# Patient Record
Sex: MALE | Race: WHITE | Employment: OTHER | URBAN - NONMETROPOLITAN AREA
[De-identification: names, ages, dates, MRNs, and addresses within clinical notes are randomized per-mention and may not be internally consistent; named-entity substitution may affect disease eponyms.]

---

## 2018-04-16 ENCOUNTER — OFFICE VISIT (OUTPATIENT)
Dept: FAMILY MEDICINE CLINIC | Age: 58
End: 2018-04-16
Payer: MEDICARE

## 2018-04-16 VITALS
HEIGHT: 73 IN | DIASTOLIC BLOOD PRESSURE: 82 MMHG | HEART RATE: 107 BPM | RESPIRATION RATE: 16 BRPM | BODY MASS INDEX: 36.18 KG/M2 | SYSTOLIC BLOOD PRESSURE: 134 MMHG | WEIGHT: 273 LBS | OXYGEN SATURATION: 92 %

## 2018-04-16 DIAGNOSIS — E11.9 TYPE 2 DIABETES MELLITUS WITHOUT COMPLICATION, WITHOUT LONG-TERM CURRENT USE OF INSULIN (HCC): ICD-10-CM

## 2018-04-16 DIAGNOSIS — F17.210 NICOTINE DEPENDENCE, CIGARETTES, UNCOMPLICATED: ICD-10-CM

## 2018-04-16 DIAGNOSIS — F41.1 GENERALIZED ANXIETY DISORDER: ICD-10-CM

## 2018-04-16 DIAGNOSIS — Z13.31 POSITIVE DEPRESSION SCREENING: ICD-10-CM

## 2018-04-16 DIAGNOSIS — R22.1 MASS IN NECK: ICD-10-CM

## 2018-04-16 DIAGNOSIS — M54.2 NECK PAIN: ICD-10-CM

## 2018-04-16 DIAGNOSIS — F33.1 MODERATE EPISODE OF RECURRENT MAJOR DEPRESSIVE DISORDER (HCC): Primary | ICD-10-CM

## 2018-04-16 PROCEDURE — G0296 VISIT TO DETERM LDCT ELIG: HCPCS | Performed by: FAMILY MEDICINE

## 2018-04-16 PROCEDURE — 3046F HEMOGLOBIN A1C LEVEL >9.0%: CPT | Performed by: FAMILY MEDICINE

## 2018-04-16 PROCEDURE — G8431 POS CLIN DEPRES SCRN F/U DOC: HCPCS | Performed by: FAMILY MEDICINE

## 2018-04-16 PROCEDURE — 99406 BEHAV CHNG SMOKING 3-10 MIN: CPT | Performed by: FAMILY MEDICINE

## 2018-04-16 PROCEDURE — 3017F COLORECTAL CA SCREEN DOC REV: CPT | Performed by: FAMILY MEDICINE

## 2018-04-16 PROCEDURE — 4004F PT TOBACCO SCREEN RCVD TLK: CPT | Performed by: FAMILY MEDICINE

## 2018-04-16 PROCEDURE — 99205 OFFICE O/P NEW HI 60 MIN: CPT | Performed by: FAMILY MEDICINE

## 2018-04-16 PROCEDURE — 96372 THER/PROPH/DIAG INJ SC/IM: CPT | Performed by: FAMILY MEDICINE

## 2018-04-16 PROCEDURE — G8417 CALC BMI ABV UP PARAM F/U: HCPCS | Performed by: FAMILY MEDICINE

## 2018-04-16 PROCEDURE — G8427 DOCREV CUR MEDS BY ELIG CLIN: HCPCS | Performed by: FAMILY MEDICINE

## 2018-04-16 PROCEDURE — 2022F DILAT RTA XM EVC RTNOPTHY: CPT | Performed by: FAMILY MEDICINE

## 2018-04-16 RX ORDER — QUETIAPINE FUMARATE 100 MG/1
150 TABLET, FILM COATED ORAL DAILY
Qty: 45 TABLET | Refills: 3
Start: 2018-04-16 | End: 2018-04-16 | Stop reason: CLARIF

## 2018-04-16 RX ORDER — QUETIAPINE FUMARATE 100 MG/1
150 TABLET, FILM COATED ORAL DAILY
Qty: 45 TABLET | Refills: 3 | Status: SHIPPED | OUTPATIENT
Start: 2018-04-16 | End: 2018-06-04

## 2018-04-16 RX ORDER — SERTRALINE HYDROCHLORIDE 100 MG/1
100 TABLET, FILM COATED ORAL DAILY
COMMUNITY
End: 2018-04-16 | Stop reason: CLARIF

## 2018-04-16 RX ORDER — VARENICLINE TARTRATE 25 MG
KIT ORAL
Qty: 1 EACH | Refills: 0 | Status: SHIPPED | OUTPATIENT
Start: 2018-04-16 | End: 2018-04-27 | Stop reason: ALTCHOICE

## 2018-04-16 RX ORDER — KETOROLAC TROMETHAMINE 30 MG/ML
60 INJECTION, SOLUTION INTRAMUSCULAR; INTRAVENOUS ONCE
Status: COMPLETED | OUTPATIENT
Start: 2018-04-16 | End: 2018-04-16

## 2018-04-16 RX ORDER — SERTRALINE HYDROCHLORIDE 100 MG/1
100 TABLET, FILM COATED ORAL DAILY
Qty: 30 TABLET | Refills: 3 | Status: SHIPPED | OUTPATIENT
Start: 2018-04-16 | End: 2018-06-04 | Stop reason: SDUPTHER

## 2018-04-16 RX ORDER — VARENICLINE TARTRATE 25 MG
KIT ORAL
Qty: 1 EACH | Refills: 0 | Status: SHIPPED | OUTPATIENT
Start: 2018-04-16 | End: 2018-04-16 | Stop reason: CLARIF

## 2018-04-16 RX ADMIN — KETOROLAC TROMETHAMINE 60 MG: 30 INJECTION, SOLUTION INTRAMUSCULAR; INTRAVENOUS at 14:00

## 2018-04-16 ASSESSMENT — PATIENT HEALTH QUESTIONNAIRE - PHQ9
SUM OF ALL RESPONSES TO PHQ9 QUESTIONS 1 & 2: 3
3. TROUBLE FALLING OR STAYING ASLEEP: 3
1. LITTLE INTEREST OR PLEASURE IN DOING THINGS: 0
5. POOR APPETITE OR OVEREATING: 1
4. FEELING TIRED OR HAVING LITTLE ENERGY: 1
9. THOUGHTS THAT YOU WOULD BE BETTER OFF DEAD, OR OF HURTING YOURSELF: 1
2. FEELING DOWN, DEPRESSED OR HOPELESS: 3
6. FEELING BAD ABOUT YOURSELF - OR THAT YOU ARE A FAILURE OR HAVE LET YOURSELF OR YOUR FAMILY DOWN: 1
SUM OF ALL RESPONSES TO PHQ QUESTIONS 1-9: 13
8. MOVING OR SPEAKING SO SLOWLY THAT OTHER PEOPLE COULD HAVE NOTICED. OR THE OPPOSITE, BEING SO FIGETY OR RESTLESS THAT YOU HAVE BEEN MOVING AROUND A LOT MORE THAN USUAL: 3
7. TROUBLE CONCENTRATING ON THINGS, SUCH AS READING THE NEWSPAPER OR WATCHING TELEVISION: 0
10. IF YOU CHECKED OFF ANY PROBLEMS, HOW DIFFICULT HAVE THESE PROBLEMS MADE IT FOR YOU TO DO YOUR WORK, TAKE CARE OF THINGS AT HOME, OR GET ALONG WITH OTHER PEOPLE: 3

## 2018-04-16 ASSESSMENT — ENCOUNTER SYMPTOMS
ABDOMINAL PAIN: 0
SHORTNESS OF BREATH: 1
CONSTIPATION: 0
NAUSEA: 0
DIARRHEA: 0
RHINORRHEA: 0
COUGH: 1
COLOR CHANGE: 1
WHEEZING: 0
EYE DISCHARGE: 0
SORE THROAT: 0

## 2018-04-23 ENCOUNTER — HOSPITAL ENCOUNTER (OUTPATIENT)
Age: 58
Discharge: HOME | End: 2018-04-23
Payer: MEDICARE

## 2018-04-23 ENCOUNTER — HOSPITAL ENCOUNTER (OUTPATIENT)
Dept: CT IMAGING | Age: 58
Discharge: HOME | End: 2018-04-23
Payer: MEDICARE

## 2018-04-23 DIAGNOSIS — E11.9 TYPE 2 DIABETES MELLITUS WITHOUT COMPLICATION, WITHOUT LONG-TERM CURRENT USE OF INSULIN (HCC): ICD-10-CM

## 2018-04-23 DIAGNOSIS — F17.210 NICOTINE DEPENDENCE, CIGARETTES, UNCOMPLICATED: ICD-10-CM

## 2018-04-23 LAB
ALBUMIN SERPL-MCNC: 3.8 G/DL (ref 3.5–5.1)
ALP BLD-CCNC: 99 U/L (ref 38–126)
ALT SERPL-CCNC: 13 U/L (ref 11–66)
ANION GAP SERPL CALCULATED.3IONS-SCNC: 12 MEQ/L (ref 8–16)
ANISOCYTOSIS: ABNORMAL
AST SERPL-CCNC: 15 U/L (ref 5–40)
AVERAGE GLUCOSE: 96 MG/DL (ref 70–126)
BASOPHILS # BLD: 0.3 %
BASOPHILS ABSOLUTE: 0 THOU/MM3 (ref 0–0.1)
BILIRUB SERPL-MCNC: 0.4 MG/DL (ref 0.3–1.2)
BUN BLDV-MCNC: 27 MG/DL (ref 7–22)
CALCIUM SERPL-MCNC: 8.9 MG/DL (ref 8.5–10.5)
CHLORIDE BLD-SCNC: 104 MEQ/L (ref 98–111)
CHOLESTEROL, TOTAL: 189 MG/DL (ref 100–199)
CO2: 26 MEQ/L (ref 23–33)
CREAT SERPL-MCNC: 1.1 MG/DL (ref 0.4–1.2)
EOSINOPHIL # BLD: 2.2 %
EOSINOPHILS ABSOLUTE: 0.3 THOU/MM3 (ref 0–0.4)
GFR SERPL CREATININE-BSD FRML MDRD: 68 ML/MIN/1.73M2
GLUCOSE BLD-MCNC: 99 MG/DL (ref 70–108)
HBA1C MFR BLD: 5.2 % (ref 4.4–6.4)
HCT VFR BLD CALC: 48.4 % (ref 42–52)
HDLC SERPL-MCNC: 67 MG/DL
HEMOGLOBIN: 16.1 GM/DL (ref 14–18)
LDL CHOLESTEROL CALCULATED: 111 MG/DL
LYMPHOCYTES # BLD: 14.1 %
LYMPHOCYTES ABSOLUTE: 1.7 THOU/MM3 (ref 1–4.8)
MCH RBC QN AUTO: 30.5 PG (ref 27–31)
MCHC RBC AUTO-ENTMCNC: 33.2 GM/DL (ref 33–37)
MCV RBC AUTO: 92 FL (ref 80–94)
MONOCYTES # BLD: 6.5 %
MONOCYTES ABSOLUTE: 0.8 THOU/MM3 (ref 0.4–1.3)
NUCLEATED RED BLOOD CELLS: 0 /100 WBC
PDW BLD-RTO: 15.3 % (ref 11.5–14.5)
PLATELET # BLD: 258 THOU/MM3 (ref 130–400)
PMV BLD AUTO: 8.7 FL (ref 7.4–10.4)
POTASSIUM SERPL-SCNC: 4.7 MEQ/L (ref 3.5–5.2)
RBC # BLD: 5.26 MILL/MM3 (ref 4.7–6.1)
SEG NEUTROPHILS: 76.9 %
SEGMENTED NEUTROPHILS ABSOLUTE COUNT: 9.1 THOU/MM3 (ref 1.8–7.7)
SODIUM BLD-SCNC: 142 MEQ/L (ref 135–145)
TOTAL PROTEIN: 6.6 G/DL (ref 6.1–8)
TRIGL SERPL-MCNC: 57 MG/DL (ref 0–199)
WBC # BLD: 11.8 THOU/MM3 (ref 4.8–10.8)

## 2018-04-23 PROCEDURE — 83036 HEMOGLOBIN GLYCOSYLATED A1C: CPT

## 2018-04-23 PROCEDURE — 80061 LIPID PANEL: CPT

## 2018-04-23 PROCEDURE — 36415 COLL VENOUS BLD VENIPUNCTURE: CPT

## 2018-04-23 PROCEDURE — 85025 COMPLETE CBC W/AUTO DIFF WBC: CPT

## 2018-04-23 PROCEDURE — G0297 LDCT FOR LUNG CA SCREEN: HCPCS

## 2018-04-23 PROCEDURE — 80053 COMPREHEN METABOLIC PANEL: CPT

## 2018-04-27 ENCOUNTER — HOSPITAL ENCOUNTER (OUTPATIENT)
Age: 58
Discharge: HOME | End: 2018-04-27
Payer: MEDICARE

## 2018-04-27 ENCOUNTER — OFFICE VISIT (OUTPATIENT)
Dept: ENT CLINIC | Age: 58
End: 2018-04-27
Payer: MEDICARE

## 2018-04-27 ENCOUNTER — TELEPHONE (OUTPATIENT)
Dept: ENT CLINIC | Age: 58
End: 2018-04-27

## 2018-04-27 ENCOUNTER — HOSPITAL ENCOUNTER (OUTPATIENT)
Dept: CT IMAGING | Age: 58
Discharge: HOME | End: 2018-04-27
Payer: MEDICARE

## 2018-04-27 VITALS
TEMPERATURE: 98.4 F | SYSTOLIC BLOOD PRESSURE: 122 MMHG | HEIGHT: 72 IN | BODY MASS INDEX: 37.65 KG/M2 | DIASTOLIC BLOOD PRESSURE: 70 MMHG | RESPIRATION RATE: 12 BRPM | WEIGHT: 278 LBS | HEART RATE: 88 BPM

## 2018-04-27 DIAGNOSIS — R59.0 LEFT CERVICAL LYMPHADENOPATHY: ICD-10-CM

## 2018-04-27 DIAGNOSIS — R22.1 MASS OF LEFT SIDE OF NECK: ICD-10-CM

## 2018-04-27 DIAGNOSIS — M54.2 CERVICALGIA: ICD-10-CM

## 2018-04-27 DIAGNOSIS — L98.8 DRAINING CUTANEOUS SINUS TRACT: ICD-10-CM

## 2018-04-27 DIAGNOSIS — D37.02 NEOPLASM OF UNCERTAIN BEHAVIOR OF BASE OF TONGUE: Primary | ICD-10-CM

## 2018-04-27 DIAGNOSIS — D37.02 NEOPLASM OF UNCERTAIN BEHAVIOR OF BASE OF TONGUE: ICD-10-CM

## 2018-04-27 PROCEDURE — 3017F COLORECTAL CA SCREEN DOC REV: CPT | Performed by: OTOLARYNGOLOGY

## 2018-04-27 PROCEDURE — 4004F PT TOBACCO SCREEN RCVD TLK: CPT | Performed by: OTOLARYNGOLOGY

## 2018-04-27 PROCEDURE — G8427 DOCREV CUR MEDS BY ELIG CLIN: HCPCS | Performed by: OTOLARYNGOLOGY

## 2018-04-27 PROCEDURE — 70491 CT SOFT TISSUE NECK W/DYE: CPT

## 2018-04-27 PROCEDURE — G8417 CALC BMI ABV UP PARAM F/U: HCPCS | Performed by: OTOLARYNGOLOGY

## 2018-04-27 PROCEDURE — 6360000004 HC RX CONTRAST MEDICATION: Performed by: OTOLARYNGOLOGY

## 2018-04-27 PROCEDURE — 99203 OFFICE O/P NEW LOW 30 MIN: CPT | Performed by: OTOLARYNGOLOGY

## 2018-04-27 RX ORDER — IBUPROFEN 600 MG/1
600 TABLET ORAL EVERY 6 HOURS PRN
Qty: 50 TABLET | Refills: 1 | Status: SHIPPED | OUTPATIENT
Start: 2018-04-27 | End: 2018-05-10 | Stop reason: ALTCHOICE

## 2018-04-27 RX ADMIN — IOPAMIDOL 80 ML: 755 INJECTION, SOLUTION INTRAVENOUS at 18:10

## 2018-04-27 ASSESSMENT — ENCOUNTER SYMPTOMS
TROUBLE SWALLOWING: 0
WHEEZING: 0
DIARRHEA: 0
APNEA: 0
FACIAL SWELLING: 0
SHORTNESS OF BREATH: 0
ABDOMINAL PAIN: 0
COUGH: 0
VOICE CHANGE: 0
NAUSEA: 0
RHINORRHEA: 0
STRIDOR: 0
CHOKING: 0
CHEST TIGHTNESS: 0
SORE THROAT: 0
VOMITING: 0
SINUS PRESSURE: 0
COLOR CHANGE: 0

## 2018-04-30 ENCOUNTER — TELEPHONE (OUTPATIENT)
Dept: ENT CLINIC | Age: 58
End: 2018-04-30

## 2018-05-03 ENCOUNTER — TELEPHONE (OUTPATIENT)
Dept: ENT CLINIC | Age: 58
End: 2018-05-03

## 2018-05-03 DIAGNOSIS — M54.2 CERVICALGIA: Primary | ICD-10-CM

## 2018-05-03 DIAGNOSIS — D37.02 NEOPLASM OF UNCERTAIN BEHAVIOR OF BASE OF TONGUE: ICD-10-CM

## 2018-05-03 DIAGNOSIS — R59.0 CERVICAL LYMPHADENOPATHY: ICD-10-CM

## 2018-05-03 RX ORDER — HYDROCODONE BITARTRATE AND ACETAMINOPHEN 5; 325 MG/1; MG/1
1 TABLET ORAL EVERY 4 HOURS PRN
Qty: 42 TABLET | Refills: 0 | Status: SHIPPED | OUTPATIENT
Start: 2018-05-03 | End: 2018-05-10

## 2018-05-07 ENCOUNTER — HOSPITAL ENCOUNTER (OUTPATIENT)
Dept: ULTRASOUND IMAGING | Age: 58
Discharge: HOME | End: 2018-05-07
Payer: MEDICARE

## 2018-05-07 DIAGNOSIS — R59.0 LEFT CERVICAL LYMPHADENOPATHY: ICD-10-CM

## 2018-05-07 DIAGNOSIS — D37.02 NEOPLASM OF UNCERTAIN BEHAVIOR OF BASE OF TONGUE: ICD-10-CM

## 2018-05-07 PROCEDURE — 88333 PATH CONSLTJ SURG CYTO XM 1: CPT

## 2018-05-07 PROCEDURE — 88341 IMHCHEM/IMCYTCHM EA ADD ANTB: CPT

## 2018-05-07 PROCEDURE — 88305 TISSUE EXAM BY PATHOLOGIST: CPT

## 2018-05-07 PROCEDURE — 88334 PATH CONSLTJ SURG CYTO XM EA: CPT

## 2018-05-07 PROCEDURE — 76942 ECHO GUIDE FOR BIOPSY: CPT

## 2018-05-07 PROCEDURE — 88342 IMHCHEM/IMCYTCHM 1ST ANTB: CPT

## 2018-05-09 ENCOUNTER — TELEPHONE (OUTPATIENT)
Dept: ENT CLINIC | Age: 58
End: 2018-05-09

## 2018-05-10 ENCOUNTER — OFFICE VISIT (OUTPATIENT)
Dept: ENT CLINIC | Age: 58
End: 2018-05-10
Payer: MEDICARE

## 2018-05-10 VITALS
DIASTOLIC BLOOD PRESSURE: 70 MMHG | BODY MASS INDEX: 37.38 KG/M2 | TEMPERATURE: 98.1 F | HEIGHT: 72 IN | RESPIRATION RATE: 12 BRPM | HEART RATE: 80 BPM | SYSTOLIC BLOOD PRESSURE: 124 MMHG | WEIGHT: 276 LBS

## 2018-05-10 DIAGNOSIS — C77.9 METASTATIC SQUAMOUS CELL CARCINOMA TO LYMPH NODE (HCC): ICD-10-CM

## 2018-05-10 DIAGNOSIS — M54.2 CERVICALGIA: ICD-10-CM

## 2018-05-10 DIAGNOSIS — C09.9 SQUAMOUS CELL CARCINOMA OF LEFT TONSIL (HCC): Primary | ICD-10-CM

## 2018-05-10 DIAGNOSIS — L98.8 DRAINING CUTANEOUS SINUS TRACT: ICD-10-CM

## 2018-05-10 PROCEDURE — 4004F PT TOBACCO SCREEN RCVD TLK: CPT | Performed by: OTOLARYNGOLOGY

## 2018-05-10 PROCEDURE — G8427 DOCREV CUR MEDS BY ELIG CLIN: HCPCS | Performed by: OTOLARYNGOLOGY

## 2018-05-10 PROCEDURE — 3017F COLORECTAL CA SCREEN DOC REV: CPT | Performed by: OTOLARYNGOLOGY

## 2018-05-10 PROCEDURE — G8417 CALC BMI ABV UP PARAM F/U: HCPCS | Performed by: OTOLARYNGOLOGY

## 2018-05-10 PROCEDURE — 99214 OFFICE O/P EST MOD 30 MIN: CPT | Performed by: OTOLARYNGOLOGY

## 2018-05-10 RX ORDER — HYDROCODONE BITARTRATE AND ACETAMINOPHEN 7.5; 325 MG/1; MG/1
1 TABLET ORAL EVERY 4 HOURS PRN
Qty: 42 TABLET | Refills: 0 | Status: SHIPPED | OUTPATIENT
Start: 2018-05-10 | End: 2018-05-17

## 2018-05-10 ASSESSMENT — ENCOUNTER SYMPTOMS
SORE THROAT: 0
APNEA: 0
STRIDOR: 0
TROUBLE SWALLOWING: 0
RHINORRHEA: 0
DIARRHEA: 0
COLOR CHANGE: 0
NAUSEA: 0
CHEST TIGHTNESS: 0
COUGH: 0
VOICE CHANGE: 0
WHEEZING: 0
SINUS PRESSURE: 0
FACIAL SWELLING: 0
VOMITING: 0
CHOKING: 0
SHORTNESS OF BREATH: 0
ABDOMINAL PAIN: 0

## 2018-05-14 ENCOUNTER — HOSPITAL ENCOUNTER (OUTPATIENT)
Dept: PET IMAGING | Age: 58
Discharge: HOME | End: 2018-05-14
Payer: MEDICARE

## 2018-05-14 DIAGNOSIS — C77.9 METASTATIC SQUAMOUS CELL CARCINOMA TO LYMPH NODE (HCC): ICD-10-CM

## 2018-05-14 DIAGNOSIS — C09.9 SQUAMOUS CELL CARCINOMA OF LEFT TONSIL (HCC): ICD-10-CM

## 2018-05-14 PROCEDURE — 78815 PET IMAGE W/CT SKULL-THIGH: CPT

## 2018-05-14 PROCEDURE — 3430000000 HC RX DIAGNOSTIC RADIOPHARMACEUTICAL: Performed by: OTOLARYNGOLOGY

## 2018-05-14 PROCEDURE — A9552 F18 FDG: HCPCS | Performed by: OTOLARYNGOLOGY

## 2018-05-14 RX ORDER — FLUDEOXYGLUCOSE F 18 200 MCI/ML
14.7 INJECTION, SOLUTION INTRAVENOUS
Status: COMPLETED | OUTPATIENT
Start: 2018-05-14 | End: 2018-05-14

## 2018-05-14 RX ADMIN — FLUDEOXYGLUCOSE F 18 14.7 MILLICURIE: 200 INJECTION, SOLUTION INTRAVENOUS at 07:40

## 2018-05-15 ENCOUNTER — CLINICAL DOCUMENTATION (OUTPATIENT)
Dept: CASE MANAGEMENT | Age: 58
End: 2018-05-15

## 2018-05-15 ENCOUNTER — HOSPITAL ENCOUNTER (OUTPATIENT)
Dept: INFUSION THERAPY | Age: 58
Discharge: HOME | End: 2018-05-15
Payer: MEDICARE

## 2018-05-15 ENCOUNTER — HOSPITAL ENCOUNTER (OUTPATIENT)
Dept: RADIATION ONCOLOGY | Age: 58
Discharge: HOME | End: 2018-05-15
Payer: MEDICARE

## 2018-05-15 ENCOUNTER — OFFICE VISIT (OUTPATIENT)
Dept: ONCOLOGY | Age: 58
End: 2018-05-15
Payer: MEDICARE

## 2018-05-15 VITALS
RESPIRATION RATE: 18 BRPM | OXYGEN SATURATION: 93 % | HEIGHT: 72 IN | BODY MASS INDEX: 36.57 KG/M2 | HEART RATE: 72 BPM | WEIGHT: 270 LBS | TEMPERATURE: 98.6 F | SYSTOLIC BLOOD PRESSURE: 137 MMHG | DIASTOLIC BLOOD PRESSURE: 73 MMHG

## 2018-05-15 DIAGNOSIS — C09.9 MALIGNANT NEOPLASM OF TONSIL (HCC): ICD-10-CM

## 2018-05-15 DIAGNOSIS — C09.9 MALIGNANT NEOPLASM OF TONSIL (HCC): Primary | ICD-10-CM

## 2018-05-15 DIAGNOSIS — C10.8 MALIGNANT NEOPLASM OF OVERLAPPING SITES OF OROPHARYNX (HCC): Primary | ICD-10-CM

## 2018-05-15 DIAGNOSIS — C76.0 HEAD AND NECK CANCER (HCC): Primary | ICD-10-CM

## 2018-05-15 DIAGNOSIS — C10.8 MALIGNANT NEOPLASM OF OVERLAPPING SITES OF OROPHARYNX (HCC): ICD-10-CM

## 2018-05-15 PROBLEM — C02.9 TONGUE CARCINOMA (HCC): Status: ACTIVE | Noted: 2018-05-15

## 2018-05-15 LAB
ALBUMIN SERPL-MCNC: 3.4 G/DL (ref 3.5–5.1)
ALP BLD-CCNC: 98 U/L (ref 38–126)
ALT SERPL-CCNC: 15 U/L (ref 11–66)
AST SERPL-CCNC: 16 U/L (ref 5–40)
BASINOPHIL, AUTOMATED: 1 % (ref 0–3)
BILIRUB SERPL-MCNC: 0.4 MG/DL (ref 0.3–1.2)
BILIRUBIN DIRECT: < 0.2 MG/DL (ref 0–0.3)
BUN, WHOLE BLOOD: 24 MG/DL (ref 8–26)
CHLORIDE, WHOLE BLOOD: 102 MEQ/L (ref 98–109)
CREATININE, WHOLE BLOOD: 1.1 MG/DL (ref 0.5–1.2)
EOSINOPHILS RELATIVE PERCENT: 3 % (ref 0–4)
GFR, ESTIMATED: 73 ML/MIN/1.73M2
GLUCOSE, WHOLE BLOOD: 108 MG/DL (ref 70–108)
HCT VFR BLD CALC: 46.2 % (ref 42–52)
HEMOGLOBIN: 15.4 GM/DL (ref 14–18)
IONIZED CALCIUM, WHOLE BLOOD: 1.15 MMOL/L (ref 1.12–1.32)
LYMPHOCYTES # BLD: 17 % (ref 15–47)
MCH RBC QN AUTO: 31.3 PG (ref 27–31)
MCHC RBC AUTO-ENTMCNC: 33.3 GM/DL (ref 33–37)
MCV RBC AUTO: 94 FL (ref 80–94)
MONOCYTES: 8 % (ref 0–12)
PDW BLD-RTO: 12.2 % (ref 11.5–14.5)
PLATELET # BLD: 243 THOU/MM3 (ref 130–400)
PMV BLD AUTO: 7.2 FL (ref 7.4–10.4)
POTASSIUM, WHOLE BLOOD: 3.8 MEQ/L (ref 3.5–4.9)
RBC # BLD: 4.92 MILL/MM3 (ref 4.7–6.1)
SEG NEUTROPHILS: 72 % (ref 43–75)
SODIUM, WHOLE BLOOD: 138 MEQ/L (ref 138–146)
TOTAL CO2, WHOLE BLOOD: 26 MEQ/L (ref 23–33)
TOTAL PROTEIN: 6.6 G/DL (ref 6.1–8)
WBC # BLD: 10.3 THOU/MM3 (ref 4.8–10.8)

## 2018-05-15 PROCEDURE — 80047 BASIC METABLC PNL IONIZED CA: CPT

## 2018-05-15 PROCEDURE — 85025 COMPLETE CBC W/AUTO DIFF WBC: CPT

## 2018-05-15 PROCEDURE — 99211 OFF/OP EST MAY X REQ PHY/QHP: CPT

## 2018-05-15 PROCEDURE — 99204 OFFICE O/P NEW MOD 45 MIN: CPT | Performed by: PHYSICIAN ASSISTANT

## 2018-05-15 PROCEDURE — 36415 COLL VENOUS BLD VENIPUNCTURE: CPT

## 2018-05-15 PROCEDURE — 99202 OFFICE O/P NEW SF 15 MIN: CPT | Performed by: RADIOLOGY

## 2018-05-15 PROCEDURE — 80076 HEPATIC FUNCTION PANEL: CPT

## 2018-05-15 RX ORDER — HEPARIN SODIUM (PORCINE) LOCK FLUSH IV SOLN 100 UNIT/ML 100 UNIT/ML
500 SOLUTION INTRAVENOUS PRN
Status: CANCELLED | OUTPATIENT
Start: 2018-05-15

## 2018-05-15 RX ORDER — SODIUM CHLORIDE 0.9 % (FLUSH) 0.9 %
20 SYRINGE (ML) INJECTION PRN
Status: CANCELLED | OUTPATIENT
Start: 2018-05-15

## 2018-05-15 RX ORDER — SODIUM CHLORIDE 0.9 % (FLUSH) 0.9 %
10 SYRINGE (ML) INJECTION PRN
Status: CANCELLED | OUTPATIENT
Start: 2018-05-15

## 2018-05-15 ASSESSMENT — ENCOUNTER SYMPTOMS
CHEST TIGHTNESS: 0
WHEEZING: 1
NAUSEA: 0
VOMITING: 0
SHORTNESS OF BREATH: 1
BLOOD IN STOOL: 0
TROUBLE SWALLOWING: 1
COUGH: 1
DIARRHEA: 0
SORE THROAT: 1
CONSTIPATION: 0
ABDOMINAL PAIN: 0

## 2018-05-17 ENCOUNTER — HOSPITAL ENCOUNTER (OUTPATIENT)
Dept: CT IMAGING | Age: 58
Discharge: HOME | End: 2018-05-17
Payer: MEDICARE

## 2018-05-17 ENCOUNTER — TELEPHONE (OUTPATIENT)
Dept: ONCOLOGY | Age: 58
End: 2018-05-17

## 2018-05-17 DIAGNOSIS — C10.8 MALIGNANT NEOPLASM OF OVERLAPPING SITES OF OROPHARYNX (HCC): ICD-10-CM

## 2018-05-17 PROCEDURE — 77332 RADIATION TREATMENT AID(S): CPT | Performed by: RADIOLOGY

## 2018-05-17 PROCEDURE — 77334 RADIATION TREATMENT AID(S): CPT | Performed by: RADIOLOGY

## 2018-05-17 PROCEDURE — 3209999900 CT GUIDE RADIATION THERAPY NO CHARGE

## 2018-05-18 ENCOUNTER — HOSPITAL ENCOUNTER (OUTPATIENT)
Dept: INFUSION THERAPY | Age: 58
Discharge: HOME | End: 2018-05-18
Payer: MEDICARE

## 2018-05-18 DIAGNOSIS — C09.9 MALIGNANT NEOPLASM OF TONSIL (HCC): ICD-10-CM

## 2018-05-18 DIAGNOSIS — C09.9 SQUAMOUS CELL CARCINOMA OF LEFT TONSIL (HCC): ICD-10-CM

## 2018-05-18 DIAGNOSIS — C02.9 TONGUE CARCINOMA (HCC): ICD-10-CM

## 2018-05-18 PROCEDURE — 99212 OFFICE O/P EST SF 10 MIN: CPT

## 2018-05-18 NOTE — PROGRESS NOTES
Ofe in to see patient. Patient states he wants a port, she states will get it ordered and consult completed Monday. Patient later stated he would rather want IR to insert. [facility] AT [facility] notified. , he will talk with dr Sarabjit Campbell on Monday about this. Patient instructed on chemotherapy specifically the drugs cisplatin and  chemotherapy regimen. The [employer] folder along with the following - chemotherapy drug information sheets,chemotherapy side effects handouts,Understanding your blood counts, [place] diet,Importance to hydration,when to call physician sheet,reduce risk infection sheet,bleeding precaution,neutropenia precaution,Chemotherapy and You and Eating Hints books was included and reviewed. All questions answered satisfactory and support given. Distress Screening  Survey completed. Patient navigator explained to patient and informed that she may be calling him/her if needs assistance. Patient given a tour of facility. Approximately 60minutes spent with patient LACP called for  and scheduled for appointment  Tuesday.  Assisted off unit in wheelchair

## 2018-05-18 NOTE — PLAN OF CARE
Problem: Intellectual/Education/Knowledge Deficit  Intervention: Verbal/written education provided  Chemotherapy Teaching     What is Chemotherapy   Drug action [x]   Method of Administration [x]   Handouts given []     Side Effects  Nausea/vomiting [x]   Diarrhea [x]   Fatigue [x]   Signs / Symptoms of infection [x]   Neutropenia [x]   Thrombocytopenia [x]   Alopecia [x]   neuropathy [x]   [place] diet &  the importance of fluids [x]       Micellaneous  Importance of nutrition [x]   Importance of oral hygiene [x]   When to call the MD [x]   Monitoring labs [x]   Use of supportive services []     Explanation of Drug Regimen / Frequency  cisplatin     Comments  Verbalized understanding to drug,action,side effects and when to call MD       Goal: Teaching initiated upon admission  Outcome: Met This Shift  Patient verbalizes understanding to verbal information given on cisplatin,action and possible side effects. Aware to call MD if develop complications. Problem: Discharge Planning  Intervention: Discharge to appropriate level of care  Discuss discharge instructions, follow ups and when to call doctor. Goal: Knowledge of discharge instructions  Knowledge of discharge instructions    Outcome: Met This Shift  Verbalized understanding of discharge instructions, follow ups and when to call doctor    Comments: Care plan reviewed with patient . Patient verbalized understanding of the plan of care and contribute to goal setting.

## 2018-05-21 ENCOUNTER — TELEPHONE (OUTPATIENT)
Dept: FAMILY MEDICINE CLINIC | Age: 58
End: 2018-05-21

## 2018-05-21 DIAGNOSIS — F41.1 GENERALIZED ANXIETY DISORDER: Primary | ICD-10-CM

## 2018-05-21 DIAGNOSIS — C09.9 MALIGNANT NEOPLASM OF TONSIL (HCC): Primary | ICD-10-CM

## 2018-05-21 RX ORDER — CLONAZEPAM 1 MG/1
1 TABLET ORAL 3 TIMES DAILY PRN
Qty: 90 TABLET | Refills: 1 | Status: SHIPPED | OUTPATIENT
Start: 2018-05-21 | End: 2018-05-23 | Stop reason: SDUPTHER

## 2018-05-22 ENCOUNTER — CLINICAL DOCUMENTATION (OUTPATIENT)
Dept: CASE MANAGEMENT | Age: 58
End: 2018-05-22

## 2018-05-22 ENCOUNTER — HOSPITAL ENCOUNTER (OUTPATIENT)
Dept: INFUSION THERAPY | Age: 58
Discharge: HOME | End: 2018-05-22
Payer: MEDICARE

## 2018-05-22 VITALS
SYSTOLIC BLOOD PRESSURE: 151 MMHG | OXYGEN SATURATION: 95 % | RESPIRATION RATE: 18 BRPM | TEMPERATURE: 97.8 F | HEIGHT: 72 IN | BODY MASS INDEX: 38.87 KG/M2 | WEIGHT: 287 LBS | DIASTOLIC BLOOD PRESSURE: 79 MMHG | HEART RATE: 74 BPM

## 2018-05-22 DIAGNOSIS — C09.9 MALIGNANT NEOPLASM OF TONSIL (HCC): ICD-10-CM

## 2018-05-22 DIAGNOSIS — F41.1 GENERALIZED ANXIETY DISORDER: ICD-10-CM

## 2018-05-22 DIAGNOSIS — R53.0 NEOPLASTIC MALIGNANT RELATED FATIGUE: ICD-10-CM

## 2018-05-22 DIAGNOSIS — Z51.11 ENCOUNTER FOR ANTINEOPLASTIC CHEMOTHERAPY: ICD-10-CM

## 2018-05-22 DIAGNOSIS — R53.1 GENERALIZED WEAKNESS: Primary | ICD-10-CM

## 2018-05-22 LAB
ALBUMIN SERPL-MCNC: 3.5 G/DL (ref 3.5–5.1)
ALP BLD-CCNC: 111 U/L (ref 38–126)
ALT SERPL-CCNC: 21 U/L (ref 11–66)
AST SERPL-CCNC: 25 U/L (ref 5–40)
BASINOPHIL, AUTOMATED: 1 % (ref 0–3)
BILIRUB SERPL-MCNC: 0.3 MG/DL (ref 0.3–1.2)
BILIRUBIN DIRECT: < 0.2 MG/DL (ref 0–0.3)
BUN, WHOLE BLOOD: 29 MG/DL (ref 8–26)
CHLORIDE, WHOLE BLOOD: 101 MEQ/L (ref 98–109)
CREATININE, WHOLE BLOOD: 1.1 MG/DL (ref 0.5–1.2)
EOSINOPHILS RELATIVE PERCENT: 3 % (ref 0–4)
GFR, ESTIMATED: 73 ML/MIN/1.73M2
GLUCOSE, WHOLE BLOOD: 67 MG/DL (ref 70–108)
HCT VFR BLD CALC: 45.6 % (ref 42–52)
HEMOGLOBIN: 15.1 GM/DL (ref 14–18)
IONIZED CALCIUM, WHOLE BLOOD: 1.16 MMOL/L (ref 1.12–1.32)
LYMPHOCYTES # BLD: 18 % (ref 15–47)
MCH RBC QN AUTO: 31.1 PG (ref 27–31)
MCHC RBC AUTO-ENTMCNC: 33.1 GM/DL (ref 33–37)
MCV RBC AUTO: 94 FL (ref 80–94)
MONOCYTES: 9 % (ref 0–12)
PDW BLD-RTO: 12.1 % (ref 11.5–14.5)
PLATELET # BLD: 249 THOU/MM3 (ref 130–400)
PMV BLD AUTO: 7.7 FL (ref 7.4–10.4)
POTASSIUM, WHOLE BLOOD: 4.7 MEQ/L (ref 3.5–4.9)
RBC # BLD: 4.84 MILL/MM3 (ref 4.7–6.1)
SEG NEUTROPHILS: 70 % (ref 43–75)
SODIUM, WHOLE BLOOD: 138 MEQ/L (ref 138–146)
TOTAL CO2, WHOLE BLOOD: 28 MEQ/L (ref 23–33)
TOTAL PROTEIN: 6.7 G/DL (ref 6.1–8)
WBC # BLD: 10.9 THOU/MM3 (ref 4.8–10.8)

## 2018-05-22 PROCEDURE — 96415 CHEMO IV INFUSION ADDL HR: CPT

## 2018-05-22 PROCEDURE — 96375 TX/PRO/DX INJ NEW DRUG ADDON: CPT

## 2018-05-22 PROCEDURE — 6360000002 HC RX W HCPCS: Performed by: INTERNAL MEDICINE

## 2018-05-22 PROCEDURE — 85025 COMPLETE CBC W/AUTO DIFF WBC: CPT

## 2018-05-22 PROCEDURE — 2580000003 HC RX 258: Performed by: INTERNAL MEDICINE

## 2018-05-22 PROCEDURE — 80076 HEPATIC FUNCTION PANEL: CPT

## 2018-05-22 PROCEDURE — 96367 TX/PROPH/DG ADDL SEQ IV INF: CPT

## 2018-05-22 PROCEDURE — 99212 OFFICE O/P EST SF 10 MIN: CPT

## 2018-05-22 PROCEDURE — 80047 BASIC METABLC PNL IONIZED CA: CPT

## 2018-05-22 PROCEDURE — 77300 RADIATION THERAPY DOSE PLAN: CPT | Performed by: RADIOLOGY

## 2018-05-22 PROCEDURE — 77338 DESIGN MLC DEVICE FOR IMRT: CPT | Performed by: RADIOLOGY

## 2018-05-22 PROCEDURE — 96413 CHEMO IV INFUSION 1 HR: CPT

## 2018-05-22 PROCEDURE — 36415 COLL VENOUS BLD VENIPUNCTURE: CPT

## 2018-05-22 PROCEDURE — 96366 THER/PROPH/DIAG IV INF ADDON: CPT

## 2018-05-22 PROCEDURE — 77301 RADIOTHERAPY DOSE PLAN IMRT: CPT | Performed by: RADIOLOGY

## 2018-05-22 RX ORDER — LIDOCAINE AND PRILOCAINE 25; 25 MG/G; MG/G
1 CREAM TOPICAL PRN
COMMUNITY
End: 2018-05-22 | Stop reason: SDUPTHER

## 2018-05-22 RX ORDER — SODIUM CHLORIDE 9 MG/ML
INJECTION, SOLUTION INTRAVENOUS ONCE
Status: CANCELLED | OUTPATIENT
Start: 2018-05-29 | End: 2018-05-29

## 2018-05-22 RX ORDER — ONDANSETRON 4 MG/1
4 TABLET, FILM COATED ORAL EVERY 8 HOURS PRN
Qty: 30 TABLET | Refills: 3 | Status: SHIPPED | OUTPATIENT
Start: 2018-05-22 | End: 2018-07-17 | Stop reason: ALTCHOICE

## 2018-05-22 RX ORDER — SODIUM CHLORIDE 9 MG/ML
INJECTION, SOLUTION INTRAVENOUS CONTINUOUS
Status: CANCELLED | OUTPATIENT
Start: 2018-05-22

## 2018-05-22 RX ORDER — LIDOCAINE AND PRILOCAINE 25; 25 MG/G; MG/G
1 CREAM TOPICAL PRN
Qty: 1 TUBE | Refills: 1 | Status: SHIPPED | OUTPATIENT
Start: 2018-05-22 | End: 2018-10-12

## 2018-05-22 RX ORDER — METHYLPREDNISOLONE SODIUM SUCCINATE 125 MG/2ML
125 INJECTION, POWDER, LYOPHILIZED, FOR SOLUTION INTRAMUSCULAR; INTRAVENOUS ONCE
Status: CANCELLED | OUTPATIENT
Start: 2018-05-29 | End: 2018-05-29

## 2018-05-22 RX ORDER — PALONOSETRON 0.05 MG/ML
0.25 INJECTION, SOLUTION INTRAVENOUS ONCE
Status: COMPLETED | OUTPATIENT
Start: 2018-05-22 | End: 2018-05-22

## 2018-05-22 RX ORDER — OXYCODONE HYDROCHLORIDE 5 MG/1
10 CAPSULE ORAL EVERY 4 HOURS PRN
COMMUNITY
End: 2018-08-22 | Stop reason: DRUGHIGH

## 2018-05-22 RX ORDER — HEPARIN SODIUM (PORCINE) LOCK FLUSH IV SOLN 100 UNIT/ML 100 UNIT/ML
500 SOLUTION INTRAVENOUS PRN
Status: CANCELLED | OUTPATIENT
Start: 2018-05-29

## 2018-05-22 RX ORDER — ONDANSETRON 4 MG/1
4 TABLET, FILM COATED ORAL EVERY 8 HOURS PRN
COMMUNITY
End: 2018-05-22 | Stop reason: SDUPTHER

## 2018-05-22 RX ORDER — PALONOSETRON 0.05 MG/ML
0.25 INJECTION, SOLUTION INTRAVENOUS ONCE
Status: CANCELLED | OUTPATIENT
Start: 2018-05-29

## 2018-05-22 RX ORDER — SODIUM CHLORIDE 0.9 % (FLUSH) 0.9 %
10 SYRINGE (ML) INJECTION PRN
Status: CANCELLED | OUTPATIENT
Start: 2018-05-22

## 2018-05-22 RX ORDER — SODIUM CHLORIDE 0.9 % (FLUSH) 0.9 %
10 SYRINGE (ML) INJECTION PRN
Status: CANCELLED | OUTPATIENT
Start: 2018-05-29

## 2018-05-22 RX ORDER — EPINEPHRINE 1 MG/ML
0.3 INJECTION, SOLUTION, CONCENTRATE INTRAVENOUS PRN
Status: CANCELLED | OUTPATIENT
Start: 2018-05-29

## 2018-05-22 RX ORDER — METHYLPREDNISOLONE SODIUM SUCCINATE 125 MG/2ML
125 INJECTION, POWDER, LYOPHILIZED, FOR SOLUTION INTRAMUSCULAR; INTRAVENOUS ONCE
Status: CANCELLED | OUTPATIENT
Start: 2018-05-22 | End: 2018-05-22

## 2018-05-22 RX ORDER — 0.9 % SODIUM CHLORIDE 0.9 %
10 VIAL (ML) INJECTION ONCE
Status: CANCELLED | OUTPATIENT
Start: 2018-05-22 | End: 2018-05-22

## 2018-05-22 RX ORDER — HEPARIN SODIUM (PORCINE) LOCK FLUSH IV SOLN 100 UNIT/ML 100 UNIT/ML
500 SOLUTION INTRAVENOUS PRN
Status: CANCELLED | OUTPATIENT
Start: 2018-05-22

## 2018-05-22 RX ORDER — CLONAZEPAM 1 MG/1
1 TABLET ORAL 3 TIMES DAILY PRN
Qty: 90 TABLET | Refills: 1 | Status: CANCELLED | OUTPATIENT
Start: 2018-05-22 | End: 2018-06-22

## 2018-05-22 RX ORDER — DIPHENHYDRAMINE HYDROCHLORIDE 50 MG/ML
50 INJECTION INTRAMUSCULAR; INTRAVENOUS ONCE
Status: CANCELLED | OUTPATIENT
Start: 2018-05-22 | End: 2018-05-22

## 2018-05-22 RX ORDER — SODIUM CHLORIDE 9 MG/ML
INJECTION, SOLUTION INTRAVENOUS CONTINUOUS
Status: CANCELLED | OUTPATIENT
Start: 2018-05-29

## 2018-05-22 RX ORDER — SODIUM CHLORIDE 9 MG/ML
INJECTION, SOLUTION INTRAVENOUS ONCE
Status: COMPLETED | OUTPATIENT
Start: 2018-05-22 | End: 2018-05-22

## 2018-05-22 RX ORDER — 0.9 % SODIUM CHLORIDE 0.9 %
10 VIAL (ML) INJECTION ONCE
Status: CANCELLED | OUTPATIENT
Start: 2018-05-29 | End: 2018-05-29

## 2018-05-22 RX ORDER — DIPHENHYDRAMINE HYDROCHLORIDE 50 MG/ML
50 INJECTION INTRAMUSCULAR; INTRAVENOUS ONCE
Status: CANCELLED | OUTPATIENT
Start: 2018-05-29 | End: 2018-05-29

## 2018-05-22 RX ORDER — EPINEPHRINE 1 MG/ML
0.3 INJECTION, SOLUTION, CONCENTRATE INTRAVENOUS PRN
Status: CANCELLED | OUTPATIENT
Start: 2018-05-22

## 2018-05-22 RX ORDER — SODIUM CHLORIDE 0.9 % (FLUSH) 0.9 %
5 SYRINGE (ML) INJECTION PRN
Status: CANCELLED | OUTPATIENT
Start: 2018-05-29

## 2018-05-22 RX ORDER — SODIUM CHLORIDE 0.9 % (FLUSH) 0.9 %
5 SYRINGE (ML) INJECTION PRN
Status: CANCELLED | OUTPATIENT
Start: 2018-05-22

## 2018-05-22 RX ADMIN — POTASSIUM CHLORIDE: 2 INJECTION, SOLUTION, CONCENTRATE INTRAVENOUS at 09:14

## 2018-05-22 RX ADMIN — SODIUM CHLORIDE: 9 INJECTION, SOLUTION INTRAVENOUS at 09:11

## 2018-05-22 RX ADMIN — CISPLATIN: 100 INJECTION, SOLUTION INTRAVENOUS at 11:57

## 2018-05-22 RX ADMIN — PALONOSETRON HYDROCHLORIDE 0.25 MG: 0.25 INJECTION INTRAVENOUS at 11:15

## 2018-05-22 RX ADMIN — POTASSIUM CHLORIDE: 2 INJECTION, SOLUTION, CONCENTRATE INTRAVENOUS at 14:02

## 2018-05-22 RX ADMIN — SODIUM CHLORIDE 150 MG: 9 INJECTION, SOLUTION INTRAVENOUS at 10:42

## 2018-05-22 RX ADMIN — DEXAMETHASONE SODIUM PHOSPHATE 12 MG: 4 INJECTION, SOLUTION INTRAMUSCULAR; INTRAVENOUS at 11:19

## 2018-05-22 NOTE — PROGRESS NOTES
Chemotherapy Administration    Pre-assessment Data: Antineoplastic Agents  Other:   See toxicity flow sheet for assessment [x]     Physician Notification of Concerns Related to Chemotherapy Administration:   Physician Notified Briseida Campo / Time of Notification      Interventions:   Lab work assessed  [x]   Height / Weight verified for dose [x]   Current MAR reviewed [x]   Emergency drugs available as appropriate [x]   Anaphylaxis assessment completed [x]   Pre-medications administered as ordered [x]   Blood return noted upon initiation of chemotherapy [x]   Blood return noted each 1-2ml of a vesicant medication if given IV push []   Blood return noted each 2-3ml of a non-vesicant medication if given IV push []   Monitor for signs / symptoms of hypersensitivity reaction [x]   Chemotherapy orders (drug/dose/rate) verified by 2 Chemo certified RNs [x]   Monitor IV site and blood return throughout the infusion of the medication [x]   Document IV site checks on the IV assessment form [x]   Document chemotherapy teaching on the Patient Education tab [x]   Document patient verbalizes understanding of medications being administered [x]   If IV infiltration, see ONS Guidelines []   Other:      []

## 2018-05-22 NOTE — PROGRESS NOTES
Patient assessed for the following post chemotherapy:    Dizziness   No  Lightheadedness  No      Acute nausea/vomiting No  Headache   No  Chest pain/pressure  No  Rash/itching   No  Shortness of breath  No    Patient kept for 20 minutes observation post infusion chemotherapy. Patient tolerated chemotherapy treatment cisplatin without any complications. Last vital signs:   BP (!) 151/79   Pulse 74   Temp 97.8 °F (36.6 °C) (Oral)   Resp 18   Ht 6' 0.01\" (1.829 m)   Wt 287 lb (130.2 kg)   SpO2 95%   BMI 38.91 kg/m²         Patient instructed if experience any of the above symptoms following today's infusion,he/she is to notify MD immediately or go to the emergency department. Discharge instructions given to patient. Verbalizes understanding.  Off unit in wheelchair, wanted to wait outside for [facility] express ride

## 2018-05-23 ENCOUNTER — TELEPHONE (OUTPATIENT)
Dept: FAMILY MEDICINE CLINIC | Age: 58
End: 2018-05-23

## 2018-05-23 DIAGNOSIS — F41.1 GENERALIZED ANXIETY DISORDER: ICD-10-CM

## 2018-05-23 PROCEDURE — 77338 DESIGN MLC DEVICE FOR IMRT: CPT | Performed by: RADIOLOGY

## 2018-05-23 PROCEDURE — 77386 HC NTSTY MODUL RAD TX DLVR CPLX: CPT | Performed by: RADIOLOGY

## 2018-05-23 PROCEDURE — 77470 SPECIAL RADIATION TREATMENT: CPT | Performed by: RADIOLOGY

## 2018-05-23 RX ORDER — CLONAZEPAM 1 MG/1
1 TABLET ORAL 3 TIMES DAILY PRN
Qty: 90 TABLET | Refills: 1 | Status: SHIPPED | OUTPATIENT
Start: 2018-05-23 | End: 2018-07-18 | Stop reason: SDUPTHER

## 2018-05-24 ENCOUNTER — NURSE ONLY (OUTPATIENT)
Dept: INFUSION THERAPY | Age: 58
End: 2018-05-24

## 2018-05-24 PROCEDURE — 77386 HC NTSTY MODUL RAD TX DLVR CPLX: CPT | Performed by: RADIOLOGY

## 2018-05-25 DIAGNOSIS — C09.9 MALIGNANT NEOPLASM OF TONSIL (HCC): Primary | ICD-10-CM

## 2018-05-25 PROCEDURE — 77386 HC NTSTY MODUL RAD TX DLVR CPLX: CPT | Performed by: RADIOLOGY

## 2018-05-29 ENCOUNTER — HOSPITAL ENCOUNTER (OUTPATIENT)
Dept: INFUSION THERAPY | Age: 58
Discharge: HOME | End: 2018-05-29
Payer: MEDICARE

## 2018-05-29 ENCOUNTER — CLINICAL DOCUMENTATION (OUTPATIENT)
Dept: CASE MANAGEMENT | Age: 58
End: 2018-05-29

## 2018-05-29 VITALS
OXYGEN SATURATION: 93 % | TEMPERATURE: 97.9 F | RESPIRATION RATE: 18 BRPM | BODY MASS INDEX: 37.96 KG/M2 | HEART RATE: 86 BPM | SYSTOLIC BLOOD PRESSURE: 139 MMHG | WEIGHT: 280 LBS | DIASTOLIC BLOOD PRESSURE: 65 MMHG

## 2018-05-29 DIAGNOSIS — Z51.11 ENCOUNTER FOR ANTINEOPLASTIC CHEMOTHERAPY: ICD-10-CM

## 2018-05-29 DIAGNOSIS — C09.9 MALIGNANT NEOPLASM OF TONSIL (HCC): ICD-10-CM

## 2018-05-29 LAB
ALBUMIN SERPL-MCNC: 3.6 G/DL (ref 3.5–5.1)
ALP BLD-CCNC: 80 U/L (ref 38–126)
ALT SERPL-CCNC: 20 U/L (ref 11–66)
ANISOCYTOSIS: ABNORMAL
AST SERPL-CCNC: 24 U/L (ref 5–40)
BASOPHILS # BLD: 0.7 %
BASOPHILS ABSOLUTE: 0.1 THOU/MM3 (ref 0–0.1)
BILIRUB SERPL-MCNC: 0.7 MG/DL (ref 0.3–1.2)
BILIRUBIN DIRECT: < 0.2 MG/DL (ref 0–0.3)
BUN, WHOLE BLOOD: 24 MG/DL (ref 8–26)
CHLORIDE, WHOLE BLOOD: 103 MEQ/L (ref 98–109)
CREATININE, WHOLE BLOOD: 1.1 MG/DL (ref 0.5–1.2)
EOSINOPHIL # BLD: 1.7 %
EOSINOPHILS ABSOLUTE: 0.2 THOU/MM3 (ref 0–0.4)
GFR, ESTIMATED: 73 ML/MIN/1.73M2
GLUCOSE, WHOLE BLOOD: 123 MG/DL (ref 70–108)
HCT VFR BLD CALC: 42.6 % (ref 42–52)
HEMOGLOBIN: 14.4 GM/DL (ref 14–18)
IONIZED CALCIUM, WHOLE BLOOD: 1.18 MMOL/L (ref 1.12–1.32)
LYMPHOCYTES # BLD: 8.3 %
LYMPHOCYTES ABSOLUTE: 1.2 THOU/MM3 (ref 1–4.8)
MCH RBC QN AUTO: 31.8 PG (ref 27–31)
MCHC RBC AUTO-ENTMCNC: 33.9 GM/DL (ref 33–37)
MCV RBC AUTO: 94 FL (ref 80–94)
MONOCYTES # BLD: 6.6 %
MONOCYTES ABSOLUTE: 1 THOU/MM3 (ref 0.4–1.3)
NUCLEATED RED BLOOD CELLS: 0 /100 WBC
PDW BLD-RTO: 11.4 % (ref 11.5–14.5)
PLATELET # BLD: 239 THOU/MM3 (ref 130–400)
PMV BLD AUTO: 7.4 FL (ref 7.4–10.4)
POTASSIUM, WHOLE BLOOD: 4.5 MEQ/L (ref 3.5–4.9)
RBC # BLD: 4.53 MILL/MM3 (ref 4.7–6.1)
SEG NEUTROPHILS: 82.7 %
SEGMENTED NEUTROPHILS ABSOLUTE COUNT: 12 THOU/MM3 (ref 1.8–7.7)
SODIUM, WHOLE BLOOD: 141 MEQ/L (ref 138–146)
TOTAL CO2, WHOLE BLOOD: 28 MEQ/L (ref 23–33)
TOTAL PROTEIN: 6.4 G/DL (ref 6.1–8)
WBC # BLD: 14.5 THOU/MM3 (ref 4.8–10.8)

## 2018-05-29 PROCEDURE — 96366 THER/PROPH/DIAG IV INF ADDON: CPT

## 2018-05-29 PROCEDURE — 80047 BASIC METABLC PNL IONIZED CA: CPT

## 2018-05-29 PROCEDURE — 36415 COLL VENOUS BLD VENIPUNCTURE: CPT

## 2018-05-29 PROCEDURE — 80076 HEPATIC FUNCTION PANEL: CPT

## 2018-05-29 PROCEDURE — 96413 CHEMO IV INFUSION 1 HR: CPT

## 2018-05-29 PROCEDURE — 85025 COMPLETE CBC W/AUTO DIFF WBC: CPT

## 2018-05-29 PROCEDURE — 77386 HC NTSTY MODUL RAD TX DLVR CPLX: CPT

## 2018-05-29 PROCEDURE — 96415 CHEMO IV INFUSION ADDL HR: CPT

## 2018-05-29 PROCEDURE — 77336 RADIATION PHYSICS CONSULT: CPT

## 2018-05-29 PROCEDURE — 6360000002 HC RX W HCPCS: Performed by: INTERNAL MEDICINE

## 2018-05-29 PROCEDURE — 96367 TX/PROPH/DG ADDL SEQ IV INF: CPT

## 2018-05-29 PROCEDURE — 2580000003 HC RX 258: Performed by: INTERNAL MEDICINE

## 2018-05-29 PROCEDURE — 96375 TX/PRO/DX INJ NEW DRUG ADDON: CPT

## 2018-05-29 RX ORDER — SODIUM CHLORIDE 9 MG/ML
INJECTION, SOLUTION INTRAVENOUS ONCE
Status: COMPLETED | OUTPATIENT
Start: 2018-05-29 | End: 2018-05-29

## 2018-05-29 RX ORDER — PALONOSETRON 0.05 MG/ML
0.25 INJECTION, SOLUTION INTRAVENOUS ONCE
Status: COMPLETED | OUTPATIENT
Start: 2018-05-29 | End: 2018-05-29

## 2018-05-29 RX ADMIN — DEXAMETHASONE SODIUM PHOSPHATE 12 MG: 4 INJECTION, SOLUTION INTRAMUSCULAR; INTRAVENOUS at 12:37

## 2018-05-29 RX ADMIN — POTASSIUM CHLORIDE: 2 INJECTION, SOLUTION, CONCENTRATE INTRAVENOUS at 13:00

## 2018-05-29 RX ADMIN — SODIUM CHLORIDE: 9 INJECTION, SOLUTION INTRAVENOUS at 12:02

## 2018-05-29 RX ADMIN — SODIUM CHLORIDE 150 MG: 9 INJECTION, SOLUTION INTRAVENOUS at 12:03

## 2018-05-29 RX ADMIN — CISPLATIN: 100 INJECTION, SOLUTION INTRAVENOUS at 14:15

## 2018-05-29 RX ADMIN — PALONOSETRON HYDROCHLORIDE 0.25 MG: 0.25 INJECTION INTRAVENOUS at 12:02

## 2018-05-29 RX ADMIN — POTASSIUM CHLORIDE: 2 INJECTION, SOLUTION, CONCENTRATE INTRAVENOUS at 16:11

## 2018-05-29 ASSESSMENT — PAIN SCALES - GENERAL
PAINLEVEL_OUTOF10: 8
PAINLEVEL_OUTOF10: 6

## 2018-05-29 ASSESSMENT — PAIN DESCRIPTION - PAIN TYPE
TYPE: CHRONIC PAIN
TYPE: CHRONIC PAIN

## 2018-05-29 ASSESSMENT — PAIN DESCRIPTION - INTENSITY: RATING_2: 8

## 2018-05-29 ASSESSMENT — PAIN DESCRIPTION - ONSET
ONSET: ON-GOING
ONSET: ON-GOING
ONSET_2: ON-GOING

## 2018-05-29 ASSESSMENT — PAIN DESCRIPTION - FREQUENCY
FREQUENCY: CONTINUOUS
FREQUENCY: CONTINUOUS

## 2018-05-29 ASSESSMENT — PAIN DESCRIPTION - PROGRESSION
CLINICAL_PROGRESSION: NOT CHANGED
CLINICAL_PROGRESSION: NOT CHANGED

## 2018-05-29 ASSESSMENT — PAIN DESCRIPTION - DESCRIPTORS
DESCRIPTORS: CONSTANT;SHARP;SHOOTING
DESCRIPTORS: CONSTANT;SHOOTING
DESCRIPTORS_2: ACHING;DULL

## 2018-05-29 ASSESSMENT — PAIN DESCRIPTION - LOCATION
LOCATION: THROAT
LOCATION: THROAT

## 2018-05-29 ASSESSMENT — PAIN DESCRIPTION - ORIENTATION
ORIENTATION: MID
ORIENTATION: MID
ORIENTATION_2: LEFT;RIGHT

## 2018-05-29 NOTE — PLAN OF CARE
Problem: Pain:  Intervention: Opioid analgesia side-effects  Patient well versed on current pain medication and regime    Goal: Control of chronic pain  Control of chronic pain   Outcome: Ongoing  Patient currently doesn't have adequate pain control. Pain medication being prescribed by Dr Saleem Hernandez in Radiation therapy and message left with NP about pain not in control and will discuss with doctor and patient tomorrow     Problem: Musculor/Skeletal Functional Status  Goal: Absence of falls  Outcome: Met This Shift  No falls this admission    Problem: Intellectual/Education/Knowledge Deficit  Intervention: Verbal/written education provided  Discharge instruction sheets    Goal: Teaching initiated upon admission  Outcome: Met This Shift  Patient aware of fall precautions for here and at home -call light in reach while here  Goal: Written Disposition Instruction form completed  Outcome: Met This Shift  Discharge instructions given and reviewed with patient. All questions answered. Patient verbalized understanding    Problem: Discharge Planning  Intervention: Interaction with patient/family and care team  Patient currently denies any other needs other than pain  Intervention: Discharge to appropriate level of care  Discharge home    Goal: Knowledge of discharge instructions  Knowledge of discharge instructions    Outcome: Met This Shift  Patient  able to teach back follow up appointments and when to call the doctor. Patient offers no questions at this time    Comments: Care plan reviewed with patient and he verbalized understanding of the plan of care and contributed to goal setting.

## 2018-05-29 NOTE — PROGRESS NOTES
Patient assessed for the following post chemotherapy:    Dizziness   No  Lightheadedness  No      Acute nausea/vomiting No  Headache   No  Chest pain/pressure  No  Rash/itching   No  Shortness of breath  No    Patient kept for 20 minutes observation post infusion chemotherapy. Patient tolerated chemotherapy treatment with cisplatin without any complications. Last vital signs:   /65   Pulse 86   Temp 97.9 °F (36.6 °C) (Oral)   Resp 18   Wt 280 lb (127 kg)   SpO2 93%   BMI 37.96 kg/m²     Patient instructed if experience any of the above symptoms following today's infusion,he is to notify MD immediately or go to the emergency department. Discharge instructions given to patient. Verbalizes understanding. off unit via wheelchair accompanied by nurse. Patient had all belonging with him at discharge.

## 2018-05-30 ENCOUNTER — CLINICAL DOCUMENTATION (OUTPATIENT)
Dept: NUTRITION | Age: 58
End: 2018-05-30

## 2018-05-30 ENCOUNTER — HOSPITAL ENCOUNTER (OUTPATIENT)
Dept: INTERVENTIONAL RADIOLOGY/VASCULAR | Age: 58
Discharge: HOME | End: 2018-05-30
Payer: MEDICARE

## 2018-05-30 VITALS
OXYGEN SATURATION: 92 % | DIASTOLIC BLOOD PRESSURE: 69 MMHG | RESPIRATION RATE: 18 BRPM | HEART RATE: 91 BPM | TEMPERATURE: 97.5 F | BODY MASS INDEX: 37.96 KG/M2 | SYSTOLIC BLOOD PRESSURE: 138 MMHG | WEIGHT: 280 LBS

## 2018-05-30 DIAGNOSIS — C09.9 MALIGNANT NEOPLASM OF TONSIL (HCC): ICD-10-CM

## 2018-05-30 PROCEDURE — 6360000002 HC RX W HCPCS: Performed by: RADIOLOGY

## 2018-05-30 PROCEDURE — C1788 PORT, INDWELLING, IMP: HCPCS

## 2018-05-30 PROCEDURE — C1769 GUIDE WIRE: HCPCS

## 2018-05-30 PROCEDURE — 36561 INSERT TUNNELED CV CATH: CPT | Performed by: RADIOLOGY

## 2018-05-30 PROCEDURE — 77386 HC NTSTY MODUL RAD TX DLVR CPLX: CPT | Performed by: RADIOLOGY

## 2018-05-30 PROCEDURE — 2500000003 HC RX 250 WO HCPCS: Performed by: RADIOLOGY

## 2018-05-30 PROCEDURE — A6454 SELF-ADHER BAND W>=3" <5"/YD: HCPCS

## 2018-05-30 PROCEDURE — 76937 US GUIDE VASCULAR ACCESS: CPT | Performed by: RADIOLOGY

## 2018-05-30 PROCEDURE — 77001 FLUOROGUIDE FOR VEIN DEVICE: CPT | Performed by: RADIOLOGY

## 2018-05-30 PROCEDURE — C1894 INTRO/SHEATH, NON-LASER: HCPCS

## 2018-05-30 PROCEDURE — 2580000003 HC RX 258: Performed by: RADIOLOGY

## 2018-05-30 PROCEDURE — 2500000003 HC RX 250 WO HCPCS

## 2018-05-30 PROCEDURE — 6360000002 HC RX W HCPCS

## 2018-05-30 RX ORDER — HEPARIN SODIUM (PORCINE) LOCK FLUSH IV SOLN 100 UNIT/ML 100 UNIT/ML
500 SOLUTION INTRAVENOUS ONCE
Status: COMPLETED | OUTPATIENT
Start: 2018-05-30 | End: 2018-05-30

## 2018-05-30 RX ORDER — SODIUM CHLORIDE 450 MG/100ML
INJECTION, SOLUTION INTRAVENOUS CONTINUOUS
Status: DISCONTINUED | OUTPATIENT
Start: 2018-05-30 | End: 2018-05-31 | Stop reason: HOSPADM

## 2018-05-30 RX ORDER — CEFAZOLIN SODIUM 1 G/50ML
1 INJECTION, SOLUTION INTRAVENOUS
Status: COMPLETED | OUTPATIENT
Start: 2018-05-30 | End: 2018-05-30

## 2018-05-30 RX ORDER — FENTANYL CITRATE 50 UG/ML
50 INJECTION, SOLUTION INTRAMUSCULAR; INTRAVENOUS ONCE
Status: COMPLETED | OUTPATIENT
Start: 2018-05-30 | End: 2018-05-30

## 2018-05-30 RX ORDER — MIDAZOLAM HYDROCHLORIDE 1 MG/ML
1 INJECTION INTRAMUSCULAR; INTRAVENOUS ONCE
Status: COMPLETED | OUTPATIENT
Start: 2018-05-30 | End: 2018-05-30

## 2018-05-30 RX ADMIN — FENTANYL CITRATE 50 MCG: 50 INJECTION, SOLUTION INTRAMUSCULAR; INTRAVENOUS at 12:25

## 2018-05-30 RX ADMIN — HEPARIN SODIUM (PORCINE) LOCK FLUSH IV SOLN 100 UNIT/ML 500 UNITS: 100 SOLUTION at 12:38

## 2018-05-30 RX ADMIN — CEFAZOLIN SODIUM 1 G: 1 INJECTION, SOLUTION INTRAVENOUS at 08:34

## 2018-05-30 RX ADMIN — MIDAZOLAM HYDROCHLORIDE 0.5 MG: 1 INJECTION INTRAMUSCULAR; INTRAVENOUS at 12:24

## 2018-05-30 RX ADMIN — SODIUM CHLORIDE: 4.5 INJECTION, SOLUTION INTRAVENOUS at 08:34

## 2018-05-30 RX ADMIN — SODIUM CHLORIDE 50000 UNITS: 900 IRRIGANT IRRIGATION at 12:39

## 2018-05-30 ASSESSMENT — PAIN DESCRIPTION - ORIENTATION
ORIENTATION: LEFT
ORIENTATION_2: LEFT;RIGHT
ORIENTATION: LEFT

## 2018-05-30 ASSESSMENT — PAIN DESCRIPTION - FREQUENCY: FREQUENCY: INTERMITTENT

## 2018-05-30 ASSESSMENT — PAIN SCALES - GENERAL
PAINLEVEL_OUTOF10: 7
PAINLEVEL_OUTOF10: 6
PAINLEVEL_OUTOF10: 6
PAINLEVEL_OUTOF10: 8
PAINLEVEL_OUTOF10: 7
PAINLEVEL_OUTOF10: 7

## 2018-05-30 ASSESSMENT — PAIN DESCRIPTION - PAIN TYPE
TYPE: SURGICAL PAIN
TYPE: CHRONIC PAIN
TYPE_2: CHRONIC PAIN
TYPE: CHRONIC PAIN

## 2018-05-30 ASSESSMENT — PAIN DESCRIPTION - LOCATION
LOCATION: NECK
LOCATION_2: HEAD
LOCATION: NECK

## 2018-05-30 ASSESSMENT — PAIN DESCRIPTION - ONSET: ONSET: ON-GOING

## 2018-05-30 ASSESSMENT — PAIN DESCRIPTION - DESCRIPTORS
DESCRIPTORS: SHARP
DESCRIPTORS: ACHING
DESCRIPTORS_2: ACHING

## 2018-05-30 ASSESSMENT — PAIN DESCRIPTION - INTENSITY: RATING_2: 7

## 2018-05-31 PROCEDURE — 77386 HC NTSTY MODUL RAD TX DLVR CPLX: CPT | Performed by: RADIOLOGY

## 2018-06-01 ENCOUNTER — HOSPITAL ENCOUNTER (OUTPATIENT)
Dept: RADIATION ONCOLOGY | Age: 58
Discharge: HOME | End: 2018-06-01
Payer: MEDICARE

## 2018-06-01 PROCEDURE — 77386 HC NTSTY MODUL RAD TX DLVR CPLX: CPT | Performed by: RADIOLOGY

## 2018-06-02 ENCOUNTER — HOSPITAL ENCOUNTER (EMERGENCY)
Age: 58
Discharge: LEFT BEFORE DISCHARGE | End: 2018-06-02
Attending: FAMILY MEDICINE
Payer: MEDICARE

## 2018-06-02 ENCOUNTER — APPOINTMENT (OUTPATIENT)
Dept: GENERAL RADIOLOGY | Age: 58
End: 2018-06-02
Payer: MEDICARE

## 2018-06-02 ENCOUNTER — NURSE TRIAGE (OUTPATIENT)
Dept: ADMINISTRATIVE | Age: 58
End: 2018-06-02

## 2018-06-02 VITALS
OXYGEN SATURATION: 97 % | HEART RATE: 57 BPM | DIASTOLIC BLOOD PRESSURE: 76 MMHG | TEMPERATURE: 98.3 F | RESPIRATION RATE: 20 BRPM | SYSTOLIC BLOOD PRESSURE: 116 MMHG

## 2018-06-02 DIAGNOSIS — I50.9 ACUTE ON CHRONIC CONGESTIVE HEART FAILURE, UNSPECIFIED CONGESTIVE HEART FAILURE TYPE: Primary | ICD-10-CM

## 2018-06-02 DIAGNOSIS — R23.8 SKIN IRRITATION: ICD-10-CM

## 2018-06-02 DIAGNOSIS — R06.89 DYSPNEA AND RESPIRATORY ABNORMALITIES: ICD-10-CM

## 2018-06-02 DIAGNOSIS — R06.00 DYSPNEA AND RESPIRATORY ABNORMALITIES: ICD-10-CM

## 2018-06-02 LAB
ALBUMIN SERPL-MCNC: 3.1 G/DL (ref 3.5–5.1)
ALP BLD-CCNC: 107 U/L (ref 38–126)
ALT SERPL-CCNC: 68 U/L (ref 11–66)
ANION GAP SERPL CALCULATED.3IONS-SCNC: 9 MEQ/L (ref 8–16)
AST SERPL-CCNC: 54 U/L (ref 5–40)
BASOPHILS # BLD: 0.8 %
BASOPHILS ABSOLUTE: 0.1 THOU/MM3 (ref 0–0.1)
BILIRUB SERPL-MCNC: 1.1 MG/DL (ref 0.3–1.2)
BUN BLDV-MCNC: 24 MG/DL (ref 7–22)
CALCIUM SERPL-MCNC: 8.3 MG/DL (ref 8.5–10.5)
CHLORIDE BLD-SCNC: 104 MEQ/L (ref 98–111)
CO2: 26 MEQ/L (ref 23–33)
CREAT SERPL-MCNC: 0.8 MG/DL (ref 0.4–1.2)
EKG ATRIAL RATE: 79 BPM
EKG P AXIS: 75 DEGREES
EKG P-R INTERVAL: 136 MS
EKG Q-T INTERVAL: 372 MS
EKG QRS DURATION: 88 MS
EKG QTC CALCULATION (BAZETT): 426 MS
EKG R AXIS: 19 DEGREES
EKG T AXIS: 50 DEGREES
EKG VENTRICULAR RATE: 79 BPM
EOSINOPHIL # BLD: 1.5 %
EOSINOPHILS ABSOLUTE: 0.2 THOU/MM3 (ref 0–0.4)
GFR SERPL CREATININE-BSD FRML MDRD: > 90 ML/MIN/1.73M2
GLUCOSE BLD-MCNC: 125 MG/DL (ref 70–108)
HCT VFR BLD CALC: 40.7 % (ref 42–52)
HEMOGLOBIN: 13.7 GM/DL (ref 14–18)
LACTIC ACID, SEPSIS: 0.9 MMOL/L (ref 0.5–1.9)
LYMPHOCYTES # BLD: 4.5 %
LYMPHOCYTES ABSOLUTE: 0.5 THOU/MM3 (ref 1–4.8)
MCH RBC QN AUTO: 31.2 PG (ref 27–31)
MCHC RBC AUTO-ENTMCNC: 33.7 GM/DL (ref 33–37)
MCV RBC AUTO: 92.5 FL (ref 80–94)
MONOCYTES # BLD: 10.2 %
MONOCYTES ABSOLUTE: 1.2 THOU/MM3 (ref 0.4–1.3)
NUCLEATED RED BLOOD CELLS: 0 /100 WBC
OSMOLALITY CALCULATION: 283.1 MOSMOL/KG (ref 275–300)
PDW BLD-RTO: 14.3 % (ref 11.5–14.5)
PLATELET # BLD: 212 THOU/MM3 (ref 130–400)
PMV BLD AUTO: 8.2 FL (ref 7.4–10.4)
POTASSIUM SERPL-SCNC: 4.6 MEQ/L (ref 3.5–5.2)
PRO-BNP: 1187 PG/ML (ref 0–900)
PROCALCITONIN: 0.06 NG/ML (ref 0.01–0.09)
RBC # BLD: 4.4 MILL/MM3 (ref 4.7–6.1)
SEG NEUTROPHILS: 83 %
SEGMENTED NEUTROPHILS ABSOLUTE COUNT: 9.8 THOU/MM3 (ref 1.8–7.7)
SODIUM BLD-SCNC: 139 MEQ/L (ref 135–145)
TOTAL PROTEIN: 5.9 G/DL (ref 6.1–8)
WBC # BLD: 11.8 THOU/MM3 (ref 4.8–10.8)

## 2018-06-02 PROCEDURE — 84145 PROCALCITONIN (PCT): CPT

## 2018-06-02 PROCEDURE — 71046 X-RAY EXAM CHEST 2 VIEWS: CPT

## 2018-06-02 PROCEDURE — 93005 ELECTROCARDIOGRAM TRACING: CPT | Performed by: FAMILY MEDICINE

## 2018-06-02 PROCEDURE — 83605 ASSAY OF LACTIC ACID: CPT

## 2018-06-02 PROCEDURE — 36415 COLL VENOUS BLD VENIPUNCTURE: CPT

## 2018-06-02 PROCEDURE — 99284 EMERGENCY DEPT VISIT MOD MDM: CPT

## 2018-06-02 PROCEDURE — 83880 ASSAY OF NATRIURETIC PEPTIDE: CPT

## 2018-06-02 PROCEDURE — 85025 COMPLETE CBC W/AUTO DIFF WBC: CPT

## 2018-06-02 PROCEDURE — 87040 BLOOD CULTURE FOR BACTERIA: CPT

## 2018-06-02 PROCEDURE — 80053 COMPREHEN METABOLIC PANEL: CPT

## 2018-06-02 RX ORDER — BACITRACIN, NEOMYCIN, POLYMYXIN B 400; 3.5; 5 [USP'U]/G; MG/G; [USP'U]/G
OINTMENT TOPICAL ONCE
Status: DISCONTINUED | OUTPATIENT
Start: 2018-06-02 | End: 2018-06-02 | Stop reason: HOSPADM

## 2018-06-02 ASSESSMENT — PAIN DESCRIPTION - PAIN TYPE: TYPE: ACUTE PAIN

## 2018-06-02 ASSESSMENT — ENCOUNTER SYMPTOMS
EYE REDNESS: 0
COUGH: 0
NAUSEA: 0
DIARRHEA: 0
SHORTNESS OF BREATH: 0
WHEEZING: 0
RHINORRHEA: 0
BACK PAIN: 0
EYE DISCHARGE: 0
ABDOMINAL PAIN: 0
VOMITING: 0
SORE THROAT: 0

## 2018-06-02 ASSESSMENT — PAIN DESCRIPTION - ORIENTATION: ORIENTATION: RIGHT

## 2018-06-02 ASSESSMENT — PAIN DESCRIPTION - LOCATION: LOCATION: OTHER (COMMENT)

## 2018-06-02 ASSESSMENT — PAIN SCALES - GENERAL: PAINLEVEL_OUTOF10: 6

## 2018-06-04 ENCOUNTER — OFFICE VISIT (OUTPATIENT)
Dept: FAMILY MEDICINE CLINIC | Age: 58
End: 2018-06-04
Payer: MEDICARE

## 2018-06-04 VITALS
BODY MASS INDEX: 39.28 KG/M2 | HEART RATE: 94 BPM | DIASTOLIC BLOOD PRESSURE: 74 MMHG | SYSTOLIC BLOOD PRESSURE: 118 MMHG | WEIGHT: 290 LBS | OXYGEN SATURATION: 98 % | HEIGHT: 72 IN | RESPIRATION RATE: 20 BRPM

## 2018-06-04 DIAGNOSIS — F17.210 CIGARETTE NICOTINE DEPENDENCE WITHOUT COMPLICATION: ICD-10-CM

## 2018-06-04 DIAGNOSIS — C09.9 SQUAMOUS CELL CARCINOMA OF LEFT TONSIL (HCC): ICD-10-CM

## 2018-06-04 DIAGNOSIS — F41.1 GENERALIZED ANXIETY DISORDER: ICD-10-CM

## 2018-06-04 DIAGNOSIS — F33.1 MODERATE EPISODE OF RECURRENT MAJOR DEPRESSIVE DISORDER (HCC): ICD-10-CM

## 2018-06-04 DIAGNOSIS — C09.9 MALIGNANT NEOPLASM OF TONSIL (HCC): Primary | ICD-10-CM

## 2018-06-04 DIAGNOSIS — C02.9 TONGUE CARCINOMA (HCC): ICD-10-CM

## 2018-06-04 PROCEDURE — 77386 HC NTSTY MODUL RAD TX DLVR CPLX: CPT

## 2018-06-04 PROCEDURE — 4004F PT TOBACCO SCREEN RCVD TLK: CPT | Performed by: FAMILY MEDICINE

## 2018-06-04 PROCEDURE — G8417 CALC BMI ABV UP PARAM F/U: HCPCS | Performed by: FAMILY MEDICINE

## 2018-06-04 PROCEDURE — 99214 OFFICE O/P EST MOD 30 MIN: CPT | Performed by: FAMILY MEDICINE

## 2018-06-04 PROCEDURE — 3017F COLORECTAL CA SCREEN DOC REV: CPT | Performed by: FAMILY MEDICINE

## 2018-06-04 PROCEDURE — 77336 RADIATION PHYSICS CONSULT: CPT

## 2018-06-04 PROCEDURE — G8427 DOCREV CUR MEDS BY ELIG CLIN: HCPCS | Performed by: FAMILY MEDICINE

## 2018-06-04 RX ORDER — ALBUTEROL SULFATE 90 UG/1
2 AEROSOL, METERED RESPIRATORY (INHALATION) EVERY 6 HOURS PRN
Qty: 1 INHALER | Refills: 2 | Status: SHIPPED | OUTPATIENT
Start: 2018-06-04 | End: 2019-04-13 | Stop reason: SDUPTHER

## 2018-06-04 RX ORDER — VARENICLINE TARTRATE 25 MG
KIT ORAL
Qty: 1 EACH | Refills: 0 | Status: SHIPPED | OUTPATIENT
Start: 2018-06-04 | End: 2018-08-14 | Stop reason: ALTCHOICE

## 2018-06-04 RX ORDER — MORPHINE SULFATE 30 MG/1
60 TABLET ORAL EVERY 12 HOURS
COMMUNITY
End: 2018-08-22 | Stop reason: SDUPTHER

## 2018-06-04 RX ORDER — SERTRALINE HYDROCHLORIDE 100 MG/1
150 TABLET, FILM COATED ORAL DAILY
Qty: 45 TABLET | Refills: 5 | Status: SHIPPED | OUTPATIENT
Start: 2018-06-04 | End: 2018-07-17 | Stop reason: ALTCHOICE

## 2018-06-04 ASSESSMENT — ENCOUNTER SYMPTOMS
CONSTIPATION: 0
EYE DISCHARGE: 0
COLOR CHANGE: 1
ABDOMINAL PAIN: 0
DIARRHEA: 0
SORE THROAT: 0
SHORTNESS OF BREATH: 0
WHEEZING: 1
RHINORRHEA: 0
NAUSEA: 0
COUGH: 0

## 2018-06-05 ENCOUNTER — NURSE ONLY (OUTPATIENT)
Dept: INFUSION THERAPY | Age: 58
End: 2018-06-05

## 2018-06-05 ENCOUNTER — OFFICE VISIT (OUTPATIENT)
Dept: ONCOLOGY | Age: 58
End: 2018-06-05
Payer: MEDICARE

## 2018-06-05 ENCOUNTER — HOSPITAL ENCOUNTER (OUTPATIENT)
Dept: INFUSION THERAPY | Age: 58
Discharge: HOME | End: 2018-06-05
Payer: MEDICARE

## 2018-06-05 VITALS
HEART RATE: 77 BPM | RESPIRATION RATE: 20 BRPM | SYSTOLIC BLOOD PRESSURE: 127 MMHG | TEMPERATURE: 98.6 F | OXYGEN SATURATION: 92 % | DIASTOLIC BLOOD PRESSURE: 67 MMHG | BODY MASS INDEX: 39.32 KG/M2 | HEIGHT: 72 IN

## 2018-06-05 VITALS
TEMPERATURE: 98.6 F | SYSTOLIC BLOOD PRESSURE: 115 MMHG | DIASTOLIC BLOOD PRESSURE: 54 MMHG | HEART RATE: 68 BPM | OXYGEN SATURATION: 98 % | RESPIRATION RATE: 18 BRPM

## 2018-06-05 DIAGNOSIS — C76.0 HEAD AND NECK CANCER (HCC): ICD-10-CM

## 2018-06-05 DIAGNOSIS — R22.1 MASS IN NECK: ICD-10-CM

## 2018-06-05 DIAGNOSIS — Z51.11 ENCOUNTER FOR ANTINEOPLASTIC CHEMOTHERAPY: ICD-10-CM

## 2018-06-05 DIAGNOSIS — C09.9 MALIGNANT NEOPLASM OF TONSIL (HCC): ICD-10-CM

## 2018-06-05 DIAGNOSIS — C09.9 MALIGNANT NEOPLASM OF TONSIL (HCC): Primary | ICD-10-CM

## 2018-06-05 DIAGNOSIS — E11.9 TYPE 2 DIABETES MELLITUS WITHOUT COMPLICATION, WITHOUT LONG-TERM CURRENT USE OF INSULIN (HCC): ICD-10-CM

## 2018-06-05 DIAGNOSIS — C02.9 TONGUE CARCINOMA (HCC): ICD-10-CM

## 2018-06-05 DIAGNOSIS — C09.9 SQUAMOUS CELL CARCINOMA OF LEFT TONSIL (HCC): ICD-10-CM

## 2018-06-05 LAB
ALBUMIN SERPL-MCNC: 3 G/DL (ref 3.5–5.1)
ALP BLD-CCNC: 213 U/L (ref 38–126)
ALT SERPL-CCNC: 62 U/L (ref 11–66)
AST SERPL-CCNC: 40 U/L (ref 5–40)
BASINOPHIL, AUTOMATED: 1 % (ref 0–3)
BILIRUB SERPL-MCNC: 0.9 MG/DL (ref 0.3–1.2)
BILIRUBIN DIRECT: 0.5 MG/DL (ref 0–0.3)
BUN, WHOLE BLOOD: 21 MG/DL (ref 8–26)
CHLORIDE, WHOLE BLOOD: 96 MEQ/L (ref 98–109)
CREATININE, WHOLE BLOOD: 0.9 MG/DL (ref 0.5–1.2)
EOSINOPHILS RELATIVE PERCENT: 2 % (ref 0–4)
GFR, ESTIMATED: > 90 ML/MIN/1.73M2
GLUCOSE, WHOLE BLOOD: 116 MG/DL (ref 70–108)
HCT VFR BLD CALC: 35.4 % (ref 42–52)
HEMOGLOBIN: 12.4 GM/DL (ref 14–18)
IONIZED CALCIUM, WHOLE BLOOD: 1.12 MMOL/L (ref 1.12–1.32)
LYMPHOCYTES # BLD: 7 % (ref 15–47)
MCH RBC QN AUTO: 32 PG (ref 27–31)
MCHC RBC AUTO-ENTMCNC: 35 GM/DL (ref 33–37)
MCV RBC AUTO: 91 FL (ref 80–94)
MONOCYTES: 13 % (ref 0–12)
PDW BLD-RTO: 11.5 % (ref 11.5–14.5)
PLATELET # BLD: 164 THOU/MM3 (ref 130–400)
PMV BLD AUTO: 7.7 FL (ref 7.4–10.4)
POTASSIUM, WHOLE BLOOD: 4 MEQ/L (ref 3.5–4.9)
RBC # BLD: 3.88 MILL/MM3 (ref 4.7–6.1)
SEG NEUTROPHILS: 78 % (ref 43–75)
SODIUM, WHOLE BLOOD: 133 MEQ/L (ref 138–146)
TOTAL CO2, WHOLE BLOOD: 26 MEQ/L (ref 23–33)
TOTAL PROTEIN: 5.7 G/DL (ref 6.1–8)
WBC # BLD: 11.8 THOU/MM3 (ref 4.8–10.8)

## 2018-06-05 PROCEDURE — 85025 COMPLETE CBC W/AUTO DIFF WBC: CPT

## 2018-06-05 PROCEDURE — 96413 CHEMO IV INFUSION 1 HR: CPT

## 2018-06-05 PROCEDURE — 96415 CHEMO IV INFUSION ADDL HR: CPT

## 2018-06-05 PROCEDURE — 6370000000 HC RX 637 (ALT 250 FOR IP): Performed by: PHYSICIAN ASSISTANT

## 2018-06-05 PROCEDURE — 96367 TX/PROPH/DG ADDL SEQ IV INF: CPT

## 2018-06-05 PROCEDURE — 6360000002 HC RX W HCPCS: Performed by: INTERNAL MEDICINE

## 2018-06-05 PROCEDURE — 96366 THER/PROPH/DIAG IV INF ADDON: CPT

## 2018-06-05 PROCEDURE — G8427 DOCREV CUR MEDS BY ELIG CLIN: HCPCS | Performed by: INTERNAL MEDICINE

## 2018-06-05 PROCEDURE — 3017F COLORECTAL CA SCREEN DOC REV: CPT | Performed by: INTERNAL MEDICINE

## 2018-06-05 PROCEDURE — 96375 TX/PRO/DX INJ NEW DRUG ADDON: CPT

## 2018-06-05 PROCEDURE — G8417 CALC BMI ABV UP PARAM F/U: HCPCS | Performed by: INTERNAL MEDICINE

## 2018-06-05 PROCEDURE — G0463 HOSPITAL OUTPT CLINIC VISIT: HCPCS

## 2018-06-05 PROCEDURE — 2580000003 HC RX 258: Performed by: INTERNAL MEDICINE

## 2018-06-05 PROCEDURE — 80076 HEPATIC FUNCTION PANEL: CPT

## 2018-06-05 PROCEDURE — 77386 HC NTSTY MODUL RAD TX DLVR CPLX: CPT | Performed by: RADIOLOGY

## 2018-06-05 PROCEDURE — 4004F PT TOBACCO SCREEN RCVD TLK: CPT | Performed by: INTERNAL MEDICINE

## 2018-06-05 PROCEDURE — 36415 COLL VENOUS BLD VENIPUNCTURE: CPT

## 2018-06-05 PROCEDURE — 99215 OFFICE O/P EST HI 40 MIN: CPT | Performed by: INTERNAL MEDICINE

## 2018-06-05 PROCEDURE — 80047 BASIC METABLC PNL IONIZED CA: CPT

## 2018-06-05 RX ORDER — METHYLPREDNISOLONE SODIUM SUCCINATE 125 MG/2ML
125 INJECTION, POWDER, LYOPHILIZED, FOR SOLUTION INTRAMUSCULAR; INTRAVENOUS ONCE
Status: CANCELLED | OUTPATIENT
Start: 2018-06-05 | End: 2018-06-05

## 2018-06-05 RX ORDER — HEPARIN SODIUM (PORCINE) LOCK FLUSH IV SOLN 100 UNIT/ML 100 UNIT/ML
500 SOLUTION INTRAVENOUS PRN
Status: CANCELLED | OUTPATIENT
Start: 2018-06-05

## 2018-06-05 RX ORDER — PALONOSETRON 0.05 MG/ML
0.25 INJECTION, SOLUTION INTRAVENOUS ONCE
Status: CANCELLED | OUTPATIENT
Start: 2018-06-05

## 2018-06-05 RX ORDER — DIPHENHYDRAMINE HYDROCHLORIDE 50 MG/ML
50 INJECTION INTRAMUSCULAR; INTRAVENOUS ONCE
Status: CANCELLED | OUTPATIENT
Start: 2018-06-05 | End: 2018-06-05

## 2018-06-05 RX ORDER — SODIUM CHLORIDE 0.9 % (FLUSH) 0.9 %
10 SYRINGE (ML) INJECTION PRN
Status: CANCELLED | OUTPATIENT
Start: 2018-06-05

## 2018-06-05 RX ORDER — SODIUM CHLORIDE 9 MG/ML
INJECTION, SOLUTION INTRAVENOUS ONCE
Status: COMPLETED | OUTPATIENT
Start: 2018-06-05 | End: 2018-06-05

## 2018-06-05 RX ORDER — SODIUM CHLORIDE 0.9 % (FLUSH) 0.9 %
10 SYRINGE (ML) INJECTION PRN
Status: DISCONTINUED | OUTPATIENT
Start: 2018-06-05 | End: 2018-06-06 | Stop reason: HOSPADM

## 2018-06-05 RX ORDER — SODIUM CHLORIDE 0.9 % (FLUSH) 0.9 %
5 SYRINGE (ML) INJECTION PRN
Status: CANCELLED | OUTPATIENT
Start: 2018-06-05

## 2018-06-05 RX ORDER — ALBUTEROL SULFATE 90 UG/1
2 AEROSOL, METERED RESPIRATORY (INHALATION) ONCE
Status: CANCELLED
Start: 2018-06-05 | End: 2018-06-05

## 2018-06-05 RX ORDER — PALONOSETRON 0.05 MG/ML
0.25 INJECTION, SOLUTION INTRAVENOUS ONCE
Status: COMPLETED | OUTPATIENT
Start: 2018-06-05 | End: 2018-06-05

## 2018-06-05 RX ORDER — ALBUTEROL SULFATE 90 UG/1
2 AEROSOL, METERED RESPIRATORY (INHALATION) ONCE
Status: COMPLETED | OUTPATIENT
Start: 2018-06-05 | End: 2018-06-05

## 2018-06-05 RX ORDER — SODIUM CHLORIDE 9 MG/ML
INJECTION, SOLUTION INTRAVENOUS ONCE
Status: CANCELLED | OUTPATIENT
Start: 2018-06-05 | End: 2018-06-05

## 2018-06-05 RX ORDER — HEPARIN SODIUM (PORCINE) LOCK FLUSH IV SOLN 100 UNIT/ML 100 UNIT/ML
500 SOLUTION INTRAVENOUS PRN
Status: DISCONTINUED | OUTPATIENT
Start: 2018-06-05 | End: 2018-06-06 | Stop reason: HOSPADM

## 2018-06-05 RX ORDER — 0.9 % SODIUM CHLORIDE 0.9 %
10 VIAL (ML) INJECTION ONCE
Status: CANCELLED | OUTPATIENT
Start: 2018-06-05 | End: 2018-06-05

## 2018-06-05 RX ORDER — SODIUM CHLORIDE 0.9 % (FLUSH) 0.9 %
20 SYRINGE (ML) INJECTION PRN
Status: CANCELLED | OUTPATIENT
Start: 2018-06-05

## 2018-06-05 RX ORDER — SODIUM CHLORIDE 9 MG/ML
INJECTION, SOLUTION INTRAVENOUS CONTINUOUS
Status: CANCELLED | OUTPATIENT
Start: 2018-06-05

## 2018-06-05 RX ADMIN — SODIUM CHLORIDE: 9 INJECTION, SOLUTION INTRAVENOUS at 10:26

## 2018-06-05 RX ADMIN — Medication 10 ML: at 16:32

## 2018-06-05 RX ADMIN — SODIUM CHLORIDE 150 MG: 9 INJECTION, SOLUTION INTRAVENOUS at 12:24

## 2018-06-05 RX ADMIN — Medication 2 PUFF: at 14:16

## 2018-06-05 RX ADMIN — CISPLATIN: 100 INJECTION, SOLUTION INTRAVENOUS at 13:02

## 2018-06-05 RX ADMIN — Medication 10 ML: at 09:40

## 2018-06-05 RX ADMIN — SODIUM CHLORIDE, PRESERVATIVE FREE 500 UNITS: 5 INJECTION INTRAVENOUS at 16:32

## 2018-06-05 RX ADMIN — POTASSIUM CHLORIDE: 2 INJECTION, SOLUTION, CONCENTRATE INTRAVENOUS at 10:38

## 2018-06-05 RX ADMIN — POTASSIUM CHLORIDE: 2 INJECTION, SOLUTION, CONCENTRATE INTRAVENOUS at 15:25

## 2018-06-05 RX ADMIN — Medication 10 ML: at 09:41

## 2018-06-05 RX ADMIN — DEXAMETHASONE SODIUM PHOSPHATE 12 MG: 4 INJECTION, SOLUTION INTRAMUSCULAR; INTRAVENOUS at 11:49

## 2018-06-05 RX ADMIN — PALONOSETRON HYDROCHLORIDE 0.25 MG: 0.25 INJECTION INTRAVENOUS at 12:22

## 2018-06-05 NOTE — PLAN OF CARE
Problem: Infection - Central Venous Catheter-Associated Bloodstream Infection:  Intervention: Central line needs assessment  Discussed port maintenance, infection prevention, signs and when to call the doctor    Goal: Will show no infection signs and symptoms  Will show no infection signs and symptoms  Outcome: Met This Shift  Mediport site with no redness or warmth. Skin over port intact with no signs of breakdown noted. Steri strips intact. Bruising noted under mediport site and right breast. Blisters noted lateral side of tape edges Patient verbalizes signs/symptoms of port infection and when to notify the physician. Problem: Musculor/Skeletal Functional Status  Intervention: Fall precautions  Discussed fall precautions, call light within reach. Goal: Absence of falls  Outcome: Met This Shift  Patient verbalizes understanding to verbal information given on fall precautions. Patient free from falls this visit. Problem: Intellectual/Education/Knowledge Deficit  Intervention: Verbal/written education provided  Chemotherapy Teaching     What is Chemotherapy   Drug action [x]   Method of Administration [x]   Handouts given []     Side Effects  Nausea/vomiting [x]   Diarrhea [x]   Fatigue [x]   Signs / Symptoms of infection [x]   Neutropenia [x]   Thrombocytopenia [x]   Alopecia [x]   neuropathy [x]   [place] diet &  the importance of fluids [x]       Micellaneous  Importance of nutrition [x]   Importance of oral hygiene [x]   When to call the MD [x]   Monitoring labs [x]   Use of supportive services []     Explanation of Drug Regimen / Frequency  cisplatin     Comments  Verbalized understanding to drug,action,side effects and when to call MD       Goal: Teaching initiated upon admission  Outcome: Met This Shift  Patient verbalizes understanding to verbal information given on cisplatin,action and possible side effects. Aware to call MD if develop complications.      Problem: Discharge Planning  Intervention: Interaction with patient/family and care team  Discuss discharge instructions, follow ups, and when to call the doctor. Goal: Knowledge of discharge instructions  Knowledge of discharge instructions    Outcome: Met This Shift  Verbalized understanding of discharge instructions, follow-up appointments, and when to call the physician. Comments: Care plan reviewed with patient. Patient verbalize understanding of the plan of care and contribute to goal setting.

## 2018-06-05 NOTE — PROGRESS NOTES
Patient assessed for the following post chemotherapy:    Dizziness   No  Lightheadedness  No      Acute nausea/vomiting No  Headache   No  Chest pain/pressure  No  Rash/itching   No  Shortness of breath  No    Patient kept for 20 minutes observation post infusion chemotherapy. Patient tolerated chemotherapy treatment cisplatin without any complications. Last vital signs:   BP (!) 115/54   Pulse 68   Temp 98.6 °F (37 °C) (Oral)   Resp 18   SpO2 98%       Patient instructed if experience any of the above symptoms following today's infusion,he/she is to notify MD immediately or go to the emergency department. Discharge instructions given to patient. Verbalizes understanding. Off unit in wheelchair. LACP called from transportation home.

## 2018-06-05 NOTE — PROGRESS NOTES
Patient has 2 large tape burns to right upper chest, about 2 inches from Mediport site. Steri strips in place to port site. Very large bruise to chest under port site. Dominick Teixeira in to see, instructed patient to apply antibiotic ointment to tape burns. Patient verbalized understanding.

## 2018-06-05 NOTE — PROGRESS NOTES
Patient breathing easier after albuterol. Still having cough with thick sputum, but wheezing improved.

## 2018-06-06 PROCEDURE — 77386 HC NTSTY MODUL RAD TX DLVR CPLX: CPT | Performed by: RADIOLOGY

## 2018-06-07 PROCEDURE — 77386 HC NTSTY MODUL RAD TX DLVR CPLX: CPT

## 2018-06-08 LAB
BLOOD CULTURE, ROUTINE: NORMAL
BLOOD CULTURE, ROUTINE: NORMAL

## 2018-06-08 PROCEDURE — 77386 HC NTSTY MODUL RAD TX DLVR CPLX: CPT | Performed by: RADIOLOGY

## 2018-06-10 RX ORDER — SODIUM CHLORIDE 0.9 % (FLUSH) 0.9 %
5 SYRINGE (ML) INJECTION PRN
Status: CANCELLED | OUTPATIENT
Start: 2018-06-12

## 2018-06-10 RX ORDER — 0.9 % SODIUM CHLORIDE 0.9 %
10 VIAL (ML) INJECTION ONCE
Status: CANCELLED | OUTPATIENT
Start: 2018-06-12 | End: 2018-06-12

## 2018-06-10 RX ORDER — EPINEPHRINE 1 MG/ML
0.3 INJECTION, SOLUTION, CONCENTRATE INTRAVENOUS PRN
Status: CANCELLED | OUTPATIENT
Start: 2018-06-12

## 2018-06-10 RX ORDER — DIPHENHYDRAMINE HYDROCHLORIDE 50 MG/ML
50 INJECTION INTRAMUSCULAR; INTRAVENOUS ONCE
Status: CANCELLED | OUTPATIENT
Start: 2018-06-12 | End: 2018-06-12

## 2018-06-10 RX ORDER — METHYLPREDNISOLONE SODIUM SUCCINATE 125 MG/2ML
125 INJECTION, POWDER, LYOPHILIZED, FOR SOLUTION INTRAMUSCULAR; INTRAVENOUS ONCE
Status: CANCELLED | OUTPATIENT
Start: 2018-06-12 | End: 2018-06-12

## 2018-06-10 RX ORDER — SODIUM CHLORIDE 9 MG/ML
INJECTION, SOLUTION INTRAVENOUS CONTINUOUS
Status: CANCELLED | OUTPATIENT
Start: 2018-06-12

## 2018-06-11 PROCEDURE — 77336 RADIATION PHYSICS CONSULT: CPT | Performed by: RADIOLOGY

## 2018-06-11 PROCEDURE — 77386 HC NTSTY MODUL RAD TX DLVR CPLX: CPT | Performed by: RADIOLOGY

## 2018-06-12 ENCOUNTER — HOSPITAL ENCOUNTER (OUTPATIENT)
Dept: INFUSION THERAPY | Age: 58
Discharge: HOME | End: 2018-06-12
Payer: MEDICARE

## 2018-06-12 VITALS
HEART RATE: 66 BPM | DIASTOLIC BLOOD PRESSURE: 74 MMHG | RESPIRATION RATE: 18 BRPM | OXYGEN SATURATION: 98 % | TEMPERATURE: 98.5 F | HEIGHT: 72 IN | SYSTOLIC BLOOD PRESSURE: 144 MMHG

## 2018-06-12 DIAGNOSIS — Z51.11 ENCOUNTER FOR ANTINEOPLASTIC CHEMOTHERAPY: ICD-10-CM

## 2018-06-12 DIAGNOSIS — C09.9 MALIGNANT NEOPLASM OF TONSIL (HCC): ICD-10-CM

## 2018-06-12 LAB
BASINOPHIL, AUTOMATED: 0 % (ref 0–3)
BUN, WHOLE BLOOD: 13 MG/DL (ref 8–26)
CHLORIDE, WHOLE BLOOD: 98 MEQ/L (ref 98–109)
CREATININE, WHOLE BLOOD: 0.8 MG/DL (ref 0.5–1.2)
EOSINOPHILS RELATIVE PERCENT: 4 % (ref 0–4)
GFR, ESTIMATED: > 90 ML/MIN/1.73M2
GLUCOSE, WHOLE BLOOD: 95 MG/DL (ref 70–108)
HCT VFR BLD CALC: 36.8 % (ref 42–52)
HEMOGLOBIN: 12.8 GM/DL (ref 14–18)
IONIZED CALCIUM, WHOLE BLOOD: 1.17 MMOL/L (ref 1.12–1.32)
LYMPHOCYTES # BLD: 14 % (ref 15–47)
MCH RBC QN AUTO: 31.8 PG (ref 27–31)
MCHC RBC AUTO-ENTMCNC: 34.8 GM/DL (ref 33–37)
MCV RBC AUTO: 92 FL (ref 80–94)
MONOCYTES: 11 % (ref 0–12)
PDW BLD-RTO: 11.6 % (ref 11.5–14.5)
PLATELET # BLD: 187 THOU/MM3 (ref 130–400)
PMV BLD AUTO: 7 FL (ref 7.4–10.4)
POTASSIUM, WHOLE BLOOD: 4.3 MEQ/L (ref 3.5–4.9)
RBC # BLD: 4.02 MILL/MM3 (ref 4.7–6.1)
SEG NEUTROPHILS: 71 % (ref 43–75)
SODIUM, WHOLE BLOOD: 137 MEQ/L (ref 138–146)
TOTAL CO2, WHOLE BLOOD: 29 MEQ/L (ref 23–33)
WBC # BLD: 6.8 THOU/MM3 (ref 4.8–10.8)

## 2018-06-12 PROCEDURE — 96413 CHEMO IV INFUSION 1 HR: CPT

## 2018-06-12 PROCEDURE — 36591 DRAW BLOOD OFF VENOUS DEVICE: CPT

## 2018-06-12 PROCEDURE — 2580000003 HC RX 258: Performed by: INTERNAL MEDICINE

## 2018-06-12 PROCEDURE — 96367 TX/PROPH/DG ADDL SEQ IV INF: CPT

## 2018-06-12 PROCEDURE — 77386 HC NTSTY MODUL RAD TX DLVR CPLX: CPT | Performed by: RADIOLOGY

## 2018-06-12 PROCEDURE — 96415 CHEMO IV INFUSION ADDL HR: CPT

## 2018-06-12 PROCEDURE — 96366 THER/PROPH/DIAG IV INF ADDON: CPT

## 2018-06-12 PROCEDURE — 6360000002 HC RX W HCPCS: Performed by: INTERNAL MEDICINE

## 2018-06-12 PROCEDURE — 85025 COMPLETE CBC W/AUTO DIFF WBC: CPT

## 2018-06-12 PROCEDURE — 80047 BASIC METABLC PNL IONIZED CA: CPT

## 2018-06-12 PROCEDURE — 96375 TX/PRO/DX INJ NEW DRUG ADDON: CPT

## 2018-06-12 RX ORDER — PALONOSETRON 0.05 MG/ML
0.25 INJECTION, SOLUTION INTRAVENOUS ONCE
Status: COMPLETED | OUTPATIENT
Start: 2018-06-12 | End: 2018-06-12

## 2018-06-12 RX ORDER — SODIUM CHLORIDE 9 MG/ML
INJECTION, SOLUTION INTRAVENOUS ONCE
Status: COMPLETED | OUTPATIENT
Start: 2018-06-12 | End: 2018-06-12

## 2018-06-12 RX ORDER — SODIUM CHLORIDE 0.9 % (FLUSH) 0.9 %
10 SYRINGE (ML) INJECTION PRN
Status: DISCONTINUED | OUTPATIENT
Start: 2018-06-12 | End: 2018-06-13 | Stop reason: HOSPADM

## 2018-06-12 RX ORDER — HEPARIN SODIUM (PORCINE) LOCK FLUSH IV SOLN 100 UNIT/ML 100 UNIT/ML
500 SOLUTION INTRAVENOUS PRN
Status: DISCONTINUED | OUTPATIENT
Start: 2018-06-12 | End: 2018-06-13 | Stop reason: HOSPADM

## 2018-06-12 RX ADMIN — Medication 10 ML: at 16:39

## 2018-06-12 RX ADMIN — SODIUM CHLORIDE, PRESERVATIVE FREE 500 UNITS: 5 INJECTION INTRAVENOUS at 16:39

## 2018-06-12 RX ADMIN — DEXAMETHASONE SODIUM PHOSPHATE 12 MG: 4 INJECTION, SOLUTION INTRAMUSCULAR; INTRAVENOUS at 11:44

## 2018-06-12 RX ADMIN — POTASSIUM CHLORIDE: 2 INJECTION, SOLUTION, CONCENTRATE INTRAVENOUS at 15:27

## 2018-06-12 RX ADMIN — SODIUM CHLORIDE: 9 INJECTION, SOLUTION INTRAVENOUS at 10:30

## 2018-06-12 RX ADMIN — Medication 10 ML: at 10:05

## 2018-06-12 RX ADMIN — SODIUM CHLORIDE 150 MG: 9 INJECTION, SOLUTION INTRAVENOUS at 12:19

## 2018-06-12 RX ADMIN — CISPLATIN: 100 INJECTION, SOLUTION INTRAVENOUS at 13:24

## 2018-06-12 RX ADMIN — Medication 10 ML: at 10:06

## 2018-06-12 RX ADMIN — PALONOSETRON 0.25 MG: 0.05 INJECTION, SOLUTION INTRAVENOUS at 10:31

## 2018-06-12 RX ADMIN — POTASSIUM CHLORIDE: 2 INJECTION, SOLUTION, CONCENTRATE INTRAVENOUS at 10:33

## 2018-06-12 RX ADMIN — Medication 10 ML: at 10:30

## 2018-06-12 ASSESSMENT — PAIN SCALES - GENERAL: PAINLEVEL_OUTOF10: 7

## 2018-06-12 ASSESSMENT — PAIN DESCRIPTION - DESCRIPTORS: DESCRIPTORS: CONSTANT;SHARP

## 2018-06-12 ASSESSMENT — PAIN DESCRIPTION - ORIENTATION: ORIENTATION: LEFT

## 2018-06-12 ASSESSMENT — PAIN DESCRIPTION - PAIN TYPE: TYPE: CHRONIC PAIN;ACUTE PAIN

## 2018-06-12 ASSESSMENT — PAIN DESCRIPTION - LOCATION: LOCATION: THROAT;HEAD

## 2018-06-12 NOTE — PROGRESS NOTES
Patient coughing and choking more frequently while eating. Also complains of more pain, call and spoke with Radiation tech and she said would address concerns when down for radiation. Assisted off unit in wheelchair to radiation appointment.

## 2018-06-12 NOTE — PROGRESS NOTES
Patient assessed for the following post chemotherapy:    Dizziness   No  Lightheadedness  No      Acute nausea/vomiting No  Headache   No  Chest pain/pressure  No  Rash/itching   No  Shortness of breath  No     Patient tolerated chemotherapy treatment cisplatin without any complications. Last vital signs:   BP (!) 144/74   Pulse 66   Temp 98.5 °F (36.9 °C) (Oral)   Resp 18   Ht 6' 0.01\" (1.829 m)   SpO2 98%         Patient instructed if experience any of the above symptoms following today's infusion,he/she is to notify MD immediately or go to the emergency department. Discharge instructions given to patient. Verbalizes understanding. Off unit in wheelchair per self.  LACP called for

## 2018-06-12 NOTE — PLAN OF CARE
Problem: Intellectual/Education/Knowledge Deficit  Intervention: Verbal/written education provided  Chemotherapy Teaching     What is Chemotherapy   Drug action [x]   Method of Administration [x]   Handouts given []     Side Effects  Nausea/vomiting [x]   Diarrhea [x]   Fatigue [x]   Signs / Symptoms of infection [x]   Neutropenia [x]   Thrombocytopenia [x]   Alopecia [x]   neuropathy [x]   [place] diet &  the importance of fluids [x]       Micellaneous  Importance of nutrition [x]   Importance of oral hygiene [x]   When to call the MD [x]   Monitoring labs [x]   Use of supportive services []     Explanation of Drug Regimen / Frequency  cisplatin     Comments  Verbalized understanding to drug,action,side effects and when to call MD       Goal: Teaching initiated upon admission  Outcome: Met This Shift  Patient verbalizes understanding to verbal information given on cisplatin,action and possible side effects. Aware to call MD if develop complications. Problem: Discharge Planning  Intervention: Discharge to appropriate level of care  Discuss discharge instructions, follow ups and when to call doctor. Goal: Knowledge of discharge instructions  Knowledge of discharge instructions     Outcome: Met This Shift  Verbalized understanding of discharge instructions, follow ups and when to call doctor    Problem: Infection - Central Venous Catheter-Associated Bloodstream Infection:  Intervention: Infection risk assessment  Discuss port maintenance, infection prevention, signs and when to call Dr Cooper Robles    Goal: Will show no infection signs and symptoms  Will show no infection signs and symptoms   Outcome: Met This Shift  Mediport site with no redness or warmth. Skin over port intact with no signs of breakdown noted. Patient verbalizes signs/symptoms of port infection and when to notify the physician. Problem: Falls - Risk of:   Intervention: Assess risk factors for falls  Assess for fall risk, instruct to ask for assistance with ambulation    Goal: Will remain free from falls  Will remain free from falls   Outcome: Met This Shift  Free from falls while in infusion center. Verbalized understanding of fall prevention and to ask for assistance with ambulation    Comments: Care plan reviewed with patient. Patient verbalized understanding of the plan of care and contribute to goal setting.

## 2018-06-12 NOTE — PROGRESS NOTES
Chemotherapy Administration    Pre-assessment Data: Antineoplastic Agents  Other:   See toxicity flow sheet for assessment [x]     Physician Notification of Concerns Related to Chemotherapy Administration:   Physician Notified Debra Frees / Time of Notification      Interventions:   Lab work assessed  [x]   Height / Weight verified for dose [x]   Current MAR reviewed [x]   Emergency drugs available as appropriate [x]   Anaphylaxis assessment completed [x]   Pre-medications administered as ordered [x]   Blood return noted upon initiation of chemotherapy [x]   Blood return noted each 1-2ml of a vesicant medication if given IV push []   Blood return noted each 2-3ml of a non-vesicant medication if given IV push []   Monitor for signs / symptoms of hypersensitivity reaction [x]   Chemotherapy orders (drug/dose/rate) verified by 2 Chemo certified RNs [x]   Monitor IV site and blood return throughout the infusion of the medication [x]   Document IV site checks on the IV assessment form [x]   Document chemotherapy teaching on the Patient Education tab [x]   Document patient verbalizes understanding of medications being administered [x]   If IV infiltration, see ONS Guidelines []   Other:      []

## 2018-06-13 ENCOUNTER — CLINICAL DOCUMENTATION (OUTPATIENT)
Dept: NUTRITION | Age: 58
End: 2018-06-13

## 2018-06-13 PROCEDURE — 77386 HC NTSTY MODUL RAD TX DLVR CPLX: CPT | Performed by: RADIOLOGY

## 2018-06-14 PROCEDURE — 77386 HC NTSTY MODUL RAD TX DLVR CPLX: CPT | Performed by: RADIOLOGY

## 2018-06-15 PROCEDURE — 77386 HC NTSTY MODUL RAD TX DLVR CPLX: CPT | Performed by: RADIOLOGY

## 2018-06-17 RX ORDER — 0.9 % SODIUM CHLORIDE 0.9 %
10 VIAL (ML) INJECTION ONCE
Status: CANCELLED | OUTPATIENT
Start: 2018-06-19 | End: 2018-06-19

## 2018-06-17 RX ORDER — METHYLPREDNISOLONE SODIUM SUCCINATE 125 MG/2ML
125 INJECTION, POWDER, LYOPHILIZED, FOR SOLUTION INTRAMUSCULAR; INTRAVENOUS ONCE
Status: CANCELLED | OUTPATIENT
Start: 2018-06-19 | End: 2018-06-19

## 2018-06-17 RX ORDER — SODIUM CHLORIDE 0.9 % (FLUSH) 0.9 %
5 SYRINGE (ML) INJECTION PRN
Status: CANCELLED | OUTPATIENT
Start: 2018-06-19

## 2018-06-17 RX ORDER — DIPHENHYDRAMINE HYDROCHLORIDE 50 MG/ML
50 INJECTION INTRAMUSCULAR; INTRAVENOUS ONCE
Status: CANCELLED | OUTPATIENT
Start: 2018-06-19 | End: 2018-06-19

## 2018-06-17 RX ORDER — EPINEPHRINE 1 MG/ML
0.3 INJECTION, SOLUTION, CONCENTRATE INTRAVENOUS PRN
Status: CANCELLED | OUTPATIENT
Start: 2018-06-19

## 2018-06-17 RX ORDER — SODIUM CHLORIDE 9 MG/ML
INJECTION, SOLUTION INTRAVENOUS CONTINUOUS
Status: CANCELLED | OUTPATIENT
Start: 2018-06-19

## 2018-06-18 PROCEDURE — 77336 RADIATION PHYSICS CONSULT: CPT

## 2018-06-18 PROCEDURE — 77386 HC NTSTY MODUL RAD TX DLVR CPLX: CPT

## 2018-06-19 ENCOUNTER — HOSPITAL ENCOUNTER (OUTPATIENT)
Dept: INFUSION THERAPY | Age: 58
Discharge: HOME | End: 2018-06-19
Payer: MEDICARE

## 2018-06-19 ENCOUNTER — CLINICAL DOCUMENTATION (OUTPATIENT)
Dept: CASE MANAGEMENT | Age: 58
End: 2018-06-19

## 2018-06-19 VITALS
TEMPERATURE: 98.1 F | DIASTOLIC BLOOD PRESSURE: 69 MMHG | RESPIRATION RATE: 18 BRPM | OXYGEN SATURATION: 96 % | HEIGHT: 72 IN | SYSTOLIC BLOOD PRESSURE: 135 MMHG | HEART RATE: 68 BPM

## 2018-06-19 DIAGNOSIS — C02.9 TONGUE CARCINOMA (HCC): ICD-10-CM

## 2018-06-19 DIAGNOSIS — C09.9 MALIGNANT NEOPLASM OF TONSIL (HCC): ICD-10-CM

## 2018-06-19 DIAGNOSIS — Z51.11 ENCOUNTER FOR ANTINEOPLASTIC CHEMOTHERAPY: ICD-10-CM

## 2018-06-19 DIAGNOSIS — R22.1 MASS IN NECK: ICD-10-CM

## 2018-06-19 DIAGNOSIS — C09.9 SQUAMOUS CELL CARCINOMA OF LEFT TONSIL (HCC): ICD-10-CM

## 2018-06-19 DIAGNOSIS — E11.9 TYPE 2 DIABETES MELLITUS WITHOUT COMPLICATION, WITHOUT LONG-TERM CURRENT USE OF INSULIN (HCC): ICD-10-CM

## 2018-06-19 LAB
BASINOPHIL, AUTOMATED: 0 % (ref 0–3)
BUN, WHOLE BLOOD: 11 MG/DL (ref 8–26)
CHLORIDE, WHOLE BLOOD: 96 MEQ/L (ref 98–109)
CREATININE, WHOLE BLOOD: 0.8 MG/DL (ref 0.5–1.2)
EOSINOPHILS RELATIVE PERCENT: 2 % (ref 0–4)
GFR, ESTIMATED: > 90 ML/MIN/1.73M2
GLUCOSE, WHOLE BLOOD: 132 MG/DL (ref 70–108)
HCT VFR BLD CALC: 35.8 % (ref 42–52)
HEMOGLOBIN: 12.6 GM/DL (ref 14–18)
IONIZED CALCIUM, WHOLE BLOOD: 1.08 MMOL/L (ref 1.12–1.32)
LYMPHOCYTES # BLD: 9 % (ref 15–47)
MCH RBC QN AUTO: 32.2 PG (ref 27–31)
MCHC RBC AUTO-ENTMCNC: 35.2 GM/DL (ref 33–37)
MCV RBC AUTO: 91 FL (ref 80–94)
MONOCYTES: 10 % (ref 0–12)
PDW BLD-RTO: 11.6 % (ref 11.5–14.5)
PLATELET # BLD: 135 THOU/MM3 (ref 130–400)
PMV BLD AUTO: 7 FL (ref 7.4–10.4)
POTASSIUM, WHOLE BLOOD: 3.8 MEQ/L (ref 3.5–4.9)
RBC # BLD: 3.91 MILL/MM3 (ref 4.7–6.1)
SEG NEUTROPHILS: 79 % (ref 43–75)
SODIUM, WHOLE BLOOD: 139 MEQ/L (ref 138–146)
TOTAL CO2, WHOLE BLOOD: 30 MEQ/L (ref 23–33)
WBC # BLD: 7.6 THOU/MM3 (ref 4.8–10.8)

## 2018-06-19 PROCEDURE — 96375 TX/PRO/DX INJ NEW DRUG ADDON: CPT

## 2018-06-19 PROCEDURE — 6360000002 HC RX W HCPCS: Performed by: INTERNAL MEDICINE

## 2018-06-19 PROCEDURE — 77386 HC NTSTY MODUL RAD TX DLVR CPLX: CPT | Performed by: RADIOLOGY

## 2018-06-19 PROCEDURE — 80047 BASIC METABLC PNL IONIZED CA: CPT

## 2018-06-19 PROCEDURE — 96413 CHEMO IV INFUSION 1 HR: CPT

## 2018-06-19 PROCEDURE — 36591 DRAW BLOOD OFF VENOUS DEVICE: CPT

## 2018-06-19 PROCEDURE — 96415 CHEMO IV INFUSION ADDL HR: CPT

## 2018-06-19 PROCEDURE — 96366 THER/PROPH/DIAG IV INF ADDON: CPT

## 2018-06-19 PROCEDURE — 36593 DECLOT VASCULAR DEVICE: CPT

## 2018-06-19 PROCEDURE — 96367 TX/PROPH/DG ADDL SEQ IV INF: CPT

## 2018-06-19 PROCEDURE — 2580000003 HC RX 258: Performed by: INTERNAL MEDICINE

## 2018-06-19 PROCEDURE — 85025 COMPLETE CBC W/AUTO DIFF WBC: CPT

## 2018-06-19 RX ORDER — PALONOSETRON 0.05 MG/ML
0.25 INJECTION, SOLUTION INTRAVENOUS ONCE
Status: COMPLETED | OUTPATIENT
Start: 2018-06-19 | End: 2018-06-19

## 2018-06-19 RX ORDER — SODIUM CHLORIDE 0.9 % (FLUSH) 0.9 %
20 SYRINGE (ML) INJECTION PRN
Status: DISCONTINUED | OUTPATIENT
Start: 2018-06-19 | End: 2018-06-20 | Stop reason: HOSPADM

## 2018-06-19 RX ORDER — SODIUM CHLORIDE 9 MG/ML
INJECTION, SOLUTION INTRAVENOUS ONCE
Status: COMPLETED | OUTPATIENT
Start: 2018-06-19 | End: 2018-06-19

## 2018-06-19 RX ORDER — HEPARIN SODIUM (PORCINE) LOCK FLUSH IV SOLN 100 UNIT/ML 100 UNIT/ML
500 SOLUTION INTRAVENOUS PRN
Status: CANCELLED | OUTPATIENT
Start: 2018-06-19

## 2018-06-19 RX ORDER — SODIUM CHLORIDE 0.9 % (FLUSH) 0.9 %
10 SYRINGE (ML) INJECTION PRN
Status: DISCONTINUED | OUTPATIENT
Start: 2018-06-19 | End: 2018-06-20 | Stop reason: HOSPADM

## 2018-06-19 RX ORDER — SODIUM CHLORIDE 0.9 % (FLUSH) 0.9 %
20 SYRINGE (ML) INJECTION PRN
Status: CANCELLED | OUTPATIENT
Start: 2018-06-19

## 2018-06-19 RX ORDER — HEPARIN SODIUM (PORCINE) LOCK FLUSH IV SOLN 100 UNIT/ML 100 UNIT/ML
500 SOLUTION INTRAVENOUS PRN
Status: DISCONTINUED | OUTPATIENT
Start: 2018-06-19 | End: 2018-06-20 | Stop reason: HOSPADM

## 2018-06-19 RX ORDER — SODIUM CHLORIDE 0.9 % (FLUSH) 0.9 %
10 SYRINGE (ML) INJECTION PRN
Status: CANCELLED | OUTPATIENT
Start: 2018-06-19

## 2018-06-19 RX ADMIN — POTASSIUM CHLORIDE: 2 INJECTION, SOLUTION, CONCENTRATE INTRAVENOUS at 11:44

## 2018-06-19 RX ADMIN — WATER 2.2 ML: 1 INJECTION INTRAMUSCULAR; INTRAVENOUS; SUBCUTANEOUS at 10:40

## 2018-06-19 RX ADMIN — ALTEPLASE 2 MG: 2.2 INJECTION, POWDER, LYOPHILIZED, FOR SOLUTION INTRAVENOUS at 10:41

## 2018-06-19 RX ADMIN — SODIUM CHLORIDE: 9 INJECTION, SOLUTION INTRAVENOUS at 11:42

## 2018-06-19 RX ADMIN — POTASSIUM CHLORIDE: 2 INJECTION, SOLUTION, CONCENTRATE INTRAVENOUS at 16:36

## 2018-06-19 RX ADMIN — SODIUM CHLORIDE, PRESERVATIVE FREE 500 UNITS: 5 INJECTION INTRAVENOUS at 17:31

## 2018-06-19 RX ADMIN — Medication 20 ML: at 11:20

## 2018-06-19 RX ADMIN — DEXAMETHASONE SODIUM PHOSPHATE 12 MG: 4 INJECTION, SOLUTION INTRAMUSCULAR; INTRAVENOUS at 12:59

## 2018-06-19 RX ADMIN — Medication 10 ML: at 17:31

## 2018-06-19 RX ADMIN — PALONOSETRON 0.25 MG: 0.05 INJECTION, SOLUTION INTRAVENOUS at 12:58

## 2018-06-19 RX ADMIN — Medication 20 ML: at 10:35

## 2018-06-19 RX ADMIN — SODIUM CHLORIDE 150 MG: 9 INJECTION, SOLUTION INTRAVENOUS at 13:29

## 2018-06-19 RX ADMIN — Medication 20 ML: at 10:36

## 2018-06-19 RX ADMIN — CISPLATIN: 100 INJECTION, SOLUTION INTRAVENOUS at 14:16

## 2018-06-19 ASSESSMENT — PAIN DESCRIPTION - FREQUENCY: FREQUENCY: INTERMITTENT

## 2018-06-19 ASSESSMENT — PAIN SCALES - GENERAL
PAINLEVEL_OUTOF10: 5
PAINLEVEL_OUTOF10: 6

## 2018-06-19 ASSESSMENT — PAIN DESCRIPTION - PAIN TYPE: TYPE: CHRONIC PAIN

## 2018-06-19 ASSESSMENT — PAIN DESCRIPTION - ONSET: ONSET: ON-GOING

## 2018-06-19 ASSESSMENT — PAIN DESCRIPTION - DESCRIPTORS: DESCRIPTORS: CONSTANT;SHARP

## 2018-06-19 ASSESSMENT — PAIN DESCRIPTION - PROGRESSION: CLINICAL_PROGRESSION: NOT CHANGED

## 2018-06-19 ASSESSMENT — PAIN DESCRIPTION - ORIENTATION: ORIENTATION: LEFT

## 2018-06-19 ASSESSMENT — PAIN DESCRIPTION - LOCATION: LOCATION: THROAT;NECK

## 2018-06-19 NOTE — PROGRESS NOTES
Patient assessed for the following post chemotherapy:    Dizziness   No  Lightheadedness  No      Acute nausea/vomiting No  Headache   No  Chest pain/pressure  No  Rash/itching   No  Shortness of breath  No    Patient kept for 20 minutes observation post infusion chemotherapy. Patient tolerated chemotherapy treatment cisplatin without any complications. Last vital signs:   /69   Pulse 68   Temp 98.1 °F (36.7 °C) (Oral)   Resp 18   Ht 6' 0.01\" (1.829 m)   SpO2 96%         Patient instructed if experience any of the above symptoms following today's infusion,he/she is to notify MD immediately or go to the emergency department. Discharge instructions given to patient. Verbalizes understanding. Wheeled self off unit in wheelchair. LACP called for transport

## 2018-06-19 NOTE — PLAN OF CARE
Problem: Intellectual/Education/Knowledge Deficit  Intervention: Verbal/written education provided  Chemotherapy Teaching     What is Chemotherapy   Drug action [x]   Method of Administration [x]   Handouts given []     Side Effects  Nausea/vomiting [x]   Diarrhea [x]   Fatigue [x]   Signs / Symptoms of infection [x]   Neutropenia [x]   Thrombocytopenia [x]   Alopecia [x]   neuropathy [x]   [place] diet &  the importance of fluids [x]       Micellaneous  Importance of nutrition [x]   Importance of oral hygiene [x]   When to call the MD [x]   Monitoring labs [x]   Use of supportive services []     Explanation of Drug Regimen / Frequency  cisplatin     Comments  Verbalized understanding to drug,action,side effects and when to call MD       Goal: Teaching initiated upon admission  Outcome: Met This Shift  Patient verbalizes understanding to verbal information given on cisplatin,action and possible side effects. Aware to call MD if develop complications. Problem: Discharge Planning  Intervention: Discharge to appropriate level of care  Discuss discharge instructions, follow ups and when to call doctor. Goal: Knowledge of discharge instructions  Knowledge of discharge instructions    Outcome: Met This Shift  Verbalized understanding of discharge instructions, follow ups and when to call doctor    Problem: Falls - Risk of: Intervention: Assess risk factors for falls  Assess for fall risk, instruct to ask for assistance with ambulation    Goal: Will remain free from falls  Will remain free from falls  Outcome: Met This Shift  Free from falls while in infusion center.  Verbalized understanding of fall prevention and to ask for assistance with ambulation    Problem: Infection - Central Venous Catheter-Associated Bloodstream Infection:  Intervention: Infection risk assessment  Discuss port maintenance, infection prevention, signs and when to call Dr    Goal: Will show no infection signs and symptoms  Will show no infection signs and symptoms  Outcome: Met This Shift  Mediport site with no redness or warmth. Skin over port intact with no signs of breakdown noted. Patient verbalizes signs/symptoms of port infection and when to notify the physician. Comments: Care plan reviewed with patient. Patient   verbalized understanding of the plan of care and contribute to goal setting.

## 2018-06-19 NOTE — PROGRESS NOTES
Chemotherapy Administration    Pre-assessment Data: Antineoplastic Agents  Other:   See toxicity flow sheet for assessment [x]     Physician Notification of Concerns Related to Chemotherapy Administration:   Physician Notified Hope Sicard / Time of Notification      Interventions:   Lab work assessed  [x]   Height / Weight verified for dose [x]   Current MAR reviewed [x]   Emergency drugs available as appropriate [x]   Anaphylaxis assessment completed [x]   Pre-medications administered as ordered [x]   Blood return noted upon initiation of chemotherapy [x]   Blood return noted each 1-2ml of a vesicant medication if given IV push []   Blood return noted each 2-3ml of a non-vesicant medication if given IV push []   Monitor for signs / symptoms of hypersensitivity reaction [x]   Chemotherapy orders (drug/dose/rate) verified by 2 Chemo certified RNs [x]   Monitor IV site and blood return throughout the infusion of the medication [x]   Document IV site checks on the IV assessment form [x]   Document chemotherapy teaching on the Patient Education tab [x]   Document patient verbalizes understanding of medications being administered [x]   If IV infiltration, see ONS Guidelines []   Other:      []

## 2018-06-20 PROCEDURE — 77386 HC NTSTY MODUL RAD TX DLVR CPLX: CPT | Performed by: RADIOLOGY

## 2018-06-20 RX ORDER — PALONOSETRON 0.05 MG/ML
0.25 INJECTION, SOLUTION INTRAVENOUS ONCE
Status: CANCELLED | OUTPATIENT
Start: 2018-06-26

## 2018-06-20 RX ORDER — SODIUM CHLORIDE 0.9 % (FLUSH) 0.9 %
10 SYRINGE (ML) INJECTION PRN
Status: CANCELLED | OUTPATIENT
Start: 2018-06-26

## 2018-06-20 RX ORDER — 0.9 % SODIUM CHLORIDE 0.9 %
10 VIAL (ML) INJECTION ONCE
Status: CANCELLED | OUTPATIENT
Start: 2018-06-26 | End: 2018-06-26

## 2018-06-20 RX ORDER — HEPARIN SODIUM (PORCINE) LOCK FLUSH IV SOLN 100 UNIT/ML 100 UNIT/ML
500 SOLUTION INTRAVENOUS PRN
Status: CANCELLED | OUTPATIENT
Start: 2018-06-26

## 2018-06-20 RX ORDER — DIPHENHYDRAMINE HYDROCHLORIDE 50 MG/ML
50 INJECTION INTRAMUSCULAR; INTRAVENOUS ONCE
Status: CANCELLED | OUTPATIENT
Start: 2018-06-26 | End: 2018-06-26

## 2018-06-20 RX ORDER — SODIUM CHLORIDE 9 MG/ML
INJECTION, SOLUTION INTRAVENOUS CONTINUOUS
Status: CANCELLED | OUTPATIENT
Start: 2018-06-26

## 2018-06-20 RX ORDER — SODIUM CHLORIDE 0.9 % (FLUSH) 0.9 %
5 SYRINGE (ML) INJECTION PRN
Status: CANCELLED | OUTPATIENT
Start: 2018-06-26

## 2018-06-20 RX ORDER — METHYLPREDNISOLONE SODIUM SUCCINATE 125 MG/2ML
125 INJECTION, POWDER, LYOPHILIZED, FOR SOLUTION INTRAMUSCULAR; INTRAVENOUS ONCE
Status: CANCELLED | OUTPATIENT
Start: 2018-06-26 | End: 2018-06-26

## 2018-06-20 RX ORDER — SODIUM CHLORIDE 9 MG/ML
INJECTION, SOLUTION INTRAVENOUS ONCE
Status: CANCELLED | OUTPATIENT
Start: 2018-06-26 | End: 2018-06-26

## 2018-06-21 PROCEDURE — 77386 HC NTSTY MODUL RAD TX DLVR CPLX: CPT

## 2018-06-22 PROCEDURE — 77386 HC NTSTY MODUL RAD TX DLVR CPLX: CPT | Performed by: RADIOLOGY

## 2018-06-25 PROCEDURE — 77336 RADIATION PHYSICS CONSULT: CPT | Performed by: RADIOLOGY

## 2018-06-25 PROCEDURE — 77386 HC NTSTY MODUL RAD TX DLVR CPLX: CPT | Performed by: RADIOLOGY

## 2018-06-26 ENCOUNTER — HOSPITAL ENCOUNTER (OUTPATIENT)
Dept: INFUSION THERAPY | Age: 58
Discharge: HOME | End: 2018-06-26
Payer: MEDICARE

## 2018-06-26 ENCOUNTER — TELEPHONE (OUTPATIENT)
Dept: FAMILY MEDICINE CLINIC | Age: 58
End: 2018-06-26

## 2018-06-26 VITALS
DIASTOLIC BLOOD PRESSURE: 67 MMHG | HEART RATE: 70 BPM | HEIGHT: 72 IN | RESPIRATION RATE: 18 BRPM | SYSTOLIC BLOOD PRESSURE: 143 MMHG | TEMPERATURE: 98.1 F | OXYGEN SATURATION: 93 %

## 2018-06-26 DIAGNOSIS — C09.9 MALIGNANT NEOPLASM OF TONSIL (HCC): ICD-10-CM

## 2018-06-26 DIAGNOSIS — R22.1 MASS IN NECK: ICD-10-CM

## 2018-06-26 DIAGNOSIS — C02.9 TONGUE CARCINOMA (HCC): ICD-10-CM

## 2018-06-26 DIAGNOSIS — Z51.11 ENCOUNTER FOR ANTINEOPLASTIC CHEMOTHERAPY: ICD-10-CM

## 2018-06-26 DIAGNOSIS — C09.9 SQUAMOUS CELL CARCINOMA OF LEFT TONSIL (HCC): ICD-10-CM

## 2018-06-26 DIAGNOSIS — E11.9 TYPE 2 DIABETES MELLITUS WITHOUT COMPLICATION, WITHOUT LONG-TERM CURRENT USE OF INSULIN (HCC): ICD-10-CM

## 2018-06-26 LAB
ALBUMIN SERPL-MCNC: 3 G/DL (ref 3.5–5.1)
ALP BLD-CCNC: 79 U/L (ref 38–126)
ALT SERPL-CCNC: 10 U/L (ref 11–66)
AST SERPL-CCNC: 15 U/L (ref 5–40)
BASOPHILS # BLD: 0.2 %
BASOPHILS ABSOLUTE: 0 THOU/MM3 (ref 0–0.1)
BILIRUB SERPL-MCNC: 0.4 MG/DL (ref 0.3–1.2)
BILIRUBIN DIRECT: < 0.2 MG/DL (ref 0–0.3)
BUN, WHOLE BLOOD: 18 MG/DL (ref 8–26)
CHLORIDE, WHOLE BLOOD: 96 MEQ/L (ref 98–109)
CREATININE, WHOLE BLOOD: 0.9 MG/DL (ref 0.5–1.2)
EOSINOPHIL # BLD: 1.5 %
EOSINOPHILS ABSOLUTE: 0.1 THOU/MM3 (ref 0–0.4)
GFR, ESTIMATED: > 90 ML/MIN/1.73M2
GLUCOSE, WHOLE BLOOD: 111 MG/DL (ref 70–108)
HCT VFR BLD CALC: 32.9 % (ref 42–52)
HEMOGLOBIN: 11.7 GM/DL (ref 14–18)
IMMATURE GRANS (ABS): 0.02 THOU/MM3 (ref 0–0.07)
IMMATURE GRANULOCYTES: 0.3 %
IONIZED CALCIUM, WHOLE BLOOD: 1.11 MMOL/L (ref 1.12–1.32)
LYMPHOCYTES # BLD: 7.5 %
LYMPHOCYTES ABSOLUTE: 0.5 THOU/MM3 (ref 1–4.8)
MCH RBC QN AUTO: 32.5 PG (ref 27–31)
MCHC RBC AUTO-ENTMCNC: 35.7 GM/DL (ref 33–37)
MCV RBC AUTO: 91 FL (ref 80–94)
MONOCYTES # BLD: 7.9 %
MONOCYTES ABSOLUTE: 0.5 THOU/MM3 (ref 0.4–1.3)
NUCLEATED RED BLOOD CELLS: 0 /100 WBC
PDW BLD-RTO: 11.6 % (ref 11.5–14.5)
PLATELET # BLD: 110 THOU/MM3 (ref 130–400)
PMV BLD AUTO: 7.6 FL (ref 7.4–10.4)
POTASSIUM, WHOLE BLOOD: 3.8 MEQ/L (ref 3.5–4.9)
RBC # BLD: 3.61 MILL/MM3 (ref 4.7–6.1)
SEG NEUTROPHILS: 82.6 %
SEGMENTED NEUTROPHILS ABSOLUTE COUNT: 5 THOU/MM3 (ref 1.8–7.7)
SODIUM, WHOLE BLOOD: 138 MEQ/L (ref 138–146)
TOTAL CO2, WHOLE BLOOD: 30 MEQ/L (ref 23–33)
TOTAL PROTEIN: 5.6 G/DL (ref 6.1–8)
WBC # BLD: 6.1 THOU/MM3 (ref 4.8–10.8)

## 2018-06-26 PROCEDURE — 77386 HC NTSTY MODUL RAD TX DLVR CPLX: CPT | Performed by: RADIOLOGY

## 2018-06-26 PROCEDURE — 77338 DESIGN MLC DEVICE FOR IMRT: CPT | Performed by: RADIOLOGY

## 2018-06-26 PROCEDURE — 96375 TX/PRO/DX INJ NEW DRUG ADDON: CPT

## 2018-06-26 PROCEDURE — 80047 BASIC METABLC PNL IONIZED CA: CPT

## 2018-06-26 PROCEDURE — 96367 TX/PROPH/DG ADDL SEQ IV INF: CPT

## 2018-06-26 PROCEDURE — 36591 DRAW BLOOD OFF VENOUS DEVICE: CPT

## 2018-06-26 PROCEDURE — 96366 THER/PROPH/DIAG IV INF ADDON: CPT

## 2018-06-26 PROCEDURE — 80076 HEPATIC FUNCTION PANEL: CPT

## 2018-06-26 PROCEDURE — 6360000002 HC RX W HCPCS: Performed by: INTERNAL MEDICINE

## 2018-06-26 PROCEDURE — 85025 COMPLETE CBC W/AUTO DIFF WBC: CPT

## 2018-06-26 PROCEDURE — 96415 CHEMO IV INFUSION ADDL HR: CPT

## 2018-06-26 PROCEDURE — 96413 CHEMO IV INFUSION 1 HR: CPT

## 2018-06-26 PROCEDURE — 2580000003 HC RX 258: Performed by: INTERNAL MEDICINE

## 2018-06-26 PROCEDURE — 77300 RADIATION THERAPY DOSE PLAN: CPT | Performed by: RADIOLOGY

## 2018-06-26 RX ORDER — HEPARIN SODIUM (PORCINE) LOCK FLUSH IV SOLN 100 UNIT/ML 100 UNIT/ML
500 SOLUTION INTRAVENOUS PRN
Status: DISCONTINUED | OUTPATIENT
Start: 2018-06-26 | End: 2018-06-27 | Stop reason: HOSPADM

## 2018-06-26 RX ORDER — SODIUM CHLORIDE 0.9 % (FLUSH) 0.9 %
10 SYRINGE (ML) INJECTION PRN
Status: CANCELLED | OUTPATIENT
Start: 2018-06-26

## 2018-06-26 RX ORDER — SODIUM CHLORIDE 9 MG/ML
INJECTION, SOLUTION INTRAVENOUS ONCE
Status: COMPLETED | OUTPATIENT
Start: 2018-06-26 | End: 2018-06-26

## 2018-06-26 RX ORDER — SODIUM CHLORIDE 0.9 % (FLUSH) 0.9 %
10 SYRINGE (ML) INJECTION PRN
Status: DISCONTINUED | OUTPATIENT
Start: 2018-06-26 | End: 2018-06-27 | Stop reason: HOSPADM

## 2018-06-26 RX ORDER — DOCUSATE SODIUM 100 MG/1
100 CAPSULE, LIQUID FILLED ORAL 3 TIMES DAILY
Qty: 90 CAPSULE | Refills: 3 | Status: SHIPPED | OUTPATIENT
Start: 2018-06-26 | End: 2018-07-17 | Stop reason: ALTCHOICE

## 2018-06-26 RX ORDER — SODIUM CHLORIDE 0.9 % (FLUSH) 0.9 %
20 SYRINGE (ML) INJECTION PRN
Status: CANCELLED | OUTPATIENT
Start: 2018-06-26

## 2018-06-26 RX ORDER — PALONOSETRON 0.05 MG/ML
0.25 INJECTION, SOLUTION INTRAVENOUS ONCE
Status: COMPLETED | OUTPATIENT
Start: 2018-06-26 | End: 2018-06-26

## 2018-06-26 RX ORDER — SODIUM CHLORIDE 0.9 % (FLUSH) 0.9 %
20 SYRINGE (ML) INJECTION PRN
Status: DISCONTINUED | OUTPATIENT
Start: 2018-06-26 | End: 2018-06-27 | Stop reason: HOSPADM

## 2018-06-26 RX ORDER — HEPARIN SODIUM (PORCINE) LOCK FLUSH IV SOLN 100 UNIT/ML 100 UNIT/ML
500 SOLUTION INTRAVENOUS PRN
Status: CANCELLED | OUTPATIENT
Start: 2018-06-26

## 2018-06-26 RX ADMIN — SODIUM CHLORIDE, PRESERVATIVE FREE 500 UNITS: 5 INJECTION INTRAVENOUS at 17:02

## 2018-06-26 RX ADMIN — SODIUM CHLORIDE: 9 INJECTION, SOLUTION INTRAVENOUS at 11:29

## 2018-06-26 RX ADMIN — PALONOSETRON HYDROCHLORIDE 0.25 MG: 0.25 INJECTION INTRAVENOUS at 13:42

## 2018-06-26 RX ADMIN — DEXAMETHASONE SODIUM PHOSPHATE 12 MG: 4 INJECTION, SOLUTION INTRAMUSCULAR; INTRAVENOUS at 12:38

## 2018-06-26 RX ADMIN — SODIUM CHLORIDE 150 MG: 9 INJECTION, SOLUTION INTRAVENOUS at 13:10

## 2018-06-26 RX ADMIN — Medication 20 ML: at 10:31

## 2018-06-26 RX ADMIN — Medication 10 ML: at 10:30

## 2018-06-26 RX ADMIN — Medication 10 ML: at 11:29

## 2018-06-26 RX ADMIN — Medication 10 ML: at 17:02

## 2018-06-26 RX ADMIN — CISPLATIN: 100 INJECTION, SOLUTION INTRAVENOUS at 13:52

## 2018-06-26 RX ADMIN — POTASSIUM CHLORIDE: 2 INJECTION, SOLUTION, CONCENTRATE INTRAVENOUS at 15:59

## 2018-06-26 RX ADMIN — POTASSIUM CHLORIDE: 2 INJECTION, SOLUTION, CONCENTRATE INTRAVENOUS at 11:34

## 2018-06-26 ASSESSMENT — PAIN DESCRIPTION - DESCRIPTORS: DESCRIPTORS: BURNING;DISCOMFORT;SHARP

## 2018-06-26 ASSESSMENT — PAIN DESCRIPTION - LOCATION: LOCATION: NECK;THROAT

## 2018-06-26 ASSESSMENT — PAIN DESCRIPTION - PROGRESSION: CLINICAL_PROGRESSION: NOT CHANGED

## 2018-06-26 ASSESSMENT — PAIN SCALES - GENERAL: PAINLEVEL_OUTOF10: 6

## 2018-06-26 ASSESSMENT — PAIN DESCRIPTION - ONSET: ONSET: ON-GOING

## 2018-06-26 ASSESSMENT — PAIN DESCRIPTION - FREQUENCY: FREQUENCY: CONTINUOUS

## 2018-06-26 ASSESSMENT — PAIN DESCRIPTION - ORIENTATION: ORIENTATION: LEFT

## 2018-06-26 ASSESSMENT — PAIN DESCRIPTION - PAIN TYPE: TYPE: CHRONIC PAIN

## 2018-06-26 NOTE — PLAN OF CARE
Problem: Pain:  Intervention: Opioid analgesia side-effects  Patient encouraged to take prescribed pain medications and call Physician if pain is not controlled. Goal: Pain level will decrease  Pain level will decrease   Outcome: Met This Shift  Complaints of chronic  pain in the neck/throat area a '6\" out of 10 using ZAIDA scale. Problem: Intellectual/Education/Knowledge Deficit  Intervention: Verbal/written education provided  Chemotherapy Teaching     What is Chemotherapy   Drug action [x]   Method of Administration [x]   Handouts given []     Side Effects  Nausea/vomiting [x]   Diarrhea [x]   Fatigue [x]   Signs / Symptoms of infection [x]   Neutropenia [x]   Thrombocytopenia [x]   Alopecia [x]   neuropathy [x]   [place] diet &  the importance of fluids [x]       Micellaneous  Importance of nutrition [x]   Importance of oral hygiene [x]   When to call the MD [x]   Monitoring labs [x]   Use of supportive services []     Explanation of Drug Regimen / Frequency  Cisplatin     Comments  Verbalized understanding to drug,action,side effects and when to call MD       Goal: Teaching initiated upon admission  Outcome: Met This Shift  Patient verbalizes understanding to verbal information given on Cisplatin, action and possible side effects. Aware to call MD if develop complications. Problem: Discharge Planning  Intervention: Discharge to appropriate level of care  Discuss understanding of discharge instructions,follow-up appointments, and when to call the physician. Goal: Knowledge of discharge instructions  Knowledge of discharge instructions     Outcome: Met This Shift  Verbalized understanding of discharge instructions, follow-up appointments, and when to call the physician. Problem: Infection - Central Venous Catheter-Associated Bloodstream Infection:  Intervention: Infection risk assessment  Mediport site with no redness or warmth. Skin over port site intact with no signs of breakdown noted.  Patient verbalizes signs/symptoms of port infection and when to notify the physician. Goal: Will show no infection signs and symptoms  Will show no infection signs and symptoms   Outcome: Met This Shift  Instructed to monitor for signs/symptoms of infection at medi-port site and call MD if problems develop. Comments: Care plan reviewed with patient. Patient verbalize understanding of the plan of care and contribute to goal setting.

## 2018-06-26 NOTE — ONCOLOGY
Chemotherapy Administration    Pre-assessment Data: Antineoplastic Agents  Other:   See toxicity flow sheet for assessment [x]     Physician Notification of Concerns Related to Chemotherapy Administration:   Physician Notified Ken Girard / Time of Notification      Interventions:   Lab work assessed  [x]   Height / Weight verified for dose [x]   Current MAR reviewed [x]   Emergency drugs available as appropriate [x]   Anaphylaxis assessment completed [x]   Pre-medications administered as ordered [x]   Blood return noted upon initiation of chemotherapy [x]   Blood return noted each 1-2ml of a vesicant medication if given IV push []   Blood return noted each 2-3ml of a non-vesicant medication if given IV push []   Monitor for signs / symptoms of hypersensitivity reaction [x]   Chemotherapy orders (drug/dose/rate) verified by 2 Chemo certified RNs [x]   Monitor IV site and blood return throughout the infusion of the medication [x]   Document IV site checks on the IV assessment form [x]   Document chemotherapy teaching on the Patient Education tab [x]   Document patient verbalizes understanding of medications being administered [x]   If IV infiltration, see ONS Guidelines []   Other:     Cisplatin []

## 2018-06-27 ENCOUNTER — CLINICAL DOCUMENTATION (OUTPATIENT)
Dept: NUTRITION | Age: 58
End: 2018-06-27

## 2018-06-27 PROCEDURE — 77386 HC NTSTY MODUL RAD TX DLVR CPLX: CPT | Performed by: RADIOLOGY

## 2018-06-28 ENCOUNTER — CLINICAL DOCUMENTATION (OUTPATIENT)
Dept: CASE MANAGEMENT | Age: 58
End: 2018-06-28

## 2018-06-28 PROCEDURE — 77386 HC NTSTY MODUL RAD TX DLVR CPLX: CPT | Performed by: RADIOLOGY

## 2018-06-29 PROCEDURE — 77386 HC NTSTY MODUL RAD TX DLVR CPLX: CPT | Performed by: RADIOLOGY

## 2018-07-02 ENCOUNTER — HOSPITAL ENCOUNTER (OUTPATIENT)
Dept: RADIATION ONCOLOGY | Age: 58
Discharge: HOME | End: 2018-07-02
Payer: MEDICARE

## 2018-07-02 DIAGNOSIS — C09.9 SQUAMOUS CELL CARCINOMA OF LEFT TONSIL (HCC): Primary | ICD-10-CM

## 2018-07-02 DIAGNOSIS — E11.9 TYPE 2 DIABETES MELLITUS WITHOUT COMPLICATION, WITHOUT LONG-TERM CURRENT USE OF INSULIN (HCC): ICD-10-CM

## 2018-07-02 PROCEDURE — 77336 RADIATION PHYSICS CONSULT: CPT | Performed by: RADIOLOGY

## 2018-07-02 PROCEDURE — 77386 HC NTSTY MODUL RAD TX DLVR CPLX: CPT | Performed by: RADIOLOGY

## 2018-07-03 PROCEDURE — 77386 HC NTSTY MODUL RAD TX DLVR CPLX: CPT | Performed by: RADIOLOGY

## 2018-07-06 PROCEDURE — 77386 HC NTSTY MODUL RAD TX DLVR CPLX: CPT | Performed by: RADIOLOGY

## 2018-07-09 ENCOUNTER — HOSPITAL ENCOUNTER (OUTPATIENT)
Age: 58
Discharge: HOME | End: 2018-07-09
Payer: MEDICARE

## 2018-07-09 DIAGNOSIS — G89.3 PAIN DUE TO NEOPLASM: ICD-10-CM

## 2018-07-09 DIAGNOSIS — C10.8 MALIGNANT NEOPLASM OF JUNCTIONAL REGION OF OROPHARYNX (HCC): Primary | ICD-10-CM

## 2018-07-09 LAB
BASOPHILS # BLD: 0.2 %
BASOPHILS ABSOLUTE: 0 THOU/MM3 (ref 0–0.1)
EOSINOPHIL # BLD: 2.9 %
EOSINOPHILS ABSOLUTE: 0.1 THOU/MM3 (ref 0–0.4)
ERYTHROCYTE [DISTWIDTH] IN BLOOD BY AUTOMATED COUNT: 16.2 % (ref 11.5–14.5)
ERYTHROCYTE [DISTWIDTH] IN BLOOD BY AUTOMATED COUNT: 53.8 FL (ref 35–45)
HCT VFR BLD CALC: 34.8 % (ref 42–52)
HEMOGLOBIN: 12.1 GM/DL (ref 14–18)
IMMATURE GRANS (ABS): 0.03 THOU/MM3 (ref 0–0.07)
IMMATURE GRANULOCYTES: 0.7 %
LYMPHOCYTES # BLD: 10.2 %
LYMPHOCYTES ABSOLUTE: 0.4 THOU/MM3 (ref 1–4.8)
MCH RBC QN AUTO: 32.6 PG (ref 26–33)
MCHC RBC AUTO-ENTMCNC: 34.8 GM/DL (ref 32.2–35.5)
MCV RBC AUTO: 93.8 FL (ref 80–94)
MONOCYTES # BLD: 12.7 %
MONOCYTES ABSOLUTE: 0.6 THOU/MM3 (ref 0.4–1.3)
NUCLEATED RED BLOOD CELLS: 0 /100 WBC
PLATELET # BLD: 163 THOU/MM3 (ref 130–400)
PLATELET ESTIMATE: ADEQUATE
PMV BLD AUTO: 10.7 FL (ref 9.4–12.4)
RBC # BLD: 3.71 MILL/MM3 (ref 4.7–6.1)
SCAN OF BLOOD SMEAR: NORMAL
SEG NEUTROPHILS: 73.3 %
SEGMENTED NEUTROPHILS ABSOLUTE COUNT: 3.2 THOU/MM3 (ref 1.8–7.7)
WBC # BLD: 4.4 THOU/MM3 (ref 4.8–10.8)

## 2018-07-09 PROCEDURE — 85025 COMPLETE CBC W/AUTO DIFF WBC: CPT

## 2018-07-09 PROCEDURE — 77386 HC NTSTY MODUL RAD TX DLVR CPLX: CPT | Performed by: RADIOLOGY

## 2018-07-09 PROCEDURE — 36415 COLL VENOUS BLD VENIPUNCTURE: CPT

## 2018-07-10 ENCOUNTER — HOSPITAL ENCOUNTER (OUTPATIENT)
Dept: WOUND CARE | Age: 58
Discharge: HOME | End: 2018-07-10
Payer: MEDICARE

## 2018-07-10 VITALS
OXYGEN SATURATION: 94 % | HEART RATE: 96 BPM | HEIGHT: 72 IN | TEMPERATURE: 98.2 F | RESPIRATION RATE: 18 BRPM | WEIGHT: 266.7 LBS | BODY MASS INDEX: 36.12 KG/M2 | SYSTOLIC BLOOD PRESSURE: 145 MMHG | DIASTOLIC BLOOD PRESSURE: 63 MMHG

## 2018-07-10 PROCEDURE — 77386 HC NTSTY MODUL RAD TX DLVR CPLX: CPT | Performed by: RADIOLOGY

## 2018-07-10 PROCEDURE — 29580 STRAPPING UNNA BOOT: CPT

## 2018-07-10 PROCEDURE — G0463 HOSPITAL OUTPT CLINIC VISIT: HCPCS

## 2018-07-10 NOTE — PROGRESS NOTES
[facility]         H&P and Procedure Note      Sindhu Patel  MEDICAL RECORD NUMBER:  [id number]  AGE: 59 y.o. GENDER: male  : 1960  EPISODE DATE:  7/10/2018    Subjective:     Chief Complaint   Patient presents with    Wound Check     right lower leg wound         HISTORY of PRESENT ILLNESS HPI     Sindhu Patel is a 59 y.o. male New patient, who presents today for wound/ulcer evaluation. History of Wound Context: venous stasis. Wound/Ulcer Pain Timing/Severity: intermittent  Quality of pain: dull  Severity:  3 / 10   Modifying Factors: Pain worsens with palpation  Associated Signs/Symptoms: edema and drainage    Interval History:   Patient presents today for follow up on wound/ulcer's progression. The patient is currently not on antibiotics. Patient relates long history of ulceration to the right lower extremity. Relates that he has a history of venous stasis. Admits to history of previous fracture to the knee. Also admits to previous ulcer which probed to bone of the lateral malleolus and right 2nd toe. 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 Right superficial leg ulcer, onset with chemo. Denies purulent drainage, mal odor, erythema. Associated pitting edema and serous drainage. Denies F,C,N,V, SOB, CP. Admits to 40 year pack history, currently smoking 4-5 cigarettes a day.         PAST MEDICAL HISTORY        Diagnosis Date    Anxiety     Bleeds easily ([address]     Bruises easily     COPD (chronic obstructive pulmonary disease) ([address]     DVT (deep venous thrombosis) ([address]     Enlarged lymph node     History of emotional problems     Hypertension     HCC    Obesity     Type II or unspecified type diabetes mellitus without mention of complication, not stated as uncontrolled        PAST SURGICAL HISTORY    Past Surgical History:   Procedure Laterality Date    HIP SURGERY Right 2014    Excisin Right Hip Mass-Dr. Krista Gray     KNEE SURGERY file prior to encounter. REVIEW OF SYSTEMS    Pertinent items are noted in HPI. Objective:      BP (!) 145/63   Pulse 96   Temp 98.2 °F (36.8 °C) (Oral)   Resp 18   Ht 6' (1.829 m)   Wt 266 lb 11.2 oz (121 kg)   SpO2 94%   BMI 36.17 kg/m²     Wt Readings from Last 3 Encounters:   07/10/18 266 lb 11.2 oz (121 kg)   06/04/18 290 lb (131.5 kg)   05/30/18 280 lb (127 kg)       PHYSICAL EXAM    General Appearance: alert and oriented to person, place and time, well-developed and well-nourished, in no acute distress  Palpable pedal pulses. Brisk cap refill. 3+ pitting edema bilaterally. Hyper pigmentation right leg  Superficial ulceration right leg without purulent drainage, mal odor, erythema. Light touch sensation grossly intact. Wound 07/10/18 Leg Right; Lower 1 (Active)   Wound Type Wound 7/10/2018  2:58 PM   Dressing Changed Changed/New 7/10/2018  2:58 PM   Wound Cleansed Rinsed/Irrigated with saline 7/10/2018  2:58 PM   Wound Length (cm) 6.9 cm 7/10/2018  2:58 PM   Wound Width (cm) 5.5 cm 7/10/2018  2:58 PM   Wound Depth (cm)  0.05 7/10/2018  2:58 PM   Calculated Wound Size (cm^2) (l*w) 37.95 cm^2 7/10/2018  2:58 PM   Wound Assessment Epithelialization;Pink 7/10/2018  2:58 PM   Drainage Amount Moderate 7/10/2018  2:58 PM   Drainage Description Serous 7/10/2018  2:58 PM   Odor None 7/10/2018  2:58 PM   Margins Attached edges 7/10/2018  2:58 PM   [place]-wound Assessment Dry;Red;Edema 7/10/2018  2:58 PM   [place]%Wound Bed 40 7/10/2018  2:58 PM   Other%Wound Bed 60 7/10/2018  2:58 PM   Number of days: 0       LABS      CBC:   Lab Results   Component Value Date    WBC 4.4 07/09/2018    HGB 12.1 07/09/2018    HCT 34.8 07/09/2018    MCV 93.8 07/09/2018     07/09/2018     BMP:   Lab Results   Component Value Date     06/26/2018     06/02/2018    K 3.8 06/26/2018    K 4.6 06/02/2018     06/02/2018    CO2 26 06/02/2018    BUN 24 06/02/2018    CREATININE 0.9 06/26/2018    CREATININE 0.8 06/02/2018     PT/INR: No results found for: PROTIME, INR  Prealbumin: No results found for: PREALBUMIN  Albumin:  Lab Results   Component Value Date    LABALBU 3.0 06/26/2018     Sed Rate:No results found for: Rosa Baker  Micro:   Lab Results   Component Value Date    BC No growth-preliminary  No growth   06/02/2018        Assessment:     Ulcer Identification:  Ulcer Type: venous  Contributing Factors: venous stasis, smoking and immunosuppression    Wound: N/A    Depth of Diabetic/Pressure/Non Pressure Ulcers or Wound:  Non-Pressure ulcer, limited to breakdown of skin    Patient Active Problem List   Diagnosis Code    Mass in neck R22.1    Hip pain M25.559    Fracture of metacarpal bone S62.309A    Type 2 diabetes mellitus without complication, without long-term current use of insulin (HCC) E11.9    Generalized anxiety disorder F41.1    Moderate episode of recurrent major depressive disorder (HCC) F33.1    Malignant neoplasm of tonsil (HCC) C09.9    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 Performed by: Jefry Bradley DPM      Plan:     Patient examined and evaluated    Venous stasis ulcerations without signs of infection. Local wound care to consist of aquacel and unnaboot  Encourage smoking cessation  Follow up 1 week eval for compression stockings    Treatment:   Orders Placed This Encounter   Procedures    Application unnaboot     Standing Status:   Standing     Number of Occurrences:   1         Antibiotics: No    Follow up: 1 weeks    Please see attached Discharge Instructions    Written patient dismissal instructions given to patient and signed by patient or POA. Discharge Instructions       Visit Discharge/Physician Orders:     Wound Location: Right lower leg wound, bilateral lower leg edema    Dressing orders: Unna boots applied to bilateral lower legs today. Keep dry and intact. Bilateral lower legs: Remove old unna boots to bilateral lower legs. Wash legs with warm soapy water. Pat dry. Cleanse wound with normal saline or wound cleanser and gauze. Pat dry with clean gauze. Apply unna boots then coban to bilateral lower legs from base of toes to 1-2 inches below bend of knee. Change in wound clinic once weekly. Keep all dressings clean & dry. Do not shower, take baths or get wound wet, unless otherwise instructed by your Wound Care doctor. Follow up visit:   1 Week - 7/17/18 at 2:30 pm    Keep next scheduled appointment. Please give 24 hour notice if unable to keep appointment. [phone]    If you experience any of the following, please call the Wound Care Service during business hours: Monday through Friday 8:00 am - 4:30 pm  ([phone]). *Increase in pain   *Temperature over 101   *Increase in drainage from your wound or a foul odor   *Uncontrolled swelling   *Need for compression bandage changes due to slippage, breakthrough drainage    If you need medical attention outside of business hours, please contact your Primary Care Doctor or go to the nearest emergency room.                    Electronically signed by Santa Edmondson DPM on 7/10/2018 at 3:38 PM

## 2018-07-10 NOTE — PLAN OF CARE
Problem: Wound:  Goal: Will show signs of wound healing; wound closure and no evidence of infection  Will show signs of wound healing; wound closure and no evidence of infection  Outcome: Ongoing  Pt presents to clinic new with bilateral lower leg edema and wound to right lower leg. No s/s of infection noted. Pt afebrile. Pt currently receiving radiation for left sided tonsil and lymph cancer as reported by pt. Mediport present to right upper chest. Aquacel ag applied to right lower leg followed by unna boot then coban to bilateral lower legs. Pt to leave intact x 1 week and follow up in wound clinic. Comments: Care plan reviewed with patient . Patient verbalizes understanding of the plan of care and contribute to goal setting.

## 2018-07-11 ENCOUNTER — TELEPHONE (OUTPATIENT)
Dept: WOUND CARE | Age: 58
End: 2018-07-11

## 2018-07-11 ENCOUNTER — CLINICAL DOCUMENTATION (OUTPATIENT)
Dept: NUTRITION | Age: 58
End: 2018-07-11

## 2018-07-11 ENCOUNTER — HOSPITAL ENCOUNTER (OUTPATIENT)
Dept: WOUND CARE | Age: 58
Discharge: HOME | End: 2018-07-11
Payer: MEDICARE

## 2018-07-11 VITALS
TEMPERATURE: 98 F | OXYGEN SATURATION: 94 % | SYSTOLIC BLOOD PRESSURE: 119 MMHG | HEART RATE: 87 BPM | DIASTOLIC BLOOD PRESSURE: 59 MMHG | RESPIRATION RATE: 18 BRPM

## 2018-07-11 DIAGNOSIS — C09.9 MALIGNANT NEOPLASM OF TONSIL (HCC): ICD-10-CM

## 2018-07-11 DIAGNOSIS — C09.9 SQUAMOUS CELL CARCINOMA OF LEFT TONSIL (HCC): ICD-10-CM

## 2018-07-11 DIAGNOSIS — R60.0 BILATERAL LEG EDEMA: ICD-10-CM

## 2018-07-11 DIAGNOSIS — S81.801A WOUND OF RIGHT LEG, INITIAL ENCOUNTER: Primary | ICD-10-CM

## 2018-07-11 DIAGNOSIS — E11.9 TYPE 2 DIABETES MELLITUS WITHOUT COMPLICATION, WITHOUT LONG-TERM CURRENT USE OF INSULIN (HCC): ICD-10-CM

## 2018-07-11 PROCEDURE — 99214 OFFICE O/P EST MOD 30 MIN: CPT

## 2018-07-11 PROCEDURE — 77386 HC NTSTY MODUL RAD TX DLVR CPLX: CPT | Performed by: RADIOLOGY

## 2018-07-11 NOTE — PLAN OF CARE
Problem: Wound:  Goal: Will show signs of wound healing; wound closure and no evidence of infection  Will show signs of wound healing; wound closure and no evidence of infection  Outcome: Ongoing  Pt presents to clinic for ongoing care of bilateral lower leg edema and right leg wounds. No s/s of infection noted. Pt afebrile. Pt states that his right leg wrap felt very tight and he could not keep wrap on any longer. Took wrap off at home. More swelling noted to right lower leg than left. Pt states that he sleeps with his feet down in dependant position. Pt requests wedge, wheelchair and walker for home due to increased weakness. Called Dr. Gabriel Cotter office and set pt up with appt to assist in getting assistive devices. Instructed to pt elevate legs if wraps feel like they are tight to help control edema. Aquacel ag applied to right lower leg. Wrapped lower legs with kerlix then surpress wraps. Home health to change three times weekly. Referral sent to [facility] per pt request. Referral sent for social work to help assist pt with medical bills/medical card. Emailed and faxed referral and spoke to Marina Sherman. Pt states he has 2 more radiation treatments. Pt to follow up next week with Dr. Jen Cali. Called pt transport and wheeled pt to [facility]. Comments: Care plan reviewed with patient. Patient verbalizes understanding of the plan of care and contribute to goal setting.

## 2018-07-11 NOTE — PROGRESS NOTES
Patient called Wound Clinic stating, Unna boot to right lower feels too tight. RN advised patient to removed unna boot to the right leg. Patient removed unna boot and states, his leg feels better. Patient states he is able to move his leg without pain or further concern. Patient notes sensation. Patient states left leg unna boot feels fine. RN asked if patient is able to come to Wound Care to see RN, Patient states he is being picked up by [facility] today at 1PM for his radiation treatment. RN contacted [facility], she scheduled patient to be dropped off at [address] [facility] after his radiation treatment. Patient notified and agrees to come to Wound Care for evaluation of right lower leg.

## 2018-07-12 PROCEDURE — 77386 HC NTSTY MODUL RAD TX DLVR CPLX: CPT | Performed by: RADIOLOGY

## 2018-07-16 ENCOUNTER — TELEPHONE (OUTPATIENT)
Dept: FAMILY MEDICINE CLINIC | Age: 58
End: 2018-07-16

## 2018-07-16 DIAGNOSIS — E11.65 TYPE 2 DIABETES MELLITUS WITH HYPERGLYCEMIA, UNSPECIFIED LONG TERM INSULIN USE STATUS: Primary | ICD-10-CM

## 2018-07-16 PROCEDURE — 77386 HC NTSTY MODUL RAD TX DLVR CPLX: CPT

## 2018-07-16 PROCEDURE — 77336 RADIATION PHYSICS CONSULT: CPT

## 2018-07-16 RX ORDER — BLOOD-GLUCOSE METER
1 KIT MISCELLANEOUS DAILY
Qty: 1 KIT | Refills: 0 | Status: SHIPPED | OUTPATIENT
Start: 2018-07-16 | End: 2019-04-13 | Stop reason: SDUPTHER

## 2018-07-16 NOTE — TELEPHONE ENCOUNTER
Dr Ashly Esquivel, who does the patient's radiation and also Dr Ana Cardenas told him that he needs to get a meter. But they wouldn't order him one. Order or appt?     Call patient

## 2018-07-17 ENCOUNTER — HOSPITAL ENCOUNTER (OUTPATIENT)
Dept: WOUND CARE | Age: 58
Discharge: HOME | End: 2018-07-17
Payer: MEDICARE

## 2018-07-17 ENCOUNTER — OFFICE VISIT (OUTPATIENT)
Dept: PHYSICAL MEDICINE AND REHAB | Age: 58
End: 2018-07-17
Payer: MEDICARE

## 2018-07-17 ENCOUNTER — TELEPHONE (OUTPATIENT)
Dept: FAMILY MEDICINE CLINIC | Age: 58
End: 2018-07-17

## 2018-07-17 VITALS
TEMPERATURE: 98 F | DIASTOLIC BLOOD PRESSURE: 65 MMHG | HEART RATE: 95 BPM | WEIGHT: 266 LBS | OXYGEN SATURATION: 96 % | HEIGHT: 72 IN | RESPIRATION RATE: 18 BRPM | SYSTOLIC BLOOD PRESSURE: 115 MMHG | BODY MASS INDEX: 36.03 KG/M2

## 2018-07-17 VITALS
WEIGHT: 266.1 LBS | BODY MASS INDEX: 36.04 KG/M2 | DIASTOLIC BLOOD PRESSURE: 75 MMHG | SYSTOLIC BLOOD PRESSURE: 128 MMHG | HEIGHT: 72 IN | HEART RATE: 92 BPM

## 2018-07-17 DIAGNOSIS — C02.9 TONGUE CARCINOMA (HCC): ICD-10-CM

## 2018-07-17 DIAGNOSIS — I83.018 VENOUS STASIS ULCER OF OTHER PART OF RIGHT LOWER LEG LIMITED TO BREAKDOWN OF SKIN WITH VARICOSE VEINS (HCC): Primary | ICD-10-CM

## 2018-07-17 DIAGNOSIS — M25.559 ARTHRALGIA OF HIP, UNSPECIFIED LATERALITY: ICD-10-CM

## 2018-07-17 DIAGNOSIS — C09.9 SQUAMOUS CELL CARCINOMA OF LEFT TONSIL (HCC): Primary | ICD-10-CM

## 2018-07-17 DIAGNOSIS — L97.811 VENOUS STASIS ULCER OF OTHER PART OF RIGHT LOWER LEG LIMITED TO BREAKDOWN OF SKIN WITH VARICOSE VEINS (HCC): Primary | ICD-10-CM

## 2018-07-17 PROCEDURE — 99204 OFFICE O/P NEW MOD 45 MIN: CPT | Performed by: PAIN MEDICINE

## 2018-07-17 PROCEDURE — 99213 OFFICE O/P EST LOW 20 MIN: CPT

## 2018-07-17 PROCEDURE — G8417 CALC BMI ABV UP PARAM F/U: HCPCS | Performed by: PAIN MEDICINE

## 2018-07-17 PROCEDURE — 3017F COLORECTAL CA SCREEN DOC REV: CPT | Performed by: PAIN MEDICINE

## 2018-07-17 PROCEDURE — 4004F PT TOBACCO SCREEN RCVD TLK: CPT | Performed by: PAIN MEDICINE

## 2018-07-17 PROCEDURE — G8427 DOCREV CUR MEDS BY ELIG CLIN: HCPCS | Performed by: PAIN MEDICINE

## 2018-07-17 ASSESSMENT — ENCOUNTER SYMPTOMS
VOMITING: 0
SHORTNESS OF BREATH: 1
PHOTOPHOBIA: 0
BACK PAIN: 0
CONSTIPATION: 1
RHINORRHEA: 0
DIARRHEA: 0
WHEEZING: 0
NAUSEA: 0
COUGH: 0
ABDOMINAL PAIN: 0
EYE PAIN: 0
VOICE CHANGE: 1
CHEST TIGHTNESS: 0
SINUS PRESSURE: 0
TROUBLE SWALLOWING: 1
COLOR CHANGE: 0
SORE THROAT: 1

## 2018-07-17 ASSESSMENT — PAIN DESCRIPTION - PAIN TYPE: TYPE: CHRONIC PAIN

## 2018-07-17 ASSESSMENT — PAIN DESCRIPTION - PROGRESSION: CLINICAL_PROGRESSION: NOT CHANGED

## 2018-07-17 ASSESSMENT — PAIN SCALES - GENERAL: PAINLEVEL_OUTOF10: 5

## 2018-07-17 ASSESSMENT — PAIN DESCRIPTION - FREQUENCY: FREQUENCY: CONTINUOUS

## 2018-07-17 ASSESSMENT — PAIN DESCRIPTION - ONSET: ONSET: ON-GOING

## 2018-07-17 NOTE — PROGRESS NOTES
[facility]  [facility]  [address]  Dept: [phone]  Dept Fax: [phone]  Loc: [phone]    Visit Date: 7/17/2018    Mustapha Suárez is a 59 y.o. male who is referred for pain management evaluation and treatment per Dr. Shaheen Duncan. CAGE and CAGE-AID Questions   1. In the last three months, have you felt you should cut down or stop drinking or using drugs? Yes []        No [x]     2. In the last three months, has anyone annoyed you or gotten on your nerves by telling you to cut down or stop drinking or using drugs? Yes []        No [x]     3. In the last three months, have you felt guilty or bad about how much you drink or use drugs? Yes []        No [x]     4. In the last three months, have you been waking up wanting to have an alcoholic drink or use drugs? Yes []        No [x]        Opioid Risk Tool:  Clinician Form       1. Family History of Substance Abuse: Female Male    Alcohol   []1   []3    Illegal drugs   []2   []3    Prescription drugs     []4   []4   2. Personal History of Substance Abuse:          Alcohol   []3   []3    Illegal drugs   []4   []4    Prescription drugs     []5   []5   3. Age (fredo box if between 12 and 39):     []1   []1   4. History of Preadolescent Sexual Abuse:     []3   []0   5. Psychological Disease:      Attention deficit disorder, obsessive-compulsive disorder, bipolar, schizophrenia   []2   []2      Depression     []1   []1    Scoring Totals 0 0     Total Score  Low Risk  Moderate Risk  High Risk   Risk Category   0 - 3   4 - 7   8 or Above      Patient states symptoms interfere with:  A.  General Activity:  no   B. Mood: no    C. Walking Ability:   no   D. Normal Work (Includes both work outside the home and housework):   no    E.  Relations with Other People:  no   F. Sleep:   no   G.  Enjoyment of Life:  no       HPI:     Chief Complaint: Throat pain    HPI     Patient is a 60 y/o male with known history of squamous cell carcinoma of the left tonsil. Patient states he developed a painful mass in his left neck and seek evaluation per Dr Nicolás Quinn. Patient was further evaluated and was found to have a tongue mass with cervical lymphadenopathy on the left. Patient had CT scan preformed which confirmed mass. Patient had biopsy preformed on May 7, 2018 revealed squamous cell carcinoma. Patient states has had chemotherapy and radiation treatments. Patient states has tolerated treatments. Patient states has been prescribed extended release morphine and oxycodone for pain control. States pain is controlled with theses medications and has been taking less and still feels pain adequately controlled. States my pain is minimal and I do not want to be on pain medications again. PET CT 05/14/2018:  FINDINGS:        Neck: The anatomic characteristics of a mass at the left tonsil and tongue base with inferior extension are better characterized on recent CT of the neck. This mass is associated with a maximum SUV of 16.6 (image 49). There are multiple FDG avid level 2,    3 and 4 left-sided cervical lymph nodes. A representative level 3/4 lymph node measures 2.1 cm in short axis diameter and has a maximum SUV of 18.5 (image 73). A nonenlarged right-sided level 3/4 lymph node has a maximum SUV of 4.9 (image 79).     Chest: Cardiac size is normal. Atherosclerotic calcifications are present in the thoracic aorta without evidence of aneurysm. There is no pleural or pericardial effusion. Pleural thickening is noted on the right side. Calcified granulomas are present in    the lungs. There is scarring at the bilateral lung bases. No FDG avid pulmonary nodules are identified. There is no FDG avid mediastinal, hilar or axillary lymphadenopathy.  Degenerative and scoliotic changes are present in the thoracic spine without    evidence of hypermetabolic osseous metastatic disease.       Abdomen/pelvis: Physiologic activity is present in the liver, spleen, urinary collecting system and gastrointestinal tract. There are calcified granulomas in the liver and spleen. Bilateral perinephric stranding may be chronic. Atherosclerotic    calcifications are seen in the abdominal aorta without evidence of aneurysm. There is an IVC filter in an infrarenal position. Diffuse thickening of the urinary bladder wall may be secondary to underdistention. Calcifications are seen in a prominent    prostate gland. There is no FDG avid mesenteric, retroperitoneal, pelvic or inguinal lymphadenopathy. Degenerative changes are seen in the lumbar spine and pelvis without evidence of hypermetabolic osseous metastatic disease. A well-defined sclerotic    lesion in the left ilium is likely a benign bone island (image 246).         Impression   1. FDG avid mass at the left tongue base and tonsil with inferior extension corresponding to known malignancy. 2. FDG avid cervical lymphadenopathy, primarily on the left side, corresponding to known metastatic disease. 3. No evidence of FDG avid distant or osseous metastatic disease.             The patient is allergic to latex; coumadin [warfarin sodium]; and tape [adhesive tape]. Past Medical History  Dave Jackman  has a past medical history of Anxiety; Bleeds easily ([address] Bruises easily; Cancer [facility]); COPD (chronic obstructive pulmonary disease) ([address] DVT (deep venous thrombosis) ([address] Enlarged lymph node; History of emotional problems; Hypertension; Obesity; Squamous cell carcinoma of tonsils (HCC); and Type II or unspecified type diabetes mellitus without mention of complication, not stated as uncontrolled. Past Surgical History  The patient  has a past surgical history that includes knee surgery (Right, 1980's); Toe Surgery (Right, 2013); other surgical history (7-7-09); hip surgery (Right, 06/30/2014); and Tunneled venous port placement.     Family History  This patient's family history includes cell carcinoma of left tonsil ([facility]    2. Tongue carcinoma ([facility]            Plan:      · Patient read and signed orientation and opioid agreement. · OARRS reviewed. · Discussed long term side effects of medications. · Discussed tolerance, dependency and addiction. · UDS preformed today. · Patient told can not receive any pain medications from any other source. · Discussed with patient they may not be pain free. · No evidence of abuse, diversion or aberrant behavior. · Medications and/or procedures improve function and quality of life. · Reviewed notes from Dr Chandler Simmons  · Reviewed Scans  · Discussed pain medications at length, sustain and immediate release opioids. · Prescription Needs: Patient states pain is minimal and does not want to continue to take narcotics. · I have instructed  the patient to call office if his pain level changes. Verbalizes understanding    Meds. Prescribed:   No orders of the defined types were placed in this encounter. Return for Patient to call office if pain level changes. .     Time spent with patient was 60 minutes more than 50% was spent  Counseling/coordinated the patient's care.     Electronically signed by Leny Archer MD on 7/24/2018 at 5:07 PM

## 2018-07-17 NOTE — TELEPHONE ENCOUNTER
FYI: Milady Kee a  from [address] [place] [address] called wanting the script for the patient's wheeled walker with a seat to be faxed to Annalise Baez or Milady Kee at [phone].

## 2018-07-17 NOTE — LETTER
[facility]  [address] [id number]  Phone: [phone]  Fax: [phone]    Wei Velasquez MD        July 24, 2018       Patient: Jim Bishop   MR Number: [id number]   YOB: 1960   Date of Visit: 7/17/2018       Dear Dr. Lynn Donovan: Thank you for the request for consultation for Eric Avila to me for the evaluation of cancer pain. Below are the relevant portions of my assessment and plan of care. If you have questions, please do not hesitate to call me. I look forward to following Sameer Valdez along with you.     Sincerely,        Khanh Jordan MD    CC providers:  Jameel Culver MD  [address] [place] [id number] [facility]  Arianna Conroy MD  [address] [place]to Hackett

## 2018-07-17 NOTE — PROGRESS NOTES
SYSTEMS    Pertinent items are noted in HPI. Objective:      /65   Pulse 95   Temp 98 °F (36.7 °C) (Oral)   Resp 18   Ht 6' (1.829 m)   Wt 266 lb (120.7 kg)   SpO2 96%   BMI 36.08 kg/m²     Wt Readings from Last 3 Encounters:   07/17/18 266 lb (120.7 kg)   07/10/18 266 lb 11.2 oz (121 kg)   06/04/18 290 lb (131.5 kg)       PHYSICAL EXAM:    General Appearance: alert and oriented to person, place and time, well-developed and well-nourished, in no acute distress    Palpable pedal pulses. Brisk cap refill. 3+ pitting edema bilaterally. Hyper pigmentation right leg  Superficial venous ulceration right leg without purulent drainage, mal odor, erythema. Light touch sensation grossly intact. Wound 07/10/18 Leg Right; Lower 1 (Active)   Wound Type Wound 7/17/2018  2:25 PM   Dressing Status Intact 7/17/2018  2:25 PM   Dressing Changed Changed/New 7/17/2018  2:25 PM   Wound Cleansed Rinsed/Irrigated with saline 7/17/2018  2:25 PM   Wound Length (cm) 2 cm 7/17/2018  2:25 PM   Wound Width (cm) 2 cm 7/17/2018  2:25 PM   Wound Depth (cm)  0.1 7/17/2018  2:25 PM   Calculated Wound Size (cm^2) (l*w) 4 cm^2 7/17/2018  2:25 PM   Change in Wound Size % (l*w) 89.46 7/17/2018  2:25 PM   Wound Assessment Yellow;Pink 7/17/2018  2:25 PM   Drainage Amount Small 7/17/2018  2:25 PM   Drainage Description Serosanguinous 7/17/2018  2:25 PM   Odor None 7/17/2018  2:25 PM   Margins Attached edges 7/17/2018  2:25 PM   [place]-wound Assessment Dry;Pink 7/17/2018  2:25 PM   [place]%Wound Bed 50 7/17/2018  2:25 PM   Yellow%Wound Bed 50 7/17/2018  2:25 PM   Other%Wound Bed 60 7/10/2018  2:58 PM   Number of days: 6       LABS      CBC:   Lab Results   Component Value Date    WBC 4.4 07/09/2018    HGB 12.1 07/09/2018    HCT 34.8 07/09/2018    MCV 93.8 07/09/2018     07/09/2018     BMP:   Lab Results   Component Value Date     06/26/2018     06/02/2018    K 3.8 06/26/2018    K 4.6 06/02/2018     06/02/2018

## 2018-07-18 DIAGNOSIS — F41.1 GENERALIZED ANXIETY DISORDER: ICD-10-CM

## 2018-07-18 RX ORDER — CLONAZEPAM 1 MG/1
1 TABLET ORAL 3 TIMES DAILY PRN
Qty: 90 TABLET | Refills: 1 | Status: SHIPPED | OUTPATIENT
Start: 2018-07-18 | End: 2018-07-19 | Stop reason: SDUPTHER

## 2018-07-18 NOTE — TELEPHONE ENCOUNTER
Pt called stating that he will need a refill on his clonazepam on 7-21-18 but our office is closed. Pt wants to know if a script can be sent to pharmacy and he will  on 21 or 22. Pt will check with the pharmacy.

## 2018-07-19 DIAGNOSIS — F41.1 GENERALIZED ANXIETY DISORDER: ICD-10-CM

## 2018-07-19 RX ORDER — CLONAZEPAM 1 MG/1
1 TABLET ORAL 3 TIMES DAILY PRN
Qty: 90 TABLET | Refills: 1 | Status: SHIPPED | OUTPATIENT
Start: 2018-07-19 | End: 2018-09-05 | Stop reason: SDUPTHER

## 2018-07-20 ENCOUNTER — TELEPHONE (OUTPATIENT)
Dept: PHYSICAL MEDICINE AND REHAB | Age: 58
End: 2018-07-20

## 2018-07-20 NOTE — TELEPHONE ENCOUNTER
Pt. Requesting pain medication. New pt 7/17/2018 for cancer of the tongue and tonsil. UDS positive for Morphine THC-Delta-9 Oxycodone and Noroxycodone. Please advise.

## 2018-07-23 DIAGNOSIS — C09.9 MALIGNANT NEOPLASM OF TONSIL (HCC): Primary | ICD-10-CM

## 2018-07-24 ENCOUNTER — HOSPITAL ENCOUNTER (OUTPATIENT)
Dept: INFUSION THERAPY | Age: 58
Discharge: HOME | End: 2018-07-24
Payer: MEDICARE

## 2018-07-24 NOTE — TELEPHONE ENCOUNTER
Pt. Would like to make and appt. for increased pain. I will have to discuss with Charly/Dr. Lesly Chavez where to schedule due to the full schedule.

## 2018-07-25 ENCOUNTER — OFFICE VISIT (OUTPATIENT)
Dept: PHYSICAL MEDICINE AND REHAB | Age: 58
End: 2018-07-25
Payer: MEDICARE

## 2018-07-25 VITALS
WEIGHT: 266 LBS | BODY MASS INDEX: 36.03 KG/M2 | SYSTOLIC BLOOD PRESSURE: 133 MMHG | HEART RATE: 104 BPM | HEIGHT: 72 IN | DIASTOLIC BLOOD PRESSURE: 60 MMHG

## 2018-07-25 DIAGNOSIS — G89.4 CHRONIC PAIN SYNDROME: ICD-10-CM

## 2018-07-25 DIAGNOSIS — C09.9 SQUAMOUS CELL CARCINOMA OF LEFT TONSIL (HCC): Primary | ICD-10-CM

## 2018-07-25 DIAGNOSIS — C02.9 TONGUE CARCINOMA (HCC): ICD-10-CM

## 2018-07-25 PROCEDURE — G8417 CALC BMI ABV UP PARAM F/U: HCPCS | Performed by: NURSE PRACTITIONER

## 2018-07-25 PROCEDURE — 4004F PT TOBACCO SCREEN RCVD TLK: CPT | Performed by: NURSE PRACTITIONER

## 2018-07-25 PROCEDURE — 99213 OFFICE O/P EST LOW 20 MIN: CPT | Performed by: NURSE PRACTITIONER

## 2018-07-25 PROCEDURE — G8427 DOCREV CUR MEDS BY ELIG CLIN: HCPCS | Performed by: NURSE PRACTITIONER

## 2018-07-25 PROCEDURE — 3017F COLORECTAL CA SCREEN DOC REV: CPT | Performed by: NURSE PRACTITIONER

## 2018-07-25 RX ORDER — OXYCODONE HYDROCHLORIDE 10 MG/1
10 TABLET ORAL EVERY 8 HOURS PRN
Qty: 45 TABLET | Refills: 0 | Status: SHIPPED | OUTPATIENT
Start: 2018-07-25 | End: 2018-08-07 | Stop reason: SDUPTHER

## 2018-07-25 ASSESSMENT — ENCOUNTER SYMPTOMS
BACK PAIN: 0
CHEST TIGHTNESS: 0
WHEEZING: 1
TROUBLE SWALLOWING: 1
VOICE CHANGE: 1
NAUSEA: 0
SORE THROAT: 1
PHOTOPHOBIA: 0
DIARRHEA: 0
VOMITING: 0
COLOR CHANGE: 0
ABDOMINAL PAIN: 1
CONSTIPATION: 0
COUGH: 1
SINUS PRESSURE: 0
RHINORRHEA: 0
EYE PAIN: 0
SHORTNESS OF BREATH: 1
CHOKING: 1

## 2018-07-25 NOTE — PROGRESS NOTES
exhibits no distension. There is no tenderness. There is no rebound and no guarding. Musculoskeletal:        Right lower leg: He exhibits tenderness and swelling. Right foot: There is tenderness and swelling. Feet:    Scratch and open wound to right shin area, has dressing ACE wrap intact. Neurological: He is alert and oriented to person, place, and time. He has normal strength. He is not disoriented. He displays no atrophy. No cranial nerve deficit or sensory deficit. He exhibits normal muscle tone. He displays a negative Romberg sign. Coordination and gait normal. He displays no Babinski's sign on the right side. Skin: Skin is warm. No rash noted. He is not diaphoretic. No erythema. No pallor. Psychiatric: He has a normal mood and affect. His speech is normal and behavior is normal. Judgment and thought content normal. His mood appears not anxious. His affect is not angry, not blunt, not labile and not inappropriate. He is not agitated, not aggressive, not hyperactive, not slowed, not withdrawn, not actively hallucinating and not combative. Thought content is not paranoid and not delusional. Cognition and memory are normal. Cognition and memory are not impaired. He does not express impulsivity or inappropriate judgment. He does not exhibit a depressed mood. He expresses no homicidal and no suicidal ideation. He expresses no suicidal plans and no homicidal plans. He exhibits normal recent memory and normal remote memory. Tearful crying today. He is attentive. Nursing note and vitals reviewed. NIC test: negative  Yeomans test: negative  [place] test: negative     Assessment:     1. 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 Tongue carcinoma ([facility]    3. Chronic pain syndrome            Plan:      · OARRS reviewed. · Discussed long term side effects of medications. · Discussed tolerance, dependency and addiction.   · Previous UDS reviewed  · UDS preformed today for

## 2018-07-30 ENCOUNTER — TELEPHONE (OUTPATIENT)
Dept: FAMILY MEDICINE CLINIC | Age: 58
End: 2018-07-30

## 2018-07-30 ENCOUNTER — CLINICAL DOCUMENTATION (OUTPATIENT)
Dept: RADIATION ONCOLOGY | Age: 58
End: 2018-07-30

## 2018-07-30 DIAGNOSIS — G89.3 CANCER ASSOCIATED PAIN: Primary | ICD-10-CM

## 2018-07-30 RX ORDER — MORPHINE SULFATE 60 MG/1
60 TABLET, FILM COATED, EXTENDED RELEASE ORAL EVERY 12 HOURS
Qty: 28 TABLET | Refills: 0 | Status: CANCELLED | OUTPATIENT
Start: 2018-07-30 | End: 2018-08-13

## 2018-07-30 NOTE — TELEPHONE ENCOUNTER
Pt. Requesting MS Contin 60mg q12h refill. You note states will discuss continuing Morphine 30mg q12h with Dr. Lesly Chavez. I set up for the 60mg, please advise if otherwise. OARRS reviewed. UDS: positive for Morphine and oxycodone as well as 7-Aminoclonazepam and THC-Delta-9 - cancer patient. Last seen: 7/25/2018.  Follow-up: 8/22/2018

## 2018-07-31 ENCOUNTER — OFFICE VISIT (OUTPATIENT)
Dept: ONCOLOGY | Age: 58
End: 2018-07-31
Payer: MEDICARE

## 2018-07-31 ENCOUNTER — HOSPITAL ENCOUNTER (OUTPATIENT)
Dept: INFUSION THERAPY | Age: 58
Discharge: HOME | End: 2018-07-31
Payer: MEDICARE

## 2018-07-31 VITALS
DIASTOLIC BLOOD PRESSURE: 84 MMHG | HEART RATE: 81 BPM | OXYGEN SATURATION: 93 % | SYSTOLIC BLOOD PRESSURE: 146 MMHG | HEIGHT: 72 IN | RESPIRATION RATE: 20 BRPM | TEMPERATURE: 97.9 F

## 2018-07-31 DIAGNOSIS — C02.9 TONGUE CARCINOMA (HCC): ICD-10-CM

## 2018-07-31 DIAGNOSIS — Z51.11 ENCOUNTER FOR ANTINEOPLASTIC CHEMOTHERAPY: ICD-10-CM

## 2018-07-31 DIAGNOSIS — R22.1 MASS IN NECK: ICD-10-CM

## 2018-07-31 DIAGNOSIS — C09.9 MALIGNANT NEOPLASM OF TONSIL (HCC): Primary | ICD-10-CM

## 2018-07-31 DIAGNOSIS — C09.9 SQUAMOUS CELL CARCINOMA OF LEFT TONSIL (HCC): ICD-10-CM

## 2018-07-31 DIAGNOSIS — C09.9 MALIGNANT NEOPLASM OF TONSIL (HCC): ICD-10-CM

## 2018-07-31 DIAGNOSIS — E11.9 TYPE 2 DIABETES MELLITUS WITHOUT COMPLICATION, WITHOUT LONG-TERM CURRENT USE OF INSULIN (HCC): ICD-10-CM

## 2018-07-31 LAB
BASOPHILIA: ABNORMAL
BASOPHILS # BLD: 0.8 %
BASOPHILS ABSOLUTE: 0.1 THOU/MM3 (ref 0–0.1)
BUN, WHOLE BLOOD: 23 MG/DL (ref 8–26)
CHLORIDE, WHOLE BLOOD: 98 MEQ/L (ref 98–109)
CREATININE, WHOLE BLOOD: 1 MG/DL (ref 0.5–1.2)
EOSINOPHIL # BLD: 1.6 %
EOSINOPHILS ABSOLUTE: 0.2 THOU/MM3 (ref 0–0.4)
GFR, ESTIMATED: 81 ML/MIN/1.73M2
GLUCOSE, WHOLE BLOOD: 118 MG/DL (ref 70–108)
HCT VFR BLD CALC: 35.9 % (ref 42–52)
HEMOGLOBIN: 12.2 GM/DL (ref 14–18)
IMMATURE GRANS (ABS): 0.22 THOU/MM3 (ref 0–0.07)
IMMATURE GRANULOCYTES: 2.2 %
IONIZED CALCIUM, WHOLE BLOOD: 1.02 MMOL/L (ref 1.12–1.32)
LYMPHOCYTES # BLD: 5.4 %
LYMPHOCYTES ABSOLUTE: 0.6 THOU/MM3 (ref 1–4.8)
MCH RBC QN AUTO: 31.2 PG (ref 27–31)
MCHC RBC AUTO-ENTMCNC: 33.9 GM/DL (ref 33–37)
MCV RBC AUTO: 92 FL (ref 80–94)
MONOCYTES # BLD: 13.4 %
MONOCYTES ABSOLUTE: 1.4 THOU/MM3 (ref 0.4–1.3)
NUCLEATED RED BLOOD CELLS: 0 /100 WBC
PDW BLD-RTO: 14.5 % (ref 11.5–14.5)
PLATELET # BLD: 250 THOU/MM3 (ref 130–400)
PLATELET ESTIMATE: ADEQUATE
PMV BLD AUTO: 7.6 FL (ref 7.4–10.4)
POTASSIUM, WHOLE BLOOD: 3.7 MEQ/L (ref 3.5–4.9)
RBC # BLD: 3.9 MILL/MM3 (ref 4.7–6.1)
SCAN OF BLOOD SMEAR: NORMAL
SEG NEUTROPHILS: 76.6 %
SEGMENTED NEUTROPHILS ABSOLUTE COUNT: 7.8 THOU/MM3 (ref 1.8–7.7)
SODIUM, WHOLE BLOOD: 139 MEQ/L (ref 138–146)
TOTAL CO2, WHOLE BLOOD: 28 MEQ/L (ref 23–33)
WBC # BLD: 10.2 THOU/MM3 (ref 4.8–10.8)

## 2018-07-31 PROCEDURE — 80047 BASIC METABLC PNL IONIZED CA: CPT

## 2018-07-31 PROCEDURE — 36591 DRAW BLOOD OFF VENOUS DEVICE: CPT

## 2018-07-31 PROCEDURE — 99213 OFFICE O/P EST LOW 20 MIN: CPT | Performed by: INTERNAL MEDICINE

## 2018-07-31 PROCEDURE — G8427 DOCREV CUR MEDS BY ELIG CLIN: HCPCS | Performed by: INTERNAL MEDICINE

## 2018-07-31 PROCEDURE — 36593 DECLOT VASCULAR DEVICE: CPT

## 2018-07-31 PROCEDURE — 3017F COLORECTAL CA SCREEN DOC REV: CPT | Performed by: INTERNAL MEDICINE

## 2018-07-31 PROCEDURE — G8417 CALC BMI ABV UP PARAM F/U: HCPCS | Performed by: INTERNAL MEDICINE

## 2018-07-31 PROCEDURE — 6360000002 HC RX W HCPCS: Performed by: INTERNAL MEDICINE

## 2018-07-31 PROCEDURE — 85025 COMPLETE CBC W/AUTO DIFF WBC: CPT

## 2018-07-31 PROCEDURE — 4004F PT TOBACCO SCREEN RCVD TLK: CPT | Performed by: INTERNAL MEDICINE

## 2018-07-31 PROCEDURE — 2709999900 HC NON-CHARGEABLE SUPPLY

## 2018-07-31 PROCEDURE — 99211 OFF/OP EST MAY X REQ PHY/QHP: CPT

## 2018-07-31 PROCEDURE — 2580000003 HC RX 258: Performed by: INTERNAL MEDICINE

## 2018-07-31 RX ORDER — SODIUM CHLORIDE 0.9 % (FLUSH) 0.9 %
10 SYRINGE (ML) INJECTION PRN
Status: DISCONTINUED | OUTPATIENT
Start: 2018-07-31 | End: 2018-08-01 | Stop reason: HOSPADM

## 2018-07-31 RX ORDER — SODIUM CHLORIDE 0.9 % (FLUSH) 0.9 %
20 SYRINGE (ML) INJECTION PRN
Status: CANCELLED | OUTPATIENT
Start: 2018-07-31

## 2018-07-31 RX ORDER — HEPARIN SODIUM (PORCINE) LOCK FLUSH IV SOLN 100 UNIT/ML 100 UNIT/ML
500 SOLUTION INTRAVENOUS PRN
Status: CANCELLED | OUTPATIENT
Start: 2018-07-31

## 2018-07-31 RX ORDER — SODIUM CHLORIDE 0.9 % (FLUSH) 0.9 %
10 SYRINGE (ML) INJECTION PRN
Status: CANCELLED | OUTPATIENT
Start: 2018-07-31

## 2018-07-31 RX ORDER — MORPHINE SULFATE 30 MG/1
30 TABLET, FILM COATED, EXTENDED RELEASE ORAL EVERY 12 HOURS
Qty: 30 TABLET | Refills: 0 | Status: SHIPPED | OUTPATIENT
Start: 2018-07-31 | End: 2018-08-09 | Stop reason: SDUPTHER

## 2018-07-31 RX ORDER — SODIUM CHLORIDE 0.9 % (FLUSH) 0.9 %
20 SYRINGE (ML) INJECTION PRN
Status: DISCONTINUED | OUTPATIENT
Start: 2018-07-31 | End: 2018-08-01 | Stop reason: HOSPADM

## 2018-07-31 RX ORDER — HEPARIN SODIUM (PORCINE) LOCK FLUSH IV SOLN 100 UNIT/ML 100 UNIT/ML
500 SOLUTION INTRAVENOUS PRN
Status: DISCONTINUED | OUTPATIENT
Start: 2018-07-31 | End: 2018-08-01 | Stop reason: HOSPADM

## 2018-07-31 RX ADMIN — Medication 10 ML: at 11:26

## 2018-07-31 RX ADMIN — Medication 10 ML: at 11:25

## 2018-07-31 RX ADMIN — WATER 2.2 ML: 1 INJECTION INTRAMUSCULAR; INTRAVENOUS; SUBCUTANEOUS at 11:39

## 2018-07-31 RX ADMIN — Medication 20 ML: at 12:04

## 2018-07-31 RX ADMIN — ALTEPLASE 2 MG: 2.2 INJECTION, POWDER, LYOPHILIZED, FOR SOLUTION INTRAVENOUS at 11:34

## 2018-07-31 RX ADMIN — HEPARIN 500 UNITS: 100 SYRINGE at 12:04

## 2018-07-31 NOTE — PROGRESS NOTES
Tolerated lab draw from [facility] well, discharged in satisfactory condition per self.

## 2018-07-31 NOTE — PLAN OF CARE
Problem: Musculor/Skeletal Functional Status  Intervention: Fall precautions  Fall precautions reviewed, patient verbalizes understanding. Call light within reach. Goal: Absence of falls  Outcome: Met This Shift      Problem: Intellectual/Education/Knowledge Deficit  Intervention: Verbal/written education provided  Labs to be drawn reviewed, patient verbalizes understanding. Goal: Teaching initiated upon admission  Outcome: Met This Shift      Problem: Discharge Planning  Intervention: Interaction with patient/family and care team  Patient verbalizes understanding of discharge instructions, follow up appointment, and when to call physician if needed    Goal: Knowledge of discharge instructions  Knowledge of discharge instructions    Outcome: Met This Shift      Problem: Infection - Central Venous Catheter-Associated Bloodstream Infection:  Intervention: Infection risk assessment  Mediport site with no redness or warmth. Skin over port intact with no signs of breakdown noted. Patient verbalizes signs/symptoms of port infection and when to notify the physician. Goal: Will show no infection signs and symptoms  Will show no infection signs and symptoms  Outcome: Met This Shift      Comments: Care plan reviewed with patient. Patient verbalizes understanding of the plan of care and contributes to goal setting.

## 2018-08-06 ENCOUNTER — TELEPHONE (OUTPATIENT)
Dept: PHYSICAL MEDICINE AND REHAB | Age: 58
End: 2018-08-06

## 2018-08-07 DIAGNOSIS — C09.9 SQUAMOUS CELL CARCINOMA OF LEFT TONSIL (HCC): ICD-10-CM

## 2018-08-07 DIAGNOSIS — G89.4 CHRONIC PAIN SYNDROME: ICD-10-CM

## 2018-08-07 DIAGNOSIS — C02.9 TONGUE CARCINOMA (HCC): ICD-10-CM

## 2018-08-07 RX ORDER — OXYCODONE HYDROCHLORIDE 10 MG/1
10 TABLET ORAL EVERY 8 HOURS PRN
Qty: 90 TABLET | Refills: 0 | Status: SHIPPED | OUTPATIENT
Start: 2018-08-08 | End: 2018-09-04 | Stop reason: SDUPTHER

## 2018-08-07 NOTE — PROGRESS NOTES
[facility] [address] [facility], [address]  Ph. 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 Treatment Course Number: 1    Treatment Site (s) Modality Dose (cGy From To Fractions/  Elapsed Days   Base of Tongue Mass + Cervical Lymph Nodes 6MV photons 5000 cGy 2018   Boost to Primary and Gross Node Disease 6MV photons 2000 cGy 2018 2018 10/ 18     Cumulative Dose to Base of Tongue Primary and Grossly Involved Lymph Nodes: 7000 cGy  Treatment Modifiers: Intensity modulated radiation therapy; custom MLC blocking; custom Aquaplast mask immobilization; concurrent chemotherapy.       HISTORY OF PRESENT ILLNESS:   Arianna Marin is a 59-year-old [employer]  and long-term cigarette smoker (continues to smoke one half pack per day) who noticed some left neck fullness and lumps dating back for well over a year. In the past few months he developed pain in the left neck and head area which has grown quite severe. He has pain with swallowing as well. About a year ago he developed a small area and the left neck where fluid would drain at night. He states that the fluid was tested, and \"did not show anything\". Recently, he moved, and changed his primary care physician to Dr. Keith Pizano. He brought up his difficulties with her, and after examining his neck, she informed him that he required further evaluation. He was seen in otolaryngology and had suspicious findings including left neck lymphadenopathy and a mass involving the base of tongue. CT scanning of the neck showed a partially cavitated mass in the left oropharynx and suspicious appearing left cervical adenopathy.

## 2018-08-09 DIAGNOSIS — G89.3 CANCER ASSOCIATED PAIN: ICD-10-CM

## 2018-08-09 RX ORDER — MORPHINE SULFATE 30 MG/1
30 TABLET, FILM COATED, EXTENDED RELEASE ORAL EVERY 12 HOURS
Qty: 30 TABLET | Refills: 0 | Status: SHIPPED | OUTPATIENT
Start: 2018-08-14 | End: 2018-08-22 | Stop reason: SDUPTHER

## 2018-08-14 ENCOUNTER — HOSPITAL ENCOUNTER (OUTPATIENT)
Dept: WOUND CARE | Age: 58
Discharge: HOME | End: 2018-08-14
Payer: MEDICARE

## 2018-08-14 VITALS
WEIGHT: 231.5 LBS | HEART RATE: 94 BPM | HEIGHT: 72 IN | RESPIRATION RATE: 21 BRPM | DIASTOLIC BLOOD PRESSURE: 67 MMHG | SYSTOLIC BLOOD PRESSURE: 120 MMHG | TEMPERATURE: 97.7 F | BODY MASS INDEX: 31.36 KG/M2 | OXYGEN SATURATION: 98 %

## 2018-08-14 DIAGNOSIS — S91.301A OPEN WOUND OF RIGHT FOOT, INITIAL ENCOUNTER: Primary | ICD-10-CM

## 2018-08-14 PROCEDURE — 11721 DEBRIDE NAIL 6 OR MORE: CPT

## 2018-08-14 PROCEDURE — 2709999900 HC NON-CHARGEABLE SUPPLY

## 2018-08-14 ASSESSMENT — PAIN DESCRIPTION - LOCATION: LOCATION: THROAT;LEG

## 2018-08-14 ASSESSMENT — PAIN DESCRIPTION - PROGRESSION: CLINICAL_PROGRESSION: NOT CHANGED

## 2018-08-14 ASSESSMENT — PAIN SCALES - GENERAL: PAINLEVEL_OUTOF10: 5

## 2018-08-14 ASSESSMENT — PAIN DESCRIPTION - DESCRIPTORS: DESCRIPTORS: BURNING;SORE

## 2018-08-14 ASSESSMENT — PAIN DESCRIPTION - PAIN TYPE: TYPE: CHRONIC PAIN

## 2018-08-14 ASSESSMENT — PAIN DESCRIPTION - ONSET: ONSET: ON-GOING

## 2018-08-14 ASSESSMENT — PAIN DESCRIPTION - FREQUENCY: FREQUENCY: CONTINUOUS

## 2018-08-14 ASSESSMENT — PAIN DESCRIPTION - ORIENTATION: ORIENTATION: RIGHT;LEFT

## 2018-08-14 NOTE — PROGRESS NOTES
[facility]          Progress Note and Procedure Note      Gillian Mcclure  MEDICAL RECORD NUMBER:  [id number]  AGE: 59 y.o. GENDER: male  : 1960  EPISODE DATE:  2018    Subjective:     Chief Complaint   Patient presents with    Wound Check     right leg          HISTORY of PRESENT ILLNESS HPI     Gillian Mcclure is a 59 y.o. male Established patient, who presents today for wound/ulcer evaluation. History of Wound Context: venous stasis  Wound/Ulcer Pain Timing/Severity: intermittent  Quality of pain: dull  Severity:  3 / 10   Modifying Factors: Pain worsens with palpation  Associated Signs/Symptoms: edema and drainage    Interval History:   Patient presents today for follow up on wound/ulcer's progression. The patient is currently not on antibiotics. Current dressing care includes meptiel Ag and compression wraps. Relates that he has a history of venous stasis. Admits to history of previous fracture to the knee. Also admits to previous ulcer which probed to bone of the lateral malleolus and right 2nd toe. 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 Right superficial leg ulcer, onset with chemo, currently healed. New superficial ulceration present to the dorsum of the right foot, patient thinks it is due to the wraps being too tight. Patient also has an area that is sore on the outside of his right foot by the base of his 5th toe. Denies purulent drainage, mal odor, erythema. Associated pitting edema and serous drainage. Denies F,C,N,V, SOB, CP. Admits to 40 year pack history, currently smoking 5-7 cigarettes a day, trying to quit.          PAST MEDICAL HISTORY        Diagnosis Date    Anxiety     Bleeds easily ([address]     Bruises easily     Cancer ([address]     COPD (chronic obstructive pulmonary disease) ([address]     DVT (deep venous thrombosis) ([address]     Enlarged lymph node     History of emotional problems     Hypertension     HCC    Obesity     mouth every 4 hours as needed for Pain. Tirso John lidocaine-prilocaine (EMLA) 2.5-2.5 % cream Apply 1 Tube topically as needed for Pain Apply topically as needed. 1 Tube 1     No current facility-administered medications on file prior to encounter. REVIEW OF SYSTEMS    Pertinent items are noted in HPI. Objective:      /67   Pulse 94   Temp 97.7 °F (36.5 °C) (Oral)   Resp 21   Ht 6' (1.829 m)   Wt 231 lb 8 oz (105 kg)   SpO2 98%   BMI 31.40 kg/m²     Wt Readings from Last 3 Encounters:   08/14/18 231 lb 8 oz (105 kg)   07/25/18 266 lb (120.7 kg)   07/17/18 266 lb (120.7 kg)       PHYSICAL EXAM    General Appearance: alert and oriented to person, place and time, well-developed and well-nourished, in no acute distress    Palpable pedal pulses. Brisk cap refill. Hyper pigmentation right leg  Superficial ulceration present to the dorsum of the right foot, no mal odor, no erythema. Light touch sensation grossly intact. Equinus to bilateral lower extremity. Prominent styloid process on the right 5th digit with moderate erythema. Wound 07/10/18 Leg Right; Lower 1 (Active)   Wound Type Wound 7/17/2018  2:25 PM   Dressing Status Intact 7/17/2018  2:25 PM   Dressing Changed Changed/New 7/17/2018  2:25 PM   Wound Cleansed Rinsed/Irrigated with saline 7/17/2018  2:25 PM   Wound Length (cm) 0 cm 8/14/2018  2:55 PM   Wound Width (cm) 0 cm 8/14/2018  2:55 PM   Wound Depth (cm)  0 8/14/2018  2:55 PM   Calculated Wound Size (cm^2) (l*w) 0 cm^2 8/14/2018  2:55 PM   Change in Wound Size % (l*w) 100 8/14/2018  2:55 PM   Wound Assessment Yellow;Pink 7/17/2018  2:25 PM   Drainage Amount Small 7/17/2018  2:25 PM   Drainage Description Serosanguinous 7/17/2018  2:25 PM   Odor None 7/17/2018  2:25 PM   Margins Attached edges 7/17/2018  2:25 PM   [place]-wound Assessment Dry;Pink 7/17/2018  2:25 PM   [place]%Wound Bed 50 7/17/2018  2:25 PM   Yellow%Wound Bed 50 7/17/2018  2:25 PM   Other%Wound Bed 60 7/10/2018  2:58 PM for 20 seconds (sing \"Happy Birthday\" twice).    3) Cleanse wounds with normal saline or wound cleanser and gauze. Pat dry with clean gauze.    4) Right dorsal and lateral foot wounds- Apply bordered foam dressing . Change every 3 days.      Keep all dressings clean & dry.     Do not shower, take baths or get wound wet, unless otherwise instructed by your Wound Care doctor.      Follow up visit:   2 Weeks  August 28th @ 1:30 pm      Keep next scheduled appointment. Please give 24 hour notice if unable to keep appointment. [phone]     If you experience any of the following, please call the Wound Care Service during business hours: Monday through Friday 8:00 am - 4:30 pm  ([phone]). *Increase in pain              *Temperature over 101              *Increase in drainage from your wound or a foul odor              *Uncontrolled swelling              *Need for compression bandage changes due to slippage, breakthrough drainage     If you need medical attention outside of business hours, please contact your Primary Care Doctor or go to the nearest emergency room.          Electronically signed by Sade Magallanes DPM on 8/14/2018 at 3:29 PM

## 2018-08-14 NOTE — PROGRESS NOTES
I was present with the resident during the visit . I discussed the case with the resident and agree with the findings and the plan as documented in the residents note.     Alexi Whaley DPM, FACFAS  Foot & Ankle Surgical Residency  [facility]

## 2018-08-15 ENCOUNTER — TELEPHONE (OUTPATIENT)
Dept: FAMILY MEDICINE CLINIC | Age: 58
End: 2018-08-15

## 2018-08-15 NOTE — TELEPHONE ENCOUNTER
Patient states he has been constipated for a week or less. He had diarrhea about a week ago, that was last bm. He is wondering if he could have something for the constipation. He is having gas. He has not tried any otc meds. He also states he is not eating a lot, about 1 time daily. Having a continued sore throat.     Pharmacy is [facility] [place]  dolv 6/14/18  lopez 9/5

## 2018-08-22 ENCOUNTER — TELEPHONE (OUTPATIENT)
Dept: PHYSICAL MEDICINE AND REHAB | Age: 58
End: 2018-08-22

## 2018-08-22 ENCOUNTER — OFFICE VISIT (OUTPATIENT)
Dept: PHYSICAL MEDICINE AND REHAB | Age: 58
End: 2018-08-22
Payer: MEDICARE

## 2018-08-22 VITALS
HEART RATE: 91 BPM | SYSTOLIC BLOOD PRESSURE: 126 MMHG | BODY MASS INDEX: 31.29 KG/M2 | WEIGHT: 231 LBS | HEIGHT: 72 IN | DIASTOLIC BLOOD PRESSURE: 82 MMHG

## 2018-08-22 DIAGNOSIS — C09.9 SQUAMOUS CELL CARCINOMA OF LEFT TONSIL (HCC): Primary | ICD-10-CM

## 2018-08-22 DIAGNOSIS — T40.2X5A THERAPEUTIC OPIOID-INDUCED CONSTIPATION (OIC): ICD-10-CM

## 2018-08-22 DIAGNOSIS — K59.03 THERAPEUTIC OPIOID-INDUCED CONSTIPATION (OIC): ICD-10-CM

## 2018-08-22 DIAGNOSIS — G89.4 CHRONIC PAIN SYNDROME: ICD-10-CM

## 2018-08-22 DIAGNOSIS — G89.3 CANCER ASSOCIATED PAIN: ICD-10-CM

## 2018-08-22 DIAGNOSIS — C02.9 TONGUE CARCINOMA (HCC): ICD-10-CM

## 2018-08-22 PROCEDURE — 4004F PT TOBACCO SCREEN RCVD TLK: CPT | Performed by: NURSE PRACTITIONER

## 2018-08-22 PROCEDURE — G8427 DOCREV CUR MEDS BY ELIG CLIN: HCPCS | Performed by: NURSE PRACTITIONER

## 2018-08-22 PROCEDURE — G8417 CALC BMI ABV UP PARAM F/U: HCPCS | Performed by: NURSE PRACTITIONER

## 2018-08-22 PROCEDURE — 3017F COLORECTAL CA SCREEN DOC REV: CPT | Performed by: NURSE PRACTITIONER

## 2018-08-22 PROCEDURE — 99213 OFFICE O/P EST LOW 20 MIN: CPT | Performed by: NURSE PRACTITIONER

## 2018-08-22 RX ORDER — MORPHINE SULFATE 30 MG/1
30 TABLET, FILM COATED, EXTENDED RELEASE ORAL EVERY 12 HOURS
Qty: 60 TABLET | Refills: 0 | Status: SHIPPED | OUTPATIENT
Start: 2018-08-28 | End: 2018-09-12

## 2018-08-22 ASSESSMENT — ENCOUNTER SYMPTOMS
COLOR CHANGE: 0
CONSTIPATION: 0
VOICE CHANGE: 1
NAUSEA: 0
WHEEZING: 1
SINUS PRESSURE: 0
BACK PAIN: 0
DIARRHEA: 0
VOMITING: 0
RHINORRHEA: 0
TROUBLE SWALLOWING: 1
CHOKING: 1
COUGH: 1
SHORTNESS OF BREATH: 1
EYE PAIN: 0
PHOTOPHOBIA: 0
SORE THROAT: 1
CHEST TIGHTNESS: 0
ABDOMINAL PAIN: 1

## 2018-08-22 NOTE — PROGRESS NOTES
[facility] [address] [facility]  Dept: [phone]  Dept Fax: [phone]  Loc: [phone]    Visit Date: 8/22/2018    Functionality Assessment/Goals Worksheet     On a scale of 0 (Does not Interfere) to 10 (Completely Interferes)     1. Which number describes how during the past week pain has interfered with       the following:  A. General Activity:  8  B. Mood: 9  C. Walking Ability:  10  D. Normal Work (Includes both work outside the home and housework):  10  E. Relations with Other People:   7  F. Sleep:   9  G. Enjoyment of Life:   10    2. Patient Prefers to Take their Pain Medications:     [x]  On a regular basis   []  Only when necessary    []  Does not take pain medications    3. What are the Patient's Goals/Expectations for Visiting Pain Management? [x]  Learn about my pain    [x]  Receive Medication   []  Physical Therapy     []  Treat Depression   []  Receive Injections    []  Treat Sleep   []  Deal with Anxiety and Stress   []  Treat Opoid Dependence/Addiction   []  Other:    HPI:   Geovani Miller is a 59 y.o. male is here today for    Chief Complaint: throat pain  HPI   F/U left tonsillar and tongue squamous celll carcinoma. F/U continues to have throat and tongue tonsillar pain on the left. He states the pain increases with turning his head and swallowing. He continues to eat shakes, pureed foods, soft foods due to difficulty swallowing and pain. He continues to have chemo and radiation  treatments. He follows with Dr Ruby Gong. He continues to take MS 30 mg every 12 hrs and OXY 10 mg IR prn q 8 hrs. He states it helps the pain. He has been constipation lately and his PCP put him on Colace and Powder. Medications reviewed. Patient c/o constipation side effects with medications. Patient states he is  taking medications as prescribed.  He denies receiving pain medications from other sources. He complains of any ER visits since last visit. Pain scale with out pain medications is 9/10. Pain scale with pain medications is  4/10. Last dose of Morphine and Oxy  was today  Drug screen reviewed from 7/25/2018 and was + THC  Pill count was completed today and was appropriate    The patient is allergic to latex; coumadin [warfarin sodium]; and tape [adhesive tape]. Past Medical History  Dick Sanchez  has a past medical history of Anxiety; Bleeds easily ([facility] Bruises easily; Cancer [facility]); COPD (chronic obstructive pulmonary disease) ([facility] D[facility]deep venous thrombosis) ([facility] Enlarged lymph node; History of emotional problems; Hypertension; Obesity; Squamous cell carcinoma of tonsils (HCC); and Type II or unspecified type diabetes mellitus without mention of complication, not stated as uncontrolled. Past Surgical History  The patient  has a past surgical history that includes knee surgery (Right, 1980's); Toe Surgery (Right, 2013); other surgical history (7-7-09); hip surgery (Right, 06/30/2014); and Tunneled venous port placement. Family History  This patient's family history includes Cancer in his mother. Social History  Dick Sanchez  reports that he has been smoking Cigarettes. He has a 4.00 pack-year smoking history. He has never used smokeless tobacco. He reports that he does not drink alcohol or use drugs. Medications    Current Outpatient Prescriptions:     [START ON 8/28/2018] morphine (MS CONTIN) 30 MG extended release tablet, Take 1 tablet by mouth every 12 hours for 15 days. ., Disp: 60 tablet, Rfl: 0    naloxegol (MOVANTIK) 12.5 MG TABS tablet, Take 1 tablet by mouth every morning, Disp: 30 tablet, Rfl: 2    oxyCODONE HCl (OXY-IR) 10 MG immediate release tablet, Take 1 tablet by mouth every 8 hours as needed for Pain for up to 30 days. ., Disp: 90 tablet, Rfl: 0    clonazePAM (KLONOPIN) 1 MG tablet, Take 1 tablet by mouth 3 times daily as needed for Anxiety Psychiatric/Behavioral: Negative for agitation, behavioral problems, confusion, decreased concentration, dysphoric mood, hallucinations, self-injury, sleep disturbance and suicidal ideas. The patient is not nervous/anxious and is not hyperactive. Objective:     Vitals:    08/22/18 1238   BP: 126/82   Pulse: 91   Weight: 231 lb (104.8 kg)   Height: 6' (1.829 m)       Physical Exam   Constitutional: He is oriented to person, place, and time. He appears well-developed and well-nourished. No distress. HENT:   Head: Normocephalic and atraumatic. Right Ear: External ear normal.   Left Ear: External ear normal.   Nose: Nose normal.   Mouth/Throat: Oral lesions present. Tonsillar abscesses present. No oropharyngeal exudate. Tongue and tonsil cancer with active radiation and chemo treatments. He has throat swelling and oral lesions, tongue coated with brown exudate. Eyes: Pupils are equal, round, and reactive to light. Conjunctivae and EOM are normal. Right eye exhibits no discharge. Left eye exhibits no discharge. No scleral icterus. Neck: Normal range of motion and full passive range of motion without pain. Neck supple. No muscular tenderness present. No neck rigidity. No edema, no erythema and normal range of motion present. No thyromegaly present. Cardiovascular: Normal rate, regular rhythm, normal heart sounds and intact distal pulses. Exam reveals no gallop and no friction rub. No murmur heard. Pulmonary/Chest: Effort normal and breath sounds normal. No respiratory distress. He has no wheezes. He has no rales. He exhibits no tenderness. Right chest mediport   Abdominal: Soft. Bowel sounds are normal. He exhibits no distension. There is no tenderness. There is no rebound and no guarding. Musculoskeletal:        Right lower leg: He exhibits tenderness and swelling. Right foot: There is tenderness and swelling.         Feet:    Scratch and open wound to right shin area, has dressing ACE wrap intact. Neurological: He is alert and oriented to person, place, and time. He has normal strength. He is not disoriented. He displays no atrophy. No cranial nerve deficit or sensory deficit. He exhibits normal muscle tone. He displays a negative Romberg sign. Coordination and gait normal. He displays no Babinski's sign on the right side. Skin: Skin is warm. No rash noted. He is not diaphoretic. No erythema. No pallor. Psychiatric: He has a normal mood and affect. His speech is normal and behavior is normal. Judgment and thought content normal. His mood appears not anxious. His affect is not angry, not blunt, not labile and not inappropriate. He is not agitated, not aggressive, not hyperactive, not slowed, not withdrawn, not actively hallucinating and not combative. Thought content is not paranoid and not delusional. Cognition and memory are normal. Cognition and memory are not impaired. He does not express impulsivity or inappropriate judgment. He does not exhibit a depressed mood. He expresses no homicidal and no suicidal ideation. He expresses no suicidal plans and no homicidal plans. He exhibits normal recent memory and normal remote memory. Tearful crying today. He is attentive. Nursing note and vitals reviewed. NIC test: negative  Yeomans test:negative  Gaenslen test: negative     Assessment:     1. 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 Tongue carcinoma ([facility]    3. Cancer associated pain    4. Chronic pain syndrome    5. Therapeutic opioid-induced constipation (OIC)            Plan:      · OARRS reviewed. · Discussed long term side effects of medications. · Discussed tolerance, dependency and addiction. · Previous UDS reviewed  · UDS preformed today for compliance. · Patient told can not receive any pain medications from any other source. · Discussed with patient that they may not be pain free. · No evidence of abuse, diversion or aberrant behavior.   · Medications and/or procedures improve function and quality of life. · Movantik 12.5 mg daily for OIC, medication reviewed  · Continue MS 30 mg every 12 hrs and Oxy IR 10 mg prn q 8 hrs. · Continues to follow Dr Michelle Zee. Meds. Prescribed:   Orders Placed This Encounter   Medications    morphine (MS CONTIN) 30 MG extended release tablet     Sig: Take 1 tablet by mouth every 12 hours for 15 days. .     Dispense:  60 tablet     Refill:  0    naloxegol (MOVANTIK) 12.5 MG TABS tablet     Sig: Take 1 tablet by mouth every morning     Dispense:  30 tablet     Refill:  2       Return in about 6 weeks (around 10/3/2018) for Follow up pain medications.          Electronically signed by Naye PresYAQUELIN lancaster CNP on 8/22/2018 at 1:02 PM

## 2018-08-23 ENCOUNTER — TELEPHONE (OUTPATIENT)
Dept: PHYSICAL MEDICINE AND REHAB | Age: 58
End: 2018-08-23

## 2018-08-23 RX ORDER — LACTULOSE 10 G/15ML
20 SOLUTION ORAL 2 TIMES DAILY PRN
Qty: 1800 ML | Refills: 0 | Status: ON HOLD | OUTPATIENT
Start: 2018-08-23 | End: 2019-08-13 | Stop reason: SDUPTHER

## 2018-08-27 ENCOUNTER — HOSPITAL ENCOUNTER (OUTPATIENT)
Dept: RADIATION ONCOLOGY | Age: 58
Discharge: HOME | End: 2018-08-27
Payer: MEDICARE

## 2018-08-27 DIAGNOSIS — C02.9 TONGUE CARCINOMA (HCC): ICD-10-CM

## 2018-08-27 DIAGNOSIS — C09.9 SQUAMOUS CELL CARCINOMA OF LEFT TONSIL (HCC): Primary | ICD-10-CM

## 2018-08-27 PROCEDURE — 99211 OFF/OP EST MAY X REQ PHY/QHP: CPT

## 2018-08-28 ENCOUNTER — HOSPITAL ENCOUNTER (OUTPATIENT)
Dept: INFUSION THERAPY | Age: 58
Discharge: HOME | End: 2018-08-28
Payer: MEDICARE

## 2018-08-28 ENCOUNTER — OFFICE VISIT (OUTPATIENT)
Dept: ONCOLOGY | Age: 58
End: 2018-08-28
Payer: MEDICARE

## 2018-08-28 ENCOUNTER — HOSPITAL ENCOUNTER (OUTPATIENT)
Dept: WOUND CARE | Age: 58
Discharge: HOME | End: 2018-08-28
Payer: MEDICARE

## 2018-08-28 VITALS
TEMPERATURE: 97.2 F | RESPIRATION RATE: 18 BRPM | OXYGEN SATURATION: 95 % | DIASTOLIC BLOOD PRESSURE: 60 MMHG | SYSTOLIC BLOOD PRESSURE: 115 MMHG | HEART RATE: 62 BPM | BODY MASS INDEX: 31.51 KG/M2 | HEIGHT: 72 IN | WEIGHT: 232.6 LBS

## 2018-08-28 VITALS
DIASTOLIC BLOOD PRESSURE: 55 MMHG | HEART RATE: 72 BPM | TEMPERATURE: 97.9 F | SYSTOLIC BLOOD PRESSURE: 111 MMHG | RESPIRATION RATE: 16 BRPM | OXYGEN SATURATION: 96 %

## 2018-08-28 DIAGNOSIS — R22.1 MASS IN NECK: ICD-10-CM

## 2018-08-28 DIAGNOSIS — C02.9 TONGUE CARCINOMA (HCC): ICD-10-CM

## 2018-08-28 DIAGNOSIS — E11.9 TYPE 2 DIABETES MELLITUS WITHOUT COMPLICATION, WITHOUT LONG-TERM CURRENT USE OF INSULIN (HCC): ICD-10-CM

## 2018-08-28 DIAGNOSIS — E03.9 HYPOTHYROIDISM, UNSPECIFIED TYPE: Primary | ICD-10-CM

## 2018-08-28 DIAGNOSIS — C09.9 SQUAMOUS CELL CARCINOMA OF LEFT TONSIL (HCC): ICD-10-CM

## 2018-08-28 DIAGNOSIS — E03.9 HYPOTHYROIDISM, UNSPECIFIED TYPE: ICD-10-CM

## 2018-08-28 DIAGNOSIS — L89.891 DECUBITUS ULCER OF RIGHT FOOT, STAGE 1: Primary | ICD-10-CM

## 2018-08-28 DIAGNOSIS — C09.9 MALIGNANT NEOPLASM OF TONSIL (HCC): ICD-10-CM

## 2018-08-28 DIAGNOSIS — C09.9 MALIGNANT NEOPLASM OF TONSIL (HCC): Primary | ICD-10-CM

## 2018-08-28 LAB
ALBUMIN SERPL-MCNC: 3.2 G/DL (ref 3.5–5.1)
ALP BLD-CCNC: 71 U/L (ref 38–126)
ALT SERPL-CCNC: < 5 U/L (ref 11–66)
AST SERPL-CCNC: 13 U/L (ref 5–40)
BASINOPHIL, AUTOMATED: 0 % (ref 0–3)
BILIRUB SERPL-MCNC: 0.5 MG/DL (ref 0.3–1.2)
BILIRUBIN DIRECT: < 0.2 MG/DL (ref 0–0.3)
BUN, WHOLE BLOOD: 15 MG/DL (ref 8–26)
CHLORIDE, WHOLE BLOOD: 101 MEQ/L (ref 98–109)
CREATININE, WHOLE BLOOD: 1.1 MG/DL (ref 0.5–1.2)
EOSINOPHILS RELATIVE PERCENT: 5 % (ref 0–4)
GFR, ESTIMATED: 72 ML/MIN/1.73M2
GLUCOSE, WHOLE BLOOD: 103 MG/DL (ref 70–108)
HCT VFR BLD CALC: 35.4 % (ref 42–52)
HEMOGLOBIN: 11.9 GM/DL (ref 14–18)
IONIZED CALCIUM, WHOLE BLOOD: 1.2 MMOL/L (ref 1.12–1.32)
LYMPHOCYTES # BLD: 13 % (ref 15–47)
MCH RBC QN AUTO: 31.8 PG (ref 27–31)
MCHC RBC AUTO-ENTMCNC: 33.7 GM/DL (ref 33–37)
MCV RBC AUTO: 94 FL (ref 80–94)
MONOCYTES: 8 % (ref 0–12)
PDW BLD-RTO: 12.6 % (ref 11.5–14.5)
PLATELET # BLD: 194 THOU/MM3 (ref 130–400)
PMV BLD AUTO: 7.6 FL (ref 7.4–10.4)
POTASSIUM, WHOLE BLOOD: 4 MEQ/L (ref 3.5–4.9)
RBC # BLD: 3.75 MILL/MM3 (ref 4.7–6.1)
SEG NEUTROPHILS: 74 % (ref 43–75)
SODIUM, WHOLE BLOOD: 140 MEQ/L (ref 138–146)
T4 FREE: 1.26 NG/DL (ref 0.93–1.76)
TOTAL CO2, WHOLE BLOOD: 26 MEQ/L (ref 23–33)
TOTAL PROTEIN: 6.2 G/DL (ref 6.1–8)
TSH SERPL DL<=0.05 MIU/L-ACNC: 1.11 UIU/ML (ref 0.4–4.2)
WBC # BLD: 7.2 THOU/MM3 (ref 4.8–10.8)

## 2018-08-28 PROCEDURE — 2709999900 HC NON-CHARGEABLE SUPPLY

## 2018-08-28 PROCEDURE — 80047 BASIC METABLC PNL IONIZED CA: CPT

## 2018-08-28 PROCEDURE — 84443 ASSAY THYROID STIM HORMONE: CPT

## 2018-08-28 PROCEDURE — 99213 OFFICE O/P EST LOW 20 MIN: CPT | Performed by: INTERNAL MEDICINE

## 2018-08-28 PROCEDURE — 4004F PT TOBACCO SCREEN RCVD TLK: CPT | Performed by: INTERNAL MEDICINE

## 2018-08-28 PROCEDURE — 36591 DRAW BLOOD OFF VENOUS DEVICE: CPT

## 2018-08-28 PROCEDURE — L3260 AMBULATORY SURGICAL BOOT EAC: HCPCS

## 2018-08-28 PROCEDURE — 2580000003 HC RX 258: Performed by: INTERNAL MEDICINE

## 2018-08-28 PROCEDURE — 80076 HEPATIC FUNCTION PANEL: CPT

## 2018-08-28 PROCEDURE — 85025 COMPLETE CBC W/AUTO DIFF WBC: CPT

## 2018-08-28 PROCEDURE — G8417 CALC BMI ABV UP PARAM F/U: HCPCS | Performed by: INTERNAL MEDICINE

## 2018-08-28 PROCEDURE — 3017F COLORECTAL CA SCREEN DOC REV: CPT | Performed by: INTERNAL MEDICINE

## 2018-08-28 PROCEDURE — 99211 OFF/OP EST MAY X REQ PHY/QHP: CPT

## 2018-08-28 PROCEDURE — G8427 DOCREV CUR MEDS BY ELIG CLIN: HCPCS | Performed by: INTERNAL MEDICINE

## 2018-08-28 PROCEDURE — 99213 OFFICE O/P EST LOW 20 MIN: CPT

## 2018-08-28 PROCEDURE — 84439 ASSAY OF FREE THYROXINE: CPT

## 2018-08-28 PROCEDURE — 6360000002 HC RX W HCPCS: Performed by: INTERNAL MEDICINE

## 2018-08-28 RX ORDER — SODIUM CHLORIDE 0.9 % (FLUSH) 0.9 %
20 SYRINGE (ML) INJECTION PRN
Status: CANCELLED | OUTPATIENT
Start: 2018-08-28

## 2018-08-28 RX ORDER — HEPARIN SODIUM (PORCINE) LOCK FLUSH IV SOLN 100 UNIT/ML 100 UNIT/ML
500 SOLUTION INTRAVENOUS PRN
Status: CANCELLED | OUTPATIENT
Start: 2018-08-28

## 2018-08-28 RX ORDER — SODIUM CHLORIDE 0.9 % (FLUSH) 0.9 %
10 SYRINGE (ML) INJECTION PRN
Status: DISCONTINUED | OUTPATIENT
Start: 2018-08-28 | End: 2018-08-29 | Stop reason: HOSPADM

## 2018-08-28 RX ORDER — SODIUM CHLORIDE 0.9 % (FLUSH) 0.9 %
10 SYRINGE (ML) INJECTION PRN
Status: CANCELLED | OUTPATIENT
Start: 2018-08-28

## 2018-08-28 RX ORDER — HEPARIN SODIUM (PORCINE) LOCK FLUSH IV SOLN 100 UNIT/ML 100 UNIT/ML
500 SOLUTION INTRAVENOUS PRN
Status: DISCONTINUED | OUTPATIENT
Start: 2018-08-28 | End: 2018-08-29 | Stop reason: HOSPADM

## 2018-08-28 RX ORDER — SODIUM CHLORIDE 0.9 % (FLUSH) 0.9 %
20 SYRINGE (ML) INJECTION PRN
Status: DISCONTINUED | OUTPATIENT
Start: 2018-08-28 | End: 2018-08-29 | Stop reason: HOSPADM

## 2018-08-28 RX ADMIN — Medication 20 ML: at 12:15

## 2018-08-28 RX ADMIN — Medication 500 UNITS: at 12:16

## 2018-08-28 ASSESSMENT — PAIN DESCRIPTION - PAIN TYPE: TYPE: CHRONIC PAIN

## 2018-08-28 ASSESSMENT — PAIN SCALES - GENERAL: PAINLEVEL_OUTOF10: 5

## 2018-08-28 NOTE — PROGRESS NOTES
Stella De La Rosa  1960    Visit Discharge/Physician Orders:  - Stop smoking to promote wound healing.   - Have xray of right foot done prior to next appt. - Wear Dh Shoe when walking to right foot.     Home Care: [facility]     Wound Location: right dorsal and lateral foot wounds      Dressing orders:      1) Gather wound care supplies and arrange on clean table.      2) Wash your hands with soap and water or use alcohol based hand  for 20 seconds (sing \"Happy Birthday\" twice).    3) Cleanse wounds with normal saline or wound cleanser and gauze. Pat dry with clean gauze.    4) Right dorsal and lateral foot wounds- Apply bordered foam dressing . Change every 3 days. Apply tubigrip. Apply in AM take off in PM.      Keep all dressings clean & dry.     Do not shower, take baths or get wound wet, unless otherwise instructed by your Wound Care doctor.      Follow up visit: Flaquito Marroquin - September 11th, 2018 at 2:00 pm     Keep next scheduled appointment. Please give 24 hour notice if unable to keep appointment. [phone]     If you experience any of the following, please call the Wound Care Service during business hours: Monday through Friday 8:00 am - 4:30 pm  ([phone]).             *Increase in pain              *Temperature over 101              *Increase in drainage from your wound or a foul odor              *Uncontrolled swelling              *Need for compression bandage changes due to slippage, breakthrough drainage     If you need medical attention outside of business hours, please contact your Primary Care Doctor or go to the nearest emergency room.

## 2018-08-28 NOTE — PROGRESS NOTES
 History of emotional problems     Hypertension     HCC    Obesity     Squamous cell carcinoma of tonsils ([facility] [address] 2018    Type II or unspecified type diabetes mellitus without mention of complication, not stated as uncontrolled        PAST SURGICAL HISTORY    Past Surgical History:   Procedure Laterality Date    HIP SURGERY Right 06/30/2014    Excisin Right Hip Mass-Dr. Brody Blankenship     KNEE SURGERY Right 1980's    OTHER SURGICAL HISTORY  7-7-09    lisa filter placed     TOE SURGERY Right 2013    wound to bone (second toe)     TUNNELED VENOUS PORT PLACEMENT         FAMILY HISTORY    Family History   Problem Relation Age of Onset    Cancer Mother         lung       SOCIAL HISTORY    Social History   Substance Use Topics    Smoking status: Current Every Day Smoker     Packs/day: 0.10     Years: 40.00     Types: Cigarettes    Smokeless tobacco: Never Used      Comment: 5 cig a day     Alcohol use No       ALLERGIES    Allergies   Allergen Reactions    Latex Hives    Coumadin [Warfarin Sodium] Other (See Comments)     Tears skin     Tape [Adhesive Tape] Other (See Comments)     Tears skin        MEDICATIONS    Current Outpatient Prescriptions on File Prior to Encounter   Medication Sig Dispense Refill    lactulose (CHRONULAC) 10 GM/15ML solution Take 30 mLs by mouth 2 times daily as needed (constipation) 1800 mL 0    morphine (MS CONTIN) 30 MG extended release tablet Take 1 tablet by mouth every 12 hours for 15 days. . 60 tablet 0    naloxegol (MOVANTIK) 12.5 MG TABS tablet Take 1 tablet by mouth every morning 30 tablet 2    oxyCODONE HCl (OXY-IR) 10 MG immediate release tablet Take 1 tablet by mouth every 8 hours as needed for Pain for up to 30 days. . 90 tablet 0    clonazePAM (KLONOPIN) 1 MG tablet Take 1 tablet by mouth 3 times daily as needed for Anxiety for up to 90 doses. . 90 tablet 1    glucose monitoring kit (FREESTYLE) monitoring kit 1 kit by Does not apply route daily 1 kit 0    Component Value Date    LABALBU 3.0 06/26/2018     Sed Rate:No results found for: Judy Kilpatrick  CRP: No results found for: CRP  Micro:   Lab Results   Component Value Date    BC No growth-preliminary  No growth   06/02/2018      Hemoglobin A1C:   Lab Results   Component Value Date    LABA1C 5.2 04/23/2018       Assessment:     Ulcer Identification:  Ulcer Type: pressure  Contributing Factors: edema, chronic pressure and smoking    Wound: N/A    Depth of Diabetic/Pressure/Non Pressure Ulcers or Wound:  Pressure ulcer, Stage 1    Patient Active Problem List   Diagnosis Code    Mass in neck R22.1    Hip pain M25.559    Fracture of metacarpal bone S62.309A    Type 2 diabetes mellitus without complication, without long-term current use of insulin (HCC) E11.9    Generalized anxiety disorder F41.1    Moderate episode of recurrent major depressive disorder (HCC) F33.1    Malignant neoplasm of tonsil (HCC) C09.9    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 Plan:     Patient examined and evaluated  Ulcerations dressed with boarder foam dressngs   Tubigrips to bilateral lower extremity   Patient to use a DH walker shoe to offload the prominence on the right 5th digit   Xray ordered of the right foot for patient to obtain prior to next appointment  Discussed possibility of getting patient in custom brace or shoe     Treatment: No orders of the defined types were placed in this encounter. Antibiotics: No    Follow up: 2 weeks    Please see attached Discharge Instructions    Written patient dismissal instructions given to patient and signed by patient or POA.          Discharge Instructions       Visit Discharge/Physician Orders:  - Stop smoking to promote wound healing.      Home Care: [facility]     Wound Location: right dorsal and lateral foot wounds      Dressing orders:      1) Gather wound care supplies and arrange on clean table.      2) Wash your hands with soap and water or use alcohol based hand  for 20 seconds (sing \"Happy Birthday\" twice).    3) Cleanse wounds with normal saline or wound cleanser and gauze. Pat dry with clean gauze.    4) Right dorsal and lateral foot wounds- Apply bordered foam dressing . Change every 3 days. Apply tubigrip. Apply in AM take off in PM.      Keep all dressings clean & dry.     Do not shower, take baths or get wound wet, unless otherwise instructed by your Wound Care doctor.      Follow up visit: Renée Santizo      Keep next scheduled appointment. Please give 24 hour notice if unable to keep appointment. [phone]     If you experience any of the following, please call the Wound Care Service during business hours: Monday through Friday 8:00 am - 4:30 pm  ([phone]).             *Increase in pain              *Temperature over 101              *Increase in drainage from your wound or a foul odor              *Uncontrolled swelling              *Need for compression bandage changes due to slippage, breakthrough drainage     If you need medical attention outside of business hours, please contact your Primary Care Doctor or go to the nearest emergency room.          Electronically signed by Nathan Delgadillo DPM on 8/28/2018 at 2:32 PM

## 2018-08-28 NOTE — PROGRESS NOTES
Labs drawn from [facility] per protocol. Tolerated well. Discharged in satisfactory condition.  Off unit with LPN for appointment with dr Esquivel See

## 2018-09-04 DIAGNOSIS — G89.4 CHRONIC PAIN SYNDROME: ICD-10-CM

## 2018-09-04 DIAGNOSIS — C02.9 TONGUE CARCINOMA (HCC): ICD-10-CM

## 2018-09-04 DIAGNOSIS — C09.9 SQUAMOUS CELL CARCINOMA OF LEFT TONSIL (HCC): ICD-10-CM

## 2018-09-04 RX ORDER — OXYCODONE HYDROCHLORIDE 10 MG/1
10 TABLET ORAL EVERY 8 HOURS PRN
Qty: 90 TABLET | Refills: 0 | Status: SHIPPED | OUTPATIENT
Start: 2018-09-07 | End: 2018-10-03 | Stop reason: SDUPTHER

## 2018-09-05 ENCOUNTER — OFFICE VISIT (OUTPATIENT)
Dept: FAMILY MEDICINE CLINIC | Age: 58
End: 2018-09-05
Payer: MEDICARE

## 2018-09-05 VITALS
WEIGHT: 226 LBS | OXYGEN SATURATION: 97 % | BODY MASS INDEX: 30.61 KG/M2 | SYSTOLIC BLOOD PRESSURE: 110 MMHG | DIASTOLIC BLOOD PRESSURE: 64 MMHG | RESPIRATION RATE: 20 BRPM | HEART RATE: 80 BPM | HEIGHT: 72 IN

## 2018-09-05 DIAGNOSIS — E11.9 TYPE 2 DIABETES MELLITUS WITHOUT COMPLICATION, WITHOUT LONG-TERM CURRENT USE OF INSULIN (HCC): ICD-10-CM

## 2018-09-05 DIAGNOSIS — F33.1 MODERATE EPISODE OF RECURRENT MAJOR DEPRESSIVE DISORDER (HCC): Primary | ICD-10-CM

## 2018-09-05 DIAGNOSIS — C02.9 TONGUE CARCINOMA (HCC): ICD-10-CM

## 2018-09-05 DIAGNOSIS — F41.1 GENERALIZED ANXIETY DISORDER: ICD-10-CM

## 2018-09-05 PROCEDURE — 2022F DILAT RTA XM EVC RTNOPTHY: CPT | Performed by: FAMILY MEDICINE

## 2018-09-05 PROCEDURE — G8427 DOCREV CUR MEDS BY ELIG CLIN: HCPCS | Performed by: FAMILY MEDICINE

## 2018-09-05 PROCEDURE — 3044F HG A1C LEVEL LT 7.0%: CPT | Performed by: FAMILY MEDICINE

## 2018-09-05 PROCEDURE — 3017F COLORECTAL CA SCREEN DOC REV: CPT | Performed by: FAMILY MEDICINE

## 2018-09-05 PROCEDURE — G8417 CALC BMI ABV UP PARAM F/U: HCPCS | Performed by: FAMILY MEDICINE

## 2018-09-05 PROCEDURE — 4004F PT TOBACCO SCREEN RCVD TLK: CPT | Performed by: FAMILY MEDICINE

## 2018-09-05 PROCEDURE — 99214 OFFICE O/P EST MOD 30 MIN: CPT | Performed by: FAMILY MEDICINE

## 2018-09-05 RX ORDER — CLONAZEPAM 1 MG/1
1 TABLET ORAL 3 TIMES DAILY PRN
Qty: 90 TABLET | Refills: 2 | Status: SHIPPED | OUTPATIENT
Start: 2018-09-05 | End: 2018-12-10 | Stop reason: SDUPTHER

## 2018-09-05 RX ORDER — SERTRALINE HYDROCHLORIDE 100 MG/1
100 TABLET, FILM COATED ORAL DAILY
Qty: 30 TABLET | Refills: 2 | Status: SHIPPED | OUTPATIENT
Start: 2018-09-05 | End: 2018-10-10 | Stop reason: DRUGHIGH

## 2018-09-05 ASSESSMENT — ENCOUNTER SYMPTOMS
SORE THROAT: 0
CONSTIPATION: 0
NAUSEA: 0
SHORTNESS OF BREATH: 0
RHINORRHEA: 0
ABDOMINAL PAIN: 0
TROUBLE SWALLOWING: 1
COUGH: 0
WHEEZING: 0
EYE DISCHARGE: 0
DIARRHEA: 0

## 2018-09-05 NOTE — PROGRESS NOTES
[facility]  [address] [place] [address]  Dept: [phone]  Dept Fax: [phone]  Loc: [phone]    Lizandro Carbajal is a 59 y.o. male who presents today for his medical conditions/complaints as noted below. Chief Complaint   Patient presents with    Check-Up     3 month check on meds feels that they are working well for him            HPI:     Patient presents for three-month recheck of mood. Mentions that the clonazepam really helps with his anxiety but he does still feel depressed. Mentions that he has low energy and mood is low. He feels sad and unmotivated. Appetite is decreased and is not sleeping the best.  He denies thoughts of hurting himself. Has been on antidepressants in the past but it's been quite some time. Thinks that the medication might help him. He is currently undergoing treatment for his tonsillar cancer and this has been tough on him. Pain is controlled with pain management. Also patient is a diabetic but has been completely diet controlled on any medications. Denies any chest pain or shortness of breath. Otherwise doing well.         Past Medical History:   Diagnosis Date    Anxiety     Bleeds easily ([address]     Bruises easily     Cancer ([address]     COPD (chronic obstructive pulmonary disease) ([address]     DVT (deep venous thrombosis) ([address] 2009    Enlarged lymph node     History of emotional problems     Hypertension     HCC    Obesity     Squamous cell carcinoma of tonsils ([address] 2018    Type II or unspecified type diabetes mellitus without mention of complication, not stated as uncontrolled       Past Surgical History:   Procedure Laterality Date    HIP SURGERY Right 06/30/2014    Excisin Right Hip Mass-Dr. Brody Blankenship     KNEE SURGERY Right 1980's    OTHER SURGICAL HISTORY  7-7-09    lisa filter placed     TOE SURGERY Right 2013    wound to bone (second toe)     TUNNELED has a normal mood and affect. His behavior is normal. Judgment and thought content normal.   Nursing note and vitals reviewed. /64   Pulse 80   Resp 20   Ht 6' (1.829 m)   Wt 226 lb (102.5 kg)   SpO2 97%   BMI 30.65 kg/m²     Assessment:       Diagnosis Orders   1. Moderate episode of recurrent major depressive disorder (HCC)  sertraline (ZOLOFT) 100 MG tablet   2. Tongue carcinoma ([address]     3. Type 2 diabetes mellitus without complication, without long-term current use of insulin ([facility])   DIABETES EYE EXAM   4. Generalized anxiety disorder  clonazePAM (KLONOPIN) 1 MG tablet    sertraline (ZOLOFT) 100 MG tablet       Plan:   MDD- will start zoloft  LAURA- cont klonopin, refill given  DM- has appt for eye doc. Will do foot exam and microalbumin at next visit  Pt has had colonsocopy, results requested. Discussed use, benefit, and side effects of prescribed medications. Barriers to medication compliance addressed. All patient questions answered. Pt voiced understanding. Return in about 3 months (around 12/5/2018). Orders Placed This Encounter   Procedures     DIABETES EYE EXAM     Standing Status:   Future     Standing Expiration Date:   10/5/2018     Orders Placed This Encounter   Medications    clonazePAM (KLONOPIN) 1 MG tablet     Sig: Take 1 tablet by mouth 3 times daily as needed for Anxiety for up to 90 doses. .     Dispense:  90 tablet     Refill:  2    sertraline (ZOLOFT) 100 MG tablet     Sig: Take 1 tablet by mouth daily     Dispense:  30 tablet     Refill:  2        Reccommended tobacco cessation options including pharmacologic methods, counseled great than 3 minutes during this visit:  Yes  []  No  []     Patient given educational materials - see patient instructions. Discussed use, benefit, and side effects of prescribed medications. All patient questions answered. Pt voiced understanding. Reviewed health maintenance.   Instructed to continue current medications, diet and exercise. Patient agreed with treatment plan. Follow up as directed.      Electronically signed by Taisha Nobles MD on 9/5/2018 at 1:17 PM

## 2018-09-06 ENCOUNTER — TELEPHONE (OUTPATIENT)
Dept: WOUND CARE | Age: 58
End: 2018-09-06

## 2018-09-08 NOTE — PROGRESS NOTES
[facility] PROFESSIONAL SERVICES  [facility]  [address] [facility] [address], [address] [address],[address]  [address] [address]  Dept: [phone]  Dept Fax: [phone]: [phone]    Visit Date: 8/28/2018     Subjective:     Chief Complaint:  Estefani Murillo is a 59 y.o. with squamous cell carcinoma of the left tonsil. He was referred by Dr. Dusty Decker for further evaluation of his malignancy. The patient developed a painful mass in his left neck. He was seen by Dr. Dusty Decker and was found to have a base of tongue mass with palpable cervical lymphadenopathy on the left side. CT scan of the head and neck confirmed a thick wall mass which showed cavitation and likely originated from the left palatine tonsil. A core biopsy obtained on May 7, 2018 revealed squamous cell carcinoma. A PET scan was obtained that found the known mass at the base of the left tongue and tonsil as well as cervical lymphadenopathy. There was no evidence of distant metastatic disease. HPI: The patient is here today for follow up evaluation. He is here for follow-up regarding his history of squamous cell carcinoma the tonsil. The patient has completed a course of concurrent radiation therapy and chemotherapy treatment. He is starting to show improvement from the multiple side effects that he experienced during therapy. He had relatively severe sore throat, generalized weakness, and fatigue. Easily symptoms are improving. In addition his general pain is been improving. His ECOG performance status is level 0-1. He reports that his bowel and bladder habits have been normal.  The patient had fever or other signs of infection. PMH, SH, and FH:  I reviewed the patients medication list and allergy list as noted on the electronic medical record. The PMH, SH and FH were also reviewed as noted on the EMR. Review of Systems   Constitutional: Fatigue. HENT: Sore throat. Eyes: Negative. Respiratory: Negative.    Cardiovascular: Negative. Gastrointestinal: Negative. Genitourinary: Negative. Musculoskeletal: Negative. Skin: Negative. Neurological: General weakness. Hematological: Negative. Psychiatric/Behavioral: Negative. Objective:   Physical Exam   Vitals:    08/28/18 1226   BP: 115/60   Pulse: 62   Resp: 18   Temp: 97.2 °F (36.2 °C)   SpO2: 95%   Vitals reviewed. Constitutional: Well-nourished. No acute distress. HENT: Normocephalic and atraumatic. Mild erythema in the throat, no thrush. Eyes: Pupils are equal and reactive. No scleral icterus. Neck: Overall appearance is symmetrical. No identifiable masses. Chest: Inspection and palpation of chest is normal.  Pulmonary: Effort normal. No respiratory distress. Abdominal: Soft. No hepatomegaly or splenomegaly. Musculoskeletal: Gait is normal. Muscle strength and tone good. Neurological: Alert and oriented to person, place, and time. Judgment and thought content normal.  Skin: Skin is warm and dry. No rash. Psychiatric: Mood and affect appropriate for the clinical situation. Behavior is normal.        Data Analysis:    Hematology 8/28/2018 7/31/2018 7/9/2018   WBC 7.2 10.2 4.4 (L)   RBC 3.75 (L) 3.90 (L) 3.71 (L)   HGB 11.9 (L) 12.2 (L) 12.1 (L)   HCT 35.4 (L) 35.9 (L) 34.8 (L)   MCV 94 92 93.8   RDW 12.6 14.5     250 163     Assessment:   1. Squamous cell carcinoma head and neck  2. Chronic anemia. 3.  Chronic pain. Plan:   1. Monitor for recurrence of malignancy. 2.  Continue MS Contin and oxycodone for pain control. 3.  Monitor hemoglobin/hematocrit and for any signs of blood loss. Richie Pennington M.D.   [facility] [address]  [address], [address] [facility], [address] [place] [address], [address] [facility] [place]

## 2018-09-11 ENCOUNTER — HOSPITAL ENCOUNTER (OUTPATIENT)
Dept: WOUND CARE | Age: 58
Discharge: HOME | End: 2018-09-11
Payer: MEDICARE

## 2018-09-11 ENCOUNTER — HOSPITAL ENCOUNTER (OUTPATIENT)
Age: 58
Discharge: HOME | End: 2018-09-11
Payer: MEDICARE

## 2018-09-11 ENCOUNTER — HOSPITAL ENCOUNTER (OUTPATIENT)
Dept: GENERAL RADIOLOGY | Age: 58
Discharge: HOME | End: 2018-09-11
Payer: MEDICARE

## 2018-09-11 VITALS
OXYGEN SATURATION: 98 % | DIASTOLIC BLOOD PRESSURE: 75 MMHG | TEMPERATURE: 98 F | HEART RATE: 105 BPM | RESPIRATION RATE: 20 BRPM | SYSTOLIC BLOOD PRESSURE: 123 MMHG

## 2018-09-11 DIAGNOSIS — L89.891 DECUBITUS ULCER OF RIGHT FOOT, STAGE 1: ICD-10-CM

## 2018-09-11 DIAGNOSIS — M86.9 OSTEOMYELITIS OF RIGHT FOOT, UNSPECIFIED TYPE (HCC): Primary | ICD-10-CM

## 2018-09-11 PROCEDURE — 87186 SC STD MICRODIL/AGAR DIL: CPT

## 2018-09-11 PROCEDURE — 97597 DBRDMT OPN WND 1ST 20 CM/<: CPT

## 2018-09-11 PROCEDURE — 87205 SMEAR GRAM STAIN: CPT

## 2018-09-11 PROCEDURE — 87147 CULTURE TYPE IMMUNOLOGIC: CPT

## 2018-09-11 PROCEDURE — 73630 X-RAY EXAM OF FOOT: CPT

## 2018-09-11 PROCEDURE — 6370000000 HC RX 637 (ALT 250 FOR IP): Performed by: PODIATRIST

## 2018-09-11 PROCEDURE — 87075 CULTR BACTERIA EXCEPT BLOOD: CPT

## 2018-09-11 PROCEDURE — 2709999900 HC NON-CHARGEABLE SUPPLY

## 2018-09-11 PROCEDURE — 87077 CULTURE AEROBIC IDENTIFY: CPT

## 2018-09-11 PROCEDURE — 87070 CULTURE OTHR SPECIMN AEROBIC: CPT

## 2018-09-11 RX ORDER — CLINDAMYCIN HYDROCHLORIDE 150 MG/1
150 CAPSULE ORAL 4 TIMES DAILY
Qty: 40 CAPSULE | Refills: 0 | Status: SHIPPED | OUTPATIENT
Start: 2018-09-11 | End: 2018-09-21

## 2018-09-11 RX ADMIN — LIDOCAINE HYDROCHLORIDE: 20 JELLY TOPICAL at 14:13

## 2018-09-11 ASSESSMENT — PAIN DESCRIPTION - LOCATION: LOCATION: LEG

## 2018-09-11 ASSESSMENT — PAIN DESCRIPTION - PAIN TYPE: TYPE: CHRONIC PAIN

## 2018-09-11 ASSESSMENT — PAIN DESCRIPTION - ORIENTATION: ORIENTATION: RIGHT

## 2018-09-11 ASSESSMENT — PAIN SCALES - GENERAL: PAINLEVEL_OUTOF10: 5

## 2018-09-11 NOTE — PROGRESS NOTES
[facility]          Progress Note and Procedure Note      Ginger George  MEDICAL RECORD NUMBER:  [id number]  AGE: 59 y.o. GENDER: male  : 1960  EPISODE DATE:  2018    Subjective:     Chief Complaint   Patient presents with    Wound Check     right foot         HISTORY of PRESENT ILLNESS HPI     Ginger George is a 59 y.o. male Established patient, who presents today for wound/ulcer evaluation. History of Wound Context: venous stasis  Wound/Ulcer Pain Timing/Severity: intermittent  Quality of pain: dull  Severity:  3 / 10   Modifying Factors: Pain worsens with palpation  Associated Signs/Symptoms: edema and drainage     Interval History:   Patient presents today for follow up on wound/ulcer's progression. The patient is currently not on antibiotics. Patient has a history of venous stasis. Admits to history of previous fracture to the knee. Also admits to previous ulcer which probed to bone of the lateral malleolus and right 2nd toe. 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 Right superficial leg ulcer, onset with chemo, currently healed. Superficial ulceration present to the dorsum of the right foot improving, patient thinks it is due to the wraps being too tight. Patient also has an area that is sore on the outside of his right foot by the base of his 5th toe that has gotten worse over the past week. Denies purulent drainage, mal odor, erythema. Associated pitting edema and serous drainage. Denies F,C,N,V, SOB, CP. Admits to 40 year pack history, currently smoking 5-7 cigarettes a day, trying to quit. Current dressing care includes boarder foam to the dorsum of the foot and the lateral aspect of the foot.            PAST MEDICAL HISTORY        Diagnosis Date    Anxiety     Bleeds easily ([address]     Bruises easily     Cancer ([address]     COPD (chronic obstructive pulmonary disease) ([address]     DVT (deep venous thrombosis) ([address]     Enlarged lymph node (FREESTYLE) monitoring kit 1 kit by Does not apply route daily 1 kit 0    albuterol sulfate  (90 Base) MCG/ACT inhaler Inhale 2 puffs into the lungs every 6 hours as needed for Wheezing 1 Inhaler 2    lidocaine-prilocaine (EMLA) 2.5-2.5 % cream Apply 1 Tube topically as needed for Pain Apply topically as needed. 1 Tube 1     No current facility-administered medications on file prior to encounter. REVIEW OF SYSTEMS    Pertinent items are noted in HPI. Objective:      /75   Pulse 105   Temp 98 °F (36.7 °C) (Oral)   Resp 20   SpO2 98%     Wt Readings from Last 3 Encounters:   09/05/18 226 lb (102.5 kg)   08/28/18 232 lb 9.6 oz (105.5 kg)   08/22/18 231 lb (104.8 kg)       PHYSICAL EXAM    General Appearance: alert and oriented to person, place and time, well-developed and well-nourished, in no acute distress    Skin cool to touch, however palpable pedal pulses. Brisk cap refill. Hyper pigmentation right leg  Superficial ulceration present to the dorsum of the right foot, no mal odor, no erythema. Superficial ulceration present to the lateral aspect of the right 5th digit at the level of the styloid process. Light touch sensation grossly intact. Equinus to bilateral lower extremity. Prominent styloid process on the right 5th digit.                     Wound 08/14/18 Foot Dorsal;Right #2 (Active)   Wound Image   9/11/2018  1:58 PM   Wound Type Wound 9/11/2018  1:58 PM   Dressing Status Intact 9/11/2018  1:58 PM   Dressing Changed Changed/New 9/11/2018  1:58 PM   Wound Cleansed Rinsed/Irrigated with saline 9/11/2018  1:58 PM   Wound Length (cm) 0.8 cm 9/11/2018  1:58 PM   Wound Width (cm) 0.4 cm 9/11/2018  1:58 PM   Wound Depth (cm)  0.05 9/11/2018  1:58 PM   Calculated Wound Size (cm^2) (l*w) 0.32 cm^2 9/11/2018  1:58 PM   Change in Wound Size % (l*w) 75.38 9/11/2018  1:58 PM   Wound Assessment Red;Yellow 8/28/2018  2:05 PM   Drainage Amount Small 9/11/2018  1:58 PM   Drainage Description Serosanguinous 9/11/2018  1:58 PM   Odor None 9/11/2018  1:58 PM   Margins Attached edges 9/11/2018  1:58 PM   [place]-wound Assessment Dry;Pink 9/11/2018  1:58 PM   Red%Wound Bed 100 9/11/2018  1:58 PM   Yellow%Wound Bed 20 8/28/2018  2:05 PM   Op First Treatment Date 08/14/18 8/14/2018  2:55 PM   Number of days: 27       Wound 08/28/18 Foot Right;Lateral 1 (Active)   Wound Image   9/11/2018  1:58 PM   Wound Type Wound 9/11/2018  1:58 PM   Dressing Changed Changed/New 9/11/2018  1:58 PM   Wound Cleansed Rinsed/Irrigated with saline 9/11/2018  1:58 PM   Wound Length (cm) 0.7 cm 9/11/2018  1:58 PM   Wound Width (cm) 1.2 cm 9/11/2018  1:58 PM   Wound Depth (cm)  0.3 9/11/2018  1:58 PM   Calculated Wound Size (cm^2) (l*w) 0.84 cm^2 9/11/2018  1:58 PM   Change in Wound Size % (l*w) -20 9/11/2018  1:58 PM   Wound Assessment Black; Yellow 9/11/2018  1:58 PM   Drainage Amount Moderate 9/11/2018  1:58 PM   Drainage Description Yellow;Brown 9/11/2018  1:58 PM   Odor Mild 9/11/2018  1:58 PM   Margins Attached edges 9/11/2018  1:58 PM   [place]-wound Assessment Dry;Red 9/11/2018  1:58 PM   Okay%Wound Bed 10 8/28/2018  2:05 PM   Yellow%Wound Bed 75 9/11/2018  1:58 PM   Black%Wound Bed 25 9/11/2018  1:58 PM   Number of days: 14       LABS      CBC:   Lab Results   Component Value Date    WBC 7.2 08/28/2018    HGB 11.9 08/28/2018    HCT 35.4 08/28/2018    MCV 94 08/28/2018     08/28/2018     BMP:   Lab Results   Component Value Date     08/28/2018     06/02/2018    K 4.0 08/28/2018    K 4.6 06/02/2018     06/02/2018    CO2 26 06/02/2018    BUN 24 06/02/2018    CREATININE 1.1 08/28/2018    CREATININE 0.8 06/02/2018     PT/INR: No results found for: PROTIME, INR  Prealbumin: No results found for: PREALBUMIN  Albumin:  Lab Results   Component Value Date    LABALBU 3.2 08/28/2018     Sed Rate:No results found for: SEDRATE  CRP: No results found for: CRP  Micro:   Lab Results   Component Value Date    BC No growth-preliminary  No growth   06/02/2018      Hemoglobin A1C:   Lab Results   Component Value Date    LABA1C 5.2 04/23/2018       Assessment:     Ulcer Identification:  Ulcer Type: pressure  Contributing Factors: edema, chronic pressure and smoking    Wound: N/A    Depth of Diabetic/Pressure/Non Pressure Ulcers or Wound:  Pressure ulcer, Stage 1    Patient Active Problem List   Diagnosis Code    Mass in neck R22.1    Hip pain M25.559    Fracture of metacarpal bone S62.309A    Type 2 diabetes mellitus without complication, without long-term current use of insulin (HCC) E11.9    Generalized anxiety disorder F41.1    Moderate episode of recurrent major depressive disorder (HCC) F33.1    Malignant neoplasm of tonsil (HCC) C09.9    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 Performed by: Emerald Mcnulty DPM    Consent obtained: Yes     Time out taken:  Yes    Pain control: lidocaine gel 2%      Debridement:Non-excisional Debridement    Using curette the wound(s)/ulcer(s) was/were sharply debrided down through and including the removal of epidermis, dermis and subcutaneous tissue. Devitalized Tissue Debrided:  fibrin and necrotic/eschar    Pre Debridement Measurements:  Are located in the Wound/Ulcer Documentation Flow Sheet    Wound/Ulcer #: 1    Post Debridement Measurements:    Wound/Ulcer Descriptions are listed under Physical Exam above.     Wound/Ulcer Descriptions are Pre Debridement except measurements    Percent of Wound/Ulcer Debrided: 100%    Total Surface Area Debrided:  0.84 sq cm     Bleeding:  Minimal    Hemostasis Achieved:  not needed    Procedural Pain:

## 2018-09-12 ENCOUNTER — TELEPHONE (OUTPATIENT)
Dept: PHYSICAL MEDICINE AND REHAB | Age: 58
End: 2018-09-12

## 2018-09-12 DIAGNOSIS — G62.9 NEUROPATHY: Primary | ICD-10-CM

## 2018-09-12 RX ORDER — GABAPENTIN 300 MG/1
300 CAPSULE ORAL NIGHTLY
Qty: 30 CAPSULE | Refills: 0 | Status: SHIPPED | OUTPATIENT
Start: 2018-09-12 | End: 2018-10-12

## 2018-09-14 ENCOUNTER — TELEPHONE (OUTPATIENT)
Dept: WOUND CARE | Age: 58
End: 2018-09-14

## 2018-09-14 LAB
AEROBIC CULTURE: ABNORMAL
ANAEROBIC CULTURE: ABNORMAL
GRAM STAIN RESULT: ABNORMAL
ORGANISM: ABNORMAL

## 2018-09-14 RX ORDER — SULFAMETHOXAZOLE AND TRIMETHOPRIM 800; 160 MG/1; MG/1
1 TABLET ORAL 2 TIMES DAILY
COMMUNITY
Start: 2018-09-14 | End: 2018-09-28

## 2018-09-17 ENCOUNTER — HOSPITAL ENCOUNTER (OUTPATIENT)
Dept: WOUND CARE | Age: 58
Discharge: HOME | End: 2018-09-17
Payer: MEDICARE

## 2018-09-17 VITALS
SYSTOLIC BLOOD PRESSURE: 138 MMHG | HEART RATE: 78 BPM | DIASTOLIC BLOOD PRESSURE: 65 MMHG | HEIGHT: 72 IN | OXYGEN SATURATION: 93 % | RESPIRATION RATE: 18 BRPM | BODY MASS INDEX: 29.35 KG/M2 | TEMPERATURE: 98 F | WEIGHT: 216.7 LBS

## 2018-09-17 DIAGNOSIS — L89.890 UNSTAGEABLE PRESSURE ULCER OF RIGHT FOOT (HCC): ICD-10-CM

## 2018-09-17 PROCEDURE — 99213 OFFICE O/P EST LOW 20 MIN: CPT

## 2018-09-17 PROCEDURE — 2709999900 HC NON-CHARGEABLE SUPPLY

## 2018-09-17 PROCEDURE — 99214 OFFICE O/P EST MOD 30 MIN: CPT | Performed by: NURSE PRACTITIONER

## 2018-09-17 ASSESSMENT — PAIN DESCRIPTION - PROGRESSION: CLINICAL_PROGRESSION: NOT CHANGED

## 2018-09-17 ASSESSMENT — PAIN DESCRIPTION - FREQUENCY: FREQUENCY: CONTINUOUS

## 2018-09-17 ASSESSMENT — PAIN DESCRIPTION - ONSET: ONSET: ON-GOING

## 2018-09-17 ASSESSMENT — PAIN DESCRIPTION - LOCATION: LOCATION: THROAT;FOOT

## 2018-09-17 ASSESSMENT — PAIN DESCRIPTION - ORIENTATION: ORIENTATION: RIGHT

## 2018-09-17 ASSESSMENT — PAIN DESCRIPTION - PAIN TYPE: TYPE: CHRONIC PAIN

## 2018-09-17 ASSESSMENT — PAIN DESCRIPTION - DESCRIPTORS: DESCRIPTORS: CONSTANT

## 2018-09-17 ASSESSMENT — PAIN SCALES - GENERAL: PAINLEVEL_OUTOF10: 5

## 2018-09-17 NOTE — PROGRESS NOTES
0.8 cm 9/17/2018  1:21 PM   Wound Width (cm) 1.2 cm 9/17/2018  1:21 PM   Wound Depth (cm)  0.4 9/17/2018  1:21 PM   Calculated Wound Size (cm^2) (l*w) 0.96 cm^2 9/17/2018  1:21 PM   Change in Wound Size % (l*w) -37.14 9/17/2018  1:21 PM   Wound Assessment Yellow;Red 9/17/2018  1:21 PM   Drainage Amount Small 9/17/2018  1:21 PM   Drainage Description Serosanguinous 9/17/2018  1:21 PM   Odor None 9/17/2018  1:21 PM   Margins Attached edges 9/17/2018  1:21 PM   [place]-wound Assessment Dry 9/17/2018  1:21 PM   [place]%Wound Bed 10 8/28/2018  2:05 PM   Red%Wound Bed 70 9/17/2018  1:21 PM   Yellow%Wound Bed 30 9/17/2018  1:21 PM   Black%Wound Bed 25 9/11/2018  1:58 PM   Number of days: 20     CXR: will need evaluated prior to HBO  EKG: will need evaluated prior to HBO    LABS:  CBC:   Lab Results   Component Value Date    WBC 7.2 08/28/2018    RBC 3.75 08/28/2018    HGB 11.9 08/28/2018    HCT 35.4 08/28/2018    MCV 94 08/28/2018    MCH 31.8 08/28/2018    MCHC 33.7 08/28/2018    RDW 12.6 08/28/2018     08/28/2018    MPV 7.6 08/28/2018     CMP:    Lab Results   Component Value Date     08/28/2018     06/02/2018    K 4.0 08/28/2018    K 4.6 06/02/2018     06/02/2018    CO2 26 06/02/2018    BUN 24 06/02/2018    CREATININE 1.1 08/28/2018    CREATININE 0.8 06/02/2018    LABGLOM >90 06/02/2018    GLUCOSE 125 06/02/2018    PROT 6.2 08/28/2018    LABALBU 3.2 08/28/2018    CALCIUM 8.3 06/02/2018    BILITOT 0.5 08/28/2018    ALKPHOS 71 08/28/2018    AST 13 08/28/2018    ALT <5 08/28/2018     Albumin:    Lab Results   Component Value Date    LABALBU 3.2 08/28/2018     PT/INR:  No results found for: PROTIME, INR  HgBA1c:    Lab Results   Component Value Date    LABA1C 5.2 04/23/2018       Assessment:     Unstageable pressure ulcer of right foot    Contributing Factors: edema, venous stasis, diabetes, chronic pressure and smoking    Patient Active Problem List   Diagnosis Code    Mass in neck R22.1    Hip pain Orders:  -Hyperbaric oxygen evaluation today.  -Stop smoking to promote wound healing.   -Waiting on medical records from [facility].  -Will talk to Dr. Harvey Multani about rechecking blood flow studies in leg.  -Waiting on MRI results.     Wound Location: Right foot- top and side     Dressing orders:      1) Gather wound care supplies and arrange on clean table.      2) Wash your hands with soap and water or use alcohol based hand  for 20 seconds (sing \"Happy Birthday\" twice).    3) Cleanse wounds with normal saline or wound cleanser and gauze. Pat dry with clean gauze.    4) Right foot wounds- Apply aquacel ag to wound bed. Cover with bordered foam dressings. Change every 3 days. Apply tubigrip. Apply in AM take off in PM.      Keep all dressings clean & dry.     Do not shower, take baths or get wound wet, unless otherwise instructed by your Wound Care doctor.      Follow up visit: [place]  Dr. Harvey Multani- Tuesday September 25th at 2:15 pm    Keep next scheduled appointment. Please give 24 hour notice if unable to keep appointment. [phone]     If you experience any of the following, please call the Wound Care Service during business hours: Monday through Friday 8:00 am - 4:30 pm  ([phone]).             *Increase in pain              *Temperature over 101              *Increase in drainage from your wound or a foul odor              *Uncontrolled swelling              *Need for compression bandage changes due to slippage, breakthrough drainage     If you need medical attention outside of business hours, please contact your Primary Care Doctor or go to the nearest emergency room.      Electronically signed by YAQUELIN Pettit CNP on 9/17/2018 at 1:56 PM.

## 2018-09-18 ENCOUNTER — TELEPHONE (OUTPATIENT)
Dept: ONCOLOGY | Age: 58
End: 2018-09-18

## 2018-09-18 DIAGNOSIS — C09.9 SQUAMOUS CELL CARCINOMA OF LEFT TONSIL (HCC): Primary | ICD-10-CM

## 2018-09-18 NOTE — TELEPHONE ENCOUNTER
Patient called to inquire about getting an Xray or CT done. Stated you mentioned at last appointment. Please advise, thank you.

## 2018-09-21 ENCOUNTER — HOSPITAL ENCOUNTER (OUTPATIENT)
Dept: MRI IMAGING | Age: 58
Discharge: HOME | End: 2018-09-21
Payer: MEDICARE

## 2018-09-21 DIAGNOSIS — M86.9 OSTEOMYELITIS OF RIGHT FOOT, UNSPECIFIED TYPE (HCC): ICD-10-CM

## 2018-09-21 PROCEDURE — 73718 MRI LOWER EXTREMITY W/O DYE: CPT

## 2018-09-25 ENCOUNTER — HOSPITAL ENCOUNTER (OUTPATIENT)
Dept: WOUND CARE | Age: 58
Discharge: HOME | End: 2018-09-25
Payer: MEDICARE

## 2018-09-25 VITALS
TEMPERATURE: 97.7 F | RESPIRATION RATE: 18 BRPM | OXYGEN SATURATION: 93 % | WEIGHT: 213.8 LBS | BODY MASS INDEX: 29 KG/M2 | SYSTOLIC BLOOD PRESSURE: 118 MMHG | HEART RATE: 95 BPM | DIASTOLIC BLOOD PRESSURE: 67 MMHG

## 2018-09-25 PROCEDURE — 2709999900 HC NON-CHARGEABLE SUPPLY

## 2018-09-25 PROCEDURE — 99213 OFFICE O/P EST LOW 20 MIN: CPT

## 2018-09-25 ASSESSMENT — PAIN DESCRIPTION - FREQUENCY: FREQUENCY: CONTINUOUS

## 2018-09-25 ASSESSMENT — PAIN SCALES - GENERAL: PAINLEVEL_OUTOF10: 5

## 2018-09-25 ASSESSMENT — PAIN DESCRIPTION - ORIENTATION: ORIENTATION: RIGHT

## 2018-09-25 ASSESSMENT — PAIN DESCRIPTION - PAIN TYPE: TYPE: CHRONIC PAIN

## 2018-09-25 NOTE — PROGRESS NOTES
[address] [facility]          Progress Note and Procedure Note      Junior Hess  MEDICAL RECORD NUMBER:  [id number]  AGE: 59 y.o. GENDER: male  : 1960  EPISODE DATE:  2018    Subjective:     Chief Complaint   Patient presents with    Wound Check     right foot         HISTORY of PRESENT ILLNESS HPI     Alpesh Wells is a 59 y. o. male Established patient, who presents today for wound/ulcer evaluation. History of Wound Context: venous stasis  Wound/Ulcer Pain Timing/Severity: intermittent  Quality of pain: dull  Severity:  3 / 10   Modifying Factors: Pain worsens with palpation  Associated Signs/Symptoms: edema and drainage    Interval History:   Patient presents today for follow up on wound/ulcer's progression. The patient is currently on antibiotics. Current dressing care includes fibracol, aquacel ag and boarder foam dressings to lateral aspect of the 5th digit. Border foam dressing applied to dorsal ankle wound. Patient states that he has been feeling run down feeling feverish and chills last week and had an episode of vomiting a few days ago. Patient states that he has been taking bactrim since Friday (9.21.18) after the culture results were finalized. Patient states he is feeling better today. Patient has continued to smoke, but trying to quit, down to 5-7 cigarettes/day.          PAST MEDICAL HISTORY        Diagnosis Date    Anxiety     Bleeds easily ([address]     Bruises easily     Cancer ([address]     COPD (chronic obstructive pulmonary disease) ([address]     DVT (deep venous thrombosis) ([address]     Enlarged lymph node     History of emotional problems     Hypertension     HCC    Obesity     Squamous cell carcinoma of tonsils ([address] 2018    Type II or unspecified type diabetes mellitus without mention of complication, not stated as uncontrolled        PAST SURGICAL HISTORY    Past Surgical History:   Procedure Laterality Date    HIP SURGERY Right 2014    Prachi Right Hip Mass-Dr. Shivani Evans     KNEE SURGERY Right 1980's    OTHER SURGICAL HISTORY  7-7-09    lisa filter placed     TOE SURGERY Right 2013    wound to bone (second toe)     TUNNELED VENOUS PORT PLACEMENT         FAMILY HISTORY    Family History   Problem Relation Age of Onset    Cancer Mother         lung       SOCIAL HISTORY    Social History   Substance Use Topics    Smoking status: Current Every Day Smoker     Packs/day: 0.25     Years: 40.00     Types: Cigarettes    Smokeless tobacco: Never Used      Comment: 5 cig a day     Alcohol use No       ALLERGIES    Allergies   Allergen Reactions    Latex Hives    Coumadin [Warfarin Sodium] Other (See Comments)     Tears skin     Tape [Adhesive Tape] Other (See Comments)     Tears skin        MEDICATIONS    Current Outpatient Prescriptions on File Prior to Encounter   Medication Sig Dispense Refill    sulfamethoxazole-trimethoprim (BACTRIM DS;SEPTRA DS) 800-160 MG per tablet Take 1 tablet by mouth 2 times daily      gabapentin (NEURONTIN) 300 MG capsule Take 1 capsule by mouth nightly for 30 days. . 30 capsule 0    clonazePAM (KLONOPIN) 1 MG tablet Take 1 tablet by mouth 3 times daily as needed for Anxiety for up to 90 doses. . 90 tablet 2    sertraline (ZOLOFT) 100 MG tablet Take 1 tablet by mouth daily 30 tablet 2    oxyCODONE HCl (OXY-IR) 10 MG immediate release tablet Take 1 tablet by mouth every 8 hours as needed for Pain for up to 30 days. . 90 tablet 0    lactulose (CHRONULAC) 10 GM/15ML solution Take 30 mLs by mouth 2 times daily as needed (constipation) 1800 mL 0    glucose monitoring kit (FREESTYLE) monitoring kit 1 kit by Does not apply route daily 1 kit 0    albuterol sulfate  (90 Base) MCG/ACT inhaler Inhale 2 puffs into the lungs every 6 hours as needed for Wheezing 1 Inhaler 2    lidocaine-prilocaine (EMLA) 2.5-2.5 % cream Apply 1 Tube topically as needed for Pain Apply topically as needed.  1 Tube 1     No current facility-administered medications on file prior to encounter. REVIEW OF SYSTEMS    Pertinent items are noted in HPI. Objective:      /67   Pulse 95   Temp 97.7 °F (36.5 °C) (Oral)   Resp 18   Wt 213 lb 12.8 oz (97 kg)   SpO2 93%   BMI 29.00 kg/m²     Wt Readings from Last 3 Encounters:   09/25/18 213 lb 12.8 oz (97 kg)   09/17/18 216 lb 11.2 oz (98.3 kg)   09/05/18 226 lb (102.5 kg)       PHYSICAL EXAM  General Appearance: alert and oriented to person, place and time, well-developed and well-nourished, in no acute distress     Skin cool to touch, however palpable pedal pulses. Brisk cap refill. Hyper pigmentation right leg. Full thickness ulceration present to the lateral aspect of the right foot. Wound base is fibro-granular with moderate purulence noted. Periwound erythema present with no proximal streaking, no edema present, no malodor present. Wound probes deep (0.5cm) but does not probe to bone. Equinus to bilateral lower extremity. Prominent styloid process on the right 5th digit. Wound 08/14/18 Foot Dorsal;Right #2 (Active)   Wound Image   9/17/2018  1:21 PM   Wound Type Wound 9/17/2018  1:21 PM   Dressing Status Intact; New drainage; Old drainage 9/17/2018  1:21 PM   Dressing Changed Changed/New 9/17/2018  1:21 PM   Wound Cleansed Rinsed/Irrigated with saline 9/17/2018  1:21 PM   Wound Length (cm) 0 cm 9/25/2018  3:26 PM   Wound Width (cm) 0 cm 9/25/2018  3:26 PM   Wound Depth (cm)  0 9/25/2018  3:26 PM   Calculated Wound Size (cm^2) (l*w) 0 cm^2 9/25/2018  3:26 PM   Change in Wound Size % (l*w) 100 9/25/2018  3:26 PM   Wound Assessment Yellow;Red 9/17/2018  1:21 PM   Drainage Amount Scant 9/17/2018  1:21 PM   Drainage Description Serosanguinous 9/17/2018  1:21 PM   Odor None 9/17/2018  1:21 PM   Margins Attached edges 9/17/2018  1:21 PM   [place]-wound Assessment Dry 9/17/2018  1:21 PM   Red%Wound Bed 50 9/17/2018  1:21 PM   Yellow%Wound Bed 50 9/17/2018  1:21 PM   Op First Treatment Date 08/14/18 8/14/2018  2:55 PM   Number of days: 42       Wound 08/28/18 Foot Right;Lateral 1 (Active)   Wound Image   9/25/2018  3:26 PM   Wound Type Wound 9/25/2018  3:26 PM   Dressing Status Intact; New drainage; Old drainage 9/17/2018  1:21 PM   Dressing Changed Changed/New 9/25/2018  3:26 PM   Wound Cleansed Rinsed/Irrigated with saline 9/25/2018  3:26 PM   Wound Length (cm) 0.8 cm 9/25/2018  3:26 PM   Wound Width (cm) 1.1 cm 9/25/2018  3:26 PM   Wound Depth (cm)  0.4 9/25/2018  3:26 PM   Calculated Wound Size (cm^2) (l*w) 0.88 cm^2 9/25/2018  3:26 PM   Change in Wound Size % (l*w) -25.71 9/25/2018  3:26 PM   Undermining Starts ___ O'Clock 12 9/25/2018  3:26 PM   Undermining Ends___ O'Clock 4 9/25/2018  3:26 PM   Undermining Maxium Distance (cm) 0.3 9/25/2018  3:26 PM   Wound Assessment Yellow;Red 9/17/2018  1:21 PM   Drainage Amount Moderate 9/25/2018  3:26 PM   Drainage Description Purulent;Serosanguinous 9/25/2018  3:26 PM   Odor Mild 9/25/2018  3:26 PM   Margins Attached edges; Unattached edges 9/25/2018  3:26 PM   [place]-wound Assessment Dry;Red 9/25/2018  3:26 PM   [place]%Wound Bed 10 8/28/2018  2:05 PM   Red%Wound Bed 10 9/25/2018  3:26 PM   Yellow%Wound Bed 90 9/25/2018  3:26 PM   Black%Wound Bed 25 9/11/2018  1:58 PM   Number of days: 28       LABS      CBC:   Lab Results   Component Value Date    WBC 7.2 08/28/2018    HGB 11.9 08/28/2018    HCT 35.4 08/28/2018    MCV 94 08/28/2018     08/28/2018     BMP:   Lab Results   Component Value Date     08/28/2018     06/02/2018    K 4.0 08/28/2018    K 4.6 06/02/2018     06/02/2018    CO2 26 06/02/2018    BUN 24 06/02/2018    CREATININE 1.1 08/28/2018    CREATININE 0.8 06/02/2018     PT/INR: No results found for: PROTIME, INR  Prealbumin: No results found for: PREALBUMIN  Albumin:  Lab Results   Component Value Date    [facility] 3.[facility] 08/28/2018     Sed Rate:No results found for: SEDRATE  CRP: No results found for: CRP  Micro: Lab Results   Component Value Date    BC No growth-preliminary  No growth   06/02/2018      Hemoglobin A1C:   Lab Results   Component Value Date    LABA1C 5.2 04/23/2018       Assessment:     Ulcer Identification:  Ulcer Type: pressure  Contributing Factors: chronic pressure and smoking    Wound: N/A    Depth of Diabetic/Pressure/Non Pressure Ulcers or Wound:  \"N/A    Patient Active Problem List   Diagnosis Code    Mass in neck R22.1    Hip pain M25.559    Fracture of metacarpal bone S62.309A    Type 2 diabetes mellitus without complication, without long-term current use of insulin ([facility]) E11.9    Generalized anxiety disorder F41.1    Moderate episode of recurrent major depressive disorder ([facility]) F33.1    Malignant neoplasm of tonsil ([facility]) C09.9    Squamous cell carcinoma of left tonsil ([facility]) C09.9    Tongue carcinoma ([facility]) C02.9    Encounter for antineoplastic chemotherapy Z51.11    Cigarette nicotine dependence without complication T58.900    Unstageable pressure ulcer of right foot ([facility]) L89.890       Procedure Note  Indications:  Based on my examination of this patient's wound(s)/ulcer(s) today, debridement is not required to promote healing and evaluate the extent healing.       Plan:     Patient examined and evaluated  MRI results reviewed with patient, bone marrow edema noted to the lateral aspect of the styloid process, suspicious for osteomyelitis, no cortical destruction visualized  Wound cultures demonstrate MSSA, sensitive to Bactrim, patient switched on 9.21.18  Patient interested in pursuing surgical treatment for the wound with a 5th metatarsal base resection  Patient needs to have clearance from oncologist or family doctor prior to scheduling surgery   Advised patient on signs of infection including increased pain, increased redness, increased swelling, increased drainage, foul odor and to seek immediate medical care   Patient to follow up in 1 week          Treatment:   Orders Placed This hours, please contact your Primary Care Doctor or go to the nearest emergency room.          Electronically signed by Dilcia Barakat DPM on 9/25/2018 at 4:14 PM

## 2018-09-25 NOTE — PROGRESS NOTES
I was present with the resident during the visit . I discussed the case with the resident and agree with the findings and the plan as documented in the residents note.     Cristhian Whaley DPM, FACFAS  Foot & Ankle Surgical Residency  [facility]

## 2018-09-25 NOTE — PLAN OF CARE
Problem: Wound:  Intervention: Assess wound size, appearance and drainage  Care plan reviewed with patient. Patient verbalize understanding of the plan of care and contribute to goal setting. Goal: Will show signs of wound healing; wound closure and no evidence of infection  Will show signs of wound healing; wound closure and no evidence of infection   Outcome: Ongoing  Pt. Seen today for right lateral leg wounds. Aquacel Ag, bordered foam and tubigrip applied today. Pt to change every 3 days. Continue Bactrim as previously ordered. Follow up in 2 weeks.     Comments:

## 2018-09-26 ENCOUNTER — TELEPHONE (OUTPATIENT)
Dept: ONCOLOGY | Age: 58
End: 2018-09-26

## 2018-09-26 DIAGNOSIS — G89.3 CANCER ASSOCIATED PAIN: Primary | ICD-10-CM

## 2018-09-26 RX ORDER — MORPHINE SULFATE 30 MG/1
30 TABLET, FILM COATED, EXTENDED RELEASE ORAL EVERY 12 HOURS
Qty: 60 TABLET | Refills: 0 | Status: SHIPPED | OUTPATIENT
Start: 2018-09-26 | End: 2018-10-24 | Stop reason: SDUPTHER

## 2018-09-26 NOTE — TELEPHONE ENCOUNTER
OARRS reviewed. UDS: positive for the morphine and oxycodone we have him on as well as 7-Aminoclonazepam and [address] [facility] - cancer pt. Last seen: 8/22/2018.  Follow-up: 10/8/2018

## 2018-09-27 NOTE — TELEPHONE ENCOUNTER
Irma Cough called back and they are taking some the bone the right side of the foot because of an infection.

## 2018-10-03 DIAGNOSIS — C02.9 TONGUE CARCINOMA (HCC): ICD-10-CM

## 2018-10-03 DIAGNOSIS — C09.9 SQUAMOUS CELL CARCINOMA OF LEFT TONSIL (HCC): ICD-10-CM

## 2018-10-03 DIAGNOSIS — G89.4 CHRONIC PAIN SYNDROME: ICD-10-CM

## 2018-10-03 RX ORDER — OXYCODONE HYDROCHLORIDE 10 MG/1
10 TABLET ORAL EVERY 8 HOURS PRN
Qty: 90 TABLET | Refills: 0 | Status: SHIPPED | OUTPATIENT
Start: 2018-10-07 | End: 2018-11-05 | Stop reason: SDUPTHER

## 2018-10-08 ENCOUNTER — OFFICE VISIT (OUTPATIENT)
Dept: PHYSICAL MEDICINE AND REHAB | Age: 58
End: 2018-10-08
Payer: MEDICARE

## 2018-10-08 VITALS
WEIGHT: 217 LBS | SYSTOLIC BLOOD PRESSURE: 115 MMHG | DIASTOLIC BLOOD PRESSURE: 71 MMHG | BODY MASS INDEX: 29.39 KG/M2 | HEART RATE: 97 BPM | HEIGHT: 72 IN

## 2018-10-08 DIAGNOSIS — C09.9 SQUAMOUS CELL CARCINOMA OF LEFT TONSIL (HCC): ICD-10-CM

## 2018-10-08 DIAGNOSIS — G89.4 CHRONIC PAIN SYNDROME: ICD-10-CM

## 2018-10-08 DIAGNOSIS — C09.9 MALIGNANT NEOPLASM OF TONSIL (HCC): Primary | ICD-10-CM

## 2018-10-08 DIAGNOSIS — L89.890 UNSTAGEABLE PRESSURE ULCER OF RIGHT FOOT (HCC): ICD-10-CM

## 2018-10-08 DIAGNOSIS — C02.9 TONGUE CARCINOMA (HCC): ICD-10-CM

## 2018-10-08 PROCEDURE — G8427 DOCREV CUR MEDS BY ELIG CLIN: HCPCS | Performed by: NURSE PRACTITIONER

## 2018-10-08 PROCEDURE — 99213 OFFICE O/P EST LOW 20 MIN: CPT | Performed by: NURSE PRACTITIONER

## 2018-10-08 PROCEDURE — 4004F PT TOBACCO SCREEN RCVD TLK: CPT | Performed by: NURSE PRACTITIONER

## 2018-10-08 PROCEDURE — 3017F COLORECTAL CA SCREEN DOC REV: CPT | Performed by: NURSE PRACTITIONER

## 2018-10-08 PROCEDURE — G8484 FLU IMMUNIZE NO ADMIN: HCPCS | Performed by: NURSE PRACTITIONER

## 2018-10-08 PROCEDURE — G8417 CALC BMI ABV UP PARAM F/U: HCPCS | Performed by: NURSE PRACTITIONER

## 2018-10-08 ASSESSMENT — ENCOUNTER SYMPTOMS
COUGH: 1
ABDOMINAL PAIN: 1
VOMITING: 0
CHOKING: 1
CONSTIPATION: 0
SORE THROAT: 1
EYE PAIN: 0
DIARRHEA: 0
RHINORRHEA: 0
NAUSEA: 0
CHEST TIGHTNESS: 0
PHOTOPHOBIA: 0
COLOR CHANGE: 0
BACK PAIN: 0
WHEEZING: 1
VOICE CHANGE: 1
SHORTNESS OF BREATH: 1
SINUS PRESSURE: 0
TROUBLE SWALLOWING: 1

## 2018-10-09 ENCOUNTER — HOSPITAL ENCOUNTER (OUTPATIENT)
Dept: WOUND CARE | Age: 58
Discharge: HOME | End: 2018-10-09
Payer: MEDICARE

## 2018-10-09 ENCOUNTER — HOSPITAL ENCOUNTER (OUTPATIENT)
Dept: INFUSION THERAPY | Age: 58
Discharge: HOME | End: 2018-10-09
Payer: MEDICARE

## 2018-10-09 VITALS
OXYGEN SATURATION: 94 % | TEMPERATURE: 98.1 F | SYSTOLIC BLOOD PRESSURE: 118 MMHG | HEIGHT: 72 IN | DIASTOLIC BLOOD PRESSURE: 62 MMHG | BODY MASS INDEX: 29.07 KG/M2 | RESPIRATION RATE: 18 BRPM | WEIGHT: 214.6 LBS

## 2018-10-09 VITALS
DIASTOLIC BLOOD PRESSURE: 66 MMHG | HEART RATE: 73 BPM | TEMPERATURE: 97.7 F | OXYGEN SATURATION: 95 % | SYSTOLIC BLOOD PRESSURE: 108 MMHG | RESPIRATION RATE: 18 BRPM

## 2018-10-09 DIAGNOSIS — C02.9 TONGUE CARCINOMA (HCC): ICD-10-CM

## 2018-10-09 DIAGNOSIS — R22.1 MASS IN NECK: ICD-10-CM

## 2018-10-09 DIAGNOSIS — C09.9 SQUAMOUS CELL CARCINOMA OF LEFT TONSIL (HCC): ICD-10-CM

## 2018-10-09 DIAGNOSIS — L89.890 UNSTAGEABLE PRESSURE ULCER OF RIGHT FOOT (HCC): ICD-10-CM

## 2018-10-09 DIAGNOSIS — E11.9 TYPE 2 DIABETES MELLITUS WITHOUT COMPLICATION, WITHOUT LONG-TERM CURRENT USE OF INSULIN (HCC): ICD-10-CM

## 2018-10-09 DIAGNOSIS — C09.9 MALIGNANT NEOPLASM OF TONSIL (HCC): ICD-10-CM

## 2018-10-09 PROCEDURE — 2709999900 HC NON-CHARGEABLE SUPPLY

## 2018-10-09 PROCEDURE — 36593 DECLOT VASCULAR DEVICE: CPT

## 2018-10-09 PROCEDURE — 99213 OFFICE O/P EST LOW 20 MIN: CPT

## 2018-10-09 PROCEDURE — 6360000002 HC RX W HCPCS: Performed by: INTERNAL MEDICINE

## 2018-10-09 PROCEDURE — 2580000003 HC RX 258: Performed by: INTERNAL MEDICINE

## 2018-10-09 RX ORDER — SULFAMETHOXAZOLE AND TRIMETHOPRIM 800; 160 MG/1; MG/1
1 TABLET ORAL 2 TIMES DAILY
Qty: 20 TABLET | Refills: 0 | Status: SHIPPED | OUTPATIENT
Start: 2018-10-09 | End: 2018-10-19

## 2018-10-09 RX ORDER — SODIUM CHLORIDE 0.9 % (FLUSH) 0.9 %
20 SYRINGE (ML) INJECTION PRN
Status: DISCONTINUED | OUTPATIENT
Start: 2018-10-09 | End: 2018-10-10 | Stop reason: HOSPADM

## 2018-10-09 RX ORDER — SODIUM CHLORIDE 0.9 % (FLUSH) 0.9 %
10 SYRINGE (ML) INJECTION PRN
Status: CANCELLED | OUTPATIENT
Start: 2018-10-09

## 2018-10-09 RX ORDER — SODIUM CHLORIDE 0.9 % (FLUSH) 0.9 %
10 SYRINGE (ML) INJECTION PRN
Status: DISCONTINUED | OUTPATIENT
Start: 2018-10-09 | End: 2018-10-10 | Stop reason: HOSPADM

## 2018-10-09 RX ORDER — HEPARIN SODIUM (PORCINE) LOCK FLUSH IV SOLN 100 UNIT/ML 100 UNIT/ML
500 SOLUTION INTRAVENOUS PRN
Status: DISCONTINUED | OUTPATIENT
Start: 2018-10-09 | End: 2018-10-10 | Stop reason: HOSPADM

## 2018-10-09 RX ORDER — SODIUM CHLORIDE 0.9 % (FLUSH) 0.9 %
20 SYRINGE (ML) INJECTION PRN
Status: CANCELLED | OUTPATIENT
Start: 2018-10-09

## 2018-10-09 RX ORDER — HEPARIN SODIUM (PORCINE) LOCK FLUSH IV SOLN 100 UNIT/ML 100 UNIT/ML
500 SOLUTION INTRAVENOUS PRN
Status: CANCELLED | OUTPATIENT
Start: 2018-10-09

## 2018-10-09 RX ADMIN — Medication 500 UNITS: at 14:37

## 2018-10-09 RX ADMIN — Medication 10 ML: at 14:37

## 2018-10-09 RX ADMIN — Medication 10 ML: at 13:59

## 2018-10-09 RX ADMIN — WATER 2.2 ML: 1 INJECTION INTRAMUSCULAR; INTRAVENOUS; SUBCUTANEOUS at 14:02

## 2018-10-09 RX ADMIN — ALTEPLASE 2 MG: 2.2 INJECTION, POWDER, LYOPHILIZED, FOR SOLUTION INTRAVENOUS at 14:02

## 2018-10-09 RX ADMIN — Medication 10 ML: at 13:58

## 2018-10-09 ASSESSMENT — PAIN SCALES - GENERAL
PAINLEVEL_OUTOF10: 5
PAINLEVEL_OUTOF10: 4

## 2018-10-09 ASSESSMENT — PAIN DESCRIPTION - ORIENTATION
ORIENTATION: LEFT
ORIENTATION: RIGHT

## 2018-10-09 ASSESSMENT — PAIN DESCRIPTION - DESCRIPTORS
DESCRIPTORS: ACHING;CONSTANT
DESCRIPTORS: ACHING;BURNING;CONSTANT

## 2018-10-09 ASSESSMENT — PAIN DESCRIPTION - LOCATION
LOCATION: FOOT
LOCATION: NECK;JAW

## 2018-10-09 ASSESSMENT — PAIN DESCRIPTION - PAIN TYPE
TYPE: CHRONIC PAIN
TYPE: ACUTE PAIN

## 2018-10-09 ASSESSMENT — PAIN DESCRIPTION - FREQUENCY
FREQUENCY: CONTINUOUS
FREQUENCY: CONTINUOUS

## 2018-10-09 NOTE — PROGRESS NOTES
Patient assessed for the following post cath diana:    Dizziness   No  Lightheadedness  No     Acute nausea/vomiting No  Headache   No  Chest pain/pressure  No  Rash/itching   No  Shortness of breath  No    Patient tolerated mediport flush without any complications. Cath diana instilled; good blood return noted. Last vital signs:   /62   Temp 98.1 °F (36.7 °C) (Oral)   Resp 18   Ht 6' (1.829 m)   Wt 214 lb 9.6 oz (97.3 kg)   SpO2 94%   BMI 29.10 kg/m²     Patient instructed if experience any of the above symptoms following today's mediport flush,he/she is to notify MD immediately or go to the emergency department. Discharge instructions given to patient. Verbalizes understanding. Ambulated off unit per wheelchair and cane with belongings.

## 2018-10-09 NOTE — PLAN OF CARE
Problem: Pain:  Intervention: Opioid analgesia side-effects  Patient encouraged to take pain med as prescribed and to call the physician if pain is uncontrolled. Goal: Control of chronic pain  Control of chronic pain   Outcome: Met This Shift  Patient rates left tonsil pain @ \"4\" upon admission and discharge. Problem: Discharge Planning  Intervention: Discharge to appropriate level of care  Provide discharge instructions. Goal: Knowledge of discharge instructions  Knowledge of discharge instructions     Outcome: Met This Shift  Verbalized understanding of discharge instructions, follow-up appointments, and when to call the physician. Problem: Infection - Central Venous Catheter-Associated Bloodstream Infection:  Intervention: Infection risk assessment  Discussed mediport maintenance, infection prevention, and when to call the physician. Cath diana instilled; good blood return noted. Goal: Will show no infection signs and symptoms  Will show no infection signs and symptoms   Outcome: Met This Shift  Mediport site with no redness or warmth. Skin over port intact with no signs of breakdown noted. Patient verbalizes signs/symptoms of port infection and when to notify the physician. Comments: Care plan reviewed with patient. Patient verbalize understanding of the plan of care and contribute to goal setting.

## 2018-10-10 ENCOUNTER — TELEPHONE (OUTPATIENT)
Dept: FAMILY MEDICINE CLINIC | Age: 58
End: 2018-10-10

## 2018-10-10 ENCOUNTER — TELEPHONE (OUTPATIENT)
Dept: PHYSICAL MEDICINE AND REHAB | Age: 58
End: 2018-10-10

## 2018-10-10 ENCOUNTER — OFFICE VISIT (OUTPATIENT)
Dept: FAMILY MEDICINE CLINIC | Age: 58
End: 2018-10-10
Payer: MEDICARE

## 2018-10-10 VITALS
BODY MASS INDEX: 28.77 KG/M2 | HEART RATE: 113 BPM | SYSTOLIC BLOOD PRESSURE: 118 MMHG | HEIGHT: 72 IN | DIASTOLIC BLOOD PRESSURE: 66 MMHG | TEMPERATURE: 97.4 F | OXYGEN SATURATION: 93 % | RESPIRATION RATE: 20 BRPM | WEIGHT: 212.4 LBS

## 2018-10-10 DIAGNOSIS — Z11.59 NEED FOR HEPATITIS C SCREENING TEST: ICD-10-CM

## 2018-10-10 DIAGNOSIS — F33.1 MODERATE EPISODE OF RECURRENT MAJOR DEPRESSIVE DISORDER (HCC): ICD-10-CM

## 2018-10-10 DIAGNOSIS — L89.890 UNSTAGEABLE PRESSURE ULCER OF RIGHT FOOT (HCC): ICD-10-CM

## 2018-10-10 DIAGNOSIS — Z01.818 PRE-OP EVALUATION: ICD-10-CM

## 2018-10-10 DIAGNOSIS — R22.42 LEG MASS, LEFT: ICD-10-CM

## 2018-10-10 DIAGNOSIS — C09.9 SQUAMOUS CELL CARCINOMA OF LEFT TONSIL (HCC): ICD-10-CM

## 2018-10-10 DIAGNOSIS — E11.9 TYPE 2 DIABETES MELLITUS WITHOUT COMPLICATION, WITHOUT LONG-TERM CURRENT USE OF INSULIN (HCC): Primary | ICD-10-CM

## 2018-10-10 DIAGNOSIS — F41.1 GENERALIZED ANXIETY DISORDER: ICD-10-CM

## 2018-10-10 DIAGNOSIS — Z11.4 SCREENING FOR HIV (HUMAN IMMUNODEFICIENCY VIRUS): ICD-10-CM

## 2018-10-10 PROCEDURE — 2022F DILAT RTA XM EVC RTNOPTHY: CPT | Performed by: FAMILY MEDICINE

## 2018-10-10 PROCEDURE — G8484 FLU IMMUNIZE NO ADMIN: HCPCS | Performed by: FAMILY MEDICINE

## 2018-10-10 PROCEDURE — 93000 ELECTROCARDIOGRAM COMPLETE: CPT | Performed by: FAMILY MEDICINE

## 2018-10-10 PROCEDURE — G8417 CALC BMI ABV UP PARAM F/U: HCPCS | Performed by: FAMILY MEDICINE

## 2018-10-10 PROCEDURE — 99215 OFFICE O/P EST HI 40 MIN: CPT | Performed by: FAMILY MEDICINE

## 2018-10-10 PROCEDURE — 3044F HG A1C LEVEL LT 7.0%: CPT | Performed by: FAMILY MEDICINE

## 2018-10-10 PROCEDURE — 4004F PT TOBACCO SCREEN RCVD TLK: CPT | Performed by: FAMILY MEDICINE

## 2018-10-10 PROCEDURE — G8427 DOCREV CUR MEDS BY ELIG CLIN: HCPCS | Performed by: FAMILY MEDICINE

## 2018-10-10 PROCEDURE — 3017F COLORECTAL CA SCREEN DOC REV: CPT | Performed by: FAMILY MEDICINE

## 2018-10-10 RX ORDER — SERTRALINE HYDROCHLORIDE 100 MG/1
150 TABLET, FILM COATED ORAL DAILY
Qty: 45 TABLET | Refills: 5 | Status: SHIPPED | OUTPATIENT
Start: 2018-10-10 | End: 2018-10-10 | Stop reason: SDUPTHER

## 2018-10-10 RX ORDER — SERTRALINE HYDROCHLORIDE 100 MG/1
150 TABLET, FILM COATED ORAL DAILY
Qty: 45 TABLET | Refills: 5 | Status: SHIPPED | OUTPATIENT
Start: 2018-10-10 | End: 2019-01-29 | Stop reason: SDUPTHER

## 2018-10-10 ASSESSMENT — ENCOUNTER SYMPTOMS
BLOOD IN STOOL: 0
VOMITING: 0
ABDOMINAL PAIN: 0
DIARRHEA: 0
SORE THROAT: 0
RHINORRHEA: 0
NAUSEA: 0
BACK PAIN: 1
EYE DISCHARGE: 0
COUGH: 0
CONSTIPATION: 1
COLOR CHANGE: 1
SHORTNESS OF BREATH: 0
TROUBLE SWALLOWING: 1
WHEEZING: 0

## 2018-10-10 NOTE — PROGRESS NOTES
[facility]  [address] [place] [address]  Dept: [phone]  Dept Fax: [phone]  Loc: [phone]    Yoly Lloyd is a 59 y.o. male who presents today for his medical conditions/complaints as noted below. Chief Complaint   Patient presents with    Other     sore    Mass     left leg for about a week painful and sore to touch           HPI:     Patient presents for follow-up of multiple medical conditions. First, patient was seen at the wound clinic last week for a wound on his right foot. After discussing case with podiatrist, it is believed that the wound tracks all the way to the bone and osteomyelitis is likely present. Podiatry recommends surgery for this. As far as surgical clearance goes, patient has no cardiac history and denies any chest pain or shortness of breath today. States that he can vacuum a room without being short of breath. Labs were reviewed from earlier last month which show appropriate blood counts and platelets which would suffice for surgery. Next, patient states that he is definitely feeling more depressed lately. He is currently finished with his chemotherapy and radiation therapy for tonsillar cancer but is waiting until December for repeat scan. States that the weight is difficult on him and he feels depressed and anxious. States that his mood is low and his energy is low. Appetite is also low. He tries to supplement diet with ensure and protein shakes. States that it's difficult for him to swallow so he has to mash most things up before he can eat them. He denies any thoughts of hurting himself. Next, patient complains of a mass on his left upper leg. States has been present for slightly over a week and it is very tender. He states the pain is 6 out of 10. He states it's red and inflamed and hurts to the touch. He denies any trauma or injury.   Next, patient has a skin wound Standing Status:   Future     Standing Expiration Date:   10/10/2019    Hemoglobin A1C     Standing Status:   Future     Standing Expiration Date:   10/10/2019    Microalbumin, Ur     Standing Status:   Future     Standing Expiration Date:   10/10/2019    HIV-1 and HIV-2 Antibodies     Standing Status:   Future     Standing Expiration Date:   10/10/2019    EKG 12 Lead     Order Specific Question:   Reason for Exam?     Answer:   Pre-op    HM DIABETES FOOT EXAM    HM DIABETES EYE EXAM     Standing Status:   Future     Standing Expiration Date:   4/10/2019     Orders Placed This Encounter   Medications    DISCONTD: sertraline (ZOLOFT) 100 MG tablet     Sig: Take 1.5 tablets by mouth daily     Dispense:  45 tablet     Refill:  5    sertraline (ZOLOFT) 100 MG tablet     Sig: Take 1.5 tablets by mouth daily     Dispense:  45 tablet     Refill:  5        Reccommended tobacco cessation options including pharmacologicmethods, counseled great than 3 minutes during this visit:  Yes  []  No  []     Patient given educational materials - see patient instructions. Discussed use, benefit, and sideeffects of prescribed medications. All patient questions answered. Pt voiced understanding. Reviewed health maintenance. Instructed to continue current medications, diet and exercise. Patient agreed with treatment plan. Follow up as directed.      Electronically signed by Sury Perrin MD on 10/10/2018 at 3:53 PM

## 2018-10-10 NOTE — TELEPHONE ENCOUNTER
Patient is aware of appointment information    Spoke with [id number] [facility] and they will pick him up on Monday at 1pm and transport him to [facility]

## 2018-10-12 NOTE — PROGRESS NOTES
Patient instructed not to eat or drink anything after midnight the day before surgery. Please bring list of medications with the dosages & when you take them. If you do not  have a list bring the medications bottles with you. If having a MAC or general anesthetic you MUST have a . Bring photo ID & insurance information. Leave jewelry (watch ,rings, peircings) & other valuables, including extra cash, at home. Wear comfortable clean clothing. When showering or bathing the night before or morning of surgery please use  antibacterial soap.

## 2018-10-16 ENCOUNTER — HOSPITAL ENCOUNTER (OUTPATIENT)
Age: 58
Setting detail: OUTPATIENT SURGERY
Discharge: HOME | End: 2018-10-16
Attending: PODIATRIST | Admitting: PODIATRIST
Payer: MEDICARE

## 2018-10-16 ENCOUNTER — TELEPHONE (OUTPATIENT)
Dept: PHYSICAL MEDICINE AND REHAB | Age: 58
End: 2018-10-16

## 2018-10-16 ENCOUNTER — ANESTHESIA EVENT (OUTPATIENT)
Dept: OPERATING ROOM | Age: 58
End: 2018-10-16
Payer: MEDICARE

## 2018-10-16 ENCOUNTER — ANESTHESIA (OUTPATIENT)
Dept: OPERATING ROOM | Age: 58
End: 2018-10-16
Payer: MEDICARE

## 2018-10-16 VITALS
OXYGEN SATURATION: 97 % | SYSTOLIC BLOOD PRESSURE: 128 MMHG | BODY MASS INDEX: 28.58 KG/M2 | RESPIRATION RATE: 14 BRPM | TEMPERATURE: 97.6 F | HEART RATE: 92 BPM | HEIGHT: 72 IN | DIASTOLIC BLOOD PRESSURE: 66 MMHG | WEIGHT: 211 LBS

## 2018-10-16 VITALS
DIASTOLIC BLOOD PRESSURE: 69 MMHG | OXYGEN SATURATION: 98 % | SYSTOLIC BLOOD PRESSURE: 113 MMHG | RESPIRATION RATE: 13 BRPM

## 2018-10-16 LAB — GLUCOSE BLD-MCNC: 88 MG/DL (ref 70–108)

## 2018-10-16 PROCEDURE — 7100000011 HC PHASE II RECOVERY - ADDTL 15 MIN: Performed by: PODIATRIST

## 2018-10-16 PROCEDURE — 2580000003 HC RX 258: Performed by: STUDENT IN AN ORGANIZED HEALTH CARE EDUCATION/TRAINING PROGRAM

## 2018-10-16 PROCEDURE — 7100000010 HC PHASE II RECOVERY - FIRST 15 MIN: Performed by: PODIATRIST

## 2018-10-16 PROCEDURE — 6360000002 HC RX W HCPCS: Performed by: SPECIALIST

## 2018-10-16 PROCEDURE — 6360000002 HC RX W HCPCS: Performed by: STUDENT IN AN ORGANIZED HEALTH CARE EDUCATION/TRAINING PROGRAM

## 2018-10-16 PROCEDURE — 3600000003 HC SURGERY LEVEL 3 BASE: Performed by: PODIATRIST

## 2018-10-16 PROCEDURE — 2580000003 HC RX 258: Performed by: SPECIALIST

## 2018-10-16 PROCEDURE — 88305 TISSUE EXAM BY PATHOLOGIST: CPT

## 2018-10-16 PROCEDURE — 2709999900 HC NON-CHARGEABLE SUPPLY: Performed by: PODIATRIST

## 2018-10-16 PROCEDURE — 2500000003 HC RX 250 WO HCPCS: Performed by: PODIATRIST

## 2018-10-16 PROCEDURE — 3600000013 HC SURGERY LEVEL 3 ADDTL 15MIN: Performed by: PODIATRIST

## 2018-10-16 PROCEDURE — 3700000000 HC ANESTHESIA ATTENDED CARE: Performed by: PODIATRIST

## 2018-10-16 PROCEDURE — 6370000000 HC RX 637 (ALT 250 FOR IP): Performed by: STUDENT IN AN ORGANIZED HEALTH CARE EDUCATION/TRAINING PROGRAM

## 2018-10-16 PROCEDURE — 2500000003 HC RX 250 WO HCPCS: Performed by: SPECIALIST

## 2018-10-16 PROCEDURE — 3700000001 HC ADD 15 MINUTES (ANESTHESIA): Performed by: PODIATRIST

## 2018-10-16 PROCEDURE — 6360000002 HC RX W HCPCS: Performed by: PODIATRIST

## 2018-10-16 PROCEDURE — 82948 REAGENT STRIP/BLOOD GLUCOSE: CPT

## 2018-10-16 RX ORDER — BUPIVACAINE HYDROCHLORIDE 5 MG/ML
INJECTION, SOLUTION EPIDURAL; INTRACAUDAL PRN
Status: DISCONTINUED | OUTPATIENT
Start: 2018-10-16 | End: 2018-10-16 | Stop reason: HOSPADM

## 2018-10-16 RX ORDER — FENTANYL CITRATE 50 UG/ML
INJECTION, SOLUTION INTRAMUSCULAR; INTRAVENOUS PRN
Status: DISCONTINUED | OUTPATIENT
Start: 2018-10-16 | End: 2018-10-16 | Stop reason: SDUPTHER

## 2018-10-16 RX ORDER — SODIUM CHLORIDE 0.9 % (FLUSH) 0.9 %
10 SYRINGE (ML) INJECTION EVERY 12 HOURS SCHEDULED
Status: DISCONTINUED | OUTPATIENT
Start: 2018-10-16 | End: 2018-10-16 | Stop reason: HOSPADM

## 2018-10-16 RX ORDER — ACETAMINOPHEN 325 MG/1
650 TABLET ORAL EVERY 4 HOURS PRN
Status: DISCONTINUED | OUTPATIENT
Start: 2018-10-16 | End: 2018-10-16 | Stop reason: HOSPADM

## 2018-10-16 RX ORDER — OXYCODONE HYDROCHLORIDE AND ACETAMINOPHEN 5; 325 MG/1; MG/1
2 TABLET ORAL EVERY 4 HOURS PRN
Status: DISCONTINUED | OUTPATIENT
Start: 2018-10-16 | End: 2018-10-16 | Stop reason: HOSPADM

## 2018-10-16 RX ORDER — SODIUM CHLORIDE 0.9 % (FLUSH) 0.9 %
10 SYRINGE (ML) INJECTION PRN
Status: DISCONTINUED | OUTPATIENT
Start: 2018-10-16 | End: 2018-10-16 | Stop reason: HOSPADM

## 2018-10-16 RX ORDER — LIDOCAINE HYDROCHLORIDE 20 MG/ML
INJECTION, SOLUTION INFILTRATION; PERINEURAL PRN
Status: DISCONTINUED | OUTPATIENT
Start: 2018-10-16 | End: 2018-10-16 | Stop reason: SDUPTHER

## 2018-10-16 RX ORDER — SODIUM CHLORIDE 9 MG/ML
INJECTION, SOLUTION INTRAVENOUS CONTINUOUS PRN
Status: DISCONTINUED | OUTPATIENT
Start: 2018-10-16 | End: 2018-10-16 | Stop reason: SDUPTHER

## 2018-10-16 RX ORDER — ONDANSETRON 2 MG/ML
4 INJECTION INTRAMUSCULAR; INTRAVENOUS EVERY 6 HOURS PRN
Status: DISCONTINUED | OUTPATIENT
Start: 2018-10-16 | End: 2018-10-16 | Stop reason: HOSPADM

## 2018-10-16 RX ORDER — PROPOFOL 10 MG/ML
INJECTION, EMULSION INTRAVENOUS PRN
Status: DISCONTINUED | OUTPATIENT
Start: 2018-10-16 | End: 2018-10-16 | Stop reason: SDUPTHER

## 2018-10-16 RX ORDER — GLYCOPYRROLATE 1 MG/5 ML
SYRINGE (ML) INTRAVENOUS PRN
Status: DISCONTINUED | OUTPATIENT
Start: 2018-10-16 | End: 2018-10-16 | Stop reason: SDUPTHER

## 2018-10-16 RX ORDER — OXYCODONE HYDROCHLORIDE AND ACETAMINOPHEN 5; 325 MG/1; MG/1
TABLET ORAL
Status: DISCONTINUED
Start: 2018-10-16 | End: 2018-10-16 | Stop reason: HOSPADM

## 2018-10-16 RX ADMIN — LIDOCAINE HYDROCHLORIDE 60 MG: 20 INJECTION, SOLUTION INFILTRATION; PERINEURAL at 07:33

## 2018-10-16 RX ADMIN — FENTANYL CITRATE 50 MCG: 50 INJECTION INTRAMUSCULAR; INTRAVENOUS at 08:34

## 2018-10-16 RX ADMIN — FENTANYL CITRATE 25 MCG: 50 INJECTION INTRAMUSCULAR; INTRAVENOUS at 07:56

## 2018-10-16 RX ADMIN — Medication 0.1 MG: at 07:33

## 2018-10-16 RX ADMIN — OXYCODONE HYDROCHLORIDE AND ACETAMINOPHEN 2 TABLET: 5; 325 TABLET ORAL at 08:49

## 2018-10-16 RX ADMIN — FENTANYL CITRATE 50 MCG: 50 INJECTION INTRAMUSCULAR; INTRAVENOUS at 07:33

## 2018-10-16 RX ADMIN — SODIUM CHLORIDE: 9 INJECTION, SOLUTION INTRAVENOUS at 07:31

## 2018-10-16 RX ADMIN — PROPOFOL 200 MG: 10 INJECTION, EMULSION INTRAVENOUS at 07:33

## 2018-10-16 RX ADMIN — DEXTROSE MONOHYDRATE 2 G: 50 INJECTION, SOLUTION INTRAVENOUS at 07:44

## 2018-10-16 RX ADMIN — FENTANYL CITRATE 25 MCG: 50 INJECTION INTRAMUSCULAR; INTRAVENOUS at 07:50

## 2018-10-16 RX ADMIN — PROPOFOL 100 MG: 10 INJECTION, EMULSION INTRAVENOUS at 07:45

## 2018-10-16 RX ADMIN — FENTANYL CITRATE 50 MCG: 50 INJECTION INTRAMUSCULAR; INTRAVENOUS at 08:36

## 2018-10-16 RX ADMIN — PROPOFOL 100 MG: 10 INJECTION, EMULSION INTRAVENOUS at 08:00

## 2018-10-16 RX ADMIN — PROPOFOL 30 MG: 10 INJECTION, EMULSION INTRAVENOUS at 08:20

## 2018-10-16 ASSESSMENT — PULMONARY FUNCTION TESTS
PIF_VALUE: 0

## 2018-10-16 ASSESSMENT — PAIN SCALES - GENERAL
PAINLEVEL_OUTOF10: 10
PAINLEVEL_OUTOF10: 10

## 2018-10-16 ASSESSMENT — LIFESTYLE VARIABLES: SMOKING_STATUS: 1

## 2018-10-16 ASSESSMENT — PAIN - FUNCTIONAL ASSESSMENT: PAIN_FUNCTIONAL_ASSESSMENT: 0-10

## 2018-10-16 ASSESSMENT — PAIN DESCRIPTION - DESCRIPTORS: DESCRIPTORS: CONSTANT;ACHING;SHARP

## 2018-10-16 NOTE — ANESTHESIA PRE PROCEDURE
ceFAZolin (ANCEF) 2 g in dextrose 5 % 50 mL IVPB  2 g Intravenous On Call to [address] [facility], [facility]           Allergies:     Allergies   Allergen Reactions    Latex Hives    Coumadin [Warfarin Sodium] Other (See Comments)     Tears skin     Tape Sona Solum Tape] Other (See Comments)     Tears skin        Problem List:    Patient Active Problem List   Diagnosis Code    Mass in neck R22.1    Hip pain M25.559    Fracture of metacarpal bone S62.309A    Type 2 diabetes mellitus without complication, without long-term current use of insulin ([facility]) E11.9    Generalized anxiety disorder F41.1    Moderate episode of recurrent major depressive disorder (HCC) F33.1    Malignant neoplasm of tonsil ([facility]) C09.9    Squamous cell carcinoma of left tonsil ([facility]) C09.9    Tongue carcinoma (HCC) C02.9    Encounter for antineoplastic chemotherapy Z51.11    Cigarette nicotine dependence without complication R34.833    Unstageable pressure ulcer of right foot ([facility]) L89.890       Past Medical History:        Diagnosis Date    Anxiety     Bleeds easily ([address]     Bruises easily     COPD (chronic obstructive pulmonary disease) (HCC)     DVT (deep venous thrombosis) ([address] 2009    Enlarged lymph node     History of emotional problems     Hx of blood clots 2009    Hypertension     HCC    Obesity     Squamous cell carcinoma of tonsils ([address] 2018    left   - radiation    Type II or unspecified type diabetes mellitus without mention of complication, not stated as uncontrolled     lost weight, no meds at present 2018       Past Surgical History:        Procedure Laterality Date    HIP SURGERY Right 06/30/2014    Excisin Right Hip Mass-Dr. Kowalski Pu     KNEE SURGERY Right 1980's    OTHER SURGICAL HISTORY  7-7-09    lisa filter placed     TOE SURGERY Right 2013    wound to bone (second toe)     TUNNELED VENOUS PORT PLACEMENT         Social History:    Social History   Substance Use Topics    Smoking status: Current

## 2018-10-16 NOTE — H&P
[facility]  History and Physical Update    Pt Name: Roxy Escobar  MRN: [id number]  YOB: 1960  Date of evaluation: 10/16/2018    I have examined the patient and reviewed the H&P/Consult and there are no changes to the patient or plans.       [facility] CLINT,RAY  Electronically signed 10/16/2018 at 7:26 AM

## 2018-10-22 ENCOUNTER — APPOINTMENT (OUTPATIENT)
Dept: CT IMAGING | Age: 58
DRG: 175 | End: 2018-10-22
Payer: MEDICARE

## 2018-10-22 ENCOUNTER — HOSPITAL ENCOUNTER (INPATIENT)
Age: 58
LOS: 1 days | Discharge: HOME | DRG: 175 | End: 2018-10-23
Attending: HOSPITALIST | Admitting: HOSPITALIST
Payer: MEDICARE

## 2018-10-22 ENCOUNTER — APPOINTMENT (OUTPATIENT)
Dept: GENERAL RADIOLOGY | Age: 58
DRG: 175 | End: 2018-10-22
Payer: MEDICARE

## 2018-10-22 DIAGNOSIS — I26.99 OTHER ACUTE PULMONARY EMBOLISM WITHOUT ACUTE COR PULMONALE (HCC): Primary | ICD-10-CM

## 2018-10-22 LAB
ALBUMIN SERPL-MCNC: 3.6 G/DL (ref 3.5–5.1)
ALP BLD-CCNC: 79 U/L (ref 38–126)
ALT SERPL-CCNC: 8 U/L (ref 11–66)
AMPHETAMINE+METHAMPHETAMINE URINE SCREEN: NEGATIVE
ANION GAP SERPL CALCULATED.3IONS-SCNC: 21 MEQ/L (ref 8–16)
APTT: 31.3 SECONDS (ref 22–38)
AST SERPL-CCNC: 20 U/L (ref 5–40)
BACTERIA: ABNORMAL /HPF
BARBITURATE QUANTITATIVE URINE: NEGATIVE
BENZODIAZEPINE QUANTITATIVE URINE: POSITIVE
BILIRUB SERPL-MCNC: 0.3 MG/DL (ref 0.3–1.2)
BILIRUBIN URINE: ABNORMAL
BLOOD, URINE: NEGATIVE
BUN BLDV-MCNC: 9 MG/DL (ref 7–22)
CALCIUM SERPL-MCNC: 9.7 MG/DL (ref 8.5–10.5)
CANNABINOID QUANTITATIVE URINE: POSITIVE
CASTS 2: ABNORMAL /LPF
CASTS UA: ABNORMAL /LPF
CHARACTER, URINE: ABNORMAL
CHLORIDE BLD-SCNC: 95 MEQ/L (ref 98–111)
CO2: 25 MEQ/L (ref 23–33)
COCAINE METABOLITE QUANTITATIVE URINE: NEGATIVE
COLOR: YELLOW
CREAT SERPL-MCNC: 1 MG/DL (ref 0.4–1.2)
CRYSTALS, UA: ABNORMAL
EKG ATRIAL RATE: 85 BPM
EKG P AXIS: 70 DEGREES
EKG P-R INTERVAL: 144 MS
EKG Q-T INTERVAL: 398 MS
EKG QRS DURATION: 92 MS
EKG QTC CALCULATION (BAZETT): 473 MS
EKG R AXIS: -6 DEGREES
EKG T AXIS: 50 DEGREES
EKG VENTRICULAR RATE: 85 BPM
EPITHELIAL CELLS, UA: ABNORMAL /HPF
ERYTHROCYTE [DISTWIDTH] IN BLOOD BY AUTOMATED COUNT: 13.4 % (ref 11.5–14.5)
ERYTHROCYTE [DISTWIDTH] IN BLOOD BY AUTOMATED COUNT: 13.5 % (ref 11.5–14.5)
ERYTHROCYTE [DISTWIDTH] IN BLOOD BY AUTOMATED COUNT: 44.4 FL (ref 35–45)
ERYTHROCYTE [DISTWIDTH] IN BLOOD BY AUTOMATED COUNT: 45.2 FL (ref 35–45)
GFR SERPL CREATININE-BSD FRML MDRD: 76 ML/MIN/1.73M2
GLUCOSE BLD-MCNC: 87 MG/DL (ref 70–108)
GLUCOSE URINE: NEGATIVE MG/DL
HCT VFR BLD CALC: 39.3 % (ref 42–52)
HCT VFR BLD CALC: 43.2 % (ref 42–52)
HEMOGLOBIN: 12.9 GM/DL (ref 14–18)
HEMOGLOBIN: 14.3 GM/DL (ref 14–18)
ICTOTEST: NEGATIVE
KETONES, URINE: 40
LACTIC ACID: 1.4 MMOL/L (ref 0.5–2.2)
LEUKOCYTE ESTERASE, URINE: NEGATIVE
MCH RBC QN AUTO: 29.9 PG (ref 26–33)
MCH RBC QN AUTO: 30.3 PG (ref 26–33)
MCHC RBC AUTO-ENTMCNC: 32.8 GM/DL (ref 32.2–35.5)
MCHC RBC AUTO-ENTMCNC: 33.1 GM/DL (ref 32.2–35.5)
MCV RBC AUTO: 91 FL (ref 80–94)
MCV RBC AUTO: 91.5 FL (ref 80–94)
MISCELLANEOUS 2: ABNORMAL
NITRITE, URINE: NEGATIVE
OPIATES, URINE: POSITIVE
OSMOLALITY CALCULATION: 279.3 MOSMOL/KG (ref 275–300)
OXYCODONE: POSITIVE
PH UA: 6
PHENCYCLIDINE QUANTITATIVE URINE: NEGATIVE
PLATELET # BLD: 216 THOU/MM3 (ref 130–400)
PLATELET # BLD: 234 THOU/MM3 (ref 130–400)
PMV BLD AUTO: 10 FL (ref 9.4–12.4)
PMV BLD AUTO: 9.4 FL (ref 9.4–12.4)
POTASSIUM REFLEX MAGNESIUM: 4.9 MEQ/L (ref 3.5–5.2)
POTASSIUM SERPL-SCNC: 4.9 MEQ/L (ref 3.5–5.2)
PRO-BNP: 666.5 PG/ML (ref 0–900)
PROCALCITONIN: 0.05 NG/ML (ref 0.01–0.09)
PROTEIN UA: NEGATIVE
RBC # BLD: 4.32 MILL/MM3 (ref 4.7–6.1)
RBC # BLD: 4.72 MILL/MM3 (ref 4.7–6.1)
RBC URINE: ABNORMAL /HPF
RENAL EPITHELIAL, UA: ABNORMAL
SODIUM BLD-SCNC: 141 MEQ/L (ref 135–145)
SPECIFIC GRAVITY, URINE: 1.01 (ref 1–1.03)
TOTAL PROTEIN: 6.8 G/DL (ref 6.1–8)
TROPONIN T: < 0.01 NG/ML
UROBILINOGEN, URINE: 1 EU/DL
WBC # BLD: 11.3 THOU/MM3 (ref 4.8–10.8)
WBC # BLD: 8.5 THOU/MM3 (ref 4.8–10.8)
WBC UA: ABNORMAL /HPF
YEAST: ABNORMAL

## 2018-10-22 PROCEDURE — 85730 THROMBOPLASTIN TIME PARTIAL: CPT

## 2018-10-22 PROCEDURE — 96375 TX/PRO/DX INJ NEW DRUG ADDON: CPT

## 2018-10-22 PROCEDURE — 80048 BASIC METABOLIC PNL TOTAL CA: CPT

## 2018-10-22 PROCEDURE — 6360000004 HC RX CONTRAST MEDICATION: Performed by: STUDENT IN AN ORGANIZED HEALTH CARE EDUCATION/TRAINING PROGRAM

## 2018-10-22 PROCEDURE — 96365 THER/PROPH/DIAG IV INF INIT: CPT

## 2018-10-22 PROCEDURE — 36415 COLL VENOUS BLD VENIPUNCTURE: CPT

## 2018-10-22 PROCEDURE — 85027 COMPLETE CBC AUTOMATED: CPT

## 2018-10-22 PROCEDURE — 99223 1ST HOSP IP/OBS HIGH 75: CPT | Performed by: HOSPITALIST

## 2018-10-22 PROCEDURE — 6370000000 HC RX 637 (ALT 250 FOR IP): Performed by: HOSPITALIST

## 2018-10-22 PROCEDURE — 6370000000 HC RX 637 (ALT 250 FOR IP): Performed by: STUDENT IN AN ORGANIZED HEALTH CARE EDUCATION/TRAINING PROGRAM

## 2018-10-22 PROCEDURE — 80307 DRUG TEST PRSMV CHEM ANLYZR: CPT

## 2018-10-22 PROCEDURE — 83880 ASSAY OF NATRIURETIC PEPTIDE: CPT

## 2018-10-22 PROCEDURE — 83605 ASSAY OF LACTIC ACID: CPT

## 2018-10-22 PROCEDURE — 6360000002 HC RX W HCPCS: Performed by: STUDENT IN AN ORGANIZED HEALTH CARE EDUCATION/TRAINING PROGRAM

## 2018-10-22 PROCEDURE — 71275 CT ANGIOGRAPHY CHEST: CPT

## 2018-10-22 PROCEDURE — 71045 X-RAY EXAM CHEST 1 VIEW: CPT

## 2018-10-22 PROCEDURE — 81001 URINALYSIS AUTO W/SCOPE: CPT

## 2018-10-22 PROCEDURE — 2709999900 HC NON-CHARGEABLE SUPPLY

## 2018-10-22 PROCEDURE — 99285 EMERGENCY DEPT VISIT HI MDM: CPT

## 2018-10-22 PROCEDURE — 96366 THER/PROPH/DIAG IV INF ADDON: CPT

## 2018-10-22 PROCEDURE — 93005 ELECTROCARDIOGRAM TRACING: CPT | Performed by: STUDENT IN AN ORGANIZED HEALTH CARE EDUCATION/TRAINING PROGRAM

## 2018-10-22 PROCEDURE — 93010 ELECTROCARDIOGRAM REPORT: CPT | Performed by: INTERNAL MEDICINE

## 2018-10-22 PROCEDURE — 84145 PROCALCITONIN (PCT): CPT

## 2018-10-22 PROCEDURE — 80053 COMPREHEN METABOLIC PANEL: CPT

## 2018-10-22 PROCEDURE — 1200000003 HC TELEMETRY R&B

## 2018-10-22 PROCEDURE — 84484 ASSAY OF TROPONIN QUANT: CPT

## 2018-10-22 PROCEDURE — 94640 AIRWAY INHALATION TREATMENT: CPT

## 2018-10-22 RX ORDER — LACTULOSE 10 G/15ML
20 SOLUTION ORAL 2 TIMES DAILY PRN
Status: DISCONTINUED | OUTPATIENT
Start: 2018-10-22 | End: 2018-10-23 | Stop reason: HOSPADM

## 2018-10-22 RX ORDER — HEPARIN SODIUM 10000 [USP'U]/100ML
18 INJECTION, SOLUTION INTRAVENOUS CONTINUOUS
Status: DISCONTINUED | OUTPATIENT
Start: 2018-10-22 | End: 2018-10-23

## 2018-10-22 RX ORDER — OXYCODONE HCL 10 MG/1
10 TABLET, FILM COATED, EXTENDED RELEASE ORAL ONCE
Status: COMPLETED | OUTPATIENT
Start: 2018-10-22 | End: 2018-10-22

## 2018-10-22 RX ORDER — CLONAZEPAM 1 MG/1
1 TABLET ORAL 3 TIMES DAILY PRN
Status: DISCONTINUED | OUTPATIENT
Start: 2018-10-22 | End: 2018-10-23 | Stop reason: HOSPADM

## 2018-10-22 RX ORDER — MORPHINE SULFATE 30 MG/1
30 TABLET, FILM COATED, EXTENDED RELEASE ORAL EVERY 12 HOURS
Status: DISCONTINUED | OUTPATIENT
Start: 2018-10-22 | End: 2018-10-23 | Stop reason: HOSPADM

## 2018-10-22 RX ORDER — IPRATROPIUM BROMIDE AND ALBUTEROL SULFATE 2.5; .5 MG/3ML; MG/3ML
1 SOLUTION RESPIRATORY (INHALATION) ONCE
Status: COMPLETED | OUTPATIENT
Start: 2018-10-22 | End: 2018-10-22

## 2018-10-22 RX ORDER — POTASSIUM CHLORIDE 20 MEQ/1
40 TABLET, EXTENDED RELEASE ORAL PRN
Status: DISCONTINUED | OUTPATIENT
Start: 2018-10-22 | End: 2018-10-23 | Stop reason: HOSPADM

## 2018-10-22 RX ORDER — HEPARIN SODIUM 1000 [USP'U]/ML
80 INJECTION, SOLUTION INTRAVENOUS; SUBCUTANEOUS ONCE
Status: COMPLETED | OUTPATIENT
Start: 2018-10-22 | End: 2018-10-22

## 2018-10-22 RX ORDER — CLONAZEPAM 0.5 MG/1
1 TABLET ORAL ONCE
Status: COMPLETED | OUTPATIENT
Start: 2018-10-22 | End: 2018-10-22

## 2018-10-22 RX ORDER — AZITHROMYCIN 250 MG/1
500 TABLET, FILM COATED ORAL ONCE
Status: COMPLETED | OUTPATIENT
Start: 2018-10-22 | End: 2018-10-22

## 2018-10-22 RX ORDER — SODIUM CHLORIDE 0.9 % (FLUSH) 0.9 %
10 SYRINGE (ML) INJECTION EVERY 12 HOURS SCHEDULED
Status: DISCONTINUED | OUTPATIENT
Start: 2018-10-22 | End: 2018-10-23 | Stop reason: HOSPADM

## 2018-10-22 RX ORDER — ALBUTEROL SULFATE 2.5 MG/3ML
2.5 SOLUTION RESPIRATORY (INHALATION)
Status: DISCONTINUED | OUTPATIENT
Start: 2018-10-23 | End: 2018-10-23 | Stop reason: HOSPADM

## 2018-10-22 RX ORDER — POTASSIUM CHLORIDE 20MEQ/15ML
40 LIQUID (ML) ORAL PRN
Status: DISCONTINUED | OUTPATIENT
Start: 2018-10-22 | End: 2018-10-23 | Stop reason: HOSPADM

## 2018-10-22 RX ORDER — SODIUM CHLORIDE 0.9 % (FLUSH) 0.9 %
10 SYRINGE (ML) INJECTION PRN
Status: DISCONTINUED | OUTPATIENT
Start: 2018-10-22 | End: 2018-10-23 | Stop reason: HOSPADM

## 2018-10-22 RX ORDER — HEPARIN SODIUM 1000 [USP'U]/ML
40 INJECTION, SOLUTION INTRAVENOUS; SUBCUTANEOUS PRN
Status: DISCONTINUED | OUTPATIENT
Start: 2018-10-22 | End: 2018-10-23

## 2018-10-22 RX ORDER — MORPHINE SULFATE 2 MG/ML
2 INJECTION, SOLUTION INTRAMUSCULAR; INTRAVENOUS ONCE
Status: COMPLETED | OUTPATIENT
Start: 2018-10-22 | End: 2018-10-22

## 2018-10-22 RX ORDER — OXYCODONE HYDROCHLORIDE 5 MG/1
10 TABLET ORAL EVERY 8 HOURS PRN
Status: DISCONTINUED | OUTPATIENT
Start: 2018-10-22 | End: 2018-10-23 | Stop reason: HOSPADM

## 2018-10-22 RX ORDER — ACETAMINOPHEN 325 MG/1
650 TABLET ORAL EVERY 4 HOURS PRN
Status: DISCONTINUED | OUTPATIENT
Start: 2018-10-22 | End: 2018-10-23 | Stop reason: HOSPADM

## 2018-10-22 RX ORDER — POTASSIUM CHLORIDE 7.45 MG/ML
10 INJECTION INTRAVENOUS PRN
Status: DISCONTINUED | OUTPATIENT
Start: 2018-10-22 | End: 2018-10-23 | Stop reason: HOSPADM

## 2018-10-22 RX ORDER — HEPARIN SODIUM 1000 [USP'U]/ML
80 INJECTION, SOLUTION INTRAVENOUS; SUBCUTANEOUS PRN
Status: DISCONTINUED | OUTPATIENT
Start: 2018-10-22 | End: 2018-10-23

## 2018-10-22 RX ORDER — DOCUSATE SODIUM 100 MG/1
100 CAPSULE, LIQUID FILLED ORAL 2 TIMES DAILY
Status: DISCONTINUED | OUTPATIENT
Start: 2018-10-22 | End: 2018-10-23 | Stop reason: HOSPADM

## 2018-10-22 RX ORDER — ONDANSETRON 2 MG/ML
4 INJECTION INTRAMUSCULAR; INTRAVENOUS EVERY 6 HOURS PRN
Status: DISCONTINUED | OUTPATIENT
Start: 2018-10-22 | End: 2018-10-22 | Stop reason: SDUPTHER

## 2018-10-22 RX ORDER — ONDANSETRON 2 MG/ML
4 INJECTION INTRAMUSCULAR; INTRAVENOUS EVERY 6 HOURS PRN
Status: DISCONTINUED | OUTPATIENT
Start: 2018-10-22 | End: 2018-10-23 | Stop reason: HOSPADM

## 2018-10-22 RX ADMIN — MORPHINE SULFATE 2 MG: 2 INJECTION, SOLUTION INTRAMUSCULAR; INTRAVENOUS at 16:05

## 2018-10-22 RX ADMIN — HEPARIN SODIUM 18 UNITS/KG/HR: 10000 INJECTION, SOLUTION INTRAVENOUS at 16:50

## 2018-10-22 RX ADMIN — IOPAMIDOL 85 ML: 755 INJECTION, SOLUTION INTRAVENOUS at 15:48

## 2018-10-22 RX ADMIN — MORPHINE SULFATE 30 MG: 30 TABLET, FILM COATED, EXTENDED RELEASE ORAL at 23:52

## 2018-10-22 RX ADMIN — HEPARIN SODIUM 7660 UNITS: 1000 INJECTION INTRAVENOUS; SUBCUTANEOUS at 16:51

## 2018-10-22 RX ADMIN — OXYCODONE HYDROCHLORIDE 10 MG: 10 TABLET, FILM COATED, EXTENDED RELEASE ORAL at 18:02

## 2018-10-22 RX ADMIN — CLONAZEPAM 1 MG: 0.5 TABLET ORAL at 14:51

## 2018-10-22 RX ADMIN — DOCUSATE SODIUM 100 MG: 100 CAPSULE, LIQUID FILLED ORAL at 23:52

## 2018-10-22 RX ADMIN — AZITHROMYCIN 500 MG: 250 TABLET, FILM COATED ORAL at 16:05

## 2018-10-22 RX ADMIN — IPRATROPIUM BROMIDE AND ALBUTEROL SULFATE 1 AMPULE: .5; 3 SOLUTION RESPIRATORY (INHALATION) at 13:41

## 2018-10-22 ASSESSMENT — ENCOUNTER SYMPTOMS
COUGH: 1
VOMITING: 0
EYE PAIN: 0
WHEEZING: 0
CONSTIPATION: 1
DIARRHEA: 0
EYE REDNESS: 0
NAUSEA: 0
SORE THROAT: 0
SORE THROAT: 1
BACK PAIN: 1
RHINORRHEA: 0
BACK PAIN: 0
SHORTNESS OF BREATH: 1
BLURRED VISION: 0
EYE DISCHARGE: 0
BLOOD IN STOOL: 0
SINUS PAIN: 0
ABDOMINAL PAIN: 0

## 2018-10-22 ASSESSMENT — PAIN DESCRIPTION - LOCATION
LOCATION: GENERALIZED
LOCATION: GENERALIZED

## 2018-10-22 ASSESSMENT — PAIN SCALES - GENERAL
PAINLEVEL_OUTOF10: 8
PAINLEVEL_OUTOF10: 7
PAINLEVEL_OUTOF10: 8

## 2018-10-22 ASSESSMENT — PAIN DESCRIPTION - PAIN TYPE
TYPE: CHRONIC PAIN
TYPE: CHRONIC PAIN

## 2018-10-22 NOTE — ED PROVIDER NOTES
[facility]  eMERGENCY dEPARTMENT eNCOUnter          CHIEF COMPLAINT       Chief Complaint   Patient presents with    Shortness of Breath       Nurses Notes reviewed and I agree except as notedin the HPI. HISTORY OF PRESENT ILLNESS    Kamaljit Kebede is a 59 y.o. male who has history of COPD presents to the Emergency Department via EMS for the evaluation of shortness of breath that began this morning when patient woke up. Patient was 80% on room air en route. He states that he has just felt \"weird\" all day today. 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 His last session of chemotherapy and radiation was a few weeks ago. Patient states that he has been feeling light headed and dizzy as well. Patient states that he has not been able to eat very well. He states that he also has a productive cough but that this is normal. He denies fever, hematemesis, or chest pain. Patient denies history of DVT's or heart failure. Patient is also c/o left calf pain and erythema. Patient denies any further complaints at this time. REVIEW OF SYSTEMS     Review of Systems   Constitutional: Negative for appetite change, chills, fatigue and fever. HENT: Negative for congestion, ear pain, rhinorrhea and sore throat. Eyes: Negative for discharge, redness and visual disturbance. Respiratory: Positive for cough (Chronic, productive) and shortness of breath. Negative for wheezing. Cardiovascular: Negative for chest pain, palpitations and leg swelling. Gastrointestinal: Negative for abdominal pain, diarrhea, nausea and vomiting. Genitourinary: Negative for decreased urine volume, difficulty urinating, dysuria and hematuria. Musculoskeletal: Positive for myalgias (Left calf). Negative for arthralgias, back pain, joint swelling and neck pain. Skin: Negative for pallor and rash. Allergic/Immunologic: Negative for environmental allergies.    Neurological: Negative for dizziness, syncope, posterior oropharyngeal edema or posterior oropharyngeal erythema. Eyes: Conjunctivae and EOM are normal.   Neck: Normal range of motion. Neck supple. No JVD present. Cardiovascular: Normal rate, regular rhythm, normal heart sounds, intact distal pulses and normal pulses. Exam reveals no gallop and no friction rub. No murmur heard. Pulmonary/Chest: Effort normal. No respiratory distress. He has decreased breath sounds. He has no wheezes. He has no rhonchi. He has no rales. Abdominal: Soft. Bowel sounds are normal. He exhibits no distension. There is no tenderness. There is no rebound, no guarding and no CVA tenderness. Musculoskeletal: Normal range of motion. He exhibits no edema. Left lower leg: He exhibits tenderness and swelling. Patient's left calf is neurovascularly intact. The anterior left calf is indurated, warm to the touch, swollen, and tender to palpation. He has many varicose veins in his legs as well. Neurological: He is alert and oriented to person, place, and time. He exhibits normal muscle tone. Coordination normal.   Skin: Skin is warm and dry. No rash noted. He is not diaphoretic. Nursing note and vitals reviewed.       DIFFERENTIAL DIAGNOSIS:   Including but not limited to: PE, metastatic cancer, COPD, pneumonia   DIAGNOSTIC RESULTS     EKG: All EKG's are interpreted by the Emergency Department Physician who eithersigns or Co-signs this chart in the absence of a cardiologist.       EKG SOB (Edited Result - FINAL)   Component (Lab Inquiry)   Collection Time Result Time Ventricular Rate Atrial Rate P-R Interval QRS Duration Q-T Interval   10/22/18 11:58:19 10/22/18 11:58:19 85 85 144 92 398       Collection Time Result Time QTc Calculation (Bazett) P Axis R Axis T Axis   10/22/18 11:58:19 10/22/18 11:58:19 473 70 -6 50       Final result                Narrative:    Sinus rhythm with Premature atrial complexes  Nonspecific ST and T wave abnormality  Prolonged QT interval or tu fusion, consider myocardial disease, electrolyte imbalance, or drug effects  Abnormal ECG  When compared with ECG of 02-JUN-2018 12:19,  Premature atrial complexes are now Present  Nonspecific T wave abnormality now evident in Inferior leads  Nonspecific T wave abnormality now evident in Anterolateral leads  Confirmed by ANDRA WILKERSON ([id number]) on 10/22/2018 10:10:50 PM                  RADIOLOGY: non-plain filmimages(s) such as CT, Ultrasound and MRI are read by the radiologist.  CTA Chest W WO Contrast   Final Result      Small left lower lobe pulmonary arterial embolism. There is 2 cm area of irregular scarring posterior medially right lung base. This is likely related to scarring or changes related to treatment. However when compared to prior examination this appears more masslike. Further evaluation can be based on    clinical history. PET/CT can be considered. These results were communicated directly with Cristobal Kumari, charge nurse on 10/22/2018 4:02 PM,  [ ]      **This report has been created using voice recognition software. It may contain minor errors which are inherent in voice recognition technology. **      Final report electronically signed by Dr. Adenike Mendoza on 10/22/2018 4:07 PM      XR CHEST PORTABLE   Final Result   1. Normal heart size. Mediport right side, catheter tip in SVC. 2. Mild chronic pleural thickening lateral aspect right mid and lower lung fields. 3. Mild left basilar atelectasis/pneumonia. Questionable mild infiltrate right lung base. 4. No definite effusion is seen. There appears to be mild chronic blunting both lateral costophrenic angles. **This report has been created using voice recognition software. It may contain minor errors which are inherent in voice recognition technology. **      Final report electronically signed by Dr. Olayinka Krause on 10/22/2018 1:27 PM          LABS:   Labs Reviewed   CBC - Abnormal; Notable for the following:        Result Value

## 2018-10-22 NOTE — ED NOTES
Pt medicated per orders and positioned for comfort. Respirations easy and even. Skin warm and dry.        Tirso Loredo RN  10/22/18 5929

## 2018-10-22 NOTE — H&P
Patient was looking to get a refill on l-carnitine 500mg sig: give 0.5 tablet via j-tube two times a day    Thank You,  Chaz Jacob, Pharmacy [facility] [place]     insight  Capillary Refill: Brisk,< 3 seconds   Peripheral Pulses: +2 palpable, equal bilaterally       Labs:     Recent Labs      10/22/18   1324  10/22/18   1641   WBC  11.3*  8.5   HGB  14.3  12.9*   HCT  43.2  39.3*   PLT  234  216     Recent Labs      10/22/18   1324   NA  141   K  4.9  4.9   CL  95*   CO2  25   BUN  9   CREATININE  1.0   CALCIUM  9.7     Recent Labs      10/22/18   1324   AST  20   ALT  8*   BILITOT  0.3   ALKPHOS  79     No results for input(s): INR in the last 72 hours. No results for input(s): Aloma Caleb in the last 72 hours. Urinalysis:      Lab Results   Component Value Date    NITRU NEGATIVE 10/22/2018    WBCUA 2-4 10/22/2018    BACTERIA NONE 10/22/2018    RBCUA 3-5 10/22/2018    BLOODU NEGATIVE 10/22/2018    GLUCOSEU NEGATIVE 10/22/2018       Intake & Output:  No intake/output data recorded. No intake/output data recorded. Radiology:   CTA Chest W WO Contrast   Final Result      Small left lower lobe pulmonary arterial embolism. There is 2 cm area of irregular scarring posterior medially right lung base. This is likely related to scarring or changes related to treatment. However when compared to prior examination this appears more masslike. Further evaluation can be based on    clinical history. PET/CT can be considered. These results were communicated directly with Chyna Sen, charge nurse on 10/22/2018 4:02 PM,  [ ]      **This report has been created using voice recognition software. It may contain minor errors which are inherent in voice recognition technology. **      Final report electronically signed by Dr. Zander Reese on 10/22/2018 4:07 PM      XR CHEST PORTABLE   Final Result   1. Normal heart size. Mediport right side, catheter tip in SVC. 2. Mild chronic pleural thickening lateral aspect right mid and lower lung fields. 3. Mild left basilar atelectasis/pneumonia. Questionable mild infiltrate right lung base. 4. No definite effusion is seen. completed his antibiotics after 2 weeks. Patient most likely had removal of area of osteomyelitis, given that he has only been on antibiotics for the past 2 weeks. 4. Type 2 diabetes-patient with history of diabetes. Patient will be continued on his home diabetic medications. Accu-Cheks as scheduled. Patient will be placed on a moderate insulin sliding scale. 5. Disposition-out of it to chair and ambulating. Thank you Aniceto Bustos MD for the opportunity to be involved in this patient's care.     Electronically signed by Sheri Reinoso MD on 10/22/2018 at 6:49 PM

## 2018-10-22 NOTE — ED NOTES
Pt provided urine sample, states he wants to go home. Advised pt we need to get test results back before decisions would be make for admission or not.        Korina Adames, RN  10/22/18 4848

## 2018-10-22 NOTE — ED NOTES
Pt requesting oxycodone for pain. Francisco Patricia, [id number] notified.       Glen Baez RN  10/22/18 7817

## 2018-10-23 ENCOUNTER — TELEPHONE (OUTPATIENT)
Dept: FAMILY MEDICINE CLINIC | Age: 58
End: 2018-10-23

## 2018-10-23 ENCOUNTER — APPOINTMENT (OUTPATIENT)
Dept: INTERVENTIONAL RADIOLOGY/VASCULAR | Age: 58
DRG: 175 | End: 2018-10-23
Payer: MEDICARE

## 2018-10-23 VITALS
HEART RATE: 83 BPM | BODY MASS INDEX: 27.4 KG/M2 | HEIGHT: 72 IN | SYSTOLIC BLOOD PRESSURE: 109 MMHG | RESPIRATION RATE: 17 BRPM | OXYGEN SATURATION: 93 % | TEMPERATURE: 98 F | WEIGHT: 202.3 LBS | DIASTOLIC BLOOD PRESSURE: 59 MMHG

## 2018-10-23 PROBLEM — E43 SEVERE MALNUTRITION (HCC): Chronic | Status: ACTIVE | Noted: 2018-10-23

## 2018-10-23 LAB
ANION GAP SERPL CALCULATED.3IONS-SCNC: 13 MEQ/L (ref 8–16)
APTT: 154 SECONDS (ref 22–38)
APTT: 54.5 SECONDS (ref 22–38)
BASOPHILS # BLD: 0.4 %
BASOPHILS ABSOLUTE: 0 THOU/MM3 (ref 0–0.1)
BUN BLDV-MCNC: 9 MG/DL (ref 7–22)
CALCIUM SERPL-MCNC: 8.9 MG/DL (ref 8.5–10.5)
CHLORIDE BLD-SCNC: 97 MEQ/L (ref 98–111)
CO2: 26 MEQ/L (ref 23–33)
CREAT SERPL-MCNC: 1 MG/DL (ref 0.4–1.2)
EOSINOPHIL # BLD: 0.8 %
EOSINOPHILS ABSOLUTE: 0.1 THOU/MM3 (ref 0–0.4)
ERYTHROCYTE [DISTWIDTH] IN BLOOD BY AUTOMATED COUNT: 13.8 % (ref 11.5–14.5)
ERYTHROCYTE [DISTWIDTH] IN BLOOD BY AUTOMATED COUNT: 44.7 FL (ref 35–45)
GFR SERPL CREATININE-BSD FRML MDRD: 76 ML/MIN/1.73M2
GLUCOSE BLD-MCNC: 101 MG/DL (ref 70–108)
HCT VFR BLD CALC: 36.9 % (ref 42–52)
HEMOGLOBIN: 12.6 GM/DL (ref 14–18)
IMMATURE GRANS (ABS): 0.04 THOU/MM3 (ref 0–0.07)
IMMATURE GRANULOCYTES: 0.6 %
LYMPHOCYTES # BLD: 12.1 %
LYMPHOCYTES ABSOLUTE: 0.9 THOU/MM3 (ref 1–4.8)
MCH RBC QN AUTO: 30.7 PG (ref 26–33)
MCHC RBC AUTO-ENTMCNC: 34.1 GM/DL (ref 32.2–35.5)
MCV RBC AUTO: 89.8 FL (ref 80–94)
MONOCYTES # BLD: 10.3 %
MONOCYTES ABSOLUTE: 0.7 THOU/MM3 (ref 0.4–1.3)
NUCLEATED RED BLOOD CELLS: 0 /100 WBC
OSMOLALITY CALCULATION: 270.8 MOSMOL/KG (ref 275–300)
PLATELET # BLD: 203 THOU/MM3 (ref 130–400)
PMV BLD AUTO: 9.5 FL (ref 9.4–12.4)
POTASSIUM REFLEX MAGNESIUM: 3.8 MEQ/L (ref 3.5–5.2)
RBC # BLD: 4.11 MILL/MM3 (ref 4.7–6.1)
SEG NEUTROPHILS: 75.8 %
SEGMENTED NEUTROPHILS ABSOLUTE COUNT: 5.5 THOU/MM3 (ref 1.8–7.7)
SODIUM BLD-SCNC: 136 MEQ/L (ref 135–145)
WBC # BLD: 7.2 THOU/MM3 (ref 4.8–10.8)

## 2018-10-23 PROCEDURE — 99239 HOSP IP/OBS DSCHRG MGMT >30: CPT | Performed by: INTERNAL MEDICINE

## 2018-10-23 PROCEDURE — 6360000002 HC RX W HCPCS: Performed by: HOSPITALIST

## 2018-10-23 PROCEDURE — 6370000000 HC RX 637 (ALT 250 FOR IP): Performed by: HOSPITALIST

## 2018-10-23 PROCEDURE — 85025 COMPLETE CBC W/AUTO DIFF WBC: CPT

## 2018-10-23 PROCEDURE — 94640 AIRWAY INHALATION TREATMENT: CPT

## 2018-10-23 PROCEDURE — 80048 BASIC METABOLIC PNL TOTAL CA: CPT

## 2018-10-23 PROCEDURE — 2580000003 HC RX 258: Performed by: HOSPITALIST

## 2018-10-23 PROCEDURE — 93970 EXTREMITY STUDY: CPT

## 2018-10-23 PROCEDURE — 6370000000 HC RX 637 (ALT 250 FOR IP): Performed by: INTERNAL MEDICINE

## 2018-10-23 PROCEDURE — 85730 THROMBOPLASTIN TIME PARTIAL: CPT

## 2018-10-23 PROCEDURE — 36415 COLL VENOUS BLD VENIPUNCTURE: CPT

## 2018-10-23 RX ADMIN — MORPHINE SULFATE 30 MG: 30 TABLET, FILM COATED, EXTENDED RELEASE ORAL at 10:27

## 2018-10-23 RX ADMIN — ALBUTEROL SULFATE 2.5 MG: 2.5 SOLUTION RESPIRATORY (INHALATION) at 09:06

## 2018-10-23 RX ADMIN — ALBUTEROL SULFATE 2.5 MG: 2.5 SOLUTION RESPIRATORY (INHALATION) at 12:42

## 2018-10-23 RX ADMIN — APIXABAN 10 MG: 5 TABLET, FILM COATED ORAL at 10:28

## 2018-10-23 RX ADMIN — OXYCODONE HYDROCHLORIDE 10 MG: 5 TABLET ORAL at 13:00

## 2018-10-23 RX ADMIN — DOCUSATE SODIUM 100 MG: 100 CAPSULE, LIQUID FILLED ORAL at 10:27

## 2018-10-23 RX ADMIN — OXYCODONE HYDROCHLORIDE 10 MG: 5 TABLET ORAL at 04:55

## 2018-10-23 RX ADMIN — SERTRALINE 150 MG: 100 TABLET, FILM COATED ORAL at 10:28

## 2018-10-23 RX ADMIN — Medication 10 ML: at 10:29

## 2018-10-23 RX ADMIN — CLONAZEPAM 1 MG: 1 TABLET ORAL at 13:03

## 2018-10-23 ASSESSMENT — PAIN SCALES - GENERAL
PAINLEVEL_OUTOF10: 6
PAINLEVEL_OUTOF10: 7
PAINLEVEL_OUTOF10: 6
PAINLEVEL_OUTOF10: 7

## 2018-10-23 ASSESSMENT — PAIN DESCRIPTION - PAIN TYPE
TYPE: CHRONIC PAIN
TYPE: CHRONIC PAIN

## 2018-10-23 ASSESSMENT — PAIN DESCRIPTION - LOCATION: LOCATION: GENERALIZED

## 2018-10-23 ASSESSMENT — PAIN DESCRIPTION - ONSET: ONSET: ON-GOING

## 2018-10-23 ASSESSMENT — PAIN DESCRIPTION - FREQUENCY: FREQUENCY: CONTINUOUS

## 2018-10-23 ASSESSMENT — PAIN DESCRIPTION - DESCRIPTORS: DESCRIPTORS: ACHING

## 2018-10-23 NOTE — PROGRESS NOTES
CLINICAL PHARMACY: DISCHARGE MED RECONCILIATION/REVIEW    [facility] ([facility]) Select Patient?: No  Total # of Interventions Recommended: 0  Total # Interventions Accepted: 0  Intervention Severity:   - Level 1 Intervention Present?: No   - Level 2 #: 0   - Level 3 #: 0   Time Spent (min): 15    Joshua Long PharmD, BCPS  10/23/2018  2:11 PM

## 2018-10-23 NOTE — PROGRESS NOTES
Nutrition Assessment    Type and Reason for Visit: Initial, Positive Nutrition Screen (difficulty swalloiwng, history of tonsil cancer)    Nutrition Recommendations:   Trial dysphagia II to assist with chewing/swallowing. Continue ONS. Consider a multivitamin. Malnutrition Assessment:  · Malnutrition Status: Meets the criteria for severe malnutrition  · Context: Chronic illness  · Findings of the 6 clinical characteristics of malnutrition (Minimum of 2 out of 6 clinical characteristics is required to make the diagnosis of moderate or severe Protein Calorie Malnutrition based on AND/ASPEN Guidelines):  1. Energy Intake-Less than or equal to 75%, greater than or equal to 1 month    2. Weight Loss-10% loss or greater (25.9% ), in 6 months  3. Fat Loss-Severe subcutaneous fat loss, Orbital, Fat overlying the ribs  4. Muscle Loss-Severe muscle mass loss, Clavicles (pectoralis and deltoids), Thigh (quadriceps)    Nutrition Diagnosis:   · Problem: Severe malnutrition, in context of chronic illness  · Etiology: related to Insufficient energy/nutrient consumption, Difficulty swallowing     Signs and symptoms:  as evidenced by Diet history of poor intake, Weight loss greater than or equal to 10% in 6 months, Severe muscle loss, Severe loss of subcutaneous fat    Nutrition Assessment:  · Subjective Assessment: Pt seen, reports poor oral intake since at least 6/2018. Reports he used to weigh over 500#. Note per EMR, 70.7# unplanned weight loss in the last 6 months. States he has dentures but didn't bring, trouble swallowing so has been avoiding meats, etc.  He thought it would be a great idea to trial dysphagia (soft with ground meats) diet. He has been using Boost at home 3-4 per day, agreed to Ensure Enlive TID. Will monitor blood sugars, history of diabetes but since he has lost so much weight hasn't been a problem anymore he reports. States chemsticks at home 99, 106.   Medications: lactulose, zoloft, MOM,

## 2018-10-23 NOTE — DISCHARGE SUMMARY
Hospital Medicine Discharge Summary      Patient Identification:   Lucille Nathan   : 1960  MRN: [id number]   Account: [de-identified]      Patient's PCP: Raul Siu MD    Admit Date: 10/22/2018     Discharge Date:   10/23/2018     Admitting Physician: Efrain Moy MD     Discharge Physician: Isidoro Smith MD     Discharge Diagnoses:  Acute Left lower lobe acute PE without cor pulmonale  Right lower lobe scarring versus mass  Current smoker  Severe protein calorie malnutrition  Squamous cell cancer of the tonsil on the left status post radiation  Right fifth metatarsal osteomyelitis status post resection,  COPD without exacerbation  Anxiety disorder  History of hypertension and diabetes currently off medications because of weight loss        The patient was seen and examined on day of discharge and this discharge summary is in conjunction with any daily progress note from day of discharge. Hospital Course:   Lucille Nathan is a 59 y.o. male admitted to [address] [place] [address] on 10/22/2018 for SOB and sweating      Patient presented to the emergency room with chief complaint of sudden onset shortness of breath that started while he was resting in the morning with sweating and hyperventilating. Patient thought he was having a panic attack but was not getting better and came to the emergency room. Denied any pleuritic chest pain or cough or wheezing. Patient had right foot ulcer with cellulitis and also fifth metatarsal osteomyelitis and recently had excision of the ulcer and the metatarsal base resection on 10/16 and has a CAM boot. Prior to that he was treated with antibiotics as per him.    Admits to smoking 10 cigarettes per day and also done with radiation of the left tonsilar cancer.       CT scan shows small left lower lobe pulmonary artery embolism and also a 2 cm area of irregular scarring posterior on the medial right lung base most likely related to scarring from treatment but kit by Does not apply route daily             lactulose (CHRONULAC) 10 GM/15ML solution  Take 30 mLs by mouth 2 times daily as needed (constipation)             morphine (MS CONTIN) 30 MG extended release tablet  Take 1 tablet by mouth every 12 hours for 30 days. Moisés Lobato oxyCODONE HCl (OXY-IR) 10 MG immediate release tablet  Take 1 tablet by mouth every 8 hours as needed for Pain for up to 30 days. Moisés Lobato sertraline (ZOLOFT) 100 MG tablet  Take 1.5 tablets by mouth daily                 Time Spent on discharge is more than 35 min in the examination, evaluation, counseling and review of medications and discharge plan. Signed: Thank you Edna Gutiérrez MD for the opportunity to be involved in this patient's care.     Electronically signed by Eladia Wright MD on 10/23/2018 at 1:17 PM

## 2018-10-23 NOTE — PLAN OF CARE
Problem: Falls - Risk of:  Goal: Will remain free from falls  Will remain free from falls   Outcome: Ongoing  Pt free from falls this shift. Bed alarm on, 2/4 side rails up, call light in reach, fall band on, nonskid socks on, clear pathway, bed in locked and lowest position. Will continue to monitor. Goal: Absence of physical injury  Absence of physical injury   Outcome: Ongoing  Pt free from physical injury this shift. Bed alarm on, 2/4 side rails up, call light in reach, fall band on, nonskid socks on, clear pathway, bed in locked and lowest position. Will continue to monitor. Problem: Pain:  Goal: Pain level will decrease  Pain level will decrease   Outcome: Ongoing  Pt experiences chronic pain due to tonsil cancer. Will continue to monitor and manage appropriately. Problem: Discharge Planning:  Goal: Participates in care planning  Participates in care planning  Outcome: Ongoing  Pt participates in care planning to the best of ability. Will continue to include in care planning. Goal: Discharged to appropriate level of care  Discharged to appropriate level of care  Outcome: Ongoing  Pt plans to be discharged to home. Will continue to monitor for further discharge needs. Problem: Anxiety/Stress:  Goal: Level of anxiety will decrease  Level of anxiety will decrease  Outcome: Ongoing  No anxiety noted this shift. Will continue to provide calming environment. Problem: Bowel Function - Altered:  Goal: Bowel elimination is within specified parameters  Bowel elimination is within specified parameters  Outcome: Ongoing  Pt on pain medications and has scheduled stool softeners. No BM noted this shift. Will continue to monitor. Problem: Gas Exchange - Impaired:  Goal: Levels of oxygenation will improve  Levels of oxygenation will improve  Outcome: Ongoing  Pt O2 95% on room air. Will continue to monitor.     Problem: Skin Integrity - Impaired:  Goal: Will show no infection signs and symptoms  Will show no

## 2018-10-23 NOTE — PROGRESS NOTES
Collaborative Care Pharmacist Note:    The cost of both Eliquis and Xarelto is $40 per month with the patient's insurance.     Kilo WashingtonD, BCPS  10/23/2018  10:47 AM

## 2018-10-23 NOTE — TELEPHONE ENCOUNTER
10/23/18 . Transition of Care visit scheduled.  10/30/18  Patient is being discharged to home from [facility]

## 2018-10-23 NOTE — PLAN OF CARE
Problem: Nutrition  Goal: Optimal nutrition therapy  Outcome: Ongoing  Nutrition Problem: Severe malnutrition, in context of chronic illness  Intervention: Food and/or Nutrient Delivery: Modify current diet, Start ONS (Trial dysphagia II due to assist with chewing/swallowing)  Nutritional Goals: Pt will safely consume 75% or more of meals during LOS.

## 2018-10-23 NOTE — PLAN OF CARE
Problem: Impaired respiratory status  Goal: Clear lung sounds  Outcome: Ongoing  Pt started on aerosol txs to improve breath sounds/aeration. Txs explained to pt and pt given medication education sheet.

## 2018-10-23 NOTE — FLOWSHEET NOTE
10/23/18 1341   Provider Notification   Reason for Communication Review case   Provider Name Dr. Trell Reis   Provider Notification Physician   Method of Communication Page   Response Other (Comment)  (unable to reach)   Notification Time 2213     Attempted to contact Dr. Trell Reis regarding consult.

## 2018-10-24 DIAGNOSIS — G89.3 CANCER ASSOCIATED PAIN: ICD-10-CM

## 2018-10-24 RX ORDER — MORPHINE SULFATE 30 MG/1
30 TABLET, FILM COATED, EXTENDED RELEASE ORAL EVERY 12 HOURS
Qty: 60 TABLET | Refills: 0 | Status: SHIPPED | OUTPATIENT
Start: 2018-10-27 | End: 2018-11-26 | Stop reason: SDUPTHER

## 2018-10-30 ENCOUNTER — OFFICE VISIT (OUTPATIENT)
Dept: FAMILY MEDICINE CLINIC | Age: 58
End: 2018-10-30
Payer: MEDICARE

## 2018-10-30 ENCOUNTER — HOSPITAL ENCOUNTER (OUTPATIENT)
Dept: WOUND CARE | Age: 58
Discharge: HOME | End: 2018-10-30
Payer: MEDICARE

## 2018-10-30 VITALS
RESPIRATION RATE: 18 BRPM | WEIGHT: 203.5 LBS | TEMPERATURE: 97.7 F | HEART RATE: 84 BPM | OXYGEN SATURATION: 93 % | DIASTOLIC BLOOD PRESSURE: 57 MMHG | BODY MASS INDEX: 27.6 KG/M2 | SYSTOLIC BLOOD PRESSURE: 104 MMHG

## 2018-10-30 VITALS
OXYGEN SATURATION: 95 % | DIASTOLIC BLOOD PRESSURE: 62 MMHG | SYSTOLIC BLOOD PRESSURE: 116 MMHG | RESPIRATION RATE: 12 BRPM | WEIGHT: 205 LBS | BODY MASS INDEX: 27.8 KG/M2 | HEART RATE: 100 BPM

## 2018-10-30 DIAGNOSIS — F41.1 GENERALIZED ANXIETY DISORDER: ICD-10-CM

## 2018-10-30 DIAGNOSIS — E43 SEVERE PROTEIN-CALORIE MALNUTRITION (HCC): ICD-10-CM

## 2018-10-30 DIAGNOSIS — I26.99 OTHER ACUTE PULMONARY EMBOLISM WITHOUT ACUTE COR PULMONALE (HCC): ICD-10-CM

## 2018-10-30 DIAGNOSIS — Z23 NEED FOR VACCINATION: ICD-10-CM

## 2018-10-30 DIAGNOSIS — I26.99 PULMONARY EMBOLISM AND INFARCTION (HCC): Primary | ICD-10-CM

## 2018-10-30 DIAGNOSIS — T81.89XD NON-HEALING SURGICAL WOUND, SUBSEQUENT ENCOUNTER: Primary | ICD-10-CM

## 2018-10-30 PROCEDURE — 97597 DBRDMT OPN WND 1ST 20 CM/<: CPT

## 2018-10-30 PROCEDURE — G0009 ADMIN PNEUMOCOCCAL VACCINE: HCPCS | Performed by: FAMILY MEDICINE

## 2018-10-30 PROCEDURE — 90732 PPSV23 VACC 2 YRS+ SUBQ/IM: CPT | Performed by: FAMILY MEDICINE

## 2018-10-30 PROCEDURE — 1111F DSCHRG MED/CURRENT MED MERGE: CPT | Performed by: FAMILY MEDICINE

## 2018-10-30 PROCEDURE — 2709999900 HC NON-CHARGEABLE SUPPLY

## 2018-10-30 PROCEDURE — 99495 TRANSJ CARE MGMT MOD F2F 14D: CPT | Performed by: FAMILY MEDICINE

## 2018-10-30 RX ORDER — HYDROXYZINE 50 MG/1
50 TABLET, FILM COATED ORAL NIGHTLY PRN
Qty: 30 TABLET | Refills: 1 | Status: SHIPPED | OUTPATIENT
Start: 2018-10-30 | End: 2018-12-11 | Stop reason: SDUPTHER

## 2018-10-30 ASSESSMENT — ENCOUNTER SYMPTOMS
SHORTNESS OF BREATH: 0
RHINORRHEA: 0
COLOR CHANGE: 1
WHEEZING: 0
DIARRHEA: 0
COUGH: 0
ABDOMINAL PAIN: 0
SORE THROAT: 0
EYE DISCHARGE: 0
NAUSEA: 0
CONSTIPATION: 0

## 2018-10-30 ASSESSMENT — PAIN SCALES - GENERAL: PAINLEVEL_OUTOF10: 0

## 2018-10-30 NOTE — PROGRESS NOTES
After obtaining consent, and per orders of Dr. Lieutenant Chaudhry, injection of 0.5ml Pneumovax 23 given in Left deltoid by Hoagland Real. Patient instructed to report any adverse reaction to me immediately.     Immunizations     Name Date Dose Route    Pneumococcal Polysaccharide ([id number]) 10/30/2018 0.5 mL Intramuscular    Site: Deltoid- Left    Lot: [id number]    NDC: 7406-8861-83          Patient tolerated well  VIS given  Vaccine Checklist filled out
mouth 3 times daily as needed for Anxiety for up to 90 doses. [place] Route glucose monitoring kit (FREESTYLE) monitoring kit  1 kit by Does not apply route daily             hydrOXYzine (ATARAX) 50 MG tablet  Take 1 tablet by mouth nightly as needed for Anxiety (sleep)             lactulose (CHRONULAC) 10 GM/15ML solution  Take 30 mLs by mouth 2 times daily as needed (constipation)             morphine (MS CONTIN) 30 MG extended release tablet  Take 1 tablet by mouth every 12 hours for 30 days. [place]enton Route oxyCODONE HCl (OXY-IR) 10 MG immediate release tablet  Take 1 tablet by mouth every 8 hours as needed for Pain for up to 30 days. [place] Route sertraline (ZOLOFT) 100 MG tablet  Take 1.5 tablets by mouth daily                   Medications marked \"taking\" at this time  Outpatient Prescriptions Marked as Taking for the 10/30/18 encounter (Office Visit) with Maggie Magaña MD   Medication Sig Dispense Refill    apixaban (ELIQUIS) 5 MG TABS tablet Take 1 tablet by mouth 2 times daily 60 tablet 5    hydrOXYzine (ATARAX) 50 MG tablet Take 1 tablet by mouth nightly as needed for Anxiety (sleep) 30 tablet 1    morphine (MS CONTIN) 30 MG extended release tablet Take 1 tablet by mouth every 12 hours for 30 days. . 60 tablet 0    sertraline (ZOLOFT) 100 MG tablet Take 1.5 tablets by mouth daily 45 tablet 5    oxyCODONE HCl (OXY-IR) 10 MG immediate release tablet Take 1 tablet by mouth every 8 hours as needed for Pain for up to 30 days. . 90 tablet 0    clonazePAM (KLONOPIN) 1 MG tablet Take 1 tablet by mouth 3 times daily as needed for Anxiety for up to 90 doses. . 90 tablet 2    lactulose (CHRONULAC) 10 GM/15ML solution Take 30 mLs by mouth 2 times daily as needed (constipation) 1800 mL 0    glucose monitoring kit (FREESTYLE) monitoring kit 1 kit by Does not apply route daily 1 kit 0    albuterol sulfate  (90 Base) MCG/ACT inhaler Inhale 2 puffs into the lungs every 6 hours as needed for Wheezing 1 Inhaler 2

## 2018-10-30 NOTE — PROGRESS NOTES
given to patient and signed by patient or POA. Discharge Instructions       Visit Discharge/Physician Orders: Wear walking boot. May take off at night in bed, otherwise wear when walking. Stay off right foot as much as possible. Home Health: [address] - referral sent today    Wound Location: Right lateral foot    Dressing orders:     1) Gather wound care supplies and arrange on clean table. 2) Wash your hands with soap and water or use alcohol based hand  for 20 seconds (sing \"Happy Birthday\" twice). 3) Cleanse wounds with normal saline or wound cleanser and gauze. Pat dry with clean gauze. 4) Apply iodoflex to open wound area. Apply betadine to incision line. Cover with gauze. Wrap with kerlix. 5) Change dressing three times weekly. Keep all dressings clean & dry. Do not shower, take baths or get wound wet, unless otherwise instructed by your Wound Care doctor. Follow up visit:   2 Weeks - November 13th, 2018 at 2:30 pm    Keep next scheduled appointment. Please give 24 hour notice if unable to keep appointment. [phone]    If you experience any of the following, please call the Wound Care Service during business hours: Monday through Friday 8:00 am - 4:30 pm  ([phone]). *Increase in pain   *Temperature over 101   *Increase in drainage from your wound or a foul odor   *Uncontrolled swelling   *Need for compression bandage changes due to slippage, breakthrough drainage    If you need medical attention outside of business hours, please contact your Primary Care Doctor or go to the nearest emergency room.                    Electronically signed by Jan Walters DPM on 10/30/2018 at 12:59 PM

## 2018-11-05 DIAGNOSIS — G89.4 CHRONIC PAIN SYNDROME: ICD-10-CM

## 2018-11-05 DIAGNOSIS — C09.9 SQUAMOUS CELL CARCINOMA OF LEFT TONSIL (HCC): ICD-10-CM

## 2018-11-05 DIAGNOSIS — C02.9 TONGUE CARCINOMA (HCC): ICD-10-CM

## 2018-11-05 RX ORDER — OXYCODONE HYDROCHLORIDE 10 MG/1
10 TABLET ORAL EVERY 8 HOURS PRN
Qty: 90 TABLET | Refills: 0 | Status: SHIPPED | OUTPATIENT
Start: 2018-11-06 | End: 2018-12-03 | Stop reason: SDUPTHER

## 2018-11-07 ENCOUNTER — HOSPITAL ENCOUNTER (OUTPATIENT)
Dept: PET IMAGING | Age: 58
Discharge: HOME | End: 2018-11-07
Payer: MEDICARE

## 2018-11-07 DIAGNOSIS — C09.9 SQUAMOUS CELL CARCINOMA OF LEFT TONSIL (HCC): ICD-10-CM

## 2018-11-07 PROCEDURE — 78815 PET IMAGE W/CT SKULL-THIGH: CPT

## 2018-11-07 PROCEDURE — 3430000000 HC RX DIAGNOSTIC RADIOPHARMACEUTICAL: Performed by: INTERNAL MEDICINE

## 2018-11-07 PROCEDURE — A9552 F18 FDG: HCPCS | Performed by: INTERNAL MEDICINE

## 2018-11-07 RX ORDER — FLUDEOXYGLUCOSE F 18 200 MCI/ML
10 INJECTION, SOLUTION INTRAVENOUS
Status: COMPLETED | OUTPATIENT
Start: 2018-11-07 | End: 2018-11-07

## 2018-11-07 RX ADMIN — FLUDEOXYGLUCOSE F 18 15 MILLICURIE: 200 INJECTION, SOLUTION INTRAVENOUS at 10:24

## 2018-11-09 ENCOUNTER — OFFICE VISIT (OUTPATIENT)
Dept: ONCOLOGY | Age: 58
End: 2018-11-09
Payer: MEDICARE

## 2018-11-09 ENCOUNTER — HOSPITAL ENCOUNTER (OUTPATIENT)
Dept: INFUSION THERAPY | Age: 58
Discharge: HOME | End: 2018-11-09
Payer: MEDICARE

## 2018-11-09 VITALS
SYSTOLIC BLOOD PRESSURE: 113 MMHG | HEART RATE: 75 BPM | OXYGEN SATURATION: 97 % | HEIGHT: 72 IN | DIASTOLIC BLOOD PRESSURE: 69 MMHG | TEMPERATURE: 97.4 F | WEIGHT: 211.2 LBS | RESPIRATION RATE: 18 BRPM | BODY MASS INDEX: 28.61 KG/M2

## 2018-11-09 DIAGNOSIS — C09.9 MALIGNANT NEOPLASM OF TONSIL (HCC): Primary | ICD-10-CM

## 2018-11-09 DIAGNOSIS — C09.9 SQUAMOUS CELL CARCINOMA OF LEFT TONSIL (HCC): ICD-10-CM

## 2018-11-09 PROCEDURE — G8484 FLU IMMUNIZE NO ADMIN: HCPCS | Performed by: INTERNAL MEDICINE

## 2018-11-09 PROCEDURE — G8417 CALC BMI ABV UP PARAM F/U: HCPCS | Performed by: INTERNAL MEDICINE

## 2018-11-09 PROCEDURE — 4004F PT TOBACCO SCREEN RCVD TLK: CPT | Performed by: INTERNAL MEDICINE

## 2018-11-09 PROCEDURE — 1111F DSCHRG MED/CURRENT MED MERGE: CPT | Performed by: INTERNAL MEDICINE

## 2018-11-09 PROCEDURE — G8427 DOCREV CUR MEDS BY ELIG CLIN: HCPCS | Performed by: INTERNAL MEDICINE

## 2018-11-09 PROCEDURE — 99213 OFFICE O/P EST LOW 20 MIN: CPT | Performed by: INTERNAL MEDICINE

## 2018-11-09 PROCEDURE — 3017F COLORECTAL CA SCREEN DOC REV: CPT | Performed by: INTERNAL MEDICINE

## 2018-11-09 PROCEDURE — 99211 OFF/OP EST MAY X REQ PHY/QHP: CPT

## 2018-11-18 NOTE — PROGRESS NOTES
[facility] PROFESSIONAL SERVICES  [facility]  [address], [address]  [address] [address]  Dept: [phone]  Dept Fax: [phone]: [phone]    Visit Date: 11/9/2018     Subjective:     Chief Complaint:  Blaire Green is a 59 y.o. with squamous cell carcinoma of the left tonsil. He was referred by Dr. Elio Herbert for further evaluation of his malignancy. The patient developed a painful mass in his left neck. He was seen by Dr. Elio Herbert and was found to have a base of tongue mass with palpable cervical lymphadenopathy on the left side. CT scan of the head and neck confirmed a thick wall mass which showed cavitation and likely originated from the left palatine tonsil. A core biopsy obtained on May 7, 2018 revealed squamous cell carcinoma. A PET scan was obtained that found the known mass at the base of the left tongue and tonsil as well as cervical lymphadenopathy. There was no evidence of distant metastatic disease. HPI: The patient is here today for follow up evaluation. He is here today for follow up regarding his head and neck cancer. His general condition has improved since discontinuing therapy. A recent PET/CT scan found an good response to therapy. The patient's chronic pain has improved. He continues to use narcotic pain medication. The patient's follow-up the chronic pain clinic. He does not have any specific symptoms to suggest progression or recurrence of his malignancy. The patient does have chronic shortness of breath. His energy level has been improving. The patient's bowel and bladder habits have been stable. He has not had signs of infection. ECOG performance status is level I.    PMH, SH, and FH:  I reviewed the patients medication list and allergy list as noted on the electronic medical record. The PMH, SH and FH were also reviewed as noted on the EMR. Review of Systems   Constitutional: Negative. HENT: Negative. Eyes: Negative.

## 2018-11-20 ENCOUNTER — HOSPITAL ENCOUNTER (OUTPATIENT)
Dept: INFUSION THERAPY | Age: 58
Discharge: HOME | End: 2018-11-20
Payer: MEDICARE

## 2018-11-20 ENCOUNTER — HOSPITAL ENCOUNTER (OUTPATIENT)
Dept: WOUND CARE | Age: 58
Discharge: HOME | End: 2018-11-20
Payer: MEDICARE

## 2018-11-20 VITALS
DIASTOLIC BLOOD PRESSURE: 65 MMHG | HEART RATE: 69 BPM | TEMPERATURE: 97.9 F | RESPIRATION RATE: 18 BRPM | SYSTOLIC BLOOD PRESSURE: 137 MMHG | OXYGEN SATURATION: 93 %

## 2018-11-20 VITALS
HEART RATE: 80 BPM | BODY MASS INDEX: 28.5 KG/M2 | SYSTOLIC BLOOD PRESSURE: 127 MMHG | OXYGEN SATURATION: 94 % | RESPIRATION RATE: 18 BRPM | HEIGHT: 72 IN | WEIGHT: 210.4 LBS | DIASTOLIC BLOOD PRESSURE: 67 MMHG | TEMPERATURE: 97.9 F

## 2018-11-20 DIAGNOSIS — E11.9 TYPE 2 DIABETES MELLITUS WITHOUT COMPLICATION, WITHOUT LONG-TERM CURRENT USE OF INSULIN (HCC): ICD-10-CM

## 2018-11-20 DIAGNOSIS — C02.9 TONGUE CARCINOMA (HCC): ICD-10-CM

## 2018-11-20 DIAGNOSIS — R22.1 MASS IN NECK: ICD-10-CM

## 2018-11-20 DIAGNOSIS — C09.9 SQUAMOUS CELL CARCINOMA OF LEFT TONSIL (HCC): ICD-10-CM

## 2018-11-20 DIAGNOSIS — C09.9 MALIGNANT NEOPLASM OF TONSIL (HCC): ICD-10-CM

## 2018-11-20 DIAGNOSIS — L84 CALLUS OF FOOT: Primary | ICD-10-CM

## 2018-11-20 PROCEDURE — 2580000003 HC RX 258: Performed by: INTERNAL MEDICINE

## 2018-11-20 PROCEDURE — 2709999900 HC NON-CHARGEABLE SUPPLY

## 2018-11-20 PROCEDURE — 11055 PARING/CUTG B9 HYPRKER LES 1: CPT

## 2018-11-20 PROCEDURE — 99212 OFFICE O/P EST SF 10 MIN: CPT

## 2018-11-20 PROCEDURE — 11721 DEBRIDE NAIL 6 OR MORE: CPT

## 2018-11-20 PROCEDURE — 6360000002 HC RX W HCPCS: Performed by: INTERNAL MEDICINE

## 2018-11-20 RX ORDER — SODIUM CHLORIDE 0.9 % (FLUSH) 0.9 %
20 SYRINGE (ML) INJECTION PRN
Status: CANCELLED | OUTPATIENT
Start: 2018-11-20

## 2018-11-20 RX ORDER — HEPARIN SODIUM (PORCINE) LOCK FLUSH IV SOLN 100 UNIT/ML 100 UNIT/ML
500 SOLUTION INTRAVENOUS PRN
Status: DISCONTINUED | OUTPATIENT
Start: 2018-11-20 | End: 2018-11-21 | Stop reason: HOSPADM

## 2018-11-20 RX ORDER — DOCUSATE SODIUM 100 MG/1
100 CAPSULE, LIQUID FILLED ORAL 3 TIMES DAILY PRN
Status: ON HOLD | COMMUNITY
End: 2019-08-13 | Stop reason: SDUPTHER

## 2018-11-20 RX ORDER — SODIUM CHLORIDE 0.9 % (FLUSH) 0.9 %
10 SYRINGE (ML) INJECTION PRN
Status: DISCONTINUED | OUTPATIENT
Start: 2018-11-20 | End: 2018-11-21 | Stop reason: HOSPADM

## 2018-11-20 RX ORDER — HEPARIN SODIUM (PORCINE) LOCK FLUSH IV SOLN 100 UNIT/ML 100 UNIT/ML
500 SOLUTION INTRAVENOUS PRN
Status: CANCELLED | OUTPATIENT
Start: 2018-11-20

## 2018-11-20 RX ORDER — SODIUM CHLORIDE 0.9 % (FLUSH) 0.9 %
10 SYRINGE (ML) INJECTION PRN
Status: CANCELLED | OUTPATIENT
Start: 2018-11-20

## 2018-11-20 RX ADMIN — Medication 500 UNITS: at 14:16

## 2018-11-20 RX ADMIN — Medication 10 ML: at 14:15

## 2018-11-20 ASSESSMENT — PAIN DESCRIPTION - DESCRIPTORS: DESCRIPTORS: ACHING

## 2018-11-20 ASSESSMENT — PAIN DESCRIPTION - PAIN TYPE
TYPE: ACUTE PAIN
TYPE: CHRONIC PAIN

## 2018-11-20 ASSESSMENT — PAIN SCALES - GENERAL
PAINLEVEL_OUTOF10: 5
PAINLEVEL_OUTOF10: 5

## 2018-11-20 ASSESSMENT — PAIN DESCRIPTION - LOCATION
LOCATION: NECK;BACK
LOCATION: THROAT

## 2018-11-20 ASSESSMENT — PAIN DESCRIPTION - FREQUENCY: FREQUENCY: CONTINUOUS

## 2018-11-20 NOTE — PLAN OF CARE
Problem: Pain:  Intervention: Promote participation in pain management plan  Encourage pt to take PRN pain medications as prescribed. Encourage pt to report uncontrolled pain to MD.     Goal: Control of chronic pain  Control of chronic pain   Outcome: Met This Shift  Pt reporting chronic neck and back pain. Pt states pain is tolerable with home PRN pain medications. Problem: Discharge Planning  Intervention: Discharge to appropriate level of care  Discharge instructions given to pt and reviewed. Follow up appointments reviewed. Goal: Knowledge of discharge instructions  Knowledge of discharge instructions     Outcome: Met This Shift  Patient verbalizes understanding of discharge instructions, follow up appointment, and when to call physician if needed    Problem: Infection - Central Venous Catheter-Associated Bloodstream Infection:  Intervention: Infection risk assessment  Discuss port maintenance, infection prevention, signs of infection, and when to call the doctor. Pt instructed port needs flushed every 4-6 weeks. Next appointment scheduled. Goal: Will show no infection signs and symptoms  Will show no infection signs and symptoms   Outcome: Met This Shift  Mediport site with no redness or warmth. Skin over port site intact with no signs of breakdown noted. Patient verbalizes signs/symptoms of port infection and when to notify the physician. Problem: Musculor/Skeletal Functional Status  Intervention: Fall precautions  Fall risk factors assessed. Precautions reviewed. Assistance provided with activity. Goal: Absence of falls  Outcome: Met This Shift  Pt free of falls this visit. Comments: Care plan reviewed with patient. Patient verbalizes understanding of the plan of care and contributes to goal setting.

## 2018-11-20 NOTE — PROGRESS NOTES
I was present with the resident during the visit . I discussed the case with the resident and agree with the findings and the plan as documented in the residents note.     Marlene Whaley DPM, FACFAS  Foot & Ankle Surgical Residency  [facility]

## 2018-11-20 NOTE — PROGRESS NOTES
Pt tolerated port flush without any complications. Discharge instructions given to patient. Patient verbalizes understanding. Pt ambulated off unit per self with belongings.

## 2018-11-26 DIAGNOSIS — G89.3 CANCER ASSOCIATED PAIN: ICD-10-CM

## 2018-11-26 RX ORDER — MORPHINE SULFATE 30 MG/1
30 TABLET, FILM COATED, EXTENDED RELEASE ORAL EVERY 12 HOURS
Qty: 60 TABLET | Refills: 0 | Status: SHIPPED | OUTPATIENT
Start: 2018-11-26 | End: 2018-12-24 | Stop reason: SDUPTHER

## 2018-11-27 ENCOUNTER — OFFICE VISIT (OUTPATIENT)
Dept: PHYSICAL MEDICINE AND REHAB | Age: 58
End: 2018-11-27
Payer: MEDICARE

## 2018-11-27 VITALS
DIASTOLIC BLOOD PRESSURE: 61 MMHG | HEIGHT: 72 IN | WEIGHT: 211 LBS | SYSTOLIC BLOOD PRESSURE: 131 MMHG | BODY MASS INDEX: 28.58 KG/M2

## 2018-11-27 DIAGNOSIS — C02.9 TONGUE CARCINOMA (HCC): ICD-10-CM

## 2018-11-27 DIAGNOSIS — G89.3 CANCER ASSOCIATED PAIN: Primary | ICD-10-CM

## 2018-11-27 DIAGNOSIS — G62.9 NEUROPATHY: ICD-10-CM

## 2018-11-27 DIAGNOSIS — I26.99 PULMONARY EMBOLISM AND INFARCTION (HCC): ICD-10-CM

## 2018-11-27 DIAGNOSIS — C09.9 MALIGNANT NEOPLASM OF TONSIL (HCC): ICD-10-CM

## 2018-11-27 DIAGNOSIS — C09.9 SQUAMOUS CELL CARCINOMA OF LEFT TONSIL (HCC): ICD-10-CM

## 2018-11-27 DIAGNOSIS — G89.4 CHRONIC PAIN SYNDROME: ICD-10-CM

## 2018-11-27 DIAGNOSIS — L89.890 UNSTAGEABLE PRESSURE ULCER OF RIGHT FOOT (HCC): ICD-10-CM

## 2018-11-27 PROCEDURE — G8484 FLU IMMUNIZE NO ADMIN: HCPCS | Performed by: NURSE PRACTITIONER

## 2018-11-27 PROCEDURE — G8417 CALC BMI ABV UP PARAM F/U: HCPCS | Performed by: NURSE PRACTITIONER

## 2018-11-27 PROCEDURE — 99213 OFFICE O/P EST LOW 20 MIN: CPT | Performed by: NURSE PRACTITIONER

## 2018-11-27 PROCEDURE — 4004F PT TOBACCO SCREEN RCVD TLK: CPT | Performed by: NURSE PRACTITIONER

## 2018-11-27 PROCEDURE — G8427 DOCREV CUR MEDS BY ELIG CLIN: HCPCS | Performed by: NURSE PRACTITIONER

## 2018-11-27 PROCEDURE — 3017F COLORECTAL CA SCREEN DOC REV: CPT | Performed by: NURSE PRACTITIONER

## 2018-11-27 ASSESSMENT — ENCOUNTER SYMPTOMS
SHORTNESS OF BREATH: 1
TROUBLE SWALLOWING: 1
SINUS PRESSURE: 0
COUGH: 1
RHINORRHEA: 0
BACK PAIN: 0
VOMITING: 0
ABDOMINAL PAIN: 1
SORE THROAT: 1
VOICE CHANGE: 1
CONSTIPATION: 0
EYE PAIN: 0
CHEST TIGHTNESS: 0
DIARRHEA: 0
NAUSEA: 0
PHOTOPHOBIA: 0
WHEEZING: 1
CHOKING: 1
COLOR CHANGE: 0

## 2018-11-27 NOTE — PROGRESS NOTES
Patient states he is  taking medications as prescribed. Hedenies receiving pain medications from other sources. He complains of any ER visits since last visit. SOB PE  Pain scale with out pain medications is 9/10. Pain scale with pain medications is  4/10. Last dose of  MS Contin and Oxy IR.was today  Drug screen reviewed from 10/8/2018 and was appropriate + THC cancer pt  Pill count was completed today and was appropriate    The patientis allergic to latex; coumadin [warfarin sodium]; and tape [adhesive tape]. Past Medical History  Dung Woods  has a past medical history of Anxiety; Bleeds easily ([address] Bruises easily; COPD (chronic obstructive pulmonary disease) ([address] DVT (deep venous thrombosis) ([address] Enlarged lymph node; History of emotional problems; Hx of blood clots; Hypertension; Obesity; Squamous cell carcinoma of tonsils (HCC); and Type II or unspecified type diabetes mellitus without mention of complication, not stated as uncontrolled. Past Surgical History  The patient  has a past surgical history that includes knee surgery (Right, 1980's); Toe Surgery (Right, 2013); other surgical history (7-7-09); hip surgery (Right, 06/30/2014); Tunneled venous port placement; and pr office/outpt visit,procedure only (Right, 10/16/2018). Family History  This patient's family history includes Cancer in his mother. Social History  Dung Woods  reports that he has been smoking Cigarettes. He has a 20.00 pack-year smoking history. He has never used smokeless tobacco. He reports that he does not drink alcohol or use drugs. Medications    Current Outpatient Prescriptions:     morphine (MS CONTIN) 30 MG extended release tablet, Take 1 tablet by mouth every 12 hours for 30 days. ., Disp: 60 tablet, Rfl: 0    docusate sodium (COLACE) 100 MG capsule, Take 100 mg by mouth 3 times daily as needed for Constipation, Disp: , Rfl:     oxyCODONE HCl (OXY-IR) 10 MG immediate release tablet, Take 1 tablet by mouth every 8 hours as needed for Pain for up to 30 days. ., Disp: 90 tablet, Rfl: 0    apixaban (ELIQUIS) 5 MG TABS tablet, Take 1 tablet by mouth 2 times daily, Disp: 60 tablet, Rfl: 5    sertraline (ZOLOFT) 100 MG tablet, Take 1.5 tablets by mouth daily, Disp: 45 tablet, Rfl: 5    lactulose (CHRONULAC) 10 GM/15ML solution, Take 30 mLs by mouth 2 times daily as needed (constipation), Disp: 1800 mL, Rfl: 0    glucose monitoring kit (FREESTYLE) monitoring kit, 1 kit by Does not apply route daily, Disp: 1 kit, Rfl: 0    albuterol sulfate  (90 Base) MCG/ACT inhaler, Inhale 2 puffs into the lungs every 6 hours as needed for Wheezing, Disp: 1 Inhaler, Rfl: 2    clonazePAM (KLONOPIN) 1 MG tablet, Take 1 tablet by mouth 3 times daily as needed for Anxiety for up to 90 doses. ., Disp: 90 tablet, Rfl: 2    Subjective:      Review of Systems   Constitutional: Positive for activity change and appetite change. Negative for chills, diaphoresis, fatigue, fever and unexpected weight change. HENT: Positive for drooling, mouth sores, sore throat, trouble swallowing and voice change. Negative for congestion, ear pain, hearing loss, nosebleeds, rhinorrhea and sinus pressure. Has tongue and tonsil cancer with radiation and chem treatments   Eyes: Negative for photophobia, pain and visual disturbance. Respiratory: Positive for cough, choking, shortness of breath and wheezing. Negative for chest tightness. Cardiovascular: Negative for chest pain and palpitations. Gastrointestinal: Positive for abdominal pain. Negative for constipation, diarrhea, nausea and vomiting. Endocrine: Negative for cold intolerance, heat intolerance, polydipsia, polyphagia and polyuria. Genitourinary: Positive for scrotal swelling. Negative for decreased urine volume, difficulty urinating, frequency and hematuria.         Large scrotum   Musculoskeletal: Negative for arthralgias, back pain, gait problem, joint swelling, myalgias, neck pain and

## 2018-12-03 DIAGNOSIS — C02.9 TONGUE CARCINOMA (HCC): ICD-10-CM

## 2018-12-03 DIAGNOSIS — G89.4 CHRONIC PAIN SYNDROME: ICD-10-CM

## 2018-12-03 DIAGNOSIS — C09.9 SQUAMOUS CELL CARCINOMA OF LEFT TONSIL (HCC): ICD-10-CM

## 2018-12-03 RX ORDER — OXYCODONE HYDROCHLORIDE 10 MG/1
10 TABLET ORAL EVERY 8 HOURS PRN
Qty: 90 TABLET | Refills: 0 | Status: SHIPPED | OUTPATIENT
Start: 2018-12-06 | End: 2018-12-24 | Stop reason: SDUPTHER

## 2018-12-03 NOTE — TELEPHONE ENCOUNTER
OARRS reviewed. UDS: positive for oxycodone and morphine we have him on as well as 7-Aminoclonazepam and [facility] - cancer patient. Last seen: 7/17/2018.  Follow-up: 1/28/2018

## 2018-12-06 ENCOUNTER — TELEPHONE (OUTPATIENT)
Dept: FAMILY MEDICINE CLINIC | Age: 58
End: 2018-12-06

## 2018-12-06 RX ORDER — AZITHROMYCIN 250 MG/1
TABLET, FILM COATED ORAL
Qty: 6 TABLET | Refills: 0 | Status: SHIPPED | OUTPATIENT
Start: 2018-12-06 | End: 2018-12-16

## 2018-12-10 ENCOUNTER — TELEPHONE (OUTPATIENT)
Dept: WOUND CARE | Age: 58
End: 2018-12-10

## 2018-12-10 DIAGNOSIS — F41.1 GENERALIZED ANXIETY DISORDER: ICD-10-CM

## 2018-12-10 NOTE — TELEPHONE ENCOUNTER
Magdaleno Plan called requesting a refill on the following medications:  Requested Prescriptions     Pending Prescriptions Disp Refills    clonazePAM (KLONOPIN) 1 MG tablet 90 tablet 2     Sig: Take 1 tablet by mouth 3 times daily as needed for Anxiety for up to 90 doses. .     Pharmacy verified: [facility]      Date of last visit: 10/10/18  Date of next visit (if applicable): 7/73/1984

## 2018-12-10 NOTE — PROGRESS NOTES
Kate from [facility] health called to give note that they discharged pt from service d/t family taking over on dressing changes.

## 2018-12-11 ENCOUNTER — TELEPHONE (OUTPATIENT)
Dept: FAMILY MEDICINE CLINIC | Age: 58
End: 2018-12-11

## 2018-12-11 DIAGNOSIS — F41.1 GENERALIZED ANXIETY DISORDER: ICD-10-CM

## 2018-12-11 RX ORDER — CLONAZEPAM 1 MG/1
1 TABLET ORAL 3 TIMES DAILY PRN
Qty: 90 TABLET | Refills: 2 | Status: SHIPPED | OUTPATIENT
Start: 2018-12-11 | End: 2019-03-07 | Stop reason: SDUPTHER

## 2018-12-11 RX ORDER — HYDROXYZINE 50 MG/1
75 TABLET, FILM COATED ORAL NIGHTLY PRN
Qty: 45 TABLET | Refills: 2 | Status: SHIPPED | OUTPATIENT
Start: 2018-12-11 | End: 2019-03-25 | Stop reason: SDUPTHER

## 2018-12-18 ENCOUNTER — HOSPITAL ENCOUNTER (OUTPATIENT)
Dept: INFUSION THERAPY | Age: 58
End: 2018-12-18

## 2018-12-24 DIAGNOSIS — C09.9 SQUAMOUS CELL CARCINOMA OF LEFT TONSIL (HCC): ICD-10-CM

## 2018-12-24 DIAGNOSIS — G89.4 CHRONIC PAIN SYNDROME: ICD-10-CM

## 2018-12-24 DIAGNOSIS — G89.3 CANCER ASSOCIATED PAIN: ICD-10-CM

## 2018-12-24 DIAGNOSIS — C02.9 TONGUE CARCINOMA (HCC): ICD-10-CM

## 2018-12-24 RX ORDER — OXYCODONE HYDROCHLORIDE 10 MG/1
10 TABLET ORAL EVERY 8 HOURS PRN
Qty: 90 TABLET | Refills: 0 | Status: SHIPPED | OUTPATIENT
Start: 2019-01-06 | End: 2019-01-28 | Stop reason: SDUPTHER

## 2018-12-24 RX ORDER — NALOXONE HYDROCHLORIDE 4 MG/.1ML
1 SPRAY NASAL PRN
Qty: 1 EACH | Refills: 0 | Status: ON HOLD | OUTPATIENT
Start: 2018-12-24 | End: 2019-08-13 | Stop reason: SDUPTHER

## 2018-12-24 RX ORDER — MORPHINE SULFATE 30 MG/1
30 TABLET, FILM COATED, EXTENDED RELEASE ORAL EVERY 12 HOURS
Qty: 60 TABLET | Refills: 0 | Status: SHIPPED | OUTPATIENT
Start: 2018-12-26 | End: 2019-01-21 | Stop reason: SDUPTHER

## 2018-12-28 ENCOUNTER — HOSPITAL ENCOUNTER (OUTPATIENT)
Dept: INFUSION THERAPY | Age: 58
End: 2018-12-28
Payer: MEDICARE

## 2018-12-31 ENCOUNTER — TELEPHONE (OUTPATIENT)
Dept: FAMILY MEDICINE CLINIC | Age: 58
End: 2018-12-31

## 2019-01-02 ENCOUNTER — TELEPHONE (OUTPATIENT)
Dept: FAMILY MEDICINE CLINIC | Age: 59
End: 2019-01-02

## 2019-01-07 ENCOUNTER — TELEPHONE (OUTPATIENT)
Dept: PHYSICAL MEDICINE AND REHAB | Age: 59
End: 2019-01-07

## 2019-01-07 ENCOUNTER — TELEPHONE (OUTPATIENT)
Dept: FAMILY MEDICINE CLINIC | Age: 59
End: 2019-01-07

## 2019-01-08 ENCOUNTER — HOSPITAL ENCOUNTER (OUTPATIENT)
Dept: WOUND CARE | Age: 59
Discharge: HOME | End: 2019-01-08
Payer: MEDICARE

## 2019-01-08 VITALS
TEMPERATURE: 98.4 F | HEIGHT: 72 IN | SYSTOLIC BLOOD PRESSURE: 146 MMHG | BODY MASS INDEX: 27.09 KG/M2 | RESPIRATION RATE: 18 BRPM | OXYGEN SATURATION: 91 % | DIASTOLIC BLOOD PRESSURE: 77 MMHG | HEART RATE: 74 BPM | WEIGHT: 200 LBS

## 2019-01-08 DIAGNOSIS — L89.890 UNSTAGEABLE PRESSURE ULCER OF RIGHT FOOT (HCC): ICD-10-CM

## 2019-01-08 DIAGNOSIS — E11.9 TYPE 2 DIABETES MELLITUS WITHOUT COMPLICATION, WITHOUT LONG-TERM CURRENT USE OF INSULIN (HCC): Primary | ICD-10-CM

## 2019-01-08 PROCEDURE — 11721 DEBRIDE NAIL 6 OR MORE: CPT

## 2019-01-08 PROCEDURE — 11056 PARNG/CUTG B9 HYPRKR LES 2-4: CPT

## 2019-01-08 ASSESSMENT — PAIN DESCRIPTION - DESCRIPTORS: DESCRIPTORS: ACHING;CONSTANT;BURNING

## 2019-01-08 ASSESSMENT — PAIN DESCRIPTION - FREQUENCY: FREQUENCY: CONTINUOUS

## 2019-01-08 ASSESSMENT — PAIN DESCRIPTION - LOCATION: LOCATION: THROAT

## 2019-01-08 ASSESSMENT — PAIN DESCRIPTION - ONSET: ONSET: ON-GOING

## 2019-01-08 ASSESSMENT — PAIN SCALES - GENERAL: PAINLEVEL_OUTOF10: 6

## 2019-01-08 ASSESSMENT — PAIN DESCRIPTION - PAIN TYPE: TYPE: CHRONIC PAIN

## 2019-01-08 ASSESSMENT — PAIN DESCRIPTION - PROGRESSION: CLINICAL_PROGRESSION: NOT CHANGED

## 2019-01-21 DIAGNOSIS — G89.3 CANCER ASSOCIATED PAIN: ICD-10-CM

## 2019-01-21 RX ORDER — MORPHINE SULFATE 30 MG/1
30 TABLET, FILM COATED, EXTENDED RELEASE ORAL EVERY 12 HOURS
Qty: 60 TABLET | Refills: 0 | Status: SHIPPED | OUTPATIENT
Start: 2019-01-24 | End: 2019-02-19 | Stop reason: SDUPTHER

## 2019-01-28 ENCOUNTER — OFFICE VISIT (OUTPATIENT)
Dept: PHYSICAL MEDICINE AND REHAB | Age: 59
End: 2019-01-28
Payer: MEDICARE

## 2019-01-28 VITALS
DIASTOLIC BLOOD PRESSURE: 82 MMHG | SYSTOLIC BLOOD PRESSURE: 137 MMHG | BODY MASS INDEX: 28.17 KG/M2 | HEIGHT: 72 IN | HEART RATE: 83 BPM | WEIGHT: 208 LBS

## 2019-01-28 DIAGNOSIS — C02.9 TONGUE CARCINOMA (HCC): ICD-10-CM

## 2019-01-28 DIAGNOSIS — G89.3 CANCER ASSOCIATED PAIN: ICD-10-CM

## 2019-01-28 DIAGNOSIS — I26.99 PULMONARY EMBOLISM AND INFARCTION (HCC): ICD-10-CM

## 2019-01-28 DIAGNOSIS — C09.9 MALIGNANT NEOPLASM OF TONSIL (HCC): ICD-10-CM

## 2019-01-28 DIAGNOSIS — G62.9 NEUROPATHY: ICD-10-CM

## 2019-01-28 DIAGNOSIS — G89.4 CHRONIC PAIN SYNDROME: ICD-10-CM

## 2019-01-28 DIAGNOSIS — C09.9 SQUAMOUS CELL CARCINOMA OF LEFT TONSIL (HCC): Primary | ICD-10-CM

## 2019-01-28 PROCEDURE — G8427 DOCREV CUR MEDS BY ELIG CLIN: HCPCS | Performed by: NURSE PRACTITIONER

## 2019-01-28 PROCEDURE — 99213 OFFICE O/P EST LOW 20 MIN: CPT | Performed by: NURSE PRACTITIONER

## 2019-01-28 PROCEDURE — G8484 FLU IMMUNIZE NO ADMIN: HCPCS | Performed by: NURSE PRACTITIONER

## 2019-01-28 PROCEDURE — 4004F PT TOBACCO SCREEN RCVD TLK: CPT | Performed by: NURSE PRACTITIONER

## 2019-01-28 PROCEDURE — 3017F COLORECTAL CA SCREEN DOC REV: CPT | Performed by: NURSE PRACTITIONER

## 2019-01-28 PROCEDURE — G8417 CALC BMI ABV UP PARAM F/U: HCPCS | Performed by: NURSE PRACTITIONER

## 2019-01-28 RX ORDER — OXYCODONE HYDROCHLORIDE 10 MG/1
10 TABLET ORAL EVERY 8 HOURS PRN
Qty: 90 TABLET | Refills: 0 | Status: SHIPPED | OUTPATIENT
Start: 2019-02-05 | End: 2019-03-04 | Stop reason: SDUPTHER

## 2019-01-28 ASSESSMENT — ENCOUNTER SYMPTOMS
PHOTOPHOBIA: 0
VOICE CHANGE: 1
SINUS PRESSURE: 0
WHEEZING: 1
CONSTIPATION: 0
TROUBLE SWALLOWING: 1
BACK PAIN: 0
SHORTNESS OF BREATH: 1
COUGH: 1
VOMITING: 0
CHEST TIGHTNESS: 0
RHINORRHEA: 0
ABDOMINAL PAIN: 1
CHOKING: 1
EYE PAIN: 0
DIARRHEA: 0
COLOR CHANGE: 0
SORE THROAT: 1
NAUSEA: 0

## 2019-01-29 ENCOUNTER — OFFICE VISIT (OUTPATIENT)
Dept: FAMILY MEDICINE CLINIC | Age: 59
End: 2019-01-29
Payer: MEDICARE

## 2019-01-29 VITALS
OXYGEN SATURATION: 93 % | RESPIRATION RATE: 18 BRPM | DIASTOLIC BLOOD PRESSURE: 76 MMHG | HEIGHT: 72 IN | TEMPERATURE: 97.8 F | WEIGHT: 203 LBS | SYSTOLIC BLOOD PRESSURE: 132 MMHG | HEART RATE: 99 BPM | BODY MASS INDEX: 27.5 KG/M2

## 2019-01-29 DIAGNOSIS — I26.99 PULMONARY EMBOLISM AND INFARCTION (HCC): ICD-10-CM

## 2019-01-29 DIAGNOSIS — F41.1 GENERALIZED ANXIETY DISORDER: ICD-10-CM

## 2019-01-29 DIAGNOSIS — F33.1 MODERATE EPISODE OF RECURRENT MAJOR DEPRESSIVE DISORDER (HCC): ICD-10-CM

## 2019-01-29 DIAGNOSIS — E43 SEVERE PROTEIN-CALORIE MALNUTRITION (HCC): ICD-10-CM

## 2019-01-29 DIAGNOSIS — E11.9 TYPE 2 DIABETES MELLITUS WITHOUT COMPLICATION, WITHOUT LONG-TERM CURRENT USE OF INSULIN (HCC): Primary | ICD-10-CM

## 2019-01-29 LAB — HBA1C MFR BLD: 4.8 %

## 2019-01-29 PROCEDURE — G8484 FLU IMMUNIZE NO ADMIN: HCPCS | Performed by: FAMILY MEDICINE

## 2019-01-29 PROCEDURE — 2022F DILAT RTA XM EVC RTNOPTHY: CPT | Performed by: FAMILY MEDICINE

## 2019-01-29 PROCEDURE — 83036 HEMOGLOBIN GLYCOSYLATED A1C: CPT | Performed by: FAMILY MEDICINE

## 2019-01-29 PROCEDURE — 3046F HEMOGLOBIN A1C LEVEL >9.0%: CPT | Performed by: FAMILY MEDICINE

## 2019-01-29 PROCEDURE — 3017F COLORECTAL CA SCREEN DOC REV: CPT | Performed by: FAMILY MEDICINE

## 2019-01-29 PROCEDURE — 4004F PT TOBACCO SCREEN RCVD TLK: CPT | Performed by: FAMILY MEDICINE

## 2019-01-29 PROCEDURE — G8417 CALC BMI ABV UP PARAM F/U: HCPCS | Performed by: FAMILY MEDICINE

## 2019-01-29 PROCEDURE — 99214 OFFICE O/P EST MOD 30 MIN: CPT | Performed by: FAMILY MEDICINE

## 2019-01-29 PROCEDURE — G8427 DOCREV CUR MEDS BY ELIG CLIN: HCPCS | Performed by: FAMILY MEDICINE

## 2019-01-29 RX ORDER — SERTRALINE HYDROCHLORIDE 100 MG/1
200 TABLET, FILM COATED ORAL DAILY
Qty: 60 TABLET | Refills: 2 | Status: SHIPPED | OUTPATIENT
Start: 2019-01-29 | End: 2019-07-05 | Stop reason: SDUPTHER

## 2019-01-29 ASSESSMENT — ENCOUNTER SYMPTOMS
EYE DISCHARGE: 0
TROUBLE SWALLOWING: 1
NAUSEA: 0
RHINORRHEA: 0
SORE THROAT: 0
ABDOMINAL PAIN: 0
WHEEZING: 1
CONSTIPATION: 0
DIARRHEA: 0
COUGH: 0
BACK PAIN: 1
SHORTNESS OF BREATH: 0

## 2019-01-30 ENCOUNTER — TELEPHONE (OUTPATIENT)
Dept: FAMILY MEDICINE CLINIC | Age: 59
End: 2019-01-30

## 2019-02-19 DIAGNOSIS — G89.3 CANCER ASSOCIATED PAIN: ICD-10-CM

## 2019-02-19 RX ORDER — MORPHINE SULFATE 30 MG/1
30 TABLET, FILM COATED, EXTENDED RELEASE ORAL EVERY 12 HOURS
Qty: 60 TABLET | Refills: 0 | Status: SHIPPED | OUTPATIENT
Start: 2019-02-23 | End: 2019-03-21 | Stop reason: SDUPTHER

## 2019-02-21 ENCOUNTER — TELEPHONE (OUTPATIENT)
Dept: FAMILY MEDICINE CLINIC | Age: 59
End: 2019-02-21

## 2019-02-21 DIAGNOSIS — L01.00 IMPETIGO: Primary | ICD-10-CM

## 2019-03-04 DIAGNOSIS — C09.9 MALIGNANT NEOPLASM OF TONSIL (HCC): ICD-10-CM

## 2019-03-04 DIAGNOSIS — G89.4 CHRONIC PAIN SYNDROME: ICD-10-CM

## 2019-03-04 DIAGNOSIS — C09.9 SQUAMOUS CELL CARCINOMA OF LEFT TONSIL (HCC): ICD-10-CM

## 2019-03-04 DIAGNOSIS — C02.9 TONGUE CARCINOMA (HCC): ICD-10-CM

## 2019-03-04 DIAGNOSIS — G62.9 NEUROPATHY: ICD-10-CM

## 2019-03-04 DIAGNOSIS — G89.3 CANCER ASSOCIATED PAIN: ICD-10-CM

## 2019-03-04 DIAGNOSIS — I26.99 PULMONARY EMBOLISM AND INFARCTION (HCC): ICD-10-CM

## 2019-03-04 RX ORDER — OXYCODONE HYDROCHLORIDE 10 MG/1
10 TABLET ORAL EVERY 8 HOURS PRN
Qty: 90 TABLET | Refills: 0 | Status: SHIPPED | OUTPATIENT
Start: 2019-03-07 | End: 2019-04-01 | Stop reason: SDUPTHER

## 2019-03-08 ENCOUNTER — HOSPITAL ENCOUNTER (OUTPATIENT)
Dept: RADIATION ONCOLOGY | Age: 59
Discharge: HOME | End: 2019-03-08
Payer: MEDICARE

## 2019-03-08 DIAGNOSIS — C09.9 SQUAMOUS CELL CARCINOMA OF LEFT TONSIL (HCC): ICD-10-CM

## 2019-03-08 DIAGNOSIS — R13.10 DYSPHAGIA, UNSPECIFIED TYPE: ICD-10-CM

## 2019-03-08 DIAGNOSIS — R53.83 FATIGUE, UNSPECIFIED TYPE: ICD-10-CM

## 2019-03-08 DIAGNOSIS — M54.2 NECK PAIN: Primary | ICD-10-CM

## 2019-03-08 PROCEDURE — 99211 OFF/OP EST MAY X REQ PHY/QHP: CPT

## 2019-03-13 ENCOUNTER — TELEPHONE (OUTPATIENT)
Dept: FAMILY MEDICINE CLINIC | Age: 59
End: 2019-03-13

## 2019-03-19 ENCOUNTER — TELEPHONE (OUTPATIENT)
Dept: FAMILY MEDICINE CLINIC | Age: 59
End: 2019-03-19

## 2019-03-19 NOTE — PROGRESS NOTES
[address] [address]  Therapy Contact Note      Date: 3/19/2019  Patient Name: Jose Meyers        MRN: [id number]    Account Number:   : 1960  (59 y.o.)  Gender: male       [facility]. Set patient up with [facility]). [facility] will contact patient to set up services. [facility] confirmed transport for 19 1030 appointment time. Phoned patient and notified him of this arrangement.     Jared Barton, Rehab Tech

## 2019-03-20 ENCOUNTER — PATIENT MESSAGE (OUTPATIENT)
Dept: ENT CLINIC | Age: 59
End: 2019-03-20

## 2019-03-20 ENCOUNTER — TELEPHONE (OUTPATIENT)
Dept: FAMILY MEDICINE CLINIC | Age: 59
End: 2019-03-20

## 2019-03-20 ENCOUNTER — NURSE TRIAGE (OUTPATIENT)
Dept: ADMINISTRATIVE | Age: 59
End: 2019-03-20

## 2019-03-21 ENCOUNTER — APPOINTMENT (OUTPATIENT)
Dept: MRI IMAGING | Age: 59
End: 2019-03-21
Payer: MEDICARE

## 2019-03-21 ENCOUNTER — TELEPHONE (OUTPATIENT)
Dept: FAMILY MEDICINE CLINIC | Age: 59
End: 2019-03-21

## 2019-03-21 ENCOUNTER — HOSPITAL ENCOUNTER (OUTPATIENT)
Age: 59
Discharge: HOME | End: 2019-03-21
Payer: MEDICARE

## 2019-03-21 ENCOUNTER — HOSPITAL ENCOUNTER (OUTPATIENT)
Dept: GENERAL RADIOLOGY | Age: 59
Discharge: HOME | End: 2019-03-21
Payer: MEDICARE

## 2019-03-21 ENCOUNTER — HOSPITAL ENCOUNTER (OUTPATIENT)
Dept: MRI IMAGING | Age: 59
Discharge: HOME | End: 2019-03-21
Payer: MEDICARE

## 2019-03-21 DIAGNOSIS — M25.552 LEFT HIP PAIN: ICD-10-CM

## 2019-03-21 DIAGNOSIS — C09.9 MALIGNANT NEOPLASM OF TONSIL (HCC): ICD-10-CM

## 2019-03-21 DIAGNOSIS — G89.3 CANCER ASSOCIATED PAIN: ICD-10-CM

## 2019-03-21 DIAGNOSIS — M54.2 NECK PAIN: ICD-10-CM

## 2019-03-21 DIAGNOSIS — M25.552 LEFT HIP PAIN: Primary | ICD-10-CM

## 2019-03-21 DIAGNOSIS — E11.9 TYPE 2 DIABETES MELLITUS WITHOUT COMPLICATION, WITHOUT LONG-TERM CURRENT USE OF INSULIN (HCC): ICD-10-CM

## 2019-03-21 LAB
ALBUMIN SERPL-MCNC: 3.6 G/DL (ref 3.5–5.1)
ALP BLD-CCNC: 80 U/L (ref 38–126)
ALT SERPL-CCNC: 7 U/L (ref 11–66)
ANION GAP SERPL CALCULATED.3IONS-SCNC: 14 MEQ/L (ref 8–16)
AST SERPL-CCNC: 13 U/L (ref 5–40)
AVERAGE GLUCOSE: 90 MG/DL (ref 70–126)
BASOPHILS # BLD: 0.6 %
BASOPHILS ABSOLUTE: 0 THOU/MM3 (ref 0–0.1)
BILIRUB SERPL-MCNC: 0.4 MG/DL (ref 0.3–1.2)
BILIRUBIN DIRECT: < 0.2 MG/DL (ref 0–0.3)
BUN BLDV-MCNC: 16 MG/DL (ref 7–22)
CALCIUM SERPL-MCNC: 9.2 MG/DL (ref 8.5–10.5)
CHLORIDE BLD-SCNC: 97 MEQ/L (ref 98–111)
CHOLESTEROL, TOTAL: 194 MG/DL (ref 100–199)
CO2: 26 MEQ/L (ref 23–33)
CREAT SERPL-MCNC: 0.9 MG/DL (ref 0.4–1.2)
CREATININE, URINE: 74.8 MG/DL
EOSINOPHIL # BLD: 1.6 %
EOSINOPHILS ABSOLUTE: 0.1 THOU/MM3 (ref 0–0.4)
ERYTHROCYTE [DISTWIDTH] IN BLOOD BY AUTOMATED COUNT: 13 % (ref 11.5–14.5)
ERYTHROCYTE [DISTWIDTH] IN BLOOD BY AUTOMATED COUNT: 42.5 FL (ref 35–45)
GFR SERPL CREATININE-BSD FRML MDRD: 86 ML/MIN/1.73M2
GLUCOSE BLD-MCNC: 93 MG/DL (ref 70–108)
HBA1C MFR BLD: 5 % (ref 4.4–6.4)
HCT VFR BLD CALC: 42.6 % (ref 42–52)
HDLC SERPL-MCNC: 58 MG/DL
HEMOGLOBIN: 14.3 GM/DL (ref 14–18)
IMMATURE GRANS (ABS): 0.02 THOU/MM3 (ref 0–0.07)
IMMATURE GRANULOCYTES: 0.3 %
LDL CHOLESTEROL CALCULATED: 117 MG/DL
LYMPHOCYTES # BLD: 12.1 %
LYMPHOCYTES ABSOLUTE: 0.8 THOU/MM3 (ref 1–4.8)
MCH RBC QN AUTO: 30 PG (ref 26–33)
MCHC RBC AUTO-ENTMCNC: 33.6 GM/DL (ref 32.2–35.5)
MCV RBC AUTO: 89.5 FL (ref 80–94)
MICROALBUMIN UR-MCNC: < 1.2 MG/DL
MICROALBUMIN/CREAT UR-RTO: 16 MG/G (ref 0–30)
MONOCYTES # BLD: 8.7 %
MONOCYTES ABSOLUTE: 0.6 THOU/MM3 (ref 0.4–1.3)
NUCLEATED RED BLOOD CELLS: 0 /100 WBC
PLATELET # BLD: 209 THOU/MM3 (ref 130–400)
PMV BLD AUTO: 9.8 FL (ref 9.4–12.4)
POC CREATININE WHOLE BLOOD: 0.9 MG/DL (ref 0.5–1.2)
POTASSIUM SERPL-SCNC: 4.1 MEQ/L (ref 3.5–5.2)
RBC # BLD: 4.76 MILL/MM3 (ref 4.7–6.1)
SEG NEUTROPHILS: 76.7 %
SEGMENTED NEUTROPHILS ABSOLUTE COUNT: 4.9 THOU/MM3 (ref 1.8–7.7)
SODIUM BLD-SCNC: 137 MEQ/L (ref 135–145)
TOTAL PROTEIN: 6.5 G/DL (ref 6.1–8)
TRIGL SERPL-MCNC: 97 MG/DL (ref 0–199)
WBC # BLD: 6.4 THOU/MM3 (ref 4.8–10.8)

## 2019-03-21 PROCEDURE — 82248 BILIRUBIN DIRECT: CPT

## 2019-03-21 PROCEDURE — 82565 ASSAY OF CREATININE: CPT

## 2019-03-21 PROCEDURE — 80061 LIPID PANEL: CPT

## 2019-03-21 PROCEDURE — 6360000004 HC RX CONTRAST MEDICATION: Performed by: NURSE PRACTITIONER

## 2019-03-21 PROCEDURE — 73502 X-RAY EXAM HIP UNI 2-3 VIEWS: CPT

## 2019-03-21 PROCEDURE — 83036 HEMOGLOBIN GLYCOSYLATED A1C: CPT

## 2019-03-21 PROCEDURE — A9579 GAD-BASE MR CONTRAST NOS,1ML: HCPCS | Performed by: NURSE PRACTITIONER

## 2019-03-21 PROCEDURE — 85025 COMPLETE CBC W/AUTO DIFF WBC: CPT

## 2019-03-21 PROCEDURE — 82043 UR ALBUMIN QUANTITATIVE: CPT

## 2019-03-21 PROCEDURE — 72100 X-RAY EXAM L-S SPINE 2/3 VWS: CPT

## 2019-03-21 PROCEDURE — 80053 COMPREHEN METABOLIC PANEL: CPT

## 2019-03-21 PROCEDURE — 70543 MRI ORBT/FAC/NCK W/O &W/DYE: CPT

## 2019-03-21 PROCEDURE — 36415 COLL VENOUS BLD VENIPUNCTURE: CPT

## 2019-03-21 RX ORDER — MORPHINE SULFATE 30 MG/1
30 TABLET, FILM COATED, EXTENDED RELEASE ORAL EVERY 12 HOURS
Qty: 60 TABLET | Refills: 0 | Status: SHIPPED | OUTPATIENT
Start: 2019-03-25 | End: 2019-04-24 | Stop reason: SDUPTHER

## 2019-03-21 RX ADMIN — GADOTERIDOL 20 ML: 279.3 INJECTION, SOLUTION INTRAVENOUS at 12:29

## 2019-03-22 ENCOUNTER — HOSPITAL ENCOUNTER (OUTPATIENT)
Dept: INFUSION THERAPY | Age: 59
Discharge: HOME | End: 2019-03-22
Payer: MEDICARE

## 2019-03-22 ENCOUNTER — OFFICE VISIT (OUTPATIENT)
Dept: ONCOLOGY | Age: 59
End: 2019-03-22
Payer: MEDICARE

## 2019-03-22 VITALS
HEIGHT: 72 IN | WEIGHT: 188.2 LBS | BODY MASS INDEX: 25.49 KG/M2 | RESPIRATION RATE: 18 BRPM | DIASTOLIC BLOOD PRESSURE: 72 MMHG | HEART RATE: 95 BPM | SYSTOLIC BLOOD PRESSURE: 127 MMHG | TEMPERATURE: 98.2 F

## 2019-03-22 VITALS
DIASTOLIC BLOOD PRESSURE: 72 MMHG | TEMPERATURE: 98.2 F | RESPIRATION RATE: 18 BRPM | OXYGEN SATURATION: 96 % | WEIGHT: 188.2 LBS | HEIGHT: 72 IN | SYSTOLIC BLOOD PRESSURE: 127 MMHG | BODY MASS INDEX: 25.49 KG/M2 | HEART RATE: 95 BPM

## 2019-03-22 DIAGNOSIS — E11.9 TYPE 2 DIABETES MELLITUS WITHOUT COMPLICATION, WITHOUT LONG-TERM CURRENT USE OF INSULIN (HCC): ICD-10-CM

## 2019-03-22 DIAGNOSIS — R22.1 MASS IN NECK: ICD-10-CM

## 2019-03-22 DIAGNOSIS — C09.9 MALIGNANT NEOPLASM OF TONSIL (HCC): Primary | ICD-10-CM

## 2019-03-22 DIAGNOSIS — C09.9 SQUAMOUS CELL CARCINOMA OF LEFT TONSIL (HCC): Primary | ICD-10-CM

## 2019-03-22 DIAGNOSIS — C02.9 TONGUE CARCINOMA (HCC): ICD-10-CM

## 2019-03-22 DIAGNOSIS — C09.9 SQUAMOUS CELL CARCINOMA OF LEFT TONSIL (HCC): ICD-10-CM

## 2019-03-22 PROCEDURE — G8484 FLU IMMUNIZE NO ADMIN: HCPCS | Performed by: INTERNAL MEDICINE

## 2019-03-22 PROCEDURE — 6360000002 HC RX W HCPCS: Performed by: INTERNAL MEDICINE

## 2019-03-22 PROCEDURE — G8427 DOCREV CUR MEDS BY ELIG CLIN: HCPCS | Performed by: INTERNAL MEDICINE

## 2019-03-22 PROCEDURE — 4004F PT TOBACCO SCREEN RCVD TLK: CPT | Performed by: INTERNAL MEDICINE

## 2019-03-22 PROCEDURE — 3017F COLORECTAL CA SCREEN DOC REV: CPT | Performed by: INTERNAL MEDICINE

## 2019-03-22 PROCEDURE — 99213 OFFICE O/P EST LOW 20 MIN: CPT | Performed by: INTERNAL MEDICINE

## 2019-03-22 PROCEDURE — G8417 CALC BMI ABV UP PARAM F/U: HCPCS | Performed by: INTERNAL MEDICINE

## 2019-03-22 PROCEDURE — 2580000003 HC RX 258: Performed by: INTERNAL MEDICINE

## 2019-03-22 PROCEDURE — 99213 OFFICE O/P EST LOW 20 MIN: CPT

## 2019-03-22 PROCEDURE — 2709999900 HC NON-CHARGEABLE SUPPLY

## 2019-03-22 RX ORDER — SODIUM CHLORIDE 0.9 % (FLUSH) 0.9 %
20 SYRINGE (ML) INJECTION PRN
Status: CANCELLED | OUTPATIENT
Start: 2019-03-22

## 2019-03-22 RX ORDER — SODIUM CHLORIDE 0.9 % (FLUSH) 0.9 %
10 SYRINGE (ML) INJECTION PRN
Status: CANCELLED | OUTPATIENT
Start: 2019-03-22

## 2019-03-22 RX ORDER — SODIUM CHLORIDE 0.9 % (FLUSH) 0.9 %
10 SYRINGE (ML) INJECTION PRN
Status: DISCONTINUED | OUTPATIENT
Start: 2019-03-22 | End: 2019-03-23 | Stop reason: HOSPADM

## 2019-03-22 RX ORDER — HEPARIN SODIUM (PORCINE) LOCK FLUSH IV SOLN 100 UNIT/ML 100 UNIT/ML
500 SOLUTION INTRAVENOUS PRN
Status: CANCELLED | OUTPATIENT
Start: 2019-03-22

## 2019-03-22 RX ORDER — HEPARIN SODIUM (PORCINE) LOCK FLUSH IV SOLN 100 UNIT/ML 100 UNIT/ML
500 SOLUTION INTRAVENOUS PRN
Status: DISCONTINUED | OUTPATIENT
Start: 2019-03-22 | End: 2019-03-23 | Stop reason: HOSPADM

## 2019-03-22 RX ORDER — SODIUM CHLORIDE 0.9 % (FLUSH) 0.9 %
20 SYRINGE (ML) INJECTION PRN
Status: DISCONTINUED | OUTPATIENT
Start: 2019-03-22 | End: 2019-03-23 | Stop reason: HOSPADM

## 2019-03-22 RX ADMIN — Medication 10 ML: at 12:35

## 2019-03-22 RX ADMIN — Medication 10 ML: at 12:36

## 2019-03-22 RX ADMIN — Medication 500 UNITS: at 12:36

## 2019-03-22 ASSESSMENT — PAIN DESCRIPTION - FREQUENCY: FREQUENCY: CONTINUOUS

## 2019-03-22 ASSESSMENT — PAIN DESCRIPTION - LOCATION: LOCATION: THROAT

## 2019-03-22 ASSESSMENT — PAIN SCALES - GENERAL: PAINLEVEL_OUTOF10: 6

## 2019-03-22 ASSESSMENT — PAIN DESCRIPTION - PAIN TYPE: TYPE: CHRONIC PAIN

## 2019-03-22 ASSESSMENT — PAIN DESCRIPTION - PROGRESSION: CLINICAL_PROGRESSION: NOT CHANGED

## 2019-03-22 NOTE — PROGRESS NOTES
Patient assessed for the following post mediport flush:    Dizziness   No  Lightheadedness  No     Acute nausea/vomiting No  Headache   No  Chest pain/pressure  No  Rash/itching   No  Shortness of breath  No    Patient tolerated mediport flush without any complications. Last vital signs:   /72   Pulse 95   Temp 98.2 °F (36.8 °C) (Oral)   Resp 18   Ht 6' (1.829 m)   Wt 188 lb 3.2 oz (85.4 kg)   BMI 25.52 kg/m²     Patient instructed if experience any of the above symptoms following today's mediport flush, he/she is to notify MD immediately or go to the emergency department. Discharge instructions given to patient. Verbalizes understanding. Ambulated off unit per wheelchair with belongings.

## 2019-03-22 NOTE — PLAN OF CARE
Problem: Pain:  Goal: Pain level will decrease  Description  Pain level will decrease  Outcome: Met This Shift  Goal: Control of acute pain  Description  Control of acute pain  Outcome: Met This Shift  Goal: Control of chronic pain  Description  Control of chronic pain  Outcome: Met This Shift  Note:   Patient rating pain a 6 out of 10 using ZAIDA scale, Pain located in throat. Intervention: Opioid analgesia side-effects  Note:   Patient encouraged to take prescribed pain medications and call Physician if pain is not controlled. Problem: Musculor/Skeletal Functional Status  Goal: Absence of falls  Outcome: Met This Shift  Note:   Free from falls while in O.P. Oncology. Intervention: Fall precautions  Note:   Discussed the need to use the call light for assistance when getting up to ambulate. Call light within reach. Problem: Discharge Planning  Goal: Knowledge of discharge instructions  Description  Knowledge of discharge instructions     Outcome: Met This Shift  Note:   Verbalize understanding of discharge instructions, follow up appointments, and when to call Physician. Intervention: Discharge to appropriate level of care  Note:   Provide discharge instructions. Problem: Infection - Central Venous Catheter-Associated Bloodstream Infection:  Goal: Will show no infection signs and symptoms  Description  Will show no infection signs and symptoms   Outcome: Met This Shift  Note:   Mediport site with no redness or warmth. Skin over port intact with no signs of breakdown noted. Patient verbalizes signs/symptoms of port infection and when to notify the physician. Intervention: Infection risk assessment  Note:   Discussed mediport maintenance, infection prevention, and when to call the physician. Good blood return noted. Care plan reviewed with patient. Patient verbalize understanding of the plan of care and contribute to goal setting.

## 2019-03-23 ENCOUNTER — PATIENT MESSAGE (OUTPATIENT)
Dept: FAMILY MEDICINE CLINIC | Age: 59
End: 2019-03-23

## 2019-03-23 DIAGNOSIS — F41.1 GENERALIZED ANXIETY DISORDER: ICD-10-CM

## 2019-03-24 ENCOUNTER — PATIENT MESSAGE (OUTPATIENT)
Dept: FAMILY MEDICINE CLINIC | Age: 59
End: 2019-03-24

## 2019-03-25 ENCOUNTER — TELEPHONE (OUTPATIENT)
Dept: ONCOLOGY | Age: 59
End: 2019-03-25

## 2019-03-25 DIAGNOSIS — F41.1 GENERALIZED ANXIETY DISORDER: ICD-10-CM

## 2019-03-25 RX ORDER — LIDOCAINE AND PRILOCAINE 25; 25 MG/G; MG/G
CREAM TOPICAL
Qty: 1 TUBE | Refills: 3 | Status: SHIPPED | OUTPATIENT
Start: 2019-03-25 | End: 2019-06-13 | Stop reason: SDUPTHER

## 2019-03-25 RX ORDER — HYDROXYZINE 50 MG/1
TABLET, FILM COATED ORAL
Qty: 60 TABLET | Refills: 2 | Status: SHIPPED | OUTPATIENT
Start: 2019-03-25 | End: 2019-04-22 | Stop reason: SDUPTHER

## 2019-03-25 RX ORDER — HYDROXYZINE 50 MG/1
75 TABLET, FILM COATED ORAL NIGHTLY PRN
Qty: 45 TABLET | Refills: 2 | Status: SHIPPED | OUTPATIENT
Start: 2019-03-25 | End: 2019-03-25 | Stop reason: SDUPTHER

## 2019-03-26 DIAGNOSIS — Z85.89 H/O MALIGNANT NEOPLASM OF HEAD AND NECK: ICD-10-CM

## 2019-03-26 DIAGNOSIS — R13.10 DYSPHAGIA, UNSPECIFIED TYPE: ICD-10-CM

## 2019-03-26 DIAGNOSIS — M54.2 NECK PAIN: Primary | ICD-10-CM

## 2019-04-01 ENCOUNTER — SOCIAL WORK (OUTPATIENT)
Dept: RADIATION ONCOLOGY | Age: 59
End: 2019-04-01

## 2019-04-01 ENCOUNTER — OFFICE VISIT (OUTPATIENT)
Dept: PHYSICAL MEDICINE AND REHAB | Age: 59
End: 2019-04-01
Payer: MEDICARE

## 2019-04-01 VITALS
SYSTOLIC BLOOD PRESSURE: 133 MMHG | WEIGHT: 188 LBS | HEART RATE: 83 BPM | BODY MASS INDEX: 25.47 KG/M2 | DIASTOLIC BLOOD PRESSURE: 71 MMHG | HEIGHT: 72 IN

## 2019-04-01 DIAGNOSIS — C09.9 MALIGNANT NEOPLASM OF TONSIL (HCC): ICD-10-CM

## 2019-04-01 DIAGNOSIS — G89.4 CHRONIC PAIN SYNDROME: ICD-10-CM

## 2019-04-01 DIAGNOSIS — I26.99 PULMONARY EMBOLISM AND INFARCTION (HCC): ICD-10-CM

## 2019-04-01 DIAGNOSIS — C09.9 SQUAMOUS CELL CARCINOMA OF LEFT TONSIL (HCC): ICD-10-CM

## 2019-04-01 DIAGNOSIS — C02.9 TONGUE CARCINOMA (HCC): ICD-10-CM

## 2019-04-01 DIAGNOSIS — G62.9 NEUROPATHY: ICD-10-CM

## 2019-04-01 DIAGNOSIS — G89.3 CANCER ASSOCIATED PAIN: Primary | ICD-10-CM

## 2019-04-01 PROCEDURE — G8427 DOCREV CUR MEDS BY ELIG CLIN: HCPCS | Performed by: NURSE PRACTITIONER

## 2019-04-01 PROCEDURE — 99213 OFFICE O/P EST LOW 20 MIN: CPT | Performed by: NURSE PRACTITIONER

## 2019-04-01 PROCEDURE — G8417 CALC BMI ABV UP PARAM F/U: HCPCS | Performed by: NURSE PRACTITIONER

## 2019-04-01 PROCEDURE — 3017F COLORECTAL CA SCREEN DOC REV: CPT | Performed by: NURSE PRACTITIONER

## 2019-04-01 PROCEDURE — 4004F PT TOBACCO SCREEN RCVD TLK: CPT | Performed by: NURSE PRACTITIONER

## 2019-04-01 RX ORDER — OXYCODONE HYDROCHLORIDE 10 MG/1
10 TABLET ORAL EVERY 8 HOURS PRN
Qty: 90 TABLET | Refills: 0 | Status: SHIPPED | OUTPATIENT
Start: 2019-04-06 | End: 2019-05-06 | Stop reason: SDUPTHER

## 2019-04-01 ASSESSMENT — ENCOUNTER SYMPTOMS
SORE THROAT: 1
CHOKING: 1
BACK PAIN: 0
TROUBLE SWALLOWING: 1
PHOTOPHOBIA: 0
COUGH: 1
NAUSEA: 0
ABDOMINAL PAIN: 1
WHEEZING: 1
RHINORRHEA: 0
SHORTNESS OF BREATH: 1
SINUS PRESSURE: 0
COLOR CHANGE: 0
EYE PAIN: 0
CONSTIPATION: 0
VOICE CHANGE: 1
VOMITING: 0
CHEST TIGHTNESS: 0
DIARRHEA: 0

## 2019-04-01 NOTE — PROGRESS NOTES
[facility]  [address]  Dept: [phone]  Dept Fax: [phone]  Loc: [phone]    Visit Date: 4/1/2019    Functionality Assessment/Goals Worksheet     On a scale of 0 (Does not Interfere) to 10 (Completely Interferes)     1. Which number describes how during the past week pain has interfered with       the following:  A. General Activity:  8  B. Mood: 8  C. Walking Ability:  9  D. Normal Work (Includes both work outside the home and housework):  9  E. Relations with Other People:   6  F. Sleep:   9  G. Enjoyment of Life:   5    2. Patient Prefers to Take their Pain Medications:     []  On a regular basis   [x]  Only when necessary    []  Does not take pain medications    3. What are the Patient's Goals/Expectations for Visiting Pain Management? [x]  Learn about my pain    []  Receive Medication   []  Physical Therapy     []  Treat Depression   []  Receive Injections    []  Treat Sleep   []  Deal with Anxiety and Stress   []  Treat Opoid Dependence/Addiction   [x]  Other:Get through a day without pain. HPI:   Dakota Basurto is a 62 y.o. male is here today for    Chief Complaint: throat pain    HPI   Throat tongue cancer squamous cell carcinoma left tonsillar cancer. F/U continues to have throat pain neck pain and difficulty swallowing. He follows Dr Violeta Kingsley, Dr Rich Dietrich and Dr Slater Lesch. He is in speech therapy again. He is about to start Chemo and radiation gain. He continues to take MS Contin and Oxy IR. He asked if he should be going to speech. I told him yes because he doesn't want to loose the muscle activity or coordination to prevent aspiratiobn or starvation and end up with a feeding tube. Medications reviewed. Patient denies side effects with medications. Patient states he is taking medications as prescribed.  Hedenies receiving pain medications from other sources. He denies any ER visits since last visit. Pain scale with out pain medications or at its worst is 9/10. Pain scale with pain medications or at its best is 4/10. Last dose of  MS Contin and Oxy IR was today  Drug screen reviewed from 1/28/2019and was  + THC cancer patient  Pill count was completed today and was appropriate  Patient does have naloxone available at home. Patient has not required use of naloxone at home since last office visit. The patientis allergic to latex; coumadin [warfarin sodium]; and tape [adhesive tape]. Past Medical History  Lucila Dunham  has a past medical history of Anxiety, Bleeds easily ([address] Bruises easily, COPD (chronic obstructive pulmonary disease) ([address] DVT (deep venous thrombosis) ([address] Enlarged lymph node, History of emotional problems, Hx of blood clots, Hypertension, Obesity, Squamous cell carcinoma of tonsils ([address] and Type II or unspecified type diabetes mellitus without mention of complication, not stated as uncontrolled. Past Surgical History  The patient  has a past surgical history that includes knee surgery (Right, 1980's); Toe Surgery (Right, 2013); other surgical history (7-7-09); hip surgery (Right, 06/30/2014); Tunneled venous port placement; and pr office/outpt visit,procedure only (Right, 10/16/2018). Family History  This patient's family history includes Cancer in his mother. Social History  Lucila Dunham  reports that he has been smoking cigarettes. He has a 20.00 pack-year smoking history. He has never used smokeless tobacco. He reports that he does not drink alcohol or use drugs. Medications    Current Outpatient Medications:     [START ON 4/6/2019] oxyCODONE HCl (OXY-IR) 10 MG immediate release tablet, Take 1 tablet by mouth every 8 hours as needed for Pain for up to 30 days. , Disp: 90 tablet, Rfl: 0    hydrOXYzine (ATARAX) 50 MG tablet, Take 1-2 tablets nightly prn, Disp: 60 tablet, Rfl: 2    lidocaine-prilocaine (EMLA) 2.5-2.5 % cream, Apply topically as needed. , Disp: 1 Tube, Rfl: 3    morphine (MS CONTIN) 30 MG extended release tablet, Take 1 tablet by mouth every 12 hours for 30 days. , Disp: 60 tablet, Rfl: 0    clonazePAM (KLONOPIN) 1 MG tablet, Take 1 tablet by mouth 2 times daily as needed for Anxiety for up to 60 doses. , Disp: 60 tablet, Rfl: 2    sertraline (ZOLOFT) 100 MG tablet, Take 2 tablets by mouth daily, Disp: 60 tablet, Rfl: 2    naloxone (NARCAN) 4 MG/0.1ML LIQD nasal spray, 1 spray by Nasal route as needed for Opioid Reversal, Disp: 1 each, Rfl: 0    docusate sodium (COLACE) 100 MG capsule, Take 100 mg by mouth 3 times daily as needed for Constipation, Disp: , Rfl:     apixaban (ELIQUIS) 5 MG TABS tablet, Take 1 tablet by mouth 2 times daily, Disp: 60 tablet, Rfl: 5    lactulose (CHRONULAC) 10 GM/15ML solution, Take 30 mLs by mouth 2 times daily as needed (constipation), Disp: 1800 mL, Rfl: 0    glucose monitoring kit (FREESTYLE) monitoring kit, 1 kit by Does not apply route daily, Disp: 1 kit, Rfl: 0    albuterol sulfate  (90 Base) MCG/ACT inhaler, Inhale 2 puffs into the lungs every 6 hours as needed for Wheezing, Disp: 1 Inhaler, Rfl: 2    Subjective:      Review of Systems   Constitutional: Positive for activity change and appetite change. Negative for chills, diaphoresis, fatigue, fever and unexpected weight change. HENT: Positive for drooling, mouth sores, sore throat, trouble swallowing and voice change. Negative for congestion, ear pain, hearing loss, nosebleeds, rhinorrhea and sinus pressure. Has tongue and tonsil cancer with radiation and chem treatments   Eyes: Negative for photophobia, pain and visual disturbance. Respiratory: Positive for cough, choking, shortness of breath and wheezing. Negative for chest tightness. Cardiovascular: Negative for chest pain and palpitations. Gastrointestinal: Positive for abdominal pain. Negative for constipation, diarrhea, nausea and vomiting. Endocrine: Negative for cold intolerance, heat intolerance, polydipsia, polyphagia and polyuria. Genitourinary: Positive for scrotal swelling. Negative for decreased urine volume, difficulty urinating, frequency and hematuria. Large scrotum   Musculoskeletal: Negative for arthralgias, back pain, gait problem, joint swelling, myalgias, neck pain and neck stiffness. Skin: Negative for color change and rash. BLE discolored and dry skin   Allergic/Immunologic: Negative for food allergies and immunocompromised state. Neurological: Positive for dizziness. Negative for tremors, seizures, syncope, facial asymmetry, speech difficulty, weakness, light-headedness, numbness and headaches. Hematological: Does not bruise/bleed easily. Psychiatric/Behavioral: Negative for agitation, behavioral problems, confusion, decreased concentration, dysphoric mood, hallucinations, self-injury, sleep disturbance and suicidal ideas. The patient is not nervous/anxious and is not hyperactive. Objective:     Vitals:    04/01/19 1425   BP: 133/71   Site: Right Upper Arm   Position: Sitting   Cuff Size: Medium Adult   Pulse: 83   Weight: 188 lb (85.3 kg)   Height: 6' (1.829 m)       Physical Exam   Constitutional: He is oriented to person, place, and time. He appears well-developed and well-nourished. No distress. HENT:   Head: Normocephalic and atraumatic. Right Ear: External ear normal.   Left Ear: External ear normal.   Nose: Nose normal.   Mouth/Throat: Oral lesions present. Tonsillar abscesses present. No oropharyngeal exudate. Tonsils are 1+ on the right. Tonsils are 3+ on the left. Tongue and tonsil cancer with active radiation and chemo treatments. He has throat swelling and oral lesions, tongue coated with brown exudate. Raspy hoarse voice, thick phlem    Eyes: Pupils are equal, round, and reactive to light. Conjunctivae and EOM are normal. Right eye exhibits no discharge.  Left eye exhibits no discharge. No scleral icterus. Neck: Normal range of motion and full passive range of motion without pain. Neck supple. No muscular tenderness present. No neck rigidity. No edema, no erythema and normal range of motion present. No thyromegaly present. Cardiovascular: Normal rate, regular rhythm, normal heart sounds and intact distal pulses. Exam reveals no gallop and no friction rub. No murmur heard. Pulmonary/Chest: Effort normal and breath sounds normal. No respiratory distress. He has no wheezes. He has no rales. He exhibits no tenderness. Right chest mediport   Abdominal: Soft. Bowel sounds are normal. He exhibits no distension. There is no tenderness. There is no rebound and no guarding. Musculoskeletal:        Right shoulder: Normal.        Left shoulder: Normal.        Right hip: He exhibits tenderness. Left hip: He exhibits tenderness. Right knee: Tenderness found. Left knee: Tenderness found. Cervical back: He exhibits tenderness. Thoracic back: He exhibits tenderness. Lumbar back: He exhibits tenderness. Right lower leg: He exhibits tenderness and swelling. Right foot: There is tenderness, bony tenderness, swelling and deformity. Feet:    10/2018 with Dr Jeancarlos Varela and 2nd toe amputation    Neurological: He is alert and oriented to person, place, and time. He has normal strength. He is not disoriented. He displays no atrophy. No cranial nerve deficit or sensory deficit. He exhibits normal muscle tone. He displays a negative Romberg sign. Coordination and gait normal. He displays no Babinski's sign on the right side. Skin: Skin is warm. No rash noted. He is not diaphoretic. No erythema. No pallor. Psychiatric: His speech is normal and behavior is normal. Judgment and thought content normal. His mood appears not anxious. His affect is not angry, not blunt, not labile and not inappropriate.  He is not agitated, not aggressive, not Eliquis for PE DVT from post op toe amputation complications      Meds. Prescribed:   Orders Placed This Encounter   Medications    oxyCODONE HCl (OXY-IR) 10 MG immediate release tablet     Sig: Take 1 tablet by mouth every 8 hours as needed for Pain for up to 30 days. Dispense:  90 tablet     Refill:  0     Reduce doses taken as pain becomes manageable       Return in about 2 months (around 6/1/2019) for Follow up pain medications.          Electronically signed by YAQUELIN Rosas CNP on4/1/2019 at 2:56 PM

## 2019-04-01 NOTE — PROGRESS NOTES
Informed patient of his CT scan scheduled for 4/9/19 and details pertaining to scan. Patient indicates he is now responsible for setting up his own transportation through [facility] on [address]

## 2019-04-02 ENCOUNTER — TELEPHONE (OUTPATIENT)
Dept: ONCOLOGY | Age: 59
End: 2019-04-02

## 2019-04-02 ENCOUNTER — HOSPITAL ENCOUNTER (OUTPATIENT)
Dept: PHYSICAL THERAPY | Age: 59
Setting detail: THERAPIES SERIES
Discharge: HOME | End: 2019-04-02
Payer: MEDICARE

## 2019-04-02 PROCEDURE — 97162 PT EVAL MOD COMPLEX 30 MIN: CPT

## 2019-04-02 ASSESSMENT — PAIN SCALES - GENERAL: PAINLEVEL_OUTOF10: 8

## 2019-04-02 NOTE — PROGRESS NOTES
** PLEASE SIGN, DATE AND TIME CERTIFICATION BELOW AND RETURN TO [facility] OUTPATIENT REHABILITATION (FAX #: [phone]). ATTEST/CO-SIGN IF ACCESSING VIA [employer]. THANK YOU.**    I certify that I have examined the patient below and determined that Physical Medicine and Rehabilitation service is necessary and that I approve the established plan of care for up to 90 days or as specifically noted. Attestation, signature or co-signature of physician indicates approval of certification requirements.    ________________________ ____________ __________  Physician Signature   Date   Time    [facility]  OUTPATIENT REHABILITATION  PHYSICAL THERAPY   ONCOLOGY REHABILITATION EVALUATION  Time In: 1300  Time Out: 1208  Total timed code minutes: 0  Total treatment minutes: 45    Date: 2019  Patient Name: Brice Dumont        CSN: [id number]   : 1960  (62 y.o.)  Gender: male   Referring Practitioner: Dr. 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   Treatment Diagnosis: Right LE weakness, left hip pain, difficulty walking, generalized weakness, lymphedema of neck  Additional Pertinent Hx: Diagnosed with L tonsil CA with cervical adenopathy 18; history of chemo and radiation, anxiety, COPD, HTN, obesity, DM type II, right knee trouble, falling, depression       Insurance:  Medicare - no visit limit   Total # Visits Approved: Total # Visits to Date: 1   Certification Date: 68/11/15 Progress Note Counter:1/10 for PN   Date of Physician Follow-Up: Speech on 19 , [place] - Rad Onc 19 Chart Reviewed: Yes     Subjective: Subjective: States he has been falling frequently with the last fall on 3/18/19. States has a wheelchair, 4-wheeled walker and small base quad cane but uses the 4-ww in the home. Would like to become more steady on his feet. X-rays of spine and left hip show arthritic changes.     Pain: Pain Assessment  Patient Currently in Pain: Yes  Pain Assessment: 0-10  Pain Level: 8(back, neck and left hip)    Social/Functional History and Current Status:  Curt Mendez lives with family in a single story home with stairs to enter without handrails. 2 steps to enter. Lives in back of house with family present at front of house. Has help from niece, Chioma Rodriguez, who helps with cooking and cleaning. Task Previous Current   ADLs  Independent Assistance Required   Homemaking Independent Assistance Required   Ambulation Independent Assistance Required - walks with RW in clinic with right foot drop, wide base of support, lacking terminal knee ext on R and slow jose l - pt states has AFO but declines use   Transfers Independent Assistance Required    Driving Independent Dependent/Unable   Work On diability On disability   Recreation Independent Dependent/Unable    Community Integration Independent Assistance Required        OBJECTIVE:   Posture: Poor, Protruded Head, Protracted Scapula  Palpation: Muscle atrophy palpated at bilateral proximal quads  Observation: poor circulation with wounds healed on right LE with toe amputated, extremely dry, scaley skin with R worse than L. Obvious lymphedema at neck from radiation. Range of Motion/Strength (Range of Motion in degrees)    Right Left Comments   Shoulder Flexion PR[facility]    Shoulder ABDuction PROM [facility]    Shoulder Strength 4/5 4/5    Elbow Strength 4+/5 4+/5    Hip Flexion AROM [facility]    Hip Strength 4-/5 4-/5    Knee Extension/Flexion AROM [facility] WFL R lacking full ext   Knee Strength 4/5 4/5    Ankle AROM [facility] WFL R with foot drop   Ankle Strength 2-/5 4/5      Circumferential Measurements:   Head and Neck Measurements   NECK (cm)   Superior 48   Middle 46   Inferior 43   TOTAL NECK COMPOSITE 137     Brief Fatigue Inventory: 8.3 (0: none, 1-3: mild, 4-6: moderate, 7-10: severe)  Tinetti: 16/28     Activity Tolerance: Tolerated evaluation well.     Treatment Initiated: Unable to initiate treatment due to pt's concern for review of medical records    ASSESSMENT:  Problem List:Decreased core stabilization, Decreased overall strength, Decreased tolerance of activities, Difficulty walking, Difficulty climbing stairs, Pain, Lymphedema and Impaired posture    Assessment: Pt presents with generalized weakness, difficulty walking and history of falling along with lymphedema of neck. Pt would benefit from skilled PT services to address these issues and improve his safety with functional mobility. Prognosis: Fair  Patient Education: Plan of care, goals. See \"Treatment Initiated\" for further details. Education Outcome: Verbalized understanding    PLAN:  Frequency and Duration of Treatment: up to 2x per week for up to 12 weeks pending compliance with program and recommendations  Treatment Recommendations: Strengthening, Range of Motion, Conditioning, Ambulation, Stair Training, Tranfers, Balance/Proprioception, Lymphedema Management, Manual Techniques, Pain Management, Postural Re-Training, Body Mechanics/Ergonomics, Home Exercise Prescription and Safety Education  Specific Interventions for Next Treatment:  Strengthening, gait, balance, core strengthening/posture, conditioning    History: Personal factors or comorbidities that impact plan of care - High Complexity: 3 or more personal factors or comorbidities. See history section above for details. Examination: Body structures and functions, activity limitations, participation restrictions; using standardized tests and measures - Moderate Complexity: 3 or morebody structures and functional, activity limitations and/or participation restrictions. See restrictions and objective section above for details. Clinical Presentation: Moderate - Evolving with Changing Characteristics: Pt is now falling intermittently    Decision Making: Moderate Complexity due to above reasons.   Decision making was based on patient assessment and decision making process in determining plan of care and establishing reasonable expectations for measurable functional outcomes. Evaluation Complexity: Based on the findings of patient history, examination, clinical presentation, and decision making during this evaluation, the evaluation of Yamel Couch  is of medium complexity. Goals:  Patient goals : Stop falling, walk better    Short term goals  Time Frame for Short term goals: 6 weeks  Short term goal 1: Pt to demo bilateral shoulder strength improved from 4/5 to 4+/5 for return to meal prep. Short term goal 2: Pt to demo bilateral hip strength improved from 4-/5 to 4+/5 to assist with transfers and gait safety. Short term goal 3: Pt to demo gait with RW with adequate right foot clearance and base of support 75% of the time on level surface Mod I >200 feet for safety in the community. Long term goals  Time Frame for Long term goals : 12 weeks  Long term goal 1: Pt to demo sit to stand without use of hands from standard height chair with independence for safety in the community. Long term goal 2: Pt to demo Brief Fatigue Inventory score improved from 8.3 to 6 for improved participation in recreational activities. Long term goal 3: Pt to demo Tinetti score improved from 16/28 to 21/28 for reduced risk of falling. Long term goal 4: Pt to report understanding of home management for chronic lymphedema of neck, including ROM, self manual lymphatic drainage, and compression.     PT G-Codes  Functional Assessment Tool Used: Tinetti  Score: 16/28    Rosy Marinelli PT, DPT, [place] [phone] 4/2/2019

## 2019-04-02 NOTE — TELEPHONE ENCOUNTER
Patient came in to the office today inquiring if he was in remission. He states that on the last visit you mentioned that he was but there was something going on in his jaw. He is wondering if he is still in remission or has the cancer returned? Please advise, thank you in advance.

## 2019-04-08 ENCOUNTER — HOSPITAL ENCOUNTER (OUTPATIENT)
Dept: SPEECH THERAPY | Age: 59
Setting detail: THERAPIES SERIES
Discharge: HOME | End: 2019-04-08
Payer: MEDICARE

## 2019-04-09 ENCOUNTER — APPOINTMENT (OUTPATIENT)
Dept: GENERAL RADIOLOGY | Age: 59
DRG: 260 | End: 2019-04-09
Payer: MEDICARE

## 2019-04-09 ENCOUNTER — APPOINTMENT (OUTPATIENT)
Dept: CT IMAGING | Age: 59
DRG: 260 | End: 2019-04-09
Payer: MEDICARE

## 2019-04-09 ENCOUNTER — HOSPITAL ENCOUNTER (INPATIENT)
Age: 59
LOS: 3 days | Discharge: HOME | DRG: 260 | End: 2019-04-12
Attending: INTERNAL MEDICINE | Admitting: INTERNAL MEDICINE
Payer: MEDICARE

## 2019-04-09 ENCOUNTER — TELEPHONE (OUTPATIENT)
Dept: ONCOLOGY | Age: 59
End: 2019-04-09

## 2019-04-09 DIAGNOSIS — I26.99 OTHER ACUTE PULMONARY EMBOLISM WITHOUT ACUTE COR PULMONALE (HCC): ICD-10-CM

## 2019-04-09 PROBLEM — I21.4 NSTEMI (NON-ST ELEVATED MYOCARDIAL INFARCTION) (HCC): Status: ACTIVE | Noted: 2019-04-09

## 2019-04-09 LAB
ALBUMIN SERPL-MCNC: 3.4 G/DL (ref 3.5–5.1)
ALP BLD-CCNC: 77 U/L (ref 38–126)
ALT SERPL-CCNC: 7 U/L (ref 11–66)
ANION GAP SERPL CALCULATED.3IONS-SCNC: 15 MEQ/L (ref 8–16)
APTT: 31.1 SECONDS (ref 22–38)
APTT: 31.4 SECONDS (ref 22–38)
APTT: 43.1 SECONDS (ref 22–38)
AST SERPL-CCNC: 19 U/L (ref 5–40)
BASOPHILS # BLD: 0.6 %
BASOPHILS ABSOLUTE: 0.1 THOU/MM3 (ref 0–0.1)
BILIRUB SERPL-MCNC: 0.7 MG/DL (ref 0.3–1.2)
BUN BLDV-MCNC: 17 MG/DL (ref 7–22)
CALCIUM SERPL-MCNC: 9.2 MG/DL (ref 8.5–10.5)
CHLORIDE BLD-SCNC: 100 MEQ/L (ref 98–111)
CO2: 26 MEQ/L (ref 23–33)
CREAT SERPL-MCNC: 0.9 MG/DL (ref 0.4–1.2)
EKG ATRIAL RATE: 61 BPM
EKG ATRIAL RATE: 77 BPM
EKG P AXIS: 83 DEGREES
EKG P AXIS: 86 DEGREES
EKG P-R INTERVAL: 144 MS
EKG P-R INTERVAL: 150 MS
EKG Q-T INTERVAL: 428 MS
EKG Q-T INTERVAL: 458 MS
EKG QRS DURATION: 88 MS
EKG QRS DURATION: 90 MS
EKG QTC CALCULATION (BAZETT): 461 MS
EKG QTC CALCULATION (BAZETT): 484 MS
EKG R AXIS: -30 DEGREES
EKG T AXIS: -74 DEGREES
EKG T AXIS: -88 DEGREES
EKG VENTRICULAR RATE: 61 BPM
EKG VENTRICULAR RATE: 77 BPM
EOSINOPHIL # BLD: 0.7 %
EOSINOPHILS ABSOLUTE: 0.1 THOU/MM3 (ref 0–0.4)
ERYTHROCYTE [DISTWIDTH] IN BLOOD BY AUTOMATED COUNT: 12.3 % (ref 11.5–14.5)
ERYTHROCYTE [DISTWIDTH] IN BLOOD BY AUTOMATED COUNT: 12.5 % (ref 11.5–14.5)
ERYTHROCYTE [DISTWIDTH] IN BLOOD BY AUTOMATED COUNT: 39.3 FL (ref 35–45)
ERYTHROCYTE [DISTWIDTH] IN BLOOD BY AUTOMATED COUNT: 40 FL (ref 35–45)
FLU A ANTIGEN: NEGATIVE
FLU B ANTIGEN: NEGATIVE
GFR SERPL CREATININE-BSD FRML MDRD: 86 ML/MIN/1.73M2
GLUCOSE BLD-MCNC: 112 MG/DL (ref 70–108)
HCT VFR BLD CALC: 44.4 % (ref 42–52)
HCT VFR BLD CALC: 47.2 % (ref 42–52)
HEMOGLOBIN: 15 GM/DL (ref 14–18)
HEMOGLOBIN: 15.8 GM/DL (ref 14–18)
IMMATURE GRANS (ABS): 0.03 THOU/MM3 (ref 0–0.07)
IMMATURE GRANULOCYTES: 0.3 %
INR BLD: 1.09 (ref 0.85–1.13)
LIPASE: 9.4 U/L (ref 5.6–51.3)
LV EF: 53 %
LVEF MODALITY: NORMAL
LYMPHOCYTES # BLD: 7 %
LYMPHOCYTES ABSOLUTE: 0.6 THOU/MM3 (ref 1–4.8)
MCH RBC QN AUTO: 29.4 PG (ref 26–33)
MCH RBC QN AUTO: 29.5 PG (ref 26–33)
MCHC RBC AUTO-ENTMCNC: 33.5 GM/DL (ref 32.2–35.5)
MCHC RBC AUTO-ENTMCNC: 33.8 GM/DL (ref 32.2–35.5)
MCV RBC AUTO: 87.2 FL (ref 80–94)
MCV RBC AUTO: 87.9 FL (ref 80–94)
MONOCYTES # BLD: 7.1 %
MONOCYTES ABSOLUTE: 0.6 THOU/MM3 (ref 0.4–1.3)
NUCLEATED RED BLOOD CELLS: 0 /100 WBC
OSMOLALITY CALCULATION: 283.6 MOSMOL/KG (ref 275–300)
PLATELET # BLD: 205 THOU/MM3 (ref 130–400)
PLATELET # BLD: 216 THOU/MM3 (ref 130–400)
PMV BLD AUTO: 10.1 FL (ref 9.4–12.4)
PMV BLD AUTO: 9.6 FL (ref 9.4–12.4)
POTASSIUM SERPL-SCNC: 4.1 MEQ/L (ref 3.5–5.2)
RBC # BLD: 5.09 MILL/MM3 (ref 4.7–6.1)
RBC # BLD: 5.37 MILL/MM3 (ref 4.7–6.1)
SEG NEUTROPHILS: 84.3 %
SEGMENTED NEUTROPHILS ABSOLUTE COUNT: 7.3 THOU/MM3 (ref 1.8–7.7)
SODIUM BLD-SCNC: 141 MEQ/L (ref 135–145)
TOTAL PROTEIN: 6.4 G/DL (ref 6.1–8)
TROPONIN T: 0.44 NG/ML
TROPONIN T: 0.45 NG/ML
TROPONIN T: 0.45 NG/ML
TROPONIN T: 0.5 NG/ML
WBC # BLD: 8.7 THOU/MM3 (ref 4.8–10.8)
WBC # BLD: 9.7 THOU/MM3 (ref 4.8–10.8)

## 2019-04-09 PROCEDURE — 6360000002 HC RX W HCPCS: Performed by: NURSE PRACTITIONER

## 2019-04-09 PROCEDURE — 85730 THROMBOPLASTIN TIME PARTIAL: CPT

## 2019-04-09 PROCEDURE — 6370000000 HC RX 637 (ALT 250 FOR IP): Performed by: NURSE PRACTITIONER

## 2019-04-09 PROCEDURE — 99223 1ST HOSP IP/OBS HIGH 75: CPT | Performed by: NURSE PRACTITIONER

## 2019-04-09 PROCEDURE — B24BZZ4 ULTRASONOGRAPHY OF HEART WITH AORTA, TRANSESOPHAGEAL: ICD-10-PCS | Performed by: INTERNAL MEDICINE

## 2019-04-09 PROCEDURE — 2709999900 HC NON-CHARGEABLE SUPPLY

## 2019-04-09 PROCEDURE — 84484 ASSAY OF TROPONIN QUANT: CPT

## 2019-04-09 PROCEDURE — 99285 EMERGENCY DEPT VISIT HI MDM: CPT

## 2019-04-09 PROCEDURE — 93306 TTE W/DOPPLER COMPLETE: CPT

## 2019-04-09 PROCEDURE — 70450 CT HEAD/BRAIN W/O DYE: CPT

## 2019-04-09 PROCEDURE — 6360000004 HC RX CONTRAST MEDICATION: Performed by: STUDENT IN AN ORGANIZED HEALTH CARE EDUCATION/TRAINING PROGRAM

## 2019-04-09 PROCEDURE — 71045 X-RAY EXAM CHEST 1 VIEW: CPT

## 2019-04-09 PROCEDURE — 87804 INFLUENZA ASSAY W/OPTIC: CPT

## 2019-04-09 PROCEDURE — 85610 PROTHROMBIN TIME: CPT

## 2019-04-09 PROCEDURE — 85025 COMPLETE CBC W/AUTO DIFF WBC: CPT

## 2019-04-09 PROCEDURE — 6370000000 HC RX 637 (ALT 250 FOR IP): Performed by: STUDENT IN AN ORGANIZED HEALTH CARE EDUCATION/TRAINING PROGRAM

## 2019-04-09 PROCEDURE — 74177 CT ABD & PELVIS W/CONTRAST: CPT

## 2019-04-09 PROCEDURE — 2500000003 HC RX 250 WO HCPCS: Performed by: STUDENT IN AN ORGANIZED HEALTH CARE EDUCATION/TRAINING PROGRAM

## 2019-04-09 PROCEDURE — 85027 COMPLETE CBC AUTOMATED: CPT

## 2019-04-09 PROCEDURE — 93005 ELECTROCARDIOGRAM TRACING: CPT | Performed by: STUDENT IN AN ORGANIZED HEALTH CARE EDUCATION/TRAINING PROGRAM

## 2019-04-09 PROCEDURE — 6360000002 HC RX W HCPCS: Performed by: STUDENT IN AN ORGANIZED HEALTH CARE EDUCATION/TRAINING PROGRAM

## 2019-04-09 PROCEDURE — 80053 COMPREHEN METABOLIC PANEL: CPT

## 2019-04-09 PROCEDURE — 99223 1ST HOSP IP/OBS HIGH 75: CPT | Performed by: INTERNAL MEDICINE

## 2019-04-09 PROCEDURE — 96375 TX/PRO/DX INJ NEW DRUG ADDON: CPT

## 2019-04-09 PROCEDURE — 93010 ELECTROCARDIOGRAM REPORT: CPT | Performed by: NUCLEAR MEDICINE

## 2019-04-09 PROCEDURE — G0426 INPT/ED TELECONSULT50: HCPCS | Performed by: PSYCHIATRY & NEUROLOGY

## 2019-04-09 PROCEDURE — 2580000003 HC RX 258: Performed by: NURSE PRACTITIONER

## 2019-04-09 PROCEDURE — 2140000000 HC CCU INTERMEDIATE R&B

## 2019-04-09 PROCEDURE — 4A023N8 MEASUREMENT OF CARDIAC SAMPLING AND PRESSURE, BILATERAL, PERCUTANEOUS APPROACH: ICD-10-PCS | Performed by: INTERNAL MEDICINE

## 2019-04-09 PROCEDURE — B2011ZZ PLAIN RADIOGRAPHY OF MULTIPLE CORONARY ARTERIES USING LOW OSMOLAR CONTRAST: ICD-10-PCS | Performed by: INTERNAL MEDICINE

## 2019-04-09 PROCEDURE — 83690 ASSAY OF LIPASE: CPT

## 2019-04-09 PROCEDURE — 96365 THER/PROPH/DIAG IV INF INIT: CPT

## 2019-04-09 PROCEDURE — 36415 COLL VENOUS BLD VENIPUNCTURE: CPT

## 2019-04-09 PROCEDURE — 71275 CT ANGIOGRAPHY CHEST: CPT

## 2019-04-09 PROCEDURE — 99223 1ST HOSP IP/OBS HIGH 75: CPT | Performed by: PSYCHIATRY & NEUROLOGY

## 2019-04-09 RX ORDER — HEPARIN SODIUM 10000 [USP'U]/100ML
12 INJECTION, SOLUTION INTRAVENOUS CONTINUOUS
Status: DISCONTINUED | OUTPATIENT
Start: 2019-04-09 | End: 2019-04-09

## 2019-04-09 RX ORDER — SODIUM CHLORIDE 0.9 % (FLUSH) 0.9 %
10 SYRINGE (ML) INJECTION PRN
Status: DISCONTINUED | OUTPATIENT
Start: 2019-04-09 | End: 2019-04-11 | Stop reason: SDUPTHER

## 2019-04-09 RX ORDER — CLONAZEPAM 1 MG/1
1 TABLET ORAL 2 TIMES DAILY PRN
Status: DISCONTINUED | OUTPATIENT
Start: 2019-04-09 | End: 2019-04-12 | Stop reason: HOSPADM

## 2019-04-09 RX ORDER — HEPARIN SODIUM 1000 [USP'U]/ML
2000 INJECTION, SOLUTION INTRAVENOUS; SUBCUTANEOUS PRN
Status: DISCONTINUED | OUTPATIENT
Start: 2019-04-09 | End: 2019-04-10 | Stop reason: ALTCHOICE

## 2019-04-09 RX ORDER — OXYCODONE HYDROCHLORIDE 5 MG/1
10 TABLET ORAL EVERY 8 HOURS PRN
Status: DISCONTINUED | OUTPATIENT
Start: 2019-04-09 | End: 2019-04-12 | Stop reason: HOSPADM

## 2019-04-09 RX ORDER — MAGNESIUM HYDROXIDE/ALUMINUM HYDROXICE/SIMETHICONE 120; 1200; 1200 MG/30ML; MG/30ML; MG/30ML
30 SUSPENSION ORAL ONCE
Status: COMPLETED | OUTPATIENT
Start: 2019-04-09 | End: 2019-04-09

## 2019-04-09 RX ORDER — ATORVASTATIN CALCIUM 40 MG/1
40 TABLET, FILM COATED ORAL NIGHTLY
Status: DISCONTINUED | OUTPATIENT
Start: 2019-04-09 | End: 2019-04-12 | Stop reason: HOSPADM

## 2019-04-09 RX ORDER — NITROGLYCERIN 20 MG/100ML
5 INJECTION INTRAVENOUS CONTINUOUS
Status: DISCONTINUED | OUTPATIENT
Start: 2019-04-09 | End: 2019-04-10

## 2019-04-09 RX ORDER — ALBUTEROL SULFATE 90 UG/1
2 AEROSOL, METERED RESPIRATORY (INHALATION) EVERY 6 HOURS PRN
Status: DISCONTINUED | OUTPATIENT
Start: 2019-04-09 | End: 2019-04-12 | Stop reason: HOSPADM

## 2019-04-09 RX ORDER — ONDANSETRON 2 MG/ML
4 INJECTION INTRAMUSCULAR; INTRAVENOUS ONCE
Status: COMPLETED | OUTPATIENT
Start: 2019-04-09 | End: 2019-04-09

## 2019-04-09 RX ORDER — MORPHINE SULFATE 2 MG/ML
2 INJECTION, SOLUTION INTRAMUSCULAR; INTRAVENOUS
Status: DISCONTINUED | OUTPATIENT
Start: 2019-04-09 | End: 2019-04-12 | Stop reason: HOSPADM

## 2019-04-09 RX ORDER — SENNA PLUS 8.6 MG/1
1 TABLET ORAL DAILY PRN
Status: DISCONTINUED | OUTPATIENT
Start: 2019-04-09 | End: 2019-04-12 | Stop reason: HOSPADM

## 2019-04-09 RX ORDER — MORPHINE SULFATE 2 MG/ML
2 INJECTION, SOLUTION INTRAMUSCULAR; INTRAVENOUS ONCE
Status: COMPLETED | OUTPATIENT
Start: 2019-04-09 | End: 2019-04-09

## 2019-04-09 RX ORDER — SODIUM CHLORIDE 0.9 % (FLUSH) 0.9 %
10 SYRINGE (ML) INJECTION EVERY 12 HOURS SCHEDULED
Status: DISCONTINUED | OUTPATIENT
Start: 2019-04-09 | End: 2019-04-11 | Stop reason: SDUPTHER

## 2019-04-09 RX ORDER — SODIUM CHLORIDE 9 MG/ML
INJECTION, SOLUTION INTRAVENOUS CONTINUOUS
Status: DISCONTINUED | OUTPATIENT
Start: 2019-04-09 | End: 2019-04-11

## 2019-04-09 RX ORDER — ASPIRIN 81 MG/1
243 TABLET, CHEWABLE ORAL ONCE
Status: COMPLETED | OUTPATIENT
Start: 2019-04-09 | End: 2019-04-09

## 2019-04-09 RX ORDER — MORPHINE SULFATE 4 MG/ML
4 INJECTION, SOLUTION INTRAMUSCULAR; INTRAVENOUS
Status: DISCONTINUED | OUTPATIENT
Start: 2019-04-09 | End: 2019-04-12 | Stop reason: HOSPADM

## 2019-04-09 RX ORDER — ACETAMINOPHEN 325 MG/1
650 TABLET ORAL ONCE
Status: COMPLETED | OUTPATIENT
Start: 2019-04-09 | End: 2019-04-09

## 2019-04-09 RX ORDER — ONDANSETRON 2 MG/ML
4 INJECTION INTRAMUSCULAR; INTRAVENOUS EVERY 6 HOURS PRN
Status: DISCONTINUED | OUTPATIENT
Start: 2019-04-09 | End: 2019-04-12 | Stop reason: HOSPADM

## 2019-04-09 RX ORDER — NITROGLYCERIN 0.4 MG/1
0.4 TABLET SUBLINGUAL EVERY 5 MIN PRN
Status: DISCONTINUED | OUTPATIENT
Start: 2019-04-09 | End: 2019-04-09

## 2019-04-09 RX ORDER — SERTRALINE HYDROCHLORIDE 100 MG/1
200 TABLET, FILM COATED ORAL DAILY
Status: DISCONTINUED | OUTPATIENT
Start: 2019-04-10 | End: 2019-04-12 | Stop reason: HOSPADM

## 2019-04-09 RX ORDER — ASPIRIN 81 MG/1
81 TABLET, CHEWABLE ORAL DAILY
Status: DISCONTINUED | OUTPATIENT
Start: 2019-04-10 | End: 2019-04-12 | Stop reason: HOSPADM

## 2019-04-09 RX ORDER — LACTULOSE 10 G/15ML
20 SOLUTION ORAL 2 TIMES DAILY PRN
Status: DISCONTINUED | OUTPATIENT
Start: 2019-04-09 | End: 2019-04-12 | Stop reason: HOSPADM

## 2019-04-09 RX ORDER — NICOTINE 21 MG/24HR
1 PATCH, TRANSDERMAL 24 HOURS TRANSDERMAL DAILY
Status: DISCONTINUED | OUTPATIENT
Start: 2019-04-09 | End: 2019-04-12 | Stop reason: HOSPADM

## 2019-04-09 RX ORDER — HEPARIN SODIUM 1000 [USP'U]/ML
4000 INJECTION, SOLUTION INTRAVENOUS; SUBCUTANEOUS ONCE
Status: COMPLETED | OUTPATIENT
Start: 2019-04-09 | End: 2019-04-09

## 2019-04-09 RX ORDER — HEPARIN SODIUM 10000 [USP'U]/100ML
12 INJECTION, SOLUTION INTRAVENOUS CONTINUOUS
Status: DISCONTINUED | OUTPATIENT
Start: 2019-04-09 | End: 2019-04-10

## 2019-04-09 RX ORDER — HEPARIN SODIUM 1000 [USP'U]/ML
4000 INJECTION, SOLUTION INTRAVENOUS; SUBCUTANEOUS PRN
Status: DISCONTINUED | OUTPATIENT
Start: 2019-04-09 | End: 2019-04-10 | Stop reason: ALTCHOICE

## 2019-04-09 RX ORDER — DOCUSATE SODIUM 100 MG/1
100 CAPSULE, LIQUID FILLED ORAL 2 TIMES DAILY
Status: DISCONTINUED | OUTPATIENT
Start: 2019-04-09 | End: 2019-04-12 | Stop reason: HOSPADM

## 2019-04-09 RX ORDER — ACETAMINOPHEN 325 MG/1
650 TABLET ORAL EVERY 4 HOURS PRN
Status: DISCONTINUED | OUTPATIENT
Start: 2019-04-09 | End: 2019-04-12 | Stop reason: HOSPADM

## 2019-04-09 RX ORDER — MORPHINE SULFATE 30 MG/1
30 TABLET, FILM COATED, EXTENDED RELEASE ORAL EVERY 12 HOURS
Status: DISCONTINUED | OUTPATIENT
Start: 2019-04-09 | End: 2019-04-12 | Stop reason: HOSPADM

## 2019-04-09 RX ADMIN — ONDANSETRON 4 MG: 2 INJECTION INTRAMUSCULAR; INTRAVENOUS at 22:27

## 2019-04-09 RX ADMIN — ASPIRIN 81 MG 243 MG: 81 TABLET ORAL at 11:59

## 2019-04-09 RX ADMIN — DOCUSATE SODIUM 100 MG: 100 CAPSULE, LIQUID FILLED ORAL at 20:09

## 2019-04-09 RX ADMIN — ALUMINUM HYDROXIDE, MAGNESIUM HYDROXIDE, AND SIMETHICONE 30 ML: 200; 200; 20 SUSPENSION ORAL at 11:00

## 2019-04-09 RX ADMIN — MORPHINE SULFATE 4 MG: 4 INJECTION INTRAVENOUS at 19:58

## 2019-04-09 RX ADMIN — Medication 10 ML: at 20:09

## 2019-04-09 RX ADMIN — ONDANSETRON 4 MG: 2 INJECTION INTRAMUSCULAR; INTRAVENOUS at 11:00

## 2019-04-09 RX ADMIN — LACTULOSE 20 G: 20 SOLUTION ORAL at 23:46

## 2019-04-09 RX ADMIN — HEPARIN SODIUM 12 UNITS/KG/HR: 10000 INJECTION, SOLUTION INTRAVENOUS at 12:02

## 2019-04-09 RX ADMIN — HEPARIN SODIUM 12 UNITS/KG/HR: 10000 INJECTION, SOLUTION INTRAVENOUS at 12:48

## 2019-04-09 RX ADMIN — ATORVASTATIN CALCIUM 40 MG: 40 TABLET, FILM COATED ORAL at 20:09

## 2019-04-09 RX ADMIN — IOPAMIDOL 80 ML: 755 INJECTION, SOLUTION INTRAVENOUS at 11:28

## 2019-04-09 RX ADMIN — HEPARIN SODIUM 4000 UNITS: 1000 INJECTION INTRAVENOUS; SUBCUTANEOUS at 12:03

## 2019-04-09 RX ADMIN — ONDANSETRON 4 MG: 2 INJECTION INTRAMUSCULAR; INTRAVENOUS at 14:55

## 2019-04-09 RX ADMIN — SODIUM CHLORIDE: 9 INJECTION, SOLUTION INTRAVENOUS at 14:57

## 2019-04-09 RX ADMIN — OXYCODONE HYDROCHLORIDE 10 MG: 5 TABLET ORAL at 22:20

## 2019-04-09 RX ADMIN — MORPHINE SULFATE 4 MG: 4 INJECTION INTRAVENOUS at 17:58

## 2019-04-09 RX ADMIN — HEPARIN SODIUM 2000 UNITS: 1000 INJECTION INTRAVENOUS; SUBCUTANEOUS at 20:19

## 2019-04-09 RX ADMIN — NITROGLYCERIN 5 MCG/MIN: 20 INJECTION INTRAVENOUS at 11:58

## 2019-04-09 RX ADMIN — MORPHINE SULFATE 2 MG: 2 INJECTION, SOLUTION INTRAMUSCULAR; INTRAVENOUS at 14:36

## 2019-04-09 RX ADMIN — MORPHINE SULFATE 2 MG: 2 INJECTION, SOLUTION INTRAMUSCULAR; INTRAVENOUS at 22:20

## 2019-04-09 RX ADMIN — ACETAMINOPHEN 650 MG: 325 TABLET ORAL at 11:00

## 2019-04-09 ASSESSMENT — ENCOUNTER SYMPTOMS
BACK PAIN: 0
DIARRHEA: 0
EYE DISCHARGE: 0
EYE REDNESS: 0
WHEEZING: 0
SHORTNESS OF BREATH: 1
COUGH: 1
VOMITING: 1
SORE THROAT: 0
ABDOMINAL PAIN: 1
NAUSEA: 1
RHINORRHEA: 1
TROUBLE SWALLOWING: 1

## 2019-04-09 ASSESSMENT — PAIN DESCRIPTION - PAIN TYPE: TYPE: ACUTE PAIN

## 2019-04-09 ASSESSMENT — PAIN SCALES - GENERAL
PAINLEVEL_OUTOF10: 6
PAINLEVEL_OUTOF10: 8
PAINLEVEL_OUTOF10: 6
PAINLEVEL_OUTOF10: 8

## 2019-04-09 ASSESSMENT — PAIN DESCRIPTION - LOCATION: LOCATION: HEAD

## 2019-04-09 ASSESSMENT — PAIN DESCRIPTION - DESCRIPTORS: DESCRIPTORS: THROBBING

## 2019-04-09 NOTE — ED NOTES
Provider Eleno ERWIN ordered to restart heparin. Pt complaining of lower back pain. Provider notified of pt's pain.       Margo Piper RN  04/09/19 5452

## 2019-04-09 NOTE — CONSULTS
[facility]    Patient's Name/Date of Birth: Sowmya Barrientos / 1960 (8 y.o.)    Date: April 9, 2019     Referring Provider: ED    CHIEF COMPLAINT: Chest pain      HPI: This is a pleasant 62 y.o. male presents with chest pain, SS, tight, 8/10 currently. Had headache and blurry vision. Eye pain to 2/10. Was nauseated and had emesis x 3. DM II, hx of DVT on Eliquis. There is concern that he had a subacute CVA, but Neurology has cleared to use all antiplatelets/antiocoagulants. ECG with ST-depressions and T-wave inversions laterally. Trop 0.445--0.503. Cr 0.9. No sob, and GONZALES. At rest, he is complaining of 6-7/10. Echo: No results found for this or any previous visit.      All labs, EKG's, diagnostic testing and images as well as cardiac cath, stress testing were reviewed during this encounter    Past Medical History:   Diagnosis Date    Anxiety     Bleeds easily ([address]     Bruises easily     COPD (chronic obstructive pulmonary disease) ([address]     DVT (deep venous thrombosis) ([address] 2009    Enlarged lymph node     History of emotional problems     Hx of blood clots 2009    Hypertension     [address]    Obesity     Squamous cell carcinoma of tonsils ([address] 2018    left   - radiation    Type II or unspecified type diabetes mellitus without mention of complication, not stated as uncontrolled     lost weight, no meds at present 2018     Past Surgical History:   Procedure Laterality Date    HIP SURGERY Right 06/30/2014    Excisin Right Hip Mass-Dr. Emeterio Sierra     KNEE SURGERY Right 1980's    OTHER SURGICAL HISTORY  7-7-09    lisa filter placed     DC OFFICE/OUTPT VISIT,PROCEDURE ONLY Right 10/16/2018    RIGHT FOOT EXCISION ULCER, RIGHT 5th METATARSAL BASE RESECTION performed by Sd Gonzalez DPM at [address] [facility] TOE SURGERY Right 2013    wound to bone (second toe)     TUNNELED VENOUS PORT PLACEMENT       Current Facility-Administered Medications   Medication Dose Route Frequency Provider Last Rate Last Dose    nitroGLYCERIN 50 mg in dextrose 5% 250 mL infusion  5 mcg/min Intravenous Continuous Joshua Juarez PA-C 1.5 mL/hr at 04/09/19 1158 5 mcg/min at 04/09/19 1158    heparin (porcine) injection 4,000 Units  4,000 Units Intravenous PRN Joshuaskyler Juarez PA-C        heparin (porcine) injection 2,000 Units  2,000 Units Intravenous PRN Joshua Juarez PA-C        heparin 25,000 units in dextrose 5% 250 mL infusion  12 Units/kg/hr Intravenous Continuous Joshuaskyler Juarez PA-C 10.2 mL/hr at 04/09/19 1248 12 Units/kg/hr at 04/09/19 1248    morphine (PF) injection 2 mg  2 mg Intravenous Once Joshua Juarez PA-C         Current Outpatient Medications   Medication Sig Dispense Refill    oxyCODONE HCl (OXY-IR) 10 MG immediate release tablet Take 1 tablet by mouth every 8 hours as needed for Pain for up to 30 days. 90 tablet 0    hydrOXYzine (ATARAX) 50 MG tablet Take 1-2 tablets nightly prn 60 tablet 2    lidocaine-prilocaine (EMLA) 2.5-2.5 % cream Apply topically as needed. 1 Tube 3    morphine (MS CONTIN) 30 MG extended release tablet Take 1 tablet by mouth every 12 hours for 30 days. 60 tablet 0    clonazePAM (KLONOPIN) 1 MG tablet Take 1 tablet by mouth 2 times daily as needed for Anxiety for up to 60 doses.  60 tablet 2    sertraline (ZOLOFT) 100 MG tablet Take 2 tablets by mouth daily 60 tablet 2    naloxone (NARCAN) 4 MG/0.1ML LIQD nasal spray 1 spray by Nasal route as needed for Opioid Reversal 1 each 0    docusate sodium (COLACE) 100 MG capsule Take 100 mg by mouth 3 times daily as needed for Constipation      apixaban (ELIQUIS) 5 MG TABS tablet Take 1 tablet by mouth 2 times daily 60 tablet 5    lactulose (CHRONULAC) 10 GM/15ML solution Take 30 mLs by mouth 2 times daily as needed (constipation) 1800 mL 0    glucose monitoring kit (FREESTYLE) monitoring kit 1 kit by Does not apply route daily 1 kit 0    albuterol sulfate  (90 Base) MCG/ACT inhaler Inhale 2 puffs into the lungs every 6 hours as needed for Wheezing 1 Inhaler 2     Prior to Admission medications    Medication Sig Start Date End Date Taking? Authorizing Provider   oxyCODONE HCl (OXY-IR) 10 MG immediate release tablet Take 1 tablet by mouth every 8 hours as needed for Pain for up to 30 days. 4/6/19 5/6/19  YAQUELIN Thakur CNP   hydrOXYzine (ATARAX) 50 MG tablet Take 1-2 tablets nightly prn 3/25/19   Yesika Elder MD   lidocaine-prilocaine (EMLA) 2.5-2.5 % cream Apply topically as needed. 3/25/19   Davon Townsend MD   morphine (MS CONTIN) 30 MG extended release tablet Take 1 tablet by mouth every 12 hours for 30 days. 3/25/19 4/24/19  YAQUELIN Thakur CNP   clonazePAM (KLONOPIN) 1 MG tablet Take 1 tablet by mouth 2 times daily as needed for Anxiety for up to 60 doses.  3/11/19 4/11/19  Yesika Elder MD   sertraline (ZOLOFT) 100 MG tablet Take 2 tablets by mouth daily 1/29/19   Yesika Elder MD   naloxone [facility]) 4 MG/0.1ML LIQD nasal spray 1 spray by Nasal route as needed for Opioid Reversal 12/24/18   YAQUELIN Joe CNP   docusate sodium (COLACE) 100 MG capsule Take 100 mg by mouth 3 times daily as needed for Constipation    Historical Provider, MD   apixaban (ELIQUIS) 5 MG TABS tablet Take 1 tablet by mouth 2 times daily 10/30/18   Yesika Elder MD   lactulose (CHRONULAC) 10 GM/15ML solution Take 30 mLs by mouth 2 times daily as needed (constipation) 8/23/18   YAQUELIN Thakur CNP   glucose monitoring kit (FREESTYLE) monitoring kit 1 kit by Does not apply route daily 7/16/18   Yesika Elder MD   albuterol sulfate  (90 Base) MCG/ACT inhaler Inhale 2 puffs into the lungs every 6 hours as needed for Wheezing 6/4/18   Yesika Elder MD   Scheduled Meds:   morphine  2 mg Intravenous Once     Continuous Infusions:   nitroGLYCERIN 5 mcg/min (04/09/19 5091)    heparin (porcine) 12 Units/kg/hr (04/09/19 5194) PRN Meds:.heparin (porcine), heparin (porcine)    Allergies   Allergen Reactions    Latex Hives    Coumadin [Warfarin Sodium] Other (See Comments)     Tears skin     Tape Janece Luana Tape] Other (See Comments)     Tears skin      Family History   Problem Relation Age of Onset    Cancer Mother         lung     Social History     Socioeconomic History    Marital status:      Spouse name: Not on file    Number of children: Not on file    Years of education: Not on file    Highest education level: Not on file   Occupational History    Occupation: disability   Social Needs    Financial resource strain: Not on file    Food insecurity:     Worry: Not on file     Inability: Not on file    Transportation needs:     Medical: Not on file     Non-medical: Not on file   Tobacco Use    Smoking status: Current Every Day Smoker     Packs/day: 0.50     Years: 40.00     Pack years: 20.00     Types: Cigarettes    Smokeless tobacco: Never Used    Tobacco comment: 5 cig a day    Substance and Sexual Activity    Alcohol use: No    Drug use: No    Sexual activity: Not on file   Lifestyle    Physical activity:     Days per week: Not on file     Minutes per session: Not on file    Stress: Not on file   Relationships    Social connections:     Talks on phone: Not on file     Gets together: Not on file     Attends Yarsanism service: Not on file     Active member of club or organization: Not on file     Attends meetings of clubs or organizations: Not on file     Relationship status: Not on file    Intimate partner violence:     Fear of current or ex partner: Not on file     Emotionally abused: Not on file     Physically abused: Not on file     Forced sexual activity: Not on file   Other Topics Concern    Not on file   Social History Narrative    Not on file     ROS:   Constitutional: Denies any recent wt change.   Eyes:  Denies any blurring or double vision, no glaucoma  Ears/Nose/Mouth/Throat:  Denies any chronic sinus/rhinitis, bleeding gums  Cardiovascular:  As described above. Respiratory:  Denies any frequent cough, wheezing or coughing up blood  Genitourinary:  Denies difficulty with urination and kidney stones  Gastrointestinal:  Denies any chronic problems with abdominal pain, nausea, vomiting or diarrhea  Musculoskeletal:  Denies any joint pain, back pain, or difficulty walking  Integumentary:  Denies any rash  Neurological:  No numbness or tingling  Endocrine:  Denies any polydipsia. Hematologic/Lymphatic:  Denies any hemorrhage or lymphatic drainage problems. Labs:  CBC:   Recent Labs     04/09/19  1027 04/09/19  1151   WBC 8.7 9.7   HGB 15.0 15.8   HCT 44.4 47.2   MCV 87.2 87.9    216     BMP:   Recent Labs     04/09/19  1027      K 4.1      CO2 26   BUN 17   CREATININE 0.9     Accucheck Glucoses: No results for input(s): POCGLU in the last 72 hours. Cardiac Enzymes: No results for input(s): CKTOTAL, CKMB, CKMBINDEX, TROPONINI in the last 72 hours.   PT/INR:   Recent Labs     04/09/19  1027   INR 1.09     APTT:   Recent Labs     04/09/19  1027 04/09/19  1151   APTT 31.4 31.1     Liver Profile:  Lab Results   Component Value Date    AST 19 04/09/2019    ALT 7 04/09/2019    BILIDIR <0.2 03/21/2019    BILITOT 0.7 04/09/2019    ALKPHOS 77 04/09/2019     Lab Results   Component Value Date    CHOL 194 03/21/2019    HDL 58 03/21/2019    TRIG 97 03/21/2019     TSH:   Lab Results   Component Value Date    TSH 1.110 08/28/2018     UA:   Lab Results   Component Value Date    COLORU YELLOW 10/22/2018    PHUR 6.0 10/22/2018    WBCUA 2-4 10/22/2018    RBCUA 3-5 10/22/2018    YEAST NONE SEEN 10/22/2018    BACTERIA NONE 10/22/2018    LEUKOCYTESUR NEGATIVE 10/22/2018    UROBILINOGEN 1.0 10/22/2018    BILIRUBINUR MODERATE 10/22/2018    BLOODU NEGATIVE 10/22/2018    GLUCOSEU NEGATIVE 10/22/2018         Physical Exam:  Vitals:    04/09/19 1246   BP: (!) 155/87   Pulse: 76   Resp:    Temp:    SpO2: 96%    No intake or output data in the 24 hours ending 04/09/19 1409     General:  No acute distress  Neck: Supple, no JVD, no carotid bruits  Heart: Regular rhythm normal S1 and S2, no rubs, murmurs or gallops; right chest powerport  Lungs: clear to ascultation no rales, wheezes, or rhonchi  Abdomen: positive bowel sounds, soft, non-tender, non-distended, no bruits, no masses  Extremities:no clubbing, cyanosis or edema  Neurologic: alert and oriented x 3, cranial nerves 2-12 grossly intact, motor and sensory intact, moving all extremities  Skin: No rashes    Assessment:  NSTEMI  HTN  DM II  Concern for subacute CVA  DVT on Eliquis - unclear when he took the last dose    Plan:  1. IV Heparin  2. ASA/Plavix  3. Bedrest  4. IV NTG gtt  5. Hold Eliquis  6. Metoprolol/Statin  7. TTE  8. Further recommendations based on results and clinical course    Thank you for allowing us to participate in the care of this patient. Please do not hesitate to call us with questions.     Electronically signed by Richard Luu MD on 4/9/2019 at 2:09 PM    Interventional Cardiology - [facility]

## 2019-04-09 NOTE — ED NOTES
Pt resting in bed with side rails up 2x2 and call light in reach. Vital signs stable. IV site checked.         Margoth Mcintosh RN  04/09/19 6035

## 2019-04-09 NOTE — FLOWSHEET NOTE
04/09/19 1730   Encounter Summary   Services provided to: Patient   Referral/Consult From: Nurse   Support System Unknown   Continue Visiting Yes  (4/9-AD)   Complexity of Encounter Low   Length of Encounter 15 minutes   Spiritual Assessment Completed Yes   Grief and Life Adjustment   Type Adjustment to illness   Assessment Hopeful;Concerns with suffering   Intervention Discussed illness/injury and it's impact;Nurtured hope;Explored feelings, thoughts, concerns   Outcome Expressed gratitude;Receptive     Objective: AD Consult  Assessment: The patient was laying in his bed. His nurse had just left and she reported he requested pain meds. This staff began discussing with him about his AD. The patient explained he was interested however at this time, his pain was so out of control, he really isn't able to focus on much of anything. He asked if we could postpone the completion until tomorrow. This staff explained the information required to complete and he agreed to obtain it for tomorrow's meeting. Since the patient is taking narcotics, this document can not be completed at this time. Plan: Nurse was notified of the medication requirements. The consult will remain open at this time and will be passed along to the staff to complete tomorrow.

## 2019-04-09 NOTE — ED NOTES
Bed: 036A  Expected date:   Expected time:   Means of arrival: [facility]  Comments:     Willy Orozco RN  04/09/19 0945

## 2019-04-09 NOTE — H&P
History & Physical        Patient:  Tomas Don  YOB: 1960    MRN: [id number]     Acct: [de-identified]    PCP: Monico Maddox MD    Date of Admission: 4/9/2019    Date of Service: Pt seen/examined on 04/09/19  and Admitted to Inpatient with expected LOS greater than two midnights due to medical therapy. Chief Complaint:  Chest pain, head and eye pain      History Of Present Illness:    62 y.o. male who presented to [facility] with mid sternal chest pain that started \"sometime this morning\"; he relates to [facility] and eye pain x 2 days\"; he is currently rating his head and eye pain 2/10 and his mid sternal chest pain 2/10 (was 7/10 this AM) and states is a pressure like sensation; he relates to lightheadedness and dizziness along with nausea and emesis x 3 today; in ED his troponin was 0.503 and he was started on Heparin and NTG gtts; cardiology and neurology was consulted from [facility] for Heparin gtt to continue; pt is being admitted to the Hospitalist Service for further care and evaluation.         Past Medical History:          Diagnosis Date    Anxiety     Bleeds easily ([address]     Bruises easily     COPD (chronic obstructive pulmonary disease) (HCC)     DVT (deep venous thrombosis) ([address] 2009    Enlarged lymph node     History of emotional problems     Hx of blood clots 2009    Hypertension     HCC    Obesity     Squamous cell carcinoma of tonsils ([address] 2018    left   - radiation    Type II or unspecified type diabetes mellitus without mention of complication, not stated as uncontrolled     lost weight, no meds at present 2018       Past Surgical History:          Procedure Laterality Date    HIP SURGERY Right 06/30/2014    Excisin Right Hip Mass-Dr. Paulino East     KNEE SURGERY Right 1980's    OTHER SURGICAL HISTORY  7-7-09    lisa filter placed     [facility] OFFICE/OUTPT VISIT,PROCEDURE ONLY Right 10/16/2018    RIGHT FOOT EXCISION ULCER, RIGHT 5th METATARSAL BASE RESECTION performed by Liborio Gandhi DPM at [facility] TOE SURGERY Right 2013    wound to bone (second toe)     TUNNELED VENOUS PORT PLACEMENT         Medications Prior to Admission:      Prior to Admission medications    Medication Sig Start Date End Date Taking? Authorizing Provider   oxyCODONE HCl (OXY-IR) 10 MG immediate release tablet Take 1 tablet by mouth every 8 hours as needed for Pain for up to 30 days. 4/6/19 5/6/19  YAQUELIN Osorio CNP   hydrOXYzine (ATARAX) 50 MG tablet Take 1-2 tablets nightly prn 3/25/19   Jamey Lange MD   lidocaine-prilocaine (EMLA) 2.5-2.5 % cream Apply topically as needed. 3/25/19   Jessica Malave MD   morphine (MS CONTIN) 30 MG extended release tablet Take 1 tablet by mouth every 12 hours for 30 days. 3/25/19 4/24/19  YAQUELIN Osorio CNP   clonazePAM (KLONOPIN) 1 MG tablet Take 1 tablet by mouth 2 times daily as needed for Anxiety for up to 60 doses. 3/11/19 4/11/19  Jamey Lange MD   sertraline (ZOLOFT) 100 MG tablet Take 2 tablets by mouth daily 1/29/19   Jamey Lange MD   naloxone [facility]) 4 MG/0.1ML LIQD nasal spray 1 spray by Nasal route as needed for Opioid Reversal 12/24/18   DebraYAQUELIN Horan CNP   docusate sodium (COLACE) 100 MG capsule Take 100 mg by mouth 3 times daily as needed for Constipation    Historical Provider, MD   apixaban (ELIQUIS) 5 MG TABS tablet Take 1 tablet by mouth 2 times daily 10/30/18   Jamey Lange MD   lactulose (CHRONULAC) 10 GM/15ML solution Take 30 mLs by mouth 2 times daily as needed (constipation) 8/23/18   YAQUELIN Osorio CNP   glucose monitoring kit (FREESTYLE) monitoring kit 1 kit by Does not apply route daily 7/16/18   Jamey Lange MD   albuterol sulfate  (90 Base) MCG/ACT inhaler Inhale 2 puffs into the lungs every 6 hours as needed for Wheezing 6/4/18   Jamey Lange MD       Allergies:  Latex; Coumadin [warfarin sodium]; and Tape Christia [place] tape]    Social History:       The patient currently lives alone. TOBACCO:   reports that he has been smoking cigarettes. He has a 20.00 pack-year smoking history. He has never used smokeless tobacco.  ETOH:   reports that he does not drink alcohol. Family History:      Positive as follows:        Problem Relation Age of Onset   Rafy Velazquez Cancer Mother         lung       REVIEW OF SYSTEMS:     Constitutional: ROS: negative for - chills or fever  Head: (+) headache, no head injury, no migraine   Eyes ROS: denies blurred/double vision (+) photophobia  Ears ROS: no hearing difficulty, no tinnitus  Mouth and Throat ROS: no ulceration, dysphagia, dental caries  Psychological ROS: no depression, no anxiety, no panic attacks, denies suicide/homicide ideation  Endocrine ROS: denies polyuria, polydypsia, no heat or cold intolerance  Respiratory ROS: positive for - cough and shortness of breath  Cardiovascular ROS: positive for - chest pain and shortness of breath  Gastrointestinal ROS: positive for - nausea/vomiting  Genito-Urinary ROS: denies dysuria, frequency, urgency; denies hematuria  Musculoskeletal ROS: negative  Neurological ROS: no syncope, no seizures, no numbness or tingling of hands, no numbness or tingling of feet, no paresis  Dermatology: no skin rash, no eczema  Endocrine: no polyuria, polydypsia, no heat/cold intolerance  Hematology: denies bruising easily, denies bleeding problems, denies clotting disorders    PHYSICAL EXAM:    BP (!) 155/87   Pulse 76   Temp 98.2 °F (36.8 °C) (Oral)   Resp 16   Ht 6' (1.829 m)   Wt 188 lb (85.3 kg)   SpO2 96%   BMI 25.50 kg/m²     General appearance:  No apparent distress, appears older than stated age and cooperative. HEENT:  Normal cephalic, atraumatic without obvious deformity. Pupils equal, round, and reactive to light. Conjunctivae/corneas clear. Neck: Supple, with full range of motion. No jugular venous distention. Trachea midline. Respiratory:  Normal respiratory effort.  Diminished to auscultation, bilaterally without Rales/Wheezes/Rhonchi. Cardiovascular:  Regular rate and rhythm with normal S1/S2 without murmurs, rubs or gallops. Abdomen: Soft, non-tender, non-distended with normal bowel sounds. Musculoskeletal:  No clubbing, cyanosis or edema bilaterally. Full range of motion without deformity. Skin: Skin color, texture, turgor normal.    Neurologic:  Neurovascularly intact without any focal sensory/motor deficits. Cranial nerves: II-XII intact, grossly non-focal.  Psychiatric:  Alert and oriented, thought content appropriate  Capillary Refill: Brisk,< 3 seconds   Peripheral Pulses: +2 palpable, equal bilaterally       Labs:     Recent Labs     04/09/19  1027 04/09/19  1151   WBC 8.7 9.7   HGB 15.0 15.8   HCT 44.4 47.2    216     Recent Labs     04/09/19  1027      K 4.1      CO2 26   BUN 17   CREATININE 0.9   CALCIUM 9.2     Recent Labs     04/09/19  1027   AST 19   ALT 7*   BILITOT 0.7   ALKPHOS 77     Recent Labs     04/09/19  1027   INR 1.09     No results for input(s): Kalia Vang in the last 72 hours. Urinalysis:      Lab Results   Component Value Date    NITRU NEGATIVE 10/22/2018    WBCUA 2-4 10/22/2018    BACTERIA NONE 10/22/2018    RBCUA 3-5 10/22/2018    BLOODU NEGATIVE 10/22/2018    GLUCOSEU NEGATIVE 10/22/2018       Radiology:     CT HEAD WO CONTRAST   Final Result   1. There is a large area of hypoattenuation within the right occipital lobe and tracking anteriorly along the medial aspect of the right temporal lobe consistent with an infarct acute to subacute. There is associated local mass effect with sulcal    effacement however there is no midline shift. There is no associated hemorrhage. A stat MRI of the brain and a CTA examination of the head and neck recommended for additional evaluation of the infarct and also to exclude an underlying mass lesion given    the history of cancer. 2. Latasha TALAMANTES 4/9/2018 at 1141 hours.          **This report stomach. An esophagram or endoscopy recommended to further evaluate this. 4. There is mesenteric edema. There is also a small amount of free fluid within the dependent portion of the pelvis. There is no loculated or drainable fluid collection. In addition there is diffuse subcutaneous edema suggesting 3rd spacing. 5. There is thickening of the urinary bladder wall with the differential including a cystitis and a mass/malignancy. **This report has been created using voice recognition software. It may contain minor errors which are inherent in voice recognition technology. **      Final report electronically signed by Dr. Victoria Melgar on 4/9/2019 12:00 PM      XR CHEST PORTABLE   Final Result   1. The lung fields are emphysematous. There is stable pleural reaction along the lateral costophrenic angles bilaterally and the lateral margin of the right lower chest. There is no consolidation or infiltrates. There is no pulmonary vascular congestion. **This report has been created using voice recognition software. It may contain minor errors which are inherent in voice recognition technology. **      Final report electronically signed by Dr. Victoria Melgar on 4/9/2019 11:09 AM          EKG:  I have reviewed the EKG with the following interpretation: SR HR 77; artifact baseline; inverted T waves in lead II, V5-V6         DVT prophylaxis: [] Lovenox                                 [] SCDs                                 [x] Heparin gtt                                 [] Encourage ambulation           [] Already on Anticoagulation    Code Status: Prior      Disposition:    [x] Home       [] TCU       [] Rehab       [] Psych       [] SNF       [] [facility]       [] Other-    PROBLEM LIST:    C/Lokesh [address] Problems    Diagnosis Date Noted    [facility]non[facility]ST elevated [facility] [I21.4] 04/09/2019       ASSESSMENT/PLAN:    1. NSTEMI--cardiology consult;  Heparin and NTG gtts; add ASA; check lipids  2. Subacute CVA right occipital lobe--consult neurology~I spoke with Dr Fritz Sanchez in ED[facility] for Heparin gtt and plan MRI brain W/WO contrast in a few days to avoid dye load as will be getting a cath-states MRI brain is not going to change any tx and contrast will allow to eval for mets; ASA, check lipids; on Heparin gtt  3. Squamous cell carcinoma of the left tonsil--follows with Dr Lee Sprague; CT scans from today show likely mets; will have oncology see; pt saw Dr eLe Sprague on March 29, 2019  4. Tobacco abuse--cessation counseling; add Nicoderm patch; check bedside PFT's        Thank you Kya Avina MD for the opportunity to be involved in this patient's care.     Electronically signed by YAQUELIN Fan CNP on 4/9/2019 at 1:04 PM

## 2019-04-09 NOTE — ED PROVIDER NOTES
[facility]  eMERGENCY dEPARTMENT eNCOUnter          CHIEF COMPLAINT       Chief Complaint   Patient presents with    Anxiety    Emesis       Nurses Notes reviewed and I agree except as noted in the HPI. HISTORY OF PRESENT ILLNESS    Dick Monreal is a 62 y.o. male who presents to the Emergency Department for the evaluation of anxiety, \"my head feels weird\", and \"I don't feel well\". The patient states he has been experiencing swelling and soreness in his neck, shooting pain in his chest, shortness of breath, left sided numbness and weakness, left sided peripheral vision loss, coughing, sneezing, nausea and occasional emesis, lack of appetite, difficulty swallowing (chronic since chemo treatment), bad taste in the back of his mouth, rhinorrhea, fatigue, and abdominal pain. He states most of his symptoms began 2 days ago, but the anxiety has been present since his wife passed away 2-3 years ago. He states he also lost his son last year, and his dog as well, while moving. He denies experiencing suicidal ideation, but does state he does not want to live anymore. He states he is \"pretty down\". He admits to needing glasses, and a refill on his Klonopin. He states he is unsure if his symptoms are due to glucose levels or cardiac issues, admitting he may be having a stroke as well. He has an extensive history with cancer, but states he was told it is in remission at the moment. He denies drinking alcohol. He does note he has started using dryer sheets that he can smell and taste the smell of off of his clothes. The patient did not state any other complaints or symptoms during my initial encounter. REVIEW OF SYSTEMS     Review of Systems   Constitutional: Positive for appetite change (loss) and fatigue. Negative for chills and fever. HENT: Positive for rhinorrhea, sneezing and trouble swallowing (chronic since chemo treatment). Negative for congestion, ear pain and sore throat.          Bad taste in the back of his mouth   Eyes: Positive for visual disturbance (left peripheral vision is gone). Negative for discharge and redness. Respiratory: Positive for cough and shortness of breath. Negative for wheezing. Cardiovascular: Positive for chest pain (\"shooting pain\"). Negative for palpitations and leg swelling. Gastrointestinal: Positive for abdominal pain, nausea and vomiting (occasional). Negative for diarrhea. Genitourinary: Negative for decreased urine volume, difficulty urinating, dysuria and hematuria. Musculoskeletal: Positive for neck pain (soreness, with swelling). Negative for arthralgias, back pain and joint swelling. Skin: Negative for pallor and rash. Allergic/Immunologic: Negative for environmental allergies. Neurological: Positive for weakness (left sided) and numbness (left sided). Negative for dizziness, syncope, light-headedness and headaches (\"head feels weird\"). Hematological: Negative for adenopathy. Psychiatric/Behavioral: Negative for confusion and suicidal ideas (but does state he does not want to live anymore). The patient is nervous/anxious. PAST MEDICAL HISTORY    has a past medical history of Anxiety, Bleeds easily ([address] Bruises easily, Cerebrovascular accident (CVA) due to embolism of right posterior cerebral artery ([address] COPD (chronic obstructive pulmonary disease) ([address] DVT (deep venous thrombosis) ([address] Enlarged lymph node, History of emotional problems, Hx of blood clots, Hypertension, Obesity, Squamous cell carcinoma of tonsils ([address] and Type II or unspecified type diabetes mellitus without mention of complication, not stated as uncontrolled. SURGICAL HISTORY      has a past surgical history that includes knee surgery (Right, 1980's); Toe Surgery (Right, 2013); other surgical history (7-7-09); hip surgery (Right, 06/30/2014); Tunneled venous port placement; and pr office/outpt visit,procedure only (Right, 10/16/2018).     CURRENT MEDICATIONS Current Discharge Medication List      CONTINUE these medications which have NOT CHANGED    Details   oxyCODONE HCl (OXY-IR) 10 MG immediate release tablet Take 1 tablet by mouth every 8 hours as needed for Pain for up to 30 days. Qty: 90 tablet, Refills: 0    Comments: Reduce doses taken as pain becomes manageable  Associated Diagnoses: Squamous cell carcinoma of left tonsil ([facility] [address] Tongue carcinoma ([facility] [address] Chronic pain syndrome; Cancer associated pain; Malignant neoplasm of tonsil ([facility] [address] Pulmonary embolism and infarction ([facility] [address] Neuropathy      hydrOXYzine (ATARAX) 50 MG tablet Take 1-2 tablets nightly prn  Qty: 60 tablet, Refills: 2    Associated Diagnoses: Generalized anxiety disorder      lidocaine-prilocaine (EMLA) 2.5-2.5 % cream Apply topically as needed. Qty: 1 Tube, Refills: 3      morphine (MS CONTIN) 30 MG extended release tablet Take 1 tablet by mouth every 12 hours for 30 days. Qty: 60 tablet, Refills: 0    Comments: Reduce doses taken as pain becomes manageable  Associated Diagnoses: Cancer associated pain      clonazePAM (KLONOPIN) 1 MG tablet Take 1 tablet by mouth 2 times daily as needed for Anxiety for up to 60 doses. Qty: 60 tablet, Refills: 2    Associated Diagnoses: Generalized anxiety disorder      sertraline (ZOLOFT) 100 MG tablet Take 2 tablets by mouth daily  Qty: 60 tablet, Refills: 2    Associated Diagnoses: Moderate episode of recurrent major depressive disorder ([facility] [address]  Generalized anxiety disorder      naloxone (NARCAN) 4 MG/0.1ML LIQD nasal spray 1 spray by Nasal route as needed for Opioid Reversal  Qty: 1 each, Refills: 0      docusate sodium (COLACE) 100 MG capsule Take 100 mg by mouth 3 times daily as needed for Constipation      apixaban (ELIQUIS) 5 MG TABS tablet Take 1 tablet by mouth 2 times daily  Qty: 60 tablet, Refills: 5    Associated Diagnoses: Other acute pulmonary embolism without acute cor pulmonale (HCC)      lactulose (CHRONULAC) 10 GM/15ML solution Take 30 mLs by mouth 2 times daily as needed (constipation)  Qty: 1800 mL, Refills: 0      glucose monitoring kit (FREESTYLE) monitoring kit 1 kit by Does not apply route daily  Qty: 1 kit, Refills: 0    Comments: Also please include 100 test strips and 100 lancets to check FSBG daily. Associated Diagnoses: Type 2 diabetes mellitus with hyperglycemia, unspecified long term insulin use status      albuterol sulfate  (90 Base) MCG/ACT inhaler Inhale 2 puffs into the lungs every 6 hours as needed for Wheezing  Qty: 1 Inhaler, Refills: 2             ALLERGIES     is allergic to latex; coumadin [warfarin sodium]; and tape [adhesive tape]. FAMILY HISTORY     indicated that his mother is . He indicated that his father is . He indicated that his maternal grandmother is . He indicated that his maternal grandfather is . He indicated that his paternal grandmother is . He indicated that his paternal grandfather is . family history includes Cancer in his mother. SOCIAL HISTORY      reports that he has been smoking cigarettes. He has a 20.00 pack-year smoking history. He has never used smokeless tobacco. He reports that he does not drink alcohol or use drugs. PHYSICAL EXAM     INITIAL VITALS:  height is 6' (1.829 m) and weight is 178 lb 6.4 oz (80.9 kg). His oral temperature is 98 °F (36.7 °C). His blood pressure is 130/60 and his pulse is 65. His respiration is 16 and oxygen saturation is 95%. Physical Exam   Constitutional: He is oriented to person, place, and time. He appears well-developed and well-nourished. Handling his own secretions. HENT:   Head: Normocephalic and atraumatic. Right Ear: External ear normal.   Left Ear: External ear normal.   Eyes: Pupils are equal, round, and reactive to light. Conjunctivae and EOM are normal. Right eye exhibits no discharge. Left eye exhibits no discharge. No scleral icterus. Right eye exhibits normal extraocular motion. Left eye exhibits normal extraocular motion. Left lateral superior peripheral vision loss. Neck: Normal range of motion. Neck supple. No JVD present. Edema present. Pulmonary/Chest: Effort normal. No stridor. No respiratory distress. He exhibits tenderness (sternum). Abdominal: Soft. He exhibits no distension. There is tenderness in the epigastric area. Musculoskeletal: Normal range of motion. He exhibits no edema. Neurological: He is alert and oriented to person, place, and time. No cranial nerve deficit. He exhibits normal muscle tone. GCS eye subscore is 4. GCS verbal subscore is 5. GCS motor subscore is 6. No asterixis, no upper or lower extremity drop, no facial droop. Skin: Skin is warm and dry. He is not diaphoretic. No erythema. Psychiatric: His behavior is normal. His mood appears anxious. Nursing note and vitals reviewed.         DIFFERENTIAL DIAGNOSIS:   Depression, GERD, brain tumor, stroke, ACS, costochondritis, STEMI    DIAGNOSTIC RESULTS     EKG: All EKG's are interpreted by the Emergency Department Physician who either signs or Co-signs this chart in the absence of a cardiologist.       EKG 12 Lead (Preliminary result)    Component (Lab Inquiry)   Collection Time Result Time Ventricular Rate Atrial Rate P-R Interval QRS Duration Q-T Interval   04/09/19 10:37:32 04/09/19 10:44:55 61 61 144 88 458       Collection Time Result Time QTc Calculation (Bazett) P Axis T Axis   04/09/19 10:37:32 04/09/19 10:44:55 461 83 -74         Preliminary result                Narrative:     Poor data quality, interpretation may be adversely affected  Sinus rhythm with Premature atrial complexes  Possible Inferior infarct , age undetermined  ST & T wave abnormality, consider lateral ischemia  Abnormal ECG  When compared with ECG of 22-OCT-2018 11:58,  Borderline criteria for Inferior infarct are now Present  Inverted T waves have replaced nonspecific T wave abnormality in Inferior leads RADIOLOGY: non-plain film images(s) such as CT, Ultrasound and MRI are readby theradiologist.    VL DUP LOWER EXTREMITY VENOUS BILATERAL   Final Result   No evidence of acute bilateral lower extremity DVT. **This report has been created using voice recognition software. It may contain minor errors which are inherent in voice recognition technology. **       Final report electronically signed by Dr. Cyndi Casey on 4/10/2019 8:41 PM      CT HEAD WO CONTRAST   Final Result   1. There is a large area of hypoattenuation within the right occipital lobe and tracking anteriorly along the medial aspect of the right temporal lobe consistent with an infarct acute to subacute. There is associated local mass effect with sulcal    effacement however there is no midline shift. There is no associated hemorrhage. A stat MRI of the brain and a CTA examination of the head and neck recommended for additional evaluation of the infarct and also to exclude an underlying mass lesion given    the history of cancer. 2. Latasha TALAMANTES 4/9/2018 at 1141 hours. **This report has been created using voice recognition software. It may contain minor errors which are inherent in voice recognition technology. **      Final report electronically signed by Dr. Reshma Retana on 4/9/2019 11:44 AM      CTA Chest W WO Contrast   Final Result   1. There are bullous changes within the left mid chest. There is linear parenchymal scarring and bronchiectatic change adjacent to this region. There is also a nodular linear opacity at the right posterior costophrenic angle suggesting atelectasis and/or    infiltrate. Follow-up imaging is recommended to exclude a mass within this region. A follow-up CT chest with intravenous contrast in 3 months is recommended to confirm stability. 2. There is a 0.2 to 0.3 cm nodule anteriorly within the right mid chest that this is series 3 image 83).  There is also a 0.6 cm noncalcified nodule along the left limits   PROTIME-INR - Abnormal; Notable for the following components:    INR 1.14 (*)     All other components within normal limits   RAPID INFLUENZA A/B ANTIGENS   APTT   PROTIME-INR   LIPASE   ANION GAP   OSMOLALITY   CBC   APTT   LIPID PANEL   BASIC METABOLIC PANEL   ANION GAP   GLOMERULAR FILTRATION RATE, ESTIMATED   URINE DRUG SCREEN   URINE RT REFLEX TO CULTURE   APTT   PLATELET COUNT   APTT   CBC WITH AUTO DIFFERENTIAL   PROTIME-INR   BASIC METABOLIC PANEL   TYPE AND SCREEN       EMERGENCYDEPARTMENTCOURSE:   Vitals:    Vitals:    04/10/19 1225 04/10/19 1330 04/10/19 1430 04/10/19 2025   BP: 104/60 (!) 102/52 112/66 130/60   Pulse: 86 84 62 65   Resp: 18 18 16 16   Temp:    98 °F (36.7 °C)   TempSrc:    Oral   SpO2: 97% 96% 96% 95%   Weight:       Height:         Patient was seen history physical exam was performed. See disposition below    MDM:  I consulted Dr. Shimon Medrano, who initially recommended outpatient follow up with an MRI with and without contrast.    I consulted Dr. Aminata Penaloza as well, who recommended imaging with CT head without    I gave the bolus of heparin due to lack of suspicion of acute stroke with last known well 2 days prior and reassuring NIH, in treatment of the acute NSTEMI. After which, Dr. Chase Patient returned my call, and recommended no heparin administration but bolus had already been administered for NSTEMI    I called Dr. Shimon Medrano back to inform him of the new findings. He stated the stroke does not look acute to him, and stated heparin bolus is not recommended, but heparin drip may continue. No further bolus should be given. ASA and Plavix Ok. I called Dr. Apolinar Pedraza, who recommended cath later in the day. The patient was seen and evaluated in the ED today following complaints of anxiety, \"my head feels weird\", and \"I don't feel well\", including chest pain and neck pain. Within the department I observed the patient's vital signs to show an elevated BP.   On exam, I appreciated anxiousness, good EOM, eyes PERRL, neck swelling, left lateral superior peripheral vision loss, cranial nerves in tact, no asterixis, no upper or lower extremity drop, no facial droop, epigastric tenderness, and sternum tenderness. Radiologic studies within the department revealed via CTA Chest w wo contrast: There are bullous changes within the left mid chest; There is linear parenchymal scarring and bronchiectatic change adjacent to this region; There is also a nodular linear opacity at the right posterior costophrenic angle suggesting atelectasis and/or infiltrate; Follow-up imaging is recommended to exclude a mass within this region; A follow-up CT chest with intravenous contrast in 3 months is recommended to confirm stability; There is a 0.2 to 0.3 cm nodule anteriorly within the right mid chest that this is series 3 image 83); There is also a 0.6 cm noncalcified nodule along the left major fissure within the left lower chest (series 3 image 131); In addition there is a 0.5 cm noncalcified nodule posterior laterally at the left lung base (series 3 image 142); A neoplastic etiology/metastatic disease cannot be excluded based on the CT appearance; A follow-up CT examination of the chest with intravenous contrast in 3 months is recommended to confirm stability. The CT Head without contrast revealed: There is a large area of hypoattenuation within the right occipital lobe and tracking anteriorly along the medial aspect of the right temporal lobe consistent with an infarct acute to subacute; There is associated local mass effect with sulcal  effacement however there is no midline shift; There is no associated hemorrhage; A stat MRI of the brain and a CTA examination of the head and neck recommended for additional evaluation of the infarct and also to exclude an underlying mass lesion given   the history of cancer. The CT Abdomen Pelvis w IV Contrast revealed:  There is a 0.9 cm hypodense lesion within the pancreatic body with a nonspecific CT appearance (An MRI of the pancreas is recommended for additional evaluation of this); There is nodular hypertrophy of the left adrenal gland; The bowel gas pattern is nonobstructive; There is a large amount of stool throughout the colon; There is mucosal thickening of the cardia the stomach (An esophagram or endoscopy recommended to further evaluate this); There is mesenteric edema; There is also a small amount of free fluid within the dependent portion of the pelvis; There is no loculated or drainable fluid collection; In addition there is diffuse subcutaneous edema suggesting 3rd spacing; There is thickening of the urinary bladder wall with the differential including a cystitis and a mass/malignancy. The CXR revealed: The lung fields are emphysematous; There is stable pleural reaction along the lateral costophrenic angles bilaterally and the lateral margin of the right lower chest; There is no consolidation or infiltrates; There is no pulmonary vascular congestion. Laboratory results showed an increasing troponin from 0.445 (10:59) to 0.503 (12:46). I consulted Eliane Parks CNP who graciously agreed to admit the patient. Within the department, the patient was treated with Heparin 25,000 units in dextrose 5% 250 mL infusion, aspirin chewable tablet 243 mg, nitroglycerin 50mg in dextrose 5% 250mL infusion, heparin injection 4,000 units, MAALOX, Zofran, Tylenol. I explained my proposed course of treatment to the patient, who was amenable to my decision, and I answered all questions that were asked. The patient was then admitted under Eliane Parks CNP.       CRITICAL CARE:   None    CONSULTS:  Dr. Rivas Situ  Dr. Veronika Pineda, neurology  Dr. Jimenez Shaver, neurointerventionalist  Dr. Jesse Lee, cardiology    PROCEDURES:  None    FINAL IMPRESSION    NSTEMI  Subacute Stroke    DISPOSITION/PLAN   Admit    PATIENT REFERREDTO:  Janis Davis MD  [address] [place] [id number]  [phone]            DISCHARGE MEDICATIONS:  Current Discharge Medication List          (Please note that portions of this note were completed with a voice recognition program.  Efforts were made to edit the dictations but occasionally words are mis-transcribed.)    The patient was given an opportunity to see the Emergency Attending. The patient voiced understanding that I was a Mid-Level Provider and was in agreement with being seen independently by myself. Scribe:  Eligio Albarran 4/9/19 10:19 AM Scribing for and in the presence of Christina Restrepo.     Signed by: Elizabeth Dukes, 04/10/19 9:47 PM    Provider:  I personally performed the services described in the documentation, reviewed and edited the documentation which was dictated to the scribe in my presence, and it accurately records my words and actions.     Martina Kilgore PA-C 4/9/19 9:47 PM    MISSY Ireland PA-C  04/10/19 [id number]

## 2019-04-09 NOTE — PROGRESS NOTES
Pharmacy Medication History Note      List of current medications patient is taking is complete. Source of information: Patient    Changes made to medication list:  Medications removed (include reason, ex. therapy complete or physician discontinued):  None    Medications added/doses adjusted:  None    Other notes (ex. Recent course of antibiotics, Coumadin dosing):  Patient states that all his medications are accurate on [facility] ([facility]) My Chart  Denies use of other OTC or herbal medications.       Allergies reviewed      Electronically signed by Tess Barrientos on 4/9/2019 at 3:51 PM

## 2019-04-09 NOTE — ED TRIAGE NOTES
PT in bed crying. Pt stated that he has felt off for about a week. PT stated that he has had high anxiety. Pt stated that the last 2 days he has been very nauseous. PT stated that he vomited 2 times today. PT in no distress.

## 2019-04-09 NOTE — CONSULTS
NEUROLOGY CONSULT NOTE      Requesting Physician: Roxi Sy MD    Reason for Consult:  Evaluate for stroke    History of Present Illness:  Brice Dumont is a 62 y.o. male admitted to [address] [place] [address] on 4/9/2019.        62year old man with hx of tonsil cancer, c/o 1 week of feeling not well, he started c/o chest pain in the last week, but 2 days ago c/o left vision feeling blurry but he could still see and mild headache. He decided to come into the hospital.    Labs and EKG showed patient is in MI, CT head showed a subacute stroke in the right occipital lobe. Past Medical History:        Diagnosis Date    Anxiety     Bleeds easily ([address]     Bruises easily     COPD (chronic obstructive pulmonary disease) (HCC)     DVT (deep venous thrombosis) ([address] 2009    Enlarged lymph node     History of emotional problems     Hx of blood clots 2009    Hypertension     [address]    Obesity     Squamous cell carcinoma of tonsils ([facility] 2018    left   - radiation    Type II or unspecified type diabetes mellitus without mention of complication, not stated as uncontrolled     lost weight, no meds at present 2018           Procedure Laterality Date    HIP SURGERY Right 06/30/2014    Excisin Right Hip Mass-Dr. Nuzhat eLija     KNEE SURGERY Right 1980's    OTHER SURGICAL HISTORY  7-7-09    lisa filter placed     SC OFFICE/OUTPT VISIT,PROCEDURE ONLY Right 10/16/2018    RIGHT FOOT EXCISION ULCER, RIGHT 5th METATARSAL BASE RESECTION performed by Roseline Rodriguez DPM at [facility] TOE SURGERY Right 2013    wound to bone (second toe)     TUNNELED VENOUS PORT PLACEMENT         Allergies:     Allergies   Allergen Reactions    Latex Hives    Coumadin [Warfarin Sodium] Other (See Comments)     Tears skin     Tape Katie Inch Tape] Other (See Comments)     Tears skin         Current Medications:     nitroGLYCERIN 50 mg in dextrose 5% 250 mL infusion Continuous   heparin (porcine) injection 4,000 Units PRN   heparin (porcine) injection 2,000 Units PRN   heparin 25,000 units in dextrose 5% 250 mL infusion Continuous   albuterol sulfate  (90 Base) MCG/ACT inhaler 2 puff Q6H PRN   clonazePAM (KLONOPIN) tablet 1 mg BID PRN   [START ON 4/10/2019] sertraline (ZOLOFT) tablet 200 mg Daily   sodium chloride flush 0.9 % injection 10 mL 2 times per day   sodium chloride flush 0.9 % injection 10 mL PRN   ondansetron (ZOFRAN) injection 4 mg Q6H PRN   atorvastatin (LIPITOR) tablet 40 mg Nightly   [START ON 4/10/2019] aspirin chewable tablet 81 mg Daily   0.9 % sodium chloride infusion Continuous   docusate sodium (COLACE) capsule 100 mg BID   senna (SENOKOT) tablet 8.6 mg Daily PRN   nicotine (NICODERM CQ) 21 MG/24HR 1 patch Daily   acetaminophen (TYLENOL) tablet 650 mg Q4H PRN        Social History:  Social History     Tobacco Use   Smoking Status Current Every Day Smoker    Packs/day: 0.50    Years: 40.00    Pack years: 20.00    Types: Cigarettes   Smokeless Tobacco Never Used   Tobacco Comment    5 cig a day      Social History     Substance and Sexual Activity   Alcohol Use No     Social History     Substance and Sexual Activity   Drug Use No         Family History:       Problem Relation Age of Onset    Cancer Mother         lung       Review of Systems:  All systems reviewed are negative except what is mentioned in history of present illness. Physical Exam:  BP (!) 167/88   Pulse 68   Temp 98.3 °F (36.8 °C)   Resp 16   Ht 6' (1.829 m)   Wt 178 lb 6.4 oz (80.9 kg)   SpO2 96%   BMI 24.20 kg/m²  I Body mass index is 24.2 kg/m².  I Wt Readings from Last 1 Encounters:   04/09/19 178 lb 6.4 oz (80.9 kg)           General appearance - alert, well appearing, and in no distress, oriented to person, place, and time and weight  Mental status -Level of Alertness: awake  Orientation: person, place, time  Memory: normal  Fund of Knowledge: normal  Attention/Concentration: normal  Language: normal. Mood is normal  Neck - supple, no significant adenopathy, carotids upstroke normal bilaterally, no carotid bruits. There is no LAP in the neck. There is no thyroid enlargement. Neurological -   Cranial Vkyiir-SL-GBA:   Cranial nerve II: Normal. There is full visual fields  Cranial nerve III: Pupils: equal, round, reactive to light   Cranial nerves III, IV, VI: Extraocular Movements: intact   Cranial nerve V: Facial sensation: intact   Cranial nerve VII:Facial strength: intact , right decrease nasolabial fold  Cranial nerve VIII: Hearing: intact   Cranial nerve IX: Palate Elevation intact bilaterally  Cranial nerve XI: Shoulder shrug intact bilaterally  Cranial nerve XII: Tongue midline   neck supple without rigidity    Motor exam is 5/5 in the upper and lower extremities Normal muscle tone  There is no muscle atrophy There is no muscle fasciculation  Sensory is intact    Coordination: finger to nose intact  Gait and station not tested     Abnormal movement none. Skin - no rashes or lesions  Superficial temporal artery pulses are normal.   Musculoskeletal: Has no hand arthritis, no limitation of ROM in any of the four extremities, . no joint tenderness, deformity or swelling. There is no leg edema. The Heart was regular in rate and rhythm. No heart murmur  Chest- Clear  Abdomen: , Soft, Intact bowel sounds. Labs:    CBC: Recent Labs     04/09/19  1027 04/09/19  1151   WBC 8.7 9.7   HGB 15.0 15.8    216   MCV 87.2 87.9   MCH 29.5 29.4   MCHC 33.8 33.5     CMP:  Recent Labs     04/09/19  1027      K 4.1      CO2 26   BUN 17   CREATININE 0.9   LABGLOM 86*   GLUCOSE 112*   CALCIUM 9.2     Liver: Recent Labs     04/09/19  1027   AST 19   ALT 7*   ALKPHOS 77   PROT 6.4   LABALBU 3.4*   BILITOT 0.7     MRI BRAIN:  No results found for this or any previous visit. No results found for this or any previous visit. No results found for this or any previous visit.   No results found for this or any previous visit. No results found for this or any previous visit. No results found for this or any previous visit. No results found for this or any previous visit. Results for orders placed during the hospital encounter of 04/09/19   CT HEAD WO CONTRAST    Narrative PROCEDURE: CT HEAD WO CONTRAST    CLINICAL INFORMATION: Peripheral vision loss for 2 days; history of cancer. COMPARISON: No prior study. TECHNIQUE:   5 mm axial imaging through the head without IV contrast.     All CT scans at this facility use dose modulation, iterative reconstruction, and/or weight based dosing when appropriate to reduce the radiation dose to as low as reasonably achievable. FINDINGS:   POSTOPERATIVE CHANGES: None. BRAIN VOLUME:   1. Normal for the patient's age. VENTRICLES/CISTERNS:   1. Normal for the patient's age. INFARCT/WHITE MATTER DISEASE:   1. There is a large area of hypoattenuation within the right occipital lobe and tracking anteriorly along the medial aspect of the right temporal lobe consistent with an infarct acute to subacute. There is associated local mass effect with sulcal   effacement however there is no midline shift. There is no associated hemorrhage. A stat MRI of the brain and a CTA examination of the head and neck recommended for additional evaluation of the infarct and also to exclude an underlying mass lesion given   the history of cancer. 2. There is mild white matter disease within the periventricular deep white matter. INTRACRANIAL HEMORRHAGE: None. MASS/MASS EFFECT/MIDLINE SHIFT: None. INTRA-AXIAL/EXTRA-AXIAL FLUID COLLECTIONS: None. INTRAORBITAL CONTENTS: Unremarkable. OTHER: None. SKULL/SCALP: Unremarkable. PARANASAL SINUSES:   1. There is a mucous retention cyst or polyp within the right maxillary sinus. 2. Mild mucosal thickening within the upper inner cells. Impression 1.  There is a large area of hypoattenuation within the right occipital lobe and tracking anteriorly along the medial aspect of the right temporal lobe consistent with an infarct acute to subacute. There is associated local mass effect with sulcal   effacement however there is no midline shift. There is no associated hemorrhage. A stat MRI of the brain and a CTA examination of the head and neck recommended for additional evaluation of the infarct and also to exclude an underlying mass lesion given   the history of cancer. 2. Latasha TALAMANTES 4/9/2018 at 1141 hours. **This report has been created using voice recognition software. It may contain minor errors which are inherent in voice recognition technology. **    Final report electronically signed by Dr. Toya Luna on 4/9/2019 11:44 AM         We reviewed the patient records and available information in the EHR       Impression:    Active Problems:    NSTEMI (non-ST elevated myocardial infarction) [facility])  Resolved Problems:    * No resolved hospital problems. *    60 year old man with hx of tonsil cancer, now with NSTMI and subacute right occipital stroke. Recommendations:    - originally recommend MRI brain w/wo contrast to workup for stroke and mets, however CT head was done. - at this moment, there is no hurry to get MRI brain to r/o cancer, however, this should be done when patient is better  - ok to use heparin for MI, the risk of hemorrhagic transformation is there, however, in this case, the benefit outweight risk   - ok to have cardiac cath, ok to use heparin during the cath for his MI  - ok to start full dose anticoagulation if needed  - ok to start aspirin  - recommend to get him MRA head and neck while he gets his MRI brain w/wo contrast.  - keep BP below 160/90, there is no reason to keep his BP high, this will not only stress his heart in the setting of MI but may increase hemorrhage in the setting of heparin gtt.       I spend a total of 80 minutes with greater than 60% of time spent face to face, counseling, coordinating

## 2019-04-09 NOTE — PROGRESS NOTES
Patient admitted to 3B, he said he is current smoker. His dentures are at home upper and lowers. He eats soft foods at home he states, he has back pain, head ache, said he fell at [address] couple weeks ago, he said Dr. Phill Mcdonald? said he has arthritis worse. He said he follows with pain clinic here at [facility]. He is alergic to coumadin, those are the scars on his bilateral lower legs. They suspected possible mets for cancer on CT scan. Oncology on board as well as cardiology and neurology. Patient in a lot of pain. Morphine Q2 for pain. mediport accessed by [facility]. He states he may need help on d/c, he says he has a walker at home, heparin drip, nitro drip, and 0.9 running. He is nauseated zofran given. Very pleasant gentleman.

## 2019-04-10 ENCOUNTER — TELEPHONE (OUTPATIENT)
Dept: FAMILY MEDICINE CLINIC | Age: 59
End: 2019-04-10

## 2019-04-10 ENCOUNTER — APPOINTMENT (OUTPATIENT)
Dept: MRI IMAGING | Age: 59
DRG: 260 | End: 2019-04-10
Payer: MEDICARE

## 2019-04-10 ENCOUNTER — TELEPHONE (OUTPATIENT)
Dept: ENT CLINIC | Age: 59
End: 2019-04-10

## 2019-04-10 ENCOUNTER — APPOINTMENT (OUTPATIENT)
Dept: CARDIAC CATH/INVASIVE PROCEDURES | Age: 59
DRG: 260 | End: 2019-04-10
Payer: MEDICARE

## 2019-04-10 ENCOUNTER — APPOINTMENT (OUTPATIENT)
Dept: INTERVENTIONAL RADIOLOGY/VASCULAR | Age: 59
DRG: 260 | End: 2019-04-10
Payer: MEDICARE

## 2019-04-10 LAB
ABO: NORMAL
ANION GAP SERPL CALCULATED.3IONS-SCNC: 12 MEQ/L (ref 8–16)
ANTIBODY SCREEN: NORMAL
APTT: 99.9 SECONDS (ref 22–38)
BUN BLDV-MCNC: 15 MG/DL (ref 7–22)
CALCIUM SERPL-MCNC: 8.5 MG/DL (ref 8.5–10.5)
CHLORIDE BLD-SCNC: 101 MEQ/L (ref 98–111)
CHOLESTEROL, TOTAL: 161 MG/DL (ref 100–199)
CO2: 25 MEQ/L (ref 23–33)
CREAT SERPL-MCNC: 0.7 MG/DL (ref 0.4–1.2)
ERYTHROCYTE [DISTWIDTH] IN BLOOD BY AUTOMATED COUNT: 12.6 % (ref 11.5–14.5)
ERYTHROCYTE [DISTWIDTH] IN BLOOD BY AUTOMATED COUNT: 40.7 FL (ref 35–45)
GFR SERPL CREATININE-BSD FRML MDRD: > 90 ML/MIN/1.73M2
GLUCOSE BLD-MCNC: 93 MG/DL (ref 70–108)
HCT VFR BLD CALC: 37.7 % (ref 42–52)
HDLC SERPL-MCNC: 46 MG/DL
HEMOGLOBIN: 12.8 GM/DL (ref 14–18)
INR BLD: 1.14 (ref 0.85–1.13)
LDL CHOLESTEROL CALCULATED: 104 MG/DL
MCH RBC QN AUTO: 30.1 PG (ref 26–33)
MCHC RBC AUTO-ENTMCNC: 34 GM/DL (ref 32.2–35.5)
MCV RBC AUTO: 88.7 FL (ref 80–94)
PLATELET # BLD: 190 THOU/MM3 (ref 130–400)
PMV BLD AUTO: 9.9 FL (ref 9.4–12.4)
POTASSIUM SERPL-SCNC: 3.8 MEQ/L (ref 3.5–5.2)
RBC # BLD: 4.25 MILL/MM3 (ref 4.7–6.1)
RH FACTOR: NORMAL
SODIUM BLD-SCNC: 138 MEQ/L (ref 135–145)
TRIGL SERPL-MCNC: 56 MG/DL (ref 0–199)
WBC # BLD: 7 THOU/MM3 (ref 4.8–10.8)

## 2019-04-10 PROCEDURE — A9579 GAD-BASE MR CONTRAST NOS,1ML: HCPCS | Performed by: PSYCHIATRY & NEUROLOGY

## 2019-04-10 PROCEDURE — 6360000002 HC RX W HCPCS

## 2019-04-10 PROCEDURE — 2709999900 HC NON-CHARGEABLE SUPPLY

## 2019-04-10 PROCEDURE — 99232 SBSQ HOSP IP/OBS MODERATE 35: CPT | Performed by: INTERNAL MEDICINE

## 2019-04-10 PROCEDURE — 36415 COLL VENOUS BLD VENIPUNCTURE: CPT

## 2019-04-10 PROCEDURE — 93458 L HRT ARTERY/VENTRICLE ANGIO: CPT | Performed by: INTERNAL MEDICINE

## 2019-04-10 PROCEDURE — C1894 INTRO/SHEATH, NON-LASER: HCPCS

## 2019-04-10 PROCEDURE — 70544 MR ANGIOGRAPHY HEAD W/O DYE: CPT

## 2019-04-10 PROCEDURE — 85730 THROMBOPLASTIN TIME PARTIAL: CPT

## 2019-04-10 PROCEDURE — 99233 SBSQ HOSP IP/OBS HIGH 50: CPT | Performed by: PSYCHIATRY & NEUROLOGY

## 2019-04-10 PROCEDURE — C1769 GUIDE WIRE: HCPCS

## 2019-04-10 PROCEDURE — 6370000000 HC RX 637 (ALT 250 FOR IP): Performed by: NURSE PRACTITIONER

## 2019-04-10 PROCEDURE — 86850 RBC ANTIBODY SCREEN: CPT

## 2019-04-10 PROCEDURE — 80048 BASIC METABOLIC PNL TOTAL CA: CPT

## 2019-04-10 PROCEDURE — 2500000003 HC RX 250 WO HCPCS

## 2019-04-10 PROCEDURE — 80061 LIPID PANEL: CPT

## 2019-04-10 PROCEDURE — 86900 BLOOD TYPING SEROLOGIC ABO: CPT

## 2019-04-10 PROCEDURE — 6360000004 HC RX CONTRAST MEDICATION: Performed by: INTERNAL MEDICINE

## 2019-04-10 PROCEDURE — 85027 COMPLETE CBC AUTOMATED: CPT

## 2019-04-10 PROCEDURE — 70553 MRI BRAIN STEM W/O & W/DYE: CPT

## 2019-04-10 PROCEDURE — 86901 BLOOD TYPING SEROLOGIC RH(D): CPT

## 2019-04-10 PROCEDURE — 93970 EXTREMITY STUDY: CPT

## 2019-04-10 PROCEDURE — 85610 PROTHROMBIN TIME: CPT

## 2019-04-10 PROCEDURE — 70547 MR ANGIOGRAPHY NECK W/O DYE: CPT

## 2019-04-10 PROCEDURE — 6360000002 HC RX W HCPCS: Performed by: INTERNAL MEDICINE

## 2019-04-10 PROCEDURE — 6360000004 HC RX CONTRAST MEDICATION: Performed by: PSYCHIATRY & NEUROLOGY

## 2019-04-10 PROCEDURE — 2140000000 HC CCU INTERMEDIATE R&B

## 2019-04-10 PROCEDURE — 94010 BREATHING CAPACITY TEST: CPT

## 2019-04-10 PROCEDURE — 6360000002 HC RX W HCPCS: Performed by: NURSE PRACTITIONER

## 2019-04-10 PROCEDURE — 2580000003 HC RX 258: Performed by: NURSE PRACTITIONER

## 2019-04-10 RX ORDER — SODIUM CHLORIDE 0.9 % (FLUSH) 0.9 %
10 SYRINGE (ML) INJECTION PRN
Status: DISCONTINUED | OUTPATIENT
Start: 2019-04-10 | End: 2019-04-10 | Stop reason: SDUPTHER

## 2019-04-10 RX ORDER — SODIUM CHLORIDE 0.9 % (FLUSH) 0.9 %
10 SYRINGE (ML) INJECTION EVERY 12 HOURS SCHEDULED
Status: DISCONTINUED | OUTPATIENT
Start: 2019-04-10 | End: 2019-04-10 | Stop reason: SDUPTHER

## 2019-04-10 RX ORDER — SODIUM CHLORIDE 0.9 % (FLUSH) 0.9 %
10 SYRINGE (ML) INJECTION EVERY 12 HOURS SCHEDULED
Status: DISCONTINUED | OUTPATIENT
Start: 2019-04-10 | End: 2019-04-11 | Stop reason: HOSPADM

## 2019-04-10 RX ORDER — HEPARIN SODIUM 5000 [USP'U]/ML
5000 INJECTION, SOLUTION INTRAVENOUS; SUBCUTANEOUS ONCE
Status: COMPLETED | OUTPATIENT
Start: 2019-04-10 | End: 2019-04-10

## 2019-04-10 RX ORDER — ACETAMINOPHEN 325 MG/1
650 TABLET ORAL EVERY 4 HOURS PRN
Status: DISCONTINUED | OUTPATIENT
Start: 2019-04-10 | End: 2019-04-10 | Stop reason: SDUPTHER

## 2019-04-10 RX ORDER — SODIUM CHLORIDE 9 MG/ML
INJECTION, SOLUTION INTRAVENOUS CONTINUOUS
Status: DISCONTINUED | OUTPATIENT
Start: 2019-04-10 | End: 2019-04-11

## 2019-04-10 RX ORDER — SODIUM CHLORIDE 0.9 % (FLUSH) 0.9 %
10 SYRINGE (ML) INJECTION PRN
Status: DISCONTINUED | OUTPATIENT
Start: 2019-04-10 | End: 2019-04-11 | Stop reason: HOSPADM

## 2019-04-10 RX ORDER — CHLORHEXIDINE GLUCONATE 4 G/100ML
SOLUTION TOPICAL ONCE
Status: COMPLETED | OUTPATIENT
Start: 2019-04-10 | End: 2019-04-11

## 2019-04-10 RX ADMIN — IOPAMIDOL 40 ML: 755 INJECTION, SOLUTION INTRAVENOUS at 08:40

## 2019-04-10 RX ADMIN — GADOTERIDOL 15 ML: 279.3 INJECTION, SOLUTION INTRAVENOUS at 17:20

## 2019-04-10 RX ADMIN — DOCUSATE SODIUM 100 MG: 100 CAPSULE, LIQUID FILLED ORAL at 10:28

## 2019-04-10 RX ADMIN — MORPHINE SULFATE 30 MG: 30 TABLET, EXTENDED RELEASE ORAL at 10:28

## 2019-04-10 RX ADMIN — ATORVASTATIN CALCIUM 40 MG: 40 TABLET, FILM COATED ORAL at 20:25

## 2019-04-10 RX ADMIN — ASPIRIN 81 MG 81 MG: 81 TABLET ORAL at 08:05

## 2019-04-10 RX ADMIN — OXYCODONE HYDROCHLORIDE 10 MG: 5 TABLET ORAL at 18:11

## 2019-04-10 RX ADMIN — SERTRALINE 200 MG: 100 TABLET, FILM COATED ORAL at 10:27

## 2019-04-10 RX ADMIN — MORPHINE SULFATE 30 MG: 30 TABLET, EXTENDED RELEASE ORAL at 00:07

## 2019-04-10 RX ADMIN — CLONAZEPAM 1 MG: 1 TABLET ORAL at 11:19

## 2019-04-10 RX ADMIN — CLONAZEPAM 1 MG: 1 TABLET ORAL at 02:01

## 2019-04-10 RX ADMIN — MORPHINE SULFATE 2 MG: 2 INJECTION, SOLUTION INTRAMUSCULAR; INTRAVENOUS at 04:19

## 2019-04-10 RX ADMIN — MORPHINE SULFATE 2 MG: 2 INJECTION, SOLUTION INTRAMUSCULAR; INTRAVENOUS at 06:48

## 2019-04-10 RX ADMIN — ACETAMINOPHEN 650 MG: 325 TABLET ORAL at 20:26

## 2019-04-10 RX ADMIN — CLONAZEPAM 1 MG: 1 TABLET ORAL at 22:24

## 2019-04-10 RX ADMIN — ACETAMINOPHEN 650 MG: 325 TABLET ORAL at 01:37

## 2019-04-10 RX ADMIN — MORPHINE SULFATE 30 MG: 30 TABLET, EXTENDED RELEASE ORAL at 22:24

## 2019-04-10 RX ADMIN — OXYCODONE HYDROCHLORIDE 10 MG: 5 TABLET ORAL at 09:03

## 2019-04-10 RX ADMIN — HEPARIN SODIUM 5000 UNITS: 5000 INJECTION INTRAVENOUS; SUBCUTANEOUS at 19:25

## 2019-04-10 RX ADMIN — SODIUM CHLORIDE, PRESERVATIVE FREE 10 ML: 5 INJECTION INTRAVENOUS at 22:22

## 2019-04-10 RX ADMIN — DOCUSATE SODIUM 100 MG: 100 CAPSULE, LIQUID FILLED ORAL at 20:25

## 2019-04-10 ASSESSMENT — PAIN DESCRIPTION - DESCRIPTORS: DESCRIPTORS: THROBBING

## 2019-04-10 ASSESSMENT — PAIN SCALES - GENERAL
PAINLEVEL_OUTOF10: 7
PAINLEVEL_OUTOF10: 8
PAINLEVEL_OUTOF10: 6
PAINLEVEL_OUTOF10: 7
PAINLEVEL_OUTOF10: 8
PAINLEVEL_OUTOF10: 8
PAINLEVEL_OUTOF10: 7
PAINLEVEL_OUTOF10: 8
PAINLEVEL_OUTOF10: 7

## 2019-04-10 ASSESSMENT — PAIN DESCRIPTION - ORIENTATION: ORIENTATION: RIGHT

## 2019-04-10 ASSESSMENT — PAIN DESCRIPTION - FREQUENCY: FREQUENCY: CONTINUOUS

## 2019-04-10 ASSESSMENT — PAIN DESCRIPTION - PAIN TYPE: TYPE: ACUTE PAIN

## 2019-04-10 ASSESSMENT — PAIN DESCRIPTION - LOCATION: LOCATION: HEAD

## 2019-04-10 NOTE — PROGRESS NOTES
Hospitalist Progress Note    Patient:  Mitra Rob      Unit/Bed:3B-31/031-A    YOB: 1960    MRN: [id number]       Acct: [de-identified]     PCP: Lucas Palmer MD    Date of Admission: 4/9/2019      Assessment/Plan:  Active Hospital Problems    Diagnosis Date Noted    NSTEMI (non-ST elevated myocardial infarction) ([facility] [I21.4] 04/09/2019    Cerebrovascular accident (CVA) due to embolism of right posterior cerebral artery ([facility] [I63.431]        NSTEMI - with elevated troponins, and EKG showing poor quality but lateral TWI.   - Cardiology consulted  - S/p heparin gtt and nitro gtt - OK with heparin use (given recent CVA) per Neurology  - [facility] with no obstructive CAD (4/10)  - suspect stroke and heart strain are etiology for troponemia. Acute/Subacute stroke in right occipital lobe  - large. - no indication for permissive HTN any longer  - okay for anticoag per Neuro (there is risk of hemorrhagic transformation, however benefit outweigh risk)  - f/up MRI/MRA official reads  - on statin, asa - plan to start Eliquis after BAILEY/Lynq placement tomorrow (can have hypertension acutely during BAILEY and want to avoid being anticoagulated during this procedure since has large stroke, per Neuro)   - discussion with Neuro - ok to stop asa once [facility] is started   - suspicion is for underlying AF    - will rule out PFO with BAILEY   - check doppler LE for occult clot   - there has been no noted AF on tele   - nonetheless, will require AC for hx of PE and DVT in past  - monitor tele  - PT/OT/ST  - lipid and A1C    Hx of SCC left tonsil - CT with likely mets with mult small nodules in bilateral lung and pancreas/adrenal gland. Last PET done 11/2018 showed resolution of FDG avid mass in left tongue, and cervical adenopathy.      Intraabdominal/Lung lesions - multiple small nodules in lungs (bilaterally), in pancreas body, left adrenal gland  - As above, Oncology consulted  - will likely need OP repeat PET CT (last was 11/2018 as noted above)    Hx of DVT/PE - will continue on [facility]    Tobacco abuse - counseling, patient is quite resistant to quit. Suspected PAD - further workup as OP    Chronic Pain  - follows pain management. - for now, continue on home regimen     Expected discharge date:  1-2 days    Disposition: pending         [] Home       [] TCU       [] Rehab       [] Psych       [] SNF       [] [place]       [] Other-    --------------------------------------------    Chief Complaint: Blurred vision. Hospital Course: Patient is a 59yo M admitted for blurred vision, chest pain, found to have NSTEMI. Clean cath on 4/10, however CT head noted acute/subacute right occipital stroke. MRI and MRA pending for today. Subjective (past 24 hours): Patient is agitated and wanting to leave, pleading with staff to be let go today. Denies any issues, but upon further questioning does mention poor vision. Very vague about symptoms. Denies any weakness, numbness or tingling currently. As he is standing at bedside, becomes unsteady, then says he has no issues with balance.        Medications:  Reviewed    Infusion Medications    nitroGLYCERIN 1.5 mcg/min (04/09/19 1955)    heparin (porcine) 13 Units/kg/hr (04/10/19 0249)    sodium chloride 75 mL/hr at 04/09/19 1457     Scheduled Medications    sertraline  200 mg Oral Daily    sodium chloride flush  10 mL Intravenous 2 times per day    atorvastatin  40 mg Oral Nightly    aspirin  81 mg Oral Daily    docusate sodium  100 mg Oral BID    nicotine  1 patch Transdermal Daily    morphine  30 mg Oral Q12H     PRN Meds: heparin (porcine), heparin (porcine), albuterol sulfate HFA, clonazePAM, sodium chloride flush, ondansetron, senna, acetaminophen, morphine **OR** morphine, oxyCODONE HCl, lactulose, heparin flush (100 units/mL)      Intake/Output Summary (Last 24 hours) at 4/10/2019 0748  Last data filed at 4/9/2019 2051  Gross per 24 hour   Intake 531.19 ml   Output 0 ml   Net 531.19 ml     Weight change:       Exam:  /71   Pulse 66   Temp 98 °F (36.7 °C) (Oral)   Resp 14   Ht 6' (1.829 m)   Wt 178 lb 6.4 oz (80.9 kg)   SpO2 95%   BMI 24.20 kg/m²     General appearance: Agitated, restless. Eyes:  Pupils equal, round, and reactive to light. Conjunctivae/corneas clear. HENT: Head normal in appearance. External nares normal.  Oral mucosa moist without lesions. Hearing grossly intact. Neck: Supple, with full range of motion. No jugular venous distention. Trachea midline. Respiratory:  Normal respiratory effort. Clear to auscultation, bilaterally without rales or wheezes or Rhonchi. Cardiovascular: Normal rate, regular rhythm with normal S1/S2 without murmurs. No lower extremity edema. Abdomen: Soft, non-tender, non-distended with normal bowel sounds. Musculoskeletal: Normal range of motion in extremities. There is no joint swelling or tenderness. Skin: Poor turgor of LE bilaterally. R>L with hyperpigmentation. Neurologic:  Neurovascularly intact without any focal sensory/motor deficits in the upper and lower extremities. Cranial nerves:  Unable to assess visual fields due to patient lack of cooperation with exam. No other gross deficits. Psychiatric: Alert and oriented, thought content appropriate, poor insight. Capillary Refill: slow, cold feet R>L  Peripheral Pulses: weak in bilateral LE. Labs:   Recent Labs     04/09/19  1027 04/09/19  1151 04/10/19  0435   WBC 8.7 9.7 7.0   HGB 15.0 15.8 12.8*   HCT 44.4 47.2 37.7*    216 190     Recent Labs     04/09/19  1027 04/10/19  0435    138   K 4.1 3.8    101   CO2 26 25   BUN 17 15   CREATININE 0.9 0.7   CALCIUM 9.2 8.5     Recent Labs     04/09/19  1027   AST 19   ALT 7*   BILITOT 0.7   ALKPHOS 77     Recent Labs     04/09/19  1027 04/10/19  0435   INR 1.09 1.14*     No results for input(s): Willian Winters in the last 72 hours.     Microbiology: Urinalysis:      Lab Results   Component Value Date    NITRU NEGATIVE 10/22/2018    WBCUA 2-4 10/22/2018    BACTERIA NONE 10/22/2018    RBCUA 3-5 10/22/2018    BLOODU NEGATIVE 10/22/2018    GLUCOSEU NEGATIVE 10/22/2018       Radiology:  CT HEAD WO CONTRAST   Final Result   1. There is a large area of hypoattenuation within the right occipital lobe and tracking anteriorly along the medial aspect of the right temporal lobe consistent with an infarct acute to subacute. There is associated local mass effect with sulcal    effacement however there is no midline shift. There is no associated hemorrhage. A stat MRI of the brain and a CTA examination of the head and neck recommended for additional evaluation of the infarct and also to exclude an underlying mass lesion given    the history of cancer. 2. Latasha TALAMANTES 4/9/2018 at 1141 hours. **This report has been created using voice recognition software. It may contain minor errors which are inherent in voice recognition technology. **      Final report electronically signed by Dr. Nadira Myers on 4/9/2019 11:44 AM      CTA Chest W WO Contrast   Final Result   1. There are bullous changes within the left mid chest. There is linear parenchymal scarring and bronchiectatic change adjacent to this region. There is also a nodular linear opacity at the right posterior costophrenic angle suggesting atelectasis and/or    infiltrate. Follow-up imaging is recommended to exclude a mass within this region. A follow-up CT chest with intravenous contrast in 3 months is recommended to confirm stability. 2. There is a 0.2 to 0.3 cm nodule anteriorly within the right mid chest that this is series 3 image 83). There is also a 0.6 cm noncalcified nodule along the left major fissure within the left lower chest (series 3 image 131). In addition there is a 0.5    cm noncalcified nodule posterior laterally at the left lung base (series 3 image 142).  A neoplastic etiology/metastatic disease cannot be excluded based on the CT appearance. A follow-up CT examination of the chest with intravenous contrast in 3 months    is recommended to confirm stability. **This report has been created using voice recognition software. It may contain minor errors which are inherent in voice recognition technology. **      Final report electronically signed by Dr. Patel Cuevas on 4/9/2019 11:52 AM      CT ABDOMEN PELVIS W IV CONTRAST Additional Contrast? None   Final Result   1. There is a 0.9 cm hypodense lesion within the pancreatic body with a nonspecific CT appearance. An MRI of the pancreas is recommended for additional evaluation of this. 2. There is nodular hypertrophy of the left adrenal gland. 3.  The bowel gas pattern is nonobstructive. There is a large amount of stool throughout the colon. There is mucosal thickening of the cardia the stomach. An esophagram or endoscopy recommended to further evaluate this. 4. There is mesenteric edema. There is also a small amount of free fluid within the dependent portion of the pelvis. There is no loculated or drainable fluid collection. In addition there is diffuse subcutaneous edema suggesting 3rd spacing. 5. There is thickening of the urinary bladder wall with the differential including a cystitis and a mass/malignancy. **This report has been created using voice recognition software. It may contain minor errors which are inherent in voice recognition technology. **      Final report electronically signed by Dr. Patel Cuevas on 4/9/2019 12:00 PM      XR CHEST PORTABLE   Final Result   1. The lung fields are emphysematous. There is stable pleural reaction along the lateral costophrenic angles bilaterally and the lateral margin of the right lower chest. There is no consolidation or infiltrates. There is no pulmonary vascular congestion. **This report has been created using voice recognition software.   It may contain minor errors which are inherent in voice recognition technology. **      Final report electronically signed by Dr. Ginger Posey on 4/9/2019 11:09 AM          DVT prophylaxis: [] Lovenox                                  [] SCDs                                 [x] SQ Heparin then on Eliquis tomorrow                                 [] Encourage ambulation           [] Already on Anticoagulation     Code Status: Full Code    PT/OT Eval Status: yes    Diet:   Diet NPO Effective Now    Fluids: gentle     Tele:   [x] yes             [] no      Electronically signed by Bin Viera DO on 4/10/2019 at 7:48 AM

## 2019-04-10 NOTE — PROGRESS NOTES
[facility]           Progress Note    Patient Name:  Analilia Walsh  Medical Record Number:  [id number]  YOB: 1960    Date:  4/10/2019  Time:  10:01 AM  Completed By:  Domingo Fraga RN    Reason for Palliative Care Evaluation: code status    Current Issues:  []  Pain  []  Fatigue  []  Nausea  [x]  Anxiety  []  Depression  []  Shortness of Breath  []  Constipation  []  Appetite  []  Other:    Advance Directives:none on file but spiritual care has been in to speak about AD  [] [facility] DNR Form  [] Living Will  [] Medical POA    Current Code Status  [x] Full Resuscitation  [] DNR-Comfort Care-Arrest  [] DNR-Comfort Care  [] Limited   [] No CPR   [] No shock   [] No ET intubation/reintubation   [] No resuscitative medications   [] Other limitation:    Performance Status:  70    Family/Patient Discussion:  Patient resting in bed. Alert and oriented to all spheres. Palliative care introduced. Patient is anxious about wanting to go home. Did inform the patient that this RN spoke with Dr. Keven Roberson and he will be in late this afternoon and may wish to do further testing. Emotional support provided. Plan/Follow-Up:  Did not speak about code status as this RN discussed with Dr. Keven Roberson and he states that further workup will be required to evaluate if this is a metastatic process. Updated Bonnie TALAMANTES.     Domingo Fraga RN  4/10/2019,  10:01 AM

## 2019-04-10 NOTE — PRE SEDATION
(porcine), albuterol sulfate HFA, clonazePAM, sodium chloride flush, ondansetron, senna, acetaminophen, morphine **OR** morphine, oxyCODONE HCl, lactulose, heparin flush (100 units/mL)  Home Meds:   Prior to Admission medications    Medication Sig Start Date End Date Taking? Authorizing Provider   oxyCODONE HCl (OXY-IR) 10 MG immediate release tablet Take 1 tablet by mouth every 8 hours as needed for Pain for up to 30 days. 4/6/19 5/6/19 Yes YAQUELIN Donis CNP   hydrOXYzine (ATARAX) 50 MG tablet Take 1-2 tablets nightly prn 3/25/19  Yes Pérez Keenan MD   lidocaine-prilocaine (EMLA) 2.5-2.5 % cream Apply topically as needed. 3/25/19  Yes Cathleen Velasco MD   morphine (MS CONTIN) 30 MG extended release tablet Take 1 tablet by mouth every 12 hours for 30 days. 3/25/19 4/24/19 Yes YAQUELIN Faust CNP   clonazePAM (KLONOPIN) 1 MG tablet Take 1 tablet by mouth 2 times daily as needed for Anxiety for up to 60 doses.  3/11/19 4/11/19 Yes Pérez Keenan MD   sertraline (ZOLOFT) 100 MG tablet Take 2 tablets by mouth daily 1/29/19  Yes Pérez Keenan MD   naloxone [facility]) 4 MG/0.1ML LIQD nasal spray 1 spray by Nasal route as needed for Opioid Reversal 12/24/18  Yes YAQUELIN Joe CNP   docusate sodium (COLACE) 100 MG capsule Take 100 mg by mouth 3 times daily as needed for Constipation   Yes Historical Provider, MD   apixaban (ELIQUIS) 5 MG TABS tablet Take 1 tablet by mouth 2 times daily 10/30/18  Yes Pérez Keenan MD   lactulose (CHRONULAC) 10 GM/15ML solution Take 30 mLs by mouth 2 times daily as needed (constipation) 8/23/18  Yes YAQUELIN Gomez CNP   glucose monitoring kit (FREESTYLE) monitoring kit 1 kit by Does not apply route daily 7/16/18  Yes Pérez Keenan MD   albuterol sulfate  (90 Base) MCG/ACT inhaler Inhale 2 puffs into the lungs every 6 hours as needed for Wheezing 6/4/18  Yes Pérez Keenan MD     Coumadin Use Last 7 Days:  no  Antiplatelet drug therapy use last 7 days: yes - asa  Other anticoagulant use last 7 days: yes - heparin  Additional Medication Information:        Pre-Sedation Documentation and Exam:   I have personally completed a history, physical exam & review of systems for this patient (see notes).     Mallampati Airway Assessment:  Mallampati Class II - (soft palate, fauces & uvula are visible)    Prior History of Anesthesia Complications:   none    ASA Classification:  Class 3 - A patient with severe systemic disease that limits activity but is not incapacitating    Sedation/ Anesthesia Plan:   intravenous sedation    Medications Planned:   midazolam (Versed) intravenously and fentanyl intravenously    Patient is an appropriate candidate for plan of sedation: yes    Electronically signed by Олег Koo MD on 4/10/2019 at 7:54 AM

## 2019-04-10 NOTE — TELEPHONE ENCOUNTER
Pt states he is currently admitted in the hospital and suffered a stroke. Pt hopes to be discharged today and would like an order for a hospital bed for his back problems. Please advise.

## 2019-04-10 NOTE — FLOWSHEET NOTE
During my follow up contact with the patient, he was in bed and no family was present. My purpose of the visit was to see if the patient wanted to complete his Advance Directive. The pt stated that he has the documents but wasn't ready to complete them at this time. The pt said that he would like to review the documents first. Plan: After the pt reviews the documents he will contact spiritual care.

## 2019-04-10 NOTE — CARE COORDINATION
[address] [facility],[address]     2019    Patient: Yamel Couch Patient : 1960   MRN: [id number]  Reason for Admission: NSTEMI (non-ST elevated myocardial infarction) [facility]) [I21.4]  RARS: Readmission Risk Score: 17    Respiratory at bedside for PFT. CTC will attempt inpatient Care Transitions interview at a later time. 2019 10:00 - Met with: Smith Bello for inpatient Care Transition interview. Identified self/role. Explained CTC process, verbalized understanding and agreeable to CTC calls. 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)    Tongue carcinoma ([address]    Encounter for antineoplastic chemotherapy    Cigarette nicotine dependence without complication    Unstageable pressure ulcer of right foot ([address]    Pulmonary embolism and infarction ([address]    Severe malnutrition (HCC)    Severe protein-calorie malnutrition ([address]    NSTEMI (non-ST elevated myocardial infarction) ([address]    Cerebrovascular accident (CVA) due to embolism of right posterior cerebral artery ([address]    Tonsil cancer [facility])       Inpatient Assessment  Care Transitions Summary    Care Transitions Inpatient Review  Medication Review  Are you able to afford your medications?:  Yes  How often do you have difficulty taking your medications?:  I always take them as prescribed.   Housing Review  Who do you live with?:  Alone  Are you an active caregiver in your home?:  No  Social Support  Do you have a ?:  No  Do you have a [address] [place] [address]?:  No  Durable Medical Equipment  Patient DME:  Straight cane, Walker, Shower chair, Other  [facility]  Functional Review  Ability to seek help/take action for Emergent/Urgent situations i.e. fire, crime, inclement weather or health crisis. :  Independent  Ability handle personal hygiene needs (bathing/dressing/grooming): Independent  Ability to manage medications: Independent  Ability to prepare food:  Independent  Ability to maintain home (clean home, laundry): Independent  Ability to drive and/or has transportation:  Dependent  Ability to do shopping:  Needs Assistance  Ability to manage finances: Independent  Is patient able to live independently?:  Yes  Hearing and Vision  Care Transitions Interventions     Other Services:  Completed (Comment: ACC Referral)        Patient presented to the ED on 0409/2019 with report of mid-sternal chest pain, anxiety, and stress. Patient also reported a multitude of additional complaints. PMH includes COPD, DVT, HTN, DM, and squamous cell carcinoma of the tonsils. In ED, CT scan showed possible mets for cancer. Patient admitted for NSTEMI (non-ST elevated myocardial infarction) ([facility] [I21.4]. Consults with Neurology, Cardiology, Oncology, and Cardiac Rehab. Case management, social work, and palliative care following. Patient lives at home where he resides alone. Patient states he is independent with ADL's. Patient utilizes [employer] for transportation. Patient current with OP PT and SLP at [facility] prior to admission. Patient states he has a blood pressure cuff and glucometer for chronic disease management. Patient trying to obtain a hospital bed, is aware he needs to meet with PCP at discharge before DME order can be placed. Patient will have new referral to [facility] at discharge. Patient denies additional needs or concerns at this time. Business card with contact information for CTC provided. Patient understands CTC will follow upon discharge. New ACC referral for possible CC enrollment if criteria is met.  Referral for

## 2019-04-10 NOTE — PROGRESS NOTES
 sodium chloride flush  10 mL Intravenous 2 times per day    atorvastatin  40 mg Oral Nightly    aspirin  81 mg Oral Daily    docusate sodium  100 mg Oral BID    nicotine  1 patch Transdermal Daily    morphine  30 mg Oral Q12H      nitroGLYCERIN 1.5 mcg/min (19)    heparin (porcine) 13 Units/kg/hr (04/10/19 0249)    sodium chloride 75 mL/hr at 19 1457       magnesium hydroxide 30 mL Daily PRN   heparin (porcine) 4,000 Units PRN   heparin (porcine) 2,000 Units PRN   albuterol sulfate HFA 2 puff Q6H PRN   clonazePAM 1 mg BID PRN   sodium chloride flush 10 mL PRN   ondansetron 4 mg Q6H PRN   senna 1 tablet Daily PRN   acetaminophen 650 mg Q4H PRN   morphine 2 mg Q2H PRN   Or     morphine 4 mg Q2H PRN   oxyCODONE HCl 10 mg Q8H PRN   lactulose 20 g BID PRN   heparin flush (100 units/mL) 500 Units PRN       Diagnostics:  EK2019 12:15:12 [facility] ROUTINE RETRIEVAL  ** Poor data quality, interpretation may be adversely affected  Normal sinus rhythm  Left axis deviation  Pulmonary disease pattern  T wave abnormality, consider lateral ischemia  Prolonged QT interval or tu fusion, consider myocardial disease, electrolyte imbalance, or drug effects  Abnormal ECG  When compared with ECG of 2019 10:37,  Premature atrial complexes are no longer Present  Nonspecific T wave abnormality has replaced inverted T waves in Inferior leads      Echo:   Electronically signed by Eamon Villegas MD (Interpreting   physician) on 2019 at 04:24 PM   ----------------------------------------------------------------      Findings      Mitral Valve   The mitral valve structure was normal with normal leaflet separation.   DOPPLER: The transmitral velocity was within the normal range with no   evidence for mitral stenosis. There was no evidence of mitral   regurgitation.      Aortic Valve   The aortic valve was trileaflet with normal thickness and cuspal   separation.  DOPPLER: Transaortic velocity was within the normal range with   no evidence of aortic stenosis. There was no evidence of aortic   regurgitation.      Tricuspid Valve   The tricuspid valve structure was normal with normal leaflet separation.   DOPPLER: There was no evidence of tricuspid stenosis. There was trace   tricuspid regurgitation.      Pulmonic Valve   The pulmonic valve leaflets were not well seen DOPPLER: The transpulmonic   velocity was within the normal range with no evidence for regurgitation.      Left Atrium   Left atrial size was normal.      Left Ventricle   Normal left ventricle size and systolic function. Ejection fraction was   estimated at 50-55%. There were no regional left ventricular wall motion   abnormalities. Mild apical hypokinesis.  E/A flow reversal noted. Suggestive of diastolic dysfunction.      Right Atrium   Right atrial size was normal.      Right Ventricle   The right ventricular size was normal with normal systolic function and   wall thickness.      Pericardial Effusion   The pericardium was normal in appearance with no evidence of a pericardial   effusion.      Pleural Effusion   No evidence of pleural effusion.      Aorta / Great Vessels   IVC not well seen. Left Heart Cath:  [facility] 11/26/2002  CORONARY ANGIOGRAPHY         Left Coronary Artery:  no angiographically definable disease         Right Coronary Artery:  no angiographically definable disease           {dominant)      PHYSICIANS         Attending Jos BLEDSOE,,MD      Lab Data:    Cardiac Enzymes:  No results for input(s): CKTOTAL, CKMB, CKMBINDEX, TROPONINI in the last 72 hours.     CBC:   Lab Results   Component Value Date    WBC 7.0 04/10/2019    RBC 4.25 04/10/2019    HGB 12.8 04/10/2019    HCT 37.7 04/10/2019     04/10/2019       CMP:  Lab Results   Component Value Date     04/10/2019    K 3.8 04/10/2019    K 3.8 10/23/2018     04/10/2019    CO2 25 04/10/2019    BUN 15 04/10/2019 CREATININE 0.7 04/10/2019    LABGLOM >90 04/10/2019    GLUCOSE 93 04/10/2019    CALCIUM 8.5 04/10/2019       Hepatic Function Panel:  Lab Results   Component Value Date    ALKPHOS 77 04/09/2019    ALT 7 04/09/2019    AST 19 04/09/2019    PROT 6.4 04/09/2019    BILITOT 0.7 04/09/2019    BILIDIR <0.2 03/21/2019    LABALBU 3.4 04/09/2019       Magnesium:  No results found for: MG    PT/INR:    Lab Results   Component Value Date    INR 1.14 04/10/2019       HgBA1c:    Lab Results   Component Value Date    LABA1C 5.0 03/21/2019       FLP:  Lab Results   Component Value Date    TRIG 56 04/10/2019    HDL 46 04/10/2019    LDLCALC 104 04/10/2019       TSH:    Lab Results   Component Value Date    TSH 1.110 08/28/2018         Assessment:    CVA - There is a large area of hypoattenuation within the right occipital lobe and tracking anteriorly along the medial aspect of the right temporal lobe consistent with an infarct acute to subacute. Prior Hx CVA    Abnormal EKG w/ elevated trop - concerning for NSTEMI   S\p cardiac cath - normal coronaries    Not NSTEMI   - elevated trop secondary to CVA      Hx PE / DVT 10/2018  - prescribed eliquis per Dr. Vale Bowling   ? NC with [address]     DM II  HTN  HLP    Hx COPD    Hx chronic pain issues        Plan:  · Recommend BAILEY / LINQ  Insertion for acute CVA  · Plan for tomorrow  · To follow with cardio at [facility]         Electronically signed by YAQUELIN Ward CNP on 4/10/2019 at 3:34 PM

## 2019-04-10 NOTE — CARE COORDINATION
4/10/19, 11:10 AM      Pilar Wells       Admitted from: ED 2019/ 1200 [facility] day: 1   Location: --A Reason for admit: NSTEMI (non-ST elevated myocardial infarction) ([address] [I21.4] Status: IP  Admit order signed?: yes  PMH:  has a past medical history of Anxiety, Bleeds easily ([address] Bruises easily, Cerebrovascular accident (CVA) due to embolism of right posterior cerebral artery ([address] COPD (chronic obstructive pulmonary disease) ([address] DVT (deep venous thrombosis) ([address] Enlarged lymph node, History of emotional problems, Hx of blood clots, Hypertension, Obesity, Squamous cell carcinoma of tonsils ([address] and Type II or unspecified type diabetes mellitus without mention of complication, not stated as uncontrolled. Procedure: 4/10 - Cardiac Cath - clean  Pertinent abnormal Imagin/9 - CXR - Emphysema.  CT head -   There is a large area of hypoattenuation within the right occipital lobe and tracking anteriorly along the medial aspect of the right temporal lobe consistent with an infarct acute to subacute. There is associated local mass effect with sulcal    effacement however there is no midline shift. There is no associated hemorrhage. A stat MRI of the brain and a CTA examination of the head and neck recommended for additional evaluation of the infarct and also to exclude an underlying mass lesion given    the history of cancer.  - CT chest -   1. There are bullous changes within the left mid chest. There is linear parenchymal scarring and bronchiectatic change adjacent to this region. There is also a nodular linear opacity at the right posterior costophrenic angle suggesting atelectasis and/or    infiltrate. Follow-up imaging is recommended to exclude a mass within this region. A follow-up CT chest with intravenous contrast in 3 months is recommended to confirm stability. 2. There is a 0.2 to 0.3 cm nodule anteriorly within the right mid chest that this is series 3 image 83).  There is Cardiology. Taken for heart cath this am, no intervention needed. Cardiac Rehab consulted. Consulting Oncology. IVF, heparin and NTG gtts started. Pt has history of cancer of the tongue and lymph nodes. Diet: DIET GENERAL; No Caffeine   Smoking status:  reports that he has been smoking cigarettes. He has a 20.00 pack-year smoking history. He has never used smokeless tobacco.   PCP: Akash Haines MD  Readmission: No  Readmission Risk Score: 18%    Discharge Planning  Current Residence:  Private Residence  Living Arrangements:  Alone   Support Systems:  Family Members  Current Services PTA:     Potential Assistance Needed:  Home Care, Outpatient PT/OT  Potential Assistance Purchasing Medications:  No  Does patient want to participate in local refill/ meds to beds program?     Type of Home Care Services:     Patient expects to be discharged to:     Expected Discharge date:  04/12/19  Follow Up Appointment: Best Day/ Time:      Discharge Plan: Pt lives at home alone. He uses the [place] [facility] to get to his appointments as he no longer drives. His niece takes him to the grocery. Pt has a walker at home, shower chair, cane and his house is fitted with grab bars. He has used [facility] in the past, would be interested in this again.  made aware.       Evaluation: yes

## 2019-04-10 NOTE — PROCEDURES
[address] [id number]                            CARDIAC CATHETERIZATION    PATIENT NAME: Dwain Woodruff                      :        1960  MED REC NO:   [id number]                           ROOM:       0031  ACCOUNT NO:   [de-identified]                           ADMIT DATE: 2019  PROVIDER:     Tee Cottrell MD      DATE OF PROCEDURE:  04/10/2019    PROCEDURES PERFORMED:  1. Selective left and right coronary angiography. 2.  Measurement of LVEDP and transaortic valve gradients. FINDINGS:  1. LVEDP estimated at 8 mmHg. There was no gradient across the aortic  valve on pullback. 2.  Left main vessel is normal.  3.  The anterior descending artery and its branches showed no narrowing. 4.  The circumflex vessel and its branches showed no narrowing. 5.  Right coronary artery and its branches showed no focal narrowing. COMPLICATIONS:  None. EQUIPMENT USED:  The 5-Malagasy 7 cm sheath, 5-Malagasy Multipack diagnostic  catheters. PROCEDURE DETAILS:  Informed consent was obtained. Right radial artery  was prepped and draped in the usual fashion. Versed/fentanyl was used  for mild systemic sedation and lidocaine for local anesthesia. Right  radial arterial access was readily obtained and a 5-Malagasy 7 cm sheath  placed. The equipment outlined above was used to perform the diagnostic  study. The findings are outlined above. At the end of the procedure,  the radial sheath was removed and a Vasc Band placed. The findings were discussed with the patient. He was transferred back to his room for further monitoring and  management.         Aj Blanco MD    D: 04/10/2019 9:28:04       T: 04/10/2019 9:46:13     [id number]  Job#: [id number]     Doc#: [id number]    CC:

## 2019-04-10 NOTE — PROGRESS NOTES
Sent message to Dr. Santa Perrin to check on plan per neurology. He said he needs to come see the patient and may order MRI/MRA.

## 2019-04-10 NOTE — PROCEDURES
Bedside PFT done at this time. Pt in high fowlers for PFT and tolerated well. Results put in chart under miscellaneous tab and nurse notified.

## 2019-04-10 NOTE — PROGRESS NOTES
Inpatient Cardiac Rehabilitation Consult    Received consult for Phase II Cardiac Rehabilitation. Cardiac Rehab education completed with patient. Pt with CA and read possible mets. However, pt is interested in our program at [facility]. He is okay for me to alert them and they will follow as an OP. Brochure given.

## 2019-04-10 NOTE — TELEPHONE ENCOUNTER
Medicare requires a face to face encounter in order to place order for bed. Please have him schedule NELSON visit after discharge and we can order it then.   thanks

## 2019-04-10 NOTE — PROGRESS NOTES
Nutrition Assessment    Type and Reason for Visit: Initial, Positive Nutrition Screen(unplanned weight loss, poor po/appetite)    Nutrition Recommendations:   Consider swallow eval to insure swallowing safety as appropriate. Send soft texture foods with ground meats & sauce on meats per pt. request  Nutrition Assessment:  Pt. severely malnourished AEB severe fat & muscle loss. At risk for further nutritional compromise r/t NSTEMI, CVA, unplanned weight loss, hx tonsil cancer  Will send ensure enlive TID. Send soft with ground meats & sauce on meats per pt. request.  Meds: Chronulac, Zofran, Senokot, Maalox, Colace, Zofran. Malnutrition Assessment:  · Malnutrition Status: Meets the criteria for severe malnutrition  · Context: Chronic illness  · Findings of the 6 clinical characteristics of malnutrition (Minimum of 2 out of 6 clinical characteristics is required to make the diagnosis of moderate or severe Protein Calorie Malnutrition based on AND/ASPEN Guidelines):  1. Energy Intake-Less than or equal to 75% of estimated energy requirement, Unable to assess(atleast 1 day)    2. Weight Loss-10% loss or greater, (5 months)  3. Fat Loss-Severe subcutaneous fat loss, Orbital  4. Muscle Loss-Severe muscle mass loss, Clavicles (pectoralis and deltoids)    Nutrition Risk Level: High    Nutrient Needs:  · Estimated Daily Total Kcal: 7232-4483 kcals (30-35kcals/kgm admit wt. 80.9kgm 4/9)  · Estimated Daily Protein (g): ~121 grams (1.5 grams protein/kgm admit wt. 80.9kgm 4/9)    Nutrition Diagnosis:   · Problem: Severe malnutrition, In context of chronic illness  · Etiology: related to Catabolic illness     Signs and symptoms:  as evidenced by Intake 25-50%, Severe loss of subcutaneous fat, Severe muscle loss, Weight loss    Objective Information:  · Nutrition-Focused Physical Findings:  Thin appearing male, states fair appetite ate ~ 1/2 of meatloaf, mashed potato, broccoli & raspberry mousse meal. No BM noted, denies constipation. Mentions he had some swallowing difficulty since he had radiation in 6/2018 ( hx tonsil cancer) Pt. requests soft texture foods with ground up meats & sauce on them. Likes boost ONS, willing to trial ensure enlive here. · Wound Type: None  · Current Nutrition Therapies:  · Oral Diet Orders: General(No Caffeine, ( Soft with ground meats & sauce on meats per pt. request))   · Oral Diet intake: 26-50%  · Oral Nutrition Supplement (ONS) Orders: Standard High Calorie Oral Supplement  · ONS intake: (new)  · Anthropometric Measures:  · Ht: 6' (182.9 cm)   · Current Body Wt: 178 lb 5.6 oz (80.9 kg)  · Admission Body Wt: 178 lb 5.6 oz (80.9 kg)((4/9))  · Usual Body Wt: (per EMR: (1/8) 200# 90.7kgm, (11/20) 210# 6.4oz,202# 4.8oz (10/22/18) no edema)  · % Weight Change:  ,  11.9% unplanned weight loss in 5 months per EMR  · Ideal Body Wt: 178 lb (80.7 kg),BMI Classification: BMI 18.5 - 24.9 Normal Weight    Nutrition Interventions:   Modify current diet(send soft diet with ground meats  & sauce on meats per pt. request)  Continued Inpatient Monitoring, Education Initiated, Coordination of Care(Encouraged good oral intake, snacks between meals as tolerated. )    Nutrition Evaluation:   · Evaluation: Goals set   · Goals: Pt. will consume 75% or more at meals during LOS.      · Monitoring: Meal Intake, Supplement Intake, Diet Tolerance, Skin Integrity, Weight, Pertinent Labs, Chewing/Swallowing, Monitor Bowel Function, Patient/Family Education      Electronically signed by Kristen Talley RD, LD on 4/10/19 at 1:56 PM    Contact Number: [phone]

## 2019-04-10 NOTE — POST SEDATION
Sedation Post Procedure Note    Patient Name: Lizbeth Ritter   YOB: 1960  Room/Bed: [facility]/031-A  Medical Record Number: [id number]  Date: 4/10/2019   Time: 8:38 AM         Physicians/Assistants: Merlin Hargrove MD    Procedure Performed:  [facility]  Post-Sedation Vital Signs:  Vitals:    04/10/19 0647   BP: 124/71   Pulse:    Resp:    Temp:    SpO2:       Vital signs were reviewed and were stable after the procedure (see flow sheet for vitals)            Post-Sedation Exam: Cardiovascular: normal           Complications: none    FINDING : 1. Normal coronaries and LVEDP.     Electronically signed by Merlin Hargrove MD on 4/10/2019 at 8:38 AM

## 2019-04-10 NOTE — PLAN OF CARE
Patient plans home and either continue outpatient therapy or possible HH if appropriate. Will continue to follow.

## 2019-04-10 NOTE — PROGRESS NOTES
NEUROLOGY INPATIENT PROGRESS NOTE    Patient- Rufina David    MRN -  [id number]   Acct # - [de-identified]      - 1960    62 y.o. Subjective: The patient is seen as follow up for subacute right occipital stroke. Patient is alert at this time. He keeps saying he wants to go home. Cardiology clear him for MI    Objective:   /66   Pulse 62   Temp 97.6 °F (36.4 °C) (Oral)   Resp 16   Ht 6' (1.829 m)   Wt 178 lb 6.4 oz (80.9 kg)   SpO2 96%   BMI 24.20 kg/m²       Intake/Output Summary (Last 24 hours) at 4/10/2019 1811  Last data filed at 4/10/2019 1515  Gross per 24 hour   Intake 2038.19 ml   Output 0 ml   Net 2038.19 ml       Physical Exam:  General:  Awake, alert, not in distress. Language is intact. HEENT: pink conjunctiva, unicteric sclera, moist oral mucosa. EOMI,   Neck: There is no carotid bruits. The Neck is supple. Neuro: CN 2-12 grossly intact with more droopy right face. Cannot see well on  The left herve visual field. Power 5/5 Throughout symmetric, Reflexes are +2 symmetric. Long tracts are intact. Cerebellar exam is Intact. Sensory exam is intact to light touch. Gait is unsteady. No abnormal movement. Osteo: There is no LROM of any of the 4 extremity joints, no joint tenderness. ROS:    Cardiac: no chest pain. No palpitations.   Renal : no flank pain, no hematuria  Skin: no rash    Medications:     [Held by provider] apixaban  5 mg Oral BID    sodium chloride flush  10 mL Intravenous 2 times per day    ceFAZolin (ANCEF) IVPB  2 g Intravenous On Call to OR    chlorhexidine   Topical Once    heparin (porcine)  5,000 Units Subcutaneous Once    sertraline  200 mg Oral Daily    sodium chloride flush  10 mL Intravenous 2 times per day    atorvastatin  40 mg Oral Nightly    aspirin  81 mg Oral Daily    docusate sodium  100 mg Oral BID    nicotine  1 patch Transdermal Daily    morphine  30 mg Oral Q12H       Data:   CBC:   Recent Labs rehab  6.  Will get BAILEY tomorrow to r/o cardiac thrombus as his stroke is most likely cardioembolic    Ralph Lezama MD, 4/10/2019 6:11 PM       Ashwini Graff MD  Attending Neurologist/Neurointensivist

## 2019-04-10 NOTE — TELEPHONE ENCOUNTER
FYI Today patient called to verify his upcoming appointment. He wanted to let Dr Stefani Blankenship know that yesterday April 9,2019 he suffered a stroke. He is inpatient here at [facility] 3B 31. He stated that he hopes to go home today or tomorrow.

## 2019-04-11 ENCOUNTER — ANESTHESIA (OUTPATIENT)
Dept: ENDOSCOPY | Age: 59
DRG: 260 | End: 2019-04-11
Payer: MEDICARE

## 2019-04-11 ENCOUNTER — APPOINTMENT (OUTPATIENT)
Dept: CARDIAC CATH/INVASIVE PROCEDURES | Age: 59
DRG: 260 | End: 2019-04-11
Payer: MEDICARE

## 2019-04-11 ENCOUNTER — ANESTHESIA EVENT (OUTPATIENT)
Dept: ENDOSCOPY | Age: 59
DRG: 260 | End: 2019-04-11
Payer: MEDICARE

## 2019-04-11 VITALS
SYSTOLIC BLOOD PRESSURE: 148 MMHG | OXYGEN SATURATION: 98 % | RESPIRATION RATE: 16 BRPM | DIASTOLIC BLOOD PRESSURE: 81 MMHG

## 2019-04-11 LAB
ANION GAP SERPL CALCULATED.3IONS-SCNC: 9 MEQ/L (ref 8–16)
APTT: 29.6 SECONDS (ref 22–38)
BACTERIA: NORMAL
BASOPHILS # BLD: 0.9 %
BASOPHILS ABSOLUTE: 0 THOU/MM3 (ref 0–0.1)
BILIRUBIN URINE: NEGATIVE
BLOOD, URINE: NEGATIVE
BUN BLDV-MCNC: 20 MG/DL (ref 7–22)
CALCIUM SERPL-MCNC: 8.6 MG/DL (ref 8.5–10.5)
CASTS: NORMAL /LPF
CASTS: NORMAL /LPF
CHARACTER, URINE: CLEAR
CHLORIDE BLD-SCNC: 103 MEQ/L (ref 98–111)
CO2: 28 MEQ/L (ref 23–33)
COLOR: YELLOW
CREAT SERPL-MCNC: 0.7 MG/DL (ref 0.4–1.2)
CRYSTALS: NORMAL
EOSINOPHIL # BLD: 4.5 %
EOSINOPHILS ABSOLUTE: 0.2 THOU/MM3 (ref 0–0.4)
EPITHELIAL CELLS, UA: NORMAL /HPF
ERYTHROCYTE [DISTWIDTH] IN BLOOD BY AUTOMATED COUNT: 12.7 % (ref 11.5–14.5)
ERYTHROCYTE [DISTWIDTH] IN BLOOD BY AUTOMATED COUNT: 41.5 FL (ref 35–45)
GFR SERPL CREATININE-BSD FRML MDRD: > 90 ML/MIN/1.73M2
GLUCOSE BLD-MCNC: 121 MG/DL (ref 70–108)
GLUCOSE BLD-MCNC: 95 MG/DL (ref 70–108)
GLUCOSE, URINE: NEGATIVE MG/DL
HCT VFR BLD CALC: 35.4 % (ref 42–52)
HEMOGLOBIN: 11.9 GM/DL (ref 14–18)
IMMATURE GRANS (ABS): 0.01 THOU/MM3 (ref 0–0.07)
IMMATURE GRANULOCYTES: 0.2 %
INR BLD: 1.05 (ref 0.85–1.13)
KETONES, URINE: NEGATIVE
LEUKOCYTE ESTERASE, URINE: NEGATIVE
LV EF: 58 %
LVEF MODALITY: NORMAL
LYMPHOCYTES # BLD: 15.7 %
LYMPHOCYTES ABSOLUTE: 0.9 THOU/MM3 (ref 1–4.8)
MCH RBC QN AUTO: 30 PG (ref 26–33)
MCHC RBC AUTO-ENTMCNC: 33.6 GM/DL (ref 32.2–35.5)
MCV RBC AUTO: 89.2 FL (ref 80–94)
MISCELLANEOUS LAB TEST RESULT: NORMAL
MONOCYTES # BLD: 11.4 %
MONOCYTES ABSOLUTE: 0.6 THOU/MM3 (ref 0.4–1.3)
NITRITE, URINE: NEGATIVE
NUCLEATED RED BLOOD CELLS: 0 /100 WBC
PH UA: 5.5 (ref 5–9)
PLATELET # BLD: 178 THOU/MM3 (ref 130–400)
PMV BLD AUTO: 9.8 FL (ref 9.4–12.4)
POTASSIUM SERPL-SCNC: 4.3 MEQ/L (ref 3.5–5.2)
PROTEIN UA: NEGATIVE MG/DL
RBC # BLD: 3.97 MILL/MM3 (ref 4.7–6.1)
RBC URINE: NORMAL /HPF
RENAL EPITHELIAL, UA: NORMAL
SEG NEUTROPHILS: 67.3 %
SEGMENTED NEUTROPHILS ABSOLUTE COUNT: 3.7 THOU/MM3 (ref 1.8–7.7)
SODIUM BLD-SCNC: 140 MEQ/L (ref 135–145)
SPECIFIC GRAVITY UA: 1.01 (ref 1–1.03)
T4 FREE: 1.29 NG/DL (ref 0.93–1.76)
TSH SERPL DL<=0.05 MIU/L-ACNC: 4.9 UIU/ML (ref 0.4–4.2)
UROBILINOGEN, URINE: 0.2 EU/DL (ref 0–1)
WBC # BLD: 5.5 THOU/MM3 (ref 4.8–10.8)
WBC UA: NORMAL /HPF
YEAST: NORMAL

## 2019-04-11 PROCEDURE — 93010 ELECTROCARDIOGRAM REPORT: CPT | Performed by: INTERNAL MEDICINE

## 2019-04-11 PROCEDURE — 2500000003 HC RX 250 WO HCPCS: Performed by: NURSE ANESTHETIST, CERTIFIED REGISTERED

## 2019-04-11 PROCEDURE — 3700000001 HC ADD 15 MINUTES (ANESTHESIA): Performed by: INTERNAL MEDICINE

## 2019-04-11 PROCEDURE — 99233 SBSQ HOSP IP/OBS HIGH 50: CPT | Performed by: INTERNAL MEDICINE

## 2019-04-11 PROCEDURE — 81001 URINALYSIS AUTO W/SCOPE: CPT

## 2019-04-11 PROCEDURE — 36415 COLL VENOUS BLD VENIPUNCTURE: CPT

## 2019-04-11 PROCEDURE — 93320 DOPPLER ECHO COMPLETE: CPT

## 2019-04-11 PROCEDURE — 82948 REAGENT STRIP/BLOOD GLUCOSE: CPT

## 2019-04-11 PROCEDURE — 6370000000 HC RX 637 (ALT 250 FOR IP)

## 2019-04-11 PROCEDURE — 93312 ECHO TRANSESOPHAGEAL: CPT

## 2019-04-11 PROCEDURE — 3700000000 HC ANESTHESIA ATTENDED CARE: Performed by: INTERNAL MEDICINE

## 2019-04-11 PROCEDURE — 93325 DOPPLER ECHO COLOR FLOW MAPG: CPT

## 2019-04-11 PROCEDURE — 2709999900 HC NON-CHARGEABLE SUPPLY

## 2019-04-11 PROCEDURE — 6360000002 HC RX W HCPCS: Performed by: NURSE ANESTHETIST, CERTIFIED REGISTERED

## 2019-04-11 PROCEDURE — 6360000002 HC RX W HCPCS: Performed by: INTERNAL MEDICINE

## 2019-04-11 PROCEDURE — 80048 BASIC METABOLIC PNL TOTAL CA: CPT

## 2019-04-11 PROCEDURE — 99233 SBSQ HOSP IP/OBS HIGH 50: CPT | Performed by: PSYCHIATRY & NEUROLOGY

## 2019-04-11 PROCEDURE — 2580000003 HC RX 258: Performed by: NURSE PRACTITIONER

## 2019-04-11 PROCEDURE — 85025 COMPLETE CBC W/AUTO DIFF WBC: CPT

## 2019-04-11 PROCEDURE — C1764 EVENT RECORDER, CARDIAC: HCPCS

## 2019-04-11 PROCEDURE — 84439 ASSAY OF FREE THYROXINE: CPT

## 2019-04-11 PROCEDURE — 2580000003 HC RX 258: Performed by: NURSE ANESTHETIST, CERTIFIED REGISTERED

## 2019-04-11 PROCEDURE — 97162 PT EVAL MOD COMPLEX 30 MIN: CPT

## 2019-04-11 PROCEDURE — 85730 THROMBOPLASTIN TIME PARTIAL: CPT

## 2019-04-11 PROCEDURE — 0JH632Z INSERTION OF MONITORING DEVICE INTO CHEST SUBCUTANEOUS TISSUE AND FASCIA, PERCUTANEOUS APPROACH: ICD-10-PCS | Performed by: INTERNAL MEDICINE

## 2019-04-11 PROCEDURE — 85610 PROTHROMBIN TIME: CPT

## 2019-04-11 PROCEDURE — 7100000000 HC PACU RECOVERY - FIRST 15 MIN: Performed by: INTERNAL MEDICINE

## 2019-04-11 PROCEDURE — 7100000001 HC PACU RECOVERY - ADDTL 15 MIN: Performed by: INTERNAL MEDICINE

## 2019-04-11 PROCEDURE — 2500000003 HC RX 250 WO HCPCS

## 2019-04-11 PROCEDURE — 84443 ASSAY THYROID STIM HORMONE: CPT

## 2019-04-11 PROCEDURE — 97110 THERAPEUTIC EXERCISES: CPT

## 2019-04-11 PROCEDURE — 93005 ELECTROCARDIOGRAM TRACING: CPT | Performed by: NURSE PRACTITIONER

## 2019-04-11 PROCEDURE — 6370000000 HC RX 637 (ALT 250 FOR IP): Performed by: NURSE PRACTITIONER

## 2019-04-11 PROCEDURE — 2140000000 HC CCU INTERMEDIATE R&B

## 2019-04-11 PROCEDURE — 33285 INSJ SUBQ CAR RHYTHM MNTR: CPT | Performed by: INTERNAL MEDICINE

## 2019-04-11 PROCEDURE — 97116 GAIT TRAINING THERAPY: CPT

## 2019-04-11 RX ORDER — SODIUM CHLORIDE 0.9 % (FLUSH) 0.9 %
10 SYRINGE (ML) INJECTION EVERY 12 HOURS SCHEDULED
Status: DISCONTINUED | OUTPATIENT
Start: 2019-04-11 | End: 2019-04-12 | Stop reason: HOSPADM

## 2019-04-11 RX ORDER — SODIUM CHLORIDE 9 MG/ML
INJECTION, SOLUTION INTRAVENOUS CONTINUOUS PRN
Status: DISCONTINUED | OUTPATIENT
Start: 2019-04-11 | End: 2019-04-11 | Stop reason: SDUPTHER

## 2019-04-11 RX ORDER — SODIUM CHLORIDE 0.9 % (FLUSH) 0.9 %
10 SYRINGE (ML) INJECTION PRN
Status: DISCONTINUED | OUTPATIENT
Start: 2019-04-11 | End: 2019-04-12 | Stop reason: HOSPADM

## 2019-04-11 RX ORDER — SODIUM CHLORIDE 9 MG/ML
INJECTION, SOLUTION INTRAVENOUS CONTINUOUS
Status: DISCONTINUED | OUTPATIENT
Start: 2019-04-11 | End: 2019-04-11

## 2019-04-11 RX ORDER — LIDOCAINE HYDROCHLORIDE 10 MG/ML
INJECTION, SOLUTION INFILTRATION; PERINEURAL PRN
Status: DISCONTINUED | OUTPATIENT
Start: 2019-04-11 | End: 2019-04-11 | Stop reason: SDUPTHER

## 2019-04-11 RX ORDER — FENTANYL CITRATE 50 UG/ML
INJECTION, SOLUTION INTRAMUSCULAR; INTRAVENOUS PRN
Status: DISCONTINUED | OUTPATIENT
Start: 2019-04-11 | End: 2019-04-11 | Stop reason: SDUPTHER

## 2019-04-11 RX ORDER — HEPARIN SODIUM 5000 [USP'U]/ML
5000 INJECTION, SOLUTION INTRAVENOUS; SUBCUTANEOUS EVERY 8 HOURS SCHEDULED
Status: DISCONTINUED | OUTPATIENT
Start: 2019-04-11 | End: 2019-04-12 | Stop reason: HOSPADM

## 2019-04-11 RX ADMIN — OXYCODONE HYDROCHLORIDE 10 MG: 5 TABLET ORAL at 19:39

## 2019-04-11 RX ADMIN — SERTRALINE 200 MG: 100 TABLET, FILM COATED ORAL at 09:53

## 2019-04-11 RX ADMIN — ASPIRIN 81 MG 81 MG: 81 TABLET ORAL at 17:45

## 2019-04-11 RX ADMIN — DOCUSATE SODIUM 100 MG: 100 CAPSULE, LIQUID FILLED ORAL at 19:39

## 2019-04-11 RX ADMIN — MORPHINE SULFATE 30 MG: 30 TABLET, EXTENDED RELEASE ORAL at 09:52

## 2019-04-11 RX ADMIN — LIDOCAINE HYDROCHLORIDE 20 MG: 10 INJECTION, SOLUTION INFILTRATION; PERINEURAL at 14:13

## 2019-04-11 RX ADMIN — SODIUM CHLORIDE: 9 INJECTION, SOLUTION INTRAVENOUS at 14:08

## 2019-04-11 RX ADMIN — Medication 10 ML: at 22:42

## 2019-04-11 RX ADMIN — OXYCODONE HYDROCHLORIDE 10 MG: 5 TABLET ORAL at 10:46

## 2019-04-11 RX ADMIN — PROPOFOL 60 MG: 10 INJECTION, EMULSION INTRAVENOUS at 14:13

## 2019-04-11 RX ADMIN — DOCUSATE SODIUM 100 MG: 100 CAPSULE, LIQUID FILLED ORAL at 09:53

## 2019-04-11 RX ADMIN — CLONAZEPAM 1 MG: 1 TABLET ORAL at 15:27

## 2019-04-11 RX ADMIN — CLONAZEPAM 1 MG: 1 TABLET ORAL at 03:35

## 2019-04-11 RX ADMIN — CHLORHEXIDINE GLUCONATE: 4 SOLUTION TOPICAL at 09:56

## 2019-04-11 RX ADMIN — FENTANYL CITRATE 50 MCG: 50 INJECTION INTRAMUSCULAR; INTRAVENOUS at 14:09

## 2019-04-11 RX ADMIN — LACTULOSE 20 G: 20 SOLUTION ORAL at 19:39

## 2019-04-11 RX ADMIN — PROPOFOL 40 MG: 10 INJECTION, EMULSION INTRAVENOUS at 14:17

## 2019-04-11 RX ADMIN — ATORVASTATIN CALCIUM 40 MG: 40 TABLET, FILM COATED ORAL at 19:40

## 2019-04-11 RX ADMIN — ACETAMINOPHEN 650 MG: 325 TABLET ORAL at 23:33

## 2019-04-11 RX ADMIN — HEPARIN SODIUM 5000 UNITS: 5000 INJECTION INTRAVENOUS; SUBCUTANEOUS at 22:41

## 2019-04-11 RX ADMIN — OXYCODONE HYDROCHLORIDE 10 MG: 5 TABLET ORAL at 02:28

## 2019-04-11 RX ADMIN — MORPHINE SULFATE 30 MG: 30 TABLET, EXTENDED RELEASE ORAL at 22:41

## 2019-04-11 ASSESSMENT — PAIN DESCRIPTION - PROGRESSION
CLINICAL_PROGRESSION: NOT CHANGED

## 2019-04-11 ASSESSMENT — PAIN DESCRIPTION - PAIN TYPE
TYPE: CHRONIC PAIN
TYPE: ACUTE PAIN

## 2019-04-11 ASSESSMENT — PAIN SCALES - GENERAL
PAINLEVEL_OUTOF10: 3
PAINLEVEL_OUTOF10: 3
PAINLEVEL_OUTOF10: 4
PAINLEVEL_OUTOF10: 6
PAINLEVEL_OUTOF10: 0
PAINLEVEL_OUTOF10: 8
PAINLEVEL_OUTOF10: 7
PAINLEVEL_OUTOF10: 3
PAINLEVEL_OUTOF10: 5
PAINLEVEL_OUTOF10: 0
PAINLEVEL_OUTOF10: 1

## 2019-04-11 ASSESSMENT — PAIN DESCRIPTION - ORIENTATION: ORIENTATION: POSTERIOR

## 2019-04-11 ASSESSMENT — PAIN DESCRIPTION - DESCRIPTORS
DESCRIPTORS: THROBBING
DESCRIPTORS: THROBBING
DESCRIPTORS: ACHING;CRAMPING

## 2019-04-11 ASSESSMENT — PAIN DESCRIPTION - FREQUENCY: FREQUENCY: CONTINUOUS

## 2019-04-11 ASSESSMENT — PAIN DESCRIPTION - LOCATION
LOCATION: NECK
LOCATION: BACK
LOCATION: NECK
LOCATION: NECK
LOCATION: HEAD

## 2019-04-11 ASSESSMENT — PAIN - FUNCTIONAL ASSESSMENT
PAIN_FUNCTIONAL_ASSESSMENT: PREVENTS OR INTERFERES SOME ACTIVE ACTIVITIES AND ADLS
PAIN_FUNCTIONAL_ASSESSMENT: 0-10

## 2019-04-11 ASSESSMENT — COPD QUESTIONNAIRES: CAT_SEVERITY: MODERATE

## 2019-04-11 ASSESSMENT — PAIN DESCRIPTION - ONSET: ONSET: ON-GOING

## 2019-04-11 NOTE — PROGRESS NOTES
[address] [place] [address]  SPEECH THERAPY MISSED TREATMENT NOTE  [facility]      Date: 2019  Patient Name: Graham Thompson        MRN: [id number]    : 1960  (62 y.o.)    REASON FOR MISSED TREATMENT:  Attempted to see patient for swallow evaluation. ALBIN Ware reports patient off unit for BAILEY. First attempt 1400, second attempt 1500. ALBIN Ware reports patient reporting, \"difficulty swallowing secondary to tonsil cancer. \"  Will check back later this date as schedule permits or     Mandi Steinberg M.S. Shari Adler

## 2019-04-11 NOTE — PROGRESS NOTES
[facility]  INPATIENT PHYSICAL THERAPY  EVALUATION  [facility] CCU-STEPDOWN 3B - [id number]    Time In: 9198  Time Out: 1016  Timed Code Treatment Minutes: 23 Minutes  Minutes: 33          Date: 2019  Patient Name: Piper Keating,  Gender:  male        MRN: [id number]  : 1960  (62 y.o.)      Referring Practitioner: Zulma Gorman DO  Diagnosis: NSTEMI (non-ST elevated myocardial infarction) [facility])  Additional Pertinent Hx: Per EMR: \"This is a pleasant 62 y. o. male presents with chest pain, SS, tight, 8/10 currently.  Had headache and blurry vision.  Eye pain to 2/10.  Was nauseated and had emesis x 3.  DM II, hx of DVT on Eliquis.  There is concern that he had a subacute CVA, but Neurology has cleared to use all antiplatelets/antiocoagulants.  ECG with ST-depressions and T-wave inversions laterally.  Trop 0.445--0.503.  Cr 0.9.  No sob, and GONZALES.  At rest, he is complaining of 6-7/10.  \"     Past Medical History:   Diagnosis Date    Anxiety     Bleeds easily ([address]     Bruises easily     Cerebrovascular accident (CVA) due to embolism of right posterior cerebral artery ([address]     COPD (chronic obstructive pulmonary disease) ([address]     DVT (deep venous thrombosis) ([address]     Enlarged lymph node     History of emotional problems     Hx of blood clots     Hypertension     [address]    Obesity     Squamous cell carcinoma of tonsils ([address] 2018    left   - radiation    Type II or unspecified type diabetes mellitus without mention of complication, not stated as uncontrolled     lost weight, no meds at present      Past Surgical History:   Procedure Laterality Date    CARDIAC CATHETERIZATION  04/10/2019    HIP SURGERY Right 2014    Excisin Right Hip Mass-Dr. Medina Major     KNEE SURGERY Right     OTHER SURGICAL HISTORY  09    lisa filter placed     SC OFFICE/OUTPT VISIT,PROCEDURE ONLY Right 10/16/2018    RIGHT FOOT EXCISION ULCER, RIGHT 5th METATARSAL BASE RESECTION performed by Rachele Capellan DPM at [facility] TOE SURGERY Right 2013    wound to bone (second toe)     TUNNELED VENOUS PORT PLACEMENT         Restrictions/Precautions:  (decreased left peripheral vision)     Right Lower Extremity Brace: Ankle Foot Orthotics(secondary to foot drop - not with pt at this time)    Subjective:  Chart Reviewed: Yes  Patient assessed for rehabilitation services?: Yes  Family / Caregiver Present: No  Other (Comment): slow processing at times, with 1 bout of confusion - pt was asked where he was and he reported that he was at his apartment/house - pt was reoriented at this time  Subjective: Pt received sitting in bedside chair with RN present. Pt was agreeable to PT interventions. Post session, pt in supine - pt Mr. Heidi Yee requested with all needs in reach. Bed alarm on    General:  Overall Orientation Status: Impaired  Orientation Level: (pt reports that he is in his apartment, pt reports year is 1 - pt is able to self correct with cueing)  Follows Commands: Impaired    Vision: Impaired  Vision Exceptions: Wears glasses at all times    Hearing: Within functional limits      Pain:  Yes.   Pain Assessment  Pain Assessment: 0-10  Pain Level: 6  Pain Type: Chronic pain  Pain Location: Back  Non-Pharmaceutical Pain Intervention(s): Repositioned  Response to Pain Intervention: Patient Satisfied       Social/Functional History:    Lives With: Alone(niece and aunt are in the front of the home in their own living space)  Type of Home: House  Home Layout: Two level  Home Access: Stairs to enter without rails  Entrance Stairs - Number of Steps: 2  Home Equipment: Cane, 4 wheeled walker, Wheelchair-manual(AFO)     Bathroom Shower/Tub: Tub/Shower unit  Bathroom Toilet: Standard       ADL Assistance: Independent  Homemaking Assistance: Independent  Ambulation Assistance: Independent(uses walker and cane (cane mostly outside))  Transfer Assistance: Independent    Active : No Additional Comments: Pt reports using walker/cane prior to admission. Pt notes I with all mobility and ADLs. Objective:  RLE PROM: Exceptions  RLE General PROM: pt able to acheive neutral position with PROM at the right ankle, limited right knee extension - lacking 20deg    RLE AROM: WFL  RLE General AROM: expect right ankle, hx of foot drop          LLE AROM : WFL       Strength RLE: Exception  Comment: hip:4/5, quad: 5/5, dorsiflexion:1/5    Strength LLE: Exception  Comment: Hip: 3+/5, quad: 5/5, dorsiflexion/plantarflexion: 5/5        Sensation  Overall Sensation Status: WFL(light touch grossly intact, pt denies N/T)    Balance  Sitting - Static: Good  Sitting - Dynamic: Good, -  Standing - Static: Fair  Standing - Dynamic: Fair, -    Sit to Supine: Stand by assistance    Transfers  Sit to Stand: Contact guard assistance  Stand to sit: Contact guard assistance  Comment: increased time and effort, unsteady with transfer       Ambulation 1  Surface: level tile  Device: Rolling Walker  Assistance: Contact guard assistance, Minimal assistance  Quality of Gait: mod path deviation- difficulty ambulating in straight line (pt deviates in both directions), significant drop foot on the right LE, increased hip and knee flexion on the right to compensate  Distance: 80ft  Comments: unsteady gait noted throughout, increased cueing for safety and maintaining a straight path      Exercises:  Comments: pt instructed in bilat LE exercises, 1x15: knee to chest, SLR, hip abduction/adduction, ankle pumps for improved functional mobility        Activity Tolerance:  Activity Tolerance: Patient limited by fatigue;Patient limited by endurance    Treatment Initiated: Pt instructed in bilat LE Exercises for improvements in functional strength. Pt also complete gait training for improved safety awareness and maintaining a straight path.  Pt able to demonstrate understanding for a short duration and began to deviate off the path mobility with mod I for return to PLOF  Short term goal 2: sit <> stand with supervision A for return to PLOF  Short term goal 3: ambulate 150ft with use of LRAD and supervision assist for ease of IADLS  Short term goal 4: negotiate 2 steps without HR and stand-by assist for safe entry/exit of his home  Long term goals  Time Frame for Long term goals : N/A secondary to short ELOS    Evaluation Complexity: Based on the findings of patient history, examination, clinical presentation, and decision making during this evaluation, the evaluation of Chris Alarcon  is of medium complexity.             AM-PAC Inpatient Mobility without Stair Climbing Raw Score : 16  AM-PAC Inpatient without Stair Climbing T-Scale Score : 45.54  Mobility Inpatient CMS 0-100% Score: 40.64  Mobility Inpatient without Stair CMS G-Code Modifier : CK

## 2019-04-11 NOTE — PROCEDURES
[address] [place] [address]                                 PROCEDURE NOTE    PATIENT NAME: Chandler Marquis                      :        1960  MED REC NO:   [id number]                           ROOM:       0031  ACCOUNT NO:   [de-identified]                           ADMIT DATE: 2019  PROVIDER:     Dariel Chen MD    DATE OF PROCEDURE:  2019    PROCEDURE PERFORMED:  Loop recorder implant. INDICATION:  Cryptogenic stroke. PROCEDURE DETAILS:  Informed consent was obtained. The left parasternal area was prepped and draped in the usual fashion. Lidocaine was used for local anesthesia. The device was implanted using the usual protocol. The patient tolerated the procedure well. SETTINGS:  Bradycardia:  30 BPM.    Pause:  3 seconds. Tachycardia:  166 BPM.    AFib:  On.    R-wave:  0.47 MV. At the end of the procedure, hemostasis was achieved using manual  pressure and the skin was closed using Dermabond and Steri-Strips. An occlusive dressing was placed over the incision and the patient was  given instructions to not to remove the dressing or run water over the  area for five days. At the end of five days, he should present himself to [address] [place] [address] for dressing removal and wound inspection.                   Eliza Loving MD    D: 2019 12:38:10       T: 2019 13:01:45     [email or web address]  Job#: [id number]     Doc#: [id number]    CC:

## 2019-04-11 NOTE — PROGRESS NOTES
Fully awake. Verbalizes mild headache, 1 of 10. Present prior to procedure. Continues to deny nausea. Report called to unit.

## 2019-04-11 NOTE — PROGRESS NOTES
Recovery period initiated. Patient drowsy but arouses to verbal stimuli. Denies pain, nausea. No family at bedside.

## 2019-04-11 NOTE — ANESTHESIA PRE PROCEDURE
Department of Anesthesiology  Preprocedure Note       Name:  Andrew Hidalgo   Age:  62 y.o.  :  1960                                          MRN:  [id number]         Date:  2019      Surgeon: Otis Harrell):  Surendra Holloway MD    Procedure: TRANSESOPHAGEAL ECHOCARDIOGRAM (N/A )    Medications prior to admission:   Prior to Admission medications    Medication Sig Start Date End Date Taking? Authorizing Provider   oxyCODONE HCl (OXY-IR) 10 MG immediate release tablet Take 1 tablet by mouth every 8 hours as needed for Pain for up to 30 days. 19 Yes YAQUELIN Chavez CNP   hydrOXYzine (ATARAX) 50 MG tablet Take 1-2 tablets nightly prn 3/25/19  Yes Maurice Coulter MD   lidocaine-prilocaine (EMLA) 2.5-2.5 % cream Apply topically as needed. 3/25/19  Yes Martinez Charles MD   morphine (MS CONTIN) 30 MG extended release tablet Take 1 tablet by mouth every 12 hours for 30 days. 3/25/19 4/24/19 Yes YAQUELIN Faust CNP   clonazePAM (KLONOPIN) 1 MG tablet Take 1 tablet by mouth 2 times daily as needed for Anxiety for up to 60 doses.  3/11/19 4/11/19 Yes Maurice Coulter MD   sertraline (ZOLOFT) 100 MG tablet Take 2 tablets by mouth daily 19  Yes Maurice Coulter MD   naloxone [facility]) 4 MG/0.1ML LIQD nasal spray 1 spray by Nasal route as needed for Opioid Reversal 18  Yes YAQUELIN Joe CNP   docusate sodium (COLACE) 100 MG capsule Take 100 mg by mouth 3 times daily as needed for Constipation   Yes Historical Provider, MD   apixaban (ELIQUIS) 5 MG TABS tablet Take 1 tablet by mouth 2 times daily 10/30/18  Yes Maurice Coulter MD   lactulose (CHRONULAC) 10 GM/15ML solution Take 30 mLs by mouth 2 times daily as needed (constipation) 18  Yes YAQUELIN Lazaro CNP   glucose monitoring kit (FREESTYLE) monitoring kit 1 kit by Does not apply route daily 18  Yes Maurice Coulter MD   albuterol sulfate  (90 Base) MCG/ACT inhaler Inhale 2 puffs into the lungs every 6 hours as needed for Wheezing 6/4/18  Yes Radha Saenz MD       Current medications:    Current Facility-Administered Medications   Medication Dose Route Frequency Provider Last Rate Last Dose    0.9 % sodium chloride infusion   Intravenous Continuous Azalia Messer APRDOLLY - CNP 50 mL/hr at 04/11/19 0954      sodium chloride flush 0.9 % injection 10 mL  10 mL Intravenous 2 times per day Azalia Messer, APRN - CNP        sodium chloride flush 0.9 % injection 10 mL  10 mL Intravenous PRN Azalia Messer APRN - CNP        magnesium hydroxide (MILK OF MAGNESIA) 400 MG/5ML suspension 30 mL  30 mL Oral Daily PRN Osmel Harvey MD        [Held by provider] apixaban (ELIQUIS) tablet 5 mg  5 mg Oral BID Andres Alegria DO        0.9 % sodium chloride infusion   Intravenous Continuous Azalia Messer, APRN - CNP        sodium chloride flush 0.9 % injection 10 mL  10 mL Intravenous 2 times per day Azalia Messer APRN - CNP   10 mL at 04/10/19 2222    sodium chloride flush 0.9 % injection 10 mL  10 mL Intravenous PRN Azalia Messer APRN - CNP        albuterol sulfate  (90 Base) MCG/ACT inhaler 2 puff  2 puff Inhalation Q6H PRN Annelett Huguenin, APRN - CNP        clonazePAM (KLONOPIN) tablet 1 mg  1 mg Oral BID PRN Dale Lozadaguenin, APRN - CNP   1 mg at 04/11/19 0335    sertraline (ZOLOFT) tablet 200 mg  200 mg Oral Daily Charlett Huguenin, APRN - CNP   200 mg at 04/11/19 0953    ondansetron (ZOFRAN) injection 4 mg  4 mg Intravenous Q6H PRN Annelett Huguenin, APRN - CNP   4 mg at 04/09/19 2227    atorvastatin (LIPITOR) tablet 40 mg  40 mg Oral Nightly Charlett Huguenin, APRN - CNP   40 mg at 04/10/19 2025    aspirin chewable tablet 81 mg  81 mg Oral Daily Irene A Retarmindaan, APRN - CNP   81 mg at 04/10/19 0805    0.9 % sodium chloride infusion   Intravenous Continuous Irene A Jenniferan, APRN - CNP 75 mL/hr at 04/09/19 1457      docusate sodium (COLACE) capsule 100 mg  100 mg Oral BID Colleen Spoon, APRN - CNP   100 mg at 04/11/19 0953    senna (SENOKOT) tablet 8.6 mg  1 tablet Oral Daily PRN Colleen Spoon, APRN - CNP        nicotine (NICODERM CQ) 21 MG/24HR 1 patch  1 patch Transdermal Daily Colleen Spoon, APRN - CNP   1 patch at 04/11/19 0948    acetaminophen (TYLENOL) tablet 650 mg  650 mg Oral Q4H PRN Colleen Spoon, APRN - CNP   650 mg at 04/10/19 2026    morphine (PF) injection 2 mg  2 mg Intravenous Q2H PRN Colleen Spoon, APRN - CNP   2 mg at 04/10/19 9231    Or    morphine injection 4 mg  4 mg Intravenous Q2H PRN Colleen Spoon, APRN - CNP   4 mg at 04/1960    oxyCODONE (ROXICODONE) immediate release tablet 10 mg  10 mg Oral Q8H PRN Colleen Spoon, APRN - CNP   10 mg at 04/11/19 1046    morphine (MS CONTIN) extended release tablet 30 mg  30 mg Oral Q12H Irene Ortiz, APRN - CNP   30 mg at 04/11/19 0952    lactulose (CHRONULAC) 10 GM/15ML solution 20 g  20 g Oral BID PRN Colleen Spoon, APRN - CNP   20 g at 04/09/19 2346    heparin flush (100 units/mL) injection 500 Units  500 Units Intercatheter PRN Colleen Spoon, APRN - CNP           Allergies:     Allergies   Allergen Reactions    Latex Hives    Coumadin [Warfarin Sodium] Other (See Comments)     Tears skin     Tape Stewart Cools Tape] Other (See Comments)     Tears skin        Problem List:    Patient Active Problem List   Diagnosis Code    Mass in neck R22.1    Hip pain M25.559    Fracture of metacarpal bone S62.309A    Type 2 diabetes mellitus without complication, without long-term current use of insulin (HCC) E11.9    Generalized anxiety disorder F41.1    Moderate episode of recurrent major depressive disorder (HCC) F33.1    Malignant neoplasm of tonsil (HCC) C09.9    Squamous cell carcinoma of left tonsil (HCC) C09.9    Tongue carcinoma (HCC) C02.9    Encounter for antineoplastic chemotherapy Z51.11    Cigarette nicotine dependence without complication J76.763    Unstageable pressure ulcer of right foot (HCC) L89.890    Pulmonary embolism and infarction (HCC) I26.99    Severe malnutrition ([address] E43    Severe protein-calorie malnutrition ([address] E43    NSTEMI (non-ST elevated myocardial infarction) ([address] I21.4    Cerebrovascular accident (CVA) due to embolism of right posterior cerebral artery ([address] I63.431    Tonsil cancer ([address] C09.9    Left homonymous hemianopsia due to recent cerebral infarction I69.398, H53.462       Past Medical History:        Diagnosis Date    Anxiety     Bleeds easily ([address]     Bruises easily     Cerebrovascular accident (CVA) due to embolism of right posterior cerebral artery ([address]     COPD (chronic obstructive pulmonary disease) ([address]     DVT (deep venous thrombosis) ([address] 2009    Enlarged lymph node     History of emotional problems     Hx of blood clots 2009    Hypertension     [address]    Obesity     Squamous cell carcinoma of tonsils ([address] 2018    left   - radiation    Type II or unspecified type diabetes mellitus without mention of complication, not stated as uncontrolled     lost weight, no meds at present 2018       Past Surgical History:        Procedure Laterality Date    CARDIAC CATHETERIZATION  04/10/2019    HIP SURGERY Right 06/30/2014    Excisin Right Hip Mass-Dr. Natanael Alba     KNEE SURGERY Right 1980's    OTHER SURGICAL HISTORY  7-7-09    lisa filter placed     PA OFFICE/OUTPT VISIT,PROCEDURE ONLY Right 10/16/2018    RIGHT FOOT EXCISION ULCER, RIGHT 5th METATARSAL BASE RESECTION performed by Stacia Escobar DPM at [facility] TOE SURGERY Right 2013    wound to bone (second toe)     TUNNELED VENOUS PORT PLACEMENT         Social History:    Social History     Tobacco Use    Smoking status: Current Every Day Smoker     Packs/day: 0.50     Years: 40.00     Pack years: 20.00     Types: Cigarettes    Smokeless tobacco: Never Used    Tobacco comment: 5 cig a day    Substance Use Topics    Alcohol use: No                                Ready to quit: Not Answered  Counseling given: Not Answered  Comment: 5 cig a day       Vital Signs (Current):   Vitals:    04/11/19 1204 04/11/19 1319 04/11/19 1335 04/11/19 1337   BP: (!) 171/74 (!) 179/81  (!) 141/69   Pulse: 71 80 64    Resp: 16 16 12    Temp:       TempSrc:       SpO2: 94% 93% 96%    Weight:       Height:                                                  BP Readings from Last 3 Encounters:   04/11/19 (!) 141/69   04/01/19 133/71   03/22/19 127/72       NPO Status: Time of last liquid consumption: 0830                        Time of last solid consumption: 1830                        Date of last liquid consumption: 04/11/19                        Date of last solid food consumption: 04/10/19    BMI:   Wt Readings from Last 3 Encounters:   04/09/19 178 lb 6.4 oz (80.9 kg)   04/01/19 188 lb (85.3 kg)   03/22/19 188 lb 3.2 oz (85.4 kg)     Body mass index is 24.2 kg/m².     CBC:   Lab Results   Component Value Date    WBC 5.5 04/11/2019    RBC 3.97 04/11/2019    HGB 11.9 04/11/2019    HCT 35.4 04/11/2019    MCV 89.2 04/11/2019    RDW 12.6 08/28/2018     04/11/2019       CMP:   Lab Results   Component Value Date     04/11/2019    K 4.3 04/11/2019    K 3.8 10/23/2018     04/11/2019    CO2 28 04/11/2019    BUN 20 04/11/2019    CREATININE 0.7 04/11/2019    LABGLOM >90 04/11/2019    GLUCOSE 121 04/11/2019    PROT 6.4 04/09/2019    CALCIUM 8.6 04/11/2019    BILITOT 0.7 04/09/2019    ALKPHOS 77 04/09/2019    AST 19 04/09/2019    ALT 7 04/09/2019       POC Tests:   Recent Labs     04/11/19  0527   POCGLU 95       Coags:   Lab Results   Component Value Date    INR 1.05 04/11/2019    APTT 29.6 04/11/2019       HCG (If Applicable): No results found for: PREGTESTUR, PREGSERUM, HCG, HCGQUANT     ABGs: No results found for: PHART, PO2ART, QPR9LUK, MOC5EWS, BEART, Y2DKESFB     Type & Screen (If Applicable):  Lab Results   Component Value Date    LABRH POS 04/10/2019       Anesthesia Evaluation  Patient summary reviewed  Airway: Mallampati: II  TM distance: >3 FB   Neck ROM: limited  Mouth opening: > = 3 FB Dental:    (+) upper dentures and lower dentures      Pulmonary:normal exam    (+) COPD: moderate,                             Cardiovascular:    (+) hypertension: severe, CHF:,                   Neuro/Psych:   (+) CVA:, psychiatric history: stable with treatment            GI/Hepatic/Renal:             Endo/Other:    (+) DiabetesType II DM, well controlled, , malignancy/cancer. Abdominal:           Vascular:   + DVT, PE. Anesthesia Plan      MAC     ASA 3       Induction: intravenous. Anesthetic plan and risks discussed with patient. Plan discussed with attending.     Attending anesthesiologist reviewed and agrees with [facility], APRN - CRNA   4/11/2019

## 2019-04-11 NOTE — FLOWSHEET NOTE
1209-Linq procedure started  1210-/76-65-16-96%  1210-Lidocaine 2% 17 ml injected in chest just left of sternum  1212-linq implanted in chest to left of sternum. Manual pressure held ofr oozing. 1213-Linq checked for R waves. Dr. Cheney Bran good position  1214-Mastisol placed over site. 1215-/73-67-18-96%  1216-Steristrips placed over site. 1217-procedure complete. 1218-BP-166/76-69-18-96%. Patient denies any complaints. Sterile dressing of telfa pad, 4 x 4 gauze and 4 x 4 tegaderm dressing applied. 1220-Report given and Care turned over to nurse on floor.

## 2019-04-11 NOTE — PRE SEDATION
[address] [address]  Sedation/Analgesia History & Physical    Pt Name: Blu Mcelroy  Account number: [de-identified]  MRN: [id number]  YOB: 1960  Provider Performing Procedure: Kenya Chapman MD  Referring Provider: Jaime Green DO   Primary Care Physician: Primo Sung MD  Date: 4/11/2019    PRE-PROCEDURE    Code Status: FULL CODE  Brief History/Pre-Procedure Diagnosis:   CVA  R/o Cardioembolic CVA     Consent: : I have discussed with the patient risks, benefits, and alternatives (and relevant risks, benefits, and side effects related to alternatives or not receiving care), and likelihood of the success. The patient and/or representative appear to understand and agree to proceed. The discussion encompasses risks, benefits, and side effects related to the alternatives and the risks related to not receiving the proposed care, treatment, and services. The indication, risks and benefits of the procedure and possible therapeutic consequences and alternatives were discussed with the patient. The patient was given the opportunity to ask questions and to have them answered to his/her satisfaction. Risks of the procedure include but are not limited to the following: Bleeding, hematoma including retroperitoneal hemmorhage, infection, pain and discomfort, injury to the aorta and other blood vessels, rhythm disturbance, low blood pressure, myocardial infarction, stroke, kidney damage/failure, myocardial perforation, allergic reactions to sedatives/contrast material, loss of pulse/vascular compromise requiring surgery, aneurysm/pseudoaneurysm formation, possible loss of a limb/hand/leg, needing blood transfusion, requiring emergent open heart surgery or emergent coronary intervention, the need for intubation/respiratory support, the requirement for defibrillation/cardioversion, and death. Alternatives to and omission of the suggested procedure were discussed.  The patient had no further questions and wished to proceed; the consent form was signed. MEDICAL HISTORY  []ASHD/ANGINA/MI/CHF   []Hypertension  []Diabetes  []Hyperlipidemia  []Smoking  []Family Hx of ASHD  [x]Additional information:       has a past medical history of Anxiety, Bleeds easily ([facility] [address] Bruises easily, Cerebrovascular accident (CVA) due to embolism of right posterior cerebral artery ([address] COPD (chronic obstructive pulmonary disease) ([facility] [address] DVT (deep venous thrombosis) ([facility] [address] Enlarged lymph node, History of emotional problems, Hx of blood clots, Hypertension, Obesity, Squamous cell carcinoma of tonsils ([facility] [address] and Type II or unspecified type diabetes mellitus without mention of complication, not stated as uncontrolled. SURGICAL HISTORY   has a past surgical history that includes knee surgery (Right, 1980's); Toe Surgery (Right, 2013); other surgical history (7-7-09); hip surgery (Right, 06/30/2014); Tunneled venous port placement; pr office/outpt visit,procedure only (Right, 10/16/2018); and Cardiac catheterization (04/10/2019).   Additional information:       ALLERGIES   Allergies as of 04/09/2019 - Review Complete 04/09/2019   Allergen Reaction Noted    Latex Hives 07/17/2018    Coumadin [warfarin sodium] Other (See Comments) 05/29/2014    Tape Freeliciarick Mar tape] Other (See Comments) 05/29/2014     Additional information:       MEDICATIONS   Aspirin  [x] 81 mg  [] 325 mg  [] None  Antiplatelet drug therapy use last 5 days  [x]No []Yes  Coumadin Use Last 5 Days [x]No []Yes  Other anticoagulant use last 5 days  [x]No []Yes    Current Facility-Administered Medications:     0.9 % sodium chloride infusion, , Intravenous, Continuous, Long Rubin APRN - CNP, Last Rate: 50 mL/hr at 04/11/19 0954    sodium chloride flush 0.9 % injection 10 mL, 10 mL, Intravenous, 2 times per day, Long Ferri, APRN - CNP    sodium chloride flush 0.9 % injection 10 mL, 10 mL, Intravenous, PRN, Longtram Rubin APRN - CNP    magnesium hydroxide (MILK OF MAGNESIA) 400 MG/5ML suspension 30 mL, 30 mL, Oral, Daily PRN, Олег Koo MD  [address] [place] [address]  [Held by provider] apixaban (ELIQUIS) tablet 5 mg, 5 mg, Oral, BID, Darrold Matte, DO    0.9 % sodium chloride infusion, , Intravenous, Continuous, Julieta Kwong, APRN - CNP    sodium chloride flush 0.9 % injection 10 mL, 10 mL, Intravenous, 2 times per day, Kaitlynn Reed, APRN - CNP, 10 mL at 04/10/19 2222    sodium chloride flush 0.9 % injection 10 mL, 10 mL, Intravenous, PRN, Kaitlynn Reed APRN - CNP    albuterol sulfate  (90 Base) MCG/ACT inhaler 2 puff, 2 puff, Inhalation, Q6H PRN, Irene Ortiz, APRN - CNP    clonazePAM (KLONOPIN) tablet 1 mg, 1 mg, Oral, BID PRN, Shayne [place], APRN - CNP, 1 mg at 04/11/19 0335    sertraline (ZOLOFT) tablet 200 mg, 200 mg, Oral, Daily, Irene Bankshman, APRN - CNP, 200 mg at 04/11/19 0953    ondansetron (ZOFRAN) injection 4 mg, 4 mg, Intravenous, Q6H PRN, Shayneadolfo Reeves, APRN - CNP, 4 mg at 04/09/19 2227    atorvastatin (LIPITOR) tablet 40 mg, 40 mg, Oral, Nightly, Irene Bankshman, APRN - CNP, 40 mg at 04/10/19 2025    aspirin chewable tablet 81 mg, 81 mg, Oral, Daily, Irene A Darrenhman, APRN - CNP, 81 mg at 04/10/19 0805    0.9 % sodium chloride infusion, , Intravenous, Continuous, Irene Ortiz, APRN - CNP, Last Rate: 75 mL/hr at 04/09/19 1457    docusate sodium (COLACE) capsule 100 mg, 100 mg, Oral, BID, Irene A Rethman, APRN - CNP, 100 mg at 04/11/19 0953    senna (SENOKOT) tablet 8.6 mg, 1 tablet, Oral, Daily PRN, YAQUELIN Paez CNP    nicotine (NICODERM CQ) 21 MG/24HR 1 patch, 1 patch, Transdermal, Daily, YAQUELIN Paez CNP, 1 patch at 04/11/19 0948    acetaminophen (TYLENOL) tablet 650 mg, 650 mg, Oral, Q4H PRN, YAQUELIN Neumann - CNP, 650 mg at 04/10/19 2026    morphine (PF) injection 2 mg, 2 mg, Intravenous, Q2H PRN, 2 mg at 04/10/19 0648 **OR** morphine injection 4 mg, 4 mg, Intravenous, Q2H PRN, Clydia Pod, APRN - CNP, 4 mg at 04/1960    oxyCODONE (ROXICODONE) immediate release tablet 10 mg, 10 mg, Oral, Q8H PRN, Clydia Pod, APRN - CNP, 10 mg at 04/11/19 1046    morphine (MS CONTIN) extended release tablet 30 mg, 30 mg, Oral, Q12H, Irene Ortiz, APRN - CNP, 30 mg at 04/11/19 0952    lactulose (CHRONULAC) 10 GM/15ML solution 20 g, 20 g, Oral, BID PRN, Clydia Pod, APRN - CNP, 20 g at 04/09/19 2346    heparin flush (100 units/mL) injection 500 Units, 500 Units, Intercatheter, PRN, Clydia Pod, APRN - CNP  Prior to Admission medications    Medication Sig Start Date End Date Taking? Authorizing Provider   oxyCODONE HCl (OXY-IR) 10 MG immediate release tablet Take 1 tablet by mouth every 8 hours as needed for Pain for up to 30 days. 4/6/19 5/6/19 Yes YAQUELIN Weiner CNP   hydrOXYzine (ATARAX) 50 MG tablet Take 1-2 tablets nightly prn 3/25/19  Yes Ariana Kim MD   lidocaine-prilocaine (EMLA) 2.5-2.5 % cream Apply topically as needed. 3/25/19  Yes Emilie Curling, MD   morphine (MS CONTIN) 30 MG extended release tablet Take 1 tablet by mouth every 12 hours for 30 days. 3/25/19 4/24/19 Yes YAQUELIN Faust CNP   clonazePAM (KLONOPIN) 1 MG tablet Take 1 tablet by mouth 2 times daily as needed for Anxiety for up to 60 doses.  3/11/19 4/11/19 Yes Ariana Kim MD   sertraline (ZOLOFT) 100 MG tablet Take 2 tablets by mouth daily 1/29/19  Yes Ariana Kim MD   naloxone [facility]) 4 MG/0.1ML LIQD nasal spray 1 spray by Nasal route as needed for Opioid Reversal 12/24/18  Yes YAQUELIN Joe CNP   docusate sodium (COLACE) 100 MG capsule Take 100 mg by mouth 3 times daily as needed for Constipation   Yes Historical Provider, MD   apixaban (ELIQUIS) 5 MG TABS tablet Take 1 tablet by mouth 2 times daily 10/30/18  Yes Ariana Necessary, MD   lactulose (CHRONULAC) 10 GM/15ML solution Take 30 mLs by mouth 2 times daily as needed (constipation) 8/23/18  Yes YAQUELIN Harirs - CNP   glucose monitoring kit (FREESTYLE) monitoring kit 1 kit by Does not apply route daily 7/16/18  Yes Akash Haines MD   albuterol sulfate  (90 Base) MCG/ACT inhaler Inhale 2 puffs into the lungs every 6 hours as needed for Wheezing 6/4/18  Yes Akash Haines MD     Additional information:       VITAL SIGNS   Vitals:    04/11/19 1337   BP: (!) 141/69   Pulse:    Resp:    Temp:    SpO2:        PHYSICAL:   General: No acute distress  HEENT:  Unremarkable for age  Neck: without increased JVD, carotid pulses 2+ bilaterally without bruits  Heart: RRR, S1 & S2 WNL, S4 gallop, without murmurs or rubs   Lungs: Clear to auscultation    Abdomen: BS present, without HSM, masses, or tenderness    Extremities: without C,C,E.  Pulses 2+ bilaterally  Mental Status: Alert & Oriented        PLANNED PROCEDURE   []Cath  []PCI                []Pacemaker/AICD  [x]BAILEY             []Cardioversion []Peripheral angiography/PTA  []Other:      SEDATION  Planned agent:[x]Midazolam []Meperidine [x]Sublimaze []Morphine  []Diazepam  []Other:     ASA Classification:  []1 []2 [x]3 []4 []5  Class 1: A normal healthy patient  Class 2: Pt with mild to moderate systemic disease  Class 3: Severe systemic disease or disturbance  Class 4: Severe systemic disorders that are already life threatening. Class 5: Moribund pt with little chances of survival, for more than 24 hours. Mallampati I Airway Classification:   []1 [x]2 []3 []4    [x]Pre-procedure diagnostic studies complete and results available. Comment:    [x]Previous sedation/anesthesia experiences assessed. Comment:    [x]The patient is an appropriate candidate to undergo the planned procedure sedation and anesthesia. (Refer to nursing sedation/analgesia documentation record)  [x]Formulation and discussion of sedation/procedure plan, risks, and expectations with patient and/or responsible adult completed.   [x]Patient examined immediately prior to the

## 2019-04-11 NOTE — CONSULTS
mouth 2 times daily 10/30/18  Yes Yesika Elder MD   lactulose (CHRONULAC) 10 GM/15ML solution Take 30 mLs by mouth 2 times daily as needed (constipation) 8/23/18  Yes Idania Mirza, APRN - CNP   glucose monitoring kit (FREESTYLE) monitoring kit 1 kit by Does not apply route daily 7/16/18  Yes Yesika Elder MD   albuterol sulfate  (90 Base) MCG/ACT inhaler Inhale 2 puffs into the lungs every 6 hours as needed for Wheezing 6/4/18  Yes Yesika Elder MD    Scheduled Meds:   sodium chloride flush  10 mL Intravenous 2 times per day    [Held by provider] apixaban  5 mg Oral BID    sodium chloride flush  10 mL Intravenous 2 times per day    sertraline  200 mg Oral Daily    atorvastatin  40 mg Oral Nightly    aspirin  81 mg Oral Daily    docusate sodium  100 mg Oral BID    nicotine  1 patch Transdermal Daily    morphine  30 mg Oral Q12H     Continuous Infusions:   sodium chloride 50 mL/hr at 04/11/19 0954    sodium chloride      sodium chloride 75 mL/hr at 04/09/19 1457     PRN Meds:.sodium chloride flush, magnesium hydroxide, sodium chloride flush, albuterol sulfate HFA, clonazePAM, ondansetron, senna, acetaminophen, morphine **OR** morphine, oxyCODONE HCl, lactulose, heparin flush (100 units/mL)  Past Medical History   has a past medical history of Anxiety, Bleeds easily ([address] Bruises easily, Cerebrovascular accident (CVA) due to embolism of right posterior cerebral artery ([address] COPD (chronic obstructive pulmonary disease) ([address] DVT (deep venous thrombosis) ([address] Enlarged lymph node, History of emotional problems, Hx of blood clots, Hypertension, Obesity, Squamous cell carcinoma of tonsils ([address] and Type II or unspecified type diabetes mellitus without mention of complication, not stated as uncontrolled. Social History  Social History     Socioeconomic History    Marital status:       Spouse name: Not on file    Number of children: Not on file    Years of education: Not on file    Highest education level: Not on file   Occupational History    Occupation: disability   Social Needs    Financial resource strain: Not on file    Food insecurity:     Worry: Not on file     Inability: Not on file    Transportation needs:     Medical: Not on file     Non-medical: Not on file   Tobacco Use    Smoking status: Current Every Day Smoker     Packs/day: 0.50     Years: 40.00     Pack years: 20.00     Types: Cigarettes    Smokeless tobacco: Never Used    Tobacco comment: 5 cig a day    Substance and Sexual Activity    Alcohol use: No    Drug use: No    Sexual activity: Not on file   Lifestyle    Physical activity:     Days per week: Not on file     Minutes per session: Not on file    Stress: Not on file   Relationships    Social connections:     Talks on phone: Not on file     Gets together: Not on file     Attends Islam service: Not on file     Active member of club or organization: Not on file     Attends meetings of clubs or organizations: Not on file     Relationship status: Not on file    Intimate partner violence:     Fear of current or ex partner: Not on file     Emotionally abused: Not on file     Physically abused: Not on file     Forced sexual activity: Not on file   Other Topics Concern    Not on file   Social History Narrative    Not on file     Family History      Problem Relation Age of Onset    Cancer Mother         lung       OBJECTIVE  /60   Pulse 58   Temp 98.3 °F (36.8 °C) (Oral)   Resp 16   Ht 6' (1.829 m)   Wt 178 lb 6.4 oz (80.9 kg)   SpO2 95%   BMI 24.20 kg/m²     Imaging:  Images were personally reviewed including:  CT brain without contrast: Right occipital lobe and temporal lobe stroke  Mri brain confirming right pca cva  mra head and neck with right p1 pca occlusion/stenosis    Assessment   62 y.o. male who presents with history of cancer, anxiety, prior right pca cva, copd, htn, obesity, t2dm with recent neck swelling and pain, sob and left sided

## 2019-04-11 NOTE — PLAN OF CARE
Problem: Falls - Risk of:  Goal: Will remain free from falls  Description  Will remain free from falls  Outcome: Met This Shift  Note:   Continue with fall precautions, up with assist. Gait unsteady. Continue use of walker, gait belt, bed alarm. Problem: Falls - Risk of:  Goal: Absence of physical injury  Description  Absence of physical injury  Outcome: Met This Shift     Problem: Risk for Impaired Skin Integrity  Goal: Tissue integrity - skin and mucous membranes  Description  Structural intactness and normal physiological function of skin and  mucous membranes. Outcome: Ongoing  Note:   Continue to assess skin. Assess Linq site on left chest.      Problem: Pain:  Goal: Pain level will decrease  Description  Pain level will decrease  Outcome: Ongoing  Note:   Patient states chronic pain to neck and legs. He is taking scheduled MS contin and prn oxycodone. His pain goal is 2/10, he was able to sleep this morning for 3 hours. States relief of pain after pain meds. Problem: Pain:  Goal: Control of acute pain  Description  Control of acute pain  Outcome: Ongoing     Problem: Pain:  Goal: Control of chronic pain  Description  Control of chronic pain  Outcome: Ongoing     Problem: DISCHARGE BARRIERS  Goal: Patient's continuum of care needs are met  Outcome: Ongoing  Note:   Rehab consult for discharge planning. PT recommends inpatient rehab post CVA. Problem: Nutrition  Goal: Optimal nutrition therapy  Outcome: Ongoing  Note:   Patient seems to have difficulty swallowing at times. On dysphagia diet. Coughs at times when eating and spits out food. Speech to eval tomorrow. Care plan reviewed with patient. Patient verbalize understanding of the plan of care and contribute to goal setting.

## 2019-04-11 NOTE — PROGRESS NOTES
Patient awake, sitting in bed. Alert and oriented x3. PEERLA. Speech clear and appropriate. Mucus membranes pink, moist. Movement of upper extremities appropriate. Capillary refill less than 3 seconds. Skin turgor less than 3 seconds. Hand grasp strong bilaterally. Heart rhythm regular. Lung sounds clear. Chest rise symmetrical. Abdomen soft, non tender. Bowel sounds present in all quadrants. Movement of lower extremities appropriate. Pedal pulses strong bilaterally. Pedal push pull strong bilaterally. Dressing on left chest, clean, dry and intact. INT present in left wrist, clean, dry and intact. Implanted venous port present in right subclavian, clean dry and intact. Patient voices no concerns at present.      [facility]iktoriya [facility]

## 2019-04-11 NOTE — PRE SEDATION
chloride      sodium chloride 75 mL/hr at 04/09/19 1457     PRN Meds: sodium chloride flush, magnesium hydroxide, sodium chloride flush, albuterol sulfate HFA, clonazePAM, ondansetron, senna, acetaminophen, morphine **OR** morphine, oxyCODONE HCl, lactulose, heparin flush (100 units/mL)  Home Meds:   Prior to Admission medications    Medication Sig Start Date End Date Taking? Authorizing Provider   oxyCODONE HCl (OXY-IR) 10 MG immediate release tablet Take 1 tablet by mouth every 8 hours as needed for Pain for up to 30 days. 4/6/19 5/6/19 Yes YAQUELIN Krueger CNP   hydrOXYzine (ATARAX) 50 MG tablet Take 1-2 tablets nightly prn 3/25/19  Yes Lorenza Hurtado MD   lidocaine-prilocaine (EMLA) 2.5-2.5 % cream Apply topically as needed. 3/25/19  Yes Sherrell Robert MD   morphine (MS CONTIN) 30 MG extended release tablet Take 1 tablet by mouth every 12 hours for 30 days. 3/25/19 4/24/19 Yes YAQUELIN Faust CNP   clonazePAM (KLONOPIN) 1 MG tablet Take 1 tablet by mouth 2 times daily as needed for Anxiety for up to 60 doses.  3/11/19 4/11/19 Yes Lorenza Hurtado MD   sertraline (ZOLOFT) 100 MG tablet Take 2 tablets by mouth daily 1/29/19  Yes Lorenza Hurtado MD   naloxone [facility]) 4 MG/0.1ML LIQD nasal spray 1 spray by Nasal route as needed for Opioid Reversal 12/24/18  Yes YAQUELIN Joe CNP   docusate sodium (COLACE) 100 MG capsule Take 100 mg by mouth 3 times daily as needed for Constipation   Yes Historical Provider, MD   apixaban (ELIQUIS) 5 MG TABS tablet Take 1 tablet by mouth 2 times daily 10/30/18  Yes Lorenza Hurtado MD   lactulose (CHRONULAC) 10 GM/15ML solution Take 30 mLs by mouth 2 times daily as needed (constipation) 8/23/18  Yes YAQUELIN Amado CNP   glucose monitoring kit (FREESTYLE) monitoring kit 1 kit by Does not apply route daily 7/16/18  Yes Lorenza Hurtado MD   albuterol sulfate  (90 Base) MCG/ACT inhaler Inhale 2 puffs into the lungs every 6 hours as needed for Wheezing 6/4/18  Yes Bryson Espinal MD     Coumadin Use Last 7 Days:  no  Antiplatelet drug therapy use last 7 days: no  Other anticoagulant use last 7 days: yes - heparin  Additional Medication Information:        Pre-Sedation Documentation and Exam:   I have personally completed a history, physical exam & review of systems for this patient (see notes).     Mallampati Airway Assessment:  Mallampati Class II - (soft palate, fauces & uvula are visible)    Prior History of Anesthesia Complications:   none    ASA Classification:  Class 3 - A patient with severe systemic disease that limits activity but is not incapacitating    Sedation/ Anesthesia Plan:   regional    Medications Planned:   Lidocaine locally    Patient is an appropriate candidate for plan of sedation: yes    Electronically signed by Tova Kelsey MD on 4/11/2019 at 11:09 AM

## 2019-04-11 NOTE — PROGRESS NOTES
Hospitalist Progress Note    Patient:  Vivien Barrosum      Unit/Bed:3B-31/031-A    YOB: 1960    MRN: [id number]       Acct: [de-identified]     PCP: Qasim Trujillo MD    Date of Admission: 4/9/2019      Assessment/Plan:  Active Hospital Problems    Diagnosis Date Noted    Left homonymous hemianopsia due to recent cerebral infarction [I69.398, H53.462]     Tonsil cancer ([facility] [facility] [C09.9]     NSTEMI (non-ST elevated myocardial infarction) ([facility] [facility] [I21.4] 04/09/2019    Cerebrovascular accident (CVA) due to embolism of right posterior cerebral artery (HCC) [I63.431]     Severe malnutrition ([facility] [E43] 10/23/2018       Non MI troponin elevation - with mildly elevated troponins, and EKG showing poor quality but lateral TWI.   - Cardiology consulted  - [facility] with no obstructive CAD (4/10)  - suspect stroke and heart strain are etiology for troponemia. Acute/Subacute stroke in right occipital lobe  - large. A/w right P1 segment stenosis on the right. Likely triggered by this possibly exacerbated by cardioembolic etiology. Does have history of DVT with questionable compliance with Eliquis. (however BAILEY done showing no PFO or thrombus - normal EF).    * Has visual field issues (left upper quadrant homonomous hemianopsia) and unsteadiness of gait  - no indication for permissive HTN   - okay for anticoag per Neuro (there is risk of hemorrhagic transformation, however benefit outweigh risk)  - MRI/MRA confirms early subacute stroke of the right occipital lobe, with P1 stenosis on right.   - on statin, asa currently - discussed with Neurology at length   - plan would be for Eliquis (suspected cardioembolic per Neuro)    - although no evidence of AF thus far and no DVT this stay on LE doppler    - as above BAILEY without PFO or thrombus    - suspicion is for underlying AF    - if patient goes to rehab, monitored setting, will do Eliquis   - if patient refuses rehab/snf, fall risk felt to be too high and will plan for just ASA  - monitor tele - no AF  - PT/OT/ST (pt recommend rehab)  - lipid and A1C    Hx of SCC left tonsil - CT with likely mets with mult small nodules in bilateral lung and pancreas/adrenal gland. Last PET done 11/2018 showed resolution of FDG avid mass in left tongue, and cervical adenopathy.   - Oncology has been consulted    Intraabdominal/Lung lesions - multiple small nodules in lungs (bilaterally), in pancreas body, left adrenal gland  - As above, Oncology consulted  - will likely need OP repeat PET CT (last was 11/2018 as noted above)    Hx of DVT/PE - discussion of AC as above. Depends upon patient's fall risk and willingness to undergo rehab whether [facility] benefits outweighs risk from possible fall. Tobacco abuse - counseling, patient is quite resistant to quit. Suspected PAD - further workup as OP    Chronic Pain  - follows pain management. - for now, continue on home regimen     Expected discharge date:  1-2 days    Disposition: pending         [] Home       [] TCU       [] Rehab       [] Psych       [] SNF       [] [place]       [] Other-    --------------------------------------------    Chief Complaint: Blurred vision. Hospital Course: Patient is a 59yo M admitted for blurred vision, chest pain, found to have NSTEMI. Clean cath on 4/10, however CT head noted acute/subacute right occipital stroke. MRI and MRA completed. Plan for DC tomorrow, needs SLP eval and will have PMR evaluation. Subjective (past 24 hours): Patient continues to ask if he can leave. However, does state he is willing to do intpatient rehab? He seems to want to go home for some reason but will not say why. There was some confusion noted by RN upon awakening this am, but that has improved. Still complains of visual symptoms but denies that he has any other issues with walking, standing etc.  PT eval and recommended rehab.        Medications:  Reviewed    Infusion Medications    sodium internal carotid artery takeoffs by NASCET criteria. 2. Nodular prominence of the left supraglottic larynx on the source images. Correlate with prior MRI of the neck. **This report has been created using voice recognition software. It may contain minor errors which are inherent in voice recognition technology. **      Final report electronically signed by Dr. Klaudia Watters MD on 4/11/2019 9:05 AM      MRA HEAD WO CONTRAST   Final Result    Brain:   1. Redemonstration of large acute to early subacute infarct in the right posterior cerebral artery territory. 2. Mild to moderate scattered focal areas of T2/FLAIR hyperintensity in the supratentorial white matter as evidence for chronic microvascular angiopathy and old lacunar infarcts. MRA BRAIN:   High-grade stenosis at the P1 segment of the right posterior cerebral artery without definite distal flow. MRA NECK:   1. No significant stenosis at the internal carotid artery takeoffs by NASCET criteria. 2. Nodular prominence of the left supraglottic larynx on the source images. Correlate with prior MRI of the neck. **This report has been created using voice recognition software. It may contain minor errors which are inherent in voice recognition technology. **      Final report electronically signed by Dr. Klaudia Watters MD on 4/11/2019 9:05 AM      MRA NECK WO CONTRAST   Final Result    Brain:   1. Redemonstration of large acute to early subacute infarct in the right posterior cerebral artery territory. 2. Mild to moderate scattered focal areas of T2/FLAIR hyperintensity in the supratentorial white matter as evidence for chronic microvascular angiopathy and old lacunar infarcts. MRA BRAIN:   High-grade stenosis at the P1 segment of the right posterior cerebral artery without definite distal flow. MRA NECK:   1. No significant stenosis at the internal carotid artery takeoffs by NASCET criteria.    2. Nodular prominence of the left supraglottic larynx on the source images. Correlate with prior MRI of the neck. **This report has been created using voice recognition software. It may contain minor errors which are inherent in voice recognition technology. **      Final report electronically signed by Dr. Theresa Thomson MD on 4/11/2019 9:05 AM      CT HEAD WO CONTRAST   Final Result   1. There is a large area of hypoattenuation within the right occipital lobe and tracking anteriorly along the medial aspect of the right temporal lobe consistent with an infarct acute to subacute. There is associated local mass effect with sulcal    effacement however there is no midline shift. There is no associated hemorrhage. A stat MRI of the brain and a CTA examination of the head and neck recommended for additional evaluation of the infarct and also to exclude an underlying mass lesion given    the history of cancer. 2. Latasha TALAMANTES 4/9/2018 at 1141 hours. **This report has been created using voice recognition software. It may contain minor errors which are inherent in voice recognition technology. **      Final report electronically signed by Dr. Mary Baca on 4/9/2019 11:44 AM      CTA Chest W WO Contrast   Final Result   1. There are bullous changes within the left mid chest. There is linear parenchymal scarring and bronchiectatic change adjacent to this region. There is also a nodular linear opacity at the right posterior costophrenic angle suggesting atelectasis and/or    infiltrate. Follow-up imaging is recommended to exclude a mass within this region. A follow-up CT chest with intravenous contrast in 3 months is recommended to confirm stability. 2. There is a 0.2 to 0.3 cm nodule anteriorly within the right mid chest that this is series 3 image 83). There is also a 0.6 cm noncalcified nodule along the left major fissure within the left lower chest (series 3 image 131).  In addition there is a 0.5 cm noncalcified nodule posterior laterally at the left lung base (series 3 image 142). A neoplastic etiology/metastatic disease cannot be excluded based on the CT appearance. A follow-up CT examination of the chest with intravenous contrast in 3 months    is recommended to confirm stability. **This report has been created using voice recognition software. It may contain minor errors which are inherent in voice recognition technology. **      Final report electronically signed by Dr. Kristine Cristina on 4/9/2019 11:52 AM      CT ABDOMEN PELVIS W IV CONTRAST Additional Contrast? None   Final Result   1. There is a 0.9 cm hypodense lesion within the pancreatic body with a nonspecific CT appearance. An MRI of the pancreas is recommended for additional evaluation of this. 2. There is nodular hypertrophy of the left adrenal gland. 3.  The bowel gas pattern is nonobstructive. There is a large amount of stool throughout the colon. There is mucosal thickening of the cardia the stomach. An esophagram or endoscopy recommended to further evaluate this. 4. There is mesenteric edema. There is also a small amount of free fluid within the dependent portion of the pelvis. There is no loculated or drainable fluid collection. In addition there is diffuse subcutaneous edema suggesting 3rd spacing. 5. There is thickening of the urinary bladder wall with the differential including a cystitis and a mass/malignancy. **This report has been created using voice recognition software. It may contain minor errors which are inherent in voice recognition technology. **      Final report electronically signed by Dr. Kristine Cristina on 4/9/2019 12:00 PM      XR CHEST PORTABLE   Final Result   1. The lung fields are emphysematous. There is stable pleural reaction along the lateral costophrenic angles bilaterally and the lateral margin of the right lower chest. There is no consolidation or infiltrates.  There is no pulmonary vascular congestion. **This report has been created using voice recognition software. It may contain minor errors which are inherent in voice recognition technology. **      Final report electronically signed by Dr. Larry Santiago on 4/9/2019 11:09 AM          DVT prophylaxis: [] Lovenox                                  [] SCDs                                 [x] SQ Heparin for now                                 [] Encourage ambulation           [] Already on Anticoagulation     Code Status: Full Code    PT/OT Eval Status: yes    Diet:   Diet NPO Time Specified  DIET DYSPHAGIA II MECHANICALLY ALTERED; No Caffeine    Fluids: will dc     Tele:   [x] yes             [] no      Electronically signed by Alexi Lisa DO on 4/11/2019 at 6:13 PM

## 2019-04-11 NOTE — CARE COORDINATION
4/11/19, 1:29 PM      Shannon Day day: 2  Location: -31/031-A Reason for admit: NSTEMI (non-ST elevated myocardial infarction) ([facility] [address] [I21.4]   Procedure: 4/10 - Cardiac Cath - clean  4/10 - MRI/MRA of head and neck -    Brain:   1. Redemonstration of large acute to early subacute infarct in the right posterior cerebral artery territory. 2. Mild to moderate scattered focal areas of T2/FLAIR hyperintensity in the supratentorial white matter as evidence for chronic microvascular angiopathy and old lacunar infarcts.       MRA BRAIN:   High-grade stenosis at the P1 segment of the right posterior cerebral artery without definite distal flow.       MRA NECK:   1. No significant stenosis at the internal carotid artery takeoffs by NASCET criteria. 2. Nodular prominence of the left supraglottic larynx on the source images. Correlate with prior MRI of the neck. 4/11 - Linq procedure completed. 4/11 - BAILEY with cardioversion  Treatment Plan of Care: Hospitalist, Neurology and Cardiology following. Palliative Care following. Pt had LOOP recorder placed today and having BAILEY with cardioversion. Pt continue to have some blurry vision and some balance issues. Have requested Physiatry consult. PCP: Lee Ann Waters MD  Readmission Risk Score: 17%  Discharge Plan: TCU/Rehab admissions coordinator consulted. Pt currently lives at home alone. Planning home with [facility] or OP therapy, unless he is appropriate for IP Rehab.

## 2019-04-11 NOTE — PROGRESS NOTES
NEUROLOGY INPATIENT PROGRESS NOTE    Patient- Kendal Phan    MRN -  [id number]   Acct # - [de-identified]      - 1960    62 y.o. Subjective: The patient is seen as follow up for right occipital stroke. Patient is alert at this time. He seems frustrated and crying as he can't use his phone properly to call his niece. Objective:   BP (!) 161/77   Pulse 61   Temp 98.3 °F (36.8 °C) (Oral)   Resp 18   Ht 6' (1.829 m)   Wt 178 lb 6.4 oz (80.9 kg)   SpO2 97%   BMI 24.20 kg/m²       Intake/Output Summary (Last 24 hours) at 2019 1550  Last data filed at 2019 1521  Gross per 24 hour   Intake 1325.57 ml   Output 1725 ml   Net -399.43 ml       Physical Exam:  General:  Awake, alert, not in distress. Language is intact. HEENT: pink conjunctiva, unicteric sclera, moist oral mucosa. EOMI,   Neck: There is no carotid bruits. The Neck is supple. Neuro: CN 2-12 grossly intact with no right face droop, partial left vision loss. Power 5/5 Throughout symmetric, Reflexes are +2 symmetric. Long tracts are intact. Cerebellar exam is Intact. Sensory exam is intact to light touch. Gait is unsteady but able to walk on his own. No abnormal movement. Osteo: There is no LROM of any of the 4 extremity joints, no joint tenderness. ROS:    Cardiac: no chest pain. No palpitations.   Renal : no flank pain, no hematuria  Skin: no rash    Medications:     sodium chloride flush  10 mL Intravenous 2 times per day    [Held by provider] apixaban  5 mg Oral BID    sertraline  200 mg Oral Daily    atorvastatin  40 mg Oral Nightly    aspirin  81 mg Oral Daily    docusate sodium  100 mg Oral BID    nicotine  1 patch Transdermal Daily    morphine  30 mg Oral Q12H       Data:   CBC:   Recent Labs     19  1151 04/10/19  0435 19  0400   WBC 9.7 7.0 5.5   HGB 15.8 12.8* 11.9*    190 178     BMP:    Recent Labs     19  1027 04/10/19  0435 19  0405    138 140   K 4.1 3.8 4.3    101 103   CO2 26 25 28   BUN 17 15 20   CREATININE 0.9 0.7 0.7   GLUCOSE 112* 93 121*            No results found for this or any previous visit. Results for orders placed during the hospital encounter of 04/09/19   MRA HEAD WO CONTRAST    Narrative PROCEDURE: MRI BRAIN W WO CONTRAST, MRA HEAD WO CONTRAST, MRA NECK WO CONTRAST    CLINICAL INFORMATION: STROKE, hx of tonsil cancer, r/o cancer . Blurred vision and headaches for 3 days. History of tonsil cancer. COMPARISON: CT head dated 4/9/2019 and MR neck dated 3/21/2019. TECHNIQUE: Sagittal T1 3-D pre and postcontrast with axial and coronal reformatted images, axial T2, FLAIR, GRE and DWI images of the brain with post processed ADC map. 15 mL ProHance was injected in the left forearm. 3-D time-of-flight sequence of the   intracranial vasculature with volume rendered maximum intensity projection images. Axial 2-D and 3-D time-of-flight sequences of the carotid bifurcations with volume rendered maximum intensity projection sequences. FINDINGS:   Brain:  There is a large area of restricted diffusion involving the mesial and posterior temporal lobe and occipital lobe with associated hyperintense T2/FLAIR signal corresponding to the large area of hypodensity on prior CT. There is effacement of sulci. No   midline shift is present. There are multiple small foci of restricted diffusion in the right thalamus and splenium of the corpus callosum associated focal areas of T2/FLAIR prolongation. No other focal areas of restricted diffusion are present. There are   mild to moderate scattered focal areas of T2/FLAIR prolongation in the subcortical deep white matter without focal areas of restricted diffusion. There are small foci of encephalomalacia interspersed among the foci of T2/FLAIR abnormality. The   ventricles, cisterns and sulci are otherwise symmetric in size and configuration.  No intra or extra-axial mass is chronic microvascular angiopathy and old lacunar infarcts. MRA BRAIN:  High-grade stenosis at the P1 segment of the right posterior cerebral artery without definite distal flow. MRA NECK:  1. No significant stenosis at the internal carotid artery takeoffs by NASCET criteria. 2. Nodular prominence of the left supraglottic larynx on the source images. Correlate with prior MRI of the neck. **This report has been created using voice recognition software. It may contain minor errors which are inherent in voice recognition technology. **    Final report electronically signed by Dr. Rafael Shen MD on 4/11/2019 9:05 AM     No results found for this or any previous visit. No results found for this or any previous visit. No results found for this or any previous visit. Results for orders placed during the hospital encounter of 04/09/19   MRI BRAIN W WO CONTRAST    Narrative PROCEDURE: MRI BRAIN W WO CONTRAST, MRA HEAD WO CONTRAST, MRA NECK WO CONTRAST    CLINICAL INFORMATION: STROKE, hx of tonsil cancer, r/o cancer . Blurred vision and headaches for 3 days. History of tonsil cancer. COMPARISON: CT head dated 4/9/2019 and MR neck dated 3/21/2019. TECHNIQUE: Sagittal T1 3-D pre and postcontrast with axial and coronal reformatted images, axial T2, FLAIR, GRE and DWI images of the brain with post processed ADC map. 15 mL ProHance was injected in the left forearm. 3-D time-of-flight sequence of the   intracranial vasculature with volume rendered maximum intensity projection images. Axial 2-D and 3-D time-of-flight sequences of the carotid bifurcations with volume rendered maximum intensity projection sequences. FINDINGS:   Brain:  There is a large area of restricted diffusion involving the mesial and posterior temporal lobe and occipital lobe with associated hyperintense T2/FLAIR signal corresponding to the large area of hypodensity on prior CT. There is effacement of sulci.  No   midline shift is present. There are multiple small foci of restricted diffusion in the right thalamus and splenium of the corpus callosum associated focal areas of T2/FLAIR prolongation. No other focal areas of restricted diffusion are present. There are   mild to moderate scattered focal areas of T2/FLAIR prolongation in the subcortical deep white matter without focal areas of restricted diffusion. There are small foci of encephalomalacia interspersed among the foci of T2/FLAIR abnormality. The   ventricles, cisterns and sulci are otherwise symmetric in size and configuration. No intra or extra-axial mass is identified. Following contrast administration, there is minimally prominent venous enhancement in the large area of infarction consistent with luxury perfusion. Otherwise no focal areas of abnormal parenchymal or meningeal enhancement is identified. The major vascular flow voids appear patent. Orbits are unremarkable. There is a mucous retention cyst in the right maxillary sinus. Visualized paranasal sinuses are otherwise clear. There is small right mastoid effusion. MRA BRAIN:  Intracranial segments of the internal carotid arteries are patent without focal stenosis or aneurysmal dilatation. The bilateral middle cerebral and anterior cerebral arteries are patent without focal abnormality identified. The basilar artery is patent   without focal stenosis or aneurysmal dilatation identified. There is minimal flow in the P1 segment of the right posterior cerebral artery with long segment severe stenosis. No definite distal flow is identified. The left posterior cerebral artery has a   fetal origin and appears patent. Superior cerebellar arteries appear patent. MRA NECK:  Visualized portions of the common carotid arteries appear patent without focal stenosis. The carotid bifurcations appear widely patent without hemodynamically significant stenosis by NASCET criteria.  Visualized portions of the cervical segments of the   internal carotid arteries appear patent without focal stenosis. The left vertebral artery is mildly dominant. Vertebral arteries appear patent without focal stenosis. There appears to be nodular thickening of the supraglottic larynx on the left on the   2-D time-of-flight source images. Correlate with prior MRI of the neck dated 3/21/2019. Impression  Brain:  1. Redemonstration of large acute to early subacute infarct in the right posterior cerebral artery territory. 2. Mild to moderate scattered focal areas of T2/FLAIR hyperintensity in the supratentorial white matter as evidence for chronic microvascular angiopathy and old lacunar infarcts. MRA BRAIN:  High-grade stenosis at the P1 segment of the right posterior cerebral artery without definite distal flow. MRA NECK:  1. No significant stenosis at the internal carotid artery takeoffs by NASCET criteria. 2. Nodular prominence of the left supraglottic larynx on the source images. Correlate with prior MRI of the neck. **This report has been created using voice recognition software. It may contain minor errors which are inherent in voice recognition technology. **    Final report electronically signed by Dr. Karen Singh MD on 4/11/2019 9:05 AM     No results found for this or any previous visit. Results for orders placed during the hospital encounter of 04/09/19   CT HEAD WO CONTRAST    Narrative PROCEDURE: CT HEAD WO CONTRAST    CLINICAL INFORMATION: Peripheral vision loss for 2 days; history of cancer. COMPARISON: No prior study. TECHNIQUE:   5 mm axial imaging through the head without IV contrast.     All CT scans at this facility use dose modulation, iterative reconstruction, and/or weight based dosing when appropriate to reduce the radiation dose to as low as reasonably achievable. FINDINGS:   POSTOPERATIVE CHANGES: None. BRAIN VOLUME:   1. Normal for the patient's age. VENTRICLES/CISTERNS:   1.  Normal for the myocardial infarction) [facility])    Cerebrovascular accident (CVA) due to embolism of right posterior cerebral artery (HCC)    Tonsil cancer ([address]    Left homonymous hemianopsia due to recent cerebral infarction  Resolved Problems:    * No resolved hospital problems. *    61 yo man who presented with chest pain, found to have R PCA stroke. Plan:    1. MRI brain showed stable stroke, MRA confirmed a R PCA stenosis that is likely the cause of his stroke that may be exacerbated by a cardioemboli  2. Patient was taking eliquis for DVT and PE in 2018, he was not compliant with his medication  3. Patient stated he wants to go home, however, he may not be safe to go home, as he will benefit from acute rehab.  4. If patient decides to go to acute rehab and agree to be compliant on eliquis, it is reasonable to restart his eliquis 5mg BID for hx of DVT/PE, from a stroke standpoint, there is still no concrete proof of afib or clot. Getting him a loop recorder to see if he has afib. 5. Due to his gait being unsteady and being non compliane, if pt refused rehab, recommend just aspirin 81mg qdaily alone for the time being.     Dee Seip, MD, 4/11/2019 3:50 PM       Nina Bhakta MD  Attending Neurologist/Neurointensivist

## 2019-04-12 ENCOUNTER — TELEPHONE (OUTPATIENT)
Dept: FAMILY MEDICINE CLINIC | Age: 59
End: 2019-04-12

## 2019-04-12 VITALS
OXYGEN SATURATION: 95 % | BODY MASS INDEX: 24.16 KG/M2 | WEIGHT: 178.4 LBS | HEIGHT: 72 IN | RESPIRATION RATE: 18 BRPM | TEMPERATURE: 97.8 F | DIASTOLIC BLOOD PRESSURE: 79 MMHG | HEART RATE: 67 BPM | SYSTOLIC BLOOD PRESSURE: 168 MMHG

## 2019-04-12 DIAGNOSIS — R13.10 DYSPHAGIA, UNSPECIFIED TYPE: Primary | ICD-10-CM

## 2019-04-12 LAB
EKG ATRIAL RATE: 48 BPM
EKG P AXIS: 64 DEGREES
EKG P-R INTERVAL: 154 MS
EKG Q-T INTERVAL: 458 MS
EKG QRS DURATION: 96 MS
EKG QTC CALCULATION (BAZETT): 409 MS
EKG R AXIS: -7 DEGREES
EKG T AXIS: -38 DEGREES
EKG VENTRICULAR RATE: 48 BPM

## 2019-04-12 PROCEDURE — 2580000003 HC RX 258: Performed by: NURSE PRACTITIONER

## 2019-04-12 PROCEDURE — 97110 THERAPEUTIC EXERCISES: CPT

## 2019-04-12 PROCEDURE — 97166 OT EVAL MOD COMPLEX 45 MIN: CPT

## 2019-04-12 PROCEDURE — 6370000000 HC RX 637 (ALT 250 FOR IP): Performed by: NURSE PRACTITIONER

## 2019-04-12 PROCEDURE — 6360000002 HC RX W HCPCS: Performed by: NURSE PRACTITIONER

## 2019-04-12 PROCEDURE — 97116 GAIT TRAINING THERAPY: CPT

## 2019-04-12 PROCEDURE — 6360000002 HC RX W HCPCS: Performed by: INTERNAL MEDICINE

## 2019-04-12 PROCEDURE — 92610 EVALUATE SWALLOWING FUNCTION: CPT

## 2019-04-12 PROCEDURE — 99233 SBSQ HOSP IP/OBS HIGH 50: CPT | Performed by: PSYCHIATRY & NEUROLOGY

## 2019-04-12 PROCEDURE — 97535 SELF CARE MNGMENT TRAINING: CPT

## 2019-04-12 PROCEDURE — 97530 THERAPEUTIC ACTIVITIES: CPT

## 2019-04-12 PROCEDURE — 99239 HOSP IP/OBS DSCHRG MGMT >30: CPT | Performed by: INTERNAL MEDICINE

## 2019-04-12 RX ORDER — ATORVASTATIN CALCIUM 40 MG/1
40 TABLET, FILM COATED ORAL NIGHTLY
Qty: 30 TABLET | Refills: 3 | Status: ON HOLD | OUTPATIENT
Start: 2019-04-12 | End: 2019-08-13 | Stop reason: HOSPADM

## 2019-04-12 RX ORDER — NICOTINE 21 MG/24HR
1 PATCH, TRANSDERMAL 24 HOURS TRANSDERMAL DAILY
Qty: 30 PATCH | Refills: 3 | Status: ON HOLD | OUTPATIENT
Start: 2019-04-13 | End: 2019-08-13 | Stop reason: SDUPTHER

## 2019-04-12 RX ADMIN — DOCUSATE SODIUM 100 MG: 100 CAPSULE, LIQUID FILLED ORAL at 10:28

## 2019-04-12 RX ADMIN — SERTRALINE 200 MG: 100 TABLET, FILM COATED ORAL at 10:28

## 2019-04-12 RX ADMIN — MORPHINE SULFATE 30 MG: 30 TABLET, EXTENDED RELEASE ORAL at 10:28

## 2019-04-12 RX ADMIN — ASPIRIN 81 MG 81 MG: 81 TABLET ORAL at 10:29

## 2019-04-12 RX ADMIN — CLONAZEPAM 1 MG: 1 TABLET ORAL at 02:33

## 2019-04-12 RX ADMIN — HEPARIN SODIUM 5000 UNITS: 5000 INJECTION INTRAVENOUS; SUBCUTANEOUS at 15:05

## 2019-04-12 RX ADMIN — HEPARIN SODIUM 5000 UNITS: 5000 INJECTION INTRAVENOUS; SUBCUTANEOUS at 04:52

## 2019-04-12 RX ADMIN — Medication 10 ML: at 10:28

## 2019-04-12 RX ADMIN — Medication 20 ML: at 17:09

## 2019-04-12 RX ADMIN — SODIUM CHLORIDE, PRESERVATIVE FREE 500 UNITS: 5 INJECTION INTRAVENOUS at 17:09

## 2019-04-12 ASSESSMENT — PAIN DESCRIPTION - LOCATION: LOCATION: HEAD

## 2019-04-12 ASSESSMENT — PAIN SCALES - GENERAL
PAINLEVEL_OUTOF10: 10
PAINLEVEL_OUTOF10: 0
PAINLEVEL_OUTOF10: 4

## 2019-04-12 ASSESSMENT — PAIN DESCRIPTION - PAIN TYPE: TYPE: ACUTE PAIN

## 2019-04-12 NOTE — CARE COORDINATION
4/12/19, 2:29 PM      Ronn Nine day: 3  Location: -31/031-A Reason for admit: NSTEMI (non-ST elevated myocardial infarction) ([facility] [address] [I21.4]   Procedure: 4/10 - Cardiac Cath - clean  4/10 - MRI/MRA of head and neck -    Brain:   1. Redemonstration of large acute to early subacute infarct in the right posterior cerebral artery territory. 2. Mild to moderate scattered focal areas of T2/FLAIR hyperintensity in the supratentorial white matter as evidence for chronic microvascular angiopathy and old lacunar infarcts.       MRA BRAIN:   High-grade stenosis at the P1 segment of the right posterior cerebral artery without definite distal flow.       MRA NECK:   1. No significant stenosis at the internal carotid artery takeoffs by NASCET criteria. 2. Nodular prominence of the left supraglottic larynx on the source images. Correlate with prior MRI of the neck. 4/11 - Linq procedure completed. 4/11 - BAILEY with cardioversion  Treatment Plan of Care: Hospitalist, Neurology and Cardiology following. Palliative Care following. PT/OT. Adding ST. Consulting Physiatry today. Oncology will follow as OP. Readmission Risk Score: 16%  PCP: Janis Davis MD  Discharge Plan:  Pt is adamant that he is leaving today. He will agree to go to Rehab if accepted, otherwise he wants to go home. Talked with Elisa Toth, she will let us know if pt is a rehab candidate. 4/12/19 3:46 PM  Update:  Pt is being discharge home with [facility]. Referral made to [facility]. Placed call to Kennedi Ash RN caring for pt. She is aware. 4/12/19, 3:47 PM    Discharge plan discussed by  and . Discharge plan reviewed with patient/ family. Patient/ family verbalize understanding of discharge plan and are in agreement with plan. Understanding was demonstrated using the teach back method.        IMM Letter  IMM Letter given to Patient/Family/Significant other/Guardian/POA/by[de-identified]   IMM Letter date given[de-identified]

## 2019-04-12 NOTE — TELEPHONE ENCOUNTER
.Transition of Care visit scheduled. 4/17/2019  Patient is being discharged to home. Date of discharge 4/12/19  Discharge from facility [facility]  Reason for admission positive stroke and NSTEMI.

## 2019-04-12 NOTE — PROGRESS NOTES
[facility]  SPEECH THERAPY  [facility] CCU-STEPDOWN 3B  Bedside Swallowing Evaluation      SLP Individual Minutes  Time In: 0845  Time Out: 3968  Minutes: 12  Timed Code Treatment Minutes: 0 Minutes       Date: 2019  Patient Name: Jose Meyers      CSN: [id number]   : 1960  (62 y.o.)  Gender: male   Referring Physician:  Dr Rosario Valdez  Diagnosis: NSTEMI  Secondary Diagnosis:  dysphagia  History of Present Illness/Injury: Pt admit with the above diagnosis. History of CVA and tonsillar cancer. Chart review states pt has had speech therapy and swallowing evaluations, but unable to locate documents in [facility] data base and/or [facility] everywhere data base.   Speech to assess current swallowing funciton to determine safest oral diet  Past Medical History:   Diagnosis Date    Anxiety     Bleeds easily ([address]     Bruises easily     Cerebrovascular accident (CVA) due to embolism of right posterior cerebral artery ([address]     Cigarette nicotine dependence without complication     COPD (chronic obstructive pulmonary disease) ([address]     DVT (deep venous thrombosis) ([address]     Enlarged lymph node     History of emotional problems     History of sleep apnea     Hypertension     HCC    Moderate episode of recurrent major depressive disorder ([address] 2018    NSTEMI (non-ST elevated myocardial infarction) ([address] 2019    Pulmonary embolism and infarction ([address] 10/22/2018    S/P insertion of inferior vena caval filter 2009    Severe malnutrition ([address] 10/23/2018    Squamous cell carcinoma of tonsils ([address] 2018    left   - radiation    Type II or unspecified type diabetes mellitus without mention of complication, not stated as uncontrolled     lost weight, no meds at present        Pain:  Did not state    Current Diet: NPO pending speech eval    Respiratory Status: [x] Independent     Behavioral Observation: [x] Alert [] Oriented [x] Confused [] Lethargic      [] Dysarthric [] Limited Direction Following [] Agitated      [] Other:    ORAL MECHANISM EVALUATION:         Comments:  Facial / Labial [x]WFL [] Impaired []DNT    Lingual [x]WFL [] Impaired []DNT    Dentition []WFL [x] Impaired []DNT Edentulous, missing dentures   Velum [x]WFL [] Impaired []DNT    Vocal Quality [x]WFL [] Impaired []DNT    Sensation []WFL [] Impaired [x]DNT    Cough [x]WFL [] Impaired []DNT    Other: []WFL [] Impaired []DNT    Other: []WFL [] Impaired []DNT        PATIENT WAS EVALUATED USING:  Thin liquids by straw, applesauce, and ilya cracker softened in applesauce    ORAL PHASE: [] WFL  [x] Impaired   [] Loss of bolus from lips [] Impaired AP movement [] Pocketing Left   [] Pocketing Right  [] Lingual Pumping  [x] Impaired Mastication   [] Reduced Bolus Formation [] Impaired Oral Initiation    [] OTHER:    PHARYNGEAL PHASE: [] WFL: Pharyngeal phase appears WFLs, but cannot completely rule out pharyngeal phase deficits from a bedside swallow evaluation alone. [x] Impaired   [x] Delayed Swallow  [] Decreased Hyolaryngeal Elevation [] Audible Swallow   [x] Suspected Pharyngeal Residue due to spontaneous multiple swallow. [] OTHER:    SIGNS AND SYMPTOMS OF LARYNGEAL PENETRATION / ASPIRATION:  [] NO sign/symptoms of aspiration evident with this evaluation, but cannot rule out silent aspiration. [] Throat Clear  [] Immediate Cough [x] Delayed Cough [] Wet Vocal Quality  [] Change in Pulmonary Status  [] OTHER:    IMPRESSIONS: Pt presents with mild oropharyngeal dysphagia with impaired mastication skills secondary to lack of dentition. Decreased hyolaryngeal elevation noted with timely pharyngeal swallow trigger. Delayed cough 1x following trial of applesauce. Unclear if cough related to any aspiration. Based on this assesment, recommend Dysphagia II and thin diet with close pulmonary monitoring. Will request instrumental assessment as warranted.       RECOMMENDATIONS:     Modified Barium Swallow: [] Is indicated to further assess    [x] Is NOT indicated at this time; Will recommend as appropriate. DIET RECOMMENDATIONS:  Dysphagia II and thin    STRATEGIES: [] Strategies pending MBS results. [x] Full upright position  [x] Small bite/sip [] No Straw [] Multiple Swallow  [] Chin tuck [] Head turn [x] Pulmonary monitoring [] Oral care after all meals  [] Supervision  [] Medication in applesauce []Direct 1:1 Supervision  [] Spoon all liquids [] Alternate solid / liquid [] Limit distractions [] Monitor for fatigue  [] PMV in place for all po [] OTHER:      EDUCATION:   Learner: [x]Patient [] Significant other [] Son/Daughter [] Parent     [] Other:   Education: [x] Reviewed results and recommendations of this evaluation     [x] Reviewed diet and strategies     [] Reviewed signs, symptoms and risk of aspiration     [] Demonstrated how to thick liquid appropriately. [x] Reviewed goals and Plan of Care     [] OTHER:   Method: [x] Discussion [] Demonstration [] Hand-out     [] OTHER:   Evaluation of Education:     [] Verbalizes understanding  [] Demonstrates with assistance     [] Demonstrates without assistance [x]Needs further instruction     [x] No evidence of learning  [x] Family not present    PATIENT GOALS: [x] Pt did not state. Will further assess during treatment. [] Return to the least restricted diet possible     [] Return to previous level of function     [] OTHER:    PLAN / TREATMENT RECOMMENDATIONS:  [] No further speech therapy services indicated. [x] Speech Therapy evaluation to assess speech, language, cognition and/or voice  [x] Skilled SLP intervention on acute care 3-5 x per week or until goals met and/or pt plateaus in function.   Specific interventions for next session may include: goals listed below      SHORT TERM GOALS:  Short-term Goals  Timeframe for Short-term Goals: 2 weeks  Goal 1: Pt will tolerate Dysphagia II and thin diet without s/s of aspiration to ensure safe and adequate

## 2019-04-12 NOTE — PROGRESS NOTES
Cardiac monitor and iv removed from patient. Midline deaccessed. Patient with all belongings. Patient assessed and denies needs. Discharge instruction given and explained to patient via teach back method and he denies questions. [facility] stated we did not need to fax paperwork. Patient brought down to discharge lobby via wheelchair and friend to bring him home.

## 2019-04-12 NOTE — CONSULTS
Physical Medicine and Rehabilitation Consult Note     Piper Keating  [id number]    Reason for Consult: evaluation for rehabilitation needs s/p RIGHT PCA territory CVA    Impression/Recommendations:     Impression:  1. CVA with large acute to early subacute infarct in the RIGHT posterior cerebral artery territory secondary to high-grade stenosis at the P1 segment of the RIGHT posterior cerebral artery without definite distal flow involving the RIGHT occipital lobe and tracking anteriorly along the medial aspect of the RIGHT temporal lobe with associated local mass effect with sulcal effacement without midline shift or associated hemorrhage. 2. Gait instability. 3. Peripheral vision loss LEFT > RIGHT. 4. NSTEMI on 4/9/2019.  5. 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 6. Mild LEFT hemiparesis. 7. Mild oropharyngeal dysphagia. 8. Caval filter placed 2009 for extensive DVT of the LEFT leg as well as calf DVT on the RIGHT. 9. Pulmonary embolism in 2018. 10. Chronic pain related to oral cancer for which he sees Dr. Juan Braden. 11. HTN. 12. COPD. 13. 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 14. Osteopenia. 15. Nicotine dependence; current every day smoker. 16. History of anxiety and depression. Recommendations:  Patient would be a good candidate for IPR; but does not wish to remain in the hospital and would like home health or outpatient therapies. It was, however difficult getting him to understand the difference between these two options. Continue PT/OT/SLP. Can discharge once medically stable. It was my pleasure to evaluate Piper Keating today. Please call with questions.     Judd Reynoso MD       History:   History of Present Illness:  Piper Keating is a 62 y.o. male who  has a past medical history of Anxiety, Bleeds easily ([facility] Bruises easily, Cerebrovascular accident (CVA) due to embolism of right posterior cerebral artery ([facility] Cigarette nicotine dependence without complication, COPD (chronic obstructive pulmonary disease) ([facility] DVT (deep venous thrombosis) ([facility] Enlarged lymph node, History of emotional problems, History of sleep apnea, Hypertension, Moderate episode of recurrent major depressive disorder ([facility] NSTEMI (non-ST elevated myocardial infarction) [facility]), Pulmonary embolism and infarction ([facility] S/P insertion of inferior vena caval filter, Severe malnutrition ([facility] Squamous cell carcinoma of tonsils ([facility] and Type II or unspecified type diabetes mellitus without mention of complication, not stated as uncontrolled. He was admitted 4/9/2019 for complaint of  midsternal chest pain that began sometime last week and 2 days ago he also had blurriness in his LEFT visual field as well as a mild headache. In the emergency department his troponin was elevated at 0.503 and EKG showed that he has had an NSTEMI. Further workup also showed a subacute infarct in the RIGHT posterior cerebral artery territory secondary to a high-grade stenosis at the P1 segment of that vessel with no definitive flow beyond the stenotic portion. Affected areas of the brain included the medial aspect of the RIGHT temporal lobe and RIGHT occipital lobe with local mass effect and sulcal effacement without midline shift or associated hemorrhage. On initial consultation exam the patient sitting on edge of his bed. He is alert and oriented ×4. The patient is able to stand up on his own for testing of his motor strength and it is noted that he is unstable on his feet and able to maintain good trunk control during the exam.  Deficits were discussed with him and he states that he would prefer to go home and do therapy either as home health as an outpatient.   These options were discussed with him and he apparently had difficulty understanding the difference; but he is still insistent on going home at this point. Prior Function  Lives With: Alone(niece and aunt are in the front of the home in their own living space)  ADL Assistance: Independent  Homemaking Assistance: Independent  Ambulation Assistance: Independent(uses walker and cane (cane mostly outside))  Transfer Assistance: Independent  Additional Comments: Pt reports using walker/cane prior to admission. Pt notes I with all mobility and ADLs. Previously was independent of ADLs and used Walker and Straight Cane AD for ambulation prior to recent admission. Initially has ambulated [de-identified]' with RW at [facility] with PT. With therapy is SBA for bed mobility, CGA for transfers, and CGA/SBA with balance. ROM restrictions, WB restrictions, precautions: Restrictions/Precautions: Fall Risk, General Precautions  Other position/activity restrictions: pt denies wearing AFO   Required Braces or Orthoses  Right Lower Extremity Brace:  Ankle Foot Orthotics(secondary to foot drop - not with pt at this time)    Fall Risk    Current Rehabilitation Assessments:  PT:    Strength RLE: Exception  Comment: hip:4/5, quad: 5/5, dorsiflexion:1/5  Strength LLE: Exception  Comment: Hip: 3+/5, quad: 5/5, dorsiflexion/plantarflexion: 5/5    Bed Mobility  Scooting: Stand by assistance    Transfers  Sit to Stand: Contact guard assistance, Stand by assistance(impulsive, required cues for safety, assisted with clothing management due to patient wet with urine from using urinal in bed)  Stand to sit: Contact guard assistance, Stand by assistance  Comment: increased time and effort, unsteady with transfer    Ambulation 1  Surface: level tile  Device: Rolling Walker  Assistance: Contact guard assistance, Minimal assistance  Quality of Gait: mod path deviation, significant drop foot on the right LE, increased hip and knee flexion on the right to compensate  Distance: 8ft x1, 80ft x1, 6ft x1  Comments: unsteady gait noted throughout, infarction ([facility] 10/22/2018    S/P insertion of inferior vena caval filter 07/07/2009    Severe malnutrition ([facility] 10/23/2018    Squamous cell carcinoma of tonsils ([facility] 2018    left   - radiation    Type II or unspecified type diabetes mellitus without mention of complication, not stated as uncontrolled     lost weight, no meds at present 2018     Surgical:   Past Surgical History:   Procedure Laterality Date    CARDIAC CATHETERIZATION  04/10/2019    HIP SURGERY Right 06/30/2014    Excisin Right Hip Mass-Dr. Jaime Martinez     KNEE SURGERY Right 1980's    OTHER SURGICAL HISTORY  7-7-09    lisa filter placed     LA OFFICE/OUTPT VISIT,PROCEDURE ONLY Right 10/16/2018    RIGHT FOOT EXCISION ULCER, RIGHT 5th METATARSAL BASE RESECTION performed by Farrah Paez DPM at [facility] TOE SURGERY Right 2013    wound to bone (second toe)     TRANSESOPHAGEAL ECHOCARDIOGRAM N/A 4/11/2019    TRANSESOPHAGEAL ECHOCARDIOGRAM performed by Jeneane Severe, MD at [facility]    TUNNELED VENOUS PORT PLACEMENT       Family:   Family History   Problem Relation Age of Onset    Cancer Mother         lung       Social History:   reports that he has been smoking cigarettes. He has a 20.00 pack-year smoking history. He has never used smokeless tobacco. He reports that he does not drink alcohol or use drugs. Lives at [address] [facility] [address] [place] [address].      Review of Systems     CONSTITUTIONAL:  negative  EYES:  positive for  blind spots  HEENT:  negative  RESPIRATORY:  negative  CARDIOVASCULAR:  negative  GASTROINTESTINAL:  negative  GENITOURINARY:  negative  SKIN:  negative  HEMATOLOGIC/LYMPHATIC:  negative  MUSCULOSKELETAL:  positive for  decreased range of motion and muscle weakness  NEUROLOGICAL:  positive for visual disturbance, coordination problems, gait problems and dysphagia  BEHAVIOR/PSYCH:  negative  10 point system review otherwise negative    Medications     Reviewed in EMR   sodium chloride hour(s)). Lab Results   Component Value Date    LABA1C 5.0 03/21/2019     No results found for: EAG  Recent Labs     04/11/19  0400 04/10/19  0435 04/09/19  1151   WBC 5.5 7.0 9.7   HGB 11.9* 12.8* 15.8   HCT 35.4* 37.7* 47.2   MCV 89.2 88.7 87.9    190 216       Imaging  Xr Lumbar Spine (2-3 Views)    Result Date: 3/21/2019  PROCEDURE: XR LUMBAR SPINE (2-3 VIEWS) CLINICAL INFORMATION: Left hip pain, COMPARISON: No prior study. TECHNIQUE: AP and lateral views of the lumbar spine. FINDINGS: There is multilevel degenerative spondylosis without acute fracture. Facet arthropathy is present with sclerosis present. An inferior vena cava filter is seen. There is mild atherosclerosis of the abdominal aorta. . Multilevel degenerative spondylosis with associated facet arthropathy. Visualized inferior vena cava filter. Atherosclerotic changes present. **This report has been created using voice recognition software. It may contain minor errors which are inherent in voice recognition technology. ** Final report electronically signed by Dr. Evy Painting on 3/21/2019 1:59 PM    Xr Hip Left (2-3 Views)    Result Date: 3/21/2019  PROCEDURE: XR HIP LEFT (2-3 VIEWS) CLINICAL INFORMATION: Left hip pain. Left hip pain for one week after fall. COMPARISON: No prior study. TECHNIQUE: AP and frog-leg lateral views were obtained  of the left hip. FINDINGS: There is normal osseous alignment without visualized fracture. There is mild joint space narrowing at the inferior margin of the hip joint with small rim osteophytes at the inferior margin of the femoral head. Mild osteoarthritis of the left hip without evidence of acute osseous injury. **This report has been created using voice recognition software. It may contain minor errors which are inherent in voice recognition technology. ** Final report electronically signed by Dr. Janet Reyes MD on 3/21/2019 2:09 PM    Ct Head Wo Contrast    Result Date: 4/9/2019  PROCEDURE: CT HEAD WO CONTRAST CLINICAL INFORMATION: Peripheral vision loss for 2 days; history of cancer. COMPARISON: No prior study. TECHNIQUE: 5 mm axial imaging through the head without IV contrast. All CT scans at this facility use dose modulation, iterative reconstruction, and/or weight based dosing when appropriate to reduce the radiation dose to as low as reasonably achievable. FINDINGS: POSTOPERATIVE CHANGES: None. BRAIN VOLUME: 1. Normal for the patient's age. VENTRICLES/CISTERNS: 1. Normal for the patient's age. INFARCT/WHITE MATTER DISEASE: 1. There is a large area of hypoattenuation within the right occipital lobe and tracking anteriorly along the medial aspect of the right temporal lobe consistent with an infarct acute to subacute. There is associated local mass effect with sulcal effacement however there is no midline shift. There is no associated hemorrhage. A stat MRI of the brain and a CTA examination of the head and neck recommended for additional evaluation of the infarct and also to exclude an underlying mass lesion given the history of cancer. 2. There is mild white matter disease within the periventricular deep white matter. INTRACRANIAL HEMORRHAGE: None. MASS/MASS EFFECT/MIDLINE SHIFT: None. INTRA-AXIAL/EXTRA-AXIAL FLUID COLLECTIONS: None. INTRAORBITAL CONTENTS: Unremarkable. OTHER: None. SKULL/SCALP: Unremarkable. PARANASAL SINUSES: 1. There is a mucous retention cyst or polyp within the right maxillary sinus. 2. Mild mucosal thickening within the upper inner cells. 1. There is a large area of hypoattenuation within the right occipital lobe and tracking anteriorly along the medial aspect of the right temporal lobe consistent with an infarct acute to subacute. There is associated local mass effect with sulcal effacement however there is no midline shift. There is no associated hemorrhage.  A stat MRI of the brain and a CTA examination of the head and neck recommended for segment of the left upper lobe. LUNG FIELDS - MASS/NODULE: 1. There are calcified granuloma within the right mid and lower chest. 2. There is a 0.2 to 0.3 cm nodule anteriorly within the right mid chest that this is series 3 image 83). There is also a 0.6 cm noncalcified nodule along the left major fissure within the left lower chest (series 3 image 131). In addition there is a 0.5  cm noncalcified nodule posterior laterally at the left lung base (series 3 image 142). A neoplastic etiology/metastatic disease cannot be excluded based on the CT appearance. LYMPHADENOPATHY: 1. No pathologically enlarged lymphadenopathy. PNEUMOTHORAX: None. THYROID GLAND: Unremarkable. TRACHEA/ESOPHAGUS: Normal. MUSCULOSKELETAL: 1. Scoliosis and degenerative changes of emphysema along the spine. UPPER ABDOMEN: 1. Findings related to the abdomen are dictated on the CT abdomen/pelvis examination dated 4/9/2018. OTHER: 1. Gynecomastia bilaterally. 1. There are bullous changes within the left mid chest. There is linear parenchymal scarring and bronchiectatic change adjacent to this region. There is also a nodular linear opacity at the right posterior costophrenic angle suggesting atelectasis and/or  infiltrate. Follow-up imaging is recommended to exclude a mass within this region. A follow-up CT chest with intravenous contrast in 3 months is recommended to confirm stability. 2. There is a 0.2 to 0.3 cm nodule anteriorly within the right mid chest that this is series 3 image 83). There is also a 0.6 cm noncalcified nodule along the left major fissure within the left lower chest (series 3 image 131). In addition there is a 0.5  cm noncalcified nodule posterior laterally at the left lung base (series 3 image 142). A neoplastic etiology/metastatic disease cannot be excluded based on the CT appearance. A follow-up CT examination of the chest with intravenous contrast in 3 months is recommended to confirm stability.  **This report has been created using voice recognition software. It may contain minor errors which are inherent in voice recognition technology. ** Final report electronically signed by Dr. Cyndra Simmonds on 4/9/2019 11:52 AM    Mra Head Wo Contrast    Result Date: 4/11/2019  PROCEDURE: MRI BRAIN W WO CONTRAST, MRA HEAD WO CONTRAST, MRA NECK WO CONTRAST CLINICAL INFORMATION: STROKE, hx of tonsil cancer, r/o cancer . Blurred vision and headaches for 3 days. History of tonsil cancer. COMPARISON: CT head dated 4/9/2019 and MR neck dated 3/21/2019. TECHNIQUE: Sagittal T1 3-D pre and postcontrast with axial and coronal reformatted images, axial T2, FLAIR, GRE and DWI images of the brain with post processed ADC map. 15 mL ProHance was injected in the left forearm. 3-D time-of-flight sequence of the intracranial vasculature with volume rendered maximum intensity projection images. Axial 2-D and 3-D time-of-flight sequences of the carotid bifurcations with volume rendered maximum intensity projection sequences. FINDINGS: Brain: There is a large area of restricted diffusion involving the mesial and posterior temporal lobe and occipital lobe with associated hyperintense T2/FLAIR signal corresponding to the large area of hypodensity on prior CT. There is effacement of sulci. No midline shift is present. There are multiple small foci of restricted diffusion in the right thalamus and splenium of the corpus callosum associated focal areas of T2/FLAIR prolongation. No other focal areas of restricted diffusion are present. There are  mild to moderate scattered focal areas of T2/FLAIR prolongation in the subcortical deep white matter without focal areas of restricted diffusion. There are small foci of encephalomalacia interspersed among the foci of T2/FLAIR abnormality. The ventricles, cisterns and sulci are otherwise symmetric in size and configuration. No intra or extra-axial mass is identified.  Following contrast administration, there is minimally prominent venous enhancement in the large area of infarction consistent with luxury perfusion. Otherwise no focal areas of abnormal parenchymal or meningeal enhancement is identified. The major vascular flow voids appear patent. Orbits are unremarkable. There is a mucous retention cyst in the right maxillary sinus. Visualized paranasal sinuses are otherwise clear. There is small right mastoid effusion. MRA BRAIN: Intracranial segments of the internal carotid arteries are patent without focal stenosis or aneurysmal dilatation. The bilateral middle cerebral and anterior cerebral arteries are patent without focal abnormality identified. The basilar artery is patent without focal stenosis or aneurysmal dilatation identified. There is minimal flow in the P1 segment of the right posterior cerebral artery with long segment severe stenosis. No definite distal flow is identified. The left posterior cerebral artery has a fetal origin and appears patent. Superior cerebellar arteries appear patent. MRA NECK: Visualized portions of the common carotid arteries appear patent without focal stenosis. The carotid bifurcations appear widely patent without hemodynamically significant stenosis by NASCET criteria. Visualized portions of the cervical segments of the internal carotid arteries appear patent without focal stenosis. The left vertebral artery is mildly dominant. Vertebral arteries appear patent without focal stenosis. There appears to be nodular thickening of the supraglottic larynx on the left on the 2-D time-of-flight source images. Correlate with prior MRI of the neck dated 3/21/2019. Brain: 1. Redemonstration of large acute to early subacute infarct in the right posterior cerebral artery territory. 2. Mild to moderate scattered focal areas of T2/FLAIR hyperintensity in the supratentorial white matter as evidence for chronic microvascular angiopathy and old lacunar infarcts.  MRA BRAIN: High-grade stenosis at the P1 segment of the right posterior cerebral artery without definite distal flow. MRA NECK: 1. No significant stenosis at the internal carotid artery takeoffs by NASCET criteria. 2. Nodular prominence of the left supraglottic larynx on the source images. Correlate with prior MRI of the neck. **This report has been created using voice recognition software. It may contain minor errors which are inherent in voice recognition technology. ** Final report electronically signed by Dr. Otilia Cerda MD on 4/11/2019 9:05 AM    Ct Abdomen Pelvis W Iv Contrast Additional Contrast? None    Result Date: 4/9/2019  PROCEDURE: CT ABDOMEN PELVIS W IV CONTRAST CLINICAL INFORMATION: abdominal pain, epigastric primarily COMPARISON: No prior study. TECHNIQUE: 5 mm axial imaging through the abdomen and pelvis with IV contrast.  Coronal and sagittal reconstructions were performed. All CT scans at this facility use dose modulation, iterative reconstruction, and/or weight based dosing when appropriate to reduce the radiation dose to as low as reasonably achievable. CONTRAST: type and amount FINDINGS: LUNG BASES: 1. Findings related to the chest are dictated on the CTA chest examination report dated 4/9/2018. LIVER/GALLBLADDER/BILIARY TREE: 1. There is a small focus of subcapsular hyperattenuation within the medial segment of the left hepatic lobe abutting the falciform ligament which is a common location for focal fatty infiltration. 2. There are a few scattered punctate calcifications within the liver or related to old granulomatous disease. 3. The gallbladder is not visualized. There is no biliary dilatation. PANCREAS/SPLEEN: 1. There is a 0.9 cm hypodense lesion within the pancreatic body with a nonspecific CT appearance. 2. There are a few scattered punctate calcifications within the spleen related to old granulomatous disease. ADRENAL GLANDS/KIDNEYS: 1. The right adrenal gland is within normal limits.  2. There is nodular hypertrophy of the left adrenal gland. BOWEL: 1. The bowel gas pattern is nonobstructive. There is a large amount of stool throughout the colon. 2. The appendix is not visualized. 3. The distal esophagus is unremarkable. There is mucosal thickening of the cardia the stomach. The small bowel is unremarkable. FREE AIR/FREE FLUID/INFLAMMATION: 1. There is mesenteric edema. There is also a small amount of free fluid within the dependent portion of the pelvis. There is no loculated or drainable fluid collection. In addition there is diffuse subcutaneous edema suggesting 3rd spacing. LYMPHADENOPATHY: 1. No pathologically enlarged lymph nodes. ABDOMINAL AORTA: 1. Atheromatous calcification abdominal aorta and iliac arteries. 2. Atheromatous calcification proximal main renal arteries bilaterally. 3. Caval filter medial right upper abdomen. PELVIS: 1. There is thickening of the urinary bladder wall with the differential including a cystitis and a mass/malignancy. ABDOMINAL WALL: 1. There are numerous varices within the subcutaneous tissues of the anterior abdominal and pelvic wall. MUSCULOSKELETAL: 1. The bony structures are osteopenic. 2. Old rib fractures right lower chest. 3. Levoscoliosis lumbar spine. 4. Paravertebral ossifications at numerous levels along the spine. There is also partial mineralization of the anterior longitudinal ligament. OTHER: None. 1. There is a 0.9 cm hypodense lesion within the pancreatic body with a nonspecific CT appearance. An MRI of the pancreas is recommended for additional evaluation of this. 2. There is nodular hypertrophy of the left adrenal gland. 3.  The bowel gas pattern is nonobstructive. There is a large amount of stool throughout the colon. There is mucosal thickening of the cardia the stomach. An esophagram or endoscopy recommended to further evaluate this. 4. There is mesenteric edema. There is also a small amount of free fluid within the dependent portion of the pelvis.  There is no loculated or drainable fluid collection. In addition there is diffuse subcutaneous edema suggesting 3rd spacing. 5. There is thickening of the urinary bladder wall with the differential including a cystitis and a mass/malignancy. **This report has been created using voice recognition software. It may contain minor errors which are inherent in voice recognition technology. ** Final report electronically signed by Dr. Barry Dillard on 4/9/2019 12:00 PM    Xr Chest Portable    Result Date: 4/9/2019  PROCEDURE: XR CHEST PORTABLE CLINICAL INFORMATION: Chest pain. COMPARISON: 10/22/2018. TECHNIQUE: AP portable chest radiograph performed. FINDINGS: POSTSURGICAL CHANGES: None. LINES/TUBES/MECHANICAL DEVICES: 1. There is a stable right jugular central venous Mediport with the distal tip overlying the superior vena cava. TRACHEA/HEART/MEDIASTINUM/HILUM: Unremarkable. LUNG FIELDS: 1. The lung fields are emphysematous. There is stable pleural reaction along the lateral costophrenic angles bilaterally and the lateral margin of the right lower chest. There is no consolidation or infiltrates. There is no pulmonary vascular congestion. 2. There is a stable calcified granuloma within the right mid chest. OTHER: None. PNEUMOTHORAX:  None. OSSEOUS STRUCTURES: 1. No acute osseous abnormality. 2. There is generalized osteopenia. 3. Stable dextroscoliosis of the thoracic spine. 4. Degenerative changes upper lumbar spine. 1. The lung fields are emphysematous. There is stable pleural reaction along the lateral costophrenic angles bilaterally and the lateral margin of the right lower chest. There is no consolidation or infiltrates. There is no pulmonary vascular congestion. **This report has been created using voice recognition software. It may contain minor errors which are inherent in voice recognition technology. ** Final report electronically signed by Dr. Barry Dillard on 4/9/2019 11:09 AM    Vl Dup Lower Extremity Venous Bilateral    Result Date: 4/10/2019  PROCEDURE: VL DUP LOWER EXTREMITY VENOUS BILATERAL CLINICAL INFORMATION: Rule out occult clot. COMPARISON: Bilateral lower extremity duplex venous ultrasound dated 10/23/2018 TECHNIQUE: Venous doppler ultrasound was performed of the bilateral lower extremities using gray scale, color flow and spectral doppler imaging. FINDINGS: Right leg: Deep veins (common femoral, femoral, popliteal, and calf veins): Patent and compressible, with spontaneous flow, phasicity, and satisfactory augmentation. Superficial veins (saphenous veins): The imaged portion of the greater saphenous vein at the saphenofemoral junction is patent by color-flow ultrasound. Left leg: Deep veins (common femoral, femoral, popliteal, and calf veins): Patent and compressible, with spontaneous flow, phasicity, and satisfactory augmentation. Superficial veins (saphenous veins): The imaged portion of the greater saphenous vein at the saphenofemoral junction is patent by color-flow ultrasound. Soft tissues: No evidence of a Baker's cyst. No focal fluid collection or mass. No evidence of acute bilateral lower extremity DVT. **This report has been created using voice recognition software. It may contain minor errors which are inherent in voice recognition technology. ** Final report electronically signed by Dr. Radha Salazar on 4/10/2019 8:41 PM    Mri Orbits Face Neck W Wo Contrast    Result Date: 3/21/2019  PROCEDURE: MRI ORBITS FACE NECK W WO CONTRAST CLINICAL INFORMATION: Neck pain and dysphagia. Additional history obtained from the electronic medical record indicates the patient has a history of oral pharyngeal squamous cell carcinoma. COMPARISON: None available. TECHNIQUE: Axial and coronal T1 and T2-weighted images were obtained through the neck soft tissues. Postcontrast axial and coronal T1-weighted images with fat saturation were also obtained. Sagittal T1-weighted images were also obtained.  Postcontrast tonsil cancer, r/o cancer . Blurred vision and headaches for 3 days. History of tonsil cancer. COMPARISON: CT head dated 4/9/2019 and MR neck dated 3/21/2019. TECHNIQUE: Sagittal T1 3-D pre and postcontrast with axial and coronal reformatted images, axial T2, FLAIR, GRE and DWI images of the brain with post processed ADC map. 15 mL ProHance was injected in the left forearm. 3-D time-of-flight sequence of the intracranial vasculature with volume rendered maximum intensity projection images. Axial 2-D and 3-D time-of-flight sequences of the carotid bifurcations with volume rendered maximum intensity projection sequences. FINDINGS: Brain: There is a large area of restricted diffusion involving the mesial and posterior temporal lobe and occipital lobe with associated hyperintense T2/FLAIR signal corresponding to the large area of hypodensity on prior CT. There is effacement of sulci. No midline shift is present. There are multiple small foci of restricted diffusion in the right thalamus and splenium of the corpus callosum associated focal areas of T2/FLAIR prolongation. No other focal areas of restricted diffusion are present. There are  mild to moderate scattered focal areas of T2/FLAIR prolongation in the subcortical deep white matter without focal areas of restricted diffusion. There are small foci of encephalomalacia interspersed among the foci of T2/FLAIR abnormality. The ventricles, cisterns and sulci are otherwise symmetric in size and configuration. No intra or extra-axial mass is identified. Following contrast administration, there is minimally prominent venous enhancement in the large area of infarction consistent with luxury perfusion. Otherwise no focal areas of abnormal parenchymal or meningeal enhancement is identified. The major vascular flow voids appear patent. Orbits are unremarkable. There is a mucous retention cyst in the right maxillary sinus. Visualized paranasal sinuses are otherwise clear.  There is small right mastoid effusion. MRA BRAIN: Intracranial segments of the internal carotid arteries are patent without focal stenosis or aneurysmal dilatation. The bilateral middle cerebral and anterior cerebral arteries are patent without focal abnormality identified. The basilar artery is patent without focal stenosis or aneurysmal dilatation identified. There is minimal flow in the P1 segment of the right posterior cerebral artery with long segment severe stenosis. No definite distal flow is identified. The left posterior cerebral artery has a fetal origin and appears patent. Superior cerebellar arteries appear patent. MRA NECK: Visualized portions of the common carotid arteries appear patent without focal stenosis. The carotid bifurcations appear widely patent without hemodynamically significant stenosis by NASCET criteria. Visualized portions of the cervical segments of the internal carotid arteries appear patent without focal stenosis. The left vertebral artery is mildly dominant. Vertebral arteries appear patent without focal stenosis. There appears to be nodular thickening of the supraglottic larynx on the left on the 2-D time-of-flight source images. Correlate with prior MRI of the neck dated 3/21/2019. Brain: 1. Redemonstration of large acute to early subacute infarct in the right posterior cerebral artery territory. 2. Mild to moderate scattered focal areas of T2/FLAIR hyperintensity in the supratentorial white matter as evidence for chronic microvascular angiopathy and old lacunar infarcts. MRA BRAIN: High-grade stenosis at the P1 segment of the right posterior cerebral artery without definite distal flow. MRA NECK: 1. No significant stenosis at the internal carotid artery takeoffs by NASCET criteria. 2. Nodular prominence of the left supraglottic larynx on the source images. Correlate with prior MRI of the neck. **This report has been created using voice recognition software.  It may contain minor errors which are inherent in voice recognition technology. ** Final report electronically signed by Dr. Janet Reyes MD on 4/11/2019 9:05 AM    Mri Brain W Wo Contrast    Result Date: 4/11/2019  PROCEDURE: MRI BRAIN W WO CONTRAST, MRA HEAD WO CONTRAST, MRA NECK WO CONTRAST CLINICAL INFORMATION: STROKE, hx of tonsil cancer, r/o cancer . Blurred vision and headaches for 3 days. History of tonsil cancer. COMPARISON: CT head dated 4/9/2019 and MR neck dated 3/21/2019. TECHNIQUE: Sagittal T1 3-D pre and postcontrast with axial and coronal reformatted images, axial T2, FLAIR, GRE and DWI images of the brain with post processed ADC map. 15 mL ProHance was injected in the left forearm. 3-D time-of-flight sequence of the intracranial vasculature with volume rendered maximum intensity projection images. Axial 2-D and 3-D time-of-flight sequences of the carotid bifurcations with volume rendered maximum intensity projection sequences. FINDINGS: Brain: There is a large area of restricted diffusion involving the mesial and posterior temporal lobe and occipital lobe with associated hyperintense T2/FLAIR signal corresponding to the large area of hypodensity on prior CT. There is effacement of sulci. No midline shift is present. There are multiple small foci of restricted diffusion in the right thalamus and splenium of the corpus callosum associated focal areas of T2/FLAIR prolongation. No other focal areas of restricted diffusion are present. There are  mild to moderate scattered focal areas of T2/FLAIR prolongation in the subcortical deep white matter without focal areas of restricted diffusion. There are small foci of encephalomalacia interspersed among the foci of T2/FLAIR abnormality. The ventricles, cisterns and sulci are otherwise symmetric in size and configuration. No intra or extra-axial mass is identified.  Following contrast administration, there is minimally prominent venous enhancement in the large area of infarction consistent with luxury perfusion. Otherwise no focal areas of abnormal parenchymal or meningeal enhancement is identified. The major vascular flow voids appear patent. Orbits are unremarkable. There is a mucous retention cyst in the right maxillary sinus. Visualized paranasal sinuses are otherwise clear. There is small right mastoid effusion. MRA BRAIN: Intracranial segments of the internal carotid arteries are patent without focal stenosis or aneurysmal dilatation. The bilateral middle cerebral and anterior cerebral arteries are patent without focal abnormality identified. The basilar artery is patent without focal stenosis or aneurysmal dilatation identified. There is minimal flow in the P1 segment of the right posterior cerebral artery with long segment severe stenosis. No definite distal flow is identified. The left posterior cerebral artery has a fetal origin and appears patent. Superior cerebellar arteries appear patent. MRA NECK: Visualized portions of the common carotid arteries appear patent without focal stenosis. The carotid bifurcations appear widely patent without hemodynamically significant stenosis by NASCET criteria. Visualized portions of the cervical segments of the internal carotid arteries appear patent without focal stenosis. The left vertebral artery is mildly dominant. Vertebral arteries appear patent without focal stenosis. There appears to be nodular thickening of the supraglottic larynx on the left on the 2-D time-of-flight source images. Correlate with prior MRI of the neck dated 3/21/2019. Brain: 1. Redemonstration of large acute to early subacute infarct in the right posterior cerebral artery territory. 2. Mild to moderate scattered focal areas of T2/FLAIR hyperintensity in the supratentorial white matter as evidence for chronic microvascular angiopathy and old lacunar infarcts.  MRA BRAIN: High-grade stenosis at the P1 segment of the right posterior cerebral artery

## 2019-04-12 NOTE — PROGRESS NOTES
[facility] 58  INPATIENT OCCUPATIONAL THERAPY  [facility] CCU-STEPDOWN 3B  EVALUATION    Time:  Time In: 957  Time Out: 1020  Timed Code Treatment Minutes: 10 Minutes  Minutes: 23          Date: 2019  Patient Name: Noel Still,   Gender: male      MRN: [id number]  : 1960  (62 y.o.)  Referring Practitioner: Day Duncan DO  Diagnosis: NSTEMI  Additional Pertinent Hx: Per EMR: \"This is a pleasant 62 y.o. male presents with chest pain, SS, tight, 8/10 currently. Had headache and blurry vision. Eye pain to 2/10. Was nauseated and had emesis x 3. DM II, hx of DVT on Eliquis. There is concern that he had a subacute CVA, but Neurology has cleared to use all antiplatelets/antiocoagulants. ECG with ST-depressions and T-wave inversions laterally. Trop 0.445--0.503. Cr 0.9. No sob, and GONZALES. At rest, he is complaining of 6-7/10. \"     Restrictions/Precautions:  Fall Risk, General Precautions                    Other position/activity restrictions: pt denies wearing AFO   Right Lower Extremity Brace:  Ankle Foot Orthotics(secondary to foot drop - not with pt at this time)    Past Medical History:   Diagnosis Date    Anxiety     Bleeds easily ([address]     Bruises easily     Cerebrovascular accident (CVA) due to embolism of right posterior cerebral artery ([address]     Cigarette nicotine dependence without complication     COPD (chronic obstructive pulmonary disease) ([address]     DVT (deep venous thrombosis) ([address]     Enlarged lymph node     History of emotional problems     History of sleep apnea     Hypertension     HCC    Moderate episode of recurrent major depressive disorder ([address] 2018    NSTEMI (non-ST elevated myocardial infarction) ([address] 2019    Pulmonary embolism and infarction ([address] 10/22/2018    S/P insertion of inferior vena caval filter 2009    Severe malnutrition ([address] 10/23/2018    Squamous cell carcinoma of tonsils ([address]     left   - radiation    walker and cane (cane mostly outside))  Transfer Assistance: Independent    Active : No     Additional Comments: Pt reports using walker/cane prior to admission. Pt notes I with all mobility and ADLs. Objective        Overall Cognitive Status: Exceptions  Following Commands: Follows one step commands with increased time; Follows one step commands with repetition  Attention Span: Difficulty dividing attention  Safety Judgement: Decreased awareness of need for assistance;Decreased awareness of need for safety  Insights: Decreased awareness of deficits  Cognition Comment: tangiental                 LUE AROM (degrees)  LUE AROM : WFL          RUE AROM (degrees)  RUE AROM : WFL       LUE Strength  Gross LUE Strength: WFL                RUE Strength  Gross RUE Strength: WFL                   ADL  Grooming: Contact guard assistance(oral hygiene at sink)          Transfers  Sit to stand: Contact guard assistance  Stand to sit: Contact guard assistance    Balance  Sitting Balance: Stand by assistance  Standing Balance: Contact guard assistance     Time: 1.5 mins  Activity: ADLs     Functional Mobility  Functional - Mobility Device: Rolling Walker  Activity: Other; To/from bathroom(onto nursing unit)  Assist Level: Contact guard assistance  Functional Mobility Comments: unsteady, poor safety awareness, impulsive, no LOB                Activity Tolerance:  Activity Tolerance: Treatment limited secondary to agitation  Activity Tolerance: difficult to redirect    Treatment Initiated:  Pt completed HH distance functional mobility with CGA and cues throughout for safety d/t impulsivity and tangiental nature this date. Pt also completed oral care standing at sink.      Assessment:  Assessment: Pt would continue to benefit from skilled OT intervention to maximize pt safety and independence with performing self care tasks and functional mobility and to ensure safe transition to the next level of care and return to PLOF.    Performance deficits / Impairments: Decreased functional mobility , Decreased endurance, Decreased ADL status, Decreased safe awareness, Decreased balance, Decreased high-level IADLs  Prognosis: Good, Fair    Clinical Decision Making: Clinical Decision making was of Moderate Complexity as the result of analysis of data from a detailed assessment, a consideration of several treatment options, the presence of comorbidities affecting the plan of care and the need for minimal to moderate modifications or assistance required to complete the evaluation. Discharge Recommendations:  Discharge Recommendations: Continue to assess pending progress    Patient Education:  Patient Education: OT role, poc/goals, safety, t/f training, ADLs  Barriers to Learning: agitiation    Equipment Recommendations:  Equipment Needed: No    Safety:  Safety Devices in place: Yes  Type of devices: All fall risk precautions in place, Left in chair, Call light within reach, Chair alarm in place, Patient at risk for falls, Nurse notified, Gait belt    Plan:  Times per week: 5-6x  Current Treatment Recommendations: Endurance Training, Balance Training, Functional Mobility Training, Safety Education & Training, Self-Care / ADL, Home Management Training, Patient/Caregiver Education & Training    Goals:  Patient goals :  To go home and return to PLOF    Short term goals  Time Frame for Short term goals: By d/c  Short term goal 1: Pt to complete functional mobility at [facility] distances requiring no greater than SUP for increased indep with accessing enviornment  Short term goal 2: Pt to tolerate dynamic standing tasks with BUE release with no greater than SUP in prep for simple IADls  Short term goal 3: Pt to complete various functional t/fs at SUP with 0 cues for safety or LOB for inc indep with toileting  Long term goals  Time Frame for Long term goals : NA d/t ELOS    Evaluation Complexity: Based on the findings of patient history, examination, clinical presentation, and decision making during this evaluation, this patient is of medium complexity.

## 2019-04-12 NOTE — PROGRESS NOTES
[facility]  INPATIENT PHYSICAL THERAPY  DAILY NOTE  [facility] CCU-STEPDOWN 3B - 3B-031-A    Time In: 2261  Time Out: 4536  Timed Code Treatment Minutes: 44 Minutes  Minutes: 39          Date: 2019  Patient Name: Blu Mcelroy,  Gender:  male        MRN: [id number]  : 1960  (62 y.o.)     Referring Practitioner: Glori Litten, DO  Diagnosis: NSTEMI (non-ST elevated myocardial infarction) [facility])  Additional Pertinent Hx: Per EMR: \"This is a pleasant 62 y. o. male presents with chest pain, SS, tight, 8/10 currently.  Had headache and blurry vision.  Eye pain to 2/10.  Was nauseated and had emesis x 3.  DM II, hx of DVT on Eliquis.  There is concern that he had a subacute CVA, but Neurology has cleared to use all antiplatelets/antiocoagulants.  ECG with ST-depressions and T-wave inversions laterally.  Trop 0.445--0.503.  Cr 0.9.  No sob, and GONZALES.  At rest, he is complaining of 6-7/10.  \"     Past Medical History:   Diagnosis Date    Anxiety     Bleeds easily ([address]     Bruises easily     Cerebrovascular accident (CVA) due to embolism of right posterior cerebral artery ([address]     Cigarette nicotine dependence without complication 4326    COPD (chronic obstructive pulmonary disease) ([address]     DVT (deep venous thrombosis) ([address]     Enlarged lymph node     History of emotional problems     History of sleep apnea     Hypertension     HCC    Moderate episode of recurrent major depressive disorder ([address] 2018    NSTEMI (non-ST elevated myocardial infarction) ([address] 2019    Pulmonary embolism and infarction ([address] 10/22/2018    S/P insertion of inferior vena caval filter 2009    Severe malnutrition ([address] 10/23/2018    Squamous cell carcinoma of tonsils ([address]     left   - radiation    Type II or unspecified type diabetes mellitus without mention of complication, not stated as uncontrolled     lost weight, no meds at present      Past Surgical History:   Procedure Laterality Date    CARDIAC CATHETERIZATION  04/10/2019    HIP SURGERY Right 06/30/2014    Excisin Right Hip Mass-Dr. Rodrigo Manning     KNEE SURGERY Right 1980's    OTHER SURGICAL HISTORY  7-7-09    lisa filter placed     ME OFFICE/OUTPT VISIT,PROCEDURE ONLY Right 10/16/2018    RIGHT FOOT EXCISION ULCER, RIGHT 5th METATARSAL BASE RESECTION performed by Karl Grey DPM at [facility] TOE SURGERY Right 2013    wound to bone (second toe)     TRANSESOPHAGEAL ECHOCARDIOGRAM N/A 4/11/2019    TRANSESOPHAGEAL ECHOCARDIOGRAM performed by Beth Cortes MD at [facility]    TUNNELED VENOUS PORT PLACEMENT         Restrictions/Precautions:  (decreased left peripheral vision)                       Right Lower Extremity Brace: Ankle Foot Orthotics(secondary to foot drop - not with pt at this time)    Prior Level of Function:  ADL Assistance: [facility]: Independent  Ambulation Assistance: Independent(uses walker and cane (cane mostly outside))  Transfer Assistance: Independent  Additional Comments: Pt reports using walker/cane prior to admission. Pt notes I with all mobility and ADLs. Subjective:     Subjective: RN approved session. Patient resting in bed upon arrival. Patient struggled reporting history to Dr. David Mar. Patient demos decreased safety awareness throughout session. Patient c/o headache throughout session and becomes agitated towards end of session, patient doesn't think he received breakfast this morning. Pain:  Yes.   Pain Assessment  Pain Assessment: 0-10  Pain Level: 10(headache)       Social/Functional:  Lives With: Alone(niece and aunt are in the front of the home in their own living space)  Type of Home: House  Home Layout: Two level  Home Access: Stairs to enter without rails  Entrance Stairs - Number of Steps: 2  Home Equipment: Cane, 4 wheeled walker, Wheelchair-manual(AFO)     Objective:  Supine to Sit: Stand by assistance  Scooting: Stand by assistance    Transfers  Sit to Stand: Contact guard assistance;Stand by assistance(impulsive, required cues for safety, assisted with clothing management due to patient wet with urine from using urinal in bed)  Stand to sit: Contact guard assistance;Stand by assistance       Ambulation 1  Surface: level tile  Device: Rolling Walker  Assistance: Contact guard assistance;Minimal assistance  Quality of Gait: mod path deviation, significant drop foot on the right LE, increased hip and knee flexion on the right to compensate  Distance: 8ft x1, 80ft x1, 6ft x1         Balance  Comments: patient stood at sink to wash hands and face ~5min, close SBA, slight unsteady at times, varied between single to no UE support, no LOB           Exercises:  Exercises  Comments: Performed supine BLE exercises: ankle pumps (L only), straight leg raises, simultaneous hip abd/add, reciprocal heel slides x10 reps to increase strength. Patient getting agitated due to POC and headache. Patient required cues to stay on task. Activity Tolerance:  Activity Tolerance: Patient limited by fatigue;Patient limited by endurance; Patient limited by pain; Patient limited by cognitive status;Treatment limited secondary to agitation    Assessment: Body structures, Functions, Activity limitations: Decreased functional mobility , Decreased safe awareness, Decreased balance, Decreased endurance, Decreased strength, Decreased vision/visual deficit  Assessment: Patient tolerated session fair. Patient impulsive and demos decreased safety awareness. Patient would benefit from continued skilled physical therapy to improve functional mobility and ensure safe discharge home.    Prognosis: Good     REQUIRES PT FOLLOW UP: Yes    Discharge Recommendations:  Discharge Recommendations: Continue to assess pending progress, Patient would benefit from continued therapy after discharge, IP Rehab(recommend PM&R consult)    Patient Education:  Patient Education: therex, balance, transfers, gait, safety    Equipment Recommendations:  Equipment Needed: No    Safety:  Type of devices: All fall risk precautions in place, Call light within reach, Gait belt, Left in chair, Nurse notified    Plan:  Times per week: 6xN  Times per day: Daily  Specific instructions for Next Treatment: advance as tolerated- high level balance, gait, stairs  Current Treatment Recommendations: Strengthening, Transfer Training, IADL Training, Balance Training, Gait Training, Endurance Training, Stair training, Home Exercise Program    Goals:  Patient goals : return home    Short term goals  Time Frame for Short term goals: 2 weeks  Short term goal 1: bed mobility with mod I for return to PLOF  Short term goal 2: sit <> stand with supervision A for return to PLOF  Short term goal 3: ambulate 150ft with use of LRAD and supervision assist for ease of IADLS  Short term goal 4: negotiate 2 steps without HR and stand-by assist for safe entry/exit of his home    Long term goals  Time Frame for Long term goals : N/A secondary to short ELOS  If patient is discharged prior to progress note completion, this note is to serve as the discharge note with all goals being unmet unless indicated otherwise.             AM-PAC Inpatient Mobility without Stair Climbing Raw Score : 16  AM-PAC Inpatient without Stair Climbing T-Scale Score : 45.54  Mobility Inpatient CMS 0-100% Score: 40.64  Mobility Inpatient without Stair CMS G-Code Modifier : [email or web address]

## 2019-04-12 NOTE — PROGRESS NOTES
NEUROLOGY INPATIENT PROGRESS NOTE    Patient- Yamel Couch    MRN -  [id number]   Acct # - [de-identified]      - 1960    62 y.o. Subjective: The patient is seen as follow up for right occipital stroke. Patient is alert at this time. He is eating lunch, he agrees to go rehab, and agrees to be compliant with eliquis for hx of DVT/PE and hypercoagulable cancer state    Objective:   BP (!) 168/79   Pulse 67   Temp 97.8 °F (36.6 °C) (Oral)   Resp 18   Ht 6' (1.829 m)   Wt 178 lb 6.4 oz (80.9 kg)   SpO2 95%   BMI 24.20 kg/m²       Intake/Output Summary (Last 24 hours) at 2019 1415  Last data filed at 2019 2128  Gross per 24 hour   Intake 1402.57 ml   Output 1475 ml   Net -72.43 ml       Physical Exam:  General:  Awake, alert, not in distress. Language is intact. HEENT: pink conjunctiva, unicteric sclera, moist oral mucosa. EOMI,   Neck: There is no carotid bruits. The Neck is supple. Neuro: CN 2-12 grossly intact with right face droop, partial left vision loss. Power 5/5 Throughout symmetric, Reflexes are +2 symmetric. Long tracts are intact. Cerebellar exam is Intact. Sensory exam is intact to light touch. Gait is unsteady but able to walk on his own. No abnormal movement. Osteo: There is no LROM of any of the 4 extremity joints, no joint tenderness. ROS:    Cardiac: no chest pain. No palpitations.   Renal : no flank pain, no hematuria  Skin: no rash    Medications:     sodium chloride flush  10 mL Intravenous 2 times per day    heparin (porcine)  5,000 Units Subcutaneous 3 times per day    [Held by provider] apixaban  5 mg Oral BID    sertraline  200 mg Oral Daily    atorvastatin  40 mg Oral Nightly    aspirin  81 mg Oral Daily    docusate sodium  100 mg Oral BID    nicotine  1 patch Transdermal Daily    morphine  30 mg Oral Q12H       Data:   CBC:   Recent Labs     04/10/19  0435 19  0400   WBC 7.0 5.5   HGB 12.8* 11.9*    178 BMP:    Recent Labs     04/10/19  0435 04/11/19  0405    140   K 3.8 4.3    103   CO2 25 28   BUN 15 20   CREATININE 0.7 0.7   GLUCOSE 93 121*            No results found for this or any previous visit. Results for orders placed during the hospital encounter of 04/09/19   MRA HEAD WO CONTRAST    Narrative PROCEDURE: MRI BRAIN W WO CONTRAST, MRA HEAD WO CONTRAST, MRA NECK WO CONTRAST    CLINICAL INFORMATION: STROKE, hx of tonsil cancer, r/o cancer . Blurred vision and headaches for 3 days. History of tonsil cancer. COMPARISON: CT head dated 4/9/2019 and MR neck dated 3/21/2019. TECHNIQUE: Sagittal T1 3-D pre and postcontrast with axial and coronal reformatted images, axial T2, FLAIR, GRE and DWI images of the brain with post processed ADC map. 15 mL ProHance was injected in the left forearm. 3-D time-of-flight sequence of the   intracranial vasculature with volume rendered maximum intensity projection images. Axial 2-D and 3-D time-of-flight sequences of the carotid bifurcations with volume rendered maximum intensity projection sequences. FINDINGS:   Brain:  There is a large area of restricted diffusion involving the mesial and posterior temporal lobe and occipital lobe with associated hyperintense T2/FLAIR signal corresponding to the large area of hypodensity on prior CT. There is effacement of sulci. No   midline shift is present. There are multiple small foci of restricted diffusion in the right thalamus and splenium of the corpus callosum associated focal areas of T2/FLAIR prolongation. No other focal areas of restricted diffusion are present. There are   mild to moderate scattered focal areas of T2/FLAIR prolongation in the subcortical deep white matter without focal areas of restricted diffusion. There are small foci of encephalomalacia interspersed among the foci of T2/FLAIR abnormality. The   ventricles, cisterns and sulci are otherwise symmetric in size and configuration.  No intra or extra-axial mass is identified. Following contrast administration, there is minimally prominent venous enhancement in the large area of infarction consistent with luxury perfusion. Otherwise no focal areas of abnormal parenchymal or meningeal enhancement is identified. The major vascular flow voids appear patent. Orbits are unremarkable. There is a mucous retention cyst in the right maxillary sinus. Visualized paranasal sinuses are otherwise clear. There is small right mastoid effusion. MRA BRAIN:  Intracranial segments of the internal carotid arteries are patent without focal stenosis or aneurysmal dilatation. The bilateral middle cerebral and anterior cerebral arteries are patent without focal abnormality identified. The basilar artery is patent   without focal stenosis or aneurysmal dilatation identified. There is minimal flow in the P1 segment of the right posterior cerebral artery with long segment severe stenosis. No definite distal flow is identified. The left posterior cerebral artery has a   fetal origin and appears patent. Superior cerebellar arteries appear patent. MRA NECK:  Visualized portions of the common carotid arteries appear patent without focal stenosis. The carotid bifurcations appear widely patent without hemodynamically significant stenosis by NASCET criteria. Visualized portions of the cervical segments of the   internal carotid arteries appear patent without focal stenosis. The left vertebral artery is mildly dominant. Vertebral arteries appear patent without focal stenosis. There appears to be nodular thickening of the supraglottic larynx on the left on the   2-D time-of-flight source images. Correlate with prior MRI of the neck dated 3/21/2019. Impression  Brain:  1. Redemonstration of large acute to early subacute infarct in the right posterior cerebral artery territory.   2. Mild to moderate scattered focal areas of T2/FLAIR hyperintensity in the supratentorial white matter as evidence for chronic microvascular angiopathy and old lacunar infarcts. MRA BRAIN:  High-grade stenosis at the P1 segment of the right posterior cerebral artery without definite distal flow. MRA NECK:  1. No significant stenosis at the internal carotid artery takeoffs by NASCET criteria. 2. Nodular prominence of the left supraglottic larynx on the source images. Correlate with prior MRI of the neck. **This report has been created using voice recognition software. It may contain minor errors which are inherent in voice recognition technology. **    Final report electronically signed by Dr. Mook Lopez MD on 4/11/2019 9:05 AM     No results found for this or any previous visit. No results found for this or any previous visit. No results found for this or any previous visit. Results for orders placed during the hospital encounter of 04/09/19   MRI BRAIN W WO CONTRAST    Narrative PROCEDURE: MRI BRAIN W WO CONTRAST, MRA HEAD WO CONTRAST, MRA NECK WO CONTRAST    CLINICAL INFORMATION: STROKE, hx of tonsil cancer, r/o cancer . Blurred vision and headaches for 3 days. History of tonsil cancer. COMPARISON: CT head dated 4/9/2019 and MR neck dated 3/21/2019. TECHNIQUE: Sagittal T1 3-D pre and postcontrast with axial and coronal reformatted images, axial T2, FLAIR, GRE and DWI images of the brain with post processed ADC map. 15 mL ProHance was injected in the left forearm. 3-D time-of-flight sequence of the   intracranial vasculature with volume rendered maximum intensity projection images. Axial 2-D and 3-D time-of-flight sequences of the carotid bifurcations with volume rendered maximum intensity projection sequences. FINDINGS:   Brain:  There is a large area of restricted diffusion involving the mesial and posterior temporal lobe and occipital lobe with associated hyperintense T2/FLAIR signal corresponding to the large area of hypodensity on prior CT.  There is effacement of sulci. No   midline shift is present. There are multiple small foci of restricted diffusion in the right thalamus and splenium of the corpus callosum associated focal areas of T2/FLAIR prolongation. No other focal areas of restricted diffusion are present. There are   mild to moderate scattered focal areas of T2/FLAIR prolongation in the subcortical deep white matter without focal areas of restricted diffusion. There are small foci of encephalomalacia interspersed among the foci of T2/FLAIR abnormality. The   ventricles, cisterns and sulci are otherwise symmetric in size and configuration. No intra or extra-axial mass is identified. Following contrast administration, there is minimally prominent venous enhancement in the large area of infarction consistent with luxury perfusion. Otherwise no focal areas of abnormal parenchymal or meningeal enhancement is identified. The major vascular flow voids appear patent. Orbits are unremarkable. There is a mucous retention cyst in the right maxillary sinus. Visualized paranasal sinuses are otherwise clear. There is small right mastoid effusion. MRA BRAIN:  Intracranial segments of the internal carotid arteries are patent without focal stenosis or aneurysmal dilatation. The bilateral middle cerebral and anterior cerebral arteries are patent without focal abnormality identified. The basilar artery is patent   without focal stenosis or aneurysmal dilatation identified. There is minimal flow in the P1 segment of the right posterior cerebral artery with long segment severe stenosis. No definite distal flow is identified. The left posterior cerebral artery has a   fetal origin and appears patent. Superior cerebellar arteries appear patent. MRA NECK:  Visualized portions of the common carotid arteries appear patent without focal stenosis. The carotid bifurcations appear widely patent without hemodynamically significant stenosis by NASCET criteria.  Visualized portions of the cervical segments of the   internal carotid arteries appear patent without focal stenosis. The left vertebral artery is mildly dominant. Vertebral arteries appear patent without focal stenosis. There appears to be nodular thickening of the supraglottic larynx on the left on the   2-D time-of-flight source images. Correlate with prior MRI of the neck dated 3/21/2019. Impression  Brain:  1. Redemonstration of large acute to early subacute infarct in the right posterior cerebral artery territory. 2. Mild to moderate scattered focal areas of T2/FLAIR hyperintensity in the supratentorial white matter as evidence for chronic microvascular angiopathy and old lacunar infarcts. MRA BRAIN:  High-grade stenosis at the P1 segment of the right posterior cerebral artery without definite distal flow. MRA NECK:  1. No significant stenosis at the internal carotid artery takeoffs by NASCET criteria. 2. Nodular prominence of the left supraglottic larynx on the source images. Correlate with prior MRI of the neck. **This report has been created using voice recognition software. It may contain minor errors which are inherent in voice recognition technology. **    Final report electronically signed by Dr. Frederick Back MD on 4/11/2019 9:05 AM     No results found for this or any previous visit. Results for orders placed during the hospital encounter of 04/09/19   CT HEAD WO CONTRAST    Narrative PROCEDURE: CT HEAD WO CONTRAST    CLINICAL INFORMATION: Peripheral vision loss for 2 days; history of cancer. COMPARISON: No prior study. TECHNIQUE:   5 mm axial imaging through the head without IV contrast.     All CT scans at this facility use dose modulation, iterative reconstruction, and/or weight based dosing when appropriate to reduce the radiation dose to as low as reasonably achievable. FINDINGS:   POSTOPERATIVE CHANGES: None. BRAIN VOLUME:   1.  Normal for the patient's age.    VENTRICLES/CISTERNS:   1. Normal for the patient's age. INFARCT/WHITE MATTER DISEASE:   1. There is a large area of hypoattenuation within the right occipital lobe and tracking anteriorly along the medial aspect of the right temporal lobe consistent with an infarct acute to subacute. There is associated local mass effect with sulcal   effacement however there is no midline shift. There is no associated hemorrhage. A stat MRI of the brain and a CTA examination of the head and neck recommended for additional evaluation of the infarct and also to exclude an underlying mass lesion given   the history of cancer. 2. There is mild white matter disease within the periventricular deep white matter. INTRACRANIAL HEMORRHAGE: None. MASS/MASS EFFECT/MIDLINE SHIFT: None. INTRA-AXIAL/EXTRA-AXIAL FLUID COLLECTIONS: None. INTRAORBITAL CONTENTS: Unremarkable. OTHER: None. SKULL/SCALP: Unremarkable. PARANASAL SINUSES:   1. There is a mucous retention cyst or polyp within the right maxillary sinus. 2. Mild mucosal thickening within the upper inner cells. Impression 1. There is a large area of hypoattenuation within the right occipital lobe and tracking anteriorly along the medial aspect of the right temporal lobe consistent with an infarct acute to subacute. There is associated local mass effect with sulcal   effacement however there is no midline shift. There is no associated hemorrhage. A stat MRI of the brain and a CTA examination of the head and neck recommended for additional evaluation of the infarct and also to exclude an underlying mass lesion given   the history of cancer. 2. Latasha TALAMANTES 4/9/2018 at 1141 hours. **This report has been created using voice recognition software. It may contain minor errors which are inherent in voice recognition technology. **    Final report electronically signed by Dr. Barry Dilladr on 4/9/2019 11:44 AM         Assessment:  Active Problems:    Severe malnutrition ([facility] [address]    NSTEMI (non-ST elevated myocardial infarction) [facility])    Cerebrovascular accident (CVA) due to embolism of right posterior cerebral artery ([facility] [address]    Tonsil cancer ([facility] [address]    Left homonymous hemianopsia due to recent cerebral infarction  Resolved Problems:    * No resolved hospital problems. *    61 yo man who presented with chest pain, found to have R PCA stroke. Plan:    1. MRI brain showed stable stroke, MRA confirmed a R PCA stenosis that is likely the cause of his stroke that may be exacerbated by a cardioemboli  2. Patient was taking eliquis for DVT and PE in 2018, he was not compliant with his medication  3. patient decides to go to acute rehab and agree to be compliant on eliquis, it is reasonable to restart his eliquis 5mg BID for hx of DVT/PE, from a stroke standpoint, there is still no concrete proof of afib or clot. Getting him a loop recorder to see if he has afib. 4. D/c aspirin  5. Patient will benefit well from rehab.     Nevaeh Coker MD, 4/12/2019 2:15 PM       Lindsey Munoz MD  Attending Neurologist/Neurointensivist

## 2019-04-13 ENCOUNTER — CARE COORDINATION (OUTPATIENT)
Dept: CASE MANAGEMENT | Age: 59
End: 2019-04-13

## 2019-04-13 DIAGNOSIS — E11.65 TYPE 2 DIABETES MELLITUS WITH HYPERGLYCEMIA (HCC): ICD-10-CM

## 2019-04-13 DIAGNOSIS — I63.431 CEREBROVASCULAR ACCIDENT (CVA) DUE TO EMBOLISM OF RIGHT POSTERIOR CEREBRAL ARTERY (HCC): Primary | ICD-10-CM

## 2019-04-13 PROCEDURE — 1111F DSCHRG MED/CURRENT MED MERGE: CPT | Performed by: FAMILY MEDICINE

## 2019-04-13 NOTE — CARE COORDINATION
[phone].)  Were you discharged with any Home Care or Post Acute Services:  Yes  Post Acute Services:  Home Health (Comment: [id number]  Patient DME:  Straight cane, Walker, Shower chair, Other  Other Patient DME:  Candi Cai you have support at home?:  Alone  Do you feel like you have everything you need to keep you well at home?:  Yes  Are you an active caregiver in your home?:  No  Care Transitions Interventions     Other Services:  Completed (Comment: [facility] Referral)      DME Assistance:  Completed          Follow Up  Future Appointments   Date Time Provider Richard Cortes   4/16/2019 11:45 AM Ishan Carson MD ENT [facility] [place]   4/17/2019  2:45 PM Milan Garcia MD Fam Med [facility] [place]   4/19/2019 11:00 AM STR OUT PT ONC BED 2 STRZ OP ONC None   4/25/2019 11:30 AM YAQUELIN Fox CNP SRPX Heart [facility] [place]   4/30/2019  1:30 PM MD Main [facility] Med [facility] [place]   6/3/2019  2:30 PM YAQUELIN Persaud CNP [facility] Pain [facility] [place]   9/27/2019 11:00 AM Isaak Peterson MD Oncology [facility] - [address] [place] [address]

## 2019-04-14 NOTE — TELEPHONE ENCOUNTER
I agree with the Maximiliano Man     Will discuss/order these things at his visit this week. Thanks.

## 2019-04-15 ENCOUNTER — TELEPHONE (OUTPATIENT)
Dept: CARDIOLOGY CLINIC | Age: 59
End: 2019-04-15

## 2019-04-15 ENCOUNTER — TELEPHONE (OUTPATIENT)
Dept: PHARMACY | Facility: CLINIC | Age: 59
End: 2019-04-15

## 2019-04-15 DIAGNOSIS — Z71.89 ENCOUNTER FOR MEDICATION REVIEW AND COUNSELING: Primary | ICD-10-CM

## 2019-04-15 PROCEDURE — 1111F DSCHRG MED/CURRENT MED MERGE: CPT

## 2019-04-15 RX ORDER — BLOOD-GLUCOSE METER
1 KIT MISCELLANEOUS DAILY
Qty: 1 KIT | Refills: 0 | Status: SHIPPED | OUTPATIENT
Start: 2019-04-15 | End: 2019-04-15

## 2019-04-15 RX ORDER — BLOOD-GLUCOSE METER
1 KIT MISCELLANEOUS DAILY
Qty: 1 KIT | Refills: 0 | Status: ON HOLD | OUTPATIENT
Start: 2019-04-15 | End: 2019-08-13 | Stop reason: HOSPADM

## 2019-04-15 RX ORDER — ALBUTEROL SULFATE 90 UG/1
2 AEROSOL, METERED RESPIRATORY (INHALATION) EVERY 6 HOURS PRN
Qty: 1 INHALER | Refills: 2 | Status: SHIPPED | OUTPATIENT
Start: 2019-04-15 | End: 2019-04-18 | Stop reason: SDUPTHER

## 2019-04-15 NOTE — TELEPHONE ENCOUNTER
CLINICAL PHARMACY NOTE  Post-Discharge Transitions of Care ([facility])    Non-face-to-face services provided:  Assessment and support for treatment adherence and medication management-Medication reconciliation completed 4/15/19    Subjective/Objective:  Symone Viramontes is a 62 y.o. male. Patient was discharged from [address] [place] [address] on 4/12/19 with a diagnosis of CVA/TIA. Patient outreach to review discharge medications and provide medication review and management. Spoke with patient    Allergies   Allergen Reactions    Latex Hives    Coumadin [Warfarin Sodium] Other (See Comments)     Tears skin     Tape [Adhesive Tape] Other (See Comments)     Tears skin        Discharge Medications (as per discharging medication list found AVS): There are NEW medications for you. START taking them after you leave the hospital  Medication Sig Notes    atorvastatin (LIPITOR) 40 MG tablet Take 1 tablet by mouth nightly States he has at home and is taking.  nicotine (NICODERM CQ) 21 MG/24HR Place 1 patch onto the skin daily States he did not  from the pharmacy. He plans to pick it up tomorrow. He states he has a new insurance starting tomorrow. Patient states he is still wearing the patch he was sent home with from the hospital. Informed patient that these are 24 hour patches and that he should remove each patch and apply a new 1 every 24 hours. Discussed rotating sites at well. Patient states he has been smoking since back home. These are medications you told us you were taking at home, CONTINUE taking them after you leave the hospital   Medication Sig Notes    albuterol sulfate  (90 Base) MCG/ACT inhaler Inhale 2 puffs into the lungs every 6 hours as needed for Wheezing States he uses daily, often multiple times. Patient to follow up with physician.  Would likely benefit from long acting bronchodilator    glucose monitoring kit (FREESTYLE) monitoring kit 1 kit by Does not apply route daily Informed patient prescription sent to the pharmacy for this per chart review    apixaban (ELIQUIS) 5 MG TABS tablet Take 1 tablet by mouth 2 times daily States he has and is taking.  oxyCODONE HCl (OXY-IR) 10 MG immediate release tablet Take 1 tablet by mouth every 8 hours as needed for Pain for up to 30 days. States he has and is taking.  hydrOXYzine (ATARAX) 50 MG tablet Take 1-2 tablets nightly prn States he has and is taking.  lidocaine-prilocaine (EMLA) 2.5-2.5 % cream Apply topically as needed. States he has and uses prior to lab draws from his port    morphine (MS CONTIN) 30 MG extended release tablet Take 1 tablet by mouth every 12 hours for 30 days. States he has and is taking.  clonazePAM (KLONOPIN) 1 MG tablet Take 1 tablet by mouth 2 times daily as needed for Anxiety for up to 60 doses. States he has and is taking.  sertraline (ZOLOFT) 100 MG tablet Take 2 tablets by mouth daily States he has and is taking.  naloxone (NARCAN) 4 MG/0.1ML LIQD nasal spray 1 spray by Nasal route as needed for Opioid Reversal States he has.  docusate sodium (COLACE) 100 MG capsule Take 100 mg by mouth 3 times daily as needed for Constipation States he has and is taking.  lactulose (CHRONULAC) 10 GM/15ML solution Take 30 mLs by mouth 2 times daily as needed (constipation) States he has and is taking. Additional Medications:  None per patient    Meds to Beds:No    Estimated Creatinine Clearance: 122 mL/min (based on SCr of 0.7 mg/dL). Assessment/Plan:  - Medication reconciliation completed. Number of medications reviewed: 14    - Pt is taking medications as directed by discharging physician. Number of discrepancies: 0.    - Identified Potential Medication Interactions: The following clinically significant interactions were identified via [employer] Interaction Analysis as category D or higher: . Aislinn Janel  Morphine, oxycodone, clonazepam, hydroxyzine- CNS Depressants may enhance the CNS depressant effect of Opioid Agonists. - Renal Dosing: No renal adjustments necessary.    - Follow up appointment date (7 days for more severe illness, 14 days for others):       Future Appointments   Date Time Provider Richard Cortes   4/16/2019 11:45 AM Juan Jose Law MD ENT [facility] [place]   4/17/2019  2:45 PM Sandra Dial MD Fam Med [facility] [place]   4/25/2019 11:30 AM Ruthie Elizondo APRN - CNP [facility] Heart [facility] [place]   4/30/2019  1:30 PM MD Main [facility] Med [facility] [place]   6/3/2019  2:30 PM Swapna Lacy APRN - CNP [facility] Pain [facility] [place]   9/27/2019 11:00 AM Magy Walter MD Oncology [facility] - [place],    Lacy Sheffield, PharmD  [facility]  Phone: C: [phone]  O: [phone]  Toll free: [phone], option 7    CLINICAL PHARMACY NOTE   POST-DISCHARGE TELEPHONE FOLLOW-UP ADDENDUM    For Pharmacy Admin Tracking Only  TCM Call Made?: No  [facility]) Select Patient?: Yes  Total # of Interventions Recommended: 0  Total # Interventions Accepted: 0  Intervention Severity:   - Level 1 Intervention Present?: No   - Level 2 #: 0   - Level 3 #: 0  Outreach Status: Review Complete  Care Coordinator Outreach to Patient?: Yes  Provider Contacted?: No  Waiting on response from: n/a  Time Spent (min): 20

## 2019-04-15 NOTE — DISCHARGE SUMMARY
Hospital Medicine Discharge Summary      Patient Identification:   Joseluis Damon   : 1960  MRN: [id number]   Account: [de-identified]      Patient's PCP: Denis Root MD    Admit Date: 2019     Discharge Date: 2019      Admitting Physician: Ho Menjivar MD     Discharge Physician: Marcos Talamantes DO       Hospital Course:   Joseluis Damon is a 62 y.o. male admitted to [address] [place] [address] on 2019 for chest pain. He has a PMHx of SCC of the left tonsil, hx of DVT/PE (recently started on Eliquis 10/2018), tobacco abuse, COPD, HTN, DM2, HLD, chronic pain (follows pain management OP). In the ED, he complained of left sided numbness and weakness, as well as left sided visual field loss. CT head was obtained and showed   a large area of hypoattenuation within the right occipital lobe and tracking anteriorly along the medial aspect of the right temporal lobe consistent with an infarct acute to subacute. There is associated local mass effect with sulcal    effacement however there is no midline shift   Neurology was consulted, and recommended non-emergent MRI and MRA of the head and neck. Neurology cleared the patient to be started on heparin drip for NSTEMI (elevated troponin 0.445 -> 0.503 with lateral TWI and ST depressions). Per Neurology, there was a risk of hemorrhagic transformation, but risk overall felt to be outweighed by benefit of heparin for NSTEMI -> no need to allow permissive HTN so strict BP goals were maintained. He was also started on ASA. The patient subsequently underwent LHC, which revealed no obstructive coronary lesions. The elevated troponin and EKG changes were felt to be secondary to the ischemic stroke. The patient was somewhat inconsistent with providers, but states that he was compliant with his Eliquis since being prescribed in 10/2018. He was monitored on telemetry (no AF) and ultimately underwent BAILEY with Lynq placement (to monitor for AF).   BAILEY showed normal EF, no thrombus, and no PFO. Lord Farris Also of note, MRA showed high grade stenosis of the P1 segment of the right PCA. MRI confirmed large acute/early subacute infarct in the right PCA territory. There was no significant stenosis of the external ICA. It is unclear what caused the patient's stroke, but per Neurology felt to be likely the right PCA stenosis possibly exacerbated by cardioemboli. Neurology recommended discontinuation of aspirin and continuing Eliquis, as there was still question about compliance of Eliquis. Patient does have active cancer and hx of PE so Eliquis will be required regardless. Also, Neurology recommended discontinuation of aspirin. He was continued on a statin. The patient was recommended for inpatient rehab, given unsteadiness and visual impairment in left field of vision, however he declined this recommendation. Discussed that he had a risk of falling, and while being on anticoagulation that there was a real risk of intracranial bleed if head trauma. The patient was willing to accept these risks and understood the consequences of his decision. He understands that being off of anticoagulation with active cancer and hx of DVT/PE would put him at elevated risk of recurrence of stroke or VTE. He will be set up with home PT/OT/ST/RN/AID/SW to assist with ongoing therapy and safety eval.  He completed lipid panel and worked with PT/OT/ST while inpatient. Appropriate recommendations were applied. As side note, patient was noted to have multiple small nodules in bilateral lung fields as well as nodule in the pancreas and adrenal gland (left). This was concerning for metastatic disease from his previous SCC of the tonsil, thus Oncology was consulted. He was not seen by Oncology while inpatient, however his case was reviewed by their services and will be set up with follow up with his Oncologist and for PET CT scan in the future.      He will also follow up with Cardiology mucosa moist without lesions. Hearing grossly intact. Neck: Supple, with full range of motion. No jugular venous distention. Trachea midline. Respiratory:  Normal respiratory effort. Clear to auscultation, bilaterally without rales or wheezes or Rhonchi. Cardiovascular: Normal rate, regular rhythm with normal S1/S2 without murmurs. No lower extremity edema. Abdomen: Soft, non-tender, non-distended with normal bowel sounds. Musculoskeletal: Normal range of motion in extremities. There is no joint swelling or tenderness. Skin: Poor turgor of LE bilaterally. R>L with hyperpigmentation. Neurologic:  Neurovascularly intact without any focal sensory/motor deficits in the upper and lower extremities. Cranial nerves:  Left homonymous superior quadrantanopia No other gross deficits. Psychiatric: Alert and oriented, thought content appropriate, poor insight  Capillary Refill: slow, cold feet R>L  Peripheral Pulses: weak in bilateral LE. Labs: For convenience and continuity at follow-up the following most recent labs are provided:      CBC:    Lab Results   Component Value Date    WBC 5.5 04/11/2019    HGB 11.9 04/11/2019    HCT 35.4 04/11/2019     04/11/2019       Renal:    Lab Results   Component Value Date     04/11/2019    K 4.3 04/11/2019    K 3.8 10/23/2018     04/11/2019    CO2 28 04/11/2019    BUN 20 04/11/2019    CREATININE 0.7 04/11/2019    CALCIUM 8.6 04/11/2019         Significant Diagnostic Studies    Radiology:   VL DUP LOWER EXTREMITY VENOUS BILATERAL   Final Result   No evidence of acute bilateral lower extremity DVT. **This report has been created using voice recognition software. It may contain minor errors which are inherent in voice recognition technology. **       Final report electronically signed by Dr. Princess Alejandra on 4/10/2019 8:41 PM      MRI BRAIN W WO CONTRAST   Final Result    Brain:   1.  Redemonstration of large acute to early subacute infarct in moderate scattered focal areas of T2/FLAIR hyperintensity in the supratentorial white matter as evidence for chronic microvascular angiopathy and old lacunar infarcts. MRA BRAIN:   High-grade stenosis at the P1 segment of the right posterior cerebral artery without definite distal flow. MRA NECK:   1. No significant stenosis at the internal carotid artery takeoffs by NASCET criteria. 2. Nodular prominence of the left supraglottic larynx on the source images. Correlate with prior MRI of the neck. **This report has been created using voice recognition software. It may contain minor errors which are inherent in voice recognition technology. **      Final report electronically signed by Dr. Rob Jin MD on 4/11/2019 9:05 AM      CT HEAD WO CONTRAST   Final Result   1. There is a large area of hypoattenuation within the right occipital lobe and tracking anteriorly along the medial aspect of the right temporal lobe consistent with an infarct acute to subacute. There is associated local mass effect with sulcal    effacement however there is no midline shift. There is no associated hemorrhage. A stat MRI of the brain and a CTA examination of the head and neck recommended for additional evaluation of the infarct and also to exclude an underlying mass lesion given    the history of cancer. 2. Latasha TALAMANTES 4/9/2018 at 1141 hours. **This report has been created using voice recognition software. It may contain minor errors which are inherent in voice recognition technology. **      Final report electronically signed by Dr. Nadira Myers on 4/9/2019 11:44 AM      CTA Chest W WO Contrast   Final Result   1. There are bullous changes within the left mid chest. There is linear parenchymal scarring and bronchiectatic change adjacent to this region. There is also a nodular linear opacity at the right posterior costophrenic angle suggesting atelectasis and/or    infiltrate.  Follow-up imaging is recommended to exclude a mass within this region. A follow-up CT chest with intravenous contrast in 3 months is recommended to confirm stability. 2. There is a 0.2 to 0.3 cm nodule anteriorly within the right mid chest that this is series 3 image 83). There is also a 0.6 cm noncalcified nodule along the left major fissure within the left lower chest (series 3 image 131). In addition there is a 0.5    cm noncalcified nodule posterior laterally at the left lung base (series 3 image 142). A neoplastic etiology/metastatic disease cannot be excluded based on the CT appearance. A follow-up CT examination of the chest with intravenous contrast in 3 months    is recommended to confirm stability. **This report has been created using voice recognition software. It may contain minor errors which are inherent in voice recognition technology. **      Final report electronically signed by Dr. Sadie Vidales on 4/9/2019 11:52 AM      CT ABDOMEN PELVIS W IV CONTRAST Additional Contrast? None   Final Result   1. There is a 0.9 cm hypodense lesion within the pancreatic body with a nonspecific CT appearance. An MRI of the pancreas is recommended for additional evaluation of this. 2. There is nodular hypertrophy of the left adrenal gland. 3.  The bowel gas pattern is nonobstructive. There is a large amount of stool throughout the colon. There is mucosal thickening of the cardia the stomach. An esophagram or endoscopy recommended to further evaluate this. 4. There is mesenteric edema. There is also a small amount of free fluid within the dependent portion of the pelvis. There is no loculated or drainable fluid collection. In addition there is diffuse subcutaneous edema suggesting 3rd spacing. 5. There is thickening of the urinary bladder wall with the differential including a cystitis and a mass/malignancy. **This report has been created using voice recognition software.   It may contain minor errors which are inherent in voice recognition technology. **      Final report electronically signed by Dr. Vania Deng on 4/9/2019 12:00 PM      XR CHEST PORTABLE   Final Result   1. The lung fields are emphysematous. There is stable pleural reaction along the lateral costophrenic angles bilaterally and the lateral margin of the right lower chest. There is no consolidation or infiltrates. There is no pulmonary vascular congestion. **This report has been created using voice recognition software. It may contain minor errors which are inherent in voice recognition technology. **      Final report electronically signed by Dr. Vania Deng on 4/9/2019 11:09 AM             Consults:     IP CONSULT TO CARDIOLOGY  IP CONSULT TO ONCOLOGY  IP CONSULT TO NEUROLOGY  PALLIATIVE CARE EVAL  IP CONSULT TO SPIRITUAL SERVICES  IP CONSULT TO SOCIAL WORK  IP CONSULT TO CARDIAC REHAB  IP CONSULT TO REHAB/TCU ADMISSION COORDINATOR  IP CONSULT TO PHYSICAL MEDICINE REHAB  IP CONSULT TO HOME CARE NEEDS    Disposition:    [x] Home with [place] [address] and PT/OT       [] TCU       [] Rehab       [] Psych       [] SNF       [] [place]       [] Other-    Condition at Discharge: Stable    Code Status:  Prior Full    Patient Instructions:    Discharge lab work: n/a  Activity: activity as tolerated and work with PT/OT  Diet: No diet orders on file      Follow-up visits:   Radha Saenz MD  [address] [place] [phone]    Go on 4/17/2019  2:45    Dara Puga MD  [address]  [address] [address] [phone]    Call  3 weeks and office will schedule PET scan     David Mata MD  [facility], [address] [facility]  [address] [phone]    Schedule an appointment as soon as possible for a visit  office will call you for appointment     Any Alan MD  [address] [facility] [address]  [phone]    Go on 4/25/2019  11:30 with [address]  [phone] Discharge Medications:        Medication List      START taking these medications    atorvastatin 40 MG tablet  Commonly known as:  LIPITOR  Take 1 tablet by mouth nightly     nicotine 21 MG/24HR  Commonly known as:  NICODERM CQ  Place 1 patch onto the skin daily        CONTINUE taking these medications    albuterol sulfate  (90 Base) MCG/ACT inhaler  Inhale 2 puffs into the lungs every 6 hours as needed for Wheezing     apixaban 5 MG Tabs tablet  Commonly known as:  ELIQUIS  Take 1 tablet by mouth 2 times daily     clonazePAM 1 MG tablet  Commonly known as:  KLONOPIN  Take 1 tablet by mouth 2 times daily as needed for Anxiety for up to 60 doses. docusate sodium 100 MG capsule  Commonly known as:  COLACE     glucose monitoring kit monitoring kit  1 kit by Does not apply route daily     hydrOXYzine 50 MG tablet  Commonly known as:  ATARAX  Take 1-2 tablets nightly prn     lactulose 10 GM/15ML solution  Commonly known as:  CHRONULAC  Take 30 mLs by mouth 2 times daily as needed (constipation)     lidocaine-prilocaine 2.5-2.5 % cream  Commonly known as:  EMLA  Apply topically as needed. morphine 30 MG extended release tablet  Commonly known as:  MS CONTIN  Take 1 tablet by mouth every 12 hours for 30 days. naloxone 4 MG/0.1ML Liqd nasal spray  Commonly known as:  NARCAN  1 spray by Nasal route as needed for Opioid Reversal     oxyCODONE HCl 10 MG immediate release tablet  Commonly known as:  OXY-IR  Take 1 tablet by mouth every 8 hours as needed for Pain for up to 30 days.      sertraline 100 MG tablet  Commonly known as:  ZOLOFT  Take 2 tablets by mouth daily           Where to Get Your Medications      These medications were sent to [facility], [address] [place], [place] [address] [id number]    Phone:  [phone]   · apixaban 5 MG Tabs tablet  · atorvastatin 40 MG tablet  · nicotine 21 MG/24HR             Time Spent

## 2019-04-15 NOTE — TELEPHONE ENCOUNTER
Pt had this linq implanted on 04/11/19 with dr Mick Rothman. He is taking eliquis according to his med list. There is no dx of afib in his chart. do you think this is definitely afib? ?

## 2019-04-15 NOTE — TELEPHONE ENCOUNTER
CLINICAL PHARMACY CONSULTATION: Transition of Care ([facility])  -----------------------------------------------------------------------------------------------  Kristy Middleton is a 62 y.o. male  who was discharged from [address] [place] [address] on 4/12/19. Attempt made to contact pt. Left msg on home/mobile TAD requesting call back at [phone] or [phone] option 7. Will continue to attempt to contact pt as appropriate.     Aman French, PharmD, [address] [facility] Clinical Pharmacist  C: [phone]    O: [phone]  Toll free: [phone], option 7

## 2019-04-16 ENCOUNTER — TELEPHONE (OUTPATIENT)
Dept: FAMILY MEDICINE CLINIC | Age: 59
End: 2019-04-16

## 2019-04-16 NOTE — TELEPHONE ENCOUNTER
Татьяна Clemente, from [facility] called to give an update on pt. She states that during her visit on 4/15/19, she conducted a swallowing evaluation on the pt. Everything that he took in, he spit back out due to unable to swallow. He was scheduled for a barium swallow today, but states he was sick and couldn't make it. Also, missed appt with ENT today. He is continuing on a soft food, puree, and liquid diet. Can office contact Outpatient Rehab to r/s Swallow [phone]? Any questions/concerns, please contact Татьяна Clemente at [phone].

## 2019-04-16 NOTE — TELEPHONE ENCOUNTER
Called outpatient rehab to see about rescheduling the pt and to see if we can do it or they will call the patient to set up.

## 2019-04-17 ENCOUNTER — APPOINTMENT (OUTPATIENT)
Dept: GENERAL RADIOLOGY | Age: 59
End: 2019-04-17
Payer: MEDICARE

## 2019-04-17 ENCOUNTER — HOSPITAL ENCOUNTER (OUTPATIENT)
Age: 59
Setting detail: OBSERVATION
Discharge: HOME | End: 2019-04-18
Attending: INTERNAL MEDICINE | Admitting: INTERNAL MEDICINE
Payer: MEDICARE

## 2019-04-17 ENCOUNTER — APPOINTMENT (OUTPATIENT)
Dept: CT IMAGING | Age: 59
End: 2019-04-17
Payer: MEDICARE

## 2019-04-17 ENCOUNTER — APPOINTMENT (OUTPATIENT)
Dept: MRI IMAGING | Age: 59
End: 2019-04-17
Payer: MEDICARE

## 2019-04-17 ENCOUNTER — CARE COORDINATION (OUTPATIENT)
Dept: CASE MANAGEMENT | Age: 59
End: 2019-04-17

## 2019-04-17 DIAGNOSIS — R06.02 SHORTNESS OF BREATH: ICD-10-CM

## 2019-04-17 DIAGNOSIS — R42 DIZZINESS: Primary | ICD-10-CM

## 2019-04-17 DIAGNOSIS — Z86.73 HISTORY OF STROKE: ICD-10-CM

## 2019-04-17 PROBLEM — R11.2 NAUSEA & VOMITING: Status: ACTIVE | Noted: 2019-04-17

## 2019-04-17 PROBLEM — E43 SEVERE MALNUTRITION (HCC): Chronic | Status: ACTIVE | Noted: 2019-04-17

## 2019-04-17 PROBLEM — R29.90 STROKE-LIKE SYMPTOM: Status: ACTIVE | Noted: 2019-04-17

## 2019-04-17 PROBLEM — R79.89 TROPONIN LEVEL ELEVATED: Status: ACTIVE | Noted: 2019-04-17

## 2019-04-17 PROBLEM — R77.8 TROPONIN LEVEL ELEVATED: Status: ACTIVE | Noted: 2019-04-17

## 2019-04-17 LAB
ALBUMIN SERPL-MCNC: 3.7 G/DL (ref 3.5–5.1)
ALP BLD-CCNC: 87 U/L (ref 38–126)
ALT SERPL-CCNC: 12 U/L (ref 11–66)
AMMONIA: 32 UMOL/L (ref 11–60)
AMPHETAMINE+METHAMPHETAMINE URINE SCREEN: NEGATIVE
ANION GAP SERPL CALCULATED.3IONS-SCNC: 16 MEQ/L (ref 8–16)
AST SERPL-CCNC: 22 U/L (ref 5–40)
AVERAGE GLUCOSE: 87 MG/DL (ref 70–126)
BACTERIA: ABNORMAL /HPF
BARBITURATE QUANTITATIVE URINE: NEGATIVE
BASOPHILS # BLD: 0.8 %
BASOPHILS ABSOLUTE: 0.1 THOU/MM3 (ref 0–0.1)
BENZODIAZEPINE QUANTITATIVE URINE: POSITIVE
BILIRUB SERPL-MCNC: 0.5 MG/DL (ref 0.3–1.2)
BILIRUBIN DIRECT: < 0.2 MG/DL (ref 0–0.3)
BILIRUBIN URINE: ABNORMAL
BLOOD, URINE: NEGATIVE
BUN BLDV-MCNC: 10 MG/DL (ref 7–22)
CALCIUM SERPL-MCNC: 9.4 MG/DL (ref 8.5–10.5)
CANNABINOID QUANTITATIVE URINE: POSITIVE
CASTS 2: ABNORMAL /LPF
CASTS UA: ABNORMAL /LPF
CHARACTER, URINE: ABNORMAL
CHLORIDE BLD-SCNC: 103 MEQ/L (ref 98–111)
CO2: 25 MEQ/L (ref 23–33)
COCAINE METABOLITE QUANTITATIVE URINE: NEGATIVE
COLOR: ABNORMAL
CREAT SERPL-MCNC: 0.8 MG/DL (ref 0.4–1.2)
CRYSTALS, UA: ABNORMAL
EKG ATRIAL RATE: 86 BPM
EKG P AXIS: 84 DEGREES
EKG P-R INTERVAL: 142 MS
EKG Q-T INTERVAL: 384 MS
EKG QRS DURATION: 92 MS
EKG QTC CALCULATION (BAZETT): 459 MS
EKG R AXIS: -33 DEGREES
EKG T AXIS: 40 DEGREES
EKG VENTRICULAR RATE: 86 BPM
EOSINOPHIL # BLD: 1.8 %
EOSINOPHILS ABSOLUTE: 0.2 THOU/MM3 (ref 0–0.4)
EPITHELIAL CELLS, UA: ABNORMAL /HPF
ERYTHROCYTE [DISTWIDTH] IN BLOOD BY AUTOMATED COUNT: 13.3 % (ref 11.5–14.5)
ERYTHROCYTE [DISTWIDTH] IN BLOOD BY AUTOMATED COUNT: 43.4 FL (ref 35–45)
FLU A ANTIGEN: NEGATIVE
FLU B ANTIGEN: NEGATIVE
GFR SERPL CREATININE-BSD FRML MDRD: > 90 ML/MIN/1.73M2
GLUCOSE BLD-MCNC: 110 MG/DL (ref 70–108)
GLUCOSE BLD-MCNC: 94 MG/DL (ref 70–108)
GLUCOSE URINE: NEGATIVE MG/DL
HBA1C MFR BLD: 4.9 % (ref 4.4–6.4)
HCT VFR BLD CALC: 44.6 % (ref 42–52)
HEMOGLOBIN: 14.9 GM/DL (ref 14–18)
ICTOTEST: NEGATIVE
IMMATURE GRANS (ABS): 0.03 THOU/MM3 (ref 0–0.07)
IMMATURE GRANULOCYTES: 0.3 %
KETONES, URINE: ABNORMAL
LEUKOCYTE ESTERASE, URINE: ABNORMAL
LIPASE: 11.3 U/L (ref 5.6–51.3)
LYMPHOCYTES # BLD: 11.4 %
LYMPHOCYTES ABSOLUTE: 1 THOU/MM3 (ref 1–4.8)
MCH RBC QN AUTO: 29.9 PG (ref 26–33)
MCHC RBC AUTO-ENTMCNC: 33.4 GM/DL (ref 32.2–35.5)
MCV RBC AUTO: 89.6 FL (ref 80–94)
MISCELLANEOUS 2: ABNORMAL
MONOCYTES # BLD: 7.9 %
MONOCYTES ABSOLUTE: 0.7 THOU/MM3 (ref 0.4–1.3)
NITRITE, URINE: POSITIVE
NUCLEATED RED BLOOD CELLS: 0 /100 WBC
OPIATES, URINE: POSITIVE
OSMOLALITY CALCULATION: 285.6 MOSMOL/KG (ref 275–300)
OXYCODONE: POSITIVE
PH UA: 7 (ref 5–9)
PHENCYCLIDINE QUANTITATIVE URINE: NEGATIVE
PLATELET # BLD: 265 THOU/MM3 (ref 130–400)
PMV BLD AUTO: 10.3 FL (ref 9.4–12.4)
POTASSIUM SERPL-SCNC: 4.3 MEQ/L (ref 3.5–5.2)
PRO-BNP: 1931 PG/ML (ref 0–900)
PROTEIN UA: NEGATIVE
RBC # BLD: 4.98 MILL/MM3 (ref 4.7–6.1)
RBC URINE: ABNORMAL /HPF
RENAL EPITHELIAL, UA: ABNORMAL
SEG NEUTROPHILS: 77.8 %
SEGMENTED NEUTROPHILS ABSOLUTE COUNT: 7.1 THOU/MM3 (ref 1.8–7.7)
SODIUM BLD-SCNC: 144 MEQ/L (ref 135–145)
SPECIFIC GRAVITY, URINE: 1.02 (ref 1–1.03)
TOTAL PROTEIN: 6.6 G/DL (ref 6.1–8)
TROPONIN T: 0.04 NG/ML
UROBILINOGEN, URINE: 1 EU/DL (ref 0–1)
WBC # BLD: 9.1 THOU/MM3 (ref 4.8–10.8)
WBC UA: ABNORMAL /HPF
YEAST: ABNORMAL

## 2019-04-17 PROCEDURE — 85025 COMPLETE CBC W/AUTO DIFF WBC: CPT

## 2019-04-17 PROCEDURE — 71045 X-RAY EXAM CHEST 1 VIEW: CPT

## 2019-04-17 PROCEDURE — 82140 ASSAY OF AMMONIA: CPT

## 2019-04-17 PROCEDURE — 99219 PR INITIAL OBSERVATION CARE/DAY 50 MINUTES: CPT | Performed by: PSYCHIATRY & NEUROLOGY

## 2019-04-17 PROCEDURE — 96375 TX/PRO/DX INJ NEW DRUG ADDON: CPT

## 2019-04-17 PROCEDURE — 99285 EMERGENCY DEPT VISIT HI MDM: CPT

## 2019-04-17 PROCEDURE — 96374 THER/PROPH/DIAG INJ IV PUSH: CPT

## 2019-04-17 PROCEDURE — 70551 MRI BRAIN STEM W/O DYE: CPT

## 2019-04-17 PROCEDURE — 2580000003 HC RX 258: Performed by: INTERNAL MEDICINE

## 2019-04-17 PROCEDURE — 99220 PR INITIAL OBSERVATION CARE/DAY 70 MINUTES: CPT | Performed by: INTERNAL MEDICINE

## 2019-04-17 PROCEDURE — 83690 ASSAY OF LIPASE: CPT

## 2019-04-17 PROCEDURE — 6370000000 HC RX 637 (ALT 250 FOR IP): Performed by: INTERNAL MEDICINE

## 2019-04-17 PROCEDURE — 80307 DRUG TEST PRSMV CHEM ANLYZR: CPT

## 2019-04-17 PROCEDURE — 80048 BASIC METABOLIC PNL TOTAL CA: CPT

## 2019-04-17 PROCEDURE — 70450 CT HEAD/BRAIN W/O DYE: CPT

## 2019-04-17 PROCEDURE — 83036 HEMOGLOBIN GLYCOSYLATED A1C: CPT

## 2019-04-17 PROCEDURE — 82948 REAGENT STRIP/BLOOD GLUCOSE: CPT

## 2019-04-17 PROCEDURE — G0378 HOSPITAL OBSERVATION PER HR: HCPCS

## 2019-04-17 PROCEDURE — 96365 THER/PROPH/DIAG IV INF INIT: CPT

## 2019-04-17 PROCEDURE — 87804 INFLUENZA ASSAY W/OPTIC: CPT

## 2019-04-17 PROCEDURE — 2709999900 HC NON-CHARGEABLE SUPPLY

## 2019-04-17 PROCEDURE — 80076 HEPATIC FUNCTION PANEL: CPT

## 2019-04-17 PROCEDURE — 87086 URINE CULTURE/COLONY COUNT: CPT

## 2019-04-17 PROCEDURE — 36415 COLL VENOUS BLD VENIPUNCTURE: CPT

## 2019-04-17 PROCEDURE — 6360000002 HC RX W HCPCS: Performed by: INTERNAL MEDICINE

## 2019-04-17 PROCEDURE — 87040 BLOOD CULTURE FOR BACTERIA: CPT

## 2019-04-17 PROCEDURE — 96361 HYDRATE IV INFUSION ADD-ON: CPT

## 2019-04-17 PROCEDURE — 93010 ELECTROCARDIOGRAM REPORT: CPT | Performed by: INTERNAL MEDICINE

## 2019-04-17 PROCEDURE — 84484 ASSAY OF TROPONIN QUANT: CPT

## 2019-04-17 PROCEDURE — 6360000002 HC RX W HCPCS: Performed by: PSYCHIATRY & NEUROLOGY

## 2019-04-17 PROCEDURE — 93005 ELECTROCARDIOGRAM TRACING: CPT | Performed by: INTERNAL MEDICINE

## 2019-04-17 PROCEDURE — 2580000003 HC RX 258: Performed by: PSYCHIATRY & NEUROLOGY

## 2019-04-17 PROCEDURE — 81001 URINALYSIS AUTO W/SCOPE: CPT

## 2019-04-17 PROCEDURE — 83880 ASSAY OF NATRIURETIC PEPTIDE: CPT

## 2019-04-17 RX ORDER — ONDANSETRON 2 MG/ML
4 INJECTION INTRAMUSCULAR; INTRAVENOUS EVERY 6 HOURS PRN
Status: DISCONTINUED | OUTPATIENT
Start: 2019-04-17 | End: 2019-04-18 | Stop reason: HOSPADM

## 2019-04-17 RX ORDER — MORPHINE SULFATE 15 MG/1
30 TABLET, FILM COATED, EXTENDED RELEASE ORAL EVERY 12 HOURS
Status: DISCONTINUED | OUTPATIENT
Start: 2019-04-17 | End: 2019-04-18 | Stop reason: HOSPADM

## 2019-04-17 RX ORDER — LACTULOSE 10 G/15ML
20 SOLUTION ORAL 2 TIMES DAILY PRN
Status: DISCONTINUED | OUTPATIENT
Start: 2019-04-17 | End: 2019-04-18 | Stop reason: HOSPADM

## 2019-04-17 RX ORDER — POTASSIUM CHLORIDE 20 MEQ/1
40 TABLET, EXTENDED RELEASE ORAL PRN
Status: DISCONTINUED | OUTPATIENT
Start: 2019-04-17 | End: 2019-04-18 | Stop reason: HOSPADM

## 2019-04-17 RX ORDER — SODIUM CHLORIDE 0.9 % (FLUSH) 0.9 %
10 SYRINGE (ML) INJECTION PRN
Status: DISCONTINUED | OUTPATIENT
Start: 2019-04-17 | End: 2019-04-18 | Stop reason: HOSPADM

## 2019-04-17 RX ORDER — NICOTINE POLACRILEX 4 MG
15 LOZENGE BUCCAL PRN
Status: DISCONTINUED | OUTPATIENT
Start: 2019-04-17 | End: 2019-04-18 | Stop reason: HOSPADM

## 2019-04-17 RX ORDER — OXYCODONE HYDROCHLORIDE 5 MG/1
10 TABLET ORAL EVERY 8 HOURS PRN
Status: DISCONTINUED | OUTPATIENT
Start: 2019-04-17 | End: 2019-04-18 | Stop reason: HOSPADM

## 2019-04-17 RX ORDER — POTASSIUM CHLORIDE 7.45 MG/ML
10 INJECTION INTRAVENOUS PRN
Status: DISCONTINUED | OUTPATIENT
Start: 2019-04-17 | End: 2019-04-18 | Stop reason: HOSPADM

## 2019-04-17 RX ORDER — 0.9 % SODIUM CHLORIDE 0.9 %
1000 INTRAVENOUS SOLUTION INTRAVENOUS ONCE
Status: DISCONTINUED | OUTPATIENT
Start: 2019-04-17 | End: 2019-04-17

## 2019-04-17 RX ORDER — DEXTROSE MONOHYDRATE 50 MG/ML
100 INJECTION, SOLUTION INTRAVENOUS PRN
Status: DISCONTINUED | OUTPATIENT
Start: 2019-04-17 | End: 2019-04-18 | Stop reason: HOSPADM

## 2019-04-17 RX ORDER — ONDANSETRON 2 MG/ML
4 INJECTION INTRAMUSCULAR; INTRAVENOUS ONCE
Status: COMPLETED | OUTPATIENT
Start: 2019-04-17 | End: 2019-04-17

## 2019-04-17 RX ORDER — POLYETHYLENE GLYCOL 3350 17 G/17G
17 POWDER, FOR SOLUTION ORAL DAILY PRN
Status: DISCONTINUED | OUTPATIENT
Start: 2019-04-17 | End: 2019-04-18 | Stop reason: HOSPADM

## 2019-04-17 RX ORDER — ACETAMINOPHEN 325 MG/1
650 TABLET ORAL EVERY 4 HOURS PRN
Status: DISCONTINUED | OUTPATIENT
Start: 2019-04-17 | End: 2019-04-18 | Stop reason: HOSPADM

## 2019-04-17 RX ORDER — SERTRALINE HYDROCHLORIDE 100 MG/1
200 TABLET, FILM COATED ORAL DAILY
Status: DISCONTINUED | OUTPATIENT
Start: 2019-04-17 | End: 2019-04-18 | Stop reason: HOSPADM

## 2019-04-17 RX ORDER — ATORVASTATIN CALCIUM 40 MG/1
40 TABLET, FILM COATED ORAL NIGHTLY
Status: DISCONTINUED | OUTPATIENT
Start: 2019-04-17 | End: 2019-04-18 | Stop reason: HOSPADM

## 2019-04-17 RX ORDER — SODIUM CHLORIDE 9 MG/ML
INJECTION, SOLUTION INTRAVENOUS CONTINUOUS
Status: DISCONTINUED | OUTPATIENT
Start: 2019-04-17 | End: 2019-04-18 | Stop reason: HOSPADM

## 2019-04-17 RX ORDER — CLONAZEPAM 1 MG/1
1 TABLET ORAL 2 TIMES DAILY PRN
Status: DISCONTINUED | OUTPATIENT
Start: 2019-04-17 | End: 2019-04-18 | Stop reason: HOSPADM

## 2019-04-17 RX ORDER — SODIUM CHLORIDE 0.9 % (FLUSH) 0.9 %
10 SYRINGE (ML) INJECTION EVERY 12 HOURS SCHEDULED
Status: DISCONTINUED | OUTPATIENT
Start: 2019-04-17 | End: 2019-04-18 | Stop reason: HOSPADM

## 2019-04-17 RX ORDER — NICOTINE 21 MG/24HR
1 PATCH, TRANSDERMAL 24 HOURS TRANSDERMAL DAILY
Status: DISCONTINUED | OUTPATIENT
Start: 2019-04-17 | End: 2019-04-18 | Stop reason: HOSPADM

## 2019-04-17 RX ORDER — DEXTROSE MONOHYDRATE 25 G/50ML
12.5 INJECTION, SOLUTION INTRAVENOUS PRN
Status: DISCONTINUED | OUTPATIENT
Start: 2019-04-17 | End: 2019-04-18 | Stop reason: HOSPADM

## 2019-04-17 RX ADMIN — CEFTRIAXONE SODIUM 1 G: 1 INJECTION, POWDER, FOR SOLUTION INTRAMUSCULAR; INTRAVENOUS at 21:47

## 2019-04-17 RX ADMIN — ATORVASTATIN CALCIUM 40 MG: 40 TABLET, FILM COATED ORAL at 20:11

## 2019-04-17 RX ADMIN — ACETAMINOPHEN 650 MG: 325 TABLET ORAL at 17:41

## 2019-04-17 RX ADMIN — POLYETHYLENE GLYCOL 3350 17 G: 17 POWDER, FOR SOLUTION ORAL at 21:32

## 2019-04-17 RX ADMIN — APIXABAN 5 MG: 5 TABLET, FILM COATED ORAL at 20:11

## 2019-04-17 RX ADMIN — SODIUM CHLORIDE: 9 INJECTION, SOLUTION INTRAVENOUS at 15:48

## 2019-04-17 RX ADMIN — MORPHINE SULFATE 30 MG: 15 TABLET, FILM COATED, EXTENDED RELEASE ORAL at 20:12

## 2019-04-17 RX ADMIN — OXYCODONE HYDROCHLORIDE 10 MG: 5 TABLET ORAL at 14:27

## 2019-04-17 RX ADMIN — SERTRALINE 200 MG: 100 TABLET, FILM COATED ORAL at 20:11

## 2019-04-17 RX ADMIN — OXYCODONE HYDROCHLORIDE 10 MG: 5 TABLET ORAL at 22:46

## 2019-04-17 RX ADMIN — ONDANSETRON 4 MG: 2 INJECTION INTRAMUSCULAR; INTRAVENOUS at 12:56

## 2019-04-17 ASSESSMENT — PAIN DESCRIPTION - PROGRESSION
CLINICAL_PROGRESSION: GRADUALLY WORSENING
CLINICAL_PROGRESSION: GRADUALLY WORSENING

## 2019-04-17 ASSESSMENT — PAIN SCALES - GENERAL
PAINLEVEL_OUTOF10: 5
PAINLEVEL_OUTOF10: 7
PAINLEVEL_OUTOF10: 10

## 2019-04-17 ASSESSMENT — PAIN DESCRIPTION - PAIN TYPE
TYPE: ACUTE PAIN
TYPE: CHRONIC PAIN

## 2019-04-17 ASSESSMENT — PAIN DESCRIPTION - DESCRIPTORS
DESCRIPTORS: ACHING
DESCRIPTORS: THROBBING

## 2019-04-17 ASSESSMENT — ENCOUNTER SYMPTOMS
RHINORRHEA: 0
WHEEZING: 0
CONSTIPATION: 0
BLOOD IN STOOL: 0
NAUSEA: 1
EYE PAIN: 0
PHOTOPHOBIA: 0
DIARRHEA: 0
VOMITING: 0
SORE THROAT: 0
COUGH: 0
CHEST TIGHTNESS: 0
SHORTNESS OF BREATH: 1
BACK PAIN: 0
ABDOMINAL PAIN: 0

## 2019-04-17 ASSESSMENT — PAIN - FUNCTIONAL ASSESSMENT
PAIN_FUNCTIONAL_ASSESSMENT: PREVENTS OR INTERFERES SOME ACTIVE ACTIVITIES AND ADLS
PAIN_FUNCTIONAL_ASSESSMENT: PREVENTS OR INTERFERES SOME ACTIVE ACTIVITIES AND ADLS

## 2019-04-17 ASSESSMENT — PAIN DESCRIPTION - FREQUENCY
FREQUENCY: CONTINUOUS
FREQUENCY: CONTINUOUS

## 2019-04-17 ASSESSMENT — PAIN DESCRIPTION - LOCATION
LOCATION: BACK
LOCATION: HEAD;NECK;BACK
LOCATION: HEAD

## 2019-04-17 ASSESSMENT — PAIN DESCRIPTION - ORIENTATION
ORIENTATION: RIGHT
ORIENTATION: LOWER

## 2019-04-17 ASSESSMENT — PAIN DESCRIPTION - ONSET
ONSET: ON-GOING
ONSET: ON-GOING

## 2019-04-17 NOTE — ED NOTES
Pt medicated per orders and updated on POC. Pt voices understanding.       Barbara Howe, ALBIN  04/17/19 1257

## 2019-04-17 NOTE — PROGRESS NOTES
Nutrition Assessment    Type and Reason for Visit: Initial, Positive Nutrition Screen(Weight loss/Decreased Appetite/Intake/Chewing/Swallowing Difficulty with food)    Nutrition Recommendations: Recommend a Multivitamin w/minerals daily. Started Ensure Enlive TID. Modified diet to Dental Soft diet texture per pt request.    Nutrition Assessment: Pt admitted d/t nausea/dizziness. Pt. severely malnourished AEB pt report of consuming 75% of estimated energy requirement over the past month, -35.6% unintentional weight loss in 11 months per EMR, and severe muscle and fat loss on exam. Pt eating lunch during visit and reports appetite is fair at present. At risk for further nutritional compromise r/t pt report of hx of tonsil cancer, difficulty swallowing, and need for nutrition support. Pt states he consumes x2-3 Ensure daily at home and loves them. Will send Ensure Enlive TID. Recommend a Multivitamin w/minerals daily. Modified diet to Dental Soft diet texture per pt request. Continue Zofran and Glycolax PRN. Malnutrition Assessment:  · Malnutrition Status: Meets the criteria for severe malnutrition  · Context: Chronic illness  · Findings of the 6 clinical characteristics of malnutrition (Minimum of 2 out of 6 clinical characteristics is required to make the diagnosis of moderate or severe Protein Calorie Malnutrition based on AND/ASPEN Guidelines):  1. Energy Intake-Less than or equal to 75% of estimated energy requirement, Greater than or equal to 1 month    2. Weight Loss-(-35.6% weight loss), (in 11 months per EMR; severe weight loss noted)  3. Fat Loss-Severe subcutaneous fat loss, Orbital  4.  Muscle Loss-Severe muscle mass loss, Temples (temporalis muscle), Clavicles (pectoralis and deltoids)    Nutrition Risk Level: High    Nutrient Needs:  · Estimated Daily Total Kcal: 2380-7849 kcal/day (25-30 kcal/kg- 82.1 kg on 4/17)  · Estimated Daily Protein (g):  g/day (1.2-1.5 g/kg - 82.1 kg on 4/17)    Nutrition Diagnosis:   · Problem: Severe malnutrition, In context of chronic illness  · Etiology: related to Insufficient energy/nutrient consumption, Catabolic illness     Signs and symptoms:  as evidenced by Patient report of, Severe loss of subcutaneous fat, Severe muscle loss, Weight loss(consuming 75% of estimated energy requirement over the past month; -35.6% unintentional weight loss in 11 months per EMR)    Objective Information:  · Nutrition-Focused Physical Findings: thin; fair appetite; pt denies vomiting/diarrhea/constipation- no BM noted; +nausea/dizziness PTA;  pt reports difficulty swallowing food since he had radiation in 6/2018 ( hx tonsil cancer) & has dentures at home that are a little loose per pt report; pt. requests Dental Soft diet with ground meats with extra sauce/gravy  · Wound Type: None  · Current Nutrition Therapies:  · Oral Diet Orders: Carb Control 4 Carbs/Meal   · Oral Diet intake: 51-75%(pt eating lunch during visit- ate 100% cherrios x2 and one milk so far)  · Oral Nutrition Supplement (ONS) Orders: Standard High Calorie Oral Supplement(Ensure Enlive TID- Variety (pt states he drank x2-3 Ensure daily PTA at home))  · ONS intake: (Initiated today)  · Anthropometric Measures:  · Ht: 6' (182.9 cm)   · Current Body Wt: 181 lb 1.6 oz (82.1 kg)(4/17; no edema noted)  · Admission Body Wt: 181 lb 1.6 oz (82.1 kg)(4/17; no edema noted)  · Usual Body Wt: 180 lb (81.6 kg)(per pt report recently. Per EMR: 188 lb 3.2 oz on 3/22/19; 200 lb on 1/8/19; 202 lb 4.8 oz on 10/22/18; 280 lb on 5/29/18)  · % Weight Change:  -35.6% weight loss in 11 months per EMR; severe weight loss noted  · Ideal Body Wt: 178 lb (80.7 kg), % Ideal Body 102%  · BMI Classification: BMI 18.5 - 24.9 Normal Weight    Nutrition Interventions:   Start ONS, Vitamin Supplement, Modify current diet(Recommend a Multivitamin w/minerals daily. Started Ensure Enlive TID.  Modified diet to Dental Soft diet texture per pt request)  Continued Inpatient Monitoring, Education Initiated, Coordination of Care(Encouraged po intake of meals and ONS at best effort)    Nutrition Evaluation:   · Evaluation: Goals set   · Goals: Pt will consume and tolerate 75% or more of meals during LOS    · Monitoring: Nutrition Progression, Meal Intake, Supplement Intake, Diet Tolerance, Weight, Pertinent Labs, Monitor Bowel Function, Nausea or Vomiting, Chewing/Swallowing      Electronically signed by Carol Higgins RD, LD on 4/17/19 at 3:05 PM    Contact Number: [phone]

## 2019-04-17 NOTE — ED TRIAGE NOTES
Pt reports via EMS with c/o \"not feeling right and something wrong\", nausea, dizziness, and CP that began today that comes and radiates toward his back and arm and feels like pressure. Hx 2 strokes and a blood clot and recent d/c from [facility] last week. Pt appears groggy and weak. Speech is a little slurred.

## 2019-04-17 NOTE — ED NOTES
Pt resting in bed with eyes closed, easy and unlabored respirations, and lights dimmed. Reminded pt about need for urine specimen collection. Denies other needs at this time.       Baudilio Fitch RN  04/17/19 3015

## 2019-04-17 NOTE — CARE COORDINATION
Attempted to reach pt for sub transition call. Noted in the chart he is presently in the Er, will try to reach pt another day.

## 2019-04-17 NOTE — FLOWSHEET NOTE
04/17/19 1430   Provider Notification   Reason for Communication New orders   Provider Name [facility]   Provider Notification Physician Assistant   Method of Communication Secure Message       [facility] regarding accessing patient's mediport for lab draws. Physician Assistant gave okay for access.

## 2019-04-17 NOTE — H&P
History & Physical        Patient:  Mitra Rob  YOB: 1960    MRN: [id number]     Acct: [de-identified]    PCP: Lucas Palmer MD    Date of Admission: 4/17/2019    Date of Service: Pt seen/examined on 4/17/2019 and Admitted to Inpatient with expected LOS greater than two midnights due to medical therapy. Chief Complaint:    Chief Complaint   Patient presents with    Nausea    Dizziness         History Of Present Illness:      62 y.o. male who presented to [address] [place] [address] with\"evaluation of dizziness and nausea. The patient reports a history of DVT, hypertension, COPD, diabetes, CVA, NSTEMI, PE, vena cava filter placement, cancer, and cardiac catheterization. He states that he has \"not been feeling right\" today. He also reports that he has been experiencing dizziness, nausea, and chills for the past 2 days, and that he developed chest pain, fatigue, shortness of breath, and a headache today. He denies experiencing any emesis, diarrhea, cough, fever, constipation, or urinary symptoms. He reports that he smokes 1-2 cigarettes per day and that he sees pain management where he receives a prescription for morphine. He was admitted to the hospital on 4/9/19 for a PE and discharged on 4/12. The patient denies further complaints at initial encounter. \"-per er note and confirmed by myself    Addendum: admits to still smoking and still using illegal drugs, admits to being confused this am          Past Medical History:          Diagnosis Date    Anxiety     Bleeds easily ([address]     Bruises easily     Cerebrovascular accident (CVA) due to embolism of right posterior cerebral artery ([address]     Cigarette nicotine dependence without complication 8/6/9634    COPD (chronic obstructive pulmonary disease) ([address]     DVT (deep venous thrombosis) ([address] 2009    Enlarged lymph node     History of emotional problems     History of sleep apnea     Hypertension     HCC    Moderate episode of recurrent major depressive disorder ([facility] 4/16/2018    NSTEMI (non-ST elevated myocardial infarction) ([facility] 4/9/2019    Pulmonary embolism and infarction ([facility] 10/22/2018    S/P insertion of inferior vena caval filter 07/07/2009    Severe malnutrition ([facility] 10/23/2018    Squamous cell carcinoma of tonsils ([facility] 2018    left   - radiation    Type II or unspecified type diabetes mellitus without mention of complication, not stated as uncontrolled     lost weight, no meds at present 2018       Past Surgical History:          Procedure Laterality Date    CARDIAC CATHETERIZATION  04/10/2019    HIP SURGERY Right 06/30/2014    Excisin Right Hip Mass-Dr. Juanjo De Leon     KNEE SURGERY Right 1980's    OTHER SURGICAL HISTORY  7-7-09    lisa filter placed     OK OFFICE/OUTPT VISIT,PROCEDURE ONLY Right 10/16/2018    RIGHT FOOT EXCISION ULCER, RIGHT 5th METATARSAL BASE RESECTION performed by Virginia Ponce DPM at [facility] TOE SURGERY Right 2013    wound to bone (second toe)     TRANSESOPHAGEAL ECHOCARDIOGRAM N/A 4/11/2019    TRANSESOPHAGEAL ECHOCARDIOGRAM performed by Dione Curran MD at [facility]    TUNNELED VENOUS PORT PLACEMENT         Medications Prior to Admission:      Prior to Admission medications    Medication Sig Start Date End Date Taking?  Authorizing Provider   albuterol sulfate  (90 Base) MCG/ACT inhaler Inhale 2 puffs into the lungs every 6 hours as needed for Wheezing 4/15/19  Yes Katelin Mayorga MD   glucose monitoring kit (FREESTYLE) monitoring kit 1 kit by Does not apply route daily 4/15/19  Yes Katelin Mayorga MD   atorvastatin (LIPITOR) 40 MG tablet Take 1 tablet by mouth nightly 4/12/19  Yes Nicci Ha DO   nicotine (NICODERM CQ) 21 MG/24HR Place 1 patch onto the skin daily 4/13/19  Yes Nicci Ha DO   apixaban (ELIQUIS) 5 MG TABS tablet Take 1 tablet by mouth 2 times daily 4/12/19  Yes Nicci Ha DO   oxyCODONE HCl (OXY-IR) 10 MG immediate release tablet Take 1 tablet apparent distress, appears stated age and cooperative. HEENT:  Normal cephalic, atraumatic without obvious deformity. Pupils equal, round, and reactive to light. Extra ocular muscles intact. Conjunctivae/corneas clear. Neck: Supple, with full range of motion. no jugular venous distention. Trachea midline. no carotid bruits  Respiratory:  Normal respiratory effort. Clear to auscultation, bilaterally without Rales/Wheezes/Rhonchi. Breath sounds equal bilaterally  Cardiovascular:  Regular rate and rhythm with normal S1/S2 without murmurs, rubs or gallops. PMI non displaced  Abdomen: Soft, non-tender, non-distended with normal bowel sounds. No guarding, rebound. Musculoskeletal:  No clubbing, cyanosis or edema bilaterally. Full range of motion without deformity. Skin: Skin color, texture, turgor normal.  No rashes or lesions, or suspicious lesions. Neurologic:  Neurovascularly intact without any focal sensory/motor deficits. Cranial nerves: II-XII intact, grossly non-focal.  Psychiatric:  Alert and oriented, thought content appropriate, normal insight  Capillary Refill: Brisk,< 2 seconds   Peripheral Pulses: +2 palpable, equal bilaterally upper and lower extremities  Lymphatics: no lymphadenopathy      Labs:     Recent Labs     04/17/19  1024   WBC 9.1   HGB 14.9   HCT 44.6        Recent Labs     04/17/19  1024      K 4.3      CO2 25   BUN 10   CREATININE 0.8   CALCIUM 9.4     Recent Labs     04/17/19  1024   AST 22   ALT 12   BILIDIR <0.2   BILITOT 0.5   ALKPHOS 87     No results for input(s): INR in the last 72 hours. No results for input(s): Anu Miller in the last 72 hours.     Urinalysis:      Lab Results   Component Value Date    NITRU NEGATIVE 04/11/2019    WBCUA 0-2 04/11/2019    BACTERIA NONE 04/11/2019    RBCUA NONE SEEN 04/11/2019    BLOODU NEGATIVE 04/11/2019    SPECGRAV 1.009 04/11/2019    GLUCOSEU NEGATIVE 10/22/2018       Radiology:     CXR: I have reviewed the CXR with the following interpretation:   EKG:  I have reviewed the EKG with the following interpretation: no acute changes    Xr Lumbar Spine (2-3 Views)    Result Date: 3/21/2019  PROCEDURE: XR LUMBAR SPINE (2-3 VIEWS) CLINICAL INFORMATION: Left hip pain, COMPARISON: No prior study. TECHNIQUE: AP and lateral views of the lumbar spine. FINDINGS: There is multilevel degenerative spondylosis without acute fracture. Facet arthropathy is present with sclerosis present. An inferior vena cava filter is seen. There is mild atherosclerosis of the abdominal aorta. . Multilevel degenerative spondylosis with associated facet arthropathy. Visualized inferior vena cava filter. Atherosclerotic changes present. **This report has been created using voice recognition software. It may contain minor errors which are inherent in voice recognition technology. ** Final report electronically signed by Dr. Brenden Marin on 3/21/2019 1:59 PM    Xr Hip Left (2-3 Views)    Result Date: 3/21/2019  PROCEDURE: XR HIP LEFT (2-3 VIEWS) CLINICAL INFORMATION: Left hip pain. Left hip pain for one week after fall. COMPARISON: No prior study. TECHNIQUE: AP and frog-leg lateral views were obtained  of the left hip. FINDINGS: There is normal osseous alignment without visualized fracture. There is mild joint space narrowing at the inferior margin of the hip joint with small rim osteophytes at the inferior margin of the femoral head. Mild osteoarthritis of the left hip without evidence of acute osseous injury. **This report has been created using voice recognition software. It may contain minor errors which are inherent in voice recognition technology. ** Final report electronically signed by Dr. Ramos Guerrero MD on 3/21/2019 2:09 PM    Ct Head Wo Contrast    Result Date: 4/17/2019  PROCEDURE: CT HEAD WO CONTRAST CLINICAL INFORMATION: Headache, history of lung cancer TECHNIQUE: CT scan of the head was performed from the vertex to the skull base without use of intravenous contrast. Axial images as well as coronal and sagittal reconstructions were obtained. All CT scans at this facility use dose modulation, iterative reconstruction, and/or weight-based dosing when appropriate to reduce radiation dose to as low as reasonably achievable. COMPARISON: CT head 4/9/2019 FINDINGS: There has been interval re-organization of an area of encephalomalacia involving the right temporal, parietal and occipital lobes. Hypoattenuation is slightly less extensive. There is no midline shift or acute hemorrhage. Ventricles are within normal limits. Mild diffuse periventricular white matter hypoattenuation is present. Mucosal thickening seen in the bilateral maxillary sinuses. Visualized orbits and mastoid air cells are unremarkable. 1. No acute hemorrhage. 2. Mild chronic periventricular small vessel ischemic changes. 3. Interval organization of hypoattenuation in the right temporal, parietal and occipital lobes, likely secondary to prior infarct and evaluated on MRI of the brain from 4/10/2019. **This report has been created using voice recognition software. It may contain minor errors which are inherent in voice recognition technology. ** Final report electronically signed by Dr. Ronna Delgado on 4/17/2019 11:06 AM    Ct Head Wo Contrast    Result Date: 4/9/2019  PROCEDURE: CT HEAD WO CONTRAST CLINICAL INFORMATION: Peripheral vision loss for 2 days; history of cancer. COMPARISON: No prior study. TECHNIQUE: 5 mm axial imaging through the head without IV contrast. All CT scans at this facility use dose modulation, iterative reconstruction, and/or weight based dosing when appropriate to reduce the radiation dose to as low as reasonably achievable. FINDINGS: POSTOPERATIVE CHANGES: None. BRAIN VOLUME: 1. Normal for the patient's age. VENTRICLES/CISTERNS: 1. Normal for the patient's age. INFARCT/WHITE MATTER DISEASE: 1.  There is a large area of hypoattenuation within the right occipital of IV contrast. A non-contrast localizer was obtained. 2mm MIP reconstructions of the chest performed in coronal and sagittal planes. All CT scans at this facility use dose modulation, iterative reconstruction, and/or weight based dosing when appropriate to reduce the radiation dose to as low as reasonably achievable. CONTRAST: 80 mL Isovue-370 intravenously. FINDINGS: POSTOPERATIVE CHANGES: None. LINES/TUBES/MECHANICAL DEVICES: None. HEART/MEDIASTINUM: 1. The heart size is normal. 2. There is no pericardial fluid or thickening. PULMONARY ARTERIES: 1. There is no pulmonary embolus. THORACIC AORTA: 1. Mild atheromatous calcification of the thoracic aorta. 2. There is no aneurysm or dissection of the thoracic aorta. 3. Mild atheromatous calcification at the origin of the left common carotid and left subclavian arteries. LUNG FIELDS - INFILTRATE/PLEURAL EFFUSION: 1. There are bullous changes within the left mid chest. There is linear parenchymal scarring and bronchiectatic change adjacent to this region. There is also a nodular linear opacity at the right posterior costophrenic angle suggesting atelectasis and/or  infiltrate. Follow-up imaging is recommended to exclude a mass within this region. 2. There are linear atelectatic densities or fibrotic changes within the lingular segment of the left upper lobe. LUNG FIELDS - MASS/NODULE: 1. There are calcified granuloma within the right mid and lower chest. 2. There is a 0.2 to 0.3 cm nodule anteriorly within the right mid chest that this is series 3 image 83). There is also a 0.6 cm noncalcified nodule along the left major fissure within the left lower chest (series 3 image 131). In addition there is a 0.5  cm noncalcified nodule posterior laterally at the left lung base (series 3 image 142). A neoplastic etiology/metastatic disease cannot be excluded based on the CT appearance. LYMPHADENOPATHY: 1. No pathologically enlarged lymphadenopathy. PNEUMOTHORAX: None.  THYROID GLAND: Unremarkable. TRACHEA/ESOPHAGUS: Normal. MUSCULOSKELETAL: 1. Scoliosis and degenerative changes of emphysema along the spine. UPPER ABDOMEN: 1. Findings related to the abdomen are dictated on the CT abdomen/pelvis examination dated 4/9/2018. OTHER: 1. Gynecomastia bilaterally. 1. There are bullous changes within the left mid chest. There is linear parenchymal scarring and bronchiectatic change adjacent to this region. There is also a nodular linear opacity at the right posterior costophrenic angle suggesting atelectasis and/or  infiltrate. Follow-up imaging is recommended to exclude a mass within this region. A follow-up CT chest with intravenous contrast in 3 months is recommended to confirm stability. 2. There is a 0.2 to 0.3 cm nodule anteriorly within the right mid chest that this is series 3 image 83). There is also a 0.6 cm noncalcified nodule along the left major fissure within the left lower chest (series 3 image 131). In addition there is a 0.5  cm noncalcified nodule posterior laterally at the left lung base (series 3 image 142). A neoplastic etiology/metastatic disease cannot be excluded based on the CT appearance. A follow-up CT examination of the chest with intravenous contrast in 3 months is recommended to confirm stability. **This report has been created using voice recognition software. It may contain minor errors which are inherent in voice recognition technology. ** Final report electronically signed by Dr. Celestino Mccarthy on 4/9/2019 11:52 AM    Mra Head Wo Contrast    Result Date: 4/11/2019  PROCEDURE: MRI BRAIN W WO CONTRAST, MRA HEAD WO CONTRAST, MRA NECK WO CONTRAST CLINICAL INFORMATION: STROKE, hx of tonsil cancer, r/o cancer . Blurred vision and headaches for 3 days. History of tonsil cancer. COMPARISON: CT head dated 4/9/2019 and MR neck dated 3/21/2019.  TECHNIQUE: Sagittal T1 3-D pre and postcontrast with axial and coronal reformatted images, axial T2, FLAIR, GRE and DWI images of the brain with post processed ADC map. 15 mL ProHance was injected in the left forearm. 3-D time-of-flight sequence of the intracranial vasculature with volume rendered maximum intensity projection images. Axial 2-D and 3-D time-of-flight sequences of the carotid bifurcations with volume rendered maximum intensity projection sequences. FINDINGS: Brain: There is a large area of restricted diffusion involving the mesial and posterior temporal lobe and occipital lobe with associated hyperintense T2/FLAIR signal corresponding to the large area of hypodensity on prior CT. There is effacement of sulci. No midline shift is present. There are multiple small foci of restricted diffusion in the right thalamus and splenium of the corpus callosum associated focal areas of T2/FLAIR prolongation. No other focal areas of restricted diffusion are present. There are  mild to moderate scattered focal areas of T2/FLAIR prolongation in the subcortical deep white matter without focal areas of restricted diffusion. There are small foci of encephalomalacia interspersed among the foci of T2/FLAIR abnormality. The ventricles, cisterns and sulci are otherwise symmetric in size and configuration. No intra or extra-axial mass is identified. Following contrast administration, there is minimally prominent venous enhancement in the large area of infarction consistent with luxury perfusion. Otherwise no focal areas of abnormal parenchymal or meningeal enhancement is identified. The major vascular flow voids appear patent. Orbits are unremarkable. There is a mucous retention cyst in the right maxillary sinus. Visualized paranasal sinuses are otherwise clear. There is small right mastoid effusion. MRA BRAIN: Intracranial segments of the internal carotid arteries are patent without focal stenosis or aneurysmal dilatation. The bilateral middle cerebral and anterior cerebral arteries are patent without focal abnormality identified.  The basilar artery is patent without focal stenosis or aneurysmal dilatation identified. There is minimal flow in the P1 segment of the right posterior cerebral artery with long segment severe stenosis. No definite distal flow is identified. The left posterior cerebral artery has a fetal origin and appears patent. Superior cerebellar arteries appear patent. MRA NECK: Visualized portions of the common carotid arteries appear patent without focal stenosis. The carotid bifurcations appear widely patent without hemodynamically significant stenosis by NASCET criteria. Visualized portions of the cervical segments of the internal carotid arteries appear patent without focal stenosis. The left vertebral artery is mildly dominant. Vertebral arteries appear patent without focal stenosis. There appears to be nodular thickening of the supraglottic larynx on the left on the 2-D time-of-flight source images. Correlate with prior MRI of the neck dated 3/21/2019. Brain: 1. Redemonstration of large acute to early subacute infarct in the right posterior cerebral artery territory. 2. Mild to moderate scattered focal areas of T2/FLAIR hyperintensity in the supratentorial white matter as evidence for chronic microvascular angiopathy and old lacunar infarcts. MRA BRAIN: High-grade stenosis at the P1 segment of the right posterior cerebral artery without definite distal flow. MRA NECK: 1. No significant stenosis at the internal carotid artery takeoffs by NASCET criteria. 2. Nodular prominence of the left supraglottic larynx on the source images. Correlate with prior MRI of the neck. **This report has been created using voice recognition software. It may contain minor errors which are inherent in voice recognition technology. ** Final report electronically signed by Dr. Mook Lopez MD on 4/11/2019 9:05 AM    Ct Abdomen Pelvis W Iv Contrast Additional Contrast? None    Result Date: 4/9/2019  PROCEDURE: CT ABDOMEN PELVIS W IV CONTRAST CLINICAL Atheromatous calcification abdominal aorta and iliac arteries. 2. Atheromatous calcification proximal main renal arteries bilaterally. 3. Caval filter medial right upper abdomen. PELVIS: 1. There is thickening of the urinary bladder wall with the differential including a cystitis and a mass/malignancy. ABDOMINAL WALL: 1. There are numerous varices within the subcutaneous tissues of the anterior abdominal and pelvic wall. MUSCULOSKELETAL: 1. The bony structures are osteopenic. 2. Old rib fractures right lower chest. 3. Levoscoliosis lumbar spine. 4. Paravertebral ossifications at numerous levels along the spine. There is also partial mineralization of the anterior longitudinal ligament. OTHER: None. 1. There is a 0.9 cm hypodense lesion within the pancreatic body with a nonspecific CT appearance. An MRI of the pancreas is recommended for additional evaluation of this. 2. There is nodular hypertrophy of the left adrenal gland. 3.  The bowel gas pattern is nonobstructive. There is a large amount of stool throughout the colon. There is mucosal thickening of the cardia the stomach. An esophagram or endoscopy recommended to further evaluate this. 4. There is mesenteric edema. There is also a small amount of free fluid within the dependent portion of the pelvis. There is no loculated or drainable fluid collection. In addition there is diffuse subcutaneous edema suggesting 3rd spacing. 5. There is thickening of the urinary bladder wall with the differential including a cystitis and a mass/malignancy. **This report has been created using voice recognition software. It may contain minor errors which are inherent in voice recognition technology. ** Final report electronically signed by Dr. Toya Luna on 4/9/2019 12:00 PM    Xr Chest Portable    Result Date: 4/17/2019  PROCEDURE: XR CHEST PORTABLE CLINICAL INFORMATION: Shortness of breath TECHNIQUE: Mobile AP chest radiograph.  COMPARISON: Mobile AP chest radiograph 4/9/2019 FINDINGS: A right-sided infusion port catheter is in stable position. There is a recording device overlying the left hemithorax. Cardiac silhouette is within normal limits. Calcified hilar lymph nodes are again noted. There are calcified granulomas in the right lung. Lungs are hyperinflated. Blunting of the bilateral costophrenic angles is also present on the prior study is likely  secondary to chronic pleural thickening. No lung consolidations are identified. Degenerative changes in the thoracic spine are poorly visualized. No acute intrathoracic process. **This report has been created using voice recognition software. It may contain minor errors which are inherent in voice recognition technology. ** Final report electronically signed by Dr. Fito Webster on 4/17/2019 10:26 AM    Xr Chest Portable    Result Date: 4/9/2019  PROCEDURE: XR CHEST PORTABLE CLINICAL INFORMATION: Chest pain. COMPARISON: 10/22/2018. TECHNIQUE: AP portable chest radiograph performed. FINDINGS: POSTSURGICAL CHANGES: None. LINES/TUBES/MECHANICAL DEVICES: 1. There is a stable right jugular central venous Mediport with the distal tip overlying the superior vena cava. TRACHEA/HEART/MEDIASTINUM/HILUM: Unremarkable. LUNG FIELDS: 1. The lung fields are emphysematous. There is stable pleural reaction along the lateral costophrenic angles bilaterally and the lateral margin of the right lower chest. There is no consolidation or infiltrates. There is no pulmonary vascular congestion. 2. There is a stable calcified granuloma within the right mid chest. OTHER: None. PNEUMOTHORAX:  None. OSSEOUS STRUCTURES: 1. No acute osseous abnormality. 2. There is generalized osteopenia. 3. Stable dextroscoliosis of the thoracic spine. 4. Degenerative changes upper lumbar spine. 1. The lung fields are emphysematous.  There is stable pleural reaction along the lateral costophrenic angles bilaterally and the lateral margin of the right lower chest. There is no consolidation or infiltrates. There is no pulmonary vascular congestion. **This report has been created using voice recognition software. It may contain minor errors which are inherent in voice recognition technology. ** Final report electronically signed by Dr. Jim Gonzalez on 4/9/2019 11:09 AM    Vl Dup Lower Extremity Venous Bilateral    Result Date: 4/10/2019  PROCEDURE: VL DUP LOWER EXTREMITY VENOUS BILATERAL CLINICAL INFORMATION: Rule out occult clot. COMPARISON: Bilateral lower extremity duplex venous ultrasound dated 10/23/2018 TECHNIQUE: Venous doppler ultrasound was performed of the bilateral lower extremities using gray scale, color flow and spectral doppler imaging. FINDINGS: Right leg: Deep veins (common femoral, femoral, popliteal, and calf veins): Patent and compressible, with spontaneous flow, phasicity, and satisfactory augmentation. Superficial veins (saphenous veins): The imaged portion of the greater saphenous vein at the saphenofemoral junction is patent by color-flow ultrasound. Left leg: Deep veins (common femoral, femoral, popliteal, and calf veins): Patent and compressible, with spontaneous flow, phasicity, and satisfactory augmentation. Superficial veins (saphenous veins): The imaged portion of the greater saphenous vein at the saphenofemoral junction is patent by color-flow ultrasound. Soft tissues: No evidence of a Baker's cyst. No focal fluid collection or mass. No evidence of acute bilateral lower extremity DVT. **This report has been created using voice recognition software. It may contain minor errors which are inherent in voice recognition technology. ** Final report electronically signed by Dr. Irene Schroeder on 4/10/2019 8:41 PM    Mri Orbits Face Neck W Wo Contrast    Result Date: 3/21/2019  PROCEDURE: MRI ORBITS FACE NECK W WO CONTRAST CLINICAL INFORMATION: Neck pain and dysphagia.  Additional history obtained from the electronic medical record indicates the patient has a history of oral pharyngeal squamous cell carcinoma. COMPARISON: None available. TECHNIQUE: Axial and coronal T1 and T2-weighted images were obtained through the neck soft tissues. Postcontrast axial and coronal T1-weighted images with fat saturation were also obtained. Sagittal T1-weighted images were also obtained. Postcontrast images were obtained after administration of 20 mL ProHance intravenous contrast. FINDINGS:  The exam is significantly limited secondary to motion artifacts. The nasopharynx and oropharynx are within normal limits. No suspicious enhancing lesion is identified within the oral cavity. There is edema, diffuse enhancement and mucosal thickening involving the supraglottic larynx. The epiglottis is within normal limits. The glottic structures are otherwise grossly symmetric. The subglottic airway is patent. The bilateral parotid and submandibular glands are within normal limits. There is edema and stranding of the subcutaneous soft tissues along the anterior neck overlying the thyroid cartilage. No abnormal lymph node is identified. The visualized intracranial structures demonstrate small foci of hyperintense T2 signal within the juan alberto and left cerebellar hemisphere. This likely corresponds to sequela of remote ischemia. Degenerative changes are present within the cervical spine. No suspicious marrow replacing lesion is identified. 1. The images are significantly degraded by motion artifacts. Correlation with a contrast-enhanced CT of the neck is recommended. 2. Mucosal thickening with edema and diffuse enhancement is noted involving the supraglottic larynx. This is nonspecific however, may represent a laryngitis. Alternatively, this can be seen in the setting of radiation therapy. 3. There is nonspecific edema at the left anterior neck overlying the thyroid cartilage. This can be also the seen in the setting of radiation therapy.  **This report has been created using voice recognition software. It may contain minor errors which are inherent in voice recognition technology. ** Final report electronically signed by Dr. Mickey Curiel on 3/21/2019 1:15 PM    Mra Neck Wo Contrast    Result Date: 4/11/2019  PROCEDURE: MRI BRAIN W WO CONTRAST, MRA HEAD WO CONTRAST, MRA NECK WO CONTRAST CLINICAL INFORMATION: STROKE, hx of tonsil cancer, r/o cancer . Blurred vision and headaches for 3 days. History of tonsil cancer. COMPARISON: CT head dated 4/9/2019 and MR neck dated 3/21/2019. TECHNIQUE: Sagittal T1 3-D pre and postcontrast with axial and coronal reformatted images, axial T2, FLAIR, GRE and DWI images of the brain with post processed ADC map. 15 mL ProHance was injected in the left forearm. 3-D time-of-flight sequence of the intracranial vasculature with volume rendered maximum intensity projection images. Axial 2-D and 3-D time-of-flight sequences of the carotid bifurcations with volume rendered maximum intensity projection sequences. FINDINGS: Brain: There is a large area of restricted diffusion involving the mesial and posterior temporal lobe and occipital lobe with associated hyperintense T2/FLAIR signal corresponding to the large area of hypodensity on prior CT. There is effacement of sulci. No midline shift is present. There are multiple small foci of restricted diffusion in the right thalamus and splenium of the corpus callosum associated focal areas of T2/FLAIR prolongation. No other focal areas of restricted diffusion are present. There are  mild to moderate scattered focal areas of T2/FLAIR prolongation in the subcortical deep white matter without focal areas of restricted diffusion. There are small foci of encephalomalacia interspersed among the foci of T2/FLAIR abnormality. The ventricles, cisterns and sulci are otherwise symmetric in size and configuration. No intra or extra-axial mass is identified.  Following contrast administration, there is minimally prominent venous enhancement in cerebral artery without definite distal flow. MRA NECK: 1. No significant stenosis at the internal carotid artery takeoffs by NASCET criteria. 2. Nodular prominence of the left supraglottic larynx on the source images. Correlate with prior MRI of the neck. **This report has been created using voice recognition software. It may contain minor errors which are inherent in voice recognition technology. ** Final report electronically signed by Dr. Oneal Serrano MD on 4/11/2019 9:05 AM    Mri Brain W Wo Contrast    Result Date: 4/11/2019  PROCEDURE: MRI BRAIN W WO CONTRAST, MRA HEAD WO CONTRAST, MRA NECK WO CONTRAST CLINICAL INFORMATION: STROKE, hx of tonsil cancer, r/o cancer . Blurred vision and headaches for 3 days. History of tonsil cancer. COMPARISON: CT head dated 4/9/2019 and MR neck dated 3/21/2019. TECHNIQUE: Sagittal T1 3-D pre and postcontrast with axial and coronal reformatted images, axial T2, FLAIR, GRE and DWI images of the brain with post processed ADC map. 15 mL ProHance was injected in the left forearm. 3-D time-of-flight sequence of the intracranial vasculature with volume rendered maximum intensity projection images. Axial 2-D and 3-D time-of-flight sequences of the carotid bifurcations with volume rendered maximum intensity projection sequences. FINDINGS: Brain: There is a large area of restricted diffusion involving the mesial and posterior temporal lobe and occipital lobe with associated hyperintense T2/FLAIR signal corresponding to the large area of hypodensity on prior CT. There is effacement of sulci. No midline shift is present. There are multiple small foci of restricted diffusion in the right thalamus and splenium of the corpus callosum associated focal areas of T2/FLAIR prolongation. No other focal areas of restricted diffusion are present.  There are  mild to moderate scattered focal areas of T2/FLAIR prolongation in the subcortical deep white matter without focal areas of restricted diffusion. There are small foci of encephalomalacia interspersed among the foci of T2/FLAIR abnormality. The ventricles, cisterns and sulci are otherwise symmetric in size and configuration. No intra or extra-axial mass is identified. Following contrast administration, there is minimally prominent venous enhancement in the large area of infarction consistent with luxury perfusion. Otherwise no focal areas of abnormal parenchymal or meningeal enhancement is identified. The major vascular flow voids appear patent. Orbits are unremarkable. There is a mucous retention cyst in the right maxillary sinus. Visualized paranasal sinuses are otherwise clear. There is small right mastoid effusion. MRA BRAIN: Intracranial segments of the internal carotid arteries are patent without focal stenosis or aneurysmal dilatation. The bilateral middle cerebral and anterior cerebral arteries are patent without focal abnormality identified. The basilar artery is patent without focal stenosis or aneurysmal dilatation identified. There is minimal flow in the P1 segment of the right posterior cerebral artery with long segment severe stenosis. No definite distal flow is identified. The left posterior cerebral artery has a fetal origin and appears patent. Superior cerebellar arteries appear patent. MRA NECK: Visualized portions of the common carotid arteries appear patent without focal stenosis. The carotid bifurcations appear widely patent without hemodynamically significant stenosis by NASCET criteria. Visualized portions of the cervical segments of the internal carotid arteries appear patent without focal stenosis. The left vertebral artery is mildly dominant. Vertebral arteries appear patent without focal stenosis. There appears to be nodular thickening of the supraglottic larynx on the left on the 2-D time-of-flight source images. Correlate with prior MRI of the neck dated 3/21/2019. Brain: 1.  Redemonstration of large acute to early subacute infarct in the right posterior cerebral artery territory. 2. Mild to moderate scattered focal areas of T2/FLAIR hyperintensity in the supratentorial white matter as evidence for chronic microvascular angiopathy and old lacunar infarcts. MRA BRAIN: High-grade stenosis at the P1 segment of the right posterior cerebral artery without definite distal flow. MRA NECK: 1. No significant stenosis at the internal carotid artery takeoffs by NASCET criteria. 2. Nodular prominence of the left supraglottic larynx on the source images. Correlate with prior MRI of the neck. **This report has been created using voice recognition software. It may contain minor errors which are inherent in voice recognition technology. ** Final report electronically signed by Dr. Erica Fitch MD on 4/11/2019 9:05 AM           DVT prophylaxis: [] Lovenox                                 [x] SCDs                                 [] SQ Heparin                                 [] Encourage ambulation           [x] Already on Anticoagulation    Code Status: Prior      PT/OT Eval Status:   Disposition:    [x] Home       [] TCU       [] Rehab       [] Psych       [] SNF       [] [place]       [] Other-    ASSESSMENT:    C/Lokesh Wheeler [address] Problems    Diagnosis Date Noted    Stroke-like symptom [R29.90] 04/17/2019    Nausea & vomiting [R11.2] 04/17/2019    SOB (shortness of breath) [R06.02] 04/17/2019    Troponin level elevated [R74.8] 04/17/2019       PLAN:    1. Observation  2. Mri brain  3. Neuro consult  4. Resume home meds  5. Antiemetics  6. May need rehab        Thank you Leticia Wilson MD for the opportunity to be involved in this patient's care.     Electronically signed by Neto Lobato DO on 4/17/2019 at 1:08 PM

## 2019-04-17 NOTE — CARE COORDINATION
ED Care Transition    2019    Patient Name: Curt Mendez   : 1960  MRN: [id number]    PCP: Bryson Espinal MD                          Utilization Review:  Admissions: 19-19 CVA, NSTEMI, SCC of tonsil  CTC/ACC - CTC, would benefit from [facility] referral pending dishcarge plan    Problem List:  Patient Active Problem List   Diagnosis    Mass in neck    Hip pain    Fracture of metacarpal bone    Type 2 diabetes mellitus without complication, without long-term current use of insulin (HCC)    Generalized anxiety disorder    Moderate episode of recurrent major depressive disorder ([address]    Malignant neoplasm of tonsil ([address]    Squamous cell carcinoma of left tonsil ([address]    Tongue carcinoma ([address]    Encounter for antineoplastic chemotherapy    Cigarette nicotine dependence without complication    Unstageable pressure ulcer of right foot ([address]    Pulmonary embolism and infarction ([address]    Severe malnutrition ([address]    Severe protein-calorie malnutrition ([address]    NSTEMI (non-ST elevated myocardial infarction) ([address]    Cerebrovascular accident (CVA) due to embolism of right posterior cerebral artery ([address]    Tonsil cancer ([address]    Left homonymous hemianopsia due to recent cerebral infarction    Stroke-like symptom    Nausea & vomiting    SOB (shortness of breath)     Summary:  Had recent admission for CVA, NSTEMI, and SCC of left tonsil. Presented to ED for evaluation of dizziness and nausea. Plan is for admission, collaborated with Hospitalist for status. Palliative care eval for chronic disease management and support. Dietician eval.for dysphagia, was scheduled for barium swallow but states he was too sick to make it. On soft food, puree, and liquid diet.      Follow up appointments:    Future Appointments   Date Time Provider Richard Cortes   2019  2:00 PM STR FLUORO ROOM 4 STRZ RAD STR Radiolog   2019 11:30 AM YAQUELIN Graham - CNP SRPX Heart [facility] 4/30/2019  1:30 PM Lorenza Hurtado MD [facility] [place]   5/3/2019 11:45 AM Felicia Esposito MD ENT [facility] - [place]   5/16/2019  1:45 PM Sherrell Robert MD Oncology [facility] - [place]   5/29/2019  2:30 PM Miah Long MD [address] [place], [place] [address]P - [place]   6/3/2019  2:30 PM YAQUELIN Amado - CNP SRPX Pain P - [place]   9/27/2019 11:00 AM Sherrell Robert MD Oncology [facility] - [place]       Review Due Health Maintenance:  Health Maintenance Due   Topic Date Due    Hepatitis C screen  1960    Diabetic retinal exam  03/31/1968    HIV screen  03/31/1973    DTaP/Tdap/Td vaccine (1 - Tdap) 03/31/1977    Shingles Vaccine (1 of 2) 03/31/2008    Colon cancer screen colonoscopy  03/31/2008     Alec Borden RN, BSN  [phone]

## 2019-04-17 NOTE — ED PROVIDER NOTES
[facility]  eMERGENCY dEPARTMENT eNCOUnter          [address] [facility]       Chief Complaint   Patient presents with    Nausea    Dizziness       Nurses Notes reviewed and I agree except as noted in the HPI. HISTORY OF PRESENT ILLNESS    Shirley Garcia is a 62 y.o. male who presents to the Emergency Department via EMS transport for the evaluation of dizziness and nausea. The patient reports a history of DVT, hypertension, COPD, diabetes, CVA, NSTEMI, PE, vena cava filter placement, cancer, and cardiac catheterization. He states that he has \"not been feeling right\" today. He also reports that he has been experiencing dizziness, nausea, and chills for the past 2 days, and that he developed chest pain, fatigue, shortness of breath, and a headache today. He denies experiencing any emesis, diarrhea, cough, fever, constipation, or urinary symptoms. He reports that he smokes 1-2 cigarettes per day and that he sees pain management where he receives a prescription for morphine. He was admitted to the hospital on 4/9/19 for a PE and discharged on 4/12. The patient denies further complaints at initial encounter. The HPI was provided by the patient. REVIEW OF SYSTEMS     Review of Systems   Constitutional: Positive for chills and fatigue. Negative for appetite change, diaphoresis and fever. HENT: Negative for congestion, ear pain, rhinorrhea and sore throat. Eyes: Negative for photophobia, pain and visual disturbance. Respiratory: Positive for shortness of breath. Negative for cough, chest tightness and wheezing. Cardiovascular: Positive for chest pain. Negative for palpitations and leg swelling. Gastrointestinal: Positive for nausea. Negative for abdominal pain, blood in stool, constipation, diarrhea and vomiting. Genitourinary: Negative for difficulty urinating, dysuria and hematuria. Musculoskeletal: Negative for back pain, joint swelling, neck pain and neck stiffness.    Skin: Negative for pallor and rash. Neurological: Positive for dizziness and headaches. Negative for syncope, weakness, light-headedness and numbness. Hematological: Negative for adenopathy. Psychiatric/Behavioral: Negative for confusion and suicidal ideas. The patient is not nervous/anxious. PAST MEDICAL HISTORY    has a past medical history of Anxiety, Bleeds easily ([address] Bruises easily, Cerebrovascular accident (CVA) due to embolism of right posterior cerebral artery ([address] Cigarette nicotine dependence without complication, COPD (chronic obstructive pulmonary disease) ([address] DVT (deep venous thrombosis) ([address] Enlarged lymph node, History of emotional problems, History of sleep apnea, Hypertension, Moderate episode of recurrent major depressive disorder ([address] NSTEMI (non-ST elevated myocardial infarction) ([address] Pulmonary embolism and infarction ([address] S/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 SURGICAL HISTORY    has a past surgical history that includes knee surgery (Right, 1980's); Toe Surgery (Right, 2013); other surgical history (7-7-09); hip surgery (Right, 06/30/2014); Tunneled venous port placement; pr office/outpt visit,procedure only (Right, 10/16/2018); Cardiac catheterization (04/10/2019); and transesophageal echocardiogram (N/A, 4/11/2019). CURRENT MEDICATIONS       Previous Medications    ALBUTEROL SULFATE  (90 BASE) MCG/ACT INHALER    Inhale 2 puffs into the lungs every 6 hours as needed for Wheezing    APIXABAN (ELIQUIS) 5 MG TABS TABLET    Take 1 tablet by mouth 2 times daily    ATORVASTATIN (LIPITOR) 40 MG TABLET    Take 1 tablet by mouth nightly    CLONAZEPAM (KLONOPIN) 1 MG TABLET    Take 1 tablet by mouth 2 times daily as needed for Anxiety for up to 60 doses.     DOCUSATE SODIUM (COLACE) 100 well-nourished. He is active and cooperative. Non-toxic appearance. HENT:   Head: Normocephalic and atraumatic. Right Ear: External ear normal.   Left Ear: External ear normal.   Mouth/Throat: Uvula is midline. Mucous membranes are dry. No oropharyngeal exudate, posterior oropharyngeal edema or posterior oropharyngeal erythema. Eyes: Conjunctivae, EOM and lids are normal. Right eye exhibits no exudate. Left eye exhibits no exudate. No scleral icterus. Neck: Normal range of motion. Neck supple. No JVD present. No tracheal deviation present. Cardiovascular: Normal rate, regular rhythm, S1 normal, S2 normal, normal heart sounds and intact distal pulses. Exam reveals no gallop. No murmur heard. Pulmonary/Chest: Effort normal and breath sounds normal. No accessory muscle usage or stridor. No respiratory distress. He has no decreased breath sounds. He has no wheezes. He has no rhonchi. He has no rales. He exhibits no tenderness. Abdominal: Soft. Normal appearance and bowel sounds are normal. He exhibits no distension. There is no tenderness. There is no rigidity, no rebound and no guarding. Musculoskeletal: Normal range of motion. Right lower leg: He exhibits no tenderness and no edema. Left lower leg: He exhibits no tenderness and no edema. Neurological: He is alert and oriented to person, place, and time. He displays no atrophy. He exhibits normal muscle tone. GCS eye subscore is 4. GCS verbal subscore is 5. GCS motor subscore is 6. Skin: Skin is warm and dry. No rash noted. Psychiatric: He has a normal mood and affect. His speech is normal and behavior is normal.   Nursing note and vitals reviewed. DIAGNOSTIC RESULTS     EKG: All EKG's are interpreted by the Emergency Department Physician who either signs or Co-signs this chart in the absence of a cardiologist.  EKG interpreted by Jair Carr MD:    Vent.  Rate: 86 bpm  AL interval: 142 ms  QRS duration: 92 ms  QTc: 459 ms  P-R-T axes: 84, -33, 40  Normal sinus rhythm  Left axis deviation  Pulmonary disease pattern  Cannot rule out inferior infarct, age undetermined  No STEMI  Compared to old EKG on April-    RADIOLOGY: non-plain film images(s) such as CT, Ultrasound and MRI are read by the radiologist.    CT Head WO Contrast   Final Result   1. No acute hemorrhage. 2. Mild chronic periventricular small vessel ischemic changes. 3. Interval organization of hypoattenuation in the right temporal, parietal and occipital lobes, likely secondary to prior infarct and evaluated on MRI of the brain from 4/10/2019. **This report has been created using voice recognition software. It may contain minor errors which are inherent in voice recognition technology. **      Final report electronically signed by Dr. Li Hernandez on 4/17/2019 11:06 AM      XR CHEST PORTABLE   Final Result      No acute intrathoracic process. **This report has been created using voice recognition software. It may contain minor errors which are inherent in voice recognition technology. **      Final report electronically signed by Dr. Li Hernandez on 4/17/2019 10:26 AM           LABS:   Labs Reviewed   TROPONIN - Abnormal; Notable for the following components:       Result Value    Troponin T 0.044 (*)     All other components within normal limits   BRAIN NATRIURETIC PEPTIDE - Abnormal; Notable for the following components:    Pro-BNP 1931.0 (*)     All other components within normal limits   RAPID INFLUENZA A/B ANTIGENS   CULTURE BLOOD #2   CULTURE BLOOD #1   CBC WITH AUTO DIFFERENTIAL   BASIC METABOLIC PANEL   HEPATIC FUNCTION PANEL   LIPASE   ANION GAP   OSMOLALITY   GLOMERULAR FILTRATION RATE, ESTIMATED   URINE RT REFLEX TO CULTURE       EMERGENCY DEPARTMENT COURSE:   Vitals:    Vitals:    04/17/19 0959 04/17/19 1103 04/17/19 1204   BP: 137/87 134/81 137/78   Pulse: 80 83 89   Resp: 18 18 18   Temp: 98.8 °F (37.1 °C)     TempSrc: Oral     SpO2: 99% 95% 96%   Weight: 180 lb (81.6 kg)     Height: 6' (1.829 m)         10:02 AM: The patient was seen and evaluated. MDM:  . She was complaining of some nausea which was treated with Zofran in the emergency department. The patient is a recent stroke. CT scan of the head was done which showed organization of that stroke. Patient's proBNP and troponin was also elevated. Patient has chronic elevation of troponin. Although clinically patient looks dehydrated, but he was not given fluids because of his elevated proBNP. Patient will be admitted by Dr. Yolanda Vila for further workup and management. Since patient is having dizziness and vertigo like symptoms with the recent stroke. Patient may need another MRI. CRITICAL CARE:   None     CONSULTS:  Dr. Jitendra Brownor:  None     FINAL IMPRESSION      1. Dizziness    2. History of stroke    3. Shortness of breath          DISPOSITION/PLAN   Admit    PATIENT REFERRED TO:  Pérez Keenan MD  [address] [place] [address] [place] [address]:  New Prescriptions    No medications on file       (Please note that portions of this note were completed with a voice recognition program.  Efforts were made to edit the dictations but occasionally words are mis-transcribed.)    Scribe:  Jessica Licea 4/17/19 10:02 AM Scribing for and in the presence of Yuval Cox MD.    Signed by: Elizabeth Merritt, 04/17/19 12:57 PM    Provider:  I personally performed the services described in the documentation, reviewed and edited the documentation which was dictated to the scribe in my presence, and it accurately records my words and actions.     Yuval Cox MD 4/17/19 12:57 PM       Yuval Cox MD  04/17/19 0168

## 2019-04-17 NOTE — PLAN OF CARE
Problem: Nutrition  Goal: Optimal nutrition therapy  Outcome: Ongoing   Nutrition Problem: Severe malnutrition, In context of chronic illness  Intervention: Food and/or Nutrient Delivery: Start ONS, Vitamin Supplement, Modify current diet(Recommend a Multivitamin w/minerals daily. Started Ensure Enlive TID.  Modified diet to Dental Soft diet texture per pt request)  Nutritional Goals: Pt will consume and tolerate 75% or more of meals during LOS

## 2019-04-18 VITALS
WEIGHT: 188.3 LBS | HEIGHT: 72 IN | HEART RATE: 68 BPM | TEMPERATURE: 97.9 F | RESPIRATION RATE: 16 BRPM | OXYGEN SATURATION: 97 % | BODY MASS INDEX: 25.5 KG/M2 | DIASTOLIC BLOOD PRESSURE: 63 MMHG | SYSTOLIC BLOOD PRESSURE: 115 MMHG

## 2019-04-18 DIAGNOSIS — F41.1 GENERALIZED ANXIETY DISORDER: ICD-10-CM

## 2019-04-18 LAB
ALBUMIN SERPL-MCNC: 3.4 G/DL (ref 3.5–5.1)
ALP BLD-CCNC: 78 U/L (ref 38–126)
ALT SERPL-CCNC: 13 U/L (ref 11–66)
ANION GAP SERPL CALCULATED.3IONS-SCNC: 9 MEQ/L (ref 8–16)
AST SERPL-CCNC: 18 U/L (ref 5–40)
BASOPHILS # BLD: 0.6 %
BASOPHILS ABSOLUTE: 0 THOU/MM3 (ref 0–0.1)
BILIRUB SERPL-MCNC: 0.3 MG/DL (ref 0.3–1.2)
BILIRUBIN DIRECT: < 0.2 MG/DL (ref 0–0.3)
BUN BLDV-MCNC: 17 MG/DL (ref 7–22)
CALCIUM SERPL-MCNC: 8.5 MG/DL (ref 8.5–10.5)
CHLORIDE BLD-SCNC: 100 MEQ/L (ref 98–111)
CO2: 28 MEQ/L (ref 23–33)
CREAT SERPL-MCNC: 0.8 MG/DL (ref 0.4–1.2)
EOSINOPHIL # BLD: 3 %
EOSINOPHILS ABSOLUTE: 0.2 THOU/MM3 (ref 0–0.4)
ERYTHROCYTE [DISTWIDTH] IN BLOOD BY AUTOMATED COUNT: 13.2 % (ref 11.5–14.5)
ERYTHROCYTE [DISTWIDTH] IN BLOOD BY AUTOMATED COUNT: 43.3 FL (ref 35–45)
GFR SERPL CREATININE-BSD FRML MDRD: > 90 ML/MIN/1.73M2
GLUCOSE BLD-MCNC: 122 MG/DL (ref 70–108)
GLUCOSE BLD-MCNC: 90 MG/DL (ref 70–108)
GLUCOSE BLD-MCNC: 96 MG/DL (ref 70–108)
HCT VFR BLD CALC: 37.7 % (ref 42–52)
HEMOGLOBIN: 12.6 GM/DL (ref 14–18)
IMMATURE GRANS (ABS): 0.01 THOU/MM3 (ref 0–0.07)
IMMATURE GRANULOCYTES: 0.2 %
LYMPHOCYTES # BLD: 17.7 %
LYMPHOCYTES ABSOLUTE: 1.2 THOU/MM3 (ref 1–4.8)
MAGNESIUM: 1.7 MG/DL (ref 1.6–2.4)
MCH RBC QN AUTO: 30.2 PG (ref 26–33)
MCHC RBC AUTO-ENTMCNC: 33.4 GM/DL (ref 32.2–35.5)
MCV RBC AUTO: 90.4 FL (ref 80–94)
MONOCYTES # BLD: 9.4 %
MONOCYTES ABSOLUTE: 0.6 THOU/MM3 (ref 0.4–1.3)
NUCLEATED RED BLOOD CELLS: 0 /100 WBC
ORGANISM: ABNORMAL
PLATELET # BLD: 235 THOU/MM3 (ref 130–400)
PMV BLD AUTO: 9.9 FL (ref 9.4–12.4)
POTASSIUM REFLEX MAGNESIUM: 4.5 MEQ/L (ref 3.5–5.2)
RBC # BLD: 4.17 MILL/MM3 (ref 4.7–6.1)
SEG NEUTROPHILS: 69.1 %
SEGMENTED NEUTROPHILS ABSOLUTE COUNT: 4.6 THOU/MM3 (ref 1.8–7.7)
SODIUM BLD-SCNC: 137 MEQ/L (ref 135–145)
TOTAL PROTEIN: 5.8 G/DL (ref 6.1–8)
URINE CULTURE REFLEX: ABNORMAL
WBC # BLD: 6.6 THOU/MM3 (ref 4.8–10.8)

## 2019-04-18 PROCEDURE — 99224 PR SBSQ OBSERVATION CARE/DAY 15 MINUTES: CPT | Performed by: PSYCHIATRY & NEUROLOGY

## 2019-04-18 PROCEDURE — 83735 ASSAY OF MAGNESIUM: CPT

## 2019-04-18 PROCEDURE — 80048 BASIC METABOLIC PNL TOTAL CA: CPT

## 2019-04-18 PROCEDURE — 97116 GAIT TRAINING THERAPY: CPT

## 2019-04-18 PROCEDURE — 99217 PR OBSERVATION CARE DISCHARGE MANAGEMENT: CPT | Performed by: INTERNAL MEDICINE

## 2019-04-18 PROCEDURE — 6370000000 HC RX 637 (ALT 250 FOR IP): Performed by: INTERNAL MEDICINE

## 2019-04-18 PROCEDURE — 2709999900 HC NON-CHARGEABLE SUPPLY

## 2019-04-18 PROCEDURE — G8987 SELF CARE CURRENT STATUS: HCPCS

## 2019-04-18 PROCEDURE — 96361 HYDRATE IV INFUSION ADD-ON: CPT

## 2019-04-18 PROCEDURE — 97161 PT EVAL LOW COMPLEX 20 MIN: CPT

## 2019-04-18 PROCEDURE — G0378 HOSPITAL OBSERVATION PER HR: HCPCS

## 2019-04-18 PROCEDURE — 36415 COLL VENOUS BLD VENIPUNCTURE: CPT

## 2019-04-18 PROCEDURE — 80076 HEPATIC FUNCTION PANEL: CPT

## 2019-04-18 PROCEDURE — 85025 COMPLETE CBC W/AUTO DIFF WBC: CPT

## 2019-04-18 PROCEDURE — 6360000002 HC RX W HCPCS: Performed by: INTERNAL MEDICINE

## 2019-04-18 PROCEDURE — 82948 REAGENT STRIP/BLOOD GLUCOSE: CPT

## 2019-04-18 PROCEDURE — G8988 SELF CARE GOAL STATUS: HCPCS

## 2019-04-18 RX ORDER — DOXYCYCLINE HYCLATE 100 MG
100 TABLET ORAL 2 TIMES DAILY
Qty: 14 TABLET | Refills: 0 | Status: SHIPPED | OUTPATIENT
Start: 2019-04-18 | End: 2019-04-25

## 2019-04-18 RX ORDER — HYDROXYZINE 50 MG/1
TABLET, FILM COATED ORAL
Qty: 60 TABLET | Refills: 2 | Status: CANCELLED | OUTPATIENT
Start: 2019-04-18

## 2019-04-18 RX ORDER — HEPARIN SODIUM (PORCINE) LOCK FLUSH IV SOLN 100 UNIT/ML 100 UNIT/ML
500 SOLUTION INTRAVENOUS ONCE
Status: COMPLETED | OUTPATIENT
Start: 2019-04-18 | End: 2019-04-18

## 2019-04-18 RX ORDER — ASPIRIN 81 MG/1
81 TABLET, CHEWABLE ORAL DAILY
Qty: 30 TABLET | Refills: 3 | Status: SHIPPED | OUTPATIENT
Start: 2019-04-18 | End: 2019-04-18 | Stop reason: HOSPADM

## 2019-04-18 RX ORDER — GREEN TEA/HOODIA GORDONII 315-12.5MG
1 CAPSULE ORAL DAILY
Qty: 30 TABLET | Refills: 0 | Status: SHIPPED | OUTPATIENT
Start: 2019-04-18 | End: 2019-05-03

## 2019-04-18 RX ORDER — NICOTINE 21 MG/24HR
1 PATCH, TRANSDERMAL 24 HOURS TRANSDERMAL DAILY
Qty: 42 PATCH | Refills: 0 | Status: SHIPPED | OUTPATIENT
Start: 2019-04-18 | End: 2019-04-18 | Stop reason: HOSPADM

## 2019-04-18 RX ADMIN — ACETAMINOPHEN 650 MG: 325 TABLET ORAL at 00:35

## 2019-04-18 RX ADMIN — CLONAZEPAM 1 MG: 1 TABLET ORAL at 00:35

## 2019-04-18 RX ADMIN — MORPHINE SULFATE 30 MG: 15 TABLET, FILM COATED, EXTENDED RELEASE ORAL at 08:44

## 2019-04-18 RX ADMIN — APIXABAN 5 MG: 5 TABLET, FILM COATED ORAL at 08:44

## 2019-04-18 RX ADMIN — OXYCODONE HYDROCHLORIDE 10 MG: 5 TABLET ORAL at 14:35

## 2019-04-18 RX ADMIN — SERTRALINE 200 MG: 100 TABLET, FILM COATED ORAL at 08:44

## 2019-04-18 RX ADMIN — SODIUM CHLORIDE, PRESERVATIVE FREE 500 UNITS: 5 INJECTION INTRAVENOUS at 15:10

## 2019-04-18 ASSESSMENT — PAIN SCALES - GENERAL
PAINLEVEL_OUTOF10: 5
PAINLEVEL_OUTOF10: 7
PAINLEVEL_OUTOF10: 5
PAINLEVEL_OUTOF10: 5
PAINLEVEL_OUTOF10: 7
PAINLEVEL_OUTOF10: 8

## 2019-04-18 ASSESSMENT — PAIN DESCRIPTION - FREQUENCY: FREQUENCY: CONTINUOUS

## 2019-04-18 ASSESSMENT — PAIN DESCRIPTION - LOCATION
LOCATION: BACK
LOCATION: BACK

## 2019-04-18 ASSESSMENT — PAIN DESCRIPTION - PAIN TYPE
TYPE: CHRONIC PAIN
TYPE: CHRONIC PAIN
TYPE: ACUTE PAIN

## 2019-04-18 ASSESSMENT — PAIN DESCRIPTION - PROGRESSION: CLINICAL_PROGRESSION: NOT CHANGED

## 2019-04-18 ASSESSMENT — PAIN DESCRIPTION - DESCRIPTORS: DESCRIPTORS: ACHING

## 2019-04-18 NOTE — PROGRESS NOTES
NEUROLOGY INPATIENT PROGRESS NOTE    Patient- Noni Connors    MRN -  [id number]   Acct # - [de-identified]      - 1960    62 y.o. Subjective: The patient is seen as follow up for recrudescene stroke from UTI. Patient is alert at this time. He feels better, speech is clearer    Objective:   /63   Pulse 68   Temp 97.9 °F (36.6 °C) (Oral)   Resp 16   Ht 6' (1.829 m)   Wt 188 lb 4.8 oz (85.4 kg)   SpO2 97%   BMI 25.54 kg/m²       Intake/Output Summary (Last 24 hours) at 2019 1532  Last data filed at 2019 1350  Gross per 24 hour   Intake 6041.19 ml   Output 1075 ml   Net 4966.19 ml       Physical Exam:  General:  Awake, alert, not in distress. Language is intact. HEENT: pink conjunctiva, unicteric sclera, moist oral mucosa. EOMI,   Neck: There is no carotid bruits. The Neck is supple. Neuro: CN 2-12 grossly intact with improved right face droop. Power 5/5 Throughout symmetric, Reflexes are +2 symmetric. Long tracts are intact. Cerebellar exam is Intact. Sensory exam is intact to light touch. Gait is normal. No abnormal movement. Osteo: There is no LROM of any of the 4 extremity joints no joint tenderness. ROS:    Cardiac: no chest pain. No palpitations.   Renal : no flank pain, no hematuria  Skin: no rash    Medications:     apixaban  5 mg Oral BID    atorvastatin  40 mg Oral Nightly    morphine  30 mg Oral Q12H    nicotine  1 patch Transdermal Daily    sertraline  200 mg Oral Daily    sodium chloride flush  10 mL Intravenous 2 times per day    insulin lispro  0-6 Units Subcutaneous TID     insulin lispro  0-3 Units Subcutaneous Nightly    cefTRIAXone (ROCEPHIN) IV  1 g Intravenous Q24H       Data:   CBC:   Recent Labs     19  1024 19  0418   WBC 9.1 6.6   HGB 14.9 12.6*    235     BMP:    Recent Labs     19  1024 19  0418    137   K 4.3 4.5    100   CO2 25 28   BUN 10 17   CREATININE 0.8 0.8 GLUCOSE 94 122*         No results found for this or any previous visit. Results for orders placed during the hospital encounter of 04/09/19   MRA HEAD WO CONTRAST    Narrative PROCEDURE: MRI BRAIN W WO CONTRAST, MRA HEAD WO CONTRAST, MRA NECK WO CONTRAST    CLINICAL INFORMATION: STROKE, hx of tonsil cancer, r/o cancer . Blurred vision and headaches for 3 days. History of tonsil cancer. COMPARISON: CT head dated 4/9/2019 and MR neck dated 3/21/2019. TECHNIQUE: Sagittal T1 3-D pre and postcontrast with axial and coronal reformatted images, axial T2, FLAIR, GRE and DWI images of the brain with post processed ADC map. 15 mL ProHance was injected in the left forearm. 3-D time-of-flight sequence of the   intracranial vasculature with volume rendered maximum intensity projection images. Axial 2-D and 3-D time-of-flight sequences of the carotid bifurcations with volume rendered maximum intensity projection sequences. FINDINGS:   Brain:  There is a large area of restricted diffusion involving the mesial and posterior temporal lobe and occipital lobe with associated hyperintense T2/FLAIR signal corresponding to the large area of hypodensity on prior CT. There is effacement of sulci. No   midline shift is present. There are multiple small foci of restricted diffusion in the right thalamus and splenium of the corpus callosum associated focal areas of T2/FLAIR prolongation. No other focal areas of restricted diffusion are present. There are   mild to moderate scattered focal areas of T2/FLAIR prolongation in the subcortical deep white matter without focal areas of restricted diffusion. There are small foci of encephalomalacia interspersed among the foci of T2/FLAIR abnormality. The   ventricles, cisterns and sulci are otherwise symmetric in size and configuration. No intra or extra-axial mass is identified.     Following contrast administration, there is minimally prominent venous enhancement in the large area of infarction consistent with luxury perfusion. Otherwise no focal areas of abnormal parenchymal or meningeal enhancement is identified. The major vascular flow voids appear patent. Orbits are unremarkable. There is a mucous retention cyst in the right maxillary sinus. Visualized paranasal sinuses are otherwise clear. There is small right mastoid effusion. MRA BRAIN:  Intracranial segments of the internal carotid arteries are patent without focal stenosis or aneurysmal dilatation. The bilateral middle cerebral and anterior cerebral arteries are patent without focal abnormality identified. The basilar artery is patent   without focal stenosis or aneurysmal dilatation identified. There is minimal flow in the P1 segment of the right posterior cerebral artery with long segment severe stenosis. No definite distal flow is identified. The left posterior cerebral artery has a   fetal origin and appears patent. Superior cerebellar arteries appear patent. MRA NECK:  Visualized portions of the common carotid arteries appear patent without focal stenosis. The carotid bifurcations appear widely patent without hemodynamically significant stenosis by NASCET criteria. Visualized portions of the cervical segments of the   internal carotid arteries appear patent without focal stenosis. The left vertebral artery is mildly dominant. Vertebral arteries appear patent without focal stenosis. There appears to be nodular thickening of the supraglottic larynx on the left on the   2-D time-of-flight source images. Correlate with prior MRI of the neck dated 3/21/2019. Impression  Brain:  1. Redemonstration of large acute to early subacute infarct in the right posterior cerebral artery territory. 2. Mild to moderate scattered focal areas of T2/FLAIR hyperintensity in the supratentorial white matter as evidence for chronic microvascular angiopathy and old lacunar infarcts.     MRA BRAIN:  High-grade stenosis at the P1 segment of the right posterior cerebral artery without definite distal flow. MRA NECK:  1. No significant stenosis at the internal carotid artery takeoffs by NASCET criteria. 2. Nodular prominence of the left supraglottic larynx on the source images. Correlate with prior MRI of the neck. **This report has been created using voice recognition software. It may contain minor errors which are inherent in voice recognition technology. **    Final report electronically signed by Dr. Pratibha Rainey MD on 4/11/2019 9:05 AM     No results found for this or any previous visit. No results found for this or any previous visit. Results for orders placed during the hospital encounter of 04/17/19   MRI BRAIN WO CONTRAST    Narrative PROCEDURE: MRI BRAIN WO CONTRAST    CLINICAL INFORMATION: STROKE, TIA. Additional history obtained from the electronic medical record indicates the patient has new onset left-sided weakness. COMPARISON: MRI of the brain dated April 10, 2019. TECHNIQUE: Multiplanar and multiple spin echo MRI images were obtained of the brain without contrast.    FINDINGS:    There is stable prominence of the sulcal spaces and ventricular system secondary to generalized, age-related parenchymal volume loss. Restricted diffusion is again noted along the right posterior temporal and right occipital lobes with smaller areas   within the right thalamus and periventricular white matter along the trigone of the right lateral ventricle. Subtle evolutionary changes are noted and there is decreased corresponding hyperintense T2 signal along the right temporal and occipital lobes. No new area of restricted diffusion is identified. There is redemonstration of patchy areas of hyperintense T2 signal within the periventricular and deep cerebral white matter which likely correspond to sequela of chronic microvascular ischemic change.    Susceptibility is again noted in the bilateral basal ganglia which likely corresponds to age-related mineralization. A small focus of hyperintense T2 signal is again noted within the left cerebellar hemisphere which likely corresponds to a remote   infarct. The ventricles are midline without evidence of hydrocephalus. No mass, mass effect or extra-axial fluid collection is identified. The basal cisterns and visualized vascular flow voids are patent. The pituitary, brain stem and cervical medullary junction   are within normal limits. The visualized orbits and temporal bone structures are stable in appearance with partial fluid opacification of the mastoid air cells on the right which may correspond to a mastoid effusion or mastoiditis. There is redemonstration of a mucous retention   cyst within the right maxillary sinus. Impression  Evolutionary changes are present corresponding to areas of subacute ischemia specifically in the right temporal and occipital lobes with smaller areas in the right thalamus and along the trigone of the right lateral ventricle. No evidence to suggest new   or acute ischemia. The remainder of the brain also appears similar to the prior exam.          **This report has been created using voice recognition software. It may contain minor errors which are inherent in voice recognition technology. **    Final report electronically signed by Dr. Kiel Cai on 4/18/2019 7:39 AM     Results for orders placed during the hospital encounter of 04/09/19   MRI BRAIN W WO CONTRAST    Narrative PROCEDURE: MRI BRAIN W WO CONTRAST, MRA HEAD WO CONTRAST, MRA NECK WO CONTRAST    CLINICAL INFORMATION: STROKE, hx of tonsil cancer, r/o cancer . Blurred vision and headaches for 3 days. History of tonsil cancer. COMPARISON: CT head dated 4/9/2019 and MR neck dated 3/21/2019. TECHNIQUE: Sagittal T1 3-D pre and postcontrast with axial and coronal reformatted images, axial T2, FLAIR, GRE and DWI images of the brain with post processed ADC map.  15 mL ProHance was injected in the left forearm. 3-D time-of-flight sequence of the   intracranial vasculature with volume rendered maximum intensity projection images. Axial 2-D and 3-D time-of-flight sequences of the carotid bifurcations with volume rendered maximum intensity projection sequences. FINDINGS:   Brain:  There is a large area of restricted diffusion involving the mesial and posterior temporal lobe and occipital lobe with associated hyperintense T2/FLAIR signal corresponding to the large area of hypodensity on prior CT. There is effacement of sulci. No   midline shift is present. There are multiple small foci of restricted diffusion in the right thalamus and splenium of the corpus callosum associated focal areas of T2/FLAIR prolongation. No other focal areas of restricted diffusion are present. There are   mild to moderate scattered focal areas of T2/FLAIR prolongation in the subcortical deep white matter without focal areas of restricted diffusion. There are small foci of encephalomalacia interspersed among the foci of T2/FLAIR abnormality. The   ventricles, cisterns and sulci are otherwise symmetric in size and configuration. No intra or extra-axial mass is identified. Following contrast administration, there is minimally prominent venous enhancement in the large area of infarction consistent with luxury perfusion. Otherwise no focal areas of abnormal parenchymal or meningeal enhancement is identified. The major vascular flow voids appear patent. Orbits are unremarkable. There is a mucous retention cyst in the right maxillary sinus. Visualized paranasal sinuses are otherwise clear. There is small right mastoid effusion. MRA BRAIN:  Intracranial segments of the internal carotid arteries are patent without focal stenosis or aneurysmal dilatation. The bilateral middle cerebral and anterior cerebral arteries are patent without focal abnormality identified.  The basilar artery is patent   without focal stenosis or aneurysmal dilatation identified. There is minimal flow in the P1 segment of the right posterior cerebral artery with long segment severe stenosis. No definite distal flow is identified. The left posterior cerebral artery has a   fetal origin and appears patent. Superior cerebellar arteries appear patent. MRA NECK:  Visualized portions of the common carotid arteries appear patent without focal stenosis. The carotid bifurcations appear widely patent without hemodynamically significant stenosis by NASCET criteria. Visualized portions of the cervical segments of the   internal carotid arteries appear patent without focal stenosis. The left vertebral artery is mildly dominant. Vertebral arteries appear patent without focal stenosis. There appears to be nodular thickening of the supraglottic larynx on the left on the   2-D time-of-flight source images. Correlate with prior MRI of the neck dated 3/21/2019. Impression  Brain:  1. Redemonstration of large acute to early subacute infarct in the right posterior cerebral artery territory. 2. Mild to moderate scattered focal areas of T2/FLAIR hyperintensity in the supratentorial white matter as evidence for chronic microvascular angiopathy and old lacunar infarcts. MRA BRAIN:  High-grade stenosis at the P1 segment of the right posterior cerebral artery without definite distal flow. MRA NECK:  1. No significant stenosis at the internal carotid artery takeoffs by NASCET criteria. 2. Nodular prominence of the left supraglottic larynx on the source images. Correlate with prior MRI of the neck. **This report has been created using voice recognition software. It may contain minor errors which are inherent in voice recognition technology. **    Final report electronically signed by Dr. Elissa Rubin MD on 4/11/2019 9:05 AM     No results found for this or any previous visit.   Results for orders placed during the hospital encounter of 04/17/19   CT Head WO Contrast Narrative PROCEDURE: CT HEAD WO CONTRAST    CLINICAL INFORMATION: Headache, history of lung cancer    TECHNIQUE: CT scan of the head was performed from the vertex to the skull base without use of intravenous contrast. Axial images as well as coronal and sagittal reconstructions were obtained. All CT scans at this facility use dose modulation, iterative reconstruction, and/or weight-based dosing when appropriate to reduce radiation dose to as low as reasonably achievable. COMPARISON: CT head 4/9/2019    FINDINGS: There has been interval re-organization of an area of encephalomalacia involving the right temporal, parietal and occipital lobes. Hypoattenuation is slightly less extensive. There is no midline shift or acute hemorrhage. Ventricles are within   normal limits. Mild diffuse periventricular white matter hypoattenuation is present. Mucosal thickening seen in the bilateral maxillary sinuses. Visualized orbits and mastoid air cells are unremarkable. Impression 1. No acute hemorrhage. 2. Mild chronic periventricular small vessel ischemic changes. 3. Interval organization of hypoattenuation in the right temporal, parietal and occipital lobes, likely secondary to prior infarct and evaluated on MRI of the brain from 4/10/2019. **This report has been created using voice recognition software. It may contain minor errors which are inherent in voice recognition technology. **    Final report electronically signed by Dr. Rob Shea on 4/17/2019 11:06 AM         Assessment:  Principal Problem:    Stroke-like symptom  Active Problems:    Nausea & vomiting    SOB (shortness of breath)    Troponin level elevated    Severe malnutrition (HCC)    History of stroke    Acute cystitis without hematuria  Resolved Problems:    * No resolved hospital problems. *    60 year old man with recent hx of R PCA / occipital large stroke, now with recrudesence stroke symptoms from a complicated UTI. Plan:    1.  At least 7 days of antibiotics for this type of complicated UTI, defer to primary team  2. con't elliquis  3. No antiplatelet is needed for stroke prevention, patient did not have any cardiovascular event, antiplatelet and anticoagulant will significantly increase the risk of ICH in his large stroke area and it is not recommended by current guideline  4. Patient can be discharged from neuro standpoint if PT/OT clear him  5. F/u with Dr. Zaire Galicia in 3 months time.     Lennie Reis MD, 4/18/2019 3:32 PM       Duncan Ley MD  Attending Neurologist/Neurointensivist

## 2019-04-18 NOTE — CARE COORDINATION
4/18/19, 10:03 AM      Brisa Wells       Admitted from: ED 4/17/2019/ [facility] day: 0   Location: 4A-06/006-A Reason for admit: Stroke-like symptom [R29.90] Status: OBS  Admit order signed?: yes  PMH:  has a past medical history of Anxiety, Bleeds easily ([address] Bruises easily, Cerebrovascular accident (CVA) due to embolism of right posterior cerebral artery ([address] Cigarette nicotine dependence without complication, COPD (chronic obstructive pulmonary disease) ([address] DVT (deep venous thrombosis) ([address] Enlarged lymph node, History of emotional problems, History of sleep apnea, Hypertension, Moderate episode of recurrent major depressive disorder ([address] NSTEMI (non-ST elevated myocardial infarction) ([address] Pulmonary embolism and infarction ([address] S/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 Procedure: none  Pertinent abnormal Imaging:MRI Brain WO Contrast    Impression:          Evolutionary changes are present corresponding to areas of subacute ischemia specifically in the right temporal and occipital lobes with smaller areas in the right thalamus and along the trigone of the right lateral ventricle. No evidence to suggest new   or acute ischemia. The remainder of the brain also appears similar to the prior exam.   CT Head WO Contrast    Impression:       1. No acute hemorrhage. 2. Mild chronic periventricular small vessel ischemic changes. 3. Interval organization of hypoattenuation in the right temporal, parietal and occipital lobes, likely secondary to prior infarct and evaluated on MRI of the brain from 4/10/2019. CXR   Impression:          No acute intrathoracic process.            Medications:  Scheduled Meds:   apixaban  5 mg Oral BID    atorvastatin  40 mg Oral Nightly    morphine  30 mg Oral Q12H    nicotine  1 patch Transdermal Daily    sertraline  200 mg Oral Daily    sodium chloride flush  10 mL Intravenous 2 times per day    insulin lispro  0-6 Units Subcutaneous TID WC    insulin lispro  0-3 Units Subcutaneous Nightly    cefTRIAXone (ROCEPHIN) IV  1 g Intravenous Q24H     Continuous Infusions:   dextrose      sodium chloride 50 mL/hr at 04/17/19 1548      Pertinent Info/Orders/Treatment Plan:  IV fluids, IV antibiotics, diabetes management, telemetry, Neurology consult, neurovascular checks. Diet: Dietary Nutrition Supplements: Standard High Calorie Oral Supplement  DIET CARB CONTROL; Dental Soft   Smoking status:  reports that he has been smoking cigarettes. He has a 20.00 pack-year smoking history. He has never used smokeless tobacco.   PCP: Geneva Donovan MD  Readmission: no  Readmission Risk Score: 0%    Discharge Planning  Current Residence:  Private Residence  Living Arrangements:  Alone, Family Members   Support Systems:  /, Friends/Neighbors, Home Care Staff  Current Services PTA:     Potential Assistance Needed:  Emergency Call  System, [employer]/Passport, Transportation  Potential Assistance Purchasing Medications:  No  Does patient want to participate in local refill/ meds to beds program?  Yes  Type of Home Care Services:  OT, PT, Aide Services, Nursing Services  Patient expects to be discharged to:  Home  Expected Discharge date:  04/20/19  Follow Up Appointment: Best Day/ Time: Monday AM    Discharge Plan: Met with Adria Leonardo. He currently lives at home alone with family support. He uses a walker and is current with [facility]. Plan is to return home alone with current home health services. SW consult placed.      Evaluation: yes

## 2019-04-18 NOTE — PROGRESS NOTES
Received a request from  to see the patient. Text message sent to Dr. Maritza Patel and awaiting response. 46  Dr. Maritza Patel is [facility] with palliative care referral.  Order placed.

## 2019-04-18 NOTE — CONSULTS
NEUROLOGY CONSULT NOTE      Requesting Physician: Gaby Lima DO    Reason for Consult:  Evaluate for slurred speech in a recent stroke    History of Present Illness:  Kinjal Guzman is a 62 y.o. male admitted to [address] [place] [address] on 4/17/2019.       62year old man with hx of recent right occipital/PCA stroke, was seen by me on last admission, patient also has hx of tonsil cancer and hx of DVT/PE, was placed on eliquis but was not compliaint before the stroke. Patient was admitted one week ago for chest pain and found to have right occipital stroke. Patient was re-started on eliquis 5mg BID for hx of DVt/PT and for stroke prevention, on discharged patient promised to be very compliant with eliquis, he stated that he has been compliant, but c/o slurred speech today. And re-admitted for it. CT head in the ED showed recent stroke, no bleeding. Reports no chest pain. No shortness of breath with exertion. Reports no neck pain. No vision changes. No dysphagia. No fever. No rash. No weight loss.     Past Medical History:        Diagnosis Date    Anxiety     Bleeds easily ([address]     Bruises easily     Cerebrovascular accident (CVA) due to embolism of right posterior cerebral artery ([address]     Cigarette nicotine dependence without complication 3/5/6620    COPD (chronic obstructive pulmonary disease) ([address]     DVT (deep venous thrombosis) ([address] 2009    Enlarged lymph node     History of emotional problems     History of sleep apnea     Hypertension     HCC    Moderate episode of recurrent major depressive disorder ([address] 4/16/2018    NSTEMI (non-ST elevated myocardial infarction) ([address] 4/9/2019    Pulmonary embolism and infarction ([address] 10/22/2018    S/P insertion of inferior vena caval filter 07/07/2009    Severe malnutrition ([address] 10/23/2018    Squamous cell carcinoma of tonsils ([address] 2018    left   - radiation    Type II or unspecified type diabetes mellitus without mention of complication, not stated as uncontrolled     lost weight, no meds at present 2018           Procedure Laterality Date    CARDIAC CATHETERIZATION  04/10/2019    HIP SURGERY Right 06/30/2014    Excisin Right Hip Mass-Dr. Sourav Benton     KNEE SURGERY Right 1980's    OTHER SURGICAL HISTORY  7-7-09    lisa filter placed     [place] OFFICE/OUTPT VISIT,PROCEDURE ONLY Right 10/16/2018    RIGHT FOOT EXCISION ULCER, RIGHT 5th METATARSAL BASE RESECTION performed by Mishel Barrett DPM at [facility] TOE SURGERY Right 2013    wound to bone (second toe)     TRANSESOPHAGEAL ECHOCARDIOGRAM N/A 4/11/2019    TRANSESOPHAGEAL ECHOCARDIOGRAM performed by Barby Omalley MD at [facility]    TUNNELED VENOUS PORT PLACEMENT         Allergies:     Allergies   Allergen Reactions    Latex Hives    Coumadin [Warfarin Sodium] Other (See Comments)     Tears skin     Tape [Adhesive Tape] Other (See Comments)     Tears skin         Current Medications:     apixaban (ELIQUIS) tablet 5 mg BID   atorvastatin (LIPITOR) tablet 40 mg Nightly   clonazePAM (KLONOPIN) tablet 1 mg BID PRN   lactulose (CHRONULAC) 10 GM/15ML solution 20 g BID PRN   morphine (MS CONTIN) extended release tablet 30 mg Q12H   nicotine (NICODERM CQ) 21 MG/24HR 1 patch Daily   oxyCODONE (ROXICODONE) immediate release tablet 10 mg Q8H PRN   sertraline (ZOLOFT) tablet 200 mg Daily   sodium chloride flush 0.9 % injection 10 mL 2 times per day   sodium chloride flush 0.9 % injection 10 mL PRN   ondansetron (ZOFRAN) injection 4 mg Q6H PRN   glucose (GLUTOSE) 40 % oral gel 15 g PRN   dextrose 50 % solution 12.5 g PRN   glucagon (rDNA) injection 1 mg PRN   dextrose 5 % solution PRN   0.9 % sodium chloride infusion Continuous   magnesium sulfate 1 g in dextrose 5 % 100 mL IVPB PRN   potassium chloride (KLOR-CON M) extended release tablet 40 mEq PRN   Or    potassium bicarb-citric acid (EFFER-K) effervescent tablet 40 mEq PRN   Or    potassium chloride 10 mEq/100 ProHance was injected in the left forearm. 3-D time-of-flight sequence of the   intracranial vasculature with volume rendered maximum intensity projection images. Axial 2-D and 3-D time-of-flight sequences of the carotid bifurcations with volume rendered maximum intensity projection sequences. FINDINGS:   Brain:  There is a large area of restricted diffusion involving the mesial and posterior temporal lobe and occipital lobe with associated hyperintense T2/FLAIR signal corresponding to the large area of hypodensity on prior CT. There is effacement of sulci. No   midline shift is present. There are multiple small foci of restricted diffusion in the right thalamus and splenium of the corpus callosum associated focal areas of T2/FLAIR prolongation. No other focal areas of restricted diffusion are present. There are   mild to moderate scattered focal areas of T2/FLAIR prolongation in the subcortical deep white matter without focal areas of restricted diffusion. There are small foci of encephalomalacia interspersed among the foci of T2/FLAIR abnormality. The   ventricles, cisterns and sulci are otherwise symmetric in size and configuration. No intra or extra-axial mass is identified. Following contrast administration, there is minimally prominent venous enhancement in the large area of infarction consistent with luxury perfusion. Otherwise no focal areas of abnormal parenchymal or meningeal enhancement is identified. The major vascular flow voids appear patent. Orbits are unremarkable. There is a mucous retention cyst in the right maxillary sinus. Visualized paranasal sinuses are otherwise clear. There is small right mastoid effusion. MRA BRAIN:  Intracranial segments of the internal carotid arteries are patent without focal stenosis or aneurysmal dilatation. The bilateral middle cerebral and anterior cerebral arteries are patent without focal abnormality identified.  The basilar artery is patent   without focal stenosis or aneurysmal dilatation identified. There is minimal flow in the P1 segment of the right posterior cerebral artery with long segment severe stenosis. No definite distal flow is identified. The left posterior cerebral artery has a   fetal origin and appears patent. Superior cerebellar arteries appear patent. MRA NECK:  Visualized portions of the common carotid arteries appear patent without focal stenosis. The carotid bifurcations appear widely patent without hemodynamically significant stenosis by NASCET criteria. Visualized portions of the cervical segments of the   internal carotid arteries appear patent without focal stenosis. The left vertebral artery is mildly dominant. Vertebral arteries appear patent without focal stenosis. There appears to be nodular thickening of the supraglottic larynx on the left on the   2-D time-of-flight source images. Correlate with prior MRI of the neck dated 3/21/2019. Impression  Brain:  1. Redemonstration of large acute to early subacute infarct in the right posterior cerebral artery territory. 2. Mild to moderate scattered focal areas of T2/FLAIR hyperintensity in the supratentorial white matter as evidence for chronic microvascular angiopathy and old lacunar infarcts. MRA BRAIN:  High-grade stenosis at the P1 segment of the right posterior cerebral artery without definite distal flow. MRA NECK:  1. No significant stenosis at the internal carotid artery takeoffs by NASCET criteria. 2. Nodular prominence of the left supraglottic larynx on the source images. Correlate with prior MRI of the neck. **This report has been created using voice recognition software. It may contain minor errors which are inherent in voice recognition technology. **    Final report electronically signed by Dr. Andres Loja MD on 4/11/2019 9:05 AM     No results found for this or any previous visit. No results found for this or any previous visit.   No results found for this or any previous visit. Results for orders placed during the hospital encounter of 04/09/19   MRI BRAIN W WO CONTRAST    Narrative PROCEDURE: MRI BRAIN W WO CONTRAST, MRA HEAD WO CONTRAST, MRA NECK WO CONTRAST    CLINICAL INFORMATION: STROKE, hx of tonsil cancer, r/o cancer . Blurred vision and headaches for 3 days. History of tonsil cancer. COMPARISON: CT head dated 4/9/2019 and MR neck dated 3/21/2019. TECHNIQUE: Sagittal T1 3-D pre and postcontrast with axial and coronal reformatted images, axial T2, FLAIR, GRE and DWI images of the brain with post processed ADC map. 15 mL ProHance was injected in the left forearm. 3-D time-of-flight sequence of the   intracranial vasculature with volume rendered maximum intensity projection images. Axial 2-D and 3-D time-of-flight sequences of the carotid bifurcations with volume rendered maximum intensity projection sequences. FINDINGS:   Brain:  There is a large area of restricted diffusion involving the mesial and posterior temporal lobe and occipital lobe with associated hyperintense T2/FLAIR signal corresponding to the large area of hypodensity on prior CT. There is effacement of sulci. No   midline shift is present. There are multiple small foci of restricted diffusion in the right thalamus and splenium of the corpus callosum associated focal areas of T2/FLAIR prolongation. No other focal areas of restricted diffusion are present. There are   mild to moderate scattered focal areas of T2/FLAIR prolongation in the subcortical deep white matter without focal areas of restricted diffusion. There are small foci of encephalomalacia interspersed among the foci of T2/FLAIR abnormality. The   ventricles, cisterns and sulci are otherwise symmetric in size and configuration. No intra or extra-axial mass is identified.     Following contrast administration, there is minimally prominent venous enhancement in the large area of infarction consistent with luxury recognition software. It may contain minor errors which are inherent in voice recognition technology. **    Final report electronically signed by Dr. Deedee Liang on 4/17/2019 11:06 AM         We reviewed the patient records and available information in the EHR       Impression:    Principal Problem:    Stroke-like symptom  Active Problems:    Nausea & vomiting    SOB (shortness of breath)    Troponin level elevated    Severe malnutrition (HCC)  Resolved Problems:    * No resolved hospital problems. *    60 year old man with recent right occipital/PCA stroke, now with slurred speech. Recommendations:    - MRI brain done, it does not showed a new stroke, patient is compliant with his eliquis 5mg BID  - this is a recrudescence deficits of his recent right PCA/occipital stroke due to a big UTI, UTI is one of the most common causes of recrudescence stroke   - started the patient of ceftriaxone 1g qdaily, recommend for 7 days, this is a complicated UTI due to the fact that he is a male and causing recrudenscence stroke.  - PT/OT evaluation  - no further stroke workup needed at this time. - con't eliquis 5mg BID                                            1. Case was discussed with primary service. 2. All questions were answered. It was my pleasure to evaluate Ana Jefferson today. Please call with questions.       Electronically signed by Reji Aguilar MD on 4/17/2019 at 9:00 PM    Jeff Adorno MD  Attending Neurologist/Neurointensivist

## 2019-04-18 NOTE — PLAN OF CARE
Problem: Falls - Risk of:  Goal: Will remain free from falls  Description  Will remain free from falls  Outcome: Ongoing  Note:   Bed in lowest position and locked. Bed alarm activated. Educated on use of call light when in need of assistance- expressed understanding. Visual checks performed and charted. No falls during shift at this time. Armband and falling star in place. Call light within reach. Transfers with one assist and cane. Problem: Nutrition  Goal: Optimal nutrition therapy  4/17/2019 1509 by Corie Casey RD, LD  Outcome: Ongoing     Problem: Pain:  Goal: Pain level will decrease  Description  Pain level will decrease  Outcome: Ongoing  Note:   Patient able to use 0-10 pain scale. Denies pain at this time. Pain complaints as documented. Agreeable to take PRN pain medications. Pain goal of 5. Problem: HEMODYNAMIC STATUS  Goal: Patient has stable vital signs and fluid balance  Outcome: Ongoing  Note:   Vitals:    04/17/19 2347   BP: 112/66   Pulse: 68   Resp: 16   Temp: 98.1 °F (36.7 °C)   SpO2: 96%     Continuing to monitor VS q4hr and PRN. Problem: ACTIVITY INTOLERANCE/IMPAIRED MOBILITY  Goal: Mobility/activity is maintained at optimum level for patient  Outcome: Ongoing  Note:   Patient ambulates with cane and one assist. Patient's RLE is slightly weaker than LLE.       Problem: COMMUNICATION IMPAIRMENT  Goal: Ability to express needs and understand communication  Outcome: Ongoing  Note:      04/17/19 1939   NIH/MNHISS Stroke Scale   NIH/MNIHSS Stroke Scale Assessed Yes   Level of Consciousness (1a. ) 0   LOC Questions (1b. ) 0   LOC Commands (1c. ) 0   Best Gaze (2. ) 0   Visual (3. ) (!) 1   Facial Palsy (4. ) (!) 1   Motor Arm, Left (5a. ) 0   Motor Arm, Right (5b. ) 0   Motor Leg, Left (6a. ) 0   Motor Leg, Right (6b. ) 0   Limb Ataxia (7. ) 0   Sensory (8. ) 0   Best Language (9. ) 0   Dysarthria (10. ) 0   Extinction and Inattention (11) 0   Modified Total 2   Total 2 Patient's speech is clear and appropriate. Patient is A&O x4, no aphasia/dysarthria noted. Continuing neuro assessments q4hr and PRN. Problem: Urinary Elimination:  Goal: Signs and symptoms of infection will decrease  Description  Signs and symptoms of infection will decrease  Outcome: Ongoing  Note:   Patient denies dysuria, or frequency/urgency. Patient was started on IV rocephin. Problem: Serum Glucose Level - Abnormal:  Goal: Ability to maintain appropriate glucose levels will improve to within specified parameters  Description  Ability to maintain appropriate glucose levels will improve to within specified parameters  Outcome: Ongoing  Note:   Continuing to chem patient's chem ACHS. Patient recieves sliding scale humalong AC & HS. Care plan reviewed with patient. Patient verbalize understanding of the plan of care and contribute to goal setting.

## 2019-04-18 NOTE — PROGRESS NOTES
Nicole Villarreal 58  INPATIENT OCCUPATIONAL THERAPY  [facility] NEUROSCIENCES 4A  EVALUATION    Time:  Time In: 1108  Time Out: 1131  Timed Code Treatment Minutes: 8 Minutes  Minutes: 23          Date: 2019  Patient Name: Sarah Pedraza,   Gender: male      MRN: [id number]  : 1960  (62 y.o.)  Referring Practitioner: Sana Elder MD  Diagnosis: stroke like symptom  Additional Pertinent Hx: Per neuro note: 62year old man with hx of recent right occipital/PCA stroke ~1 week ago. patient also has hx of tonsil cancer and hx of DVT/PE. Pt admitted for slurred speech, CT showed no acute stroke.      Restrictions/Precautions:  Up as Tolerated, General Precautions                            Past Medical History:   Diagnosis Date    Anxiety     Bleeds easily ([address]     Bruises easily     Cerebrovascular accident (CVA) due to embolism of right posterior cerebral artery (HCC)     Cigarette nicotine dependence without complication 9780    COPD (chronic obstructive pulmonary disease) (HCC)     DVT (deep venous thrombosis) ([address]     Enlarged lymph node     History of emotional problems     History of sleep apnea     Hypertension     HCC    Moderate episode of recurrent major depressive disorder ([address] 2018    NSTEMI (non-ST elevated myocardial infarction) ([address] 2019    Pulmonary embolism and infarction ([address] 10/22/2018    S/P insertion of inferior vena caval filter 2009    Severe malnutrition ([address] 10/23/2018    Squamous cell carcinoma of tonsils ([address] 2018    left   - radiation    Type II or unspecified type diabetes mellitus without mention of complication, not stated as uncontrolled     lost weight, no meds at present      Past Surgical History:   Procedure Laterality Date    CARDIAC CATHETERIZATION  04/10/2019    HIP SURGERY Right 2014    Excisin Right Hip Mass-Dr. Natanael Alba     KNEE SURGERY Right     OTHER SURGICAL HISTORY  09    lisa filter Assistance: Independent    Active : No     Additional Comments: Pt reported was independent with ADL/IADL and mobility without device (except when outside of home would use cane) up until CVA last week. Pt inconsistent with whether he was wearing/not wearing R AFO as did not have at hospital during last admission. Pt was setup to have [facility] RN and therapy after discharge on 4/12 although only RN had seen. (Simultaneous filing. User may not have seen previous data.)    Objective  Vision - Basic Assessment  Vision Comments: Pt demoes decreased peripheral vision in L eye, reported new since last admission/new CVA although reported has improved since discharge on 4/12. Overall Cognitive Status: Exceptions  Safety Judgement: Decreased awareness of need for assistance;Decreased awareness of need for safety  Problem Solving: Decreased awareness of errors;Assistance required to implement solutions;Assistance required to identify errors made;Assistance required to generate solutions;Assistance required to correct errors made  Insights: Decreased awareness of deficits    Perception  Overall Perceptual Status: WFL    Sensation  Overall Sensation Status: WNL(denies any numbness/tingling)    Posture: Good  Observation: IV, tele    Observation/Palpation  Posture: Good  Observation: IV, tele                 LUE AROM (degrees)  LUE AROM : WFL          RUE AROM (degrees)  RUE AROM : WFL       LUE Strength  Gross LUE Strength: WFL                RUE Strength  Gross RUE Strength: WFL                     RUE Tone: Normotonic  LUE Tone: Normotonic    Movements Are Fluid And Coordinated: Yes               ADL  Toileting: Setup;Stand by assistance(setup for urinal use while supine in bed; SBA for clothing mgmt X3 occasions while standing)  Additional Comments: Pt declined any other ADLs at this time.       Bed mobility  Supine to Sit: Modified independent(use of bedrail)  Scooting: Modified independent(advancing hips forward to EOB)    Transfers  Sit to stand: Stand by assistance(from EOB X2 trials; impulsive and stood prior to being instructed 1x, easily redirected to sitting)  Stand to sit: Stand by assistance(to EOB X2 trials)    Balance  Sitting Balance: Independent(at EOB)  Standing Balance: Stand by assistance     Time: X30 seconds X3 sets  Activity: prep for mobility/clothing mgmt  Comment: Pt did reach to floor X1 occasion to pull down pant leg, handrail utilized for support, and CGA required. Reinforced importance of avoiding bending towards floor as presents increased safety/fall risk. Functional Mobility  Functional - Mobility Device: Cane(quad)  Activity: Other(down hallway)  Assist Level: Contact guard assistance  Functional Mobility Comments: Slightly impulsive, uses cane in L hand although occasionally carries rather than placing on floor for stability. Increased lift at R hip noted to compensate for R foot drop. No LOB. Activity Tolerance:  Activity Tolerance: Patient Tolerated treatment well  Activity Tolerance: Reinforced importance of discussing with MD/RN when ok to shower due to port and loop recorder, otherwise recommended sponge bath at this time. Reinforced fall prevention, pt reporting had fall since last discharge on 4/12 when was working out in yard and tripped. Recommendations given to continue wearing R AFO to increase safety with mobility with pt stating \"I wear it when I'm able to find it. \" Continued to recommend [facility] therapy with pt in agreement. Education provided re: being aware of decreased peripheral vision and using scanning to compensate to avoid missing items/decrease safety. Pt verbalized understanding of all discussed throughout. Pt declined any other questions/concerns, appears to be close to baseline when was last discharged from hospital on 4/12.      Assessment:  Assessment: Pt presents requiring increased assistance for ADLs, transfers, and mobility compared to PLOF prior to recent stroke. Anticipate pt is close to functional level when was discharged from hospital on 4/12, although would continue to benefit from OT services if remains admitted. Pt will continue to benefit from OT services to increase strength/endurance for improved independence with these tasks to facilitate return to PLOF. Performance deficits / Impairments: Decreased functional mobility , Decreased ADL status, Decreased safe awareness, Decreased cognition, Decreased endurance, Decreased balance, Decreased vision/visual deficit, Decreased high-level IADLs  Prognosis: Fair    Clinical Decision Making: Clinical Decision making was of Moderate Complexity as the result of analysis of data from a detailed assessment, a consideration of several treatment options, the presence of comorbidities affecting the plan of care and the need for minimal to moderate modifications or assistance required to complete the evaluation. Discharge Recommendations:  Discharge Recommendations: Home with assist PRN, Home with Home health OT    Patient Education:  Patient Education: OT role, POC, discharge recommendations, safety with ADLs/mobility/transfers    Equipment Recommendations:  Equipment Needed: No    Safety:  Safety Devices in place: Yes  Type of devices: Gait belt, Left in bed, Nurse notified(left with PT at end of session)    Plan:  Times per week: 3-5x  Current Treatment Recommendations: Strengthening, Balance Training, Functional Mobility Training, Endurance Training, Safety Education & Training, Patient/Caregiver Education & Training, Self-Care / ADL, Home Management Training    Goals:       Short term goals  Time Frame for Short term goals: by discharge  Short term goal 1: Pt will complete functional mobility to/from bathroom and household distances with MI to increase activity tolerance/safety needed for ADL/IADL tasks.    Short term goal 2: Pt will complete BADL/light IADL tasks with MI to increase independence with self care tasks.   Short term goal 3: Pt will tolerate dynamic standing >5 minutes with MI and bilateral UE release in prep for IADL tasks. Long term goals  Time Frame for Long term goals : not set due to ELOS    Evaluation Complexity: Based on the findings of patient history, examination, clinical presentation, and decision making during this evaluation, this patient is of medium complexity. OT G-codes  Functional Assessment Tool Used: [facility]  Functional Limitation: Self care  Self Care Current Status (): At least 40 percent but less than 60 percent impaired, limited or restricted  Self Care Goal Status ():  At least 20 percent but less than 40 percent impaired, limited or restricted

## 2019-04-18 NOTE — PROGRESS NOTES
Palliative care eval received from  in ER. Pt admitted with stroke like symptoms, plan is to discharge today to home with  [facility] to follow. Writer in to see pt. Pt states that he needs help at home and is glad that home health will be following. Advance care planning discussed, including purpose of LW, DPOA and code status as it pertains to being hospitalized. Pt states that he has brother sister negar and a niece. States that he does not have children and his wife is . His parents are also . Pt states that he realizes that he needs to \"get his affairs in order\" in case anything happens to him. We also discussed the importance of letting his family know what he would want, should pt not be able to make his own decisions. Pt states that he would want all resuscitation efforts done, even if the odds are against him being \"revived\". Pt states that if he is a \"vegetable\" however, he would not want to be kept alive by aggressive care but keep him comfortable and allow natural death. Pt states that he doesn't want to complete AD's at this time, states that he will discuss with his family when he is at home. Conversation concluded with pt stating understanding of issues discussed and no questions or needs. Support and encouragement given. Plan is for pt discharge, if not discharged, palliative care will continue to follow.

## 2019-04-18 NOTE — PROGRESS NOTES
[facility]  INPATIENT PHYSICAL THERAPY  EVALUATION  [facility] 4A - 4A-06/[id number]-A    Time In: 6298  Time Out: 1154  Timed Code Treatment Minutes: 8 Minutes  Minutes: 23          Date: 2019  Patient Name: Vivien Nixon,  Gender:  male        MRN: [id number]  : 1960  (62 y.o.)  Referral Date : 19   Referring Practitioner: Ledy Mancilla MD  Diagnosis: Stroke like symptom  Additional Pertinent Hx: 62year old man with hx of recent right occipital/PCA stroke, was seen by me on last admission, patient also has hx of tonsil cancer and hx of DVT/PE, was placed on eliquis but was not compliaint before the stroke.  Patient was admitted one week ago for chest pain and found to have right occipital stroke.        Past Medical History:   Diagnosis Date    Anxiety     Bleeds easily ([address]     Bruises easily     Cerebrovascular accident (CVA) due to embolism of right posterior cerebral artery ([address]     Cigarette nicotine dependence without complication 4939    COPD (chronic obstructive pulmonary disease) ([address]     DVT (deep venous thrombosis) ([address]     Enlarged lymph node     History of emotional problems     History of sleep apnea     Hypertension     HCC    Moderate episode of recurrent major depressive disorder ([address] 2018    NSTEMI (non-ST elevated myocardial infarction) ([address] 2019    Pulmonary embolism and infarction ([address] 10/22/2018    S/P insertion of inferior vena caval filter 2009    Severe malnutrition ([address] 10/23/2018    Squamous cell carcinoma of tonsils ([address] 2018    left   - radiation    Type II or unspecified type diabetes mellitus without mention of complication, not stated as uncontrolled     lost weight, no meds at present      Past Surgical History:   Procedure Laterality Date    CARDIAC CATHETERIZATION  04/10/2019    HIP SURGERY Right 2014    Excisin Right Hip Mass-Dr. Brittany Keita     KNEE SURGERY Right     OTHER SURGICAL HISTORY  7-7-09    lisa filter placed     [place] OFFICE/OUTPT VISIT,PROCEDURE ONLY Right 10/16/2018    RIGHT FOOT EXCISION ULCER, RIGHT 5th METATARSAL BASE RESECTION performed by Chino Vasquez DPM at [facility] TOE SURGERY Right 2013    wound to bone (second toe)     TRANSESOPHAGEAL ECHOCARDIOGRAM N/A 4/11/2019    TRANSESOPHAGEAL ECHOCARDIOGRAM performed by Richard Luu MD at [facility]    TUNNELED VENOUS PORT PLACEMENT         Restrictions/Precautions:  Up as Tolerated, General Precautions                            Subjective:  Chart Reviewed: Yes  Patient assessed for rehabilitation services?: Yes  Family / Caregiver Present: No  Subjective: RN approved PT session. Patient sitting EOB upon PT arrival, finishing up with OT. He is pleasant and agreeable to therapy. General:  Overall Orientation Status: Within Normal Limits  Follows Commands: Within Functional Limits    Vision: Impaired  Vision Exceptions: Wears glasses at all times    Hearing: Within functional limits         Pain:  Yes. Pain Assessment  Pain Level: 5  Pain Type: Chronic pain  Pain Location: Back       Social/Functional History:    Lives With: (niece and aunt are in front of home with own living space)  Type of Home: House  Home Layout: One level  Home Access: Stairs to enter without rails  Entrance Stairs - Number of Steps: 1+1  Home Equipment: 4 wheeled walker, Quad cane, Wheelchair-manual(R AFO)     Bathroom Shower/Tub: Tub/Shower unit  Bathroom Toilet: Standard  Bathroom Equipment: Shower chair  IADL Comments: Patient states that he is normally independent with IADLs, except that he doesnt cook much. He states that he and his niece go together to get food.     Receives Help From: Family  ADL Assistance: Independent  Homemaking Assistance: (pt reported niece has been primarily completing since CVA)  Ambulation Assistance: Independent  Transfer Assistance: Independent    Active : No     Additional no significant comorbidities affecting the POC and no modifications or assistance needed to complete the eval.      Decision Making: Low Complexitybased on patient assessment and decision making process of determining plan of care and establishing reasonable expectations for measurable functional outcomes    REQUIRES PT FOLLOW UP: Yes    Discharge Recommendations:  Discharge Recommendations: Home with Home health PT    Patient Education:  Patient Education: Role of PT, POC, gait, safety    Equipment Recommendations:  Equipment Needed: No(pt has a walker)    Safety:  Type of devices: All fall risk precautions in place, Call light within reach, Left in bed, Bed alarm in place    Plan:  Times per week: 3-5x GM  Current Treatment Recommendations: Strengthening, Transfer Training, Endurance Training, Patient/Caregiver Education & Training, Equipment Evaluation, Education, & procurement, Balance Training, Gait Training, Home Exercise Program, Functional Mobility Training, Stair training, Safety Education & Training    Goals:  Patient goals : Go home  Short term goals  Time Frame for Short term goals: 2 weeks  Short term goal 1: Patient will transfer supine <--> sit with mod I in order to get into/out of bed. Short term goal 2: Patient will transfer sit <--> stand with mod I in order to get up to ambulate. Short term goal 3: Patient will ambulate 80' with LRAD and mod I for household and community distances. Short term goal 4: Patient will negotiate 1+1 step with no hand rail, and with LRAD and mod I for home entry. Long term goals  Time Frame for Long term goals : no LTGs due to short ELOS    Evaluation Complexity: Based on the findings of patient history, examination, clinical presentation, and decision making during this evaluation, the evaluation of [facility]  is of low complexity.     Rajwinder Wong, PT, DPT            AM-PAC Inpatient Mobility without Stair Climbing Raw Score : 15  AM-PAC Inpatient without Stair Climbing T-Scale Score : 43.03  Mobility Inpatient CMS 0-100% Score: 47.43  Mobility Inpatient without Stair CMS G-Code Modifier : CK

## 2019-04-18 NOTE — PROGRESS NOTES
CLINICAL PHARMACY NOTE: MEDS TO [address] Select Patient?: Yes  Total # of Prescriptions Filled: 3   The following medications were delivered to the patient:  Total # of Interventions Completed: 3  Time Spent (min): 45    Additional Documentation: None

## 2019-04-19 ENCOUNTER — PATIENT MESSAGE (OUTPATIENT)
Dept: FAMILY MEDICINE CLINIC | Age: 59
End: 2019-04-19

## 2019-04-19 ENCOUNTER — HOSPITAL ENCOUNTER (OUTPATIENT)
Dept: INFUSION THERAPY | Age: 59
End: 2019-04-19

## 2019-04-19 DIAGNOSIS — F41.1 GENERALIZED ANXIETY DISORDER: ICD-10-CM

## 2019-04-19 NOTE — DISCHARGE SUMMARY
discharge       Patient:  Graham Thompson  YOB: 1960    MRN: [id number]     Acct: [de-identified]    PCP: Ronald Hines MD    Date of Admission: 4/17/2019    Date of Service: Pt seen/examined on 4/19/2019 and Admitted to Inpatient with expected LOS greater than two midnights due to medical therapy. Chief Complaint:    Chief Complaint   Patient presents with    Nausea    Dizziness         History Of Present Illness:      62 y.o. male who presented to [address] [place] [address] with\"evaluation of dizziness and nausea. The patient reports a history of DVT, hypertension, COPD, diabetes, CVA, NSTEMI, PE, vena cava filter placement, cancer, and cardiac catheterization. He states that he has \"not been feeling right\" today. He also reports that he has been experiencing dizziness, nausea, and chills for the past 2 days, and that he developed chest pain, fatigue, shortness of breath, and a headache today. He denies experiencing any emesis, diarrhea, cough, fever, constipation, or urinary symptoms. He reports that he smokes 1-2 cigarettes per day and that he sees pain management where he receives a prescription for morphine. He was admitted to the hospital on 4/9/19 for a PE and discharged on 4/12. The patient denies further complaints at initial encounter. \"-per er note and confirmed by myself    Addendum: admits to still smoking and still using illegal drugs, admits to being confused this am             Medication List      START taking these medications    doxycycline hyclate 100 MG tablet  Commonly known as:  VIBRA-TABS  Take 1 tablet by mouth 2 times daily for 7 days  Notes to patient:  Antibiotic     PROBIOTIC ACIDOPHILUS Tabs  Take 1 tablet by mouth daily for 15 days  Notes to patient:  Probiotic        CONTINUE taking these medications    albuterol sulfate  (90 Base) MCG/ACT inhaler  Inhale 2 puffs into the lungs every 6 hours as needed for Wheezing     apixaban 5 MG Tabs tablet  Commonly known asComer Alex  Take 1 tablet by mouth 2 times daily     atorvastatin 40 MG tablet  Commonly known as:  LIPITOR  Take 1 tablet by mouth nightly     clonazePAM 1 MG tablet  Commonly known as:  KLONOPIN  Take 1 tablet by mouth 2 times daily as needed for Anxiety for up to 60 doses. docusate sodium 100 MG capsule  Commonly known as:  COLACE     glucose monitoring kit monitoring kit  1 kit by Does not apply route daily     hydrOXYzine 50 MG tablet  Commonly known as:  ATARAX  Take 1-2 tablets nightly prn     lactulose 10 GM/15ML solution  Commonly known as:  CHRONULAC  Take 30 mLs by mouth 2 times daily as needed (constipation)     lidocaine-prilocaine 2.5-2.5 % cream  Commonly known as:  EMLA  Apply topically as needed. morphine 30 MG extended release tablet  Commonly known as:  MS CONTIN  Take 1 tablet by mouth every 12 hours for 30 days. naloxone 4 MG/0.1ML Liqd nasal spray  Commonly known as:  NARCAN  1 spray by Nasal route as needed for Opioid Reversal     nicotine 21 MG/24HR  Commonly known as:  NICODERM CQ  Place 1 patch onto the skin daily     oxyCODONE HCl 10 MG immediate release tablet  Commonly known as:  OXY-IR  Take 1 tablet by mouth every 8 hours as needed for Pain for up to 30 days. sertraline 100 MG tablet  Commonly known as:  ZOLOFT  Take 2 tablets by mouth daily           Where to Get Your Medications      These medications were sent to [facility] [address], [address], [address] [facility]    Phone:  [phone]   · doxycycline hyclate 100 MG tablet  · PROBIOTIC ACIDOPHILUS Tabs         Diet:  No diet orders on file    REVIEW OF SYSTEMS:   Pertinent positives as noted in the HPI. All other systems reviewed and negative.     PHYSICAL EXAM:    /63   Pulse 68   Temp 97.9 °F (36.6 °C) (Oral)   Resp 16   Ht 6' (1.829 m)   Wt 188 lb 4.8 oz (85.4 kg)   SpO2 97%   BMI 25.54 kg/m² General appearance:  No apparent distress, appears stated age and cooperative. HEENT:  Normal cephalic, atraumatic without obvious deformity. Pupils equal, round, and reactive to light. Extra ocular muscles intact. Conjunctivae/corneas clear. Neck: Supple, with full range of motion. no jugular venous distention. Trachea midline. no carotid bruits  Respiratory:  Normal respiratory effort. Clear to auscultation, bilaterally without Rales/Wheezes/Rhonchi. Breath sounds equal bilaterally  Cardiovascular:  Regular rate and rhythm with normal S1/S2 without murmurs, rubs or gallops. PMI non displaced  Abdomen: Soft, non-tender, non-distended with normal bowel sounds. No guarding, rebound. Musculoskeletal:  No clubbing, cyanosis or edema bilaterally. Full range of motion without deformity. Skin: Skin color, texture, turgor normal.  No rashes or lesions, or suspicious lesions. Neurologic:  Neurovascularly intact without any focal sensory/motor deficits. Cranial nerves: II-XII intact, grossly non-focal.  Psychiatric:  Alert and oriented, thought content appropriate, normal insight  Capillary Refill: Brisk,< 2 seconds   Peripheral Pulses: +2 palpable, equal bilaterally upper and lower extremities  Lymphatics: no lymphadenopathy      Labs:     Recent Labs     04/17/19  1024 04/18/19  0418   WBC 9.1 6.6   HGB 14.9 12.6*   HCT 44.6 37.7*    235     Recent Labs     04/17/19  1024 04/18/19  0418    137   K 4.3 4.5    100   CO2 25 28   BUN 10 17   CREATININE 0.8 0.8   CALCIUM 9.4 8.5     Recent Labs     04/17/19  1024 04/18/19  0418   AST 22 18   ALT 12 13   BILIDIR <0.2 <0.2   BILITOT 0.5 0.3   ALKPHOS 87 78     No results for input(s): INR in the last 72 hours. No results for input(s): Delia Ka in the last 72 hours.     Urinalysis:      Lab Results   Component Value Date    NITRU POSITIVE 04/17/2019    WBCUA  04/17/2019    BACTERIA FEW 04/17/2019    RBCUA 0-2 04/17/2019    BLOODU NEGATIVE 04/17/2019    SPECGRAV 1.009 04/11/2019    GLUCOSEU NEGATIVE 04/17/2019       Radiology:     CXR: I have reviewed the CXR with the following interpretation:   EKG:  I have reviewed the EKG with the following interpretation: no acute changes    Xr Lumbar Spine (2-3 Views)    Result Date: 3/21/2019  PROCEDURE: XR LUMBAR SPINE (2-3 VIEWS) CLINICAL INFORMATION: Left hip pain, COMPARISON: No prior study. TECHNIQUE: AP and lateral views of the lumbar spine. FINDINGS: There is multilevel degenerative spondylosis without acute fracture. Facet arthropathy is present with sclerosis present. An inferior vena cava filter is seen. There is mild atherosclerosis of the abdominal aorta. . Multilevel degenerative spondylosis with associated facet arthropathy. Visualized inferior vena cava filter. Atherosclerotic changes present. **This report has been created using voice recognition software. It may contain minor errors which are inherent in voice recognition technology. ** Final report electronically signed by Dr. Arturo Vieira on 3/21/2019 1:59 PM    Xr Hip Left (2-3 Views)    Result Date: 3/21/2019  PROCEDURE: XR HIP LEFT (2-3 VIEWS) CLINICAL INFORMATION: Left hip pain. Left hip pain for one week after fall. COMPARISON: No prior study. TECHNIQUE: AP and frog-leg lateral views were obtained  of the left hip. FINDINGS: There is normal osseous alignment without visualized fracture. There is mild joint space narrowing at the inferior margin of the hip joint with small rim osteophytes at the inferior margin of the femoral head. Mild osteoarthritis of the left hip without evidence of acute osseous injury. **This report has been created using voice recognition software. It may contain minor errors which are inherent in voice recognition technology. ** Final report electronically signed by Dr. Nely Aldana MD on 3/21/2019 2:09 PM    Ct Head Wo Contrast    Result Date: 4/17/2019  PROCEDURE: CT HEAD WO CONTRAST CLINICAL INFORMATION: Headache, history of lung cancer TECHNIQUE: CT scan of the head was performed from the vertex to the skull base without use of intravenous contrast. Axial images as well as coronal and sagittal reconstructions were obtained. All CT scans at this facility use dose modulation, iterative reconstruction, and/or weight-based dosing when appropriate to reduce radiation dose to as low as reasonably achievable. COMPARISON: CT head 4/9/2019 FINDINGS: There has been interval re-organization of an area of encephalomalacia involving the right temporal, parietal and occipital lobes. Hypoattenuation is slightly less extensive. There is no midline shift or acute hemorrhage. Ventricles are within normal limits. Mild diffuse periventricular white matter hypoattenuation is present. Mucosal thickening seen in the bilateral maxillary sinuses. Visualized orbits and mastoid air cells are unremarkable. 1. No acute hemorrhage. 2. Mild chronic periventricular small vessel ischemic changes. 3. Interval organization of hypoattenuation in the right temporal, parietal and occipital lobes, likely secondary to prior infarct and evaluated on MRI of the brain from 4/10/2019. **This report has been created using voice recognition software. It may contain minor errors which are inherent in voice recognition technology. ** Final report electronically signed by Dr. Alejandro Rodriguez on 4/17/2019 11:06 AM    Ct Head Wo Contrast    Result Date: 4/9/2019  PROCEDURE: CT HEAD WO CONTRAST CLINICAL INFORMATION: Peripheral vision loss for 2 days; history of cancer. COMPARISON: No prior study. TECHNIQUE: 5 mm axial imaging through the head without IV contrast. All CT scans at this facility use dose modulation, iterative reconstruction, and/or weight based dosing when appropriate to reduce the radiation dose to as low as reasonably achievable. FINDINGS: POSTOPERATIVE CHANGES: None. BRAIN VOLUME: 1. Normal for the patient's age. VENTRICLES/CISTERNS: 1. Normal for the patient's age. INFARCT/WHITE MATTER DISEASE: 1. There is a large area of hypoattenuation within the right occipital lobe and tracking anteriorly along the medial aspect of the right temporal lobe consistent with an infarct acute to subacute. There is associated local mass effect with sulcal effacement however there is no midline shift. There is no associated hemorrhage. A stat MRI of the brain and a CTA examination of the head and neck recommended for additional evaluation of the infarct and also to exclude an underlying mass lesion given the history of cancer. 2. There is mild white matter disease within the periventricular deep white matter. INTRACRANIAL HEMORRHAGE: None. MASS/MASS EFFECT/MIDLINE SHIFT: None. INTRA-AXIAL/EXTRA-AXIAL FLUID COLLECTIONS: None. INTRAORBITAL CONTENTS: Unremarkable. OTHER: None. SKULL/SCALP: Unremarkable. PARANASAL SINUSES: 1. There is a mucous retention cyst or polyp within the right maxillary sinus. 2. Mild mucosal thickening within the upper inner cells. 1. There is a large area of hypoattenuation within the right occipital lobe and tracking anteriorly along the medial aspect of the right temporal lobe consistent with an infarct acute to subacute. There is associated local mass effect with sulcal effacement however there is no midline shift. There is no associated hemorrhage. A stat MRI of the brain and a CTA examination of the head and neck recommended for additional evaluation of the infarct and also to exclude an underlying mass lesion given the history of cancer. 2. [place] TALAMANTES 4/9/2018 at 1141 hours. **This report has been created using voice recognition software. It may contain minor errors which are inherent in voice recognition technology. ** Final report electronically signed by Dr. Naldo Stoddard on 4/9/2019 11:44 AM    Cta Chest W Wo Contrast    Result Date: 4/9/2019  PROCEDURE: CTA CHEST W WO CONTRAST CLINICAL INFORMATION: Chest pain; shortness of breath; history of cancer. COMPARISON: 10/22/2018. TECHNIQUE: 1.5 mm axial images were obtained through the chest after the administration of IV contrast. A non-contrast localizer was obtained. 2mm MIP reconstructions of the chest performed in coronal and sagittal planes. All CT scans at this facility use dose modulation, iterative reconstruction, and/or weight based dosing when appropriate to reduce the radiation dose to as low as reasonably achievable. CONTRAST: 80 mL Isovue-370 intravenously. FINDINGS: POSTOPERATIVE CHANGES: None. LINES/TUBES/MECHANICAL DEVICES: None. HEART/MEDIASTINUM: 1. The heart size is normal. 2. There is no pericardial fluid or thickening. PULMONARY ARTERIES: 1. There is no pulmonary embolus. THORACIC AORTA: 1. Mild atheromatous calcification of the thoracic aorta. 2. There is no aneurysm or dissection of the thoracic aorta. 3. Mild atheromatous calcification at the origin of the left common carotid and left subclavian arteries. LUNG FIELDS - INFILTRATE/PLEURAL EFFUSION: 1. There are bullous changes within the left mid chest. There is linear parenchymal scarring and bronchiectatic change adjacent to this region. There is also a nodular linear opacity at the right posterior costophrenic angle suggesting atelectasis and/or  infiltrate. Follow-up imaging is recommended to exclude a mass within this region. 2. There are linear atelectatic densities or fibrotic changes within the lingular segment of the left upper lobe. LUNG FIELDS - MASS/NODULE: 1. There are calcified granuloma within the right mid and lower chest. 2. There is a 0.2 to 0.3 cm nodule anteriorly within the right mid chest that this is series 3 image 83). There is also a 0.6 cm noncalcified nodule along the left major fissure within the left lower chest (series 3 image 131). In addition there is a 0.5  cm noncalcified nodule posterior laterally at the left lung base (series 3 image 142).  A neoplastic etiology/metastatic disease cannot be excluded based on the CT appearance. LYMPHADENOPATHY: 1. No pathologically enlarged lymphadenopathy. PNEUMOTHORAX: None. THYROID GLAND: Unremarkable. TRACHEA/ESOPHAGUS: Normal. MUSCULOSKELETAL: 1. Scoliosis and degenerative changes of emphysema along the spine. UPPER ABDOMEN: 1. Findings related to the abdomen are dictated on the CT abdomen/pelvis examination dated 4/9/2018. OTHER: 1. Gynecomastia bilaterally. 1. There are bullous changes within the left mid chest. There is linear parenchymal scarring and bronchiectatic change adjacent to this region. There is also a nodular linear opacity at the right posterior costophrenic angle suggesting atelectasis and/or  infiltrate. Follow-up imaging is recommended to exclude a mass within this region. A follow-up CT chest with intravenous contrast in 3 months is recommended to confirm stability. 2. There is a 0.2 to 0.3 cm nodule anteriorly within the right mid chest that this is series 3 image 83). There is also a 0.6 cm noncalcified nodule along the left major fissure within the left lower chest (series 3 image 131). In addition there is a 0.5  cm noncalcified nodule posterior laterally at the left lung base (series 3 image 142). A neoplastic etiology/metastatic disease cannot be excluded based on the CT appearance. A follow-up CT examination of the chest with intravenous contrast in 3 months is recommended to confirm stability. **This report has been created using voice recognition software. It may contain minor errors which are inherent in voice recognition technology. ** Final report electronically signed by Dr. Nadira Myers on 4/9/2019 11:52 AM    Mra Head Wo Contrast    Result Date: 4/11/2019  PROCEDURE: MRI BRAIN W WO CONTRAST, MRA HEAD WO CONTRAST, MRA NECK WO CONTRAST CLINICAL INFORMATION: STROKE, hx of tonsil cancer, r/o cancer . Blurred vision and headaches for 3 days. History of tonsil cancer.  COMPARISON: CT head dated 4/9/2019 and MR neck dated 3/21/2019. TECHNIQUE: Sagittal T1 3-D pre and postcontrast with axial and coronal reformatted images, axial T2, FLAIR, GRE and DWI images of the brain with post processed ADC map. 15 mL ProHance was injected in the left forearm. 3-D time-of-flight sequence of the intracranial vasculature with volume rendered maximum intensity projection images. Axial 2-D and 3-D time-of-flight sequences of the carotid bifurcations with volume rendered maximum intensity projection sequences. FINDINGS: Brain: There is a large area of restricted diffusion involving the mesial and posterior temporal lobe and occipital lobe with associated hyperintense T2/FLAIR signal corresponding to the large area of hypodensity on prior CT. There is effacement of sulci. No midline shift is present. There are multiple small foci of restricted diffusion in the right thalamus and splenium of the corpus callosum associated focal areas of T2/FLAIR prolongation. No other focal areas of restricted diffusion are present. There are  mild to moderate scattered focal areas of T2/FLAIR prolongation in the subcortical deep white matter without focal areas of restricted diffusion. There are small foci of encephalomalacia interspersed among the foci of T2/FLAIR abnormality. The ventricles, cisterns and sulci are otherwise symmetric in size and configuration. No intra or extra-axial mass is identified. Following contrast administration, there is minimally prominent venous enhancement in the large area of infarction consistent with luxury perfusion. Otherwise no focal areas of abnormal parenchymal or meningeal enhancement is identified. The major vascular flow voids appear patent. Orbits are unremarkable. There is a mucous retention cyst in the right maxillary sinus. Visualized paranasal sinuses are otherwise clear. There is small right mastoid effusion.  MRA BRAIN: Intracranial segments of the internal carotid arteries are patent without focal stenosis or aneurysmal dilatation. The bilateral middle cerebral and anterior cerebral arteries are patent without focal abnormality identified. The basilar artery is patent without focal stenosis or aneurysmal dilatation identified. There is minimal flow in the P1 segment of the right posterior cerebral artery with long segment severe stenosis. No definite distal flow is identified. The left posterior cerebral artery has a fetal origin and appears patent. Superior cerebellar arteries appear patent. MRA NECK: Visualized portions of the common carotid arteries appear patent without focal stenosis. The carotid bifurcations appear widely patent without hemodynamically significant stenosis by NASCET criteria. Visualized portions of the cervical segments of the internal carotid arteries appear patent without focal stenosis. The left vertebral artery is mildly dominant. Vertebral arteries appear patent without focal stenosis. There appears to be nodular thickening of the supraglottic larynx on the left on the 2-D time-of-flight source images. Correlate with prior MRI of the neck dated 3/21/2019. Brain: 1. Redemonstration of large acute to early subacute infarct in the right posterior cerebral artery territory. 2. Mild to moderate scattered focal areas of T2/FLAIR hyperintensity in the supratentorial white matter as evidence for chronic microvascular angiopathy and old lacunar infarcts. MRA BRAIN: High-grade stenosis at the P1 segment of the right posterior cerebral artery without definite distal flow. MRA NECK: 1. No significant stenosis at the internal carotid artery takeoffs by NASCET criteria. 2. Nodular prominence of the left supraglottic larynx on the source images. Correlate with prior MRI of the neck. **This report has been created using voice recognition software. It may contain minor errors which are inherent in voice recognition technology. ** Final report electronically signed by Dr. Tanvir Hayes MD STEFANIE on 4/11/2019 9:05 AM    Ct Abdomen Pelvis W Iv Contrast Additional Contrast? None    Result Date: 4/9/2019  PROCEDURE: CT ABDOMEN PELVIS W IV CONTRAST CLINICAL INFORMATION: abdominal pain, epigastric primarily COMPARISON: No prior study. TECHNIQUE: 5 mm axial imaging through the abdomen and pelvis with IV contrast.  Coronal and sagittal reconstructions were performed. All CT scans at this facility use dose modulation, iterative reconstruction, and/or weight based dosing when appropriate to reduce the radiation dose to as low as reasonably achievable. CONTRAST: type and amount FINDINGS: LUNG BASES: 1. Findings related to the chest are dictated on the CTA chest examination report dated 4/9/2018. LIVER/GALLBLADDER/BILIARY TREE: 1. There is a small focus of subcapsular hyperattenuation within the medial segment of the left hepatic lobe abutting the falciform ligament which is a common location for focal fatty infiltration. 2. There are a few scattered punctate calcifications within the liver or related to old granulomatous disease. 3. The gallbladder is not visualized. There is no biliary dilatation. PANCREAS/SPLEEN: 1. There is a 0.9 cm hypodense lesion within the pancreatic body with a nonspecific CT appearance. 2. There are a few scattered punctate calcifications within the spleen related to old granulomatous disease. ADRENAL GLANDS/KIDNEYS: 1. The right adrenal gland is within normal limits. 2. There is nodular hypertrophy of the left adrenal gland. BOWEL: 1. The bowel gas pattern is nonobstructive. There is a large amount of stool throughout the colon. 2. The appendix is not visualized. 3. The distal esophagus is unremarkable. There is mucosal thickening of the cardia the stomach. The small bowel is unremarkable. FREE AIR/FREE FLUID/INFLAMMATION: 1. There is mesenteric edema. There is also a small amount of free fluid within the dependent portion of the pelvis.  There is no loculated or drainable fluid collection. In addition there is diffuse subcutaneous edema suggesting 3rd spacing. LYMPHADENOPATHY: 1. No pathologically enlarged lymph nodes. ABDOMINAL AORTA: 1. Atheromatous calcification abdominal aorta and iliac arteries. 2. Atheromatous calcification proximal main renal arteries bilaterally. 3. Caval filter medial right upper abdomen. PELVIS: 1. There is thickening of the urinary bladder wall with the differential including a cystitis and a mass/malignancy. ABDOMINAL WALL: 1. There are numerous varices within the subcutaneous tissues of the anterior abdominal and pelvic wall. MUSCULOSKELETAL: 1. The bony structures are osteopenic. 2. Old rib fractures right lower chest. 3. Levoscoliosis lumbar spine. 4. Paravertebral ossifications at numerous levels along the spine. There is also partial mineralization of the anterior longitudinal ligament. OTHER: None. 1. There is a 0.9 cm hypodense lesion within the pancreatic body with a nonspecific CT appearance. An MRI of the pancreas is recommended for additional evaluation of this. 2. There is nodular hypertrophy of the left adrenal gland. 3.  The bowel gas pattern is nonobstructive. There is a large amount of stool throughout the colon. There is mucosal thickening of the cardia the stomach. An esophagram or endoscopy recommended to further evaluate this. 4. There is mesenteric edema. There is also a small amount of free fluid within the dependent portion of the pelvis. There is no loculated or drainable fluid collection. In addition there is diffuse subcutaneous edema suggesting 3rd spacing. 5. There is thickening of the urinary bladder wall with the differential including a cystitis and a mass/malignancy. **This report has been created using voice recognition software. It may contain minor errors which are inherent in voice recognition technology. ** Final report electronically signed by Dr. Maty Soriano on 4/9/2019 12:00 PM    Xr Chest Portable    Result Date: 4/17/2019  PROCEDURE: XR CHEST PORTABLE CLINICAL INFORMATION: Shortness of breath TECHNIQUE: Mobile AP chest radiograph. COMPARISON: Mobile AP chest radiograph 4/9/2019 FINDINGS: A right-sided infusion port catheter is in stable position. There is a recording device overlying the left hemithorax. Cardiac silhouette is within normal limits. Calcified hilar lymph nodes are again noted. There are calcified granulomas in the right lung. Lungs are hyperinflated. Blunting of the bilateral costophrenic angles is also present on the prior study is likely  secondary to chronic pleural thickening. No lung consolidations are identified. Degenerative changes in the thoracic spine are poorly visualized. No acute intrathoracic process. **This report has been created using voice recognition software. It may contain minor errors which are inherent in voice recognition technology. ** Final report electronically signed by Dr. Fredi Chen on 4/17/2019 10:26 AM    Xr Chest Portable    Result Date: 4/9/2019  PROCEDURE: XR CHEST PORTABLE CLINICAL INFORMATION: Chest pain. COMPARISON: 10/22/2018. TECHNIQUE: AP portable chest radiograph performed. FINDINGS: POSTSURGICAL CHANGES: None. LINES/TUBES/MECHANICAL DEVICES: 1. There is a stable right jugular central venous Mediport with the distal tip overlying the superior vena cava. TRACHEA/HEART/MEDIASTINUM/HILUM: Unremarkable. LUNG FIELDS: 1. The lung fields are emphysematous. There is stable pleural reaction along the lateral costophrenic angles bilaterally and the lateral margin of the right lower chest. There is no consolidation or infiltrates. There is no pulmonary vascular congestion. 2. There is a stable calcified granuloma within the right mid chest. OTHER: None. PNEUMOTHORAX:  None. OSSEOUS STRUCTURES: 1. No acute osseous abnormality. 2. There is generalized osteopenia. 3. Stable dextroscoliosis of the thoracic spine. 4. Degenerative changes upper lumbar spine.      1. The lung fields are emphysematous. There is stable pleural reaction along the lateral costophrenic angles bilaterally and the lateral margin of the right lower chest. There is no consolidation or infiltrates. There is no pulmonary vascular congestion. **This report has been created using voice recognition software. It may contain minor errors which are inherent in voice recognition technology. ** Final report electronically signed by Dr. Augusto Zarco on 4/9/2019 11:09 AM     Dup Lower Extremity Venous Bilateral    Result Date: 4/10/2019  PROCEDURE:  DUP LOWER EXTREMITY VENOUS BILATERAL CLINICAL INFORMATION: Rule out occult clot. COMPARISON: Bilateral lower extremity duplex venous ultrasound dated 10/23/2018 TECHNIQUE: Venous doppler ultrasound was performed of the bilateral lower extremities using gray scale, color flow and spectral doppler imaging. FINDINGS: Right leg: Deep veins (common femoral, femoral, popliteal, and calf veins): Patent and compressible, with spontaneous flow, phasicity, and satisfactory augmentation. Superficial veins (saphenous veins): The imaged portion of the greater saphenous vein at the saphenofemoral junction is patent by color-flow ultrasound. Left leg: Deep veins (common femoral, femoral, popliteal, and calf veins): Patent and compressible, with spontaneous flow, phasicity, and satisfactory augmentation. Superficial veins (saphenous veins): The imaged portion of the greater saphenous vein at the saphenofemoral junction is patent by color-flow ultrasound. Soft tissues: No evidence of a Baker's cyst. No focal fluid collection or mass. No evidence of acute bilateral lower extremity DVT. **This report has been created using voice recognition software. It may contain minor errors which are inherent in voice recognition technology. ** Final report electronically signed by Dr. Tito Ayoub on 4/10/2019 8:41 PM    Mri Orbits Face Neck W Wo Contrast    Result Date: 3/21/2019  PROCEDURE: MRI ORBITS FACE NECK W WO CONTRAST CLINICAL INFORMATION: Neck pain and dysphagia. Additional history obtained from the electronic medical record indicates the patient has a history of oral pharyngeal squamous cell carcinoma. COMPARISON: None available. TECHNIQUE: Axial and coronal T1 and T2-weighted images were obtained through the neck soft tissues. Postcontrast axial and coronal T1-weighted images with fat saturation were also obtained. Sagittal T1-weighted images were also obtained. Postcontrast images were obtained after administration of 20 mL ProHance intravenous contrast. FINDINGS:  The exam is significantly limited secondary to motion artifacts. The nasopharynx and oropharynx are within normal limits. No suspicious enhancing lesion is identified within the oral cavity. There is edema, diffuse enhancement and mucosal thickening involving the supraglottic larynx. The epiglottis is within normal limits. The glottic structures are otherwise grossly symmetric. The subglottic airway is patent. The bilateral parotid and submandibular glands are within normal limits. There is edema and stranding of the subcutaneous soft tissues along the anterior neck overlying the thyroid cartilage. No abnormal lymph node is identified. The visualized intracranial structures demonstrate small foci of hyperintense T2 signal within the juan alberto and left cerebellar hemisphere. This likely corresponds to sequela of remote ischemia. Degenerative changes are present within the cervical spine. No suspicious marrow replacing lesion is identified. 1. The images are significantly degraded by motion artifacts. Correlation with a contrast-enhanced CT of the neck is recommended. 2. Mucosal thickening with edema and diffuse enhancement is noted involving the supraglottic larynx. This is nonspecific however, may represent a laryngitis. Alternatively, this can be seen in the setting of radiation therapy.  3. There is nonspecific edema at the left anterior neck overlying the thyroid cartilage. This can be also the seen in the setting of radiation therapy. **This report has been created using voice recognition software. It may contain minor errors which are inherent in voice recognition technology. ** Final report electronically signed by Dr. Yaz Ernst on 3/21/2019 1:15 PM    Mra Neck Wo Contrast    Result Date: 4/11/2019  PROCEDURE: MRI BRAIN W WO CONTRAST, MRA HEAD WO CONTRAST, MRA NECK WO CONTRAST CLINICAL INFORMATION: STROKE, hx of tonsil cancer, r/o cancer . Blurred vision and headaches for 3 days. History of tonsil cancer. COMPARISON: CT head dated 4/9/2019 and MR neck dated 3/21/2019. TECHNIQUE: Sagittal T1 3-D pre and postcontrast with axial and coronal reformatted images, axial T2, FLAIR, GRE and DWI images of the brain with post processed ADC map. 15 mL ProHance was injected in the left forearm. 3-D time-of-flight sequence of the intracranial vasculature with volume rendered maximum intensity projection images. Axial 2-D and 3-D time-of-flight sequences of the carotid bifurcations with volume rendered maximum intensity projection sequences. FINDINGS: Brain: There is a large area of restricted diffusion involving the mesial and posterior temporal lobe and occipital lobe with associated hyperintense T2/FLAIR signal corresponding to the large area of hypodensity on prior CT. There is effacement of sulci. No midline shift is present. There are multiple small foci of restricted diffusion in the right thalamus and splenium of the corpus callosum associated focal areas of T2/FLAIR prolongation. No other focal areas of restricted diffusion are present. There are  mild to moderate scattered focal areas of T2/FLAIR prolongation in the subcortical deep white matter without focal areas of restricted diffusion. There are small foci of encephalomalacia interspersed among the foci of T2/FLAIR abnormality.  The ventricles, cisterns and sulci are otherwise symmetric in size and configuration. No intra or extra-axial mass is identified. Following contrast administration, there is minimally prominent venous enhancement in the large area of infarction consistent with luxury perfusion. Otherwise no focal areas of abnormal parenchymal or meningeal enhancement is identified. The major vascular flow voids appear patent. Orbits are unremarkable. There is a mucous retention cyst in the right maxillary sinus. Visualized paranasal sinuses are otherwise clear. There is small right mastoid effusion. MRA BRAIN: Intracranial segments of the internal carotid arteries are patent without focal stenosis or aneurysmal dilatation. The bilateral middle cerebral and anterior cerebral arteries are patent without focal abnormality identified. The basilar artery is patent without focal stenosis or aneurysmal dilatation identified. There is minimal flow in the P1 segment of the right posterior cerebral artery with long segment severe stenosis. No definite distal flow is identified. The left posterior cerebral artery has a fetal origin and appears patent. Superior cerebellar arteries appear patent. MRA NECK: Visualized portions of the common carotid arteries appear patent without focal stenosis. The carotid bifurcations appear widely patent without hemodynamically significant stenosis by NASCET criteria. Visualized portions of the cervical segments of the internal carotid arteries appear patent without focal stenosis. The left vertebral artery is mildly dominant. Vertebral arteries appear patent without focal stenosis. There appears to be nodular thickening of the supraglottic larynx on the left on the 2-D time-of-flight source images. Correlate with prior MRI of the neck dated 3/21/2019. Brain: 1. Redemonstration of large acute to early subacute infarct in the right posterior cerebral artery territory.  2. Mild to moderate scattered focal areas of T2/FLAIR hyperintensity in the supratentorial white matter as evidence for chronic microvascular angiopathy and old lacunar infarcts. MRA BRAIN: High-grade stenosis at the P1 segment of the right posterior cerebral artery without definite distal flow. MRA NECK: 1. No significant stenosis at the internal carotid artery takeoffs by NASCET criteria. 2. Nodular prominence of the left supraglottic larynx on the source images. Correlate with prior MRI of the neck. **This report has been created using voice recognition software. It may contain minor errors which are inherent in voice recognition technology. ** Final report electronically signed by Dr. Andres Loja MD on 4/11/2019 9:05 AM    Mri Brain W Wo Contrast    Result Date: 4/11/2019  PROCEDURE: MRI BRAIN W WO CONTRAST, MRA HEAD WO CONTRAST, MRA NECK WO CONTRAST CLINICAL INFORMATION: STROKE, hx of tonsil cancer, r/o cancer . Blurred vision and headaches for 3 days. History of tonsil cancer. COMPARISON: CT head dated 4/9/2019 and MR neck dated 3/21/2019. TECHNIQUE: Sagittal T1 3-D pre and postcontrast with axial and coronal reformatted images, axial T2, FLAIR, GRE and DWI images of the brain with post processed ADC map. 15 mL ProHance was injected in the left forearm. 3-D time-of-flight sequence of the intracranial vasculature with volume rendered maximum intensity projection images. Axial 2-D and 3-D time-of-flight sequences of the carotid bifurcations with volume rendered maximum intensity projection sequences. FINDINGS: Brain: There is a large area of restricted diffusion involving the mesial and posterior temporal lobe and occipital lobe with associated hyperintense T2/FLAIR signal corresponding to the large area of hypodensity on prior CT. There is effacement of sulci. No midline shift is present. There are multiple small foci of restricted diffusion in the right thalamus and splenium of the corpus callosum associated focal areas of T2/FLAIR prolongation.  No other focal areas of restricted diffusion are present. There are  mild to moderate scattered focal areas of T2/FLAIR prolongation in the subcortical deep white matter without focal areas of restricted diffusion. There are small foci of encephalomalacia interspersed among the foci of T2/FLAIR abnormality. The ventricles, cisterns and sulci are otherwise symmetric in size and configuration. No intra or extra-axial mass is identified. Following contrast administration, there is minimally prominent venous enhancement in the large area of infarction consistent with luxury perfusion. Otherwise no focal areas of abnormal parenchymal or meningeal enhancement is identified. The major vascular flow voids appear patent. Orbits are unremarkable. There is a mucous retention cyst in the right maxillary sinus. Visualized paranasal sinuses are otherwise clear. There is small right mastoid effusion. MRA BRAIN: Intracranial segments of the internal carotid arteries are patent without focal stenosis or aneurysmal dilatation. The bilateral middle cerebral and anterior cerebral arteries are patent without focal abnormality identified. The basilar artery is patent without focal stenosis or aneurysmal dilatation identified. There is minimal flow in the P1 segment of the right posterior cerebral artery with long segment severe stenosis. No definite distal flow is identified. The left posterior cerebral artery has a fetal origin and appears patent. Superior cerebellar arteries appear patent. MRA NECK: Visualized portions of the common carotid arteries appear patent without focal stenosis. The carotid bifurcations appear widely patent without hemodynamically significant stenosis by NASCET criteria. Visualized portions of the cervical segments of the internal carotid arteries appear patent without focal stenosis. The left vertebral artery is mildly dominant. Vertebral arteries appear patent without focal stenosis.  There appears to be nodular thickening of the supraglottic larynx on the left on the 2-D time-of-flight source images. Correlate with prior MRI of the neck dated 3/21/2019. Brain: 1. Redemonstration of large acute to early subacute infarct in the right posterior cerebral artery territory. 2. Mild to moderate scattered focal areas of T2/FLAIR hyperintensity in the supratentorial white matter as evidence for chronic microvascular angiopathy and old lacunar infarcts. MRA BRAIN: High-grade stenosis at the P1 segment of the right posterior cerebral artery without definite distal flow. MRA NECK: 1. No significant stenosis at the internal carotid artery takeoffs by NASCET criteria. 2. Nodular prominence of the left supraglottic larynx on the source images. Correlate with prior MRI of the neck. **This report has been created using voice recognition software. It may contain minor errors which are inherent in voice recognition technology. ** Final report electronically signed by Dr. Janet Reyes MD on 4/11/2019 9:05 AM           DVT prophylaxis: [] Lovenox                                 [x] SCDs                                 [] SQ Heparin                                 [] Encourage ambulation           [x] Already on Anticoagulation    Code Status: Prior      PT/OT Eval Status:   Disposition:    [x] Home       [] TCU       [] Rehab       [] Psych       [] SNF       [] [facility]       [] Other-    ASSESSMENT:    C/Lokesh Wheeler 110[address] Problems    Diagnosis Date Noted    Stroke-like symptom [R29.90] 04/17/2019    Nausea & vomiting [R11.2] 04/17/2019    SOB (shortness of breath) [R06.02] 04/17/2019    Troponin level elevated [R74.8] 04/17/2019    Severe malnutrition ([address] [E43] 04/17/2019     Class: Chronic    History of stroke [Z86.73]     Acute cystitis without hematuria [N30.00]        PLAN:    Acute right PCA/occipital stroke- no aspirin secondary to high risk for hemorrhagic conversion started on Eliquis by neurology not Plavix, added statin on discharge PTOT seen and okay for discharge, lipid levels   UTI/cystitis will add doxycycline on discharge  Seen by neurologist and spoke to him he is okay for discharge      Stable for home today    Discharge time 35 minutes  Thank you Tatyana Cabrales MD for the opportunity to be involved in this patient's care.     Electronically signed by Ivory Mack MD on 4/19/2019 at 11:17 AM

## 2019-04-22 ENCOUNTER — TELEPHONE (OUTPATIENT)
Dept: FAMILY MEDICINE CLINIC | Age: 59
End: 2019-04-22

## 2019-04-22 LAB
BLOOD CULTURE, ROUTINE: NORMAL
BLOOD CULTURE, ROUTINE: NORMAL

## 2019-04-22 RX ORDER — ALBUTEROL SULFATE 90 UG/1
2 AEROSOL, METERED RESPIRATORY (INHALATION) EVERY 6 HOURS PRN
Qty: 1 INHALER | Refills: 2 | Status: ON HOLD | OUTPATIENT
Start: 2019-04-22 | End: 2019-08-13 | Stop reason: SDUPTHER

## 2019-04-22 RX ORDER — HYDROXYZINE 50 MG/1
TABLET, FILM COATED ORAL
Qty: 60 TABLET | Refills: 2 | Status: SHIPPED | OUTPATIENT
Start: 2019-04-22 | End: 2019-05-22 | Stop reason: SDUPTHER

## 2019-04-22 NOTE — TELEPHONE ENCOUNTER
[facility] Transitions Initial Follow Up Call    Outreach made within 2 business days of discharge: Yes    Patient: Osmar Burgess Patient : 1960   MRN: [id number]  Reason for Admission: There are no discharge diagnoses documented for the most recent discharge. Discharge Date: 19       Spoke with: Patient    Discharge department/facility: [facility]    TCM Interactive Patient Contact:  Was patient able to fill all prescriptions: Yes  Was patient instructed to bring all medications to the follow-up visit: Yes  Is patient taking all medications as directed in the discharge summary? Yes  Does patient understand their discharge instructions: Yes  Does patient have questions or concerns that need addressed prior to 7-14 day follow up office visit: Yes-patient scheduled.      Scheduled appointment with PCP within 7-14 days    Follow Up  Future Appointments   Date Time Provider Richard Cortes   2019  8:15 AM Leticia Wilson MD Fam Med [facility] [place] [facility]   2019  2:00 PM STR [facility] ROOM 4 STRZ RAD STR Radiolog   2019 11:30 AM YAQUELIN Klein - CNP SRPX Heart [facility]   2019  1:30 PM Leticia Wilson MD Fam Med [facility]   5/3/2019 11:45 AM Shaji Henderson MD ENT [facility]   2019  1:45 PM Etienne Hussein MD Oncology [facility]   2019  2:30 PM Tina Leigh MD [address] [place] [address] [facility]   6/3/2019  2:30 PM YAQUELIN Pinzon - CNP SRPX Pain [facility]   2019 11:00 AM Etienne Hussein MD Oncology [facility] [address] [facility] ([address] [place] [address])

## 2019-04-22 NOTE — TELEPHONE ENCOUNTER
From: Brice Dumont  To:  Calvin Ortiz MD  Sent: 4/19/2019 4:50 AM EDT  Subject: Prescription Question    Hydroxy HCL 50 mg tabs as needed thank you

## 2019-04-23 ENCOUNTER — TELEPHONE (OUTPATIENT)
Dept: FAMILY MEDICINE CLINIC | Age: 59
End: 2019-04-23

## 2019-04-23 ENCOUNTER — OFFICE VISIT (OUTPATIENT)
Dept: FAMILY MEDICINE CLINIC | Age: 59
End: 2019-04-23
Payer: MEDICARE

## 2019-04-23 VITALS
WEIGHT: 179 LBS | OXYGEN SATURATION: 96 % | HEART RATE: 92 BPM | RESPIRATION RATE: 18 BRPM | SYSTOLIC BLOOD PRESSURE: 115 MMHG | DIASTOLIC BLOOD PRESSURE: 74 MMHG | BODY MASS INDEX: 24.28 KG/M2

## 2019-04-23 DIAGNOSIS — I69.90 LATE EFFECTS OF CVA (CEREBROVASCULAR ACCIDENT): ICD-10-CM

## 2019-04-23 DIAGNOSIS — F33.1 MODERATE EPISODE OF RECURRENT MAJOR DEPRESSIVE DISORDER (HCC): ICD-10-CM

## 2019-04-23 DIAGNOSIS — R53.81 PHYSICAL DECONDITIONING: ICD-10-CM

## 2019-04-23 DIAGNOSIS — F41.1 GENERALIZED ANXIETY DISORDER: Primary | ICD-10-CM

## 2019-04-23 DIAGNOSIS — I21.4 NSTEMI (NON-ST ELEVATED MYOCARDIAL INFARCTION) (HCC): ICD-10-CM

## 2019-04-23 DIAGNOSIS — I63.431 CEREBROVASCULAR ACCIDENT (CVA) DUE TO EMBOLISM OF RIGHT POSTERIOR CEREBRAL ARTERY (HCC): ICD-10-CM

## 2019-04-23 DIAGNOSIS — C09.9 MALIGNANT NEOPLASM OF TONSIL (HCC): ICD-10-CM

## 2019-04-23 PROCEDURE — 1111F DSCHRG MED/CURRENT MED MERGE: CPT | Performed by: FAMILY MEDICINE

## 2019-04-23 PROCEDURE — 99496 TRANSJ CARE MGMT HIGH F2F 7D: CPT | Performed by: FAMILY MEDICINE

## 2019-04-23 RX ORDER — BUSPIRONE HYDROCHLORIDE 10 MG/1
10 TABLET ORAL 2 TIMES DAILY
Qty: 60 TABLET | Refills: 2 | Status: SHIPPED | OUTPATIENT
Start: 2019-04-23 | End: 2019-05-13 | Stop reason: SDUPTHER

## 2019-04-23 ASSESSMENT — ENCOUNTER SYMPTOMS
COUGH: 0
CONSTIPATION: 0
BACK PAIN: 1
RHINORRHEA: 0
DIARRHEA: 0
WHEEZING: 0
NAUSEA: 0
SORE THROAT: 0
EYE DISCHARGE: 0
SHORTNESS OF BREATH: 1
ABDOMINAL PAIN: 0

## 2019-04-23 NOTE — PROGRESS NOTES
Post-Discharge Transitional Care Management Services or Hospital Follow Up      Rufina David   YOB: 1960    Date of Office Visit:  4/23/2019  Date of Hospital Admission: 4/17/19  Date of Hospital Discharge: 4/18/19  Readmission Risk Score(high >=14%.  Medium >=10%):Readmission Risk Score: 0      Care management risk score Rising risk (score 2-5) and Complex Care (Scores >=6): 10     Non face to face  following discharge, date last encounter closed (first attempt may have been earlier): 4/22/2019 10:14 AM 4/22/2019 10:14 AM    Call initiated 2 business days of discharge: Yes     Patient Active Problem List   Diagnosis    Mass in neck    Hip pain    Fracture of metacarpal bone    Type 2 diabetes mellitus without complication, without long-term current use of insulin ([address]    Generalized anxiety disorder    Moderate episode of recurrent major depressive disorder ([address]    Malignant neoplasm of tonsil ([address]    Squamous cell carcinoma of left tonsil ([address]    Tongue carcinoma ([address]    Encounter for antineoplastic chemotherapy    Cigarette nicotine dependence without complication    Unstageable pressure ulcer of right foot ([address]    Pulmonary embolism and infarction ([address]    Severe malnutrition ([address]    Severe protein-calorie malnutrition ([address]    NSTEMI (non-ST elevated myocardial infarction) ([address]    Cerebrovascular accident (CVA) due to embolism of right posterior cerebral artery ([address]    Tonsil cancer ([address]    Left homonymous hemianopsia due to recent cerebral infarction    Stroke-like symptom    Nausea & vomiting    SOB (shortness of breath)    Troponin level elevated    Severe malnutrition (HCC)    History of stroke    Acute cystitis without hematuria    Dizziness       Allergies   Allergen Reactions    Latex Hives    Coumadin [Warfarin Sodium] Other (See Comments)     Tears skin     Tape [Adhesive Tape] Other (See Comments)     Tears skin        Medications listed as ordered at the time of discharge from [facility]   Home Medication Instructions RENATO:    Printed on:04/23/19 0409   Medication Information                      albuterol sulfate  (90 Base) MCG/ACT inhaler  Inhale 2 puffs into the lungs every 6 hours as needed for Wheezing             apixaban (ELIQUIS) 5 MG TABS tablet  Take 1 tablet by mouth 2 times daily             atorvastatin (LIPITOR) 40 MG tablet  Take 1 tablet by mouth nightly             busPIRone (BUSPAR) 10 MG tablet  Take 1 tablet by mouth 2 times daily             clonazePAM (KLONOPIN) 1 MG tablet  Take 1 tablet by mouth 2 times daily as needed for Anxiety for up to 60 doses. docusate sodium (COLACE) 100 MG capsule  Take 100 mg by mouth 3 times daily as needed for Constipation             doxycycline hyclate (VIBRA-TABS) 100 MG tablet  Take 1 tablet by mouth 2 times daily for 7 days             glucose monitoring kit (FREESTYLE) monitoring kit  1 kit by Does not apply route daily             hydrOXYzine (ATARAX) 50 MG tablet  Take 1-2 tablets nightly prn             Lactobacillus (PROBIOTIC ACIDOPHILUS) TABS  Take 1 tablet by mouth daily for 15 days             lactulose (CHRONULAC) 10 GM/15ML solution  Take 30 mLs by mouth 2 times daily as needed (constipation)             lidocaine-prilocaine (EMLA) 2.5-2.5 % cream  Apply topically as needed. morphine (MS CONTIN) 30 MG extended release tablet  Take 1 tablet by mouth every 12 hours for 30 days. naloxone (NARCAN) 4 MG/0.1ML LIQD nasal spray  1 spray by Nasal route as needed for Opioid Reversal             nicotine (NICODERM CQ) 21 MG/24HR  Place 1 patch onto the skin daily             oxyCODONE HCl (OXY-IR) 10 MG immediate release tablet  Take 1 tablet by mouth every 8 hours as needed for Pain for up to 30 days.              sertraline (ZOLOFT) 100 MG tablet  Take 2 tablets by mouth daily                   Medications marked \"taking\" at this time  Outpatient Medications Marked as Taking for the 4/23/19 encounter (Office Visit) with Yesika Elder MD   Medication Sig Dispense Refill    busPIRone (BUSPAR) 10 MG tablet Take 1 tablet by mouth 2 times daily 60 tablet 2    albuterol sulfate  (90 Base) MCG/ACT inhaler Inhale 2 puffs into the lungs every 6 hours as needed for Wheezing 1 Inhaler 2    hydrOXYzine (ATARAX) 50 MG tablet Take 1-2 tablets nightly prn 60 tablet 2    doxycycline hyclate (VIBRA-TABS) 100 MG tablet Take 1 tablet by mouth 2 times daily for 7 days 14 tablet 0    Lactobacillus (PROBIOTIC ACIDOPHILUS) TABS Take 1 tablet by mouth daily for 15 days 30 tablet 0    glucose monitoring kit (FREESTYLE) monitoring kit 1 kit by Does not apply route daily 1 kit 0    atorvastatin (LIPITOR) 40 MG tablet Take 1 tablet by mouth nightly 30 tablet 3    nicotine (NICODERM CQ) 21 MG/24HR Place 1 patch onto the skin daily 30 patch 3    apixaban (ELIQUIS) 5 MG TABS tablet Take 1 tablet by mouth 2 times daily 60 tablet 1    oxyCODONE HCl (OXY-IR) 10 MG immediate release tablet Take 1 tablet by mouth every 8 hours as needed for Pain for up to 30 days. 90 tablet 0    lidocaine-prilocaine (EMLA) 2.5-2.5 % cream Apply topically as needed. 1 Tube 3    morphine (MS CONTIN) 30 MG extended release tablet Take 1 tablet by mouth every 12 hours for 30 days. 60 tablet 0    clonazePAM (KLONOPIN) 1 MG tablet Take 1 tablet by mouth 2 times daily as needed for Anxiety for up to 60 doses.  60 tablet 2    sertraline (ZOLOFT) 100 MG tablet Take 2 tablets by mouth daily 60 tablet 2    naloxone (NARCAN) 4 MG/0.1ML LIQD nasal spray 1 spray by Nasal route as needed for Opioid Reversal 1 each 0    docusate sodium (COLACE) 100 MG capsule Take 100 mg by mouth 3 times daily as needed for Constipation      lactulose (CHRONULAC) 10 GM/15ML solution Take 30 mLs by mouth 2 times daily as needed (constipation) 1800 mL 0        Medications patient taking as of now reconciled against medications ordered at time of hospital discharge: Yes    Chief Complaint   Patient presents with    Follow-Up from [address]-he thinks that when his clonopin was cut down it made him have a stroke from too much anxiety.  Other     Patient cannot remember when or where his last colonoscopy was done. Pt presents for hospital follow-up after being admitted for dizziness, nausea vomiting, generalized weakness. Patient has a history of metastatic tonsillar cancer and also has a history of recurrent DVTs and PEs. He is still smoking both tobacco and marijuana. Workup in the hospital showed a subacute CVA and also in an STEMI. Patient was managed medically and sent home. He states that he is still having some pain, especially in his back although he does take 30 mg of morphine and immediate release oxycodone regularly. He states that his biggest complaint is feeling more anxious lately his Klonopin was decreased as we warned him with the respiratory depression side effects of Klonopin, but he thinks his anxiety is not helping his chronic conditions get any better. He states that he understands the Klonopin needed to be decreased but he feels he needs something else as well. He is currently on Zoloft 200 mg but still feeling anxious and overwhelmed. He states he gets palpitations and nausea feeling in his stomach. He also has low energy and difficulty concentrating. He states that he hurts all over but especially his back. Denies any thoughts of hurting himself and is sleeping well for the most part. He does have difficulty getting around and has generalized weakness. He uses a cane or walker and has not fallen recently. Does request a hospital bed as he sleeps better with the head of his bed raised and for safety concerns would be appropriate.   Lab Results       Component                Value               Date                       WBC 6.6                 04/18/2019                 HGB                      12.6 (L)            04/18/2019                 HCT                      37.7 (L)            04/18/2019                 PLT                      235                 04/18/2019                 CHOL                     161                 04/10/2019                 TRIG                     56                  04/10/2019                 HDL                      46                  04/10/2019                 ALT                      13                  04/18/2019                 AST                      18                  04/18/2019                 NA                       137                 04/18/2019                 K                        4.5                 04/18/2019                 CL                       100                 04/18/2019                 CREATININE               0.8                 04/18/2019                 BUN                      17                  04/18/2019                 CO2                      28                  04/18/2019                 TSH                      4.900 (H)           04/11/2019                 INR                      1.05                04/11/2019                 LABA1C                   4.9                 04/17/2019                 LABMICR                  < 1.20              03/21/2019                  Inpatient course: Discharge summary reviewed- see chart. Interval history/Current status: see HPI    Review of Systems   Constitutional: Positive for activity change and appetite change. Negative for chills, fatigue and fever. HENT: Negative for congestion, rhinorrhea and sore throat. Eyes: Negative for discharge and visual disturbance. Respiratory: Positive for shortness of breath. Negative for cough and wheezing. Cardiovascular: Negative for chest pain and palpitations. Gastrointestinal: Negative for abdominal pain, constipation, diarrhea and nausea.    Genitourinary: Negative for dysuria and hematuria. Musculoskeletal: Positive for arthralgias, back pain, gait problem and myalgias. Neurological: Positive for weakness (left sided). Negative for dizziness and headaches. Psychiatric/Behavioral: Positive for sleep disturbance. Negative for dysphoric mood. The patient is nervous/anxious. Vitals:    04/23/19 0801   BP: 115/74   Site: Left Upper Arm   Position: Sitting   Cuff Size: Medium Adult   Pulse: 92   Resp: 18   SpO2: 96%   Weight: 179 lb (81.2 kg)     Body mass index is 24.28 kg/m². Wt Readings from Last 3 Encounters:   04/23/19 179 lb (81.2 kg)   04/18/19 188 lb 4.8 oz (85.4 kg)   04/09/19 178 lb 6.4 oz (80.9 kg)     BP Readings from Last 3 Encounters:   04/23/19 115/74   04/18/19 115/63   04/12/19 (!) 168/79       Physical Exam   Constitutional: He is oriented to person, place, and time. He appears well-developed. He has a sickly appearance. He appears ill. HENT:   Head: Normocephalic and atraumatic. Eyes: Conjunctivae and EOM are normal. Right eye exhibits no discharge. Left eye exhibits no discharge. No scleral icterus. Neck: Normal range of motion. Cardiovascular: Normal rate, regular rhythm and normal heart sounds. Pulmonary/Chest: Effort normal and breath sounds normal. He has no wheezes. Abdominal: Soft. Bowel sounds are normal. He exhibits no distension. There is no tenderness. Musculoskeletal: He exhibits no edema. Lumbar back: He exhibits decreased range of motion and tenderness. Lymphadenopathy:     He has no cervical adenopathy. Neurological: He is alert and oriented to person, place, and time. He displays atrophy. A sensory deficit (left side to gross touch) is present. Gait (uses walker) abnormal. Coordination normal.   Skin: Skin is warm and dry. Psychiatric: His speech is normal and behavior is normal. Judgment and thought content normal. His mood appears anxious.  Cognition and memory are normal.   Nursing note and vitals reviewed. Assessment/Plan:  1. Generalized anxiety disorder  Uncontrolled. Cont klonpin/zoloft, but will add buspar  - busPIRone (BUSPAR) 10 MG tablet; Take 1 tablet by mouth 2 times daily  Dispense: 60 tablet; Refill: 2  - Lucie Curry PsyD, Psychology, [place] CURRENT OUTPATIENT MED LIST    2. Moderate episode of recurrent major depressive disorder ([facility]  Cont meds, refer to [facility]  - [address] [place], [place] [address]Jitendra PsyD, [place] CURRENT OUTPATIENT MED LIST    3. Malignant neoplasm of tonsil ([facility]  Follows with oncology, ENT. Hospital bed for deconditioning, weakness, back pain  - Lucie Curry PsyD, [place], [facility]  - DME Order for Hospital Bed as [facility] CURRENT OUTPATIENT MED LIST    4. Late effects of CVA (cerebrovascular accident)  Generalized weakness.  - DME Order for Hospital Bed as OP  - [place] DISCHARGE MEDS RECONCILED W/ CURRENT OUTPATIENT MED LIST    5. Physical deconditioning  PT  - DME Order for Hospital Bed as OP  - [place] DISCHARGE MEDS RECONCILED W/ CURRENT OUTPATIENT MED LIST    6. Cerebrovascular accident (CVA) due to embolism of right posterior cerebral artery (HCC)  On anticoag, max med management. Tobacco counseling complete. Will get patches  - GA DISCHARGE MEDS RECONCILED W/ CURRENT OUTPATIENT MED LIST    7. NSTEMI (non-ST elevated myocardial infarction) ([facility]  Max med therapy. Tobacco counseling complete  - [place] DISCHARGE MEDS RECONCILED W/ CURRENT OUTPATIENT MED LIST      Medical Decision Making: high complexity    Patient requires assistance of a semi-electric hospital bed to make frequent and immediate changes of body positions not feasible with an ordinary bed, to alleviate pain. Patient needs bed high requirements to perform patient transfers grooming and daily living tasks.   Considering patient has metastatic cancer with significant back pain and also late effects of CVA, the semi-electric hospital bed is necessary for his comfort and safety. ADDENDUM:  Patient requires assistance of a standard wheelchair to successfully complete daily living tasks such as toileting bathing dressing and grooming or any other daily living task in the home. Standard wheelchair is necessary due to the patient's impaiRED ambulation and mobility restrictions and would be unable to resolve these daily living tasks using a cane or walker. The patient is capable of using a standard wheelchair safely in their home and can maneuver within their home with adequate access there is a caregiver available to provide assistance the patient has not expressed an unwillingness to use the wheelchair.

## 2019-04-23 NOTE — TELEPHONE ENCOUNTER
Called Jodie Britton lm on vm that order was faxed to [facility] medical supply for the pt. See the attached order.

## 2019-04-24 DIAGNOSIS — G89.3 CANCER ASSOCIATED PAIN: ICD-10-CM

## 2019-04-24 RX ORDER — MORPHINE SULFATE 30 MG/1
30 TABLET, FILM COATED, EXTENDED RELEASE ORAL EVERY 12 HOURS
Qty: 60 TABLET | Refills: 0 | Status: SHIPPED | OUTPATIENT
Start: 2019-04-24 | End: 2019-05-21 | Stop reason: SDUPTHER

## 2019-04-24 NOTE — TELEPHONE ENCOUNTER
OARRS reviewed. UDS: + for  Morphine, oxycodone,7-Aminoclonazepam and THC- cancer pt . Narcan offered: yes  Last seen: 4/1/2019.  Follow-up: 06/03/19

## 2019-04-25 ENCOUNTER — OFFICE VISIT (OUTPATIENT)
Dept: CARDIOLOGY CLINIC | Age: 59
End: 2019-04-25
Payer: MEDICARE

## 2019-04-25 VITALS
SYSTOLIC BLOOD PRESSURE: 106 MMHG | HEART RATE: 78 BPM | BODY MASS INDEX: 24.38 KG/M2 | DIASTOLIC BLOOD PRESSURE: 72 MMHG | HEIGHT: 72 IN | WEIGHT: 180 LBS

## 2019-04-25 DIAGNOSIS — I63.431 CEREBROVASCULAR ACCIDENT (CVA) DUE TO EMBOLISM OF RIGHT POSTERIOR CEREBRAL ARTERY (HCC): ICD-10-CM

## 2019-04-25 DIAGNOSIS — I73.9 CLAUDICATION (HCC): Primary | ICD-10-CM

## 2019-04-25 DIAGNOSIS — Z86.711 HISTORY OF PULMONARY EMBOLUS (PE): ICD-10-CM

## 2019-04-25 DIAGNOSIS — F17.200 CURRENT EVERY DAY SMOKER: ICD-10-CM

## 2019-04-25 DIAGNOSIS — C09.9 SQUAMOUS CELL CARCINOMA OF LEFT TONSIL (HCC): ICD-10-CM

## 2019-04-25 DIAGNOSIS — Z86.718 HISTORY OF DVT (DEEP VEIN THROMBOSIS): ICD-10-CM

## 2019-04-25 DIAGNOSIS — R09.89 DECREASED PULSES IN FEET: ICD-10-CM

## 2019-04-25 PROCEDURE — 99213 OFFICE O/P EST LOW 20 MIN: CPT | Performed by: NURSE PRACTITIONER

## 2019-04-25 PROCEDURE — 3017F COLORECTAL CA SCREEN DOC REV: CPT | Performed by: NURSE PRACTITIONER

## 2019-04-25 PROCEDURE — G8598 ASA/ANTIPLAT THER USED: HCPCS | Performed by: NURSE PRACTITIONER

## 2019-04-25 PROCEDURE — 4004F PT TOBACCO SCREEN RCVD TLK: CPT | Performed by: NURSE PRACTITIONER

## 2019-04-25 PROCEDURE — G8420 CALC BMI NORM PARAMETERS: HCPCS | Performed by: NURSE PRACTITIONER

## 2019-04-25 PROCEDURE — 1111F DSCHRG MED/CURRENT MED MERGE: CPT | Performed by: NURSE PRACTITIONER

## 2019-04-25 PROCEDURE — G8427 DOCREV CUR MEDS BY ELIG CLIN: HCPCS | Performed by: NURSE PRACTITIONER

## 2019-04-25 NOTE — PROGRESS NOTES
(non-ST elevated myocardial infarction) ([facility] 4/9/2019    Pulmonary embolism and infarction ([facility] 10/22/2018    S/P insertion of inferior vena caval filter 07/07/2009    Severe malnutrition ([address] 10/23/2018    Squamous cell carcinoma of tonsils ([facility] 2018    left   - radiation    Type II or unspecified type diabetes mellitus without mention of complication, not stated as uncontrolled     lost weight, no meds at present 2018       Allergies   Allergen Reactions    Latex Hives    Coumadin [Warfarin Sodium] Other (See Comments)     Tears skin     Tape Bell Spindle Tape] Other (See Comments)     Tears skin        Current Outpatient Medications   Medication Sig Dispense Refill    morphine (MS CONTIN) 30 MG extended release tablet Take 1 tablet by mouth every 12 hours for 30 days. 60 tablet 0    busPIRone (BUSPAR) 10 MG tablet Take 1 tablet by mouth 2 times daily 60 tablet 2    albuterol sulfate  (90 Base) MCG/ACT inhaler Inhale 2 puffs into the lungs every 6 hours as needed for Wheezing 1 Inhaler 2    hydrOXYzine (ATARAX) 50 MG tablet Take 1-2 tablets nightly prn 60 tablet 2    doxycycline hyclate (VIBRA-TABS) 100 MG tablet Take 1 tablet by mouth 2 times daily for 7 days 14 tablet 0    Lactobacillus (PROBIOTIC ACIDOPHILUS) TABS Take 1 tablet by mouth daily for 15 days 30 tablet 0    glucose monitoring kit (FREESTYLE) monitoring kit 1 kit by Does not apply route daily 1 kit 0    atorvastatin (LIPITOR) 40 MG tablet Take 1 tablet by mouth nightly 30 tablet 3    nicotine (NICODERM CQ) 21 MG/24HR Place 1 patch onto the skin daily 30 patch 3    apixaban (ELIQUIS) 5 MG TABS tablet Take 1 tablet by mouth 2 times daily 60 tablet 1    oxyCODONE HCl (OXY-IR) 10 MG immediate release tablet Take 1 tablet by mouth every 8 hours as needed for Pain for up to 30 days. 90 tablet 0    lidocaine-prilocaine (EMLA) 2.5-2.5 % cream Apply topically as needed.  1 Tube 3    clonazePAM (KLONOPIN) 1 MG tablet Take 1 tablet by mouth 2 times daily as needed for Anxiety for up to 60 doses. 60 tablet 2    sertraline (ZOLOFT) 100 MG tablet Take 2 tablets by mouth daily 60 tablet 2    naloxone (NARCAN) 4 MG/0.1ML LIQD nasal spray 1 spray by Nasal route as needed for Opioid Reversal 1 each 0    docusate sodium (COLACE) 100 MG capsule Take 100 mg by mouth 3 times daily as needed for Constipation      lactulose (CHRONULAC) 10 GM/15ML solution Take 30 mLs by mouth 2 times daily as needed (constipation) 1800 mL 0     No current facility-administered medications for this visit. Social History     Socioeconomic History    Marital status:       Spouse name: None    Number of children: None    Years of education: None    Highest education level: None   Occupational History    Occupation: disability   Social Needs    Financial resource strain: None    Food insecurity:     Worry: None     Inability: None    Transportation needs:     Medical: None     Non-medical: None   Tobacco Use    Smoking status: Current Every Day Smoker     Packs/day: 0.50     Years: 40.00     Pack years: 20.00     Types: Cigarettes    Smokeless tobacco: Never Used    Tobacco comment: 5 cig a day    Substance and Sexual Activity    Alcohol use: No    Drug use: No    Sexual activity: None   Lifestyle    Physical activity:     Days per week: None     Minutes per session: None    Stress: None   Relationships    Social connections:     Talks on phone: None     Gets together: None     Attends Confucianist service: None     Active member of club or organization: None     Attends meetings of clubs or organizations: None     Relationship status: None    Intimate partner violence:     Fear of current or ex partner: None     Emotionally abused: None     Physically abused: None     Forced sexual activity: None   Other Topics Concern    None   Social History Narrative    None       Family History   Problem Relation Age of Onset    Cancer Mother         lung Blood pressure 106/72, pulse 78, height 6' (1.829 m), weight 180 lb (81.6 kg). General:   Well developed, well nourished  Lungs:   Clear to auscultation  Heart:    Normal S1 S2, No murmur, rubs, or gallops  Abdomen:   Soft, non tender, no organomegalies, positive bowel sounds  Extremities:   No edema, no cyanosis, good peripheral pulses by doppler, extremities shiloh with R>L  Neurological:   Awake, alert, oriented. No obvious focal deficits, right foot drop  Musculoskeletal:  No obvious deformities    EK19  Normal sinus rhythm  Left axis deviation  Pulmonary disease pattern  Abnormal ECG  When compared with ECG of 2019 06:48,  Ventricular rate has increased BY  38 BPM  Nonspecific T wave abnormality has replaced inverted T waves in Inferior leads  Confirmed by Felicity Stokes MD, [facility] ([id number]) on 2019 7:07:51 PM  BAILEY: 19  Summary   Ejection fraction was estimated at 55-60%.   No defect or patent foramen ovale was identified. There was no   right-to-left shunt, with provocative maneuvers to increase right atrial   pressure.   Left atrial size was normal with no thrombus identified. APPENDAGE: The   left atrial appendage size was normal with no thrombus identified.   DOPPLER: The function was normal (normal emptying velocity).      Signature      ----------------------------------------------------------------   Electronically signed by Bebe Kim MD (Interpreting   [id number]) on 2019 at 03:10 PM   ----------------------------------------------------------------  KAYLYNN RETREAT: 19  FINDINGS:  1. LVEDP estimated at 8 mmHg. There was no gradient across the aortic  valve on pullback. 2.  Left main vessel is normal.  3.  The anterior descending artery and its branches showed no narrowing. 4.  The circumflex vessel and its branches showed no narrowing. 5.  Right coronary artery and its branches showed no focal narrowing. Whitney Azul MD           Diagnosis Orders   1.  Claudication ([facility] VL DON BILATERAL LIMITED 1-2 LEVELS   2. Decreased pulses in feet  VL DON BILATERAL LIMITED 1-2 LEVELS   3. Cerebrovascular accident (CVA) due to embolism of right posterior cerebral artery ([address]     4. History of pulmonary embolus (PE)     5. History of DVT (deep vein thrombosis)     6. Squamous cell carcinoma of left tonsil (HCC)     7. Current every day smoker         Orders Placed This Encounter   Procedures    VL DON BILATERAL LIMITED 1-2 LEVELS     Standing Status:   Future     Standing Expiration Date:   4/25/2020     S/P CVA  BAILEY no PFO, no left to right shunt, normal RON size and no thrombus  S/P Linq recorder 4/11/19, no AFib per Dr. Pagan Dys  [facility]: no CAD  No bleeding issues with eliquis.    On statin  Will obtain ABIs to evaluate PVD    Continue current treatment plan  Follow up with Dr Ernie Irving as scheduled or sooner if needed

## 2019-04-25 NOTE — DISCHARGE SUMMARY
[facility] NOTE  OUTPATIENT  Oncology Rehab    Patient Name: Cedric Stevesn        CSN: [id number]   YOB: 1960  Gender: male  Referring Practitioner: Dr. Dennis Seth  Diagnosis: C09.9 squamous cell carcinoma of left tonsil     Patient is discharged from Physical Therapy services at this time. See last note for details related to results of therapy and goal achievement. Reason for discharge:  Pt was evaluated on 4/2/19 with further PT recommended. Had been awaiting speech therapy coordination for scheduling purposes however pt canceled 3 appointments. This PT called pt this date and found that pt is now receiving home health services for speech and PT. Discharge from OP PT at this time.       Panda Barraza, DPT, [facility] [id number] 4/25/2019

## 2019-04-25 NOTE — PROGRESS NOTES
Pt here for hospital follow up-cath    EKG done on 4-    Pt denies:chest pain. palpitations. peripheral edema    Pt complains of:SOB.dizziness.

## 2019-04-29 ENCOUNTER — TELEPHONE (OUTPATIENT)
Dept: CARDIOLOGY CLINIC | Age: 59
End: 2019-04-29

## 2019-04-29 ENCOUNTER — CARE COORDINATION (OUTPATIENT)
Dept: CARE COORDINATION | Age: 59
End: 2019-04-29

## 2019-04-29 NOTE — LETTER
4/29/2019    [address]  [address] [facility] [address] [place] [id number]      Dear [address],    My name is Jason Francisco and I am a registered nurse who partners with Pérez Keenan MD to improve patients' health. Pérez Keenan MD believes you would benefit from working with me. As a member of your health care team, I would work with other providers involved in your care, offer education for your specific health conditions, and connect you with additional resources as needed. I will collaborate with Pérez Keenan MD to support you in following your treatment plan. The additional support I provide is no additional cost to you. My primary focus is to help you achieve specific goals and improve your health. We are committed to walk with you on this journey and look forward to working with you. Please call me to further discuss your healthcare needs. I am available by phone or for appointments at the office. You can reach me at [phone].     In good health,     Jason Francisco RN

## 2019-04-29 NOTE — CARE COORDINATION
Attempted to reach patient to establish Care Coordination s/p recent referral from [address] [place] [address] staff. Patient was unavailable at the time of my call and generic voicemail message was left asking patient to please return call to my direct number. Introduction [facility] letter also mailed to patient.   Lela Barbosa RN Care Coordinator

## 2019-04-30 ENCOUNTER — TELEPHONE (OUTPATIENT)
Dept: CARDIOLOGY CLINIC | Age: 59
End: 2019-04-30

## 2019-04-30 ENCOUNTER — TELEPHONE (OUTPATIENT)
Dept: FAMILY MEDICINE CLINIC | Age: 59
End: 2019-04-30
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
## 2019-05-01 NOTE — CARE COORDINATION
Received return call from [facility] . Denilson Hernández shared her partner has met with patient in the past and they are in the process of setting up MOW for patient. She is aware of hospital bed order but is unsure of the status. Denilson Hernández called DME and returned information to [facility]. Per Denilson Hernández patient is not eligible for insurance coverage for a new hospital bed until 5/4/19 so they are working to process the order after that date and deliver to patient early next week. Denilson Hernández will call and update patient.

## 2019-05-06 DIAGNOSIS — G89.4 CHRONIC PAIN SYNDROME: ICD-10-CM

## 2019-05-06 DIAGNOSIS — I26.99 PULMONARY EMBOLISM AND INFARCTION (HCC): ICD-10-CM

## 2019-05-06 DIAGNOSIS — C09.9 SQUAMOUS CELL CARCINOMA OF LEFT TONSIL (HCC): ICD-10-CM

## 2019-05-06 DIAGNOSIS — G62.9 NEUROPATHY: ICD-10-CM

## 2019-05-06 DIAGNOSIS — C02.9 TONGUE CARCINOMA (HCC): ICD-10-CM

## 2019-05-06 DIAGNOSIS — C09.9 MALIGNANT NEOPLASM OF TONSIL (HCC): ICD-10-CM

## 2019-05-06 DIAGNOSIS — G89.3 CANCER ASSOCIATED PAIN: ICD-10-CM

## 2019-05-06 RX ORDER — OXYCODONE HYDROCHLORIDE 10 MG/1
10 TABLET ORAL EVERY 8 HOURS PRN
Qty: 90 TABLET | Refills: 0 | Status: SHIPPED | OUTPATIENT
Start: 2019-05-06 | End: 2019-06-03 | Stop reason: SDUPTHER
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
## 2019-05-08 ENCOUNTER — OFFICE VISIT (OUTPATIENT)
Dept: BEHAVIORAL/MENTAL HEALTH CLINIC | Age: 59
End: 2019-05-08
Payer: MEDICARE

## 2019-05-08 DIAGNOSIS — F33.1 MAJOR DEPRESSIVE DISORDER, RECURRENT EPISODE, MODERATE WITH ANXIOUS DISTRESS (HCC): Primary | ICD-10-CM

## 2019-05-08 PROCEDURE — 90791 PSYCH DIAGNOSTIC EVALUATION: CPT | Performed by: PSYCHOLOGIST

## 2019-05-08 ASSESSMENT — PATIENT HEALTH QUESTIONNAIRE - PHQ9
9. THOUGHTS THAT YOU WOULD BE BETTER OFF DEAD, OR OF HURTING YOURSELF: 0
10. IF YOU CHECKED OFF ANY PROBLEMS, HOW DIFFICULT HAVE THESE PROBLEMS MADE IT FOR YOU TO DO YOUR WORK, TAKE CARE OF THINGS AT HOME, OR GET ALONG WITH OTHER PEOPLE: 2
6. FEELING BAD ABOUT YOURSELF - OR THAT YOU ARE A FAILURE OR HAVE LET YOURSELF OR YOUR FAMILY DOWN: 1
4. FEELING TIRED OR HAVING LITTLE ENERGY: 3
8. MOVING OR SPEAKING SO SLOWLY THAT OTHER PEOPLE COULD HAVE NOTICED. OR THE OPPOSITE, BEING SO FIGETY OR RESTLESS THAT YOU HAVE BEEN MOVING AROUND A LOT MORE THAN USUAL: 1
SUM OF ALL RESPONSES TO PHQ QUESTIONS 1-9: 18
5. POOR APPETITE OR OVEREATING: 3
2. FEELING DOWN, DEPRESSED OR HOPELESS: 3
3. TROUBLE FALLING OR STAYING ASLEEP: 3
1. LITTLE INTEREST OR PLEASURE IN DOING THINGS: 3
7. TROUBLE CONCENTRATING ON THINGS, SUCH AS READING THE NEWSPAPER OR WATCHING TELEVISION: 1
SUM OF ALL RESPONSES TO PHQ9 QUESTIONS 1 & 2: 6
SUM OF ALL RESPONSES TO PHQ QUESTIONS 1-9: 18
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
## 2019-05-08 NOTE — PATIENT INSTRUCTIONS
1. Dr. Lashanda Fisher #:  [phone]. 2.  Make a plan to go fishing 2x per week with Estelle Garcia. 3.  Check out the Stop, Breathe & Think rogelio for some brief 5-7 min meditations. 4.  Try the deep breathing as you are able each day. 5.  Return to see Dr. Talbot Habermann in 4 weeks. Deep Breathing Exercises      Exercise 1:  The Stimulating Breath (also called the Cherri Breath)   Its aim is to raise energy level and increase alertness. - Inhale and exhale rapidly through your nose, keeping your mouth closed but relaxed. Your breaths in and out should be equal in duration, but as short as possible. This is a noisy breathing exercise. - Try for three in-and-out breath cycles per second. This produces a quick movement of the diaphragm, suggesting a cherri. Breathe normally after each cycle. - Do not do for more than 15 seconds on your first try. Each time you practice the Stimulating Breath, you can increase your time by five seconds or so, until you reach a full minute. If done properly, you may feel invigorated, comparable to the heightened awareness you feel after a good workout. You should feel the effort at the back of the neck, the diaphragm, the chest and the abdomen. Try this breathing exercise the next time you need an energy boost and feel yourself reaching for a cup of coffee. Exercise 2:  The 4-7-8 (or Relaxing Breath) Exercise  This exercise is utterly simple, takes almost no time, requires no equipment and can be done anywhere. Although you can do the exercise in any position, sit with your back straight while learning the exercise. Place the tip of your tongue against the ridge of tissue just behind your upper front teeth, and keep it there through the entire exercise. You will be exhaling through your mouth around your tongue; try pursing your lips slightly if this seems awkward.  Exhale completely through your mouth, making a whoosh sound.    Close your mouth and inhale quietly through your nose to a mental count of four.  Hold your breath for a count of seven.  Exhale completely through your mouth, making a whoosh sound to a count of eight.  This is one breath. Now inhale again and repeat the cycle three more times for a total of four breaths. Note that you always inhale quietly through your nose and exhale audibly through your mouth. The tip of your tongue stays in position the whole time. Exhalation takes twice as long as inhalation. The absolute time you spend on each phase is not important; the ratio of 4:7:8 is important. If you have trouble holding your breath, speed the exercise up but keep to the ratio of 4:7:8 for the three phases. With practice you can slow it all down and get used to inhaling and exhaling more and more deeply. This exercise is a natural tranquilizer for the nervous system. Unlike tranquilizing drugs, which are often effective when you first take them but then lose their power over time, this exercise is subtle when you first try it but gains in power with repetition and practice. Do it at least twice a day. You cannot do it too frequently. Do NOT do more than 4 breaths at one time for the first month of practice. Later, if you wish, you can extend it to eight breaths. If you feel a little lightheaded when you first breathe this way, do not be concerned; it will pass. Once you develop this technique by practicing it every day, it will be a very useful tool that you will always have with you. Use it whenever anything upsetting happens - before you react. Use it whenever you are aware of internal tension. Use it to help you fall asleep. This exercise cannot be recommended too highly. Everyone can benefit from it. Exercise 3: Meditation exercise  Breath Counting  If you want to get a feel for this challenging work, try your hand at breath counting, a deceptively simple technique.   Sit in a comfortable position with the spine straight and head inclined slightly forward. Gently close your eyes and take a few deep breaths. Then let the breath come naturally without trying to influence it. Ideally it will be quiet and slow, but depth and rhythm may vary.  To begin the exercise, count \"one\" to yourself as you exhale.  The next time you exhale, count \"two,\" and so on up to Angel. \"   Then begin a new cycle, counting \"one\" on the next exhalation. Never count higher than \"five,\" and count only when you exhale. You will know your attention has wandered when you find yourself up to \"eight,\" \"12,\" even \"19. \"  Try to do 10 minutes of this form of meditation. OVERCOMING DEPRESSION    This booklet is designed to provide information about strategies for overcoming depression. It discusses a model or framework for understanding depression (the depression spiral) and presents an overview of treatment of depression, including use of medication and strategic coping strategies. The booklet is designed to be used with the assistance of your Behavioral Health Consultant. The Depression Spiral    The figure below depicts one helpful way to think about and understand depression. Our life experience (including depression) is influenced by a number of interrelated factors: our environment, biological factors, our thoughts and beliefs, our behaviors, and our emotions. Each factor can affect the others. For example, Alexandre Sheth recently began working in a fast-paced, high-pressure job (environmental factor). She began to have thoughts such as Theres no way I can get all this work done. Its impossible. If I dont get it done, I may lose my job.   As a result, she began to work longer hours, cut out all fun activities, and withdraw from family and friends (behaviors). With this decrease in many of the positive, rewarding aspects of her life, she began to feel down, depressed, and more irritable (emotions).   As the depression cycle started to take hold, she had more difficulty sleeping and concentrating (biology), which led to even more irritability and depression (emotions) and further withdrawal from activities and people (behaviors). At some point in the cycle, the balance of chemicals in her brain also began to alter (biology), which further deepened the spiral of depression. BREAKING THE DEPRESSION SPIRAL      As you can see, a variety of factors, including thoughts, behaviors, emotions, and environmental and biological factors can cause, maintain, and worsen depression. Fortunately, there are effective ways of breaking the spiral of depression. Since all the factors are interrelated (with one aspect affecting the others), even making small changes in just one or two areas can gradually lead to significant improvements in other areas. For example, Ritu Diehl noticed her worsening moods and decided to take action to break the depression spiral.  She focused first on one area that she felt would be easy to change:  her behaviors. Specifically, she wanted to make changes in how she spent her time in the evenings after work. She made a goal of spending 30 minutes each evening relaxing and doing fun activities with her family (behaviors). After several weeks, she noticed that she was beginning to feel lower levels of stress and her sleep began to improve (biological factor). Feeling more rested and better able to concentrate (biological factors) increased her belief that she could effectively manage the demands of her new job (thoughts). She noticed that she had fewer days of feeling down and depressed (emotions). Other people working to improve their depression may choose to focus initially on other areas. Some people benefit from starting an antidepressant medication (biological factor) to begin to break the depression cycle.   Others focus first on increasing regular physical exercise (a behavioral and biological factor that may help decrease

## 2019-05-09 ENCOUNTER — CARE COORDINATION (OUTPATIENT)
Dept: CARE COORDINATION | Age: 59
End: 2019-05-09

## 2019-05-09 ASSESSMENT — ENCOUNTER SYMPTOMS: DYSPNEA ASSOCIATED WITH: EXERTION

## 2019-05-09 NOTE — CARE COORDINATION
current needs  - Alerted  staff of patient's return to his home so treatment can be re-established  - Re-scheduled PCP appointment  - Reviewed upcoming appointment information  - Reviewed smoking cessation techniques         Plan of Care:   - Continue with Care Coordination follow up for continued assistance with his chronic disease management and s/p recent hospitalizations  - Complete COPD education   - Complete DM education   - Monitor for additional needs  - Monitor for readiness to discharge from Care Coordination     Care Coordination Interventions    Program Enrollment:  Complex Care  Referral from Primary Care Provider:  No  Suggested Interventions and [address]:  Completed (Comment: clint Pagan psychologist )  Diabetes Education:  Completed  Fall Risk Prevention:  Completed  Home Health Services:  Completed (Comment: current with [facility] )  Meals on Wheels:  Not Started  Medication Assistance Program:  Declined (Comment: denied any need at this time )  Medi Set or Pill Pack:  Completed (Comment:  and bora assisting with med management )  Occupational Therapy: In Process (Comment: [facility] )  Physical Therapy: In Process (Comment: [facility])  Smoking Cessation:  Completed  Social Work:  Completed (Comment: [facility] - [facility])  Other Therapy Services: In Process (Comment:  [facility] )  Transportation Support:  Completed  Zone Management Tools:  Not Started         Goals Addressed                 This Visit's Progress       Care Coordination     Conditions and Symptoms   Improving     I will schedule office visits, as directed by my provider. I will keep my appointment or reschedule if I have to cancel. I will follow my Zone Management tool to seek urgent or emergent care. I will notify my provider of any symptoms that indicate a worsening of my condition.     Barriers: lack of support, overwhelmed by complexity of regimen and lack of education  Plan for overcoming my barriers: Continued education and support   Confidence: 7/10  Anticipated Goal Completion Date: 8/1/19              Prior to Admission medications    Medication Sig Start Date End Date Taking? Authorizing Provider   oxyCODONE HCl (OXY-IR) 10 MG immediate release tablet Take 1 tablet by mouth every 8 hours as needed for Pain for up to 30 days. 5/6/19 6/5/19  YAQUELIN Hernandez CNP   morphine (MS CONTIN) 30 MG extended release tablet Take 1 tablet by mouth every 12 hours for 30 days. 4/24/19 5/24/19  YAQUELIN Hernandez CNP   busPIRone (BUSPAR) 10 MG tablet Take 1 tablet by mouth 2 times daily 4/23/19 5/23/19  Lee Ann Waters MD   albuterol sulfate  (90 Base) MCG/ACT inhaler Inhale 2 puffs into the lungs every 6 hours as needed for Wheezing 4/22/19   Lee Ann Waters MD   hydrOXYzine (ATARAX) 50 MG tablet Take 1-2 tablets nightly prn 4/22/19   Lee Ann Waters MD   glucose monitoring kit (FREESTYLE) monitoring kit 1 kit by Does not apply route daily 4/15/19   Lee Ann Waters MD   atorvastatin (LIPITOR) 40 MG tablet Take 1 tablet by mouth nightly 4/12/19   Cesar Baird DO   nicotine (NICODERM CQ) 21 MG/24HR Place 1 patch onto the skin daily 4/13/19   Cesar Baird DO   apixaban (ELIQUIS) 5 MG TABS tablet Take 1 tablet by mouth 2 times daily 4/12/19   Cesar Baird DO   lidocaine-prilocaine (EMLA) 2.5-2.5 % cream Apply topically as needed. 3/25/19   Danny Valdez MD   clonazePAM (KLONOPIN) 1 MG tablet Take 1 tablet by mouth 2 times daily as needed for Anxiety for up to 60 doses.  3/11/19   Lee Ann Waters MD   sertraline (ZOLOFT) 100 MG tablet Take 2 tablets by mouth daily 1/29/19   Lee Ann Waters MD   naloxone [facility]) 4 MG/0.1ML LIQD nasal spray 1 spray by Nasal route as needed for Opioid Reversal 12/24/18   Debra Myers APRN - CNP   docusate sodium (COLACE) 100 MG capsule Take 100 mg by mouth 3 times daily as needed for Constipation    Historical Provider, MD   lactulose ([facility]) 10 GM/15ML solution Take 30 mLs by mouth 2 times daily as needed (constipation) 8/23/18   YAQUELIN Carbajal CNP       Future Appointments   Date Time Provider Richard Bournei   5/10/2019 11:30 AM SCHEDULE, HEART SPECIALISTS [facility] Heart [address]   5/13/2019  1:30 PM Dmitry Barragan [address] [place]   5/16/2019  1:45 PM Black Gama MD Oncology [address]   5/24/2019  4:00 PM Anastasiya Fabian MD ENT [address]   6/3/2019  2:30 PM YAQUELIN Carbajal CNP [facility] Pain [address]   6/11/2019  2:00 PM Poly Salazar PSY.D [address]   8/5/2019 11:45 AM Jitendra Azul MD [address]   8/7/2019  9:45 AM Varsha Lopez MD [address]   9/27/2019 11:00 AM Black Gama MD Oncology [address]     ,   Diabetes Assessment    Meal Planning:  Avoidance of concentrated sweets   How often do you test your blood sugar?:  Meals, Daily   Do you have barriers with adherence to non-pharmacologic self-management interventions?  (Nutrition/Exercise/Self-Monitoring):  No   Have you ever had to go to the ED for symptoms of low blood sugar?:  No       No patient-reported symptoms   Do you have hyperglycemia symptoms?:  No   Do you have hypoglycemia symptoms?:  No   Last Blood Sugar Value:  87   Blood Sugar Monitoring Regimen:  Before Meals, Morning Fasting   Blood Sugar Trends:  No Change      ,   COPD Assessment    Is the patient able to verbalize Rescue vs. Long Acting medications?:  No  Does the patient have a nebulizer?:  No  Does the patient use a space with inhaled medications?:  No     Shortness of breath (worse than baseline)         Symptoms:      Symptom course:  no change  Breathlessness:  exertion  Increase use of rapid acting/rescue inhaled medications?:  No  Change in chronic cough?:  No/At Baseline  Change in sputum?:  No/At Baseline     ,   General Assessment    Do you have any symptoms that are causing concern?:  Yes  Progression since Onset:  Unchanged  Reported Symptoms:  Shortness of Breath, Other (Comment: anxiety )      and Care Coordination Episodes    Type: Amb Care Coordination  Episode: Complex Care  Noted: 4/29/2019  Comments: CTC and DM edu

## 2019-05-10 ENCOUNTER — TELEPHONE (OUTPATIENT)
Dept: CARDIOLOGY CLINIC | Age: 59
End: 2019-05-10

## 2019-05-10 ENCOUNTER — APPOINTMENT (OUTPATIENT)
Dept: GENERAL RADIOLOGY | Age: 59
End: 2019-05-10
Payer: MEDICARE

## 2019-05-10 ENCOUNTER — APPOINTMENT (OUTPATIENT)
Dept: CT IMAGING | Age: 59
End: 2019-05-10
Payer: MEDICARE

## 2019-05-10 ENCOUNTER — HOSPITAL ENCOUNTER (EMERGENCY)
Age: 59
Discharge: HOME | End: 2019-05-10
Attending: EMERGENCY MEDICINE
Payer: MEDICARE

## 2019-05-10 VITALS
HEIGHT: 72 IN | WEIGHT: 174 LBS | BODY MASS INDEX: 23.57 KG/M2 | HEART RATE: 58 BPM | DIASTOLIC BLOOD PRESSURE: 73 MMHG | SYSTOLIC BLOOD PRESSURE: 129 MMHG | OXYGEN SATURATION: 100 % | TEMPERATURE: 98.6 F | RESPIRATION RATE: 20 BRPM

## 2019-05-10 DIAGNOSIS — R53.1 GENERAL WEAKNESS: Primary | ICD-10-CM

## 2019-05-10 LAB
ALBUMIN SERPL-MCNC: 3.6 G/DL (ref 3.5–5.1)
ALP BLD-CCNC: 79 U/L (ref 38–126)
ALT SERPL-CCNC: 9 U/L (ref 11–66)
AMMONIA: 34 UMOL/L (ref 11–60)
ANION GAP SERPL CALCULATED.3IONS-SCNC: 12 MEQ/L (ref 8–16)
APTT: 36.7 SECONDS (ref 22–38)
AST SERPL-CCNC: 16 U/L (ref 5–40)
BACTERIA: ABNORMAL /HPF
BASOPHILS # BLD: 0.6 %
BASOPHILS ABSOLUTE: 0 THOU/MM3 (ref 0–0.1)
BILIRUB SERPL-MCNC: 0.5 MG/DL (ref 0.3–1.2)
BILIRUBIN DIRECT: < 0.2 MG/DL (ref 0–0.3)
BILIRUBIN URINE: NEGATIVE
BLOOD, URINE: NEGATIVE
BUN BLDV-MCNC: 20 MG/DL (ref 7–22)
CALCIUM SERPL-MCNC: 9.3 MG/DL (ref 8.5–10.5)
CASTS 2: ABNORMAL /LPF
CASTS UA: ABNORMAL /LPF
CHARACTER, URINE: ABNORMAL
CHLORIDE BLD-SCNC: 102 MEQ/L (ref 98–111)
CO2: 27 MEQ/L (ref 23–33)
COLOR: YELLOW
CREAT SERPL-MCNC: 0.7 MG/DL (ref 0.4–1.2)
CRYSTALS, UA: ABNORMAL
EKG ATRIAL RATE: 66 BPM
EKG P AXIS: 73 DEGREES
EKG P-R INTERVAL: 150 MS
EKG Q-T INTERVAL: 418 MS
EKG QRS DURATION: 88 MS
EKG QTC CALCULATION (BAZETT): 438 MS
EKG R AXIS: -36 DEGREES
EKG T AXIS: -71 DEGREES
EKG VENTRICULAR RATE: 66 BPM
EOSINOPHIL # BLD: 1.3 %
EOSINOPHILS ABSOLUTE: 0.1 THOU/MM3 (ref 0–0.4)
EPITHELIAL CELLS, UA: ABNORMAL /HPF
ERYTHROCYTE [DISTWIDTH] IN BLOOD BY AUTOMATED COUNT: 13.8 % (ref 11.5–14.5)
ERYTHROCYTE [DISTWIDTH] IN BLOOD BY AUTOMATED COUNT: 13.9 % (ref 11.5–14.5)
ERYTHROCYTE [DISTWIDTH] IN BLOOD BY AUTOMATED COUNT: 44.7 FL (ref 35–45)
ERYTHROCYTE [DISTWIDTH] IN BLOOD BY AUTOMATED COUNT: 45 FL (ref 35–45)
ETHYL ALCOHOL, SERUM: < 0.01 %
GFR SERPL CREATININE-BSD FRML MDRD: > 90 ML/MIN/1.73M2
GLUCOSE BLD-MCNC: 108 MG/DL (ref 70–108)
GLUCOSE BLD-MCNC: 97 MG/DL (ref 70–108)
GLUCOSE URINE: NEGATIVE MG/DL
HCT VFR BLD CALC: 40.6 % (ref 42–52)
HCT VFR BLD CALC: 40.9 % (ref 42–52)
HEMOGLOBIN: 13.7 GM/DL (ref 14–18)
HEMOGLOBIN: 13.8 GM/DL (ref 14–18)
IMMATURE GRANS (ABS): 0.03 THOU/MM3 (ref 0–0.07)
IMMATURE GRANULOCYTES: 0.4 %
INR BLD: 1.16 (ref 0.85–1.13)
INR BLD: 1.26 (ref 0.85–1.13)
KETONES, URINE: NEGATIVE
LEUKOCYTE ESTERASE, URINE: ABNORMAL
LYMPHOCYTES # BLD: 13.6 %
LYMPHOCYTES ABSOLUTE: 0.9 THOU/MM3 (ref 1–4.8)
MCH RBC QN AUTO: 30 PG (ref 26–33)
MCH RBC QN AUTO: 30.1 PG (ref 26–33)
MCHC RBC AUTO-ENTMCNC: 33.5 GM/DL (ref 32.2–35.5)
MCHC RBC AUTO-ENTMCNC: 34 GM/DL (ref 32.2–35.5)
MCV RBC AUTO: 88.6 FL (ref 80–94)
MCV RBC AUTO: 89.7 FL (ref 80–94)
MISCELLANEOUS 2: ABNORMAL
MONOCYTES # BLD: 8.4 %
MONOCYTES ABSOLUTE: 0.6 THOU/MM3 (ref 0.4–1.3)
NITRITE, URINE: NEGATIVE
NUCLEATED RED BLOOD CELLS: 0 /100 WBC
OSMOLALITY CALCULATION: 283.8 MOSMOL/KG (ref 275–300)
PH UA: 7.5 (ref 5–9)
PLATELET # BLD: 147 THOU/MM3 (ref 130–400)
PMV BLD AUTO: 10.4 FL (ref 9.4–12.4)
POTASSIUM SERPL-SCNC: 3.9 MEQ/L (ref 3.5–5.2)
PRO-BNP: 1353 PG/ML (ref 0–900)
PROTEIN UA: NEGATIVE
RBC # BLD: 4.56 MILL/MM3 (ref 4.7–6.1)
RBC # BLD: 4.58 MILL/MM3 (ref 4.7–6.1)
RBC URINE: ABNORMAL /HPF
REASON FOR REJECTION: NORMAL
REASON FOR REJECTION: NORMAL
REJECTED TEST: NORMAL
REJECTED TEST: NORMAL
RENAL EPITHELIAL, UA: ABNORMAL
SEG NEUTROPHILS: 75.7 %
SEGMENTED NEUTROPHILS ABSOLUTE COUNT: 5.1 THOU/MM3 (ref 1.8–7.7)
SODIUM BLD-SCNC: 141 MEQ/L (ref 135–145)
SPECIFIC GRAVITY, URINE: 1.01 (ref 1–1.03)
TOTAL PROTEIN: 5.9 G/DL (ref 6.1–8)
TROPONIN T: < 0.01 NG/ML
UROBILINOGEN, URINE: 1 EU/DL (ref 0–1)
WBC # BLD: 6.4 THOU/MM3 (ref 4.8–10.8)
WBC # BLD: 6.8 THOU/MM3 (ref 4.8–10.8)
WBC UA: ABNORMAL /HPF
YEAST: ABNORMAL

## 2019-05-10 PROCEDURE — 36415 COLL VENOUS BLD VENIPUNCTURE: CPT

## 2019-05-10 PROCEDURE — 81001 URINALYSIS AUTO W/SCOPE: CPT

## 2019-05-10 PROCEDURE — 82140 ASSAY OF AMMONIA: CPT

## 2019-05-10 PROCEDURE — 93010 ELECTROCARDIOGRAM REPORT: CPT | Performed by: NUCLEAR MEDICINE

## 2019-05-10 PROCEDURE — 85025 COMPLETE CBC W/AUTO DIFF WBC: CPT

## 2019-05-10 PROCEDURE — 82248 BILIRUBIN DIRECT: CPT

## 2019-05-10 PROCEDURE — 84484 ASSAY OF TROPONIN QUANT: CPT

## 2019-05-10 PROCEDURE — 2709999900 HC NON-CHARGEABLE SUPPLY

## 2019-05-10 PROCEDURE — 99284 EMERGENCY DEPT VISIT MOD MDM: CPT

## 2019-05-10 PROCEDURE — 85730 THROMBOPLASTIN TIME PARTIAL: CPT

## 2019-05-10 PROCEDURE — 82948 REAGENT STRIP/BLOOD GLUCOSE: CPT

## 2019-05-10 PROCEDURE — 70450 CT HEAD/BRAIN W/O DYE: CPT

## 2019-05-10 PROCEDURE — 85610 PROTHROMBIN TIME: CPT

## 2019-05-10 PROCEDURE — 83880 ASSAY OF NATRIURETIC PEPTIDE: CPT

## 2019-05-10 PROCEDURE — 71045 X-RAY EXAM CHEST 1 VIEW: CPT

## 2019-05-10 PROCEDURE — 87086 URINE CULTURE/COLONY COUNT: CPT

## 2019-05-10 PROCEDURE — 80053 COMPREHEN METABOLIC PANEL: CPT

## 2019-05-10 PROCEDURE — G0480 DRUG TEST DEF 1-7 CLASSES: HCPCS

## 2019-05-10 PROCEDURE — 93005 ELECTROCARDIOGRAM TRACING: CPT | Performed by: EMERGENCY MEDICINE

## 2019-05-10 NOTE — ED NOTES
Patient updated on plan of care. Call light in reach. resp reg. No further questions or concerns.        Loreta Bashir RN  05/10/19 [id number]

## 2019-05-10 NOTE — ED TRIAGE NOTES
Patient to ED because he feels like he is having a stroke. Patient complaints of dizziness and nausea. Patient also says he is weak.

## 2019-05-10 NOTE — ED PROVIDER NOTES
[facility]  eMERGENCY dEPARTMENT eNCOUnter          [address] [facility]       Chief Complaint   Patient presents with    Fatigue    Dizziness    Nausea       Nurses Notes reviewed and I agree except as noted in the HPI. HISTORY OF PRESENT ILLNESS    Yamel Couch is a 62 y.o. male who presents to the ED for evaluation of fatigue and nausea. Patient was admitted to [address] [place] [address] on 04/17/19 for dizziness with a history of previous CVA. He states that since he was discharged on 04/18/19, he has been \"confused. \"  but was fully alert and able to give a complete history. He complains of generalized weakness and nausea. He has a history of tonsillar cancer and states that he finished treatment. He is followed by Dr. Tyrel Rendon (oncologist). He is currently taking Eliquis. Patient denies any further complaints at initial encounter. REVIEW OF SYSTEMS     Constitutional: no fever or chills  Eyes: no visual changes  ENT: no sore throat  Respiratory: no dyspnea  Cardiovascular: no chest pain  Gastrointestinal : no abdominal pain       Remainder of review of systems is otherwise reviewed as negative. PAST MEDICAL HISTORY    has a past medical history of Anxiety, Bleeds easily ([address] Bruises easily, Cerebrovascular accident (CVA) due to embolism of right posterior cerebral artery ([address] Cigarette nicotine dependence without complication, COPD (chronic obstructive pulmonary disease) ([address] DVT (deep venous thrombosis) ([address] Enlarged lymph node, History of emotional problems, History of sleep apnea, Hypertension, Moderate episode of recurrent major depressive disorder ([address] NSTEMI (non-ST elevated myocardial infarction) ([address] Pulmonary embolism and infarction ([address] S/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     SURGICAL HISTORY has a past surgical history that includes knee surgery (Right, 1980's); Toe Surgery (Right, 2013); other surgical history (7-7-09); hip surgery (Right, 06/30/2014); Tunneled venous port placement; pr office/outpt visit,procedure only (Right, 10/16/2018); Cardiac catheterization (04/10/2019); and transesophageal echocardiogram (N/A, 4/11/2019). CURRENT MEDICATIONS       Discharge Medication List as of 5/10/2019  3:07 PM      CONTINUE these medications which have NOT CHANGED    Details   oxyCODONE HCl (OXY-IR) 10 MG immediate release tablet Take 1 tablet by mouth every 8 hours as needed for Pain for up to 30 days. , Disp-90 tablet, R-0Normal      morphine (MS CONTIN) 30 MG extended release tablet Take 1 tablet by mouth every 12 hours for 30 days. , Disp-60 tablet, R-0Normal      busPIRone (BUSPAR) 10 MG tablet Take 1 tablet by mouth 2 times daily, Disp-60 tablet, R-2Normal      albuterol sulfate  (90 Base) MCG/ACT inhaler Inhale 2 puffs into the lungs every 6 hours as needed for Wheezing, Disp-1 Inhaler, R-2Normal      hydrOXYzine (ATARAX) 50 MG tablet Take 1-2 tablets nightly prn, Disp-60 tablet, R-2Normal      glucose monitoring kit (FREESTYLE) monitoring kit DAILY Starting Mon 4/15/2019, Disp-1 kit, R-0, Normal      atorvastatin (LIPITOR) 40 MG tablet Take 1 tablet by mouth nightly, Disp-30 tablet, R-3Normal      nicotine (NICODERM CQ) 21 MG/24HR Place 1 patch onto the skin daily, Disp-30 patch, R-3Normal      apixaban (ELIQUIS) 5 MG TABS tablet Take 1 tablet by mouth 2 times daily, Disp-60 tablet, R-1Normal      lidocaine-prilocaine (EMLA) 2.5-2.5 % cream Apply topically as needed. , Disp-1 Tube, R-3, Normal      clonazePAM (KLONOPIN) 1 MG tablet Take 1 tablet by mouth 2 times daily as needed for Anxiety for up to 60 doses. , Disp-60 tablet, R-2Normal      sertraline (ZOLOFT) 100 MG tablet Take 2 tablets by mouth daily, Disp-60 tablet, R-2Normal      naloxone (NARCAN) 4 MG/0.1ML LIQD nasal spray 1 spray by Nasal route as needed for Opioid Reversal, Disp-1 each, R-0Normal      docusate sodium (COLACE) 100 MG capsule Take 100 mg by mouth 3 times daily as needed for ConstipationHistorical Med      lactulose (CHRONULAC) 10 GM/15ML solution Take 30 mLs by mouth 2 times daily as needed (constipation), Disp-1800 mL, R-0Normal             ALLERGIES     is allergic to latex; coumadin [warfarin sodium]; and tape [adhesive tape]. FAMILY HISTORY     indicated that his mother is . He indicated that his father is . He indicated that his maternal grandmother is . He indicated that his maternal grandfather is . He indicated that his paternal grandmother is . He indicated that his paternal grandfather is . family history includes Cancer in his mother. SOCIAL HISTORY      reports that he has been smoking cigarettes. He has a 6.00 pack-year smoking history. He has never used smokeless tobacco. He reports that he does not drink alcohol or use drugs. PHYSICAL EXAM     INITIAL VITALS:  height is 6' (1.829 m) and weight is 174 lb (78.9 kg). His oral temperature is 98.6 °F (37 °C). His blood pressure is 129/73 and his pulse is 58. His respiration is 20 and oxygen saturation is 100%. Constitutional:  non-toxic   Eyes:  Pupils are equal and reactive, extraocular muscles intact   HENT:  Atraumatic appearing   oropharynx moist, no pharyngeal exudates. Neck- normal range of motion,  supple   Respiratory:  No wheezing, rhonchi or rales  Cardiovascular: regular, no murmur  GI:  Non tender, no rigidity, rebound or guarding  Musculoskeletal:  2/4 distal pulses, no pitting edema  Integument: warm and dryd   Neurologic:  Alert & oriented x 3, patient has equal strength in the upper and lower extremities bilaterally and can resist gravity with no deficit. No new facial droop. No difficulty with swallowing. Tongue in the midline.   Psychiatric:  Speech and behavior appropriate DEPARTMENT COURSE:   Vitals:    Vitals:    05/10/19 1156 05/10/19 1248 05/10/19 1334 05/10/19 1434   BP:  129/77 129/73    Pulse: 60 60 60 58   Resp: 20 20 20 20   Temp: 98.6 °F (37 °C)      TempSrc: Oral      SpO2: 98% 100% 100% 100%   Weight: 174 lb (78.9 kg)      Height: 6' (1.829 m)        *The patient presented complaining of \"having another stroke\" has no focal symptoms to suggest a CVA at all. He is complaining of generalized weakness and fatigue. He later stated that he felt that there may be a component of anxiety. He thinks he may have \"overdone it this week. \"  He has no new deficits on exam and has a negative CT scan of the head. He will be discharged to follow-up on an outpatient basis. CRITICAL CARE:   none         FINAL IMPRESSION      1. General weakness          DISPOSITION/PLAN   Discharged    DISCHARGE MEDICATIONS:  Discharge Medication List as of 5/10/2019  3:07 PM          (Please note that portions of this note were completed with a voice recognition program.  Efforts were made to edit the dictations but occasionally words are mis-transcribed.)    Marcos Leal DO    Scribe:  Kiya Mcintyre 5/10/19 11:56 AM Scribing for and in the presence of Marcos Leal DO.    [unfilled]    Provider:  I personally performed the services described in the documentation, reviewed and edited the documentation which was dictated to the scribe in my presence, and it accurately records my words and actions.     Marcos Leal DO 05/10/19 5:37 PM        Marcos Leal DO  05/10/19 [id number]

## 2019-05-10 NOTE — TELEPHONE ENCOUNTER
GOT CALLED TO THE  PER STAFF. THIS PT PRESENTED IN OUR LOBBY WAITING TO HAVE DON TESTING. PT INFORMS ME THAT HE FEELS LIKE HE IS HAVING A STROKE. ASKED PT WHAT HIS SYMPTOMS WERE. PT SAID HE IS VERY CONFUSED AND HIS BACK HURTS AND HE HAS A BAD HEADACHE. HE SAID HS IS HAVING THE SAME SYMPTOMS THAT HE HAD BEFORE WITH HIS STROKE. NO DYSPHAGIA NOTED, NO FACIAL DROOPING  NOTED. DID GET A CHAIR AND TOOK PT TO ED ASAP. FROM THERE THEY TOOK HIM TO CAT SCAN. BELONGINGS BROUGHT DOWN WITH THE PT. PT ASLO BROUGHT HIS [employer] MONITOR WITH HIM AND INFORMS THAT IT DOES NOT WORK ANYMORE. STATES THAT IT GOT WET IN THE FLOOD. HE IS ASKING ME TO SEND IT BACK AND HAVE THEM SEND HIM A NEW ONE. SINCE I TOOK PT TO ED, IT SENT MONITOR BACK AND I CALLED [employer] AND SPOKE TO GORDO AND I TOLD HIM THAT I AM SENDING THE OLD MONITOR BACK AND THEY WILL GO AHEAD AND SEND HIM A NEW ONE. I GAVE HIM THE PTS ADDRESS AND HIS PHONE NUMBER .  I CALLED THE PT TO LET HIM KNOW THAT HE WILL BE GETTING A NEW MONITOR AND HE SHOULD RECEIVE IT IN ABOUT 7-10 BUSINESS DAYS AND TO PLUG IT IN AND LET IT CHARGE FOR AN HOUR AND THEN SEND A DOWNLOAD

## 2019-05-10 NOTE — ED NOTES
Patient updated on plan of care, awaiting results. No further questions or concerns. Konrad light in reach.      Geofm Closs, RN  05/10/19 [address] [facility], RN  05/10/19 8963

## 2019-05-10 NOTE — CARE COORDINATION
ED Care Transition    5/10/2019    Patient Name: Graham Thompson   : 1960  MRN: [id number]    MIRANDA Score: 2  PCP: Ronald Hines MD   Specialist: yes - Dr. Wendi Montoya, Dr. Eleonora Slater, Pain Management, Dr. Barby Crowell, Dr. Pradeep Keyes ACC/CTC: yes - ACC - Elinor Justice                        Utilization Review:  ED visits: 2  5/10/19 - Current   18 - CHF, skin irritation     Admissions: 3  19-19 - recrudesence stroke symptoms from complicated UTI     77- - Large right occipaital CVA, NSTEMI, PE    10/22/18-10/23/18- PE     Problem List:  Patient Active Problem List   Diagnosis    Mass in neck    Hip pain    Fracture of metacarpal bone    Type 2 diabetes mellitus without complication, without long-term current use of insulin ([address]    Generalized anxiety disorder    Moderate episode of recurrent major depressive disorder ([address]    Malignant neoplasm of tonsil ([address]    Squamous cell carcinoma of left tonsil ([address]    Tongue carcinoma ([address]    Encounter for antineoplastic chemotherapy    Cigarette nicotine dependence without complication    Unstageable pressure ulcer of right foot ([address]    Pulmonary embolism and infarction ([address]    Severe malnutrition ([address]    Severe protein-calorie malnutrition ([address]    NSTEMI (non-ST elevated myocardial infarction) ([address]    Cerebrovascular accident (CVA) due to embolism of right posterior cerebral artery ([address]    Tonsil cancer ([address]    Left homonymous hemianopsia due to recent cerebral infarction    Stroke-like symptom    Nausea & vomiting    SOB (shortness of breath)    Troponin level elevated    Severe malnutrition ([address]    History of stroke    Acute cystitis without hematuria    Dizziness    [facility])     Summary:  Met with Damir Viera, introduced self/role. Presented to ED for fatigue, dizziness, nausea, weakness, patient feels like he is having a stroke.      1228 updated ED provider on history, recent hospitalization, services in place, and PCP appointment on Monday. Patient resides at home alone, niece lives close and assists with cooking, cleaning, medications, transportation or any other needs. Current with [facility], Aide, PT/OT. Following with Dietician. [address] bed, uses cane/walker, shower chair. Also following with Psych, reports increased anxiety, on klonopin/zoloft, recently added buspar. Takes Oxycodone/morphine for pain. Current with [facility]. Plan is for discharge. Patient to keep PCP appointment on Monday at 1330.    Follow up appointments:   Future Appointments   Date Time Provider Richard Cortes   5/13/2019  1:30 PM Sushil Contreras   5/16/2019  1:45 PM Tara Mera MD Oncology [facility] [place]   5/24/2019  4:00 PM Anu Reyes MD [facility] [place]   6/3/2019  2:30 PM YAQUELIN Alexandre - CNP SRPX Pain [facility] [place]   6/11/2019  2:00 PM BRIDGETT Santos.LESLIE [address] [facility] [place]   8/5/2019 11:45 AM Kala Sanford MD [address] [facility] [place]   8/7/2019  9:45 AM Janis Davis MD [facility] Med [facility] [place]   9/27/2019 11:00 AM Tara Mera MD Oncology P - [place]       Review Due Health Maintenance:  Health Maintenance Due   Topic Date Due    Hepatitis C screen  1960    Diabetic retinal exam  03/31/1968    HIV screen  03/31/1973    DTaP/Tdap/Td vaccine (1 - Tdap) 03/31/1977    Shingles Vaccine (1 of 2) 03/31/2008    Colon cancer screen colonoscopy  03/31/2008     Lord Heather TALAMANTES, BSN  [phone]  [phone]

## 2019-05-11 LAB
ORGANISM: ABNORMAL
URINE CULTURE REFLEX: ABNORMAL

## 2019-05-13 ENCOUNTER — TELEPHONE (OUTPATIENT)
Dept: FAMILY MEDICINE CLINIC | Age: 59
End: 2019-05-13

## 2019-05-13 ENCOUNTER — OFFICE VISIT (OUTPATIENT)
Dept: FAMILY MEDICINE CLINIC | Age: 59
End: 2019-05-13
Payer: MEDICARE

## 2019-05-13 ENCOUNTER — PROCEDURE VISIT (OUTPATIENT)
Dept: CARDIOLOGY CLINIC | Age: 59
End: 2019-05-13
Payer: MEDICARE

## 2019-05-13 ENCOUNTER — CARE COORDINATION (OUTPATIENT)
Dept: CARE COORDINATION | Age: 59
End: 2019-05-13

## 2019-05-13 VITALS
DIASTOLIC BLOOD PRESSURE: 60 MMHG | BODY MASS INDEX: 24.66 KG/M2 | OXYGEN SATURATION: 97 % | SYSTOLIC BLOOD PRESSURE: 110 MMHG | HEART RATE: 96 BPM | RESPIRATION RATE: 20 BRPM | WEIGHT: 181.8 LBS

## 2019-05-13 DIAGNOSIS — F41.1 GENERALIZED ANXIETY DISORDER: ICD-10-CM

## 2019-05-13 DIAGNOSIS — I63.431 CEREBROVASCULAR ACCIDENT (CVA) DUE TO EMBOLISM OF RIGHT POSTERIOR CEREBRAL ARTERY (HCC): Primary | ICD-10-CM

## 2019-05-13 DIAGNOSIS — R13.10 DYSPHAGIA, UNSPECIFIED TYPE: Primary | ICD-10-CM

## 2019-05-13 PROCEDURE — 99214 OFFICE O/P EST MOD 30 MIN: CPT | Performed by: NURSE PRACTITIONER

## 2019-05-13 PROCEDURE — 93298 REM INTERROG DEV EVAL SCRMS: CPT | Performed by: INTERNAL MEDICINE

## 2019-05-13 PROCEDURE — 93299 PR REM INTERROG ICPMS/SCRMS <30 D TECH REVIEW: CPT | Performed by: INTERNAL MEDICINE

## 2019-05-13 RX ORDER — BUSPIRONE HYDROCHLORIDE 10 MG/1
10 TABLET ORAL 3 TIMES DAILY
Qty: 90 TABLET | Refills: 2 | Status: SHIPPED | OUTPATIENT
Start: 2019-05-13 | End: 2019-06-12

## 2019-05-13 ASSESSMENT — ENCOUNTER SYMPTOMS
COLOR CHANGE: 0
ABDOMINAL PAIN: 0
DIARRHEA: 0
VOMITING: 0
SHORTNESS OF BREATH: 0
NAUSEA: 0

## 2019-05-13 NOTE — PROGRESS NOTES
Constitutional: Negative for chills and fever. Respiratory: Negative for shortness of breath. Cardiovascular: Negative for chest pain. Gastrointestinal: Negative for abdominal pain, diarrhea, nausea and vomiting. Genitourinary: Negative for difficulty urinating. Skin: Negative for color change and rash. Neurological: Negative for dizziness, light-headedness and headaches. Psychiatric/Behavioral: Positive for sleep disturbance. Negative for behavioral problems, confusion, decreased concentration, dysphoric mood, self-injury and suicidal ideas. The patient is nervous/anxious. The patient is not hyperactive. Objective:     Physical Exam   Constitutional: He is oriented to person, place, and time. He appears well-developed and well-nourished. HENT:   Head: Normocephalic. Right Ear: External ear normal.   Left Ear: External ear normal.   Neck: Normal range of motion. Neck supple. Cardiovascular: Normal rate. Pulmonary/Chest: Effort normal.   Abdominal: Soft. Musculoskeletal: Normal range of motion. Neurological: He is alert and oriented to person, place, and time. Skin: Skin is warm and dry. Psychiatric: His speech is normal and behavior is normal. Judgment and thought content normal. His mood appears anxious. Cognition and memory are normal. He does not exhibit a depressed mood. He expresses no homicidal and no suicidal ideation. He expresses no suicidal plans and no homicidal plans. /60 (Site: Right Upper Arm, Position: Sitting)   Pulse 96   Resp 20   Wt 181 lb 12.8 oz (82.5 kg)   SpO2 97%   BMI 24.66 kg/m²     Assessment:       Diagnosis Orders   1. Generalized anxiety disorder  busPIRone (BUSPAR) 10 MG tablet       Plan:     Long discussion regarding unable to change klonopin dose do to opioid use as well. This could cause respirator depression. At this time will increase Buspar to 10 mg TID. Return if no better or worse or as scheduled.     Discussed use, benefit, and side effects of prescribed medications. Barriers tomedication compliance addressed. All patient questions answered. Pt voiced understanding. No orders of the defined types were placed in this encounter. Orders Placed This Encounter   Medications    busPIRone (BUSPAR) 10 MG tablet     Sig: Take 1 tablet by mouth 3 times daily     Dispense:  90 tablet     Refill:  2           Patient given educational materials - see patient instructions. Discussed use, benefit, and sideeffects of prescribed medications. All patient questions answered. Pt voiced understanding. Reviewed health maintenance. Instructed to continue current medications, diet and exercise. Patient agreed with treatment plan. Follow up as directed.      Electronically signed by YAQUELIN Colón CNP on 5/13/2019 at 4:03 PM

## 2019-05-13 NOTE — TELEPHONE ENCOUNTER
Robert Clemons called back stating she doesn't handle any testing for pts and we need to call [facility] [phone].

## 2019-05-13 NOTE — TELEPHONE ENCOUNTER
Called pts  for [facility] to let her know that Dr Hali Mortensen didn't order the Modified Barium Swallow and the they will need to contact Augusta Mahoney to have her order the test. Told Micky Falcon to contact our officer so that we know this has been addressed.      Amie Yan [phone]

## 2019-05-13 NOTE — TELEPHONE ENCOUNTER
Pt never told me at todays visit. He should come back in so I can check him out closer. May need CT neck and head.

## 2019-05-13 NOTE — TELEPHONE ENCOUNTER
Pt had a Modified Barium Swallow for Dyphasia scheduled but had to cancel and is now ready to have done. Please set up internally for [facility] and they will get it out of workqueue and contact pt to set up.

## 2019-05-13 NOTE — CARE COORDINATION
Ambulatory Care Coordination Note  5/13/2019  CM Risk Score: 10  Johnna Mortality Risk Score:      ACC: Nhung Xiao RN    Summary Note: [address] met with patient briefly prior to his PCP office visit this afternoon. Patient shared he \"remains about the same. \"  Patient denied any recent change in his breathing or blood sugars. COPD education was reviewed with patient. RNCC provided patient with a copy of COPD zone sheet and reviewed the information in detail. Patient was encouraged to keep the sheet handy and to refer to it often as a reminded of what to call and report. Patient acknowledged understanding. Patient stated [facility] staff have reached out to him and they are scheduled to see him this week, but he is uncertain as to which date. Patient shared he continues to feel he has trouble with anxiety and memory and he was encouraged to discuss this with provider during his appointment. Patient verbalized understanding. Patient denied any other questions, concerns, or needs and he was encouraged to call with any that may develop. Actions:   - Reviewed COPD education   - Reviewed zone education sheet and provided patient with a copy  - Verified [facility] has been in contact with patient  - Monitored for additional needs          Plan of Care:   - Continue with Care Coordination follow up for continued assistance with his chronic disease management and s/p recent hospitalizations  - Complete COPD education   - Complete DM education   - Monitor for additional needs                 - Monitor for readiness to discharge from [place] [address] Coordination Interventions    Program Enrollment:  Complex Care  Referral from Primary Care Provider:  No  Suggested Interventions and [address] [place] [address]:  Completed (Comment: clint Pagan psychologist )  Diabetes Education:  Completed  Fall Risk Prevention:  Completed  Home Health Services:  Completed (Comment: current with [facility] )  Meals

## 2019-05-13 NOTE — TELEPHONE ENCOUNTER
CHELO: Home health called wanting JR to know that the patient fell and hit his head last night. The patient told them that he told rEis Alas at his visit today, but they just wanted to make sure.

## 2019-05-15 ENCOUNTER — TELEPHONE (OUTPATIENT)
Dept: FAMILY MEDICINE CLINIC | Age: 59
End: 2019-05-15

## 2019-05-15 ENCOUNTER — PROCEDURE VISIT (OUTPATIENT)
Dept: CARDIOLOGY CLINIC | Age: 59
End: 2019-05-15

## 2019-05-15 DIAGNOSIS — I63.431 CEREBROVASCULAR ACCIDENT (CVA) DUE TO EMBOLISM OF RIGHT POSTERIOR CEREBRAL ARTERY (HCC): Primary | ICD-10-CM

## 2019-05-15 DIAGNOSIS — C09.9 SQUAMOUS CELL CARCINOMA OF LEFT TONSIL (HCC): Primary | ICD-10-CM

## 2019-05-15 RX ORDER — SODIUM CHLORIDE 0.9 % (FLUSH) 0.9 %
10 SYRINGE (ML) INJECTION PRN
Status: CANCELLED | OUTPATIENT
Start: 2019-05-15

## 2019-05-15 NOTE — TELEPHONE ENCOUNTER
Please call patient to ensure he is feeling ok with his bp that lwo.   He should drink plenty of fluids to help increase his bp.  thanks

## 2019-05-15 NOTE — TELEPHONE ENCOUNTER
FYI: We received a call from Darci Canseco with [facility] occupational therapy stating that today at her visit pts BP was 82/48 left arm and 84/50 right arm. Darci Canseco just wanted to let our office know.     If any questions call Darci Canseco at [phone]

## 2019-05-16 ENCOUNTER — OFFICE VISIT (OUTPATIENT)
Dept: ONCOLOGY | Age: 59
End: 2019-05-16
Payer: MEDICARE

## 2019-05-16 ENCOUNTER — HOSPITAL ENCOUNTER (OUTPATIENT)
Dept: INFUSION THERAPY | Age: 59
Discharge: HOME | End: 2019-05-16
Payer: MEDICARE

## 2019-05-16 VITALS
DIASTOLIC BLOOD PRESSURE: 56 MMHG | WEIGHT: 179.6 LBS | HEART RATE: 67 BPM | BODY MASS INDEX: 24.33 KG/M2 | HEIGHT: 72 IN | OXYGEN SATURATION: 98 % | TEMPERATURE: 97.8 F | SYSTOLIC BLOOD PRESSURE: 110 MMHG | RESPIRATION RATE: 18 BRPM

## 2019-05-16 VITALS
TEMPERATURE: 97.8 F | HEART RATE: 67 BPM | HEIGHT: 72 IN | RESPIRATION RATE: 18 BRPM | BODY MASS INDEX: 24.66 KG/M2 | OXYGEN SATURATION: 98 % | SYSTOLIC BLOOD PRESSURE: 110 MMHG | DIASTOLIC BLOOD PRESSURE: 56 MMHG

## 2019-05-16 DIAGNOSIS — D64.9 CHRONIC ANEMIA: ICD-10-CM

## 2019-05-16 DIAGNOSIS — C09.9 SQUAMOUS CELL CARCINOMA OF LEFT TONSIL (HCC): Primary | ICD-10-CM

## 2019-05-16 DIAGNOSIS — E43 SEVERE MALNUTRITION (HCC): ICD-10-CM

## 2019-05-16 DIAGNOSIS — R22.1 MASS IN NECK: ICD-10-CM

## 2019-05-16 DIAGNOSIS — C02.9 TONGUE CARCINOMA (HCC): ICD-10-CM

## 2019-05-16 DIAGNOSIS — E11.9 TYPE 2 DIABETES MELLITUS WITHOUT COMPLICATION, WITHOUT LONG-TERM CURRENT USE OF INSULIN (HCC): ICD-10-CM

## 2019-05-16 DIAGNOSIS — C09.9 TONSIL CANCER (HCC): ICD-10-CM

## 2019-05-16 DIAGNOSIS — C09.9 MALIGNANT NEOPLASM OF TONSIL (HCC): ICD-10-CM

## 2019-05-16 DIAGNOSIS — C09.9 MALIGNANT NEOPLASM OF TONSIL (HCC): Primary | ICD-10-CM

## 2019-05-16 LAB
ALBUMIN SERPL-MCNC: 3.5 G/DL (ref 3.5–5.1)
ALP BLD-CCNC: 92 U/L (ref 38–126)
ALT SERPL-CCNC: 9 U/L (ref 11–66)
AST SERPL-CCNC: 16 U/L (ref 5–40)
BASINOPHIL, AUTOMATED: 0 % (ref 0–3)
BILIRUB SERPL-MCNC: 0.2 MG/DL (ref 0.3–1.2)
BILIRUBIN DIRECT: < 0.2 MG/DL (ref 0–0.3)
BUN, WHOLE BLOOD: 21 MG/DL (ref 8–26)
CHLORIDE, WHOLE BLOOD: 97 MEQ/L (ref 98–109)
CREATININE, WHOLE BLOOD: 0.9 MG/DL (ref 0.5–1.2)
EOSINOPHILS RELATIVE PERCENT: 4 % (ref 0–4)
GFR, ESTIMATED: > 90 ML/MIN/1.73M2
GLUCOSE, WHOLE BLOOD: 108 MG/DL (ref 70–108)
HCT VFR BLD CALC: 36.7 % (ref 42–52)
HEMOGLOBIN: 12.4 GM/DL (ref 14–18)
IONIZED CALCIUM, WHOLE BLOOD: 1.15 MMOL/L (ref 1.12–1.32)
LYMPHOCYTES # BLD: 17 % (ref 15–47)
MCH RBC QN AUTO: 29.6 PG (ref 27–31)
MCHC RBC AUTO-ENTMCNC: 33.9 GM/DL (ref 33–37)
MCV RBC AUTO: 87 FL (ref 80–94)
MONOCYTES: 10 % (ref 0–12)
PDW BLD-RTO: 12.8 % (ref 11.5–14.5)
PLATELET # BLD: 166 THOU/MM3 (ref 130–400)
PMV BLD AUTO: 8.1 FL (ref 7.4–10.4)
POTASSIUM, WHOLE BLOOD: 4.2 MEQ/L (ref 3.5–4.9)
RBC # BLD: 4.2 MILL/MM3 (ref 4.7–6.1)
SEG NEUTROPHILS: 69 % (ref 43–75)
SODIUM, WHOLE BLOOD: 139 MEQ/L (ref 138–146)
TOTAL CO2, WHOLE BLOOD: 31 MEQ/L (ref 23–33)
TOTAL PROTEIN: 6.2 G/DL (ref 6.1–8)
WBC # BLD: 6.7 THOU/MM3 (ref 4.8–10.8)

## 2019-05-16 PROCEDURE — 99213 OFFICE O/P EST LOW 20 MIN: CPT | Performed by: INTERNAL MEDICINE

## 2019-05-16 PROCEDURE — 6360000002 HC RX W HCPCS: Performed by: INTERNAL MEDICINE

## 2019-05-16 PROCEDURE — 2580000003 HC RX 258: Performed by: INTERNAL MEDICINE

## 2019-05-16 PROCEDURE — 2709999900 HC NON-CHARGEABLE SUPPLY

## 2019-05-16 PROCEDURE — 99211 OFF/OP EST MAY X REQ PHY/QHP: CPT

## 2019-05-16 PROCEDURE — 80076 HEPATIC FUNCTION PANEL: CPT

## 2019-05-16 PROCEDURE — 85025 COMPLETE CBC W/AUTO DIFF WBC: CPT

## 2019-05-16 PROCEDURE — 80047 BASIC METABLC PNL IONIZED CA: CPT

## 2019-05-16 PROCEDURE — 36591 DRAW BLOOD OFF VENOUS DEVICE: CPT

## 2019-05-16 RX ORDER — SODIUM CHLORIDE 0.9 % (FLUSH) 0.9 %
10 SYRINGE (ML) INJECTION PRN
Status: CANCELLED | OUTPATIENT
Start: 2019-05-16

## 2019-05-16 RX ORDER — HEPARIN SODIUM (PORCINE) LOCK FLUSH IV SOLN 100 UNIT/ML 100 UNIT/ML
500 SOLUTION INTRAVENOUS PRN
Status: DISCONTINUED | OUTPATIENT
Start: 2019-05-16 | End: 2019-05-17 | Stop reason: HOSPADM

## 2019-05-16 RX ORDER — SODIUM CHLORIDE 0.9 % (FLUSH) 0.9 %
20 SYRINGE (ML) INJECTION PRN
Status: DISCONTINUED | OUTPATIENT
Start: 2019-05-16 | End: 2019-05-17 | Stop reason: HOSPADM

## 2019-05-16 RX ORDER — HEPARIN SODIUM (PORCINE) LOCK FLUSH IV SOLN 100 UNIT/ML 100 UNIT/ML
500 SOLUTION INTRAVENOUS PRN
Status: CANCELLED | OUTPATIENT
Start: 2019-05-16

## 2019-05-16 RX ORDER — SODIUM CHLORIDE 0.9 % (FLUSH) 0.9 %
20 SYRINGE (ML) INJECTION PRN
Status: CANCELLED | OUTPATIENT
Start: 2019-05-16

## 2019-05-16 RX ADMIN — Medication 20 ML: at 13:06

## 2019-05-16 RX ADMIN — Medication 500 UNITS: at 13:06

## 2019-05-16 ASSESSMENT — PAIN DESCRIPTION - LOCATION
LOCATION: NECK;EYE
LOCATION_2: BACK

## 2019-05-16 ASSESSMENT — PAIN DESCRIPTION - PAIN TYPE
TYPE_2: CHRONIC PAIN
TYPE: ACUTE PAIN

## 2019-05-16 ASSESSMENT — PAIN SCALES - GENERAL: PAINLEVEL_OUTOF10: 7

## 2019-05-16 ASSESSMENT — PAIN DESCRIPTION - DESCRIPTORS
DESCRIPTORS: DISCOMFORT
DESCRIPTORS_2: ACHING;DISCOMFORT

## 2019-05-16 ASSESSMENT — PAIN DESCRIPTION - FREQUENCY: FREQUENCY: INTERMITTENT

## 2019-05-16 ASSESSMENT — PAIN DESCRIPTION - ORIENTATION: ORIENTATION: RIGHT;LEFT

## 2019-05-16 ASSESSMENT — PAIN DESCRIPTION - INTENSITY: RATING_2: 9

## 2019-05-16 NOTE — TELEPHONE ENCOUNTER
Called lm on vm for [facility] to let them know to contact Suyapa Mar to order and set up the Barium Swallow for the pt and also so let us know that they have received the message.

## 2019-05-16 NOTE — PLAN OF CARE
Care plan reviewed with patient. Patient  verbalized understanding of the plan of care and contribute to goal setting. Problem: Falls - Risk of:  Goal: Will remain free from falls  Description  Will remain free from falls  Outcome: Met This Shift  Note:   Free from falls while in infusion center. Verbalized understanding of fall prevention and to ask for assistance with ambulation   Intervention: Assess risk factors for falls  Description  Assess risk factors for falls  Note:   Assess for fall risk, instruct to ask for assistance with ambulation      Problem: Discharge Planning  Goal: Knowledge of discharge instructions  Description  Knowledge of discharge instructions     Outcome: Met This Shift  Note:   Verbalized understanding of discharge instructions, follow ups and when to call doctor   Intervention: Discharge to appropriate level of care  Note:   Discuss discharge instructions, follow ups and when to call doctor. Problem: Infection - Central Venous Catheter-Associated Bloodstream Infection:  Goal: Will show no infection signs and symptoms  Description  Will show no infection signs and symptoms  Outcome: Met This Shift  Note:   Mediport site with no redness or warmth. Skin over port intact with no signs of breakdown noted. Patient verbalizes signs/symptoms of port infection and when to notify the physician.     Intervention: Infection risk assessment  Description  Infection risk assessment  Note:   Discuss port maintenance, infection prevention, signs and when to call

## 2019-05-16 NOTE — TELEPHONE ENCOUNTER
We received a call from Hay Bowden with [facility] and she is going to get the pt set up for the Modified Barium Swallow and will then contact the pt.

## 2019-05-20 ENCOUNTER — OFFICE VISIT (OUTPATIENT)
Dept: FAMILY MEDICINE CLINIC | Age: 59
End: 2019-05-20
Payer: MEDICARE

## 2019-05-20 ENCOUNTER — CARE COORDINATION (OUTPATIENT)
Dept: CARE COORDINATION | Age: 59
End: 2019-05-20

## 2019-05-20 VITALS
BODY MASS INDEX: 24.03 KG/M2 | DIASTOLIC BLOOD PRESSURE: 58 MMHG | HEART RATE: 87 BPM | SYSTOLIC BLOOD PRESSURE: 122 MMHG | WEIGHT: 177.2 LBS | OXYGEN SATURATION: 91 % | RESPIRATION RATE: 16 BRPM

## 2019-05-20 DIAGNOSIS — F41.9 ANXIETY: Primary | ICD-10-CM

## 2019-05-20 PROCEDURE — 99213 OFFICE O/P EST LOW 20 MIN: CPT | Performed by: NURSE PRACTITIONER

## 2019-05-20 ASSESSMENT — ENCOUNTER SYMPTOMS
COUGH: 0
NAUSEA: 0
VOMITING: 0
SINUS PRESSURE: 0
DIARRHEA: 0
DYSPNEA ASSOCIATED WITH: EXERTION
COLOR CHANGE: 0
SORE THROAT: 0
ABDOMINAL PAIN: 0
SHORTNESS OF BREATH: 0

## 2019-05-20 NOTE — PROGRESS NOTES
4/16/2018    NSTEMI (non-ST elevated myocardial infarction) ([facility] 4/9/2019    Pulmonary embolism and infarction ([facility] 10/22/2018    S/P insertion of inferior vena caval filter 07/07/2009    Severe malnutrition ([facility] 10/23/2018    Squamous cell carcinoma of tonsils ([facility] 2018    left   - radiation    Type II or unspecified type diabetes mellitus without mention of complication, not stated as uncontrolled     lost weight, no meds at present 2018      Past Surgical History:   Procedure Laterality Date    CARDIAC CATHETERIZATION  04/10/2019    HIP SURGERY Right 06/30/2014    Excisin Right Hip Mass-Dr. Farheen Macdonald     KNEE SURGERY Right 1980's    OTHER SURGICAL HISTORY  7-7-09    lisa filter placed     SC OFFICE/OUTPT VISIT,PROCEDURE ONLY Right 10/16/2018    RIGHT FOOT EXCISION ULCER, RIGHT 5th METATARSAL BASE RESECTION performed by Gaby Mckeon DPM at [facility] TOE SURGERY Right 2013    wound to bone (second toe)     TRANSESOPHAGEAL ECHOCARDIOGRAM N/A 4/11/2019    TRANSESOPHAGEAL ECHOCARDIOGRAM performed by Katrin Mata MD at [facility]    TUNNELED VENOUS PORT PLACEMENT         Family History   Problem Relation Age of Onset    Cancer Mother         lung       Social History     Tobacco Use    Smoking status: Current Every Day Smoker     Packs/day: 0.15     Years: 40.00     Pack years: 6.00     Types: Cigarettes    Smokeless tobacco: Never Used    Tobacco comment: 3-4cig a day   Substance Use Topics    Alcohol use: No      Current Outpatient Medications   Medication Sig Dispense Refill    busPIRone (BUSPAR) 10 MG tablet Take 1 tablet by mouth 3 times daily 90 tablet 2    oxyCODONE HCl (OXY-IR) 10 MG immediate release tablet Take 1 tablet by mouth every 8 hours as needed for Pain for up to 30 days. 90 tablet 0    morphine (MS CONTIN) 30 MG extended release tablet Take 1 tablet by mouth every 12 hours for 30 days.  60 tablet 0    albuterol sulfate  (90 Base) MCG/ACT inhaler Inhale 2 puffs into the lungs every 6 hours as needed for Wheezing 1 Inhaler 2    hydrOXYzine (ATARAX) 50 MG tablet Take 1-2 tablets nightly prn 60 tablet 2    glucose monitoring kit (FREESTYLE) monitoring kit 1 kit by Does not apply route daily 1 kit 0    atorvastatin (LIPITOR) 40 MG tablet Take 1 tablet by mouth nightly 30 tablet 3    nicotine (NICODERM CQ) 21 MG/24HR Place 1 patch onto the skin daily 30 patch 3    apixaban (ELIQUIS) 5 MG TABS tablet Take 1 tablet by mouth 2 times daily 60 tablet 1    lidocaine-prilocaine (EMLA) 2.5-2.5 % cream Apply topically as needed. 1 Tube 3    clonazePAM (KLONOPIN) 1 MG tablet Take 1 tablet by mouth 2 times daily as needed for Anxiety for up to 60 doses. 60 tablet 2    sertraline (ZOLOFT) 100 MG tablet Take 2 tablets by mouth daily 60 tablet 2    naloxone (NARCAN) 4 MG/0.1ML LIQD nasal spray 1 spray by Nasal route as needed for Opioid Reversal 1 each 0    docusate sodium (COLACE) 100 MG capsule Take 100 mg by mouth 3 times daily as needed for Constipation      lactulose (CHRONULAC) 10 GM/15ML solution Take 30 mLs by mouth 2 times daily as needed (constipation) 1800 mL 0     No current facility-administered medications for this visit.       Allergies   Allergen Reactions    Latex Hives    Coumadin [Warfarin Sodium] Other (See Comments)     Tears skin     Tape [Adhesive Tape] Other (See Comments)     Tears skin        Health Maintenance   Topic Date Due    Hepatitis C screen  1960    Diabetic retinal exam  03/31/1968    HIV screen  03/31/1973    DTaP/Tdap/Td vaccine (1 - Tdap) 03/31/1977    Shingles Vaccine (1 of 2) 03/31/2008    Colon cancer screen colonoscopy  03/31/2008    Flu vaccine (Season Ended) 09/01/2019    Diabetic foot exam  10/23/2019    Diabetic microalbuminuria test  03/21/2020    Lipid screen  04/10/2020    A1C test (Diabetic or Prediabetic)  04/17/2020    Pneumococcal 0-64 years Vaccine  Completed       Subjective: Review of Systems   Constitutional: Negative for chills and fever. HENT: Negative for congestion, ear pain, postnasal drip, sinus pressure and sore throat. Respiratory: Negative for cough and shortness of breath. Cardiovascular: Negative for chest pain. Gastrointestinal: Negative for abdominal pain, diarrhea, nausea and vomiting. Musculoskeletal: Positive for arthralgias and myalgias. Skin: Negative for color change and rash. Neurological: Negative for dizziness, light-headedness and headaches. Objective:     Physical Exam   Constitutional: He is oriented to person, place, and time. He appears well-developed and well-nourished. HENT:   Head: Normocephalic. Neck: Normal range of motion. Neck supple. Cardiovascular: Normal rate and regular rhythm. Pulmonary/Chest: Effort normal and breath sounds normal.   Musculoskeletal: Normal range of motion. Neurological: He is alert and oriented to person, place, and time. Skin: Skin is warm and dry. Psychiatric: His speech is normal and behavior is normal. Judgment and thought content normal. His mood appears anxious. His affect is not angry. Cognition and memory are normal. He does not exhibit a depressed mood. BP (!) 122/58 (Site: Left Upper Arm, Position: Sitting, Cuff Size: Medium Adult)   Pulse 87   Resp 16   Wt 177 lb 3.2 oz (80.4 kg)   SpO2 91%   BMI 24.03 kg/m²     Assessment:       Diagnosis Orders   1. Anxiety         Plan:     Follow up in 4-6 weeks  Increase buspar as instructed last visit  Instructed will not increase klonopin since on narcotics as well  Monitor symptoms  Continue current meds      Discussed use, benefit, and side effects of prescribed medications. Barriers tomedication compliance addressed. All patient questions answered. Pt voiced understanding. No follow-ups on file. No orders of the defined types were placed in this encounter.     No orders of the defined types were placed in this
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
## 2019-05-21 DIAGNOSIS — G89.3 CANCER ASSOCIATED PAIN: ICD-10-CM

## 2019-05-21 RX ORDER — MORPHINE SULFATE 30 MG/1
30 TABLET, FILM COATED, EXTENDED RELEASE ORAL EVERY 12 HOURS
Qty: 60 TABLET | Refills: 0 | Status: SHIPPED | OUTPATIENT
Start: 2019-05-23 | End: 2019-06-21 | Stop reason: SDUPTHER

## 2019-05-21 NOTE — TELEPHONE ENCOUNTER
OARRS reviewed. UDS: + for  Morphine, oxycodone from our office, 7-Aminoclonazepam and THC - cancer patient . Narcan offered: offered  Last seen: 4/1/2019.  Follow-up: 6/3/2019

## 2019-05-22 ENCOUNTER — PROCEDURE VISIT (OUTPATIENT)
Dept: CARDIOLOGY CLINIC | Age: 59
End: 2019-05-22

## 2019-05-22 ENCOUNTER — PATIENT MESSAGE (OUTPATIENT)
Dept: FAMILY MEDICINE CLINIC | Age: 59
End: 2019-05-22

## 2019-05-22 DIAGNOSIS — F41.1 GENERALIZED ANXIETY DISORDER: ICD-10-CM

## 2019-05-22 DIAGNOSIS — I63.431 CEREBROVASCULAR ACCIDENT (CVA) DUE TO EMBOLISM OF RIGHT POSTERIOR CEREBRAL ARTERY (HCC): Primary | ICD-10-CM

## 2019-05-22 RX ORDER — CLONAZEPAM 1 MG/1
1 TABLET ORAL 2 TIMES DAILY PRN
Qty: 60 TABLET | Refills: 2 | Status: ON HOLD | OUTPATIENT
Start: 2019-05-22 | End: 2019-08-13

## 2019-05-22 RX ORDER — HYDROXYZINE 50 MG/1
TABLET, FILM COATED ORAL
Qty: 60 TABLET | Refills: 2 | Status: ON HOLD | OUTPATIENT
Start: 2019-05-22 | End: 2019-08-13 | Stop reason: HOSPADM

## 2019-05-24 ENCOUNTER — OFFICE VISIT (OUTPATIENT)
Dept: ENT CLINIC | Age: 59
End: 2019-05-24
Payer: MEDICARE

## 2019-05-24 VITALS
WEIGHT: 174 LBS | HEIGHT: 72 IN | SYSTOLIC BLOOD PRESSURE: 104 MMHG | HEART RATE: 80 BPM | RESPIRATION RATE: 12 BRPM | DIASTOLIC BLOOD PRESSURE: 60 MMHG | BODY MASS INDEX: 23.57 KG/M2

## 2019-05-24 DIAGNOSIS — Z92.3 STATUS POST CHEMORADIATION: ICD-10-CM

## 2019-05-24 DIAGNOSIS — Z92.21 STATUS POST CHEMORADIATION: ICD-10-CM

## 2019-05-24 DIAGNOSIS — C01 SQUAMOUS CELL CARCINOMA OF BASE OF TONGUE (HCC): ICD-10-CM

## 2019-05-24 DIAGNOSIS — C77.9 METASTATIC SQUAMOUS CELL CARCINOMA TO LYMPH NODE (HCC): ICD-10-CM

## 2019-05-24 DIAGNOSIS — E03.4 HYPOTHYROIDISM DUE TO ACQUIRED ATROPHY OF THYROID: ICD-10-CM

## 2019-05-24 DIAGNOSIS — C09.9 SQUAMOUS CELL CARCINOMA OF LEFT TONSIL (HCC): Primary | ICD-10-CM

## 2019-05-24 PROCEDURE — 31575 DIAGNOSTIC LARYNGOSCOPY: CPT | Performed by: OTOLARYNGOLOGY

## 2019-05-24 PROCEDURE — 99213 OFFICE O/P EST LOW 20 MIN: CPT | Performed by: OTOLARYNGOLOGY

## 2019-05-24 ASSESSMENT — ENCOUNTER SYMPTOMS
APNEA: 0
CHEST TIGHTNESS: 0
SINUS PRESSURE: 0
SHORTNESS OF BREATH: 0
COLOR CHANGE: 0
FACIAL SWELLING: 0
VOMITING: 0
DIARRHEA: 0
ABDOMINAL PAIN: 0
WHEEZING: 0
RHINORRHEA: 0
VOICE CHANGE: 0
TROUBLE SWALLOWING: 0
COUGH: 0
STRIDOR: 0
SORE THROAT: 0
CHOKING: 0
NAUSEA: 0

## 2019-05-24 NOTE — LETTER
[address] and [address] [address] [place] [address] [address]  Catherine Nova  Phone: [phone]  Fax: [phone]    Sulema Yo MD        May 24, 2019    Lee Ann Waters MD  [address] [place] [address]    Patient: Ana Jefferson   MR Number: [id number]   YOB: 1960   Date of Visit: 5/24/2019     Dear Lee Ann Waters,    I recently saw your patient, Ana Jefferson, regarding follow-up for his tonsil carcinoma left base of tongue and left tonsil. A full course chemoradiation approximately 1 year ago. Currently  has no evidence of disease on exam.  Please note my suggestions regarding liquid pain medications, possible modified barium swallow, possible EGD. Vj Hayden Below are the relevant portions of my assessment and plan of care. Assessment & Plan   Diagnoses and all orders for this visit:     Diagnosis Orders   1. 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 Status post chemoradiation  NE LARYNGOSCOPY FLEXIBLE DIAGNOSTIC       The findings were explained and his questions were answered. He currently has no evidence of recurrent disease on exam.  He is indeed due for his PET/CT and that hopefully will confirm that. Consideration should be given to an EGD. Chemoradiation often produces stiff, instructed hypopharynx and cricopharyngeal area. The chronic pain in his neck and his dysphagia are bothering him severely. He should be switched to liquid medications until his dysphagia and odynophagia improve. He states the pain has been there since she started treatment PT we will see if the PET/CT shows      Return in about 2 months (around 7/24/2019) for Follow-Up. If you have questions, please do not hesitate to call me. I look forward to following Felicity Kaur along with you.     Sincerely,          Rodríguez Javed MD

## 2019-05-24 NOTE — PROGRESS NOTES
[address]  [address]  [address]  [place] [address]  Dept: [phone]  Dept Fax: [phone]  Loc: [phone]    Tomas Don is a 62 y.o. male who was referred byNo ref. provider found for:  Chief Complaint   Patient presents with    Other     Pt. presents for a enlarge left tonsil. . Pt. had to CX other appt due to being in the hospital.     . HPI:     Toams Don is a 62 y.o. male who presents today for follow-up and surveillance of this squamous cell carcinoma left base of tongue and left tonsil with metastasis to the left neck. He received chemoradiation. He complains of persistent pain in the left neck and region of the primary site. He is having difficulty swallowing. He cannot swallow pills. He requested any medications be liquid. He has a PET/CT scheduled for later this month. He also states he has had 2 strokes. History: Allergies   Allergen Reactions    Latex Hives    Coumadin [Warfarin Sodium] Other (See Comments)     Tears skin     Tape Ceclia Ishihara Tape] Other (See Comments)     Tears skin      Current Outpatient Medications   Medication Sig Dispense Refill    hydrOXYzine (ATARAX) 50 MG tablet Take 1-2 tablets nightly prn 60 tablet 2    clonazePAM (KLONOPIN) 1 MG tablet Take 1 tablet by mouth 2 times daily as needed for Anxiety for up to 30 days. 60 tablet 2    morphine (MS CONTIN) 30 MG extended release tablet Take 1 tablet by mouth every 12 hours for 30 days. 60 tablet 0    busPIRone (BUSPAR) 10 MG tablet Take 1 tablet by mouth 3 times daily 90 tablet 2    oxyCODONE HCl (OXY-IR) 10 MG immediate release tablet Take 1 tablet by mouth every 8 hours as needed for Pain for up to 30 days.  90 tablet 0    albuterol sulfate  (90 Base) MCG/ACT inhaler Inhale 2 puffs into the lungs every 6 hours as needed for Wheezing 1 Inhaler 2    glucose monitoring kit (FREESTYLE) monitoring kit 1 kit by Does not apply route daily 1 kit 0    atorvastatin (LIPITOR) 40 MG tablet Take 1 tablet by mouth nightly 30 tablet 3    nicotine (NICODERM CQ) 21 MG/24HR Place 1 patch onto the skin daily 30 patch 3    apixaban (ELIQUIS) 5 MG TABS tablet Take 1 tablet by mouth 2 times daily 60 tablet 1    lidocaine-prilocaine (EMLA) 2.5-2.5 % cream Apply topically as needed. 1 Tube 3    sertraline (ZOLOFT) 100 MG tablet Take 2 tablets by mouth daily 60 tablet 2    naloxone (NARCAN) 4 MG/0.1ML LIQD nasal spray 1 spray by Nasal route as needed for Opioid Reversal 1 each 0    docusate sodium (COLACE) 100 MG capsule Take 100 mg by mouth 3 times daily as needed for Constipation      lactulose (CHRONULAC) 10 GM/15ML solution Take 30 mLs by mouth 2 times daily as needed (constipation) 1800 mL 0     No current facility-administered medications for this visit.       Past Medical History:   Diagnosis Date    Anxiety     Bleeds easily ([address]     Bruises easily     Cerebrovascular accident (CVA) due to embolism of right posterior cerebral artery ([address] 04/09/2019    Cigarette nicotine dependence without complication 9/9/6056    COPD (chronic obstructive pulmonary disease) ([address]     DVT (deep venous thrombosis) ([address] 2009    Enlarged lymph node     History of emotional problems     History of sleep apnea     Hypertension     HCC    Moderate episode of recurrent major depressive disorder ([address] 4/16/2018    NSTEMI (non-ST elevated myocardial infarction) ([address] 4/9/2019    Pulmonary embolism and infarction ([address] 10/22/2018    S/P insertion of inferior vena caval filter 07/07/2009    Severe malnutrition ([address] 10/23/2018    Squamous cell carcinoma of tonsils ([address] 2018    left   - radiation    Type II or unspecified type diabetes mellitus without mention of complication, not stated as uncontrolled     lost weight, no meds at present 2018      Past Surgical History:   Procedure Laterality Date    CARDIAC CATHETERIZATION  04/10/2019    HIP SURGERY Right 06/30/2014    Excisin Right Hip Mass-Dr. Farheen Macdonald     KNEE SURGERY Right 1980's    OTHER SURGICAL HISTORY  7-7-09    lisa filter placed     NC OFFICE/OUTPT VISIT,PROCEDURE ONLY Right 10/16/2018    RIGHT FOOT EXCISION ULCER, RIGHT 5th METATARSAL BASE RESECTION performed by Gaby Mckeon DPM at [facility] TOE SURGERY Right 2013    wound to bone (second toe)     TRANSESOPHAGEAL ECHOCARDIOGRAM N/A 4/11/2019    TRANSESOPHAGEAL ECHOCARDIOGRAM performed by Katrin Mata MD at [facility]    TUNNELED VENOUS PORT PLACEMENT       Family History   Problem Relation Age of Onset    Cancer Mother         lung     Social History     Tobacco Use    Smoking status: Current Every Day Smoker     Packs/day: 0.15     Years: 40.00     Pack years: 6.00     Types: Cigarettes    Smokeless tobacco: Never Used    Tobacco comment: 3-4cig a day   Substance Use Topics    Alcohol use: No       Subjective:      Review of Systems   Constitutional: Negative for activity change, appetite change, chills, diaphoresis, fatigue, fever and unexpected weight change. HENT: Negative for congestion, dental problem, ear discharge, ear pain, facial swelling, hearing loss, mouth sores, nosebleeds, postnasal drip, rhinorrhea, sinus pressure, sneezing, sore throat, tinnitus, trouble swallowing and voice change. Eyes: Negative for visual disturbance. Respiratory: Negative for apnea, cough, choking, chest tightness, shortness of breath, wheezing and stridor. Cardiovascular: Negative for chest pain, palpitations and leg swelling. Gastrointestinal: Negative for abdominal pain, diarrhea, nausea and vomiting. Endocrine: Negative for cold intolerance, heat intolerance, polydipsia and polyuria. Genitourinary: Negative for dysuria, enuresis and hematuria. Musculoskeletal: Negative for arthralgias, gait problem, neck pain and neck stiffness. Skin: Negative for color change and rash. Allergic/Immunologic: Negative for environmental allergies, food allergies and immunocompromised state. Neurological: Negative for dizziness, syncope, facial asymmetry, speech difficulty, light-headedness and headaches. Hematological: Negative for adenopathy. Does not bruise/bleed easily. Psychiatric/Behavioral: Negative for confusion and sleep disturbance. The patient is not nervous/anxious. Objective:   /60 (Site: Right Upper Arm, Position: Sitting)   Pulse 80   Resp 12   Ht 6' (1.829 m)   Wt 174 lb (78.9 kg)   BMI 23.60 kg/m²     Physical Exam   Mouth and oropharynx: no mucosal edema consistent with radiation and posterior pharynx. There are no lesions. He has full tongue mobility. Palpation of base of tongue is completely soft all the way down to the hyoid, both over the dorsum and through the floor of the mouth. Neck: Soft fluffy edema of the submental area consistent with his radiation therapy and mild lymphedema. The sinus tract in the mid left neck is sealed. There is tenderness along the upper carotid sheath area, which with his strokes was not palpated vigorously, but no abnormal palpable masses. PROCEDURE: FIBEROPTIC LARYNGOSCOPY    A fiberoptic laryngoscopy was performed under topical anesthesia, after using Afrin and Lidocaine spray in the nasal fossa. The nasal fossa, nasopharynx, hypopharynx and larynx were carefully examined. Base of tongue was symmetrical, and the lingual tonsils were basically absent. Large vallecula, smooth with no bulging or ulcerations or erythema. . Epiglottis appeared normal and was not retrodisplaced. True vocal cords had normal mobility. There was no erythema. No mucosal lesions or masses were noted. Mild pooling in the pyriform sinuses. Mild edema of the posterior and lateral oropharyngeal walls that appeared soft and symmetrical, consistent with his chemoradiation history.   True vocal cords were slightly edematous, but had normal mobility. Data:  All of the past medical history, past surgical history, family history,social history, allergies and current medications were reviewed with the patient. Assessment & Plan   Diagnoses and all orders for this visit:     Diagnosis Orders   1. 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 Status post chemoradiation  MI LARYNGOSCOPY FLEXIBLE DIAGNOSTIC       The findings were explained and his questions were answered. He currently has no evidence of recurrent disease on exam.  He is indeed due for his PET/CT and that hopefully will confirm that. Consideration should be given to an EGD. Chemoradiation often produces stiff, instructed hypopharynx and cricopharyngeal area. The chronic pain in his neck and his dysphagia are bothering him severely. He should be switched to liquid medications until his dysphagia and odynophagia improve. He states the pain has been there since she started treatment PT we will see if the PET/CT shows      Return in about 2 months (around 7/24/2019) for Follow-Up. Shaun Dacosta. Jr Sierra MD    **This report has been created using voice recognition software. It may contain minor errors which are inherent in voicerecognition technology. **

## 2019-05-29 ENCOUNTER — HOSPITAL ENCOUNTER (OUTPATIENT)
Dept: PET IMAGING | Age: 59
Discharge: HOME | End: 2019-05-29
Payer: MEDICARE

## 2019-05-29 ENCOUNTER — TELEPHONE (OUTPATIENT)
Dept: FAMILY MEDICINE CLINIC | Age: 59
End: 2019-05-29

## 2019-05-29 DIAGNOSIS — C09.9 MALIGNANT NEOPLASM OF TONSIL (HCC): ICD-10-CM

## 2019-05-29 PROCEDURE — A9552 F18 FDG: HCPCS | Performed by: INTERNAL MEDICINE

## 2019-05-29 PROCEDURE — 3430000000 HC RX DIAGNOSTIC RADIOPHARMACEUTICAL: Performed by: INTERNAL MEDICINE

## 2019-05-29 PROCEDURE — 78815 PET IMAGE W/CT SKULL-THIGH: CPT

## 2019-05-29 RX ORDER — FLUDEOXYGLUCOSE F 18 200 MCI/ML
14.5 INJECTION, SOLUTION INTRAVENOUS
Status: COMPLETED | OUTPATIENT
Start: 2019-05-29 | End: 2019-05-29

## 2019-05-29 RX ADMIN — FLUDEOXYGLUCOSE F 18 14.5 MILLICURIE: 200 INJECTION, SOLUTION INTRAVENOUS at 10:00

## 2019-05-29 NOTE — TELEPHONE ENCOUNTER
Called spoke to the pt to see if the scripts that we received via fax were valid that pt had requested them. Pt stated that he does NOT want the 3 scripts that came from a company called [facility]. Pt never requested the meds. Faxed the scripts back to this company with a cancel written on the scripts along with note on the cover sheet that the pt does not want the scripts filled.

## 2019-05-29 NOTE — TELEPHONE ENCOUNTER
We received another set of script refill requests from [facility]. Called them cancelled the scripts via phone this time thru a representative Justin Strong. She stated she cancelled the scripts and we should not get any more.

## 2019-05-30 ENCOUNTER — CARE COORDINATION (OUTPATIENT)
Dept: CARE COORDINATION | Age: 59
End: 2019-05-30

## 2019-05-30 ASSESSMENT — ENCOUNTER SYMPTOMS: DYSPNEA ASSOCIATED WITH: EXERTION

## 2019-05-30 NOTE — CARE COORDINATION
Ambulatory Care Coordination Note  5/30/2019  CM Risk Score: 14  Johnna Mortality Risk Score:      ACC: Pollo Mcdowell RN    Summary Note: Patient was called for continued Care Coordination follow up and education. Patient shared he remains \"about the same. \"  Patient continues to have c/o discomfort in his neck and head and is following with specialists as instructed. Patient stated his throat hurts with swallowing and he continues to have some difficulty swallowing food. Patient reported he is working with ST and importance of completing ST exercises and following their diet recommendations reviewed with patient. Patient was encouraged to discuss symptoms and concern with ST and HH during their visit and he verbalized understanding. RNCC left message with [place] asking for return call to discuss patient. Patient stated his breathing remains at his baseline and COPD education was reviewed, including symptoms to call and report as well as importance of early symptom recognition. Patient acknowledged understanding. Patient continues to monitor BS every AM and PM.  Patient stated BS have remained stable with reading being 80 both this AM and last night. Patient denied any hypoglycemia symptoms but continues to share he is having some difficulty eating. Hypoglycemia education, including symptoms, prevention, and treatment were reviewed with patient. Patient verbalized understanding. Diabetes education also reviewed. Patient is aware of upcoming appointments and he denied any need for assistance with transportation. Patient denied any other questions, concerns, or needs and he was encouraged to call with any that may develop. Received return call from [place] [employer], Bean Calabrese. Bean Calabrese stated she continues to work with patient and is trailing different forms of food with patient but is awaiting upcoming MBS results for official diet recommendations as patient continues to have c/o cough/trouble swallowing with all types. Actions:   - Reviewed COPD and DM education   - Monitored for current services  - Encouraged patient to call with any c/o hypoglycemia  - Monitored for transportation needs          Plan of Care:   - Continue with Care Coordination follow up for continued assistance with his chronic disease management and s/p recent hospitalizations  - Complete COPD education   - Complete DM education   - Monitor for additional needs  - Monitor for readiness to discharge from Care Coordination    Care Coordination Interventions    Program Enrollment:  Complex Care  Referral from Primary Care Provider:  No  Suggested Interventions and [id number]:  Completed (Comment: clint Pagan psychologist )  Diabetes Education:  Completed  Fall Risk Prevention:  Completed  Home Health Services:  Completed (Comment: current with [facility] )  Meals on Wheels:  Not Started  Medication Assistance Program:  Declined (Comment: denied any need at this time )  Medi Set or Pill Pack:  Completed (Comment: AARON and bora assisting with med management )  Occupational Therapy:  Completed (Comment: [facility] )  Physical Therapy:  Completed (Comment: [facility])  Smoking Cessation:  Completed  Social Work:  Completed (Comment: [facility] - [place])  Other Therapy Services:  Completed (Comment:  [facility] )  Transportation Support:  Completed  Zone Management Tools:  Completed         Goals Addressed                 This Visit's Progress       Care Coordination     Conditions and Symptoms   Improving     I will schedule office visits, as directed by my provider. I will keep my appointment or reschedule if I have to cancel. I will follow my Zone Management tool to seek urgent or emergent care. I will notify my provider of any symptoms that indicate a worsening of my condition.     Barriers: lack of support, overwhelmed by complexity of regimen and lack of education  Plan for overcoming my barriers: Continued education and support   Confidence: 7/10  Anticipated Goal Completion Date: 8/1/19              Prior to Admission medications    Medication Sig Start Date End Date Taking? Authorizing Provider   hydrOXYzine (ATARAX) 50 MG tablet Take 1-2 tablets nightly prn 5/22/19   YAQUELIN Adams CNP   clonazePAM (KLONOPIN) 1 MG tablet Take 1 tablet by mouth 2 times daily as needed for Anxiety for up to 30 days. 5/22/19 6/21/19  YAQUELIN Adams CNP   morphine (MS CONTIN) 30 MG extended release tablet Take 1 tablet by mouth every 12 hours for 30 days. 5/23/19 6/22/19  YAQUELIN Stevens CNP   busPIRone (BUSPAR) 10 MG tablet Take 1 tablet by mouth 3 times daily 5/13/19 6/12/19  YAQUELIN Adams CNP   oxyCODONE HCl (OXY-IR) 10 MG immediate release tablet Take 1 tablet by mouth every 8 hours as needed for Pain for up to 30 days. 5/6/19 6/5/19  YAQUELIN Stevens CNP   albuterol sulfate  (90 Base) MCG/ACT inhaler Inhale 2 puffs into the lungs every 6 hours as needed for Wheezing 4/22/19   Sandra Dial MD   glucose monitoring kit (FREESTYLE) monitoring kit 1 kit by Does not apply route daily 4/15/19   Sandra Dial MD   atorvastatin (LIPITOR) 40 MG tablet Take 1 tablet by mouth nightly 4/12/19   Meryle Morones, DO   nicotine (NICODERM CQ) 21 MG/24HR Place 1 patch onto the skin daily 4/13/19   Meryle Morones, DO   apixaban (ELIQUIS) 5 MG TABS tablet Take 1 tablet by mouth 2 times daily 4/12/19   Meryle Morones, DO   lidocaine-prilocaine (EMLA) 2.5-2.5 % cream Apply topically as needed.  3/25/19   Magy Walter MD   sertraline (ZOLOFT) 100 MG tablet Take 2 tablets by mouth daily 1/29/19   Sandra Dial MD   naloxone [facility]) 4 MG/0.1ML LIQD nasal spray 1 spray by Nasal route as needed for Opioid Reversal 12/24/18   YAQUELIN Joe - CNP   docusate sodium (COLACE) 100 MG capsule Take 100 mg by mouth 3 times daily as needed for Constipation    Historical Provider, MD   lactulose (CHRONULAC) 10 GM/15ML solution Take 30 mLs by mouth 2 times daily as needed (constipation) 8/23/18   YAQUELIN Lr CNP       Future Appointments   Date Time Provider Richard Cortes   5/31/2019  9:30 AM STR OUT PT ONC CMA [facility] OP ONC None   5/31/2019  9:45 AM Vasu Pandya MD Oncology [address]   6/3/2019  2:30 PM YAQUELIN Lr CNP [facility] Pain [address]   6/11/2019 11:00 AM STR FLUORO ROOM 4 STRZ RAD STR Radiolog   6/13/2019  2:00 PM STR OUT PT ONC INJ RM 14 STR[facility] OP ONC None   6/18/2019 12:00 PM SAVITA RandallY.LESLIE [facility] FAM MED [address]   7/25/2019  2:00 PM Aleta Murphy MD ENT [address]   8/5/2019 11:45 AM Trish Raygoza MD [facility] Heart [address]   8/7/2019  9:45 AM Lora Vyas MD [facility] Med CG [address]   9/27/2019 11:00 AM Vasu Pandya MD Oncology [address]     ,   Diabetes Assessment    Meal Planning:  Avoidance of concentrated sweets   How often do you test your blood sugar?:  Meals, Daily   Do you have barriers with adherence to non-pharmacologic self-management interventions?  (Nutrition/Exercise/Self-Monitoring):  No   Have you ever had to go to the ED for symptoms of low blood sugar?:  No       No patient-reported symptoms   Do you have hyperglycemia symptoms?:  No   Do you have hypoglycemia symptoms?:  No   Last Blood Sugar Value:  80   Blood Sugar Monitoring Regimen:  Morning Fasting, At Bedtime   Blood Sugar Trends:  No Change      ,   COPD Assessment    Is the patient able to verbalize Rescue vs. Long Acting medications?:  No  Does the patient have a nebulizer?:  No  Does the patient use a space with inhaled medications?:  No     No patient-reported symptoms         Symptoms:      Symptom course:  stable  Breathlessness:  exertion  Increase use of rapid acting/rescue inhaled medications?:  No  Change in chronic cough?:  No/At Baseline  Change in sputum?:  No/At Baseline     ,   General Assessment    Do you have any symptoms that are causing concern?:  Yes  Progression since Onset:  Unchanged, Intermittent - Waxing/Waning  Reported Symptoms:  Other (Comment: head/throat pain )      and Care Coordination Episodes    Type: Amb Care Coordination  Episode: Complex Care  Noted: 4/29/2019  Comments: CTC and DM edu

## 2019-05-31 ENCOUNTER — HOSPITAL ENCOUNTER (OUTPATIENT)
Age: 59
Setting detail: OBSERVATION
Discharge: HOME | End: 2019-06-01
Attending: FAMILY MEDICINE | Admitting: INTERNAL MEDICINE
Payer: MEDICARE

## 2019-05-31 ENCOUNTER — APPOINTMENT (OUTPATIENT)
Dept: CT IMAGING | Age: 59
End: 2019-05-31
Payer: MEDICARE

## 2019-05-31 DIAGNOSIS — I95.1 ORTHOSTATIC HYPOTENSION: ICD-10-CM

## 2019-05-31 DIAGNOSIS — R42 DIZZINESS: Primary | ICD-10-CM

## 2019-05-31 DIAGNOSIS — R42 LIGHTHEADEDNESS: ICD-10-CM

## 2019-05-31 PROBLEM — R55 PRE-SYNCOPE: Status: ACTIVE | Noted: 2019-05-31

## 2019-05-31 PROBLEM — G44.89 OTHER HEADACHE SYNDROME: Status: ACTIVE | Noted: 2019-05-31

## 2019-05-31 PROBLEM — E86.0 DEHYDRATION: Status: ACTIVE | Noted: 2019-05-31

## 2019-05-31 LAB
ALBUMIN SERPL-MCNC: 3.5 G/DL (ref 3.5–5.1)
ALP BLD-CCNC: 90 U/L (ref 38–126)
ALT SERPL-CCNC: 12 U/L (ref 11–66)
ANION GAP SERPL CALCULATED.3IONS-SCNC: 13 MEQ/L (ref 8–16)
APTT: 36.4 SECONDS (ref 22–38)
AST SERPL-CCNC: 15 U/L (ref 5–40)
BACTERIA: ABNORMAL
BASOPHILS # BLD: 0.7 %
BASOPHILS ABSOLUTE: 0 THOU/MM3 (ref 0–0.1)
BILIRUB SERPL-MCNC: 0.5 MG/DL (ref 0.3–1.2)
BILIRUBIN URINE: NEGATIVE
BLOOD, URINE: NEGATIVE
BUN BLDV-MCNC: 16 MG/DL (ref 7–22)
CALCIUM SERPL-MCNC: 9.5 MG/DL (ref 8.5–10.5)
CASTS: ABNORMAL /LPF
CASTS: ABNORMAL /LPF
CHARACTER, URINE: CLEAR
CHLORIDE BLD-SCNC: 103 MEQ/L (ref 98–111)
CO2: 28 MEQ/L (ref 23–33)
COLOR: YELLOW
CREAT SERPL-MCNC: 0.7 MG/DL (ref 0.4–1.2)
CRYSTALS: ABNORMAL
EKG ATRIAL RATE: 70 BPM
EKG P AXIS: 79 DEGREES
EKG P-R INTERVAL: 166 MS
EKG Q-T INTERVAL: 392 MS
EKG QRS DURATION: 84 MS
EKG QTC CALCULATION (BAZETT): 423 MS
EKG R AXIS: -27 DEGREES
EKG T AXIS: -12 DEGREES
EKG VENTRICULAR RATE: 70 BPM
EOSINOPHIL # BLD: 1.3 %
EOSINOPHILS ABSOLUTE: 0.1 THOU/MM3 (ref 0–0.4)
EPITHELIAL CELLS, UA: ABNORMAL /HPF
ERYTHROCYTE [DISTWIDTH] IN BLOOD BY AUTOMATED COUNT: 13.9 % (ref 11.5–14.5)
ERYTHROCYTE [DISTWIDTH] IN BLOOD BY AUTOMATED COUNT: 45.5 FL (ref 35–45)
GFR SERPL CREATININE-BSD FRML MDRD: > 90 ML/MIN/1.73M2
GLUCOSE BLD-MCNC: 97 MG/DL (ref 70–108)
GLUCOSE, URINE: NEGATIVE MG/DL
HCT VFR BLD CALC: 42.5 % (ref 42–52)
HEMOGLOBIN: 14.3 GM/DL (ref 14–18)
IMMATURE GRANS (ABS): 0.03 THOU/MM3 (ref 0–0.07)
IMMATURE GRANULOCYTES: 0.5 %
INR BLD: 1.15 (ref 0.85–1.13)
KETONES, URINE: 15
LEUKOCYTE ESTERASE, URINE: ABNORMAL
LIPASE: 5.9 U/L (ref 5.6–51.3)
LYMPHOCYTES # BLD: 12.7 %
LYMPHOCYTES ABSOLUTE: 0.8 THOU/MM3 (ref 1–4.8)
MAGNESIUM: 1.8 MG/DL (ref 1.6–2.4)
MCH RBC QN AUTO: 30 PG (ref 26–33)
MCHC RBC AUTO-ENTMCNC: 33.6 GM/DL (ref 32.2–35.5)
MCV RBC AUTO: 89.3 FL (ref 80–94)
MISCELLANEOUS LAB TEST RESULT: ABNORMAL
MONOCYTES # BLD: 9.8 %
MONOCYTES ABSOLUTE: 0.6 THOU/MM3 (ref 0.4–1.3)
NITRITE, URINE: NEGATIVE
NUCLEATED RED BLOOD CELLS: 0 /100 WBC
OSMOLALITY CALCULATION: 287.9 MOSMOL/KG (ref 275–300)
PH UA: 6.5 (ref 5–9)
PLATELET # BLD: 193 THOU/MM3 (ref 130–400)
PMV BLD AUTO: 10.2 FL (ref 9.4–12.4)
POTASSIUM SERPL-SCNC: 3.9 MEQ/L (ref 3.5–5.2)
PROTEIN UA: NEGATIVE MG/DL
RBC # BLD: 4.76 MILL/MM3 (ref 4.7–6.1)
RBC URINE: ABNORMAL /HPF
RENAL EPITHELIAL, UA: ABNORMAL
SEG NEUTROPHILS: 75 %
SEGMENTED NEUTROPHILS ABSOLUTE COUNT: 4.5 THOU/MM3 (ref 1.8–7.7)
SODIUM BLD-SCNC: 144 MEQ/L (ref 135–145)
SPECIFIC GRAVITY UA: 1.03 (ref 1–1.03)
TOTAL PROTEIN: 6.6 G/DL (ref 6.1–8)
TROPONIN T: < 0.01 NG/ML
UROBILINOGEN, URINE: 1 EU/DL (ref 0–1)
WBC # BLD: 6 THOU/MM3 (ref 4.8–10.8)
WBC UA: ABNORMAL /HPF
YEAST: ABNORMAL

## 2019-05-31 PROCEDURE — G0378 HOSPITAL OBSERVATION PER HR: HCPCS

## 2019-05-31 PROCEDURE — 6370000000 HC RX 637 (ALT 250 FOR IP): Performed by: INTERNAL MEDICINE

## 2019-05-31 PROCEDURE — 94760 N-INVAS EAR/PLS OXIMETRY 1: CPT

## 2019-05-31 PROCEDURE — 70450 CT HEAD/BRAIN W/O DYE: CPT

## 2019-05-31 PROCEDURE — 96361 HYDRATE IV INFUSION ADD-ON: CPT

## 2019-05-31 PROCEDURE — 85730 THROMBOPLASTIN TIME PARTIAL: CPT

## 2019-05-31 PROCEDURE — 83735 ASSAY OF MAGNESIUM: CPT

## 2019-05-31 PROCEDURE — 36415 COLL VENOUS BLD VENIPUNCTURE: CPT

## 2019-05-31 PROCEDURE — 80053 COMPREHEN METABOLIC PANEL: CPT

## 2019-05-31 PROCEDURE — 85610 PROTHROMBIN TIME: CPT

## 2019-05-31 PROCEDURE — 87086 URINE CULTURE/COLONY COUNT: CPT

## 2019-05-31 PROCEDURE — 6370000000 HC RX 637 (ALT 250 FOR IP): Performed by: FAMILY MEDICINE

## 2019-05-31 PROCEDURE — 85025 COMPLETE CBC W/AUTO DIFF WBC: CPT

## 2019-05-31 PROCEDURE — 6360000004 HC RX CONTRAST MEDICATION: Performed by: FAMILY MEDICINE

## 2019-05-31 PROCEDURE — 96374 THER/PROPH/DIAG INJ IV PUSH: CPT

## 2019-05-31 PROCEDURE — 2580000003 HC RX 258: Performed by: INTERNAL MEDICINE

## 2019-05-31 PROCEDURE — 93005 ELECTROCARDIOGRAM TRACING: CPT | Performed by: FAMILY MEDICINE

## 2019-05-31 PROCEDURE — 6360000002 HC RX W HCPCS: Performed by: FAMILY MEDICINE

## 2019-05-31 PROCEDURE — 70496 CT ANGIOGRAPHY HEAD: CPT

## 2019-05-31 PROCEDURE — 84484 ASSAY OF TROPONIN QUANT: CPT

## 2019-05-31 PROCEDURE — 81001 URINALYSIS AUTO W/SCOPE: CPT

## 2019-05-31 PROCEDURE — 2709999900 HC NON-CHARGEABLE SUPPLY

## 2019-05-31 PROCEDURE — 83690 ASSAY OF LIPASE: CPT

## 2019-05-31 PROCEDURE — 70498 CT ANGIOGRAPHY NECK: CPT

## 2019-05-31 PROCEDURE — 99220 PR INITIAL OBSERVATION CARE/DAY 70 MINUTES: CPT | Performed by: INTERNAL MEDICINE

## 2019-05-31 PROCEDURE — 99285 EMERGENCY DEPT VISIT HI MDM: CPT

## 2019-05-31 PROCEDURE — 2580000003 HC RX 258: Performed by: FAMILY MEDICINE

## 2019-05-31 RX ORDER — ONDANSETRON 2 MG/ML
4 INJECTION INTRAMUSCULAR; INTRAVENOUS ONCE
Status: COMPLETED | OUTPATIENT
Start: 2019-05-31 | End: 2019-05-31

## 2019-05-31 RX ORDER — OXYCODONE HYDROCHLORIDE 5 MG/1
10 TABLET ORAL EVERY 8 HOURS PRN
Status: DISCONTINUED | OUTPATIENT
Start: 2019-05-31 | End: 2019-06-01 | Stop reason: HOSPADM

## 2019-05-31 RX ORDER — POTASSIUM CHLORIDE 7.45 MG/ML
10 INJECTION INTRAVENOUS PRN
Status: DISCONTINUED | OUTPATIENT
Start: 2019-05-31 | End: 2019-06-01 | Stop reason: HOSPADM

## 2019-05-31 RX ORDER — HYDROCODONE BITARTRATE AND ACETAMINOPHEN 5; 325 MG/1; MG/1
1 TABLET ORAL ONCE
Status: COMPLETED | OUTPATIENT
Start: 2019-05-31 | End: 2019-05-31

## 2019-05-31 RX ORDER — 0.9 % SODIUM CHLORIDE 0.9 %
1000 INTRAVENOUS SOLUTION INTRAVENOUS ONCE
Status: COMPLETED | OUTPATIENT
Start: 2019-05-31 | End: 2019-05-31

## 2019-05-31 RX ORDER — FAMOTIDINE 20 MG/1
20 TABLET, FILM COATED ORAL 2 TIMES DAILY
Status: DISCONTINUED | OUTPATIENT
Start: 2019-05-31 | End: 2019-06-01 | Stop reason: HOSPADM

## 2019-05-31 RX ORDER — MORPHINE SULFATE 30 MG/1
30 TABLET, FILM COATED, EXTENDED RELEASE ORAL EVERY 12 HOURS
Status: DISCONTINUED | OUTPATIENT
Start: 2019-05-31 | End: 2019-06-01 | Stop reason: HOSPADM

## 2019-05-31 RX ORDER — BUSPIRONE HYDROCHLORIDE 10 MG/1
10 TABLET ORAL 3 TIMES DAILY
Status: DISCONTINUED | OUTPATIENT
Start: 2019-05-31 | End: 2019-06-01 | Stop reason: HOSPADM

## 2019-05-31 RX ORDER — SODIUM CHLORIDE 0.9 % (FLUSH) 0.9 %
10 SYRINGE (ML) INJECTION EVERY 12 HOURS SCHEDULED
Status: DISCONTINUED | OUTPATIENT
Start: 2019-05-31 | End: 2019-06-01 | Stop reason: HOSPADM

## 2019-05-31 RX ORDER — POTASSIUM CHLORIDE 20 MEQ/1
40 TABLET, EXTENDED RELEASE ORAL PRN
Status: DISCONTINUED | OUTPATIENT
Start: 2019-05-31 | End: 2019-06-01 | Stop reason: HOSPADM

## 2019-05-31 RX ORDER — ACETAMINOPHEN 325 MG/1
650 TABLET ORAL EVERY 4 HOURS PRN
Status: DISCONTINUED | OUTPATIENT
Start: 2019-05-31 | End: 2019-06-01 | Stop reason: HOSPADM

## 2019-05-31 RX ORDER — ATORVASTATIN CALCIUM 40 MG/1
40 TABLET, FILM COATED ORAL NIGHTLY
Status: DISCONTINUED | OUTPATIENT
Start: 2019-05-31 | End: 2019-06-01 | Stop reason: HOSPADM

## 2019-05-31 RX ORDER — POLYETHYLENE GLYCOL 3350 17 G/17G
17 POWDER, FOR SOLUTION ORAL DAILY PRN
Status: DISCONTINUED | OUTPATIENT
Start: 2019-05-31 | End: 2019-06-01 | Stop reason: HOSPADM

## 2019-05-31 RX ORDER — SERTRALINE HYDROCHLORIDE 100 MG/1
200 TABLET, FILM COATED ORAL DAILY
Status: DISCONTINUED | OUTPATIENT
Start: 2019-05-31 | End: 2019-06-01 | Stop reason: HOSPADM

## 2019-05-31 RX ORDER — SODIUM CHLORIDE 0.9 % (FLUSH) 0.9 %
10 SYRINGE (ML) INJECTION PRN
Status: DISCONTINUED | OUTPATIENT
Start: 2019-05-31 | End: 2019-06-01 | Stop reason: HOSPADM

## 2019-05-31 RX ORDER — SODIUM CHLORIDE 9 MG/ML
INJECTION, SOLUTION INTRAVENOUS CONTINUOUS
Status: DISCONTINUED | OUTPATIENT
Start: 2019-05-31 | End: 2019-06-01 | Stop reason: HOSPADM

## 2019-05-31 RX ORDER — ONDANSETRON 2 MG/ML
4 INJECTION INTRAMUSCULAR; INTRAVENOUS EVERY 6 HOURS PRN
Status: DISCONTINUED | OUTPATIENT
Start: 2019-05-31 | End: 2019-06-01 | Stop reason: HOSPADM

## 2019-05-31 RX ADMIN — SERTRALINE 200 MG: 100 TABLET, FILM COATED ORAL at 15:58

## 2019-05-31 RX ADMIN — BUSPIRONE HYDROCHLORIDE 10 MG: 10 TABLET ORAL at 21:10

## 2019-05-31 RX ADMIN — BUSPIRONE HYDROCHLORIDE 10 MG: 10 TABLET ORAL at 15:58

## 2019-05-31 RX ADMIN — IOPAMIDOL 80 ML: 755 INJECTION, SOLUTION INTRAVENOUS at 10:44

## 2019-05-31 RX ADMIN — Medication 10 ML: at 21:11

## 2019-05-31 RX ADMIN — MORPHINE SULFATE 30 MG: 30 TABLET, EXTENDED RELEASE ORAL at 15:58

## 2019-05-31 RX ADMIN — FAMOTIDINE 20 MG: 20 TABLET ORAL at 21:10

## 2019-05-31 RX ADMIN — SODIUM CHLORIDE: 9 INJECTION, SOLUTION INTRAVENOUS at 15:58

## 2019-05-31 RX ADMIN — HYDROCODONE BITARTRATE AND ACETAMINOPHEN 1 TABLET: 5; 325 TABLET ORAL at 13:20

## 2019-05-31 RX ADMIN — SODIUM CHLORIDE 1000 ML: 9 INJECTION, SOLUTION INTRAVENOUS at 09:41

## 2019-05-31 RX ADMIN — ATORVASTATIN CALCIUM 40 MG: 40 TABLET, FILM COATED ORAL at 21:10

## 2019-05-31 RX ADMIN — OXYCODONE HYDROCHLORIDE 10 MG: 5 TABLET ORAL at 17:51

## 2019-05-31 RX ADMIN — ONDANSETRON 4 MG: 2 INJECTION INTRAMUSCULAR; INTRAVENOUS at 09:40

## 2019-05-31 RX ADMIN — APIXABAN 5 MG: 5 TABLET, FILM COATED ORAL at 15:58

## 2019-05-31 ASSESSMENT — ENCOUNTER SYMPTOMS
COUGH: 0
RHINORRHEA: 0
ABDOMINAL DISTENTION: 0
BACK PAIN: 0
CONSTIPATION: 0
WHEEZING: 0
CHEST TIGHTNESS: 0
STRIDOR: 0
ABDOMINAL PAIN: 0
SHORTNESS OF BREATH: 0
DIARRHEA: 0
VOMITING: 0
NAUSEA: 0

## 2019-05-31 ASSESSMENT — PAIN SCALES - GENERAL
PAINLEVEL_OUTOF10: 7
PAINLEVEL_OUTOF10: 6
PAINLEVEL_OUTOF10: 10
PAINLEVEL_OUTOF10: 6
PAINLEVEL_OUTOF10: 8
PAINLEVEL_OUTOF10: 10

## 2019-05-31 ASSESSMENT — PAIN DESCRIPTION - PROGRESSION: CLINICAL_PROGRESSION: NOT CHANGED

## 2019-05-31 ASSESSMENT — PAIN DESCRIPTION - PAIN TYPE
TYPE: ACUTE PAIN
TYPE: ACUTE PAIN

## 2019-05-31 ASSESSMENT — PAIN DESCRIPTION - ORIENTATION: ORIENTATION: LEFT

## 2019-05-31 ASSESSMENT — PAIN DESCRIPTION - ONSET: ONSET: ON-GOING

## 2019-05-31 ASSESSMENT — PAIN DESCRIPTION - DESCRIPTORS: DESCRIPTORS: ACHING;HEADACHE

## 2019-05-31 ASSESSMENT — PAIN - FUNCTIONAL ASSESSMENT: PAIN_FUNCTIONAL_ASSESSMENT: ACTIVITIES ARE NOT PREVENTED

## 2019-05-31 ASSESSMENT — PAIN DESCRIPTION - LOCATION
LOCATION: HEAD
LOCATION: HEAD;HIP

## 2019-05-31 ASSESSMENT — PAIN DESCRIPTION - FREQUENCY: FREQUENCY: CONTINUOUS

## 2019-05-31 NOTE — H&P
History & Physical        Patient:  Rufina David  YOB: 1960    MRN: [id number]     Acct: [de-identified]    PCP: Tatyana Cabrales MD    Date of Admission: 5/31/2019    Date of Service: Pt seen/examined on 5/31/2019 and  Placed in Observation. Chief Complaint:    Chief Complaint   Patient presents with    Headache    Dizziness         History Of Present Illness:      62 y.o. male who presented to [facility] with dizziness with standing, poor po intake over the last 3 days, hx of tongue/oral ca with radiation/chemo.     Past Medical History:          Diagnosis Date    Anxiety     Bleeds easily ([address]     Bruises easily     Cerebrovascular accident (CVA) due to embolism of right posterior cerebral artery ([address] 04/09/2019    Cigarette nicotine dependence without complication 0/2/2308    COPD (chronic obstructive pulmonary disease) ([address]     DVT (deep venous thrombosis) ([address] 2009    Enlarged lymph node     History of emotional problems     History of sleep apnea     Hypertension     HCC    Moderate episode of recurrent major depressive disorder ([address] 4/16/2018    NSTEMI (non-ST elevated myocardial infarction) ([address] 4/9/2019    Pulmonary embolism and infarction ([address] 10/22/2018    S/P insertion of inferior vena caval filter 07/07/2009    Severe malnutrition ([address] 10/23/2018    Squamous cell carcinoma of tonsils ([address] 2018    left   - radiation    Type II or unspecified type diabetes mellitus without mention of complication, not stated as uncontrolled     lost weight, no meds at present 2018       Past Surgical History:          Procedure Laterality Date    CARDIAC CATHETERIZATION  04/10/2019    HIP SURGERY Right 06/30/2014    Excisin Right Hip Mass-Dr. Farheen Macdonald     KNEE SURGERY Right 1980's    OTHER SURGICAL HISTORY  7-7-09    Donna filter placed     PA OFFICE/OUTPT VISIT,PROCEDURE ONLY Right 10/16/2018    RIGHT FOOT EXCISION ULCER, RIGHT 5th METATARSAL BASE RESECTION performed by Liborio Gandhi DPM at [facility] TOE SURGERY Right 2013    wound to bone (second toe)     TRANSESOPHAGEAL ECHOCARDIOGRAM N/A 4/11/2019    TRANSESOPHAGEAL ECHOCARDIOGRAM performed by Davion Rivas MD at [facility]    TUNNELED VENOUS PORT PLACEMENT         Medications Prior to Admission:      Prior to Admission medications    Medication Sig Start Date End Date Taking? Authorizing Provider   hydrOXYzine (ATARAX) 50 MG tablet Take 1-2 tablets nightly prn 5/22/19   Elta Dec, APRN - CNP   clonazePAM (KLONOPIN) 1 MG tablet Take 1 tablet by mouth 2 times daily as needed for Anxiety for up to 30 days. 5/22/19 6/21/19  Elta Dec, APRN - CNP   morphine (MS CONTIN) 30 MG extended release tablet Take 1 tablet by mouth every 12 hours for 30 days. 5/23/19 6/22/19  Teagan [facility], APRN - CNP   busPIRone (BUSPAR) 10 MG tablet Take 1 tablet by mouth 3 times daily 5/13/19 6/12/19  Elvicki Dec, APRN - CNP   oxyCODONE HCl (OXY-IR) 10 MG immediate release tablet Take 1 tablet by mouth every 8 hours as needed for Pain for up to 30 days. 5/6/19 6/5/19  [facility] - C[facility]   albuterol sulfate  (90 Base) MCG/ACT inhaler Inhale 2 puffs into the lungs every 6 hours as needed for Wheezing 4/22/19   Jamey Lange MD   glucose monitoring kit (FREESTYLE) monitoring kit 1 kit by Does not apply route daily 4/15/19   Jamey Lange MD   atorvastatin (LIPITOR) 40 MG tablet Take 1 tablet by mouth nightly 4/12/19   Nj Hardy DO   nicotine (NICODERM CQ) 21 MG/24HR Place 1 patch onto the skin daily 4/13/19   Nj Hardy DO   apixaban (ELIQUIS) 5 MG TABS tablet Take 1 tablet by mouth 2 times daily 4/12/19   Nj Hardy DO   lidocaine-prilocaine (EMLA) 2.5-2.5 % cream Apply topically as needed.  3/25/19   Jessica Malave MD   sertraline (ZOLOFT) 100 MG tablet Take 2 tablets by mouth daily 1/29/19   Jamey Lange MD   naloxone [facility]) 4 MG/0.1ML LIQD nasal spray 1 spray by Nasal route as needed for Opioid Reversal 12/24/18   YAQUELIN Joe CNP   docusate sodium (COLACE) 100 MG capsule Take 100 mg by mouth 3 times daily as needed for Constipation    Historical Provider, MD   lactulose (CHRONULAC) 10 GM/15ML solution Take 30 mLs by mouth 2 times daily as needed (constipation) 8/23/18   YAQUELIN Quintero CNP       Allergies:  Latex; Coumadin [warfarin sodium]; and Tape [adhesive tape]    Social History:      The patient currently lives home    TOBACCO:   reports that he has been smoking cigarettes. He has a 6.00 pack-year smoking history. He has never used smokeless tobacco.  ETOH:   reports that he does not drink alcohol. Family History:       Reviewed in detail and negative for DM, CAD, Cancer, CVA. Positive as follows:        Problem Relation Age of Onset   [facility] Cancer Mother         lung       Diet:  No diet orders on file    REVIEW OF SYSTEMS:   Pertinent positives as noted in the HPI. All other systems reviewed and negative. PHYSICAL EXAM:    BP (!) 161/79   Pulse 78   Temp 98.3 °F (36.8 °C) (Oral)   Resp 16   Ht 6' (1.829 m)   Wt 175 lb (79.4 kg)   SpO2 98%   BMI 23.73 kg/m²     General appearance:  No apparent distress, appears stated age and cooperative. HEENT:  Normal cephalic, atraumatic with obvious deformity. Pupils equal, round, and reactive to light. Extra ocular muscles intact. Conjunctivae/corneas clear. MM ddry  Neck: Supple, with obvious deformities. no jugular venous distention. Trachea midline. no carotid bruits  Respiratory:  Normal respiratory effort. Clear to auscultation, bilaterally without Rales/Wheezes/Rhonchi. Breath sounds equal bilaterally  Cardiovascular:  Regular rate and rhythm with normal S1/S2 without murmurs, rubs or gallops. PMI non displaced  Abdomen: Soft, non-tender, non-distended with normal bowel sounds. No guarding, rebound. Musculoskeletal:  No clubbing, cyanosis or edema bilaterally.   Full range of motion without deformity. Skin: Skin color, texture, turgor normal.  No rashes or lesions, or suspicious lesions. Neurologic:  Neurovascularly intact without any focal sensory/motor deficits. Cranial nerves: II-XII intact, grossly non-focal.  Psychiatric:  Alert and oriented, thought content appropriate, normal insight  Capillary Refill: Brisk,< 2 seconds   Peripheral Pulses: +2 palpable, equal bilaterally upper and lower extremities  Lymphatics: no lymphadenopathy      Labs:     Recent Labs     05/31/19  0941   WBC 6.0   HGB 14.3   HCT 42.5        Recent Labs     05/31/19  0941      K 3.9      CO2 28   BUN 16   CREATININE 0.7   CALCIUM 9.5     Recent Labs     05/31/19  0941   AST 15   ALT 12   BILITOT 0.5   ALKPHOS 90     Recent Labs     05/31/19  0941   INR 1.15*     No results for input(s): Renay Tejeda in the last 72 hours. Urinalysis:      Lab Results   Component Value Date    NITRU NEGATIVE 05/10/2019    WBCUA 10-15 05/10/2019    BACTERIA NONE 05/10/2019    RBCUA 0-2 05/10/2019    BLOODU NEGATIVE 05/10/2019    SPECGRAV 1.009 04/11/2019    GLUCOSEU NEGATIVE 05/10/2019       Radiology:     CXR: I have reviewed the CXR with the following interpretation:   EKG:  I have reviewed the EKG with the following interpretation:     Cta Head W Wo Contrast    Result Date: 5/31/2019  PROCEDURE: CTA NECK W WO CONTRAST, CTA HEAD W WO CONTRAST CLINICAL INFORMATION: headache, dizziness. COMPARISON: None available. Correlation is made to MR angiogram of the neck dated April 10, 2019. TECHNIQUE: 1 mm axial images were obtained through the head and neck after the fast bolus administration of contrast. A noncontrast localizer was obtained. 3-D reconstructions were performed on a dedicated 3-D workstation. These include multiplanar MPR images and multiplanar MIP images. Centerline reconstructions were obtained of the carotid systems. 80 mL Isovue-370 intravenous contrast was given.  All CT scans at this facility use dose modulation, iterative reconstruction, and/or weight-based dosing when appropriate to reduce radiation dose to as low as reasonably achievable. FINDINGS: Atherosclerotic calcification is present within the aortic arch. There is atherosclerotic calcification at the origins of the great vessels off the arch without flow-limiting stenosis. Atherosclerotic calcification is also present at the origin of the right common carotid artery without flow-limiting stenosis. There is atherosclerotic calcification at the right carotid bifurcation and proximal right cervical internal carotid artery resulting in less than 50% luminal narrowing by NASCET criteria. The remainder of the right cervical internal carotid artery is patent. There is atherosclerotic calcification at the left carotid bifurcation and proximal left cervical internal carotid artery. This results in approximately 63% luminal narrowing by NASCET criteria within the proximal left cervical internal carotid artery. The remainder of the left cervical internal carotid artery is patent. The origin of the right vertebral artery is patent. The right vertebral artery is nondominant. The remainder of the right vertebral artery is patent. The origin of the left vertebral artery is patent. The remainder of the left vertebral artery is dominant and patent. The vertebral arteries join intracranially to form a small yet patent basilar artery. Intracranially, there is atherosclerotic calcification within the bilateral cavernous and clinoid internal carotid arteries without flow-limiting stenosis. The bilateral MCAs and ACAs are patent with normal arborization of the distal branches. There is a  patent anterior communicating artery. The left T1 segment is absent and there is a fetal origin of the left PCA, a normal variant. There is tapering and suggestion of complete obstruction of the right P2 segment of the right PCA.  This is best seen on image 467 of series 6 and image 17 of series 11. This appears similar to the prior MR angiogram dated April 10, 2019. The superior sagittal sinus, vein of Parish, internal cerebral veins, straight sinus, transverse sinuses and sigmoid sinuses are patent. Hypoattenuation is present within the medial aspect of the right occipital lobe. This likely corresponds to prior ischemia. The visualized orbits and temporal bone structures are within normal limits. A mucous retention cyst is again noted within the right maxillary sinus. Multilevel degenerative changes are present within the cervical spine. No suspicious osseous lesion is grossly identified. Remote, healed rib fractures are noted on the right. No suspicious finding is grossly identified within the paraspinal soft tissues on this nondedicated exam. No suspicious finding is grossly identified within the visualized lung apices. Calcifications are noted within the mediastinum which likely correspond to sequela of prior granulomatous disease. 1. There is irregular and narrowing and occlusion of the right P2 segment of the right PCA. Occlusion was also noted on the prior MR angiogram of the brain dated April 10, 2019. There is hypoattenuation within the right occipital lobe which likely corresponds to associated prior ischemic changes. 2. Atherosclerotic calcification is present at the bilateral carotid bifurcations. There is atherosclerotic calcification at the proximal left cervical internal carotid artery resulting in approximately 63% luminal narrowing by NASCET criteria. Atherosclerotic calcification at the right carotid bifurcation and proximal right cervical internal carotid artery results in less than 50% luminal narrowing by NASCET criteria. **This report has been created using voice recognition software. It may contain minor errors which are inherent in voice recognition technology. ** Final report electronically signed by Dr. Hina Schmid on 5/31/2019 11:40 AM    Ct Head Wo Contrast    Result Date: 5/31/2019  PROCEDURE: CT HEAD WO CONTRAST CLINICAL INFORMATION: 73-year-old male with headache. Dizziness. COMPARISON: No prior study. TECHNIQUE: Noncontrast 5 mm axial images were obtained through the brain. All CT scans at this facility use dose modulation, iterative reconstruction, and/or weight-based dosing when appropriate to reduce radiation dose to as low as reasonably achievable. FINDINGS: An area of encephalomalacia is again seen in the right occipital lobe. Lacunar infarct is also seen in the left cerebellar hemisphere. There is no hemorrhage. There are no intra-or extra-axial collections. There is no hydrocephalus, midline shift or mass effect. The gray-white matter differentiation is preserved. There is some hypoattenuation in the periventricular white matter which may represent chronic microvascular ischemic disease. There is a mucus retention cyst in the right maxillary sinus. The remaining paranasal sinuses and mastoid air cells are clear. There is no suspicious calvarial abnormality. 1. No acute intracranial hemorrhage, mass effect or midline shift. 2. Stable encephalomalacia in the right occipital lobe presumably from previous infarction. **This report has been created using voice recognition software. It may contain minor errors which are inherent in voice recognition technology. ** Final report electronically signed by Dr James Basilio on 5/31/2019 11:11 AM    Ct Head Wo Contrast (code Stroke)    Result Date: 5/10/2019  PROCEDURE: CT HEAD WO CONTRAST CLINICAL INFORMATION: VISION CHANGES. COMPARISON: 4/17/2019 TECHNIQUE: Noncontrast 5 mm axial images were obtained through the brain. All CT scans at this facility use dose modulation, iterative reconstruction, and/or weight-based dosing when appropriate to reduce radiation dose to as low as reasonably achievable. FINDINGS: Old infarction right occipital parietal lobe. Old left cerebellar lacunar infarction. proximal left cervical internal carotid artery. This results in approximately 63% luminal narrowing by NASCET criteria within the proximal left cervical internal carotid artery. The remainder of the left cervical internal carotid artery is patent. The origin of the right vertebral artery is patent. The right vertebral artery is nondominant. The remainder of the right vertebral artery is patent. The origin of the left vertebral artery is patent. The remainder of the left vertebral artery is dominant and patent. The vertebral arteries join intracranially to form a small yet patent basilar artery. Intracranially, there is atherosclerotic calcification within the bilateral cavernous and clinoid internal carotid arteries without flow-limiting stenosis. The bilateral MCAs and ACAs are patent with normal arborization of the distal branches. There is a  patent anterior communicating artery. The left T1 segment is absent and there is a fetal origin of the left PCA, a normal variant. There is tapering and suggestion of complete obstruction of the right P2 segment of the right PCA. This is best seen on image 467 of series 6 and image 17 of series 11. This appears similar to the prior MR angiogram dated April 10, 2019. The superior sagittal sinus, vein of Parish, internal cerebral veins, straight sinus, transverse sinuses and sigmoid sinuses are patent. Hypoattenuation is present within the medial aspect of the right occipital lobe. This likely corresponds to prior ischemia. The visualized orbits and temporal bone structures are within normal limits. A mucous retention cyst is again noted within the right maxillary sinus. Multilevel degenerative changes are present within the cervical spine. No suspicious osseous lesion is grossly identified. Remote, healed rib fractures are noted on the right.  No suspicious finding is grossly identified within the paraspinal soft tissues on this nondedicated exam. No suspicious finding is grossly identified within the visualized lung apices. Calcifications are noted within the mediastinum which likely correspond to sequela of prior granulomatous disease. 1. There is irregular and narrowing and occlusion of the right P2 segment of the right PCA. Occlusion was also noted on the prior MR angiogram of the brain dated April 10, 2019. There is hypoattenuation within the right occipital lobe which likely corresponds to associated prior ischemic changes. 2. Atherosclerotic calcification is present at the bilateral carotid bifurcations. There is atherosclerotic calcification at the proximal left cervical internal carotid artery resulting in approximately 63% luminal narrowing by NASCET criteria. Atherosclerotic calcification at the right carotid bifurcation and proximal right cervical internal carotid artery results in less than 50% luminal narrowing by NASCET criteria. **This report has been created using voice recognition software. It may contain minor errors which are inherent in voice recognition technology. ** Final report electronically signed by Dr. Miriam Hammond on 5/31/2019 11:40 AM    Xr Chest Portable    Result Date: 5/10/2019  PROCEDURE: XR CHEST PORTABLE CLINICAL INFORMATION: chest pain. COMPARISON: April 17, 2019 TECHNIQUE: Portable. FINDINGS: Right chest wall Port-A-Cath with tip in the SVC. Calcified granuloma right midlung. Calcified granulomas bilateral brittny. Chronic thickening costophrenic recesses. Implantable according device overlying the left heart border. Prominent reticular markings  in the lung bases. No cardiomegaly. No acute osseous findings. Stable radiographic appearance of the chest. No acute findings. **This report has been created using voice recognition software. It may contain minor errors which are inherent in voice recognition technology. ** Final report electronically signed by Dr. Marlene Villeda on 5/10/2019 2:48 PM    Pet Ct Skull Base Mid Thigh Restage    Result Date: 5/29/2019  PROCEDURE: PET CT SKULL BASE MID THIGH RESTAGE CLINICAL INFORMATION: Malignant neoplasm of tonsil ([address] . COMPARISON: 11/7/2018 TECHNIQUE: Radiopharmaceutical: 14.5 mCi F-18 FDG. PET/CT imaging was performed from the skull base to the mid thigh levels using routine PET acquisition. Injection site: Right antecubital space. Injection time: 10:00 AM. Fasting blood glucose: 92 mg/dL. Image acquisition 11:07 AM FINDINGS: NECK: There is focal area of increased FDG metabolism in the deep left retropharyngeal soft tissues without a true CT correlate. SUV max 3.6 image 37. Diffuse increased activity in the long muscles of the right side of the neck. Vocal cord activity which is considered physiologic. Bilateral long muscle activity at the base of the neck. No FDG avid cervical lymphadenopathy. CHEST: Infusion port catheter over the right chest. Heart size remains normal. No mediastinal, hilar, or axillary FDG avid lymphadenopathy. Patchy haziness and reticular nodular densities in the lingula SUV max 1.3 similar finding to the posterior left lung base. Redemonstration of right medial basilar pleural and parenchymal scarring No FDG avid focal lung masses are seen. Multiple healed rib fractures degenerative changes of the thoracic spine. No FDG avid osseous lesions. Abdomen pelvis: Physiologic activity is present in the Liver, spleen, urinary collecting system and gastrointestinal tract. Stable position IVC filter. Perinephric stranding which is chronic. Constipation. Chronic thickening of the bladder wall. Immediately related to cystitis. Moderate constipation. No FDG avid mesenteric, retroperitoneal, pelvic or inguinal lymphadenopathy is identified. Dictated changes seen in the lumbar spine and pelvis. No FDG avid osseous lesions     1. New focal area of hypermetabolism in the deep left posterior nasopharyngeal soft tissues, without a distinct CT correlate.  This is concerning for recurrent disease. 2. No other evidence of distant hypermetabolic metastases. 3. Reticular nodular densities and haziness in the lingula and left base with minimal increased FDG metabolism. This is likely on the basis of infectious or inflammatory pathology. **This report has been created using voice recognition software. It may contain minor errors which are inherent in voice recognition technology. ** Final report electronically signed by Dr. Maude Cota on 5/29/2019 2:43 PM           DVT prophylaxis: [] Lovenox                                 [] SCDs                                 [] SQ Heparin                                 [] Encourage ambulation           [x] Already on Anticoagulation    Code Status: Prior      PT/OT Eval Status:     Disposition:    [x] Home       [] TCU       [] Rehab       [] Psych       [] SNF       [] [place]       [] Other-    ASSESSMENT:    C/L[address] Problems    Diagnosis Date Noted    Other headache syndrome [G44.89] 05/31/2019    Pre-syncope [R55] 05/31/2019    Dehydration [E86.0] 05/31/2019    Orthostatic hypotension [I95.1] 05/31/2019    Dizziness [R42]     Severe malnutrition ([address] [E43] 10/23/2018    Type 2 diabetes mellitus without complication, without long-term current use of insulin ([address] [E11.9] 04/16/2018       PLAN:    1. Observation  2. ivf  3. Resume home meds  4. Ortho vitals  5. Increase po intake  6. Follow blood glucose closely        Thank you Calvin Ortiz MD for the opportunity to be involved in this patient's care.     Electronically signed by Madeline Chisholm DO on 5/31/2019 at 1:00 PM

## 2019-05-31 NOTE — ED NOTES
Patient transported to 8A11 in stable condition. Patient monitored on telemetry.            Louie Akins, ROSANNAN  [id number]

## 2019-05-31 NOTE — PROGRESS NOTES
Nutrition Assessment    Type and Reason for Visit: Initial, Positive Nutrition Screen(weight loss; poor po pta)    Nutrition Recommendations: Changed diet to dental soft per pt. Request. Continue Ensure Enlive TID. Recommend MVI    Nutrition Assessment: Pt. severely malnourished AEB poor appetite;weight loss; severe muscle and fat loss over the past year due to effects of chemo/XRT for Tx of tonsillar CA. 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 Meds include zofran  Glucose 97   Will send ONS TID;changing diet texture to facilitate easier swallowing; encouraged 3-4 Ensure daily at home and discussed other ONS options for variety. Malnutrition Assessment:  · Malnutrition Status: Meets the criteria for severe malnutrition  · Context: Chronic illness  · Findings of the 6 clinical characteristics of malnutrition (Minimum of 2 out of 6 clinical characteristics is required to make the diagnosis of moderate or severe Protein Calorie Malnutrition based on AND/ASPEN Guidelines):  1. Energy Intake-Less than or equal to 75% of estimated energy requirement, Greater than or equal to 1 month    2. Weight Loss-10% loss or greater(19%), (8 months)  3. Fat Loss-Severe subcutaneous fat loss, Orbital, Triceps  4. Muscle Loss-Severe muscle mass loss, Temples (temporalis muscle), Clavicles (pectoralis and deltoids)  5.   Strength-(pt. states he feels weak)    Nutrition Risk Level: High    Nutrient Needs:  · Estimated Daily Total Kcal: 2340 (30/kgm based on 78kgm 5/31)  · Estimated Daily Protein (g): 117 grams (1.5/kgm based on 78kgm 5/31)    Nutrition Diagnosis:   · Problem: Severe malnutrition, In context of chronic illness  · Etiology: related to Insufficient energy/nutrient consumption     Signs and symptoms:  as evidenced by Patient report of, Diet history of poor intake, Weight loss, Severe loss of subcutaneous fat, Severe muscle loss, Other (Comment)(difficulty swallowing since chemo/XRT for tonsillar CA)    Objective Information:  · Nutrition-Focused Physical Findings: thin;weak; reports poor po and difficulty swallowing due to scarring from previous chemo and XRT; BM 0; requests dental soft texture; RD weighed pt. - pt. states \"oh no I have lost even more\"   · Wound Type: None  · Current Nutrition Therapies:  · Oral Diet Orders: Dental Soft   · Oral Diet intake: Unable to assess(starting today)  · Oral Nutrition Supplement (ONS) Orders: Standard High Calorie Oral Supplement(Enlive TID)  · ONS intake: Unable to assess(starting today - pt. drinks at home)  · Anthropometric Measures:  · Ht: 6' (182.9 cm)   · Current Body Wt: 171 lb 14.4 oz (78 kg)(5/31 with no edema)  · Admission Body Wt: 171 lb (77.6 kg)(5/31 with no edema)  · Usual Body Wt: 213 lb (96.6 kg)(9/25/18; 208# 1/28/19; 188# 4/17/19; 174# 5/24/19)  · % Weight Change:  ,  steadily decreasing - 41# or 19% in past 8 months; per pt. at Dx of CA he was ~280#  · Ideal Body Wt: 178 lb (80.7 kg),   · % Ideal Body 96%  · BMI Classification: BMI 18.5 - 24.9 Normal Weight(23.4)    Nutrition Interventions:   Modify current diet, Start ONS(changed diet to dental soft)  Continued Inpatient Monitoring, Education Initiated, Coordination of Care(discussed ONS for here and home and ways to increase protein/kcals in diet 5/31)    Nutrition Evaluation:   · Evaluation: Goals set   · Goals: consume greater than 75% meals during LOS    · Monitoring: Meal Intake, Supplement Intake, Diet Tolerance, Skin Integrity, Weight, Pertinent Labs, Chewing/Swallowing, Monitor Bowel Function      Electronically signed by Gene Jhaveri RD, LD on 5/31/19 at 3:48 PM    Contact Number: [phone]

## 2019-05-31 NOTE — PLAN OF CARE
Problem: Nutrition  Goal: Optimal nutrition therapy  Outcome: Ongoing   Nutrition Problem: Severe malnutrition, In context of chronic illness  Intervention: Food and/or Nutrient Delivery: Modify current diet, Start ONS(changed diet to dental soft)  Nutritional Goals: consume greater than 75% meals during LOS

## 2019-05-31 NOTE — ED PROVIDER NOTES
[facility]  eMERGENCY dEPARTMENT eNCOUnter          CHIEF COMPLAINT       Chief Complaint   Patient presents with    Headache    Dizziness       Nurses Notes reviewed and I agree except as noted in the HPI. HISTORY OF PRESENT ILLNESS    Rufina David is a 62 y.o. male who presents to the Emergency Department for the evaluation of lightheadedness and headache it acutely occurred this morning. States that last night he was asymptomatic. Lightheadedness described as a  Faint tingling-like sensation. No falls or loss of consciousness. States significant difficulty walking due to lightheadedness. 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 No surgical  Intervention was required but he did receive numerous doses of radiation and chemotherapy. Post treatment he developed dysphagia secondary to scar tissue from the treatment. The HPI was provided by the patient. REVIEW OF SYSTEMS     Review of Systems   Constitutional: Positive for fatigue. Negative for chills, diaphoresis and fever. HENT: Negative for congestion and rhinorrhea. Respiratory: Negative for cough, chest tightness, shortness of breath, wheezing and stridor. Cardiovascular: Negative for chest pain, palpitations and leg swelling. Gastrointestinal: Negative for abdominal distention, abdominal pain, constipation, diarrhea, nausea and vomiting. Genitourinary: Negative for difficulty urinating and dysuria. Musculoskeletal: Negative for back pain, neck pain and neck stiffness. Skin: Negative for pallor, rash and wound. Neurological: Positive for dizziness, light-headedness and headaches. Negative for syncope, weakness and numbness. Psychiatric/Behavioral: Negative for agitation and confusion.        PAST MEDICAL HISTORY    has a past medical history of Anxiety, Bleeds easily ([address] Bruises easily, Cerebrovascular accident (CVA) due to embolism of right posterior cerebral artery (HCC), Cigarette nicotine dependence without complication, COPD (chronic obstructive pulmonary disease) ([facility]), DVT (deep venous thrombosis) ([facility] [address] Enlarged lymph node, History of emotional problems, History of sleep apnea, Hypertension, Moderate episode of recurrent major depressive disorder ([facility] [address] NSTEMI (non-ST elevated myocardial infarction) [facility]), Pulmonary embolism and infarction ([facility] S/P insertion of inferior vena caval filter, Severe malnutrition ([facility] Squamous cell carcinoma of tonsils ([facility] and Type II or unspecified type diabetes mellitus without mention of complication, not stated as uncontrolled. SURGICAL HISTORY      has a past surgical history that includes knee surgery (Right, 1980's); Toe Surgery (Right, 2013); other surgical history (7-7-09); hip surgery (Right, 06/30/2014); Tunneled venous port placement; pr office/outpt visit,procedure only (Right, 10/16/2018); Cardiac catheterization (04/10/2019); and transesophageal echocardiogram (N/A, 4/11/2019). CURRENT MEDICATIONS       Current Discharge Medication List      CONTINUE these medications which have NOT CHANGED    Details   busPIRone (BUSPAR) 10 MG tablet Take 1 tablet by mouth 3 times daily  Qty: 90 tablet, Refills: 2    Associated Diagnoses: Generalized anxiety disorder      apixaban (ELIQUIS) 5 MG TABS tablet Take 1 tablet by mouth 2 times daily  Qty: 60 tablet, Refills: 1    Associated Diagnoses: Other acute pulmonary embolism without acute cor pulmonale ([facility])      hydrOXYzine (ATARAX) 50 MG tablet Take 1-2 tablets nightly prn  Qty: 60 tablet, Refills: 2    Associated Diagnoses: Generalized anxiety disorder      clonazePAM (KLONOPIN) 1 MG tablet Take 1 tablet by mouth 2 times daily as needed for Anxiety for up to 30 days. Qty: 60 tablet, Refills: 2    Associated Diagnoses: Generalized anxiety disorder      morphine (MS CONTIN) 30 MG extended release tablet Take 1 tablet by mouth every 12 hours for 30 days.   Qty: 60 sodium]; and tape [adhesive tape]. FAMILY HISTORY     indicated that his mother is . He indicated that his father is . He indicated that his maternal grandmother is . He indicated that his maternal grandfather is . He indicated that his paternal grandmother is . He indicated that his paternal grandfather is . family history includes Cancer in his mother. SOCIAL HISTORY      reports that he has been smoking cigarettes. He has a 6.00 pack-year smoking history. He has never used smokeless tobacco. He reports that he does not drink alcohol or use drugs. PHYSICAL EXAM   INITIAL VITALS:  height is 6' (1.829 m) and weight is 175 lb (79.4 kg). His oral temperature is 98.7 °F (37.1 °C). His blood pressure is 148/80 (abnormal) and his pulse is 89. His respiration is 18 and oxygen saturation is 93%. Physical Exam   Constitutional: He is oriented to person, place, and time. No distress. HENT:   Head: Normocephalic and atraumatic. Right Ear: External ear normal.   Left Ear: External ear normal.   Mouth/Throat: Oropharynx is clear and moist. No oropharyngeal exudate. Eyes: Pupils are equal, round, and reactive to light. EOM are normal. Right eye exhibits no discharge. Left eye exhibits no discharge. Neck: Normal range of motion. Cardiovascular: Normal rate, regular rhythm and normal heart sounds. No murmur heard. Pulmonary/Chest: Effort normal and breath sounds normal. No stridor. No respiratory distress. He has no wheezes. He has no rales. He exhibits no tenderness. Abdominal: Soft. He exhibits no distension and no mass. There is no tenderness. There is no rebound and no guarding. Musculoskeletal: He exhibits no edema, tenderness or deformity. Neurological: He is alert and oriented to person, place, and time. No sensory deficit. He exhibits normal muscle tone. Positive Romberg. Unsteady gait due lightheadedness. Normal finger to nose.    Skin: Skin is warm and dry. Capillary refill takes less than 2 seconds. No rash noted. He is not diaphoretic. No erythema. No pallor. Psychiatric: He has a normal mood and affect. Judgment normal.       GLASCOW COMA SCALE  NEUROMUSCULAR BLOCKADE PRIOR TO ARRIVAL [x]No []Yes    Variable  Score   Variable  Score  Eye opening [x]Spontaneous 4 Verbal  [x]Oriented  5     []To voice  3   []Confused  4    []To pain  2   []Inapp words  3    []None  1   []Incomp words 2       []None  1   Motor   [x]Obeys  6    []Localizes pain 5    []Withdraws(pain) 4    []Flexion(pain) 3  []Extension(pain) 2    []None  1     GCS Total = 15        DIFFERENTIAL DIAGNOSIS:   Diagnoses discussed with the patient and include but not limited to vertigo, CVA, dehydration, orthostatic hypotension,  Also consider but less likely carotid stenosis, ACS, PE    DIAGNOSTIC RESULTS     EKG: AllEKG's are interpreted by the Emergency Department Physician who either signs or Co-signs this chart in the absence of a cardiologist.  normal sinus rhythm. Ventricular rate 70. . QRS 84. QTc 423. No ST elevation. Nonspecific T-wave changes. RADIOLOGY: non-plain film images(s) such as CT, Ultrasound and MRI are read by the radiologist.    [address]   Final Result      1. No acute intracranial hemorrhage, mass effect or midline shift. 2. Stable encephalomalacia in the right occipital lobe presumably from previous infarction. **This report has been created using voice recognition software. It may contain minor errors which are inherent in voice recognition technology. **      Final report electronically signed by Dr Justino Posada on 5/31/2019 11:11 AM      CTA HEAD W WO CONTRAST   Final Result       1. There is irregular and narrowing and occlusion of the right P2 segment of the right PCA. Occlusion was also noted on the prior MR angiogram of the brain dated April 10, 2019.  There is hypoattenuation within the right occipital lobe limits   CBC WITH AUTO DIFFERENTIAL - Abnormal; Notable for the following components:    RDW-SD 45.5 (*)     Lymphocytes # 0.8 (*)     All other components within normal limits   URINALYSIS WITH MICROSCOPIC - Abnormal; Notable for the following components:    Ketones, Urine 15 (*)     Leukocyte Esterase, Urine SMALL (*)     All other components within normal limits   URINE CULTURE   APTT   LIPASE   MAGNESIUM   TROPONIN   COMPREHENSIVE METABOLIC PANEL   ANION GAP   GLOMERULAR FILTRATION RATE, ESTIMATED   OSMOLALITY       EMERGENCY DEPARTMENT COURSE:   Vitals:    Vitals:    05/31/19 1155 05/31/19 1317 05/31/19 1358 05/31/19 1435   BP: (!) 161/79  (!) 148/80    Pulse: 78 74 89    Resp: 16 15 18    Temp:   98.7 °F (37.1 °C)    TempSrc:   Oral    SpO2:  99% 95% 93%   Weight:       Height:           9:04 AM: The patient was seen and evaluated in a timely fashion. MEDICAL DECISIONMAKING:           [facility] hospital course. Positive orthostatic blood pressures. I'm concerned for possible dehydration secondary to dysphagia from the numerous bouts of radiation post tongue carcinoma. Imaging of the head unremarkable for any new or significant processes. Ischemia still present from previous CVA on April 2019. No intracranial hemorrhage noted. I discussed the results with the patient. Also discussed blood work. I also discussed admission to the patient considering that he has positive orthostatic vital signs. Patient agrees to admission. IV fluids continued. All questions answered. All blood work and imaging discussed. I discussed the case with the hospitalist will kindly admit the patient for further evaluation and treatment. CRITICAL CARE:   none       CONSULTS:  n/a    PROCEDURES:  None    FINAL IMPRESSION      1. Dizziness    2. Lightheadedness    3.  Orthostatic hypotension          DISPOSITION/PLAN   admit    PATIENT REFERRED TO:  Pérez Keenan MD  [address] [place] [id number]  [phone]            DISCHARGE MEDICATIONS:  Current Discharge Medication List          (Please note that portions of this note were completed with a voice recognition program.  Efforts were made to edit the dictations but occasionally words aremis-transcribed.)        Torres Beard MD  05/31/19 3758

## 2019-05-31 NOTE — ED NOTES
Bed: 002A  Expected date: 5/31/19  Expected time: 8:38 AM  Means of arrival: [facility]  Comments:     Tim Humphries RN  05/31/19 4348

## 2019-06-01 VITALS
OXYGEN SATURATION: 95 % | DIASTOLIC BLOOD PRESSURE: 62 MMHG | RESPIRATION RATE: 18 BRPM | WEIGHT: 175 LBS | SYSTOLIC BLOOD PRESSURE: 105 MMHG | BODY MASS INDEX: 23.7 KG/M2 | TEMPERATURE: 98.1 F | HEART RATE: 55 BPM | HEIGHT: 72 IN

## 2019-06-01 LAB
ANION GAP SERPL CALCULATED.3IONS-SCNC: 8 MEQ/L (ref 8–16)
BASOPHILS # BLD: 0.6 %
BASOPHILS ABSOLUTE: 0 THOU/MM3 (ref 0–0.1)
BUN BLDV-MCNC: 21 MG/DL (ref 7–22)
CALCIUM SERPL-MCNC: 8.8 MG/DL (ref 8.5–10.5)
CHLORIDE BLD-SCNC: 104 MEQ/L (ref 98–111)
CO2: 29 MEQ/L (ref 23–33)
CREAT SERPL-MCNC: 0.7 MG/DL (ref 0.4–1.2)
EOSINOPHIL # BLD: 3.1 %
EOSINOPHILS ABSOLUTE: 0.2 THOU/MM3 (ref 0–0.4)
ERYTHROCYTE [DISTWIDTH] IN BLOOD BY AUTOMATED COUNT: 14.1 % (ref 11.5–14.5)
ERYTHROCYTE [DISTWIDTH] IN BLOOD BY AUTOMATED COUNT: 47.1 FL (ref 35–45)
GFR SERPL CREATININE-BSD FRML MDRD: > 90 ML/MIN/1.73M2
GLUCOSE BLD-MCNC: 89 MG/DL (ref 70–108)
HCT VFR BLD CALC: 38 % (ref 42–52)
HEMOGLOBIN: 12.4 GM/DL (ref 14–18)
IMMATURE GRANS (ABS): 0 THOU/MM3 (ref 0–0.07)
IMMATURE GRANULOCYTES: 0 %
LYMPHOCYTES # BLD: 21.2 %
LYMPHOCYTES ABSOLUTE: 1.1 THOU/MM3 (ref 1–4.8)
MAGNESIUM: 1.8 MG/DL (ref 1.6–2.4)
MCH RBC QN AUTO: 29.7 PG (ref 26–33)
MCHC RBC AUTO-ENTMCNC: 32.6 GM/DL (ref 32.2–35.5)
MCV RBC AUTO: 90.9 FL (ref 80–94)
MONOCYTES # BLD: 11.5 %
MONOCYTES ABSOLUTE: 0.6 THOU/MM3 (ref 0.4–1.3)
NUCLEATED RED BLOOD CELLS: 0 /100 WBC
ORGANISM: ABNORMAL
PLATELET # BLD: 166 THOU/MM3 (ref 130–400)
PMV BLD AUTO: 10.2 FL (ref 9.4–12.4)
POTASSIUM REFLEX MAGNESIUM: 4.2 MEQ/L (ref 3.5–5.2)
RBC # BLD: 4.18 MILL/MM3 (ref 4.7–6.1)
SEG NEUTROPHILS: 63.6 %
SEGMENTED NEUTROPHILS ABSOLUTE COUNT: 3.3 THOU/MM3 (ref 1.8–7.7)
SODIUM BLD-SCNC: 141 MEQ/L (ref 135–145)
URINE CULTURE, ROUTINE: ABNORMAL
WBC # BLD: 5.2 THOU/MM3 (ref 4.8–10.8)

## 2019-06-01 PROCEDURE — 6370000000 HC RX 637 (ALT 250 FOR IP): Performed by: PHYSICIAN ASSISTANT

## 2019-06-01 PROCEDURE — 36415 COLL VENOUS BLD VENIPUNCTURE: CPT

## 2019-06-01 PROCEDURE — 85025 COMPLETE CBC W/AUTO DIFF WBC: CPT

## 2019-06-01 PROCEDURE — 2709999900 HC NON-CHARGEABLE SUPPLY

## 2019-06-01 PROCEDURE — 2580000003 HC RX 258: Performed by: INTERNAL MEDICINE

## 2019-06-01 PROCEDURE — 83735 ASSAY OF MAGNESIUM: CPT

## 2019-06-01 PROCEDURE — 80048 BASIC METABOLIC PNL TOTAL CA: CPT

## 2019-06-01 PROCEDURE — 99217 PR OBSERVATION CARE DISCHARGE MANAGEMENT: CPT | Performed by: HOSPITALIST

## 2019-06-01 PROCEDURE — 6370000000 HC RX 637 (ALT 250 FOR IP): Performed by: INTERNAL MEDICINE

## 2019-06-01 PROCEDURE — 93010 ELECTROCARDIOGRAM REPORT: CPT | Performed by: INTERNAL MEDICINE

## 2019-06-01 PROCEDURE — G0378 HOSPITAL OBSERVATION PER HR: HCPCS

## 2019-06-01 RX ORDER — CLONAZEPAM 1 MG/1
1 TABLET ORAL 2 TIMES DAILY PRN
Status: DISCONTINUED | OUTPATIENT
Start: 2019-06-01 | End: 2019-06-01 | Stop reason: HOSPADM

## 2019-06-01 RX ADMIN — CLONAZEPAM 1 MG: 1 TABLET ORAL at 02:22

## 2019-06-01 RX ADMIN — SODIUM CHLORIDE: 9 INJECTION, SOLUTION INTRAVENOUS at 11:21

## 2019-06-01 RX ADMIN — APIXABAN 5 MG: 5 TABLET, FILM COATED ORAL at 04:21

## 2019-06-01 RX ADMIN — MORPHINE SULFATE 30 MG: 30 TABLET, EXTENDED RELEASE ORAL at 03:46

## 2019-06-01 RX ADMIN — FAMOTIDINE 20 MG: 20 TABLET ORAL at 10:12

## 2019-06-01 RX ADMIN — BUSPIRONE HYDROCHLORIDE 10 MG: 10 TABLET ORAL at 10:11

## 2019-06-01 RX ADMIN — BUSPIRONE HYDROCHLORIDE 10 MG: 10 TABLET ORAL at 14:17

## 2019-06-01 RX ADMIN — OXYCODONE HYDROCHLORIDE 10 MG: 5 TABLET ORAL at 10:12

## 2019-06-01 RX ADMIN — SERTRALINE 200 MG: 100 TABLET, FILM COATED ORAL at 10:11

## 2019-06-01 RX ADMIN — MORPHINE SULFATE 30 MG: 30 TABLET, EXTENDED RELEASE ORAL at 15:08

## 2019-06-01 ASSESSMENT — PAIN SCALES - GENERAL
PAINLEVEL_OUTOF10: 7
PAINLEVEL_OUTOF10: 5
PAINLEVEL_OUTOF10: 7
PAINLEVEL_OUTOF10: 6
PAINLEVEL_OUTOF10: 6

## 2019-06-01 NOTE — FLOWSHEET NOTE
06/01/19 1228   Provider Notification   Reason for Communication Evaluate   Provider Name Malathi   Provider Notification Physician   Method of Communication Secure Message   Response Waiting for response   Notification Time [phone]   6/1/19 12:27 PM   [phone] From: Nolan Witt RE: Emily Hahn 8A-11 Patient is very anxious to be discharged. repeatedly asking when his doctor will be in. Please advise.

## 2019-06-01 NOTE — DISCHARGE SUMMARY
Hospital Medicine Discharge Summary      Patient Identification:   Graham Thompson   : 1960  MRN: [id number]   Account: [de-identified]      Patient's PCP: Ronald Hines MD    Admit Date: 2019     Discharge Date: 2019     Admitting Physician: Leslie Epps DO     Discharge Physician: Trupti Polanco MD     Discharge Diagnoses: Active Hospital Problems    Diagnosis Date Noted    Other headache syndrome [G44.89] 2019    Pre-syncope [R55] 2019    Dehydration [E86.0] 2019    Orthostatic hypotension [I95.1] 2019    Dizziness [R42]     Severe malnutrition ([facility] [E43] 10/23/2018    Type 2 diabetes mellitus without complication, without long-term current use of insulin ([facility] [facility] [E11.9] 2018       The patient was seen and examined on day of discharge and this discharge summary is in conjunction with any daily progress note from day of discharge. Hospital Course:   Graham Thompson is a 62 y.o. male admitted to [facility] on 2019 for orthostatic hypotension. 1) Orthostatic hypotension: Likely secondary to poor PO itnake. Orthostatic vitals confirmed orthostasis. He was hydration. Symptoms improved. 2) Vertigo: Resolved with hydration. Exam:     Vitals:  Vitals:    19 2314 19 0329 19 1000 19 1142   BP: 126/70 132/74 117/64 105/62   Pulse: 65 59 64 55   Resp: 17 16 18 18   Temp: 98.2 °F (36.8 °C) 98.1 °F (36.7 °C) 97.8 °F (36.6 °C) 98.1 °F (36.7 °C)   TempSrc: Oral Oral Oral Oral   SpO2: 93% 97% 97% 95%   Weight:       Height:         Weight: Weight: 175 lb (79.4 kg)     24 hour intake/output:    Intake/Output Summary (Last 24 hours) at 2019 1905  Last data filed at 2019 0331  Gross per 24 hour   Intake 876.28 ml   Output 200 ml   Net 676.28 ml         General appearance:  No apparent distress, appears stated age and cooperative. HEENT:  Normal cephalic, atraumatic without obvious deformity.  Pupils equal, round, presumably from previous infarction. **This report has been created using voice recognition software. It may contain minor errors which are inherent in voice recognition technology. **      Final report electronically signed by Dr Charmayne Clarity on 5/31/2019 11:11 AM      CTA HEAD W WO CONTRAST   Final Result       1. There is irregular and narrowing and occlusion of the right P2 segment of the right PCA. Occlusion was also noted on the prior MR angiogram of the brain dated April 10, 2019. There is hypoattenuation within the right occipital lobe which likely    corresponds to associated prior ischemic changes. 2. Atherosclerotic calcification is present at the bilateral carotid bifurcations. There is atherosclerotic calcification at the proximal left cervical internal carotid artery resulting in approximately 63% luminal narrowing by NASCET criteria. Atherosclerotic calcification at the right carotid bifurcation and proximal right cervical internal carotid artery results in less than 50% luminal narrowing by NASCET criteria. **This report has been created using voice recognition software. It may contain minor errors which are inherent in voice recognition technology. **      Final report electronically signed by Dr. Mickey Curiel on 5/31/2019 11:40 AM      CTA [address]   Final Result       1. There is irregular and narrowing and occlusion of the right P2 segment of the right PCA. Occlusion was also noted on the prior MR angiogram of the brain dated April 10, 2019. There is hypoattenuation within the right occipital lobe which likely    corresponds to associated prior ischemic changes. 2. Atherosclerotic calcification is present at the bilateral carotid bifurcations. There is atherosclerotic calcification at the proximal left cervical internal carotid artery resulting in approximately 63% luminal narrowing by NASCET criteria.     Atherosclerotic calcification at

## 2019-06-03 ENCOUNTER — OFFICE VISIT (OUTPATIENT)
Dept: PHYSICAL MEDICINE AND REHAB | Age: 59
End: 2019-06-03
Payer: MEDICARE

## 2019-06-03 VITALS
BODY MASS INDEX: 23.7 KG/M2 | WEIGHT: 175 LBS | HEIGHT: 72 IN | SYSTOLIC BLOOD PRESSURE: 147 MMHG | DIASTOLIC BLOOD PRESSURE: 71 MMHG

## 2019-06-03 DIAGNOSIS — G89.3 CANCER ASSOCIATED PAIN: ICD-10-CM

## 2019-06-03 DIAGNOSIS — K59.03 THERAPEUTIC OPIOID-INDUCED CONSTIPATION (OIC): ICD-10-CM

## 2019-06-03 DIAGNOSIS — I26.99 PULMONARY EMBOLISM AND INFARCTION (HCC): ICD-10-CM

## 2019-06-03 DIAGNOSIS — C02.9 TONGUE CARCINOMA (HCC): ICD-10-CM

## 2019-06-03 DIAGNOSIS — C09.9 MALIGNANT NEOPLASM OF TONSIL (HCC): ICD-10-CM

## 2019-06-03 DIAGNOSIS — G89.4 CHRONIC PAIN SYNDROME: ICD-10-CM

## 2019-06-03 DIAGNOSIS — T40.2X5A THERAPEUTIC OPIOID-INDUCED CONSTIPATION (OIC): ICD-10-CM

## 2019-06-03 DIAGNOSIS — I63.431 CEREBROVASCULAR ACCIDENT (CVA) DUE TO EMBOLISM OF RIGHT POSTERIOR CEREBRAL ARTERY (HCC): ICD-10-CM

## 2019-06-03 DIAGNOSIS — C09.9 SQUAMOUS CELL CARCINOMA OF LEFT TONSIL (HCC): Primary | ICD-10-CM

## 2019-06-03 DIAGNOSIS — G62.9 NEUROPATHY: ICD-10-CM

## 2019-06-03 PROCEDURE — 99213 OFFICE O/P EST LOW 20 MIN: CPT | Performed by: NURSE PRACTITIONER

## 2019-06-03 RX ORDER — OXYCODONE HYDROCHLORIDE 10 MG/1
10 TABLET ORAL EVERY 8 HOURS PRN
Qty: 90 TABLET | Refills: 0 | Status: SHIPPED | OUTPATIENT
Start: 2019-06-05 | End: 2019-07-03 | Stop reason: SDUPTHER

## 2019-06-03 ASSESSMENT — ENCOUNTER SYMPTOMS
COUGH: 1
SORE THROAT: 1
CHEST TIGHTNESS: 0
CONSTIPATION: 0
BACK PAIN: 0
ABDOMINAL PAIN: 1
WHEEZING: 1
NAUSEA: 0
SHORTNESS OF BREATH: 1
CHOKING: 1
PHOTOPHOBIA: 0
TROUBLE SWALLOWING: 1
EYE PAIN: 0
DIARRHEA: 0
COLOR CHANGE: 0
VOICE CHANGE: 1
VOMITING: 0
SINUS PRESSURE: 0
RHINORRHEA: 0
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
## 2019-06-05 PROBLEM — D64.9 CHRONIC ANEMIA: Status: ACTIVE | Noted: 2019-06-05

## 2019-06-06 ENCOUNTER — CARE COORDINATION (OUTPATIENT)
Dept: CARE COORDINATION | Age: 59
End: 2019-06-06

## 2019-06-06 NOTE — CARE COORDINATION
Ambulatory Care Coordination Note  6/6/2019  CM Risk Score: 10  Johnna Mortality Risk Score:      ACC: Marylee Maxwell, RN    Summary Note: Patient was called for continued Care Coordination follow up and education. Patient shared he continues to feel he has concerns with his memory but is otherwise doing \"ok. \"  Patient denied any c/o increased SOB or cough being present. Patient stated he continues to monitor his BS approx TID but he was unable to provide any specific readings during our call. Patient reported on average BS have been in the 80-90 range. DM and COPD education was briefly reviewed with patient. Patient asked that I talk to [facility] staff who was also present during our call. RNCC spoke with Adrienne Velarde with [facility]. Gabriele Drake stated patient was discharged from nursing services yesterday and continues with ST/PT/OT at this time, but she is unsure how long insurance will continue to cover therapy, mattie. Sydney Jennings stated PT reported concerns with patient this AM as he was \"off\" but she stated patient appears to be at baseline during her visit. Gabriele Drake reported patient stated he has been independent with ADLs and walking within his home. Gabriele Drake shared patient does remain current with [facility] services. RNCC attempted to reach [facility] to discuss patient but she was unavailable. Await return call at this time. Will also discuss with CC Deirdre KEATING. Patient was called back but stated he continues to be working with therapy. Patient was reminded of upcoming MBS appointment and importance of having test completed so clear diet recommendations can be made. Patient verbalized understanding and stated he believes he already has transportation arranged for this appointment. Patient will f/u on transportation today and alert [address] if assistance is needed. Patient stated his niece does continue to assist him at home as needed.   Patient denied any other questions, concerns, or needs and he was encouraged to call with any that may develop. 6/7/19: Discussed CC , Kaylene Cunningham. Kaylene Cunningham will reach out to patient and monitor for possible needs. Actions:   - Reviewed DM and COPD education   - Verified current services  - Discussed with CC Deirdre KEATING  - Reviewed upcoming MBS information and monitored for transportation needs  - Monitored for additional needs          Plan of Care:   - Continue with Care Coordination follow up for continued assistance with his chronic disease management and s/p recent hospitalizations  - Complete COPD education   - Complete DM education   - Monitor for additional needs  - Monitor for readiness to discharge from Care Coordination    Care Coordination Interventions    Program Enrollment:  Complex Care  Referral from Primary Care Provider:  No  [facility]ed Inter[facility]:  Completed (Comment: clint Pagan psychologist )  Diabetes Education:  Completed  Fall Risk Prevention:  Completed  Home Health Services:  Completed (Comment: [facility] - nursing d/c 6/5/19)  Meals on Wheels:  Not Started  Medication Assistance Program:  Declined (Comment: denied any need at this time )  Medi Set or Pill Pack:  Completed (Comment: AARON and bora assisting with med management )  Occupational Therapy:  Completed (Comment: [facility] )  Physical Therapy:  Completed (Comment: [facility])  Smoking Cessation:  Completed  Social Work:  Completed (Comment: [facility] Junior Winchester/Radha. Discussed with ADIN - Deirdre)  Other Therapy Services:  Completed (Comment:  [facility] )  Transportation Support:  Completed  Zone Management Tools:  Completed         Goals Addressed                 This Visit's Progress       Care Coordination     Conditions and Symptoms   Improving     I will schedule office visits, as directed by my provider. I will keep my appointment or reschedule if I have to cancel. I will follow my Zone Management tool to seek urgent or emergent care.   I will notify my provider of any symptoms that indicate a worsening of my condition. Barriers: lack of support, overwhelmed by complexity of regimen and lack of education  Plan for overcoming my barriers: Continued education and support   Confidence: 7/10  Anticipated Goal Completion Date: 8/1/19              Prior to Admission medications    Medication Sig Start Date End Date Taking? Authorizing Provider   oxyCODONE HCl (OXY-IR) 10 MG immediate release tablet Take 1 tablet by mouth every 8 hours as needed for Pain for up to 30 days. 6/5/19 7/5/19  YAQUELIN Pathak CNP   hydrOXYzine (ATARAX) 50 MG tablet Take 1-2 tablets nightly prn 5/22/19   YAQUELIN Holman CNP   clonazePAM (KLONOPIN) 1 MG tablet Take 1 tablet by mouth 2 times daily as needed for Anxiety for up to 30 days. 5/22/19 6/21/19  YAQUELIN Holman CNP   morphine (MS CONTIN) 30 MG extended release tablet Take 1 tablet by mouth every 12 hours for 30 days. 5/23/19 6/22/19  YAQUELIN Pathak CNP   busPIRone (BUSPAR) 10 MG tablet Take 1 tablet by mouth 3 times daily 5/13/19 6/12/19  YAQUELIN Holman CNP   albuterol sulfate  (90 Base) MCG/ACT inhaler Inhale 2 puffs into the lungs every 6 hours as needed for Wheezing 4/22/19   Calvin Ortiz MD   glucose monitoring kit (FREESTYLE) monitoring kit 1 kit by Does not apply route daily 4/15/19   Calvin Ortiz MD   atorvastatin (LIPITOR) 40 MG tablet Take 1 tablet by mouth nightly 4/12/19   Reema Olmstead DO   nicotine (NICODERM CQ) 21 MG/24HR Place 1 patch onto the skin daily 4/13/19   Reema Olmstead DO   apixaban (ELIQUIS) 5 MG TABS tablet Take 1 tablet by mouth 2 times daily 4/12/19   Reema Olmstead DO   lidocaine-prilocaine (EMLA) 2.5-2.5 % cream Apply topically as needed.  3/25/19   Mitchell Medina MD   sertraline (ZOLOFT) 100 MG tablet Take 2 tablets by mouth daily 1/29/19   Calvin Ortiz MD   naloxone [facility]) 4 MG/0.1ML LIQD nasal spray 1 spray by Nasal route as needed for Opioid Reversal 12/24/18   Debra Myers, APRN - CNP   docusate sodium (COLACE) 100 MG capsule Take 100 mg by mouth 3 times daily as needed for Constipation    Historical Provider, MD   lactulose (CHRONULAC) 10 GM/15ML solution Take 30 mLs by mouth 2 times daily as needed (constipation) 8/23/18   YAQUELIN Moss CNP       Future Appointments   Date Time Provider Richard Bournei   6/11/2019 11:00 AM STR FLUORO ROOM 4 STRZ RAD STR Radiolog   6/13/2019  2:00 PM STR OUT PT ONC INJ RM 14 STRZ OP ONC None   6/18/2019 12:00 PM Nino Muller PSY.D SRPX FAM MED [facility] [place]   7/25/2019  2:00 PM Jarred Palacios MD ENT [facility] [place]   8/5/2019 11:45 AM Yusuf Farr MD SRPX Heart [facility] [place]   8/7/2019  9:45 AM Kya Avina MD Fam Med CG [facility] [place]   9/9/2019  2:30 PM YAQUELIN Moss CNP SRPX Pain [facility] [place]   9/27/2019 11:00 AM Abhilash Baca MD Oncology [facility] [place]     ,   Diabetes Assessment    Meal Planning:  Avoidance of concentrated sweets   How often do you test your blood sugar?:  Meals, Daily   Do you have barriers with adherence to non-pharmacologic self-management interventions?  (Nutrition/Exercise/Self-Monitoring):  No   Have you ever had to go to the ED for symptoms of low blood sugar?:  No       No patient-reported symptoms   Do you have hyperglycemia symptoms?:  No   Do you have hypoglycemia symptoms?:  No   Blood Sugar Monitoring Regimen:  Before Meals      ,   COPD Assessment    Is the patient able to verbalize Rescue vs. Long Acting medications?:  No  Does the patient have a nebulizer?:  No  Does the patient use a space with inhaled medications?:  No     No patient-reported symptoms         Symptoms:   None:  Yes      Symptom course:  stable  Increase use of rapid acting/rescue inhaled medications?:  No  Change in chronic cough?:  No/At Baseline  Change in sputum?:  No/At Baseline     ,   General Assessment    Do you have any symptoms that are causing concern?:  Yes  Progression since Onset:  Unchanged, Intermittent -

## 2019-06-06 NOTE — PROGRESS NOTES
[facility] PROFESSIONAL SERVICES  [facility]  [address] [place]  [address] [address]  Dept: [phone]  Dept Fax: [phone]: [phone]    Visit Date: 5/16/2019     Subjective:     Chief Complaint:  Jonny Davila is a 62 y.o. with squamous cell carcinoma of the left tonsil. He was referred by Dr. Favio To for further evaluation of his malignancy. The patient developed a painful mass in his left neck. He was seen by Dr. Favio To and was found to have a base of tongue mass with palpable cervical lymphadenopathy on the left side. CT scan of the head and neck confirmed a thick wall mass which showed cavitation and likely originated from the left palatine tonsil. A core biopsy obtained on May 7, 2018 revealed squamous cell carcinoma. A PET scan was obtained that found the known mass at the base of the left tongue and tonsil as well as cervical lymphadenopathy. There was no evidence of distant metastatic disease. HPI: The patient is here for follow up examination. He is here for his history of head ane neck cancer. The patient's overall condition remains stable in regards to his malignancy. However, he has had a recent CVA and was hospitalized at [address] [place] [address]. He has no signs or symptom to suggest recurrence of malignancy. The patient has weakness and fatigue. Marya January He has no complaints of skeletal pain. The patient denies shortness of breath, chest pain, a change in bowel habits or a change in bladder habits. ECOG performance status is level 1-2. PMH, SH, and FH:  I reviewed the patients medication list and allergy list as noted on the electronic medical record. The PMH, SH and FH were also reviewed as noted on the EMR. Review of Systems   Constitutional: Fatigue. HENT: Negative. Eyes: Negative. Respiratory: Negative. Cardiovascular: Negative. Gastrointestinal: Negative. Genitourinary: Negative. Musculoskeletal: Negative. Skin: Negative. Neurological: General weakness. Hematological: Negative. Psychiatric/Behavioral: Negative. Objective:   Physical Exam   Vitals:    05/16/19 1306   BP: (!) 110/56   Pulse: 67   Resp: 18   Temp: 97.8 °F (36.6 °C)   SpO2: 98%   Vitals reviewed and are stable. Constitutional: Appears ill, frail. No acute distress. HENT: Normocephalic and atraumatic. Eyes: Pupils are equal and reactive. No scleral icterus. Neck: Overall appearance is symmetrical. No identifiable masses. Chest: Inspection and palpation of chest is normal.  Pulmonary: Effort normal. No respiratory distress. Cardiovascular: Regular rate and rhythm normal S1 and S2. Abdominal: Soft. No hepatomegaly or splenomegaly. Musculoskeletal: Gait is abnormal. Muscle strength and tone decreased. Neurological: Alert and oriented to person, place, and time. Judgment and thought content normal.  Skin: Scattered ecchymosis noted on bilateral upper and lower extremities. Psychiatric: Mood and affect appropriate for the clinical situation. Behavior is normal.     Data Analysis:    Hematology 5/16/2019 5/10/2019 5/10/2019   WBC 6.7 6.8 6.4   RBC 4.20 (L) 4.56 (L) 4.58 (L)   HGB 12.4 (L) 13.7 (L) 13.8 (L)   HCT 36.7 (L) 40.9 (L) 40.6 (L)   MCV 87 89.7 88.6   RDW 12.8      147      Assessment:   1. Squamous cell carcinoma head and neck  2. Chronic anemia. Plan:   1. Monitor for recurrence of malignancy. 2.  Schedule PET/CT scan. 3.  RTC to see me to review scan. 4.  Monitor hemoglobin/hematocrit and for any signs of blood loss. Kendall Cohen M.D.                                                                          Medical Director: [facility]  [facility] [place] [address], 1 [facility], [address] [place] [address], [place] [place], [address] of the [facility] at the [facility]      **This report has been created using voice recognition software. It may contain minor errors which are inherent in voice recognition technology. **

## 2019-06-10 ENCOUNTER — CARE COORDINATION (OUTPATIENT)
Dept: CARE COORDINATION | Age: 59
End: 2019-06-10

## 2019-06-10 NOTE — CARE COORDINATION
Attempted to reach patient for continued Care Coordination follow up and to remind him of MBS scheduled for tomorrow. Patient was not available and no voicemail option available. Will continue to work to f/u with patient in the future.

## 2019-06-11 ENCOUNTER — HOSPITAL ENCOUNTER (OUTPATIENT)
Dept: GENERAL RADIOLOGY | Age: 59
Discharge: HOME | End: 2019-06-11
Payer: MEDICARE

## 2019-06-11 DIAGNOSIS — R13.10 DYSPHAGIA, UNSPECIFIED TYPE: ICD-10-CM

## 2019-06-11 PROCEDURE — 92611 MOTION FLUOROSCOPY/SWALLOW: CPT

## 2019-06-11 PROCEDURE — 74230 X-RAY XM SWLNG FUNCJ C+: CPT

## 2019-06-11 PROCEDURE — 2500000003 HC RX 250 WO HCPCS: Performed by: NURSE PRACTITIONER

## 2019-06-11 RX ADMIN — BARIUM SULFATE 20 ML: 400 SUSPENSION ORAL at 11:39

## 2019-06-11 RX ADMIN — BARIUM SULFATE 40 ML: 0.81 POWDER, FOR SUSPENSION ORAL at 11:37

## 2019-06-11 RX ADMIN — BARIUM SULFATE 20 ML: 400 SUSPENSION ORAL at 11:38

## 2019-06-11 RX ADMIN — BARIUM SULFATE 20 ML: 400 PASTE ORAL at 11:39

## 2019-06-11 NOTE — DISCHARGE SUMMARY
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 Patient received chemoradiation and reports he is currently 'in remission.'  Patient reports difficulty swallowing, as well as pain when swallowing, and reports frequent coughing and throat clearing with PO intake. Patient has lost a significant amount of weight. Patient is currently being treated for dysphagia by [facility].  MBS ordered to assess pharyngeal swallow function and make recommendations for safe PO intake.     has a past medical history of Anxiety, Bleeds easily ([address] Bruises easily, Cerebrovascular accident (CVA) due to embolism of right posterior cerebral artery ([address] Cigarette nicotine dependence without complication, COPD (chronic obstructive pulmonary disease) ([address] DVT (deep venous thrombosis) ([address] Enlarged lymph node, History of emotional problems, History of sleep apnea, Hypertension, Moderate episode of recurrent major depressive disorder ([address] NSTEMI (non-ST elevated myocardial infarction) ([address] Pulmonary embolism and infarction ([address] S/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   Pain:  7/10 pain in back, left hip, left neck     Current Diet: Soft solids and thin liquids, although patient reports difficulty eating soft solids and notes limited PO intake     Respiratory Status: [x] Independent     Behavioral Observation: [x] Alert [x] Oriented     PATIENT WAS EVALUATED USING:  Thin liquid, nectar thick liquid, honey thick liquid, puree, soft solid     ORAL PREPARATION PHASE: [x] WFL     ORAL PHASE:  [x] Impaired   [x] Decreased tongue base retraction   [x] Uncontrolled bolus / diffuse fall over tongue base: to the level of the pyriform sinuses     ORAL PHASE MIRA SCORE: (Dysphagia outcome and severity scale)  [] 7 = Normal in all situations  [] 6 = WFL / Mod I; Normal diet; May have mild oral delay  [x] 5 = Mild dysphagia; May have one diet consistency restricted; Mild oral residue -clears. [] 4 = Mild-Moderate dysphagia; May have one or two diet consistencies restricted; Oral residue clears with cue; Intermittent supervision or cueing. [] 3 = Moderate dysphagia; Total assist with strategies; Two or more consistencies restricted; Moderate oral residue clears with cue;   [] 2 = Moderately Severe dysphagia; Tolerates at least one consistency safely with total use of strategies; Severe oral residue and unable to clear or needs multiple cues; Non-oral nutrition. [] 1 = Severe dysphagia; NPO; Unable to tolerate any po safely; Severe oral loss of bolus or oral residue; Non-oral nutrition. PHARYNGEAL PHASE:   [x] Impaired   [x] Delayed Swallow [x] Decreased airway protection   [x] Decreased epiglottic inversion [x] Decreased hyolaryngeal elevation and anterior excursion    [x] Residue in the valleculae: moderate  [x] Residue in the pyriform sinus: trace      [x] Residue along posterior pharyngeal wall   [x] Residue along the ariepiglottic folds. OTHER: Regurgitation of puree and soft solid back into oral cavity, spontaneously re-swallowed     PHARYNGEAL PHASE MIRA SCORE: (Dysphagia outcome and severity scale)  [] 7 = Normal in all situations; Normal diet;  No strategies  [] 6 = WFL / Mod I; May have mild pharyngeal delay or residue but clears Volitional cough [x] Effective [] Not Effective    [x] Spontaneous cough [x] Partially  Effective [] Not Effective Spontaneous throat clearing was effective in ejecting material off of vocal folds, but required cue to 'cough hard' to eject out of airway    [x]OTHER: Supraglottic swallow maneuver  [] Effective [x] Not Effective      DIAGNOSTIC IMPRESSIONS:  The patient presents with mild oral dysphagia and moderate pharyngeal dysphagia as outlined above. The patient demonstrated decreased bolus control of fluid trials, resulting in premature bolus spillage to the level of the pyriform sinuses with thin liquid trials. The patient demonstrated slightly improved bolus control with trials of nectar and honey thick liquid - however, thicker fluids resulted in an increase in pharyngeal residue due to decreased tongue base retraction, decreased hyolaryngeal elevation, and reduced pharyngeal stripping of bolus. Subsequently, the patient demonstrated consistent laryngeal penetration on pharyngeal residue; the patient demonstrated deep laryngeal penetration with contact to the level of the vocal folds with all fluid trials, but patient was more effectively able to clear the airway of penetrated residue with thin liquid trials. The patient demonstrated aspiration of trace amount of residual material during nectar thick liquid trials that spontaneously moved back to a supraglottic level but was not fully cleared from the underside of the epiglottis. The patient was highly responsive to instances of laryngeal penetration to the vocal folds, often responding with a throat clear or slight cough, but required cues to 'cough hard' to more effectively eject material from the level of the vocal folds. Little to no epiglottic inversion resulted in reduced airway protection throughout the study, increasing patient's overall risk for aspiration.   The patient demonstrated difficulty clearing the pharynx of puree and soft solid food textures, with patient regurgitating bolus from pharynx back into oral cavity and re-swallowing. Multiple swallows and liquid assist was effective in clearing most of pharyngeal residue. DIET RECOMMENDATIONS:  Thin and moist purees with thin liquids with strict use of compensatory strategies and oral care protocol - recommend nutritional supplements to support caloric and hydration needs     STRATEGIES:   [x] Full upright position  [x] Small bite/sip   [x] No Straw   [x] Multiple Swallow with liquid assist  [x] Pulmonary monitoring [x] Oral care before all meals    [x] Medication in applesauce   [x] OTHER: Slow rate, cough and re-swallow after every bite/sip      EDUCATION:   Learner: [x]Patient [] Significant other [] Son/Daughter [] Parent     [] Other:   Education: [x] Reviewed results and recommendations of this evaluation     [x] Reviewed diet and strategies     [x] Reviewed signs, symptoms and risk of aspiration     [] Demonstrated how to thick liquid appropriately. [] Reviewed goals and Plan of Care     [] OTHER:   Method: [x] Discussion [x] Demonstration [x] Hand-out     [x] OTHER: ED unit    Evaluation of Education:     [x] Verbalizes understanding [] Demonstrates with assistance     [] Demonstrates without assistance [x]Needs further instruction     [] No evidence of learning  [x] Family not present    PLAN / TREATMENT RECOMMENDATIONS:  [x] No further speech therapy services indicated at this facility.   Recommend continuation of speech therapy plan of care through [address] [place] [address],[address]: [address] [place] [address] South: 1135  UNTIMED TREATMENT: 35  TIMED TREATMENT: 0  TOTAL TIME: 35 minutes            Danika Ibarra Prash[address], [address]

## 2019-06-13 ENCOUNTER — CARE COORDINATION (OUTPATIENT)
Dept: CARE COORDINATION | Age: 59
End: 2019-06-13

## 2019-06-13 ENCOUNTER — PROCEDURE VISIT (OUTPATIENT)
Dept: CARDIOLOGY CLINIC | Age: 59
End: 2019-06-13

## 2019-06-13 ENCOUNTER — HOSPITAL ENCOUNTER (OUTPATIENT)
Dept: INFUSION THERAPY | Age: 59
Discharge: HOME | End: 2019-06-13
Payer: MEDICARE

## 2019-06-13 VITALS
OXYGEN SATURATION: 96 % | BODY MASS INDEX: 22.89 KG/M2 | WEIGHT: 169 LBS | TEMPERATURE: 97.7 F | DIASTOLIC BLOOD PRESSURE: 59 MMHG | RESPIRATION RATE: 18 BRPM | HEIGHT: 72 IN | HEART RATE: 77 BPM | SYSTOLIC BLOOD PRESSURE: 106 MMHG

## 2019-06-13 DIAGNOSIS — C09.9 TONSIL CANCER (HCC): ICD-10-CM

## 2019-06-13 DIAGNOSIS — C09.9 MALIGNANT NEOPLASM OF TONSIL (HCC): Primary | ICD-10-CM

## 2019-06-13 DIAGNOSIS — I63.431 CEREBROVASCULAR ACCIDENT (CVA) DUE TO EMBOLISM OF RIGHT POSTERIOR CEREBRAL ARTERY (HCC): Primary | ICD-10-CM

## 2019-06-13 DIAGNOSIS — R22.1 MASS IN NECK: ICD-10-CM

## 2019-06-13 DIAGNOSIS — E11.9 TYPE 2 DIABETES MELLITUS WITHOUT COMPLICATION, WITHOUT LONG-TERM CURRENT USE OF INSULIN (HCC): ICD-10-CM

## 2019-06-13 DIAGNOSIS — C09.9 SQUAMOUS CELL CARCINOMA OF LEFT TONSIL (HCC): ICD-10-CM

## 2019-06-13 DIAGNOSIS — C02.9 TONGUE CARCINOMA (HCC): ICD-10-CM

## 2019-06-13 DIAGNOSIS — E43 SEVERE MALNUTRITION (HCC): ICD-10-CM

## 2019-06-13 PROCEDURE — G0463 HOSPITAL OUTPT CLINIC VISIT: HCPCS

## 2019-06-13 PROCEDURE — 6360000002 HC RX W HCPCS: Performed by: INTERNAL MEDICINE

## 2019-06-13 PROCEDURE — 2580000003 HC RX 258: Performed by: INTERNAL MEDICINE

## 2019-06-13 PROCEDURE — 2709999900 HC NON-CHARGEABLE SUPPLY

## 2019-06-13 PROCEDURE — 99212 OFFICE O/P EST SF 10 MIN: CPT

## 2019-06-13 RX ORDER — SODIUM CHLORIDE 0.9 % (FLUSH) 0.9 %
20 SYRINGE (ML) INJECTION PRN
Status: DISCONTINUED | OUTPATIENT
Start: 2019-06-13 | End: 2019-06-14 | Stop reason: HOSPADM

## 2019-06-13 RX ORDER — HEPARIN SODIUM (PORCINE) LOCK FLUSH IV SOLN 100 UNIT/ML 100 UNIT/ML
500 SOLUTION INTRAVENOUS PRN
Status: CANCELLED | OUTPATIENT
Start: 2019-06-13

## 2019-06-13 RX ORDER — LIDOCAINE AND PRILOCAINE 25; 25 MG/G; MG/G
CREAM TOPICAL
Qty: 1 TUBE | Refills: 3 | Status: ON HOLD | OUTPATIENT
Start: 2019-06-13 | End: 2019-08-13 | Stop reason: SDUPTHER

## 2019-06-13 RX ORDER — HEPARIN SODIUM (PORCINE) LOCK FLUSH IV SOLN 100 UNIT/ML 100 UNIT/ML
500 SOLUTION INTRAVENOUS PRN
Status: DISCONTINUED | OUTPATIENT
Start: 2019-06-13 | End: 2019-06-14 | Stop reason: HOSPADM

## 2019-06-13 RX ORDER — SODIUM CHLORIDE 0.9 % (FLUSH) 0.9 %
10 SYRINGE (ML) INJECTION PRN
Status: CANCELLED | OUTPATIENT
Start: 2019-06-13

## 2019-06-13 RX ORDER — SODIUM CHLORIDE 0.9 % (FLUSH) 0.9 %
20 SYRINGE (ML) INJECTION PRN
Status: CANCELLED | OUTPATIENT
Start: 2019-06-13

## 2019-06-13 RX ADMIN — Medication 20 ML: at 13:58

## 2019-06-13 RX ADMIN — Medication 500 UNITS: at 13:59

## 2019-06-13 ASSESSMENT — PAIN SCALES - GENERAL: PAINLEVEL_OUTOF10: 6

## 2019-06-13 NOTE — PROGRESS NOTES
Patient tolerated port flush without any complications. Last vital signs:   BP (!) 106/59   Pulse 77   Temp 97.7 °F (36.5 °C) (Oral)   Resp 18   Ht 6' (1.829 m)   Wt 169 lb (76.7 kg)   SpO2 96%   BMI 22.92 kg/m²     Patient instructed if experience any of the above symptoms following today's infusion, he is to notify MD immediately or go to the emergency department. Discharge instructions given to patient. Verbalizes understanding. Wheel chaired self off unit, denied any assistance needs, with belongings.

## 2019-06-13 NOTE — CARE COORDINATION
I spoke with Kehinde Isidro from [facility] on 6/10 regarding pt. I questioned what she is doing for pt and what I can do. Kehinde Isidro informed me that they did do an assessment and felt \"pt was very overwhelmed at the time of our visit and I went out at a later date. He has family that lives in the same duplex that helps him with most things. \" I informed Kehinde Isidro that I would be glad to go out but she felt SW issues had been taken care of. I asked her to please call me if there were any issues in the future. Kehinde Isidro stated she would do so.

## 2019-06-13 NOTE — PROGRESS NOTES
NC  Patient having episodes of what appears to be a fib. Reviewed strips with Gwen Walsh,( Dr Deann Malik and Dr Martinez Sanford out of office.) Patient was unable to be reached when I called regarding any symptoms.  However patient is on ELiquis

## 2019-06-13 NOTE — PLAN OF CARE
Problem: Pain:  Description  Pain management should include both nonpharmacologic and pharmacologic interventions. Goal: Pain level will decrease  Description  Pain level will decrease  Outcome: Met This Shift  Goal: Control of acute pain  Description  Control of acute pain  Outcome: Met This Shift  Goal: Control of chronic pain  Description  Control of chronic pain  Outcome: Met This Shift  Note:   Patient rating pain a 6 out of 10 using ZAIDA scale, Pain located in generalized. Intervention: Opioid analgesia side-effects  Description  Opioid analgesia side-effects - REMINDER(s):  Bradycardia. Confusion. Constipation. Respiratory function, decreased. Note:   Patient encouraged to take prescribed pain medications and call Physician if pain is not controlled. Problem: Discharge Planning  Goal: Knowledge of discharge instructions  Description  Knowledge of discharge instructions     Outcome: Met This Shift  Note:   Verbalized understanding of discharge instructions, follow-up appointments, and when to call the physician. Intervention: Discharge to appropriate level of care  Note:   Discuss understanding of discharge instructions,follow-up appointments, and when to call the physician. Problem: Falls - Risk of:  Goal: Will remain free from falls  Description  Will remain free from falls  Outcome: Met This Shift  Note:   Free from falls while in O.P. Oncology. Intervention: Assess risk factors for falls  Description  Assess risk factors for falls  Note:   Discussed the need to use the call light for assistance when getting up to ambulate. Problem: Infection - Central Venous Catheter-Associated Bloodstream Infection:  Goal: Will show no infection signs and symptoms  Description  Will show no infection signs and symptoms  Outcome: Met This Shift  Note:   Mediport site with no redness or warmth. Skin over port site intact with no signs of breakdown noted.  Patient verbalizes signs/symptoms of port infection and when to notify the physician. Intervention: Infection risk assessment  Description  Infection risk assessment  Note:   Discuss port maintenance, infection prevention, signs and when to call Dr Aravind Salazar reviewed with patient. Patient verbalize understanding of the plan of care and contribute to goal setting.

## 2019-06-14 ENCOUNTER — TELEPHONE (OUTPATIENT)
Dept: FAMILY MEDICINE CLINIC | Age: 59
End: 2019-06-14

## 2019-06-14 NOTE — TELEPHONE ENCOUNTER
Stefano Keenan from [facility] states patient fell this morning (6/14/19). Seems to be doing ok, no medical attention needed.  This is an FYI

## 2019-06-17 ENCOUNTER — CARE COORDINATION (OUTPATIENT)
Dept: CARE COORDINATION | Age: 59
End: 2019-06-17

## 2019-06-17 ENCOUNTER — PROCEDURE VISIT (OUTPATIENT)
Dept: CARDIOLOGY CLINIC | Age: 59
End: 2019-06-17
Payer: MEDICARE

## 2019-06-17 ENCOUNTER — CARE COORDINATION (OUTPATIENT)
Dept: CASE MANAGEMENT | Age: 59
End: 2019-06-17

## 2019-06-17 DIAGNOSIS — I63.431 CEREBROVASCULAR ACCIDENT (CVA) DUE TO EMBOLISM OF RIGHT POSTERIOR CEREBRAL ARTERY (HCC): Primary | ICD-10-CM

## 2019-06-17 PROCEDURE — 93298 REM INTERROG DEV EVAL SCRMS: CPT | Performed by: INTERNAL MEDICINE

## 2019-06-17 PROCEDURE — 93299 PR REM INTERROG ICPMS/SCRMS <30 D TECH REVIEW: CPT | Performed by: INTERNAL MEDICINE

## 2019-06-17 ASSESSMENT — ENCOUNTER SYMPTOMS: DYSPNEA ASSOCIATED WITH: EXERTION

## 2019-06-17 NOTE — CARE COORDINATION
Attempted to reach patient for continued Care Coordination follow up and education. Patient was not available at the time of my call and no voicemail option available. Will continue to work to f/u with patient in the future.

## 2019-06-17 NOTE — PROGRESS NOTES
[employer] reveal  Battery ok   16 symptom downloads total -  Separate Episodes  Of A FIB   A fib burden 0.1%  Eliquis is on his med list

## 2019-06-17 NOTE — CARE COORDINATION
Ambulatory Care Coordination Note  6/17/2019  CM Risk Score: 10  Johnna Mortality Risk Score:      ACC: Norah Litten, RN    Summary Note: Received return call from patient for continued Care Coordination follow up and education. Patient reported he has been doing \"ok. \"  Patient stated he continues to have c/o SOB with activity but this remains at his baseline. He denied any c/o worsening SOB or cough being present. COPD education, including zone information and symptoms to call and report, reviewed with patient and he verbalized understanding. Patient stated he continues to monitor BS approx TID (usually before meals) and readings remain in the 80's. Patient denied any c/o hypoglycemic symptoms being present. DM education reviewed with patient, including signs/symptoms of hypoglycemia and hyperglycemia, and what to call and report. Patient verbalized understanding. Importance of good nutrition to help with BS control also reviewed with patient. Patient shared he did undergo MBS as scheduled last week and he is now to be following a pureed diet. Importance of following diet recommendations as instructed by ST to prevent development of complications reviewed with patient. Patient verbalized understanding. Upcoming appointment information for this week was also reviewed with patient and he denied any need for transportation assistance at this time. Patient was encouraged to call if this changes. Patient denied any other questions, concerns, or needs and he was encouraged to call with any that may develop.           Actions:   - Reviewed COPD education with patient  - Reviewed DM education with patient  - Reviewed symptoms to call and report  - Reviewed importance of early symptom recognition   - Reviewed upcoming appointment information   - Monitored for possible transportation assistance   - Monitored for additional needs          Plan of Care:   - Continue with Care Coordination follow up for continued assistance with his chronic disease management and s/p recent hospitalizations  - Complete COPD education   - Complete DM education   - Monitor for additional needs  - Monitor for readiness to discharge from Care Coordination    Care Coordination Interventions    Program Enrollment:  Complex Care  Referral from Primary Care Provider:  No  Suggested Interventions and [address]:  Completed (Comment: clint Pagan psychologist )  Diabetes Education:  Completed  Fall Risk Prevention:  Completed  Home Health Services:  Completed (Comment: [facility] - nursing d/c 6/5/19)  Meals on Wheels:  Not Started  Medication Assistance Program:  Declined (Comment: denied any need at this time )  Medi Set or Pill Pack:  Completed (Comment: AARON and bora assisting with med management )  Occupational Therapy:  Completed (Comment: [facility] )  Physical Therapy:  Completed (Comment: [facility])  Smoking Cessation:  Completed  Social Work:  Completed (Comment: [facility] Junior [place]ster/Radha. Discussed with [facility])  Other Therapy Services:  Completed (Comment:  [facility] )  Other Services: In Process (Comment: Tele Care Program )  Transportation Support:  Completed  Zone Management Tools:  Completed         Goals Addressed                 This Visit's Progress       Care Coordination     Conditions and Symptoms   Improving     I will schedule office visits, as directed by my provider. I will keep my appointment or reschedule if I have to cancel. I will follow my Zone Management tool to seek urgent or emergent care. I will notify my provider of any symptoms that indicate a worsening of my condition. Barriers: lack of support, overwhelmed by complexity of regimen and lack of education  Plan for overcoming my barriers: Continued education and support   Confidence: 7/10  Anticipated Goal Completion Date: 8/1/19              Prior to Admission medications    Medication Sig Start Date End Date Taking?  Authorizing Provider   lidocaine-prilocaine (EMLA) 2.5-2.5 % cream Apply topically as needed. 6/13/19   Shivani Gan MD   oxyCODONE HCl (OXY-IR) 10 MG immediate release tablet Take 1 tablet by mouth every 8 hours as needed for Pain for up to 30 days. 6/5/19 7/5/19  YAQUELIN Hernández CNP   hydrOXYzine (ATARAX) 50 MG tablet Take 1-2 tablets nightly prn 5/22/19   YAQUELIN Del Real CNP   clonazePAM (KLONOPIN) 1 MG tablet Take 1 tablet by mouth 2 times daily as needed for Anxiety for up to 30 days. 5/22/19 6/21/19  YAQUELIN Del Real CNP   morphine (MS CONTIN) 30 MG extended release tablet Take 1 tablet by mouth every 12 hours for 30 days.  5/23/19 6/22/19  YAQUELIN Hernández CNP   albuterol sulfate  (90 Base) MCG/ACT inhaler Inhale 2 puffs into the lungs every 6 hours as needed for Wheezing 4/22/19   Lucas Palmer MD   glucose monitoring kit (FREESTYLE) monitoring kit 1 kit by Does not apply route daily 4/15/19   Lucas Palmer MD   atorvastatin (LIPITOR) 40 MG tablet Take 1 tablet by mouth nightly 4/12/19   Daryn Martinez DO   nicotine (NICODERM CQ) 21 MG/24HR Place 1 patch onto the skin daily 4/13/19   Daryn Martinez DO   apixaban (ELIQUIS) 5 MG TABS tablet Take 1 tablet by mouth 2 times daily 4/12/19   Daryn Martinez DO   sertraline (ZOLOFT) 100 MG tablet Take 2 tablets by mouth daily 1/29/19   Lucas Palmer MD   naloxone [facility]) 4 MG/0.1ML LIQD nasal spray 1 spray by Nasal route as needed for Opioid Reversal 12/24/18   YAQUELIN Joe CNP   docusate sodium (COLACE) 100 MG capsule Take 100 mg by mouth 3 times daily as needed for Constipation    Historical Provider, MD   lactulose (CHRONULAC) 10 GM/15ML solution Take 30 mLs by mouth 2 times daily as needed (constipation) 8/23/18   YAQUELIN Hernández CNP       Future Appointments   Date Time Provider Richard Cortes   6/18/2019 12:00 PM Roopa Duvall PSY.D [facility]   6/20/2019 10:45 AM YAQUELIN Pedersen CNP [address] [employer] [facility]

## 2019-06-18 ENCOUNTER — TELEPHONE (OUTPATIENT)
Dept: FAMILY MEDICINE CLINIC | Age: 59
End: 2019-06-18

## 2019-06-18 ENCOUNTER — OFFICE VISIT (OUTPATIENT)
Dept: BEHAVIORAL/MENTAL HEALTH CLINIC | Age: 59
End: 2019-06-18
Payer: MEDICARE

## 2019-06-18 DIAGNOSIS — F33.1 MAJOR DEPRESSIVE DISORDER, RECURRENT EPISODE, MODERATE WITH ANXIOUS DISTRESS (HCC): Primary | ICD-10-CM

## 2019-06-18 PROCEDURE — 90832 PSYTX W PT 30 MINUTES: CPT | Performed by: PSYCHOLOGIST

## 2019-06-18 NOTE — Clinical Note
Dr. Marlyne Phoenix, Would you be willing to try a different anti-depressant medication for him. I'm not sure the Zoloft is working for him. Thanks for all your care with him,Jitendra Howard

## 2019-06-18 NOTE — PROGRESS NOTES
Behavioral Health Consultation/Psychotherapy Note  Reina Tyson. Meka Almaraz Psy.D. Visit Date:  6/18/2019    Patient:  Symone Viramontes  YOB: 1960  Chief Complaint:  Follow-up; Depression; Anxiety; and Stress    Duration of session:  30 minutes      S:     Pt said he didn't go fishing with Shirley I and love and you like we talked about because he said he hasn't felt good at all since we met last.  He was able to do the deep breathing and said that helped with anxiety. He said he's been incredibly depressed. He doesn't think the Zoloft is working at all, but says the Klonopin is helpful. He can't remember anything and his health is very poor. He continues to not sleep well, only about 4 hours a night and consequently is tried all day long. He said he walks about a block two times per day. He also wants to try to quit smoking and we discussed some strategies for that. O:    Appearance    Patient presents as alert, oriented, and cooperative  Appetite abnormal: decreased  Sleep disturbance Yes  Loss of pleasure Yes  Speech    normal rate, normal volume, well articulated and clear and understandable  Mood    Depressed  [place]  Affect    depressed affect  Thought Process    linear and coherent  Insight    Fair  Judgment    Intact  Memory    recent and remote memory intact  Suicide Assessment    no suicidal ideation        A:    1. Major depressive disorder, recurrent episode, moderate with anxious distress ([address]        Supportive counseling approx monthly may be most helpful at this time to address dep/anx and stress. P:    1. Sleep at night in the comfortable hospital bed. 2.  Some stress management strategies are attached for your review. 3.  Schedule two potentially enjoyable activities in the next couple of weeks (some examples):    -Plan to go fishing with mii to see a movie  -Spend time with family/friends  -Read a good book  -Get outside to do some walking    4.   Try this method of journaling:     -4 things that went well today   -3 things you are thankful for   -2 things that didn't go as well today but you can learn from   -1 thing you are looking forward to    5. Try the tapering plan, putting 1 less cigarette per day in ziploc bags labeled for each day of the week. 6.  Return to see Dr. Dany Howard in 4-6 weeks. All questions about treatment plan answered. Patient instructed to go immediately to the emergency room and/or call 911 if any suicidal or homicidal ideations. Patient stated understanding and is agreeable to treatment and crisis plan.           Provider Signature:  Electronically signed by Rosetta Putnam PSYD on 6/18/2019 at 12:02 PM

## 2019-06-18 NOTE — TELEPHONE ENCOUNTER
Charlette Munoz is just reporting a fall on his front porch. No reported injuries, but just wanted to let the office know he did have a fall.

## 2019-06-18 NOTE — PATIENT INSTRUCTIONS
1.  Sleep at night in the comfortable hospital bed. 2.  Some stress management strategies are attached for your review. 3.  Schedule two potentially enjoyable activities in the next couple of weeks (some examples):    -Plan to go fishing with [employer] to see a movie  -Spend time with family/friends  -Read a good book  -Get outside to do some walking    4. Try this method of journaling:     -4 things that went well today   -3 things you are thankful for   -2 things that didn't go as well today but you can learn from   -1 thing you are looking forward to    5. Try the tapering plan, putting 1 less cigarette per day in ziploc bags labeled for each day of the week. 6.  Return to see Dr. Brody Frazier in 4-6 weeks. STRESS MANAGEMENT STRATEGIES    1. Recognize Stress:  Learning to recognize when your body is reacting to stress and identifying our stressors are the first steps in managing stress. 2. Take a Break:  A change of pace, no matter how short, gives us a new outlook on old problems. Take a vacation 20 minutes a day - enjoy a change from the daily routine. 3. Learn to Relax:  Under stress, the muscles in our bodies stay tight. One of the most effective ways to combat tensions is deep muscle relaxation. Other techniques that produce muscle and mental relaxation are yoga, prayer, and deep breathing. 4. Be Nutritionally Aware:  Good nutrition is vital to optimum health, and is especially critical when we are under unusual stress, or going through a major life change. 5. Exercise Regularly:  Just like nutrition, exercise is imperative for maintaining good fitness. Whatever you enjoy - swimming, walking, jogging, aerobic exercise - will help you let off steam and work out stress. 6. Prioritize Sleep:  Aim to get 8 hours of sleep. Develop a bedtime routine and stick to it. The more well-rested you are, the more energy you will have, and the less irritable you will be.   Feeling tired from lack of sleep can also lead to irrational thinking and poor decision-making. 7. Plan your Work:  Tension and anxiety really build up when our work seems endless. Plan your work to use time and energy more efficiently. Take one thing at a time. 8. Talk it Over: This may be the most important thing you can do for yourself if you cant get a handle on things. Find a good listener. Just as a pressure relief valve allows steam to flow out of a pressure cooker and keeps it from blowing up, so talking allows stress to flow out of the body and keeps us from blowing up. 9. Accept What You Cannot Change:  If the problem is beyond your control at this time, try your best to accept it until you can change it. It beats spinning your wheels and getting nowhere. 10. Evaluate Your Perceptions:  What we think is sometimes what we feel. If we constantly think unrealistic or alarming thoughts about ourselves or other folks, then our stress level is increased. 11. Relax Unrealistic Standards:  When we set unrealistic standards for ourselves, we usually can never reach them. If we do, we burn out quickly. Set reasonable goals and standards. 12. Reward Yourself:  Find ways to reward yourself when youve completed a minor or major task. We cannot always depend on others to recognize us, so we must develop our own reward system. 13. Become Assertive: Take steps to solve problems instead of feeling helpless. Distinguishing assertiveness (respecting others rights and your rights) from aggressiveness and passivity can do much to resolve internal stress. 14. Rediscover Humor:  Learn to laugh at yourself and your situation! 15. Increase Pleasurable Activities:  Take time to participate in fun, pleasurable, activities on a regular basis.

## 2019-06-20 ENCOUNTER — HOSPITAL ENCOUNTER (OUTPATIENT)
Dept: RADIATION ONCOLOGY | Age: 59
Discharge: HOME | End: 2019-06-20
Payer: MEDICARE

## 2019-06-20 ENCOUNTER — HOSPITAL ENCOUNTER (OUTPATIENT)
Age: 59
Discharge: HOME | End: 2019-06-20
Payer: MEDICARE

## 2019-06-20 VITALS
WEIGHT: 178.57 LBS | DIASTOLIC BLOOD PRESSURE: 62 MMHG | RESPIRATION RATE: 18 BRPM | OXYGEN SATURATION: 94 % | HEART RATE: 67 BPM | SYSTOLIC BLOOD PRESSURE: 115 MMHG | TEMPERATURE: 95.7 F | BODY MASS INDEX: 24.22 KG/M2

## 2019-06-20 DIAGNOSIS — R91.8 MULTIPLE LUNG NODULES: Primary | ICD-10-CM

## 2019-06-20 DIAGNOSIS — C10.9 SQUAMOUS CELL CARCINOMA OF OROPHARYNX (HCC): ICD-10-CM

## 2019-06-20 LAB
THYROXINE (T4): 5.4 UG/DL (ref 4.5–12)
TSH SERPL DL<=0.05 MIU/L-ACNC: 2.84 UIU/ML (ref 0.4–4.2)

## 2019-06-20 PROCEDURE — 99212 OFFICE O/P EST SF 10 MIN: CPT | Performed by: RADIOLOGY

## 2019-06-20 PROCEDURE — G0463 HOSPITAL OUTPT CLINIC VISIT: HCPCS | Performed by: RADIOLOGY

## 2019-06-20 PROCEDURE — 36415 COLL VENOUS BLD VENIPUNCTURE: CPT

## 2019-06-20 PROCEDURE — 84443 ASSAY THYROID STIM HORMONE: CPT

## 2019-06-20 PROCEDURE — 84436 ASSAY OF TOTAL THYROXINE: CPT

## 2019-06-20 NOTE — PROGRESS NOTES
nodes. Ireneher Anne was no evidence of distant metastatic disease. Joanie Blank was seen for evaluation and discussion regarding the role of radiation therapy in the management of his locally advanced head and neck cancer. He was agreeable to undergoing concurrent chemoradiation. He did have very significant local and regional pain prior to starting his treatment and also had a small sinus tract from one of the necrotic left cervical lymph nodes that was intermittently draining before treatment started. He developed pronounced mucositis during his course of therapy. Christy Colindres also developed increased skin toxicity, in part because he continued to wear clothing that was touching and at times rubbing against the sides of his neck.  The skin reaction included some skin breakdown along either side of the base of the neck.  His skin management was adjusted to include Silvadene cream and cool soaks.  The skin reaction then healed and there were no further increased skin problems throughout the remainder of the treatment course. Pain was a pronounced issue early in the treatment, and continued to be a concern throughout the course.  The pain medications were adjusted, and the pain decreased to a 4/10.  Towards the end of treatment, the patient was referred to the pain clinic for ongoing management of his pain medications. Joanie Blank came for his initial follow up in August 2018 but did not follow through with further follow up until March 2019 when he called requesting to be seen for increasing pain to the left neck. He was seen and evaluated at that time and had seen Dr. Violeta Kingsley in November who ordered a PET scan which showed resolution of the mass as well as the cervical lymphadenopathy. No evidence of metastatic disease was seen. At his appointment on 3/8/19 he was having dysphagia, weight loss, pain and swelling of the neck. He had not been following with speech or physical therapy. He had also not been back to see Dr. Slater Lesch.    He was referred to see Dr. Nicolás Quinn, ordered ST/PT/OT and MRI face, orbits and neck was ordered. MRI was obtained on 3/21/19 and showed findings consistent with radiation changes. INTERVAL HISTORY: Loraine Pulliam returns to [address] [place] [address] for a post treatment follow up after undergoing concurrent chemoradiation. He completed treatment in July 2018. Since his last visit here in March [place] states he has been hospitalized 3 times, twice in April for CVA and NSTEMI and most recently on 5/31/19 for dizziness. He was been following with ST/PT/OT via home health services. States he feels this has helped some with fatigue and swallowing. Underwent recent MBS which showed penetration of honey thick, nectar thick and thin barium with aspiration of nectar thick barium. Maevecornelia Thornton states he still experiences dysphagia, xerostomia, left neck pain, decreased oral intake, weight loss and fatigue. He denies fever, chills, sob, abdominal pain, nausea, vomiting or changes with bowel habits. He continues to smoke \"3 cigarettes\" per day. CTA chest in April showed multiple pulmonary nodules which could not rule out neoplastic or metastatic process, 3 month follow up CT was recommended. He has followed up with Dr. Nicolás Quinn and seen him on 5/24/19. He underwent fiberoptic laryngoscopy and per his note there was no evidence of recurrent disease. PET scan was completed on 5/29/19 showed a new focal area of hypermetabolism in the deep left posterior nasopharyngeal soft tissues, without a distinct CT correlate, concerning for recurrent disease.     LAB RESULTS:   CBC:   Lab Results   Component Value Date    WBC 5.2 06/01/2019    RBC 4.18 06/01/2019    HGB 12.4 06/01/2019    HCT 38.0 06/01/2019    MCV 90.9 06/01/2019    MCH 29.7 06/01/2019    MCHC 32.6 06/01/2019    RDW 12.8 05/16/2019     06/01/2019    MPV 10.2 06/01/2019     CMP:  Lab Results   Component Value Date     06/01/2019    K 4.2 06/01/2019     06/01/2019    CO2 29 06/01/2019 BUN 21 06/01/2019    CREATININE 0.7 06/01/2019    LABGLOM >90 06/01/2019    GLUCOSE 89 06/01/2019    PROT 6.6 05/31/2019    LABALBU 3.5 05/31/2019    CALCIUM 8.8 06/01/2019    BILITOT 0.5 05/31/2019    ALKPHOS 90 05/31/2019    AST 15 05/31/2019    ALT 12 05/31/2019       RADIOLOGY RESULTS:   4/9/19: CTA Chest:   Impression   1. There are bullous changes within the left mid chest. There is linear parenchymal scarring and bronchiectatic change adjacent to this region. There is also a nodular linear opacity at the right posterior costophrenic angle suggesting atelectasis and/or    infiltrate. Follow-up imaging is recommended to exclude a mass within this region. A follow-up CT chest with intravenous contrast in 3 months is recommended to confirm stability. 2. There is a 0.2 to 0.3 cm nodule anteriorly within the right mid chest that this is series 3 image 83). There is also a 0.6 cm noncalcified nodule along the left major fissure within the left lower chest (series 3 image 131). In addition there is a 0.5    cm noncalcified nodule posterior laterally at the left lung base (series 3 image 142). A neoplastic etiology/metastatic disease cannot be excluded based on the CT appearance. A follow-up CT examination of the chest with intravenous contrast in 3 months    is recommended to confirm stability         4/10/19: MRI brain MRA brain, MRA neck:   Impression    Brain:   1. Redemonstration of large acute to early subacute infarct in the right posterior cerebral artery territory. 2. Mild to moderate scattered focal areas of T2/FLAIR hyperintensity in the supratentorial white matter as evidence for chronic microvascular angiopathy and old lacunar infarcts.       MRA BRAIN:   High-grade stenosis at the P1 segment of the right posterior cerebral artery without definite distal flow.       MRA NECK:   1. No significant stenosis at the internal carotid artery takeoffs by NASCET criteria.    2. Nodular prominence of the left supraglottic larynx on the source images. Correlate with prior MRI of the neck. 4/17/19 MRI brain:   Impression        Evolutionary changes are present corresponding to areas of subacute ischemia specifically in the right temporal and occipital lobes with smaller areas in the right thalamus and along the trigone of the right lateral ventricle. No evidence to suggest new    or acute ischemia. The remainder of the brain also appears similar to the prior exam.       5/29/19: PET CT:  Impression   1. New focal area of hypermetabolism in the deep left posterior nasopharyngeal soft tissues, without a distinct CT correlate. This is concerning for recurrent disease. 2. No other evidence of distant hypermetabolic metastases. 3. Reticular nodular densities and haziness in the lingula and left base with minimal increased FDG metabolism. This is likely on the basis of infectious or inflammatory pathology. 5/31/19 CTA Head/Neck:   Impression       1. There is irregular and narrowing and occlusion of the right P2 segment of the right PCA. Occlusion was also noted on the prior MR angiogram of the brain dated April 10, 2019. There is hypoattenuation within the right occipital lobe which likely    corresponds to associated prior ischemic changes.       2. Atherosclerotic calcification is present at the bilateral carotid bifurcations. There is atherosclerotic calcification at the proximal left cervical internal carotid artery resulting in approximately 63% luminal narrowing by NASCET criteria. Atherosclerotic calcification at the right carotid bifurcation and proximal right cervical internal carotid artery results in less than 50% luminal narrowing by NASCET criteria. 5/31/19 CT Head:   Impression   No acute intracranial findings. Stable appearance from old infarctions as discussed above. 6/11/19: MBS:  Impression   1.  Laryngeal penetration of honey thick, nectar thick and thin barium with aspiration of nectar thick barium. 2. Additional recommendations from the speech therapist will follow. MEDICATIONS:   Current Outpatient Medications   Medication Sig Dispense Refill    lidocaine-prilocaine (EMLA) 2.5-2.5 % cream Apply topically as needed. 1 Tube 3    oxyCODONE HCl (OXY-IR) 10 MG immediate release tablet Take 1 tablet by mouth every 8 hours as needed for Pain for up to 30 days. 90 tablet 0    hydrOXYzine (ATARAX) 50 MG tablet Take 1-2 tablets nightly prn 60 tablet 2    clonazePAM (KLONOPIN) 1 MG tablet Take 1 tablet by mouth 2 times daily as needed for Anxiety for up to 30 days. 60 tablet 2    morphine (MS CONTIN) 30 MG extended release tablet Take 1 tablet by mouth every 12 hours for 30 days. 60 tablet 0    albuterol sulfate  (90 Base) MCG/ACT inhaler Inhale 2 puffs into the lungs every 6 hours as needed for Wheezing 1 Inhaler 2    glucose monitoring kit (FREESTYLE) monitoring kit 1 kit by Does not apply route daily 1 kit 0    atorvastatin (LIPITOR) 40 MG tablet Take 1 tablet by mouth nightly 30 tablet 3    nicotine (NICODERM CQ) 21 MG/24HR Place 1 patch onto the skin daily 30 patch 3    apixaban (ELIQUIS) 5 MG TABS tablet Take 1 tablet by mouth 2 times daily 60 tablet 1    sertraline (ZOLOFT) 100 MG tablet Take 2 tablets by mouth daily 60 tablet 2    naloxone (NARCAN) 4 MG/0.1ML LIQD nasal spray 1 spray by Nasal route as needed for Opioid Reversal 1 each 0    docusate sodium (COLACE) 100 MG capsule Take 100 mg by mouth 3 times daily as needed for Constipation      lactulose (CHRONULAC) 10 GM/15ML solution Take 30 mLs by mouth 2 times daily as needed (constipation) 1800 mL 0     No current facility-administered medications for this encounter. ROS: As noted in the HPI and Interval history, otherwise negative.        EXAMINATION:   Vijaya Marcano is a pleasant chronically ill appearing adult male. Monisha Hanson is alert and oriented ×3 in no acute distress. Monisha Hanson presents today in a wheelchair. Affect is appropriate for setting.   VITAL SIGNS:  Weight 81.0kg, temperature 35.4°C, pulse 67, blood pressure 115/62, respirations 18, pulse ox 94% on room air. HEENT: Normocephalic, atraumatic.  PERRL.  EOMI.  Ears, nose and lips are within normal limits on external examination.  Oral mucosa dry without exudate. Xerostomia.  Fullness to the left oropharynx.  No visible lesion or exudate seen. NECK:  No palpable right cervical lymphadenopathy. Left cervical still with a fixed/matted presence and extremely tender to palpation.   LUNGS: Clear, diminished without adventitious sounds.  Respirations easy and unlabored. HEART: Regular rate and rhythm without murmur  ABDOMEN: Soft, nontender, non distended active bowel sounds x4. EXTREMITIES: Without clubbing or cyanosis. NEUROLOGIC EXAMINATION: Cranial nerves grossly intact. 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 He underwent treatment with concurrent chemoradiation. Since his last visit in March Mo Milligan has had multiple hospitalizations for stroke and NSTEMI. He followed up with Dr. Diane Luna and underwent fiberoptic laryngoscopy which per Dr. Diane Luna note was without signs of recurrent disease. He continues to have pain to the left neck with palpation as well as when swallowing. He continues to see Pt/OT and Speech therapies in the home health setting. He continues to loose weight and is not maintaining a healthy diet. He has lost 8kg since his last follow up in March. PET CT completed end of May showed a new focal area of hypermetabolic activity in the deep left posterior nasopharyngeal soft tissues. Reviewed scan with Dr. Jaye Hickman. Will further evaluate with MRI scan. He continues to smoke on a daily basis, 3 cigarettes per day.  CTA chest in April showed multiple lung nodules which neoplastic or metastatic disease could not be ruled out, will order recommended follow up CT chest. Has not had thyroid functions evaluated since completing treatment. Will obtain TSH and T4. Continue pain medication as prescribed through pain management. PLAN:  1. PET CT: with increased area of activity in the deep left posterior nasopharyngeal soft tissues. Will further evaluate with MRI neck to include base of skull with special attn to soft tissue posterior nasopharynx. Order placed in [facility] 2. CTA chest: completed in April showed multiple lung nodules which neoplastic or metastatic disease could not be ruled out. Will order 3 month follow up CT chest with contrast as recommended. 3. Dysphagia: followed by speech therapy, has had some improvement. Recent MBS on 6/11/19 recommended thin and moist purees with thin liquids with strict use of compensatory strategies and oral care protocol - recommend nutritional supplements to support caloric and hydration needs. 4. Weight loss: has lost approximately 8kg since last visit in March. Continues to loose weight likely due to lack of adequate nutritional intake. Will further evaluate with above scans as a neoplastic process being the cause of continued weight loss. Spoke with Seun Olguin and she is going to contact him to discuss nutritional support. 5. Tyroid function: will obtain T4 TSH  6. Smoking cessation: continues to smoke 3 cigarettes per day, educated on the benefit of quitting. Patient voiced understanding. 7. Pain control: follows with pain management. 8. Fatigue/weakness: follows with PT/OT through home health services. 9. Continue follow up with Dr. Melinda Murrell as scheduled in July. 10. Continue follow up with Dr. Adele Rodriguez as scheduled. 11. Follow up here in 3 months time. 15. Charly Olson is aware he can call for questions, concerns or changes in condition.      Electronically signed by Fenton Boeck, APRN - CNP on 6/20/19 at 2:27 PM

## 2019-06-20 NOTE — PROGRESS NOTES
Shirley Jose  6/20/2019    Patient is seen today for follow up. Vitals:    06/20/19 1034   BP: 115/62   Pulse: 67   Resp: 18   Temp: 95.7 °F (35.4 °C)   SpO2: 94%    : Wt Readings from Last 3 Encounters:   06/20/19 178 lb 9.2 oz (81 kg)   06/13/19 169 lb (76.7 kg)   06/03/19 175 lb (79.4 kg)              Allergies   Allergen Reactions    Latex Hives    Coumadin [Warfarin Sodium] Other (See Comments)     Tears skin     Tape Kathlee [place] Tape] Other (See Comments)     Tears skin         Current Outpatient Medications   Medication Sig Dispense Refill    lidocaine-prilocaine (EMLA) 2.5-2.5 % cream Apply topically as needed. 1 Tube 3    oxyCODONE HCl (OXY-IR) 10 MG immediate release tablet Take 1 tablet by mouth every 8 hours as needed for Pain for up to 30 days. 90 tablet 0    hydrOXYzine (ATARAX) 50 MG tablet Take 1-2 tablets nightly prn 60 tablet 2    clonazePAM (KLONOPIN) 1 MG tablet Take 1 tablet by mouth 2 times daily as needed for Anxiety for up to 30 days. 60 tablet 2    morphine (MS CONTIN) 30 MG extended release tablet Take 1 tablet by mouth every 12 hours for 30 days.  60 tablet 0    albuterol sulfate  (90 Base) MCG/ACT inhaler Inhale 2 puffs into the lungs every 6 hours as needed for Wheezing 1 Inhaler 2    glucose monitoring kit (FREESTYLE) monitoring kit 1 kit by Does not apply route daily 1 kit 0    atorvastatin (LIPITOR) 40 MG tablet Take 1 tablet by mouth nightly 30 tablet 3    nicotine (NICODERM CQ) 21 MG/24HR Place 1 patch onto the skin daily 30 patch 3    apixaban (ELIQUIS) 5 MG TABS tablet Take 1 tablet by mouth 2 times daily 60 tablet 1    sertraline (ZOLOFT) 100 MG tablet Take 2 tablets by mouth daily 60 tablet 2    naloxone (NARCAN) 4 MG/0.1ML LIQD nasal spray 1 spray by Nasal route as needed for Opioid Reversal 1 each 0    docusate sodium (COLACE) 100 MG capsule Take 100 mg by mouth 3 times daily as needed for Constipation      lactulose (CHRONULAC) 10 GM/15ML solution

## 2019-06-21 ENCOUNTER — TELEPHONE (OUTPATIENT)
Dept: PHYSICAL MEDICINE AND REHAB | Age: 59
End: 2019-06-21

## 2019-06-21 DIAGNOSIS — G89.3 CANCER ASSOCIATED PAIN: ICD-10-CM

## 2019-06-21 RX ORDER — MORPHINE SULFATE 30 MG/1
30 TABLET, FILM COATED, EXTENDED RELEASE ORAL EVERY 12 HOURS
Qty: 60 TABLET | Refills: 0 | Status: ON HOLD | OUTPATIENT
Start: 2019-06-21 | End: 2019-08-06 | Stop reason: HOSPADM

## 2019-06-21 NOTE — TELEPHONE ENCOUNTER
OARRS reviewed. UDS: + for  Morphine, oxycodone, 7-Aminoclonazepam, THC - cancer patient on clonazepam. Narcan offered: offered  Last seen: 6/3/2019.  Follow-up: 9/9/2016

## 2019-06-24 ENCOUNTER — CARE COORDINATION (OUTPATIENT)
Dept: CARE COORDINATION | Age: 59
End: 2019-06-24

## 2019-06-24 NOTE — CARE COORDINATION
Name: Pratibha Flowers    ### Patient Details  YOB: 1960  MRN: [id number]    ### Encounter Details  Encounter ID: [id number]  Arrival Date: N/A  Discharge Date: N/A    ### Related interaction  COPD-Diabetes High Touch UA (Welcome Call) ([email or web address])    ### Questions     Question 1   Consent   If you are interested in starting this program today, please press 1.. If you have questions or would like to talk to our , please press 2 and we will call you right back. If youd like to opt out of the program, please press 3   Opt In (Issue Panel: Opt In)    ### Required Interventions and Feedback     [facility] Update         *Patient Status changed in Care[facility] to[de-identified]     Enrolled (selected by Caitlin Owens on 06/24/2019 10:15 AM EDT)    Additional Enrollment Status Details[de-identified]    Blu Reeves.  [place], [address] [place] [address] Nurse /        Patient called and Opted IN.  (edited by Caitlin Owens on 06/24/2019 10:15 AM EDT)

## 2019-06-25 ENCOUNTER — CARE COORDINATION (OUTPATIENT)
Dept: CASE MANAGEMENT | Age: 59
End: 2019-06-25

## 2019-06-25 NOTE — CARE COORDINATION
Name: Everrett Kehr    ### Patient Details  YOB: 1960  MRN: [id number]    ### Related interaction  COPD-Diabetes High Touch UA (COPD-Diabetes High Touch UA) ([email or web address]    ### Questions     Question 1   Legs and Feet   Please press 1 for yes. Please press 2 for no. Symptoms on Feet (Issue Panel: High Priority COPD Diabetes)    ### Required Interventions and Feedback     Call Status         *Call Status (Required):     Patient Reached    Call status details:     Patient states that he has discomfort in his feet. He states that he has a \"corn on the bottom of his foot. Patient states that he has an appointment to see his MD tomorrow. Patient reports that he checks his feet daily. He uses \"gold Bond lotion on his feet. Educated patient no to get lotion between toes that it can cause a fungus. Patient states that he was not told that. Patient does not go barefoot. He has a Podiatrist and will ask MD for a referral He is currently trying to quit  smoking. Patient had no other concerns or questions  Leslie Bocanegra.          Pre-Call Opportunity Review         High Priority :     Yes

## 2019-06-26 ENCOUNTER — CLINICAL DOCUMENTATION (OUTPATIENT)
Dept: NUTRITION | Age: 59
End: 2019-06-26

## 2019-06-29 ENCOUNTER — CARE COORDINATION (OUTPATIENT)
Dept: CASE MANAGEMENT | Age: 59
End: 2019-06-29

## 2019-06-29 NOTE — CARE COORDINATION
Name: Luciano Iraheta    ### Patient Details  YOB: 1960  MRN: [id number]    ### Related interaction  COPD-Diabetes High Touch UA (COPD-Diabetes High Touch UA) ([email or web address]    ### Questions     Question 1   Shortness of Breath and Mucus   Please press 1 for yes. Please press 2 for no. Change in Mucus (Issue Panel: High Priority COPD Diabetes)     Question 2   High Blood Sugar Symptoms   Please press 1 for yes. Please press 2 for no. High Blood Sugar Symptoms (Issue Panel: High Priority COPD Diabetes)    ### Required Interventions and Feedback     Call Status         *Call Status (Required):     Patient Reached    Call status details:     Spoke with patient regarding alert received-change in mucous and high blood sugar symptoms. Patient states he continues to have a productive cough with grayish color phlegm. States he's been having the phlegm for a while and was told the increase in phlegm was from the radiation treatment. Continues to have occasional shortness of breath at rest and with exertion. He is not currently on oxygen. He also states that he may have a UTI. Reports increased urination and slight odor. Denies having any abdominal pain or burning sensation when urinating. He states he is not having any high blood sugar symptoms. Reports that he felt dizzy and lightheaded last night and if it wasn't for his niece, he would've fell. He did not check his blood sugar. Educated importance of checking blood sugars and discussed signs/symptoms of hypoglycemia and hyperglycemia. He verbalized understanding. Denies having any dizziness/lightheadedness, nausea/vomiting, weakness, fatigue. Patient has an appointment with Charlene Proctor CNP on Monday, 7/1/19. Patient is aware of when to go to ED if symptoms worsen. Patient would like to receive tele-care calls 3 times a week. Patient will be changed to low touch.   No other questions or concerns at this time.        Pre-Call Opportunity Review         High Priority :     Yes      Anastacia Rios RN  Tele-Health Coordinator

## 2019-06-30 PROBLEM — E86.0 DEHYDRATION: Status: RESOLVED | Noted: 2019-05-31 | Resolved: 2019-06-30

## 2019-07-01 ENCOUNTER — TELEPHONE (OUTPATIENT)
Dept: FAMILY MEDICINE CLINIC | Age: 59
End: 2019-07-01

## 2019-07-01 ENCOUNTER — CARE COORDINATION (OUTPATIENT)
Dept: CARE COORDINATION | Age: 59
End: 2019-07-01

## 2019-07-01 DIAGNOSIS — I69.398 OTHER SEQUELAE OF CEREBRAL INFARCTION: Primary | ICD-10-CM

## 2019-07-01 DIAGNOSIS — R26.81 UNSTEADINESS ON FEET: ICD-10-CM

## 2019-07-01 DIAGNOSIS — C10.8 MALIGNANT NEOPLASM OF OVERLAPPING SITES OF OROPHARYNX (HCC): ICD-10-CM

## 2019-07-01 DIAGNOSIS — R53.1 WEAKNESS: ICD-10-CM

## 2019-07-01 RX ORDER — CIPROFLOXACIN 500 MG/1
500 TABLET, FILM COATED ORAL 2 TIMES DAILY
Qty: 14 TABLET | Refills: 0 | Status: SHIPPED | OUTPATIENT
Start: 2019-07-01 | End: 2019-07-08

## 2019-07-01 NOTE — TELEPHONE ENCOUNTER
Patient called and stated that he has a UTI. He's having frequent urination, no burning but urine has a strong odor. It started when he was discharged from the hospital (he could not tell me when that was). He's requesting an antibiotic. [employer]. Also he was scheduled for an appointment at 2:15 today for medication check - trouble sleeping but he cancelled that because he does not feel good today. DOLV 5/20/19.

## 2019-07-01 NOTE — CARE COORDINATION
Ambulatory Care Coordination Note  7/1/2019  CM Risk Score: 10  [place] Mortality Risk Score: [unfilled]    ACC: Kiersten Crespo RN    Summary Note: Briefly spoke with patient this afternoon for continued Care Coordination follow up and education. Patient shared he has not been feeling well as he feels he has a UTI. Patient reported he is frustrated with his health as he feels there is \"always something going on. \"  Active listening and support provided to patient. Patient shared he is now being followed by telecare/telemed and he does find this helpful. Patient denied any c/o worsening SOB or cough being present. Patient then stated he needed to end our call for another call. Patient denied any other questions, concerns, or needs and he was encouraged to call with any that may develop. Actions:   - Provided active listening and support  - Monitored for change/new symptoms  - Reviewed telecare/tele med program          Plan of Care:   - Continue with Care Coordination follow up for continued assistance with his chronic disease management and s/p recent hospitalizations  - Complete COPD education   - Complete DM education   - Monitor for additional needs  - Monitor for readiness to discharge from Care Coordination    Care Coordination Interventions    Program Enrollment:  Complex Care  Referral from Primary Care Provider:  No  Suggested Interventions and [address] [place] [address]:  Completed (Comment: clint Pagan psychologist )  Diabetes Education:  Completed  Fall Risk Prevention:  Completed  Home Health Services:  Completed (Comment: [facility] - nursing d/c 6/5/19)  Meals on Wheels:  Not Started  Medication Assistance Program:  Declined (Comment: denied any need at this time )  Medi Set or Pill Pack:  Completed (Comment: AARON and bora assisting with med management )  Occupational Therapy:  Completed (Comment: thru [facility] )  Physical Therapy:  Completed (Comment: thru [facility])  Smoking Cessation: Completed  Social Work:  Completed (Comment: [facility] [phone] [address] [facility] [facility]  Other Therapy Services:  Completed (Comment: [facility] )  Other Services:  Completed (Comment: Tele Care Program )  Transportation Support:  Completed  Zone Management Tools:  Completed         Goals Addressed                 This Visit's Progress       Care Coordination     Conditions and Symptoms   No change     I will schedule office visits, as directed by my provider. I will keep my appointment or reschedule if I have to cancel. I will follow my Zone Management tool to seek urgent or emergent care. I will notify my provider of any symptoms that indicate a worsening of my condition. Barriers: lack of support, overwhelmed by complexity of regimen and lack of education  Plan for overcoming my barriers: Continued education and support   Confidence: 7/10  Anticipated Goal Completion Date: 8/1/19              Prior to Admission medications    Medication Sig Start Date End Date Taking? Authorizing Provider   ciprofloxacin (CIPRO) 500 MG tablet Take 1 tablet by mouth 2 times daily for 7 days 7/1/19 7/8/19  YAQUELIN Navas CNP   morphine (MS CONTIN) 30 MG extended release tablet Take 1 tablet by mouth every 12 hours for 30 days. 6/21/19 7/21/19  YAQUELIN Joe CNP   lidocaine-prilocaine (EMLA) 2.5-2.5 % cream Apply topically as needed. 6/13/19   Bernardo Borges MD   oxyCODONE HCl (OXY-IR) 10 MG immediate release tablet Take 1 tablet by mouth every 8 hours as needed for Pain for up to 30 days. 6/5/19 7/5/19  YAQUELIN Castro CNP   hydrOXYzine (ATARAX) 50 MG tablet Take 1-2 tablets nightly prn 5/22/19   YAQUELIN Navas CNP   clonazePAM (KLONOPIN) 1 MG tablet Take 1 tablet by mouth 2 times daily as needed for Anxiety for up to 30 days.  5/22/19 6/21/19  YAQUELIN Navas CNP   albuterol sulfate  (90 Base) MCG/ACT inhaler Inhale 2 puffs into the lungs every 6 hours as needed for Wheezing 4/22/19   Bala Johnson MD   glucose monitoring kit (FREESTYLE) monitoring kit 1 kit by Does not apply route daily 4/15/19   Bala Johnson MD   atorvastatin (LIPITOR) 40 MG tablet Take 1 tablet by mouth nightly 4/12/19   Dawit Reasons, DO   nicotine (NICODERM CQ) 21 MG/24HR Place 1 patch onto the skin daily 4/13/19   Dawit Reasons, DO   apixaban (ELIQUIS) 5 MG TABS tablet Take 1 tablet by mouth 2 times daily 4/12/19   Dawit Reasons, DO   sertraline (ZOLOFT) 100 MG tablet Take 2 tablets by mouth daily 1/29/19   Bala Johnson MD   naloxone [facility]) 4 MG/0.1ML LIQD nasal spray 1 spray by Nasal route as needed for Opioid Reversal 12/24/18   YAQUELIN Joe CNP   docusate sodium (COLACE) 100 MG capsule Take 100 mg by mouth 3 times daily as needed for Constipation    Historical Provider, MD   lactulose (CHRONULAC) 10 GM/15ML solution Take 30 mLs by mouth 2 times daily as needed (constipation) 8/23/18   YAQUELIN Phipps CNP       Future Appointments   Date Time Provider Richard Cortes   7/16/2019  1:30 PM STR MRI RM1 STRZ MRI STR Radiolog   7/16/2019  2:20 PM STR CT IMAGING RM1  OP EXPRESS STRZ OUT EXP STR Radiolog   7/25/2019  1:00 PM STR OUT PT ONC INJ RM 12 STRZ OP ONC None   7/25/2019  2:00 PM Nadine Cerda MD ENT [address]   7/26/2019 11:00 AM Vidya Camacho PSYD SRPX Psychol [facility]   8/5/2019 11:45 AM Sallie Kat MD SRPX Heart [facility]   8/7/2019  9:45 AM Bala Johnson MD [facility] Med CG [address]   9/9/2019  2:30 PM YAQUELIN Phipps CNP SRPX Pain [address]   9/27/2019 11:00 AM Vicki Verde MD Oncology [address]   9/30/2019 10:45 AM Adwoa Abdalla, APRN - [facility] None     ,   Diabetes Assessment    Meal Planning:  Avoidance of concentrated sweets   How often do you test your blood sugar?:  Meals, Daily   Do you have barriers with adherence to non-pharmacologic self-management interventions?  (Nutrition/Exercise/Self-Monitoring):  No   Have you ever had to go

## 2019-07-02 NOTE — TELEPHONE ENCOUNTER
Pt called into the office let him know that the script for Cipro was sent to [facility].

## 2019-07-03 ENCOUNTER — PATIENT MESSAGE (OUTPATIENT)
Dept: PHYSICAL MEDICINE AND REHAB | Age: 59
End: 2019-07-03

## 2019-07-03 DIAGNOSIS — I26.99 PULMONARY EMBOLISM AND INFARCTION (HCC): ICD-10-CM

## 2019-07-03 DIAGNOSIS — C09.9 MALIGNANT NEOPLASM OF TONSIL (HCC): ICD-10-CM

## 2019-07-03 DIAGNOSIS — G62.9 NEUROPATHY: ICD-10-CM

## 2019-07-03 DIAGNOSIS — G89.3 CANCER ASSOCIATED PAIN: ICD-10-CM

## 2019-07-03 DIAGNOSIS — G89.4 CHRONIC PAIN SYNDROME: ICD-10-CM

## 2019-07-03 DIAGNOSIS — C09.9 SQUAMOUS CELL CARCINOMA OF LEFT TONSIL (HCC): ICD-10-CM

## 2019-07-03 DIAGNOSIS — C02.9 TONGUE CARCINOMA (HCC): ICD-10-CM

## 2019-07-03 RX ORDER — OXYCODONE HYDROCHLORIDE 10 MG/1
10 TABLET ORAL EVERY 8 HOURS PRN
Qty: 90 TABLET | Refills: 0 | Status: ON HOLD | OUTPATIENT
Start: 2019-07-05 | End: 2019-08-06 | Stop reason: HOSPADM

## 2019-07-05 DIAGNOSIS — F41.1 GENERALIZED ANXIETY DISORDER: ICD-10-CM

## 2019-07-05 DIAGNOSIS — F33.1 MODERATE EPISODE OF RECURRENT MAJOR DEPRESSIVE DISORDER (HCC): ICD-10-CM

## 2019-07-06 ENCOUNTER — CARE COORDINATION (OUTPATIENT)
Dept: CASE MANAGEMENT | Age: 59
End: 2019-07-06

## 2019-07-07 ENCOUNTER — APPOINTMENT (OUTPATIENT)
Dept: GENERAL RADIOLOGY | Age: 59
End: 2019-07-07
Payer: MEDICARE

## 2019-07-07 ENCOUNTER — HOSPITAL ENCOUNTER (EMERGENCY)
Age: 59
Discharge: HOME | End: 2019-07-07
Payer: MEDICARE

## 2019-07-07 VITALS
HEART RATE: 79 BPM | TEMPERATURE: 97.7 F | OXYGEN SATURATION: 98 % | BODY MASS INDEX: 18.96 KG/M2 | SYSTOLIC BLOOD PRESSURE: 149 MMHG | WEIGHT: 140 LBS | DIASTOLIC BLOOD PRESSURE: 89 MMHG | RESPIRATION RATE: 14 BRPM | HEIGHT: 72 IN

## 2019-07-07 DIAGNOSIS — R53.83 FATIGUE, UNSPECIFIED TYPE: Primary | ICD-10-CM

## 2019-07-07 LAB
ALBUMIN SERPL-MCNC: 4 G/DL (ref 3.5–5.1)
ALP BLD-CCNC: 84 U/L (ref 38–126)
ALT SERPL-CCNC: 8 U/L (ref 11–66)
ANION GAP SERPL CALCULATED.3IONS-SCNC: 14 MEQ/L (ref 8–16)
AST SERPL-CCNC: 15 U/L (ref 5–40)
BACTERIA: ABNORMAL /HPF
BASOPHILS # BLD: 0.4 %
BASOPHILS ABSOLUTE: 0 THOU/MM3 (ref 0–0.1)
BILIRUB SERPL-MCNC: 0.8 MG/DL (ref 0.3–1.2)
BILIRUBIN URINE: NEGATIVE
BLOOD, URINE: NEGATIVE
BUN BLDV-MCNC: 23 MG/DL (ref 7–22)
CALCIUM SERPL-MCNC: 9.9 MG/DL (ref 8.5–10.5)
CASTS 2: ABNORMAL /LPF
CASTS UA: ABNORMAL /LPF
CHARACTER, URINE: ABNORMAL
CHLORIDE BLD-SCNC: 99 MEQ/L (ref 98–111)
CO2: 25 MEQ/L (ref 23–33)
COLOR: YELLOW
CREAT SERPL-MCNC: 0.6 MG/DL (ref 0.4–1.2)
CRYSTALS, UA: ABNORMAL
EKG ATRIAL RATE: 55 BPM
EKG P AXIS: 18 DEGREES
EKG P-R INTERVAL: 102 MS
EKG Q-T INTERVAL: 466 MS
EKG QRS DURATION: 96 MS
EKG QTC CALCULATION (BAZETT): 445 MS
EKG R AXIS: -7 DEGREES
EKG T AXIS: -73 DEGREES
EKG VENTRICULAR RATE: 55 BPM
EOSINOPHIL # BLD: 0.2 %
EOSINOPHILS ABSOLUTE: 0 THOU/MM3 (ref 0–0.4)
EPITHELIAL CELLS, UA: ABNORMAL /HPF
ERYTHROCYTE [DISTWIDTH] IN BLOOD BY AUTOMATED COUNT: 13.9 % (ref 11.5–14.5)
ERYTHROCYTE [DISTWIDTH] IN BLOOD BY AUTOMATED COUNT: 44.8 FL (ref 35–45)
GFR SERPL CREATININE-BSD FRML MDRD: > 90 ML/MIN/1.73M2
GLUCOSE BLD-MCNC: 138 MG/DL (ref 70–108)
GLUCOSE URINE: NEGATIVE MG/DL
HCT VFR BLD CALC: 43.1 % (ref 42–52)
HEMOGLOBIN: 14.8 GM/DL (ref 14–18)
IMMATURE GRANS (ABS): 0.05 THOU/MM3 (ref 0–0.07)
IMMATURE GRANULOCYTES: 0.5 %
KETONES, URINE: NEGATIVE
LACTIC ACID: 1.8 MMOL/L (ref 0.5–2.2)
LEUKOCYTE ESTERASE, URINE: ABNORMAL
LIPASE: 5.9 U/L (ref 5.6–51.3)
LYMPHOCYTES # BLD: 5.6 %
LYMPHOCYTES ABSOLUTE: 0.6 THOU/MM3 (ref 1–4.8)
MCH RBC QN AUTO: 30.3 PG (ref 26–33)
MCHC RBC AUTO-ENTMCNC: 34.3 GM/DL (ref 32.2–35.5)
MCV RBC AUTO: 88.1 FL (ref 80–94)
MISCELLANEOUS 2: ABNORMAL
MONOCYTES # BLD: 4.8 %
MONOCYTES ABSOLUTE: 0.5 THOU/MM3 (ref 0.4–1.3)
MUCUS: ABNORMAL
NITRITE, URINE: NEGATIVE
NUCLEATED RED BLOOD CELLS: 0 /100 WBC
OSMOLALITY CALCULATION: 281.6 MOSMOL/KG (ref 275–300)
PH UA: 6 (ref 5–9)
PLATELET # BLD: 281 THOU/MM3 (ref 130–400)
PMV BLD AUTO: 10.1 FL (ref 9.4–12.4)
POTASSIUM SERPL-SCNC: 3.4 MEQ/L (ref 3.5–5.2)
PROCALCITONIN: < 0.02 NG/ML (ref 0.01–0.09)
PROTEIN UA: NEGATIVE
RBC # BLD: 4.89 MILL/MM3 (ref 4.7–6.1)
RBC URINE: ABNORMAL /HPF
RENAL EPITHELIAL, UA: ABNORMAL
SEG NEUTROPHILS: 88.5 %
SEGMENTED NEUTROPHILS ABSOLUTE COUNT: 9.7 THOU/MM3 (ref 1.8–7.7)
SODIUM BLD-SCNC: 138 MEQ/L (ref 135–145)
SPECIFIC GRAVITY, URINE: 1.02 (ref 1–1.03)
TOTAL PROTEIN: 6.9 G/DL (ref 6.1–8)
TROPONIN T: < 0.01 NG/ML
UROBILINOGEN, URINE: 0.2 EU/DL (ref 0–1)
WBC # BLD: 11 THOU/MM3 (ref 4.8–10.8)
WBC UA: ABNORMAL /HPF
YEAST: ABNORMAL

## 2019-07-07 PROCEDURE — 80053 COMPREHEN METABOLIC PANEL: CPT

## 2019-07-07 PROCEDURE — 96374 THER/PROPH/DIAG INJ IV PUSH: CPT

## 2019-07-07 PROCEDURE — 84484 ASSAY OF TROPONIN QUANT: CPT

## 2019-07-07 PROCEDURE — 93005 ELECTROCARDIOGRAM TRACING: CPT | Performed by: EMERGENCY MEDICINE

## 2019-07-07 PROCEDURE — 99284 EMERGENCY DEPT VISIT MOD MDM: CPT

## 2019-07-07 PROCEDURE — 81001 URINALYSIS AUTO W/SCOPE: CPT

## 2019-07-07 PROCEDURE — 36415 COLL VENOUS BLD VENIPUNCTURE: CPT

## 2019-07-07 PROCEDURE — 71045 X-RAY EXAM CHEST 1 VIEW: CPT

## 2019-07-07 PROCEDURE — 83605 ASSAY OF LACTIC ACID: CPT

## 2019-07-07 PROCEDURE — 6360000002 HC RX W HCPCS: Performed by: EMERGENCY MEDICINE

## 2019-07-07 PROCEDURE — 84145 PROCALCITONIN (PCT): CPT

## 2019-07-07 PROCEDURE — 83690 ASSAY OF LIPASE: CPT

## 2019-07-07 PROCEDURE — 85025 COMPLETE CBC W/AUTO DIFF WBC: CPT

## 2019-07-07 PROCEDURE — 96375 TX/PRO/DX INJ NEW DRUG ADDON: CPT

## 2019-07-07 PROCEDURE — 93010 ELECTROCARDIOGRAM REPORT: CPT | Performed by: INTERNAL MEDICINE

## 2019-07-07 PROCEDURE — 2580000003 HC RX 258: Performed by: EMERGENCY MEDICINE

## 2019-07-07 RX ORDER — METOCLOPRAMIDE HYDROCHLORIDE 5 MG/ML
10 INJECTION INTRAMUSCULAR; INTRAVENOUS ONCE
Status: COMPLETED | OUTPATIENT
Start: 2019-07-07 | End: 2019-07-07

## 2019-07-07 RX ORDER — DIPHENHYDRAMINE HYDROCHLORIDE 50 MG/ML
25 INJECTION INTRAMUSCULAR; INTRAVENOUS ONCE
Status: COMPLETED | OUTPATIENT
Start: 2019-07-07 | End: 2019-07-07

## 2019-07-07 RX ORDER — 0.9 % SODIUM CHLORIDE 0.9 %
500 INTRAVENOUS SOLUTION INTRAVENOUS ONCE
Status: COMPLETED | OUTPATIENT
Start: 2019-07-07 | End: 2019-07-07

## 2019-07-07 RX ADMIN — METOCLOPRAMIDE 10 MG: 5 INJECTION, SOLUTION INTRAMUSCULAR; INTRAVENOUS at 09:09

## 2019-07-07 RX ADMIN — SODIUM CHLORIDE 500 ML: 9 INJECTION, SOLUTION INTRAVENOUS at 09:09

## 2019-07-07 RX ADMIN — DIPHENHYDRAMINE HYDROCHLORIDE 25 MG: 50 INJECTION, SOLUTION INTRAMUSCULAR; INTRAVENOUS at 09:09

## 2019-07-07 ASSESSMENT — PAIN DESCRIPTION - LOCATION
LOCATION: HEAD
LOCATION: HEAD

## 2019-07-07 ASSESSMENT — ENCOUNTER SYMPTOMS
VOICE CHANGE: 1
NAUSEA: 0
SHORTNESS OF BREATH: 1
TROUBLE SWALLOWING: 0
ABDOMINAL PAIN: 0
COUGH: 1
VOMITING: 0
COLOR CHANGE: 0
ABDOMINAL DISTENTION: 0

## 2019-07-07 ASSESSMENT — PAIN DESCRIPTION - FREQUENCY
FREQUENCY: CONTINUOUS
FREQUENCY: CONTINUOUS

## 2019-07-07 ASSESSMENT — PAIN DESCRIPTION - PROGRESSION: CLINICAL_PROGRESSION: GRADUALLY IMPROVING

## 2019-07-07 ASSESSMENT — PAIN DESCRIPTION - PAIN TYPE
TYPE: ACUTE PAIN
TYPE: ACUTE PAIN

## 2019-07-07 ASSESSMENT — PAIN DESCRIPTION - DESCRIPTORS
DESCRIPTORS: ACHING
DESCRIPTORS: ACHING

## 2019-07-07 ASSESSMENT — PAIN SCALES - GENERAL
PAINLEVEL_OUTOF10: 6
PAINLEVEL_OUTOF10: 10

## 2019-07-07 NOTE — ED NOTES
Patient updated on POC patient reports feeling a little better. Pain decreased to 6 on a scale of 0-10. Patient ate turkey and apple sauce. Patient drank half of Gatorade at this time. Patient asking for more pain medication.      Magaly Dill RN  07/07/19 [id number]

## 2019-07-07 NOTE — ED NOTES
Bed: 007A  Expected date: 7/7/19  Expected time:   Means of arrival: Fidelia  Comments:      Angeli Wright RN  07/07/19 0762

## 2019-07-08 ENCOUNTER — TELEPHONE (OUTPATIENT)
Dept: FAMILY MEDICINE CLINIC | Age: 59
End: 2019-07-08

## 2019-07-08 ENCOUNTER — CARE COORDINATION (OUTPATIENT)
Dept: CARE COORDINATION | Age: 59
End: 2019-07-08

## 2019-07-08 ENCOUNTER — PROCEDURE VISIT (OUTPATIENT)
Dept: CARDIOLOGY CLINIC | Age: 59
End: 2019-07-08

## 2019-07-08 DIAGNOSIS — I63.431 CEREBROVASCULAR ACCIDENT (CVA) DUE TO EMBOLISM OF RIGHT POSTERIOR CEREBRAL ARTERY (HCC): Primary | ICD-10-CM

## 2019-07-08 RX ORDER — SERTRALINE HYDROCHLORIDE 100 MG/1
TABLET, FILM COATED ORAL
Qty: 60 TABLET | Refills: 2 | Status: ON HOLD | OUTPATIENT
Start: 2019-07-08 | End: 2019-08-13 | Stop reason: SDUPTHER

## 2019-07-08 NOTE — CARE COORDINATION
Name: Yamil Madison    ### Patient Details  YOB: 1960  MRN: [id number]    ### Related interaction  COPD-Diabetes High Touch UA (COPD-Diabetes High Touch UA) ([email or web address])    ### Questions     Question 1   Shortness of Breath and Mucus   Please press 1 for yes. Please press 2 for no. Change in Mucus (Issue Panel: High Priority COPD Diabetes)    ### Required Interventions and Feedback     Call Status         *Call Status (Required):     Patient Reached    Call status details:     Called and spoke to patient. he states he was in ER yesterday and feels he was sent home too early. He states he is very weak and live alone. Instructed patient to call 911, He states he doesn't want to go to hospital. He states he is taking medications as directed. He states he will call PCP now. I will call PCP office and inform them of above info. spoke to Robin Burrell and she states patient is on the phone now. Pre-Call Opportunity Review         High Priority :     Yes     General Symptoms and Assessment   These symptoms may apply to all patients with all diseases. Fatigue:     Yes    General symptom details:     Patient states he is feeling very fatigued and is having problems getting up. Interventions Provided         Education:     Disease Management    Additional details regarding education:     Asked to Call [phone] - patient refused stating he  doesn't want to go to the hospital. he states he will call PCP. Sonny Viramontes.  [place], [place] [phone] / [facility]

## 2019-07-09 ENCOUNTER — CARE COORDINATION (OUTPATIENT)
Dept: CARE COORDINATION | Age: 59
End: 2019-07-09

## 2019-07-09 NOTE — CARE COORDINATION
Attempted to reach patient for continued Care Coordination follow up and education. Patient's phone initially rang busy and upon 2nd attempt patient did not answer. Generic voicemail message was left asking patient to please return call to my direct number. Will continue to work to f/u with patient in the future. Discussed with SW and she will also attempt to make contact with patient.

## 2019-07-10 ENCOUNTER — CARE COORDINATION (OUTPATIENT)
Dept: CASE MANAGEMENT | Age: 59
End: 2019-07-10

## 2019-07-10 ENCOUNTER — TELEPHONE (OUTPATIENT)
Dept: FAMILY MEDICINE CLINIC | Age: 59
End: 2019-07-10

## 2019-07-10 NOTE — TELEPHONE ENCOUNTER
We received a call from Leonid Carrasco with [facility] who spoke to the pt today and stated he has not gone to the ED to be evaluated like he was instructed to on 7-8-19. Pt stated to Leonid Carrasco that he has increased SOB coughing up clear phlegm and has trouble getting up. Pt doesn't want to go to hospital due to feeling he is sicker when he leaves and he wants home health to come assist him. Pt has stated he cant come into our office to be evaluated due to no ride or doesn't feel strong enough to get here. Please advise.

## 2019-07-10 NOTE — CARE COORDINATION
Name: Agn Gallegos    ### Patient Details  YOB: 1960  MRN: [id number]    ### Related interaction  COPD-Diabetes Low Touch UA (COPD-Diabetes Low Touch UA) ([email or web address])    ### Questions     Question 1   Shortness of Breath and Mucus   Please press 1 for yes. Please press 2 for no. Change in Mucus (Issue Panel: High Priority COPD Diabetes)     Question 2   Low Blood Sugar   Please press 1 for yes. Please press 2 for no. Low Blood Sugar Symptoms (Issue Panel: High Priority COPD Diabetes)     Question 3   Legs and Feet   Please press 1 for yes. Please press 2 for no. Symptoms on Feet (Issue Panel: High Priority COPD Diabetes)    ### Required Interventions and Feedback     Call Status         *Call Status (Required):     Patient Reached    Call status details:     Spoke with patient regarding alert received-change in mucous, low blood sugar symptoms, symptoms on feet. He reports increase in shortness of breath, cough, phlegm which has no color per patient. He states that he feels he is in distress. Advised he go to the ED but he refuses and states that he gets worse when going to the hospital.  He also states that he cannot do anything for himself and requests to be placed in a facility or have home health services. Patient has slight swelling in feet. And blood sugars continue to go up and down. Informed patient that tele-care nurse will contact PCP office and will have someone contact him. Contacted PCP office. Spoke with nurse Anselmo. Informed her of respiratory issues and patient's request to be placed in a facility or have home health services because he cannot do anything for himself. Benito Salazar will let MD know and will contact patient.        Pre-Call Opportunity Review         High Priority :     Yes      Author Jacey, RN  Care Transitions Nurse

## 2019-07-11 ENCOUNTER — CARE COORDINATION (OUTPATIENT)
Dept: CARE COORDINATION | Age: 59
End: 2019-07-11

## 2019-07-11 ENCOUNTER — APPOINTMENT (OUTPATIENT)
Dept: GENERAL RADIOLOGY | Age: 59
DRG: 064 | End: 2019-07-11
Payer: MEDICARE

## 2019-07-11 ENCOUNTER — HOSPITAL ENCOUNTER (INPATIENT)
Age: 59
LOS: 7 days | Discharge: NURSING, REHAB OR LONG-TERM CARE | DRG: 064 | End: 2019-07-18
Attending: EMERGENCY MEDICINE | Admitting: FAMILY MEDICINE
Payer: MEDICARE

## 2019-07-11 ENCOUNTER — APPOINTMENT (OUTPATIENT)
Dept: CT IMAGING | Age: 59
DRG: 064 | End: 2019-07-11
Payer: MEDICARE

## 2019-07-11 DIAGNOSIS — I63.9 CEREBROVASCULAR ACCIDENT (CVA), UNSPECIFIED MECHANISM (HCC): Primary | ICD-10-CM

## 2019-07-11 PROBLEM — S22.32XA CLOSED FRACTURE OF ONE RIB OF LEFT SIDE: Status: ACTIVE | Noted: 2019-07-11
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
## 2019-07-11 PROBLEM — S09.90XA CHI (CLOSED HEAD INJURY), INITIAL ENCOUNTER: Status: ACTIVE | Noted: 2019-07-11

## 2019-07-11 LAB
ALBUMIN SERPL-MCNC: 3.6 G/DL (ref 3.5–5.1)
ALP BLD-CCNC: 87 U/L (ref 38–126)
ALT SERPL-CCNC: 10 U/L (ref 11–66)
AMPHETAMINE+METHAMPHETAMINE URINE SCREEN: NEGATIVE
ANION GAP SERPL CALCULATED.3IONS-SCNC: 10 MEQ/L (ref 8–16)
APTT: 35 SECONDS (ref 22–38)
AST SERPL-CCNC: 18 U/L (ref 5–40)
BARBITURATE QUANTITATIVE URINE: NEGATIVE
BASOPHILS # BLD: 0.3 %
BASOPHILS ABSOLUTE: 0 THOU/MM3 (ref 0–0.1)
BENZODIAZEPINE QUANTITATIVE URINE: NEGATIVE
BILIRUB SERPL-MCNC: 0.7 MG/DL (ref 0.3–1.2)
BILIRUBIN DIRECT: < 0.2 MG/DL (ref 0–0.3)
BILIRUBIN URINE: NEGATIVE
BLOOD, URINE: NEGATIVE
BUN BLDV-MCNC: 19 MG/DL (ref 7–22)
CALCIUM SERPL-MCNC: 9.3 MG/DL (ref 8.5–10.5)
CANNABINOID QUANTITATIVE URINE: POSITIVE
CHARACTER, URINE: CLEAR
CHLORIDE BLD-SCNC: 98 MEQ/L (ref 98–111)
CO2: 30 MEQ/L (ref 23–33)
COCAINE METABOLITE QUANTITATIVE URINE: NEGATIVE
COLOR: ABNORMAL
CREAT SERPL-MCNC: 0.5 MG/DL (ref 0.4–1.2)
EKG ATRIAL RATE: 50 BPM
EKG P AXIS: 74 DEGREES
EKG P-R INTERVAL: 146 MS
EKG Q-T INTERVAL: 458 MS
EKG QRS DURATION: 94 MS
EKG QTC CALCULATION (BAZETT): 417 MS
EKG R AXIS: 14 DEGREES
EKG T AXIS: -55 DEGREES
EKG VENTRICULAR RATE: 50 BPM
EOSINOPHIL # BLD: 1.2 %
EOSINOPHILS ABSOLUTE: 0.2 THOU/MM3 (ref 0–0.4)
ERYTHROCYTE [DISTWIDTH] IN BLOOD BY AUTOMATED COUNT: 13.9 % (ref 11.5–14.5)
ERYTHROCYTE [DISTWIDTH] IN BLOOD BY AUTOMATED COUNT: 14.2 % (ref 11.5–14.5)
ERYTHROCYTE [DISTWIDTH] IN BLOOD BY AUTOMATED COUNT: 45.1 FL (ref 35–45)
ERYTHROCYTE [DISTWIDTH] IN BLOOD BY AUTOMATED COUNT: 46.8 FL (ref 35–45)
ETHYL ALCOHOL, SERUM: < 0.01 %
GFR SERPL CREATININE-BSD FRML MDRD: > 90 ML/MIN/1.73M2
GLUCOSE BLD-MCNC: 127 MG/DL (ref 70–108)
GLUCOSE URINE: NEGATIVE MG/DL
HCT VFR BLD CALC: 43.3 % (ref 42–52)
HCT VFR BLD CALC: 43.5 % (ref 42–52)
HEMOGLOBIN: 14.7 GM/DL (ref 14–18)
HEMOGLOBIN: 14.9 GM/DL (ref 14–18)
IMMATURE GRANS (ABS): 0.06 THOU/MM3 (ref 0–0.07)
IMMATURE GRANULOCYTES: 0.4 %
KETONES, URINE: NEGATIVE
LEUKOCYTE ESTERASE, URINE: NEGATIVE
LIPASE: 11.6 U/L (ref 5.6–51.3)
LYMPHOCYTES # BLD: 4.2 %
LYMPHOCYTES ABSOLUTE: 0.6 THOU/MM3 (ref 1–4.8)
MAGNESIUM: 1.7 MG/DL (ref 1.6–2.4)
MCH RBC QN AUTO: 30.4 PG (ref 26–33)
MCH RBC QN AUTO: 30.7 PG (ref 26–33)
MCHC RBC AUTO-ENTMCNC: 33.8 GM/DL (ref 32.2–35.5)
MCHC RBC AUTO-ENTMCNC: 34.4 GM/DL (ref 32.2–35.5)
MCV RBC AUTO: 89.3 FL (ref 80–94)
MCV RBC AUTO: 90.1 FL (ref 80–94)
MONOCYTES # BLD: 7.1 %
MONOCYTES ABSOLUTE: 1.1 THOU/MM3 (ref 0.4–1.3)
NITRITE, URINE: NEGATIVE
NUCLEATED RED BLOOD CELLS: 0 /100 WBC
OPIATES, URINE: POSITIVE
OSMOLALITY CALCULATION: 279.5 MOSMOL/KG (ref 275–300)
OXYCODONE: NEGATIVE
PH UA: 5.5 (ref 5–9)
PHENCYCLIDINE QUANTITATIVE URINE: NEGATIVE
PLATELET # BLD: 229 THOU/MM3 (ref 130–400)
PLATELET # BLD: 230 THOU/MM3 (ref 130–400)
PMV BLD AUTO: 10.6 FL (ref 9.4–12.4)
PMV BLD AUTO: 10.6 FL (ref 9.4–12.4)
POTASSIUM SERPL-SCNC: 3.7 MEQ/L (ref 3.5–5.2)
PROCALCITONIN: < 0.02 NG/ML (ref 0.01–0.09)
PROTEIN UA: NEGATIVE
RBC # BLD: 4.83 MILL/MM3 (ref 4.7–6.1)
RBC # BLD: 4.85 MILL/MM3 (ref 4.7–6.1)
SEG NEUTROPHILS: 86.8 %
SEGMENTED NEUTROPHILS ABSOLUTE COUNT: 13.3 THOU/MM3 (ref 1.8–7.7)
SODIUM BLD-SCNC: 138 MEQ/L (ref 135–145)
SPECIFIC GRAVITY, URINE: 1.02 (ref 1–1.03)
TOTAL CK: 75 U/L (ref 55–170)
TOTAL PROTEIN: 6.2 G/DL (ref 6.1–8)
TROPONIN T: < 0.01 NG/ML
UROBILINOGEN, URINE: 0.2 EU/DL (ref 0–1)
WBC # BLD: 11.5 THOU/MM3 (ref 4.8–10.8)
WBC # BLD: 15.3 THOU/MM3 (ref 4.8–10.8)

## 2019-07-11 PROCEDURE — 2580000003 HC RX 258: Performed by: PHYSICIAN ASSISTANT

## 2019-07-11 PROCEDURE — 87040 BLOOD CULTURE FOR BACTERIA: CPT

## 2019-07-11 PROCEDURE — 84145 PROCALCITONIN (PCT): CPT

## 2019-07-11 PROCEDURE — 85027 COMPLETE CBC AUTOMATED: CPT

## 2019-07-11 PROCEDURE — 93010 ELECTROCARDIOGRAM REPORT: CPT | Performed by: INTERNAL MEDICINE

## 2019-07-11 PROCEDURE — APPSS180 APP SPLIT SHARED TIME > 60 MINUTES: Performed by: PHYSICIAN ASSISTANT

## 2019-07-11 PROCEDURE — 36415 COLL VENOUS BLD VENIPUNCTURE: CPT

## 2019-07-11 PROCEDURE — 6370000000 HC RX 637 (ALT 250 FOR IP): Performed by: PHYSICIAN ASSISTANT

## 2019-07-11 PROCEDURE — 76376 3D RENDER W/INTRP POSTPROCES: CPT

## 2019-07-11 PROCEDURE — G0480 DRUG TEST DEF 1-7 CLASSES: HCPCS

## 2019-07-11 PROCEDURE — 6360000002 HC RX W HCPCS: Performed by: STUDENT IN AN ORGANIZED HEALTH CARE EDUCATION/TRAINING PROGRAM

## 2019-07-11 PROCEDURE — 84484 ASSAY OF TROPONIN QUANT: CPT

## 2019-07-11 PROCEDURE — 6360000004 HC RX CONTRAST MEDICATION: Performed by: PHYSICIAN ASSISTANT

## 2019-07-11 PROCEDURE — 87449 NOS EACH ORGANISM AG IA: CPT

## 2019-07-11 PROCEDURE — 6370000000 HC RX 637 (ALT 250 FOR IP): Performed by: STUDENT IN AN ORGANIZED HEALTH CARE EDUCATION/TRAINING PROGRAM

## 2019-07-11 PROCEDURE — 6820000001 HC L2 TRAUMA SURGERY EVALUATION

## 2019-07-11 PROCEDURE — 93005 ELECTROCARDIOGRAM TRACING: CPT | Performed by: PHYSICIAN ASSISTANT

## 2019-07-11 PROCEDURE — 94640 AIRWAY INHALATION TREATMENT: CPT

## 2019-07-11 PROCEDURE — 83735 ASSAY OF MAGNESIUM: CPT

## 2019-07-11 PROCEDURE — 80307 DRUG TEST PRSMV CHEM ANLYZR: CPT

## 2019-07-11 PROCEDURE — 80076 HEPATIC FUNCTION PANEL: CPT

## 2019-07-11 PROCEDURE — 2500000003 HC RX 250 WO HCPCS: Performed by: PSYCHIATRY & NEUROLOGY

## 2019-07-11 PROCEDURE — 81003 URINALYSIS AUTO W/O SCOPE: CPT

## 2019-07-11 PROCEDURE — 74177 CT ABD & PELVIS W/CONTRAST: CPT

## 2019-07-11 PROCEDURE — 99222 1ST HOSP IP/OBS MODERATE 55: CPT | Performed by: FAMILY MEDICINE

## 2019-07-11 PROCEDURE — 80048 BASIC METABOLIC PNL TOTAL CA: CPT

## 2019-07-11 PROCEDURE — 73030 X-RAY EXAM OF SHOULDER: CPT

## 2019-07-11 PROCEDURE — 2709999900 HC NON-CHARGEABLE SUPPLY

## 2019-07-11 PROCEDURE — 70450 CT HEAD/BRAIN W/O DYE: CPT

## 2019-07-11 PROCEDURE — 85025 COMPLETE CBC W/AUTO DIFF WBC: CPT

## 2019-07-11 PROCEDURE — 99285 EMERGENCY DEPT VISIT HI MDM: CPT

## 2019-07-11 PROCEDURE — 2060000000 HC ICU INTERMEDIATE R&B

## 2019-07-11 PROCEDURE — 72125 CT NECK SPINE W/O DYE: CPT

## 2019-07-11 PROCEDURE — 2580000003 HC RX 258: Performed by: STUDENT IN AN ORGANIZED HEALTH CARE EDUCATION/TRAINING PROGRAM

## 2019-07-11 PROCEDURE — 82550 ASSAY OF CK (CPK): CPT

## 2019-07-11 PROCEDURE — 83690 ASSAY OF LIPASE: CPT

## 2019-07-11 PROCEDURE — 99223 1ST HOSP IP/OBS HIGH 75: CPT | Performed by: PSYCHIATRY & NEUROLOGY

## 2019-07-11 PROCEDURE — 99223 1ST HOSP IP/OBS HIGH 75: CPT | Performed by: SURGERY

## 2019-07-11 PROCEDURE — 71260 CT THORAX DX C+: CPT

## 2019-07-11 PROCEDURE — 87205 SMEAR GRAM STAIN: CPT

## 2019-07-11 PROCEDURE — 87899 AGENT NOS ASSAY W/OPTIC: CPT

## 2019-07-11 PROCEDURE — 85730 THROMBOPLASTIN TIME PARTIAL: CPT

## 2019-07-11 RX ORDER — LABETALOL 20 MG/4 ML (5 MG/ML) INTRAVENOUS SYRINGE
10 EVERY 6 HOURS PRN
Status: DISCONTINUED | OUTPATIENT
Start: 2019-07-11 | End: 2019-07-18 | Stop reason: HOSPADM

## 2019-07-11 RX ORDER — ATORVASTATIN CALCIUM 80 MG/1
80 TABLET, FILM COATED ORAL NIGHTLY
Status: DISCONTINUED | OUTPATIENT
Start: 2019-07-11 | End: 2019-07-14

## 2019-07-11 RX ORDER — ONDANSETRON 2 MG/ML
4 INJECTION INTRAMUSCULAR; INTRAVENOUS EVERY 6 HOURS PRN
Status: DISCONTINUED | OUTPATIENT
Start: 2019-07-11 | End: 2019-07-18 | Stop reason: HOSPADM

## 2019-07-11 RX ORDER — ASPIRIN 300 MG/1
300 SUPPOSITORY RECTAL DAILY
Status: DISCONTINUED | OUTPATIENT
Start: 2019-07-11 | End: 2019-07-18 | Stop reason: HOSPADM

## 2019-07-11 RX ORDER — HEPARIN SODIUM 10000 [USP'U]/100ML
12 INJECTION, SOLUTION INTRAVENOUS CONTINUOUS
Status: DISCONTINUED | OUTPATIENT
Start: 2019-07-11 | End: 2019-07-11

## 2019-07-11 RX ORDER — ASPIRIN 81 MG/1
81 TABLET ORAL DAILY
Status: DISCONTINUED | OUTPATIENT
Start: 2019-07-11 | End: 2019-07-18 | Stop reason: HOSPADM

## 2019-07-11 RX ORDER — MORPHINE SULFATE 2 MG/ML
1 INJECTION, SOLUTION INTRAMUSCULAR; INTRAVENOUS EVERY 4 HOURS PRN
Status: DISCONTINUED | OUTPATIENT
Start: 2019-07-11 | End: 2019-07-12

## 2019-07-11 RX ORDER — HEPARIN SODIUM 1000 [USP'U]/ML
80 INJECTION, SOLUTION INTRAVENOUS; SUBCUTANEOUS ONCE
Status: DISCONTINUED | OUTPATIENT
Start: 2019-07-11 | End: 2019-07-11

## 2019-07-11 RX ORDER — HEPARIN SODIUM 10000 [USP'U]/100ML
20 INJECTION, SOLUTION INTRAVENOUS CONTINUOUS
Status: DISCONTINUED | OUTPATIENT
Start: 2019-07-11 | End: 2019-07-15

## 2019-07-11 RX ORDER — SERTRALINE HYDROCHLORIDE 100 MG/1
200 TABLET, FILM COATED ORAL DAILY
Status: DISCONTINUED | OUTPATIENT
Start: 2019-07-12 | End: 2019-07-14

## 2019-07-11 RX ORDER — LIDOCAINE 4 G/G
1 PATCH TOPICAL DAILY
Status: DISCONTINUED | OUTPATIENT
Start: 2019-07-11 | End: 2019-07-11 | Stop reason: SDUPTHER

## 2019-07-11 RX ORDER — ACETAMINOPHEN 650 MG/1
650 SUPPOSITORY RECTAL EVERY 4 HOURS PRN
Status: DISCONTINUED | OUTPATIENT
Start: 2019-07-11 | End: 2019-07-18 | Stop reason: HOSPADM

## 2019-07-11 RX ORDER — HEPARIN SODIUM 1000 [USP'U]/ML
80 INJECTION, SOLUTION INTRAVENOUS; SUBCUTANEOUS PRN
Status: DISCONTINUED | OUTPATIENT
Start: 2019-07-11 | End: 2019-07-11

## 2019-07-11 RX ORDER — LIDOCAINE 4 G/G
2 PATCH TOPICAL DAILY
Status: DISCONTINUED | OUTPATIENT
Start: 2019-07-11 | End: 2019-07-15

## 2019-07-11 RX ORDER — SODIUM CHLORIDE 9 MG/ML
INJECTION, SOLUTION INTRAVENOUS CONTINUOUS
Status: DISCONTINUED | OUTPATIENT
Start: 2019-07-11 | End: 2019-07-15

## 2019-07-11 RX ORDER — MANNITOL 20 G/100ML
75 INJECTION, SOLUTION INTRAVENOUS ONCE
Status: COMPLETED | OUTPATIENT
Start: 2019-07-11 | End: 2019-07-11

## 2019-07-11 RX ORDER — OXYCODONE HYDROCHLORIDE 5 MG/1
10 TABLET ORAL EVERY 8 HOURS PRN
Status: DISCONTINUED | OUTPATIENT
Start: 2019-07-11 | End: 2019-07-12

## 2019-07-11 RX ORDER — HEPARIN SODIUM 1000 [USP'U]/ML
40 INJECTION, SOLUTION INTRAVENOUS; SUBCUTANEOUS PRN
Status: DISCONTINUED | OUTPATIENT
Start: 2019-07-11 | End: 2019-07-11

## 2019-07-11 RX ORDER — SODIUM CHLORIDE 0.9 % (FLUSH) 0.9 %
10 SYRINGE (ML) INJECTION PRN
Status: DISCONTINUED | OUTPATIENT
Start: 2019-07-11 | End: 2019-07-18 | Stop reason: HOSPADM

## 2019-07-11 RX ORDER — NICOTINE 21 MG/24HR
1 PATCH, TRANSDERMAL 24 HOURS TRANSDERMAL DAILY
Status: DISCONTINUED | OUTPATIENT
Start: 2019-07-11 | End: 2019-07-18 | Stop reason: HOSPADM

## 2019-07-11 RX ORDER — CLONAZEPAM 1 MG/1
1 TABLET ORAL ONCE
Status: COMPLETED | OUTPATIENT
Start: 2019-07-11 | End: 2019-07-11

## 2019-07-11 RX ORDER — SODIUM CHLORIDE 0.9 % (FLUSH) 0.9 %
10 SYRINGE (ML) INJECTION EVERY 12 HOURS SCHEDULED
Status: DISCONTINUED | OUTPATIENT
Start: 2019-07-11 | End: 2019-07-18 | Stop reason: HOSPADM

## 2019-07-11 RX ORDER — LACTULOSE 10 G/15ML
20 SOLUTION ORAL 2 TIMES DAILY PRN
Status: DISCONTINUED | OUTPATIENT
Start: 2019-07-11 | End: 2019-07-14

## 2019-07-11 RX ADMIN — SODIUM CHLORIDE: 9 INJECTION, SOLUTION INTRAVENOUS at 21:51

## 2019-07-11 RX ADMIN — SODIUM CHLORIDE: 9 INJECTION, SOLUTION INTRAVENOUS at 13:47

## 2019-07-11 RX ADMIN — ONDANSETRON 4 MG: 2 INJECTION INTRAMUSCULAR; INTRAVENOUS at 21:10

## 2019-07-11 RX ADMIN — ASPIRIN 81 MG: 81 TABLET ORAL at 21:11

## 2019-07-11 RX ADMIN — IOPAMIDOL 80 ML: 755 INJECTION, SOLUTION INTRAVENOUS at 12:57

## 2019-07-11 RX ADMIN — MANNITOL 75 G: 20 INJECTION, SOLUTION INTRAVENOUS at 15:45

## 2019-07-11 RX ADMIN — IPRATROPIUM BROMIDE 0.5 MG: 0.5 SOLUTION RESPIRATORY (INHALATION) at 22:42

## 2019-07-11 RX ADMIN — SODIUM CHLORIDE, PRESERVATIVE FREE 10 ML: 5 INJECTION INTRAVENOUS at 21:05

## 2019-07-11 RX ADMIN — MORPHINE SULFATE 1 MG: 2 INJECTION, SOLUTION INTRAMUSCULAR; INTRAVENOUS at 21:05

## 2019-07-11 RX ADMIN — CEFEPIME HYDROCHLORIDE 2 G: 2 INJECTION, POWDER, FOR SOLUTION INTRAVENOUS at 23:50

## 2019-07-11 RX ADMIN — HEPARIN SODIUM 12 UNITS/KG/HR: 10000 INJECTION, SOLUTION INTRAVENOUS at 21:51

## 2019-07-11 RX ADMIN — OXYCODONE HYDROCHLORIDE 10 MG: 5 TABLET ORAL at 21:58

## 2019-07-11 RX ADMIN — VANCOMYCIN HYDROCHLORIDE 1000 MG: 1 INJECTION, POWDER, LYOPHILIZED, FOR SOLUTION INTRAVENOUS at 21:47

## 2019-07-11 RX ADMIN — CLONAZEPAM 1 MG: 1 TABLET ORAL at 22:15

## 2019-07-11 ASSESSMENT — PAIN DESCRIPTION - PROGRESSION
CLINICAL_PROGRESSION: NOT CHANGED
CLINICAL_PROGRESSION: NOT CHANGED

## 2019-07-11 ASSESSMENT — ENCOUNTER SYMPTOMS
ABDOMINAL PAIN: 1
ABDOMINAL PAIN: 0
APNEA: 0
NAUSEA: 0
VOMITING: 0
SINUS PAIN: 0
SHORTNESS OF BREATH: 0
COLOR CHANGE: 0
VOMITING: 1
BACK PAIN: 0
PHOTOPHOBIA: 0
SHORTNESS OF BREATH: 1
WHEEZING: 0
EYE DISCHARGE: 0
CONSTIPATION: 0
PHOTOPHOBIA: 1
COUGH: 1
DIARRHEA: 0
NAUSEA: 1
BACK PAIN: 1
CHEST TIGHTNESS: 0
EYE PAIN: 0
COUGH: 0
TROUBLE SWALLOWING: 0
EYE REDNESS: 0
RHINORRHEA: 0

## 2019-07-11 ASSESSMENT — PAIN DESCRIPTION - PAIN TYPE
TYPE: ACUTE PAIN

## 2019-07-11 ASSESSMENT — PAIN DESCRIPTION - LOCATION
LOCATION: HEAD;GENERALIZED
LOCATION: GENERALIZED
LOCATION: GENERALIZED
LOCATION: HEAD;GENERALIZED

## 2019-07-11 ASSESSMENT — PAIN SCALES - GENERAL
PAINLEVEL_OUTOF10: 9
PAINLEVEL_OUTOF10: 10
PAINLEVEL_OUTOF10: 10
PAINLEVEL_OUTOF10: 8

## 2019-07-11 ASSESSMENT — PAIN DESCRIPTION - ONSET
ONSET: ON-GOING
ONSET: ON-GOING

## 2019-07-11 ASSESSMENT — PAIN DESCRIPTION - DESCRIPTORS
DESCRIPTORS: ACHING
DESCRIPTORS: ACHING

## 2019-07-11 ASSESSMENT — PAIN DESCRIPTION - FREQUENCY
FREQUENCY: CONTINUOUS
FREQUENCY: CONTINUOUS

## 2019-07-11 NOTE — H&P
informed with result       Dysphagia in the setting of history of squamous cell carcinoma of left tonsil     -NPO until seen by speech therapy    History of right leg DVT and PE    -On Eliquis at home. Will hold Eliquis and start heparin drip due to acute CVA      Disposition: Patient will be admitted in stroke unit 4A. Case discussed with Dr. Sueann Bernheim (Neurologist) over the phone. Dr Sueann Bernheim recommend to hold Eliquis and start heparin gtt and get brain MRI and head and neck MRA. Appreciate Dr. Cierra Armenta input. Echo not ordered as patient just had BAILEY in 4/9/19 which showed EF 55-60%, no right-to-left shunt, no defect or patent foramen ovale was identified, no thrombus.        Electronically signed by Raquel Michelle MD on 7/11/2019 at 11:17 PM

## 2019-07-11 NOTE — ED PROVIDER NOTES
[facility]  eMERGENCY dEPARTMENT eNCOUnter   Physician Attestation    Pt Name: Knox Dance  MRN: [id number]  Jonhgflee 1960  Date of evaluation: 7/11/19        Physician Note:    I have personally performed and/or participated in the history, exam and medical decision making and agree with all pertinent clinical information. I have also reviewed and agree with the past medical, family and social history unless otherwise noted. I have personally performed a face to face diagnostic evaluation on this patient. I have reviewed the mid-levels findings and agree. History:     Patient seen with Russell ERWIN    This is a 64-year-old male who had a fall hitting his head and injuring his ribs. Complained of pain in multiple areas. He apparently has had multiple falls and was found to have a CT scan showing a cerebellar infarct. He has had previous strokes. Patient is somewhat disheveled and malodorous and has  questionable ability to care for self. We are arranging for admission. See PA note for further details in this patient's care.                Demond Mendez  96., DO  07/11/19 1459

## 2019-07-11 NOTE — CONSULTS
paraspinal soft tissues on this nondedicated exam. No suspicious finding is grossly identified within the visualized lung apices. Calcifications are noted within the mediastinum which likely correspond to sequela of prior granulomatous disease. Impression    1. There is irregular and narrowing and occlusion of the right P2 segment of the right PCA. Occlusion was also noted on the prior MR angiogram of the brain dated April 10, 2019. There is hypoattenuation within the right occipital lobe which likely   corresponds to associated prior ischemic changes. 2. Atherosclerotic calcification is present at the bilateral carotid bifurcations. There is atherosclerotic calcification at the proximal left cervical internal carotid artery resulting in approximately 63% luminal narrowing by NASCET criteria. Atherosclerotic calcification at the right carotid bifurcation and proximal right cervical internal carotid artery results in less than 50% luminal narrowing by NASCET criteria. **This report has been created using voice recognition software. It may contain minor errors which are inherent in voice recognition technology. **    Final report electronically signed by Dr. Robbie Ahn on 5/31/2019 11:40 AM     Results for orders placed during the hospital encounter of 05/31/19   CTA NECK W WO CONTRAST    Narrative PROCEDURE: CTA NECK W WO CONTRAST, CTA Kael Nelsondarlinariel 63 INFORMATION: headache, dizziness. COMPARISON: None available. Correlation is made to MR angiogram of the neck dated April 10, 2019. TECHNIQUE: 1 mm axial images were obtained through the head and neck after the fast bolus administration of contrast. A noncontrast localizer was obtained. 3-D reconstructions were performed on a dedicated 3-D workstation. These include multiplanar MPR   images and multiplanar MIP images. Centerline reconstructions were obtained of the carotid systems.  80 mL Isovue-370 intravenous contrast was given. All CT scans at this facility use dose modulation, iterative reconstruction, and/or weight-based dosing when appropriate to reduce radiation dose to as low as reasonably achievable. FINDINGS:    Atherosclerotic calcification is present within the aortic arch. There is atherosclerotic calcification at the origins of the great vessels off the arch without flow-limiting stenosis. Atherosclerotic calcification is also present at the origin of the   right common carotid artery without flow-limiting stenosis. There is atherosclerotic calcification at the right carotid bifurcation and proximal right cervical internal carotid artery resulting in less than 50% luminal narrowing by NASCET criteria. The   remainder of the right cervical internal carotid artery is patent. There is atherosclerotic calcification at the left carotid bifurcation and proximal left cervical internal carotid artery. This results in approximately 63% luminal narrowing by NASCET criteria within the proximal left cervical internal carotid artery. The remainder of the left cervical internal carotid artery is patent. The origin of the right vertebral artery is patent. The right vertebral artery is nondominant. The remainder of the right vertebral artery is patent. The origin of the left vertebral artery is patent. The remainder of the left vertebral artery is   dominant and patent. The vertebral arteries join intracranially to form a small yet patent basilar artery. Intracranially, there is atherosclerotic calcification within the bilateral cavernous and clinoid internal carotid arteries without flow-limiting stenosis. The bilateral MCAs and ACAs are patent with normal arborization of the distal branches. There is a   patent anterior communicating artery. The left T1 segment is absent and there is a fetal origin of the left PCA, a normal variant.  There is tapering and suggestion of complete obstruction of the right P2 segment of appearance with partial fluid opacification of the mastoid air cells on the right which may correspond to a mastoid effusion or mastoiditis. There is redemonstration of a mucous retention   cyst within the right maxillary sinus. Impression  Evolutionary changes are present corresponding to areas of subacute ischemia specifically in the right temporal and occipital lobes with smaller areas in the right thalamus and along the trigone of the right lateral ventricle. No evidence to suggest new   or acute ischemia. The remainder of the brain also appears similar to the prior exam.          **This report has been created using voice recognition software. It may contain minor errors which are inherent in voice recognition technology. **    Final report electronically signed by Dr. Bandar Maya on 4/18/2019 7:39 AM     Results for orders placed during the hospital encounter of 04/09/19   MRI BRAIN W WO CONTRAST    Narrative PROCEDURE: MRI BRAIN W WO CONTRAST, MRA HEAD WO CONTRAST, MRA NECK WO CONTRAST    CLINICAL INFORMATION: STROKE, hx of tonsil cancer, r/o cancer . Blurred vision and headaches for 3 days. History of tonsil cancer. COMPARISON: CT head dated 4/9/2019 and MR neck dated 3/21/2019. TECHNIQUE: Sagittal T1 3-D pre and postcontrast with axial and coronal reformatted images, axial T2, FLAIR, GRE and DWI images of the brain with post processed ADC map. 15 mL ProHance was injected in the left forearm. 3-D time-of-flight sequence of the   intracranial vasculature with volume rendered maximum intensity projection images. Axial 2-D and 3-D time-of-flight sequences of the carotid bifurcations with volume rendered maximum intensity projection sequences. FINDINGS:   Brain:  There is a large area of restricted diffusion involving the mesial and posterior temporal lobe and occipital lobe with associated hyperintense T2/FLAIR signal corresponding to the large area of hypodensity on prior CT.  There is team.      1. Case was discussed with primary service. 2. All questions were answered. It was my pleasure to evaluate Jessica Ashley today. Please call with questions.       Electronically signed by Caty Wilson MD on 7/11/2019 at 3:00 PM    Natividad Mera MD  Attending Neurologist/Neurointensivist

## 2019-07-11 NOTE — TELEPHONE ENCOUNTER
Patient refused to go to ER or come in for an office visit stating that he does not have a ride and he feels so bad that he can't get off the couch. I asked if he feels that he should call and have an ambulance take him and he stated he does not want to do that and he will just lay there. He also informed me that his niece had just been to the house and checked on him and that he would see if she could take him someone to be evaluated.

## 2019-07-11 NOTE — ED NOTES
Called Floor. Transporting pt to  4A07On cart. Pt going via [address] transport. . Floor needs 10 minutes.    [address], [place]  08/96/31 1817

## 2019-07-11 NOTE — CONSULTS
Surgical History:   Procedure Laterality Date    CARDIAC CATHETERIZATION  04/10/2019    HIP SURGERY Right 06/30/2014    Excisin Right Hip Mass-Dr. Jay Odell     KNEE SURGERY Right 1980's    OTHER SURGICAL HISTORY  7-7-09    lisa filter placed     ID OFFICE/OUTPT VISIT,PROCEDURE ONLY Right 10/16/2018    RIGHT FOOT EXCISION ULCER, RIGHT 5th METATARSAL BASE RESECTION performed by Jose Ramon Robles DPM at [facility] TOE SURGERY Right 2013    wound to bone (second toe)     TRANSESOPHAGEAL ECHOCARDIOGRAM N/A 4/11/2019    TRANSESOPHAGEAL ECHOCARDIOGRAM performed by [facility] [address], MD at [facility]    TUNNELED VENOUS PORT PLACEMENT       Social History     Socioeconomic History    Marital status:       Spouse name: None    Number of children: None    Years of education: None    Highest education level: None   Occupational History    Occupation: disability   Social Needs    Financial resource strain: None    Food insecurity:     Worry: None     Inability: None    Transportation needs:     Medical: None     Non-medical: None   Tobacco Use    Smoking status: Current Every Day Smoker     Packs/day: 0.15     Years: 40.00     Pack years: 6.00     Types: Cigarettes    Smokeless tobacco: Never Used    Tobacco comment: 3-4cig a day   Substance and Sexual Activity    Alcohol use: No    Drug use: Yes     Types: Marijuana    Sexual activity: None   Lifestyle    Physical activity:     Days per week: None     Minutes per session: None    Stress: None   Relationships    Social connections:     Talks on phone: None     Gets together: None     Attends [employer]ology service: None     Active member of club or organization: None     Attends meetings of clubs or organizations: None     Relationship status: None    Intimate partner violence:     Fear of current or ex partner: None     Emotionally abused: None     Physically abused: None     Forced sexual activity: None   Other Topics Concern    None   Social History Narrative    None     Family History   Problem Relation Age of Onset    Cancer Mother         lung       Home medications:    Previous Medications    ALBUTEROL SULFATE  (90 BASE) MCG/ACT INHALER    Inhale 2 puffs into the lungs every 6 hours as needed for Wheezing    APIXABAN (ELIQUIS) 5 MG TABS TABLET    Take 1 tablet by mouth 2 times daily    ATORVASTATIN (LIPITOR) 40 MG TABLET    Take 1 tablet by mouth nightly    CLONAZEPAM (KLONOPIN) 1 MG TABLET    Take 1 tablet by mouth 2 times daily as needed for Anxiety for up to 30 days. DOCUSATE SODIUM (COLACE) 100 MG CAPSULE    Take 100 mg by mouth 3 times daily as needed for Constipation    GLUCOSE MONITORING KIT (FREESTYLE) MONITORING KIT    1 kit by Does not apply route daily    HYDROXYZINE (ATARAX) 50 MG TABLET    Take 1-2 tablets nightly prn    LACTULOSE (CHRONULAC) 10 GM/15ML SOLUTION    Take 30 mLs by mouth 2 times daily as needed (constipation)    LIDOCAINE-PRILOCAINE (EMLA) 2.5-2.5 % CREAM    Apply topically as needed. MORPHINE (MS CONTIN) 30 MG EXTENDED RELEASE TABLET    Take 1 tablet by mouth every 12 hours for 30 days. NALOXONE (NARCAN) 4 MG/0.1ML LIQD NASAL SPRAY    1 spray by Nasal route as needed for Opioid Reversal    NICOTINE (NICODERM CQ) 21 MG/24HR    Place 1 patch onto the skin daily    OXYCODONE HCL (OXY-IR) 10 MG IMMEDIATE RELEASE TABLET    Take 1 tablet by mouth every 8 hours as needed for Pain for up to 30 days.     SERTRALINE (ZOLOFT) 100 MG TABLET    TAKE 2 TABLETS BY MOUTH ONCE DAILY       Hospital medications:  Scheduled Meds:   lidocaine  1 patch Transdermal Daily     Continuous Infusions:   sodium chloride 100 mL/hr at 07/11/19 1347     PRN Meds:  Objective   ED TRIAGE VITALS  BP: (!) 159/94, Temp: 98.2 °F (36.8 °C), Pulse: 56, Resp: 16, SpO2: 94 %  Aliyah Coma Scale  Eye Opening: Spontaneous  Best Verbal Response: Oriented  Best Motor Response: Obeys commands  Aliyah Coma Scale Score: 15  Results for orders placed or performed during the hospital encounter of 07/11/19   CBC auto differential   Result Value Ref Range    WBC 15.3 (H) 4.8 - 10.8 thou/mm3    RBC 4.85 4.70 - 6.10 mill/mm3    Hemoglobin 14.9 14.0 - 18.0 gm/dl    Hematocrit 43.3 42.0 - 52.0 %    MCV 89.3 80.0 - 94.0 fL    MCH 30.7 26.0 - 33.0 pg    MCHC 34.4 32.2 - 35.5 gm/dl    RDW-CV 13.9 11.5 - 14.5 %    RDW-SD 45.1 (H) 35.0 - 45.0 fL    Platelets 580 417 - 921 thou/mm3    MPV 10.6 9.4 - 12.4 fL    Seg Neutrophils 86.8 %    Lymphocytes 4.2 %    Monocytes 7.1 %    Eosinophils 1.2 %    Basophils 0.3 %    Immature Granulocytes 0.4 %    Segs Absolute 13.3 (H) 1.8 - 7.7 thou/mm3    Lymphocytes # 0.6 (L) 1.0 - 4.8 thou/mm3    Monocytes # 1.1 0.4 - 1.3 thou/mm3    Eosinophils # 0.2 0.0 - 0.4 thou/mm3    Basophils # 0.0 0.0 - 0.1 thou/mm3    Immature Grans (Abs) 0.06 0.00 - 0.07 thou/mm3    nRBC 0 /100 wbc   Basic Metabolic Panel   Result Value Ref Range    Sodium 138 135 - 145 meq/L    Potassium 3.7 3.5 - 5.2 meq/L    Chloride 98 98 - 111 meq/L    CO2 30 23 - 33 meq/L    Glucose 127 (H) 70 - 108 mg/dL    BUN 19 7 - 22 mg/dL    CREATININE 0.5 0.4 - 1.2 mg/dL    Calcium 9.3 8.5 - 10.5 mg/dL   Hepatic function panel   Result Value Ref Range    Alb 3.6 3.5 - 5.1 g/dL    Total Bilirubin 0.7 0.3 - 1.2 mg/dL    Bilirubin, Direct <0.2 0.0 - 0.3 mg/dL    Alkaline Phosphatase 87 38 - 126 U/L    AST 18 5 - 40 U/L    ALT 10 (L) 11 - 66 U/L    Total Protein 6.2 6.1 - 8.0 g/dL   Lipase   Result Value Ref Range    Lipase 11.6 5.6 - 51.3 U/L   Troponin   Result Value Ref Range    Troponin T < 0.010 ng/ml   Magnesium   Result Value Ref Range    Magnesium 1.7 1.6 - 2.4 mg/dL   CK   Result Value Ref Range    Total CK 75 55 - 170 U/L   Urine reflex to culture   Result Value Ref Range    Glucose, Ur NEGATIVE NEGATIVE mg/dl    Bilirubin Urine NEGATIVE NEGATIVE    Ketones, Urine NEGATIVE NEGATIVE    Specific Gravity, Urine 1.018 1.002 - 1.03    Blood, Urine NEGATIVE NEGATIVE complete resolution. 3. Cortical irregularity of the lateral ninth rib concerning for fracture. Correlate for injury or point tenderness. **This report has been created using voice recognition software. It may contain minor errors which are inherent in voice recognition technology. **      Final report electronically signed by Dr. Christine Lazo on 7/11/2019 1:29 PM      CT ABDOMEN PELVIS W IV CONTRAST Additional Contrast? None   Final Result      1. Moderate stool throughout the colon. Correlate for constipation. 2. Markedly distended bladder. Correlate for with volume status. 3. There is muscular atrophy and generalized soft tissue anasarca. 4. Findings in the left lung base could reflect atelectasis or minimal infiltrate. Follow-up is advised to document complete resolution is clinically warranted. 5. Partially imaged likely acute left ninth rib fracture. Correlate for point tenderness. **This report has been created using voice recognition software. It may contain minor errors which are inherent in voice recognition technology. **      Final report electronically signed by Dr. Christine Lazo on 7/11/2019 1:37 PM      CT LUMBAR RECONSTRUCTION WO POST PROCESS   Final Result      1. Moderate stool throughout the colon. Correlate for constipation. 2. Markedly distended bladder. Correlate for with volume status. 3. There is muscular atrophy and generalized soft tissue anasarca. 4. Findings in the left lung base could reflect atelectasis or minimal infiltrate. Follow-up is advised to document complete resolution is clinically warranted. 5. Partially imaged likely acute left ninth rib fracture. Correlate for point tenderness. **This report has been created using voice recognition software. It may contain minor errors which are inherent in voice recognition technology. **      Final report electronically signed by Dr. Christine Lazo on 7/11/2019 1:37 PM      CT THORACIC RECONSTRUCTION WO POST

## 2019-07-11 NOTE — ED PROVIDER NOTES
hemorrhage. This is new since the prior exam.  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 Ventricles are normal. No extra-axial fluid collection. Small left frontal scalp hematoma. Calvarium is intact.                    CT CERVICAL SPINE WO CONTRAST (Final result)   Result time 07/11/19 13:29:57   Final result by John Trevizo MD (07/11/19 13:29:57)                Impression:    No acute fracture. Focal sclerotic lesion C7 representing either a benign cortical bone island or an osteoblastic metastasis. This is stable compared to PET/CT dated 11/7/2018        **This report has been created using voice recognition software.  It may contain minor errors which are inherent in voice recognition technology. **    Final report electronically signed by Dr. Sam Holcomb on 7/11/2019 1:29 PM            Narrative:    PROCEDURE: CT CERVICAL SPINE WO CONTRAST    CLINICAL INFORMATION: Trauma . COMPARISON: No prior study. TECHNIQUE: 2-D multiplanar noncontrast images of the cervical spine  All CT scans at this facility use dose modulation, iterative reconstruction, and/or weight-based dosing when appropriate to reduce radiation dose to as low as reasonably achievable. FINDINGS: There is no acute fracture or acute bony malalignment. Degenerative changes are present with the left neural foraminal narrowing at C3-4 due to uncovertebral joint disease mild right neural foraminal narrowing at C4-5. Focal sclerotic lesion involving the left lateral vertebral body of C7. No precervical soft tissue swelling.                    CT Chest W Contrast (Final result)   Result time 07/11/19 13:29:39   Final result by Marko Dias MD (07/11/19 13:29:39)                Impression:       1. Chronic changes with scarring/atelectasis in the right lung base. Old right rib fractures. 2. Patchy and nodular airspace opacity in the left lung base suggestive of infiltrate.  Correlation with symptoms and follow-up is advised to document scoliosis. Partially imaged IVC filter.                    CT ABDOMEN PELVIS W IV CONTRAST Additional Contrast? None (Final result)   Result time 07/11/19 13:37:01   Final result by Louise Youssef MD (07/11/19 13:37:01)                Impression:      1. Moderate stool throughout the colon. Correlate for constipation. 2. Markedly distended bladder. Correlate for with volume status. 3. There is muscular atrophy and generalized soft tissue anasarca. 4. Findings in the left lung base could reflect atelectasis or minimal infiltrate. Follow-up is advised to document complete resolution is clinically warranted. 5. Partially imaged likely acute left ninth rib fracture. Correlate for point tenderness. **This report has been created using voice recognition software. It may contain minor errors which are inherent in voice recognition technology. **    Final report electronically signed by Dr. Konrad Valdez on 7/11/2019 1:37 PM            Narrative:    PROCEDURE: CT ABDOMEN PELVIS W IV CONTRAST, CT LUMBAR RECONSTRUCTION WO POST PROCESS    CLINICAL INFORMATION: trauma . COMPARISON: None. TECHNIQUE: 5 mm axial CT images were obtained through the abdomen and pelvis after the administration of intravenous and oral contrast. Coronal and sagittal reconstructions were obtained. Dedicated lumbar spine CT was also performed with axial, coronal   and sagittal reconstructions for better assessment of the osseous structures. All CT scans at this facility use dose modulation, iterative reconstruction, and/or weight-based dosing when appropriate to reduce radiation dose to as low as reasonably   achievable. FINDINGS:  Abdomen and pelvis: Old right rib fractures with bony remodeling and likely chronic changes in the right lung base. Patchy airspace opacity in the left lung base concerning for atelectasis, infiltrate or chronic change. Partially imaged probable left ninth lateral rib fracture.   Abdominal aorta is normal caliber with atherosclerotic changes. Spleen, pancreas liver adrenal glands are unremarkable. Bilateral renal cortical thinning subcentimeter probable cysts suggesting medical renal disease. Gallbladder is not seen. Infrarenal IVC filter. There is moderate stool throughout the colon correlate for constipation. Distended bladder correlate with volume status. There is soft tissue anasarca. Few coarse calcifications in the prostate gland. Prostate gland measures 4.7 cm in transverse diameter. Lumbar spine. Mild lumbar scoliosis. Degenerative changes in the lumbar spine and pelvis. Nonspecific sclerotic lesion in the left iliac bone measuring 6 mm. There is facet hypertrophy and mild neural foraminal narrowing. No acute fracture or subluxation.                      CT LUMBAR RECONSTRUCTION WO POST PROCESS (Final result)   Result time 07/11/19 13:37:01   Final result by Nate Persaud MD (07/11/19 13:37:01)                Impression:      1. Moderate stool throughout the colon. Correlate for constipation. 2. Markedly distended bladder. Correlate for with volume status. 3. There is muscular atrophy and generalized soft tissue anasarca. 4. Findings in the left lung base could reflect atelectasis or minimal infiltrate. Follow-up is advised to document complete resolution is clinically warranted. 5. Partially imaged likely acute left ninth rib fracture. Correlate for point tenderness. **This report has been created using voice recognition software. It may contain minor errors which are inherent in voice recognition technology. **    Final report electronically signed by Dr. Ratna Ríos on 7/11/2019 1:37 PM            Narrative:    PROCEDURE: CT ABDOMEN PELVIS W IV CONTRAST, CT LUMBAR RECONSTRUCTION WO POST PROCESS    CLINICAL INFORMATION: trauma . COMPARISON: None.     TECHNIQUE: 5 mm axial CT images were obtained through the abdomen and pelvis after the administration of intravenous and oral contrast.

## 2019-07-11 NOTE — CARE COORDINATION
CVA, Palliative Care eval.        Follow up appointments:    Future Appointments   Date Time Provider Richard Cortes   2019  1:30 PM STR MRI RM1 STRZ MRI STR Radiolog   2019  2:20 PM STR CT IMAGING RM1  [facility] OUT EXP STR Radiolog   2019  1:00 PM STR OUT PT ONC INJ RM 12 STRZ OP ONC None   2019  2:00 PM Fl[facility]ncia [facility]ler [facility] [email or web address]   2019 11:00 AM Ronald Ramirez PSYD SR[facility] Psychol [facility] [place]   2019 11:45 AM Mary Ariza MD  [place] [facility] [place]   2019  9:45 AM Crow Zepeda MD Fam Med [facility] [place]   2019  2:30 PM YAQUELIN Mcmahon - CNP SRPX Pain [facility] [place]   2019 11:00 AM hSarla Lemus MD Oncology [facility] [place]   2019 10:45 AM Larinda Rod Claris Apgar, APRN - CNP STRZ JAV[facility] None       Review Due Health Maintenance:  Health Maintenance Due   Topic Date Due    Hepatitis C screen  1958    Diabetic retinal exam  1968    HIV screen  1973    DTaP/Tdap/Td vaccine (1 - Tdap) 1977    Shingles Vaccine (1 of 2) 2008    Colon cancer screen colonoscopy  2008     Natalie Lindquist RN, BSN  [phone]  [phone]

## 2019-07-12 ENCOUNTER — APPOINTMENT (OUTPATIENT)
Dept: MRI IMAGING | Age: 59
DRG: 064 | End: 2019-07-12
Payer: MEDICARE

## 2019-07-12 ENCOUNTER — APPOINTMENT (OUTPATIENT)
Dept: INTERVENTIONAL RADIOLOGY/VASCULAR | Age: 59
DRG: 064 | End: 2019-07-12
Payer: MEDICARE

## 2019-07-12 ENCOUNTER — APPOINTMENT (OUTPATIENT)
Dept: GENERAL RADIOLOGY | Age: 59
DRG: 064 | End: 2019-07-12
Payer: MEDICARE

## 2019-07-12 LAB
ANION GAP SERPL CALCULATED.3IONS-SCNC: 9 MEQ/L (ref 8–16)
APTT: 45.3 SECONDS (ref 22–38)
APTT: 50.3 SECONDS (ref 22–38)
APTT: 99.3 SECONDS (ref 22–38)
AVERAGE GLUCOSE: 93 MG/DL (ref 70–126)
BUN BLDV-MCNC: 14 MG/DL (ref 7–22)
CALCIUM SERPL-MCNC: 8.9 MG/DL (ref 8.5–10.5)
CHLORIDE BLD-SCNC: 102 MEQ/L (ref 98–111)
CHOLESTEROL, TOTAL: 141 MG/DL (ref 100–199)
CO2: 30 MEQ/L (ref 23–33)
CREAT SERPL-MCNC: 0.5 MG/DL (ref 0.4–1.2)
ERYTHROCYTE [DISTWIDTH] IN BLOOD BY AUTOMATED COUNT: 14.1 % (ref 11.5–14.5)
ERYTHROCYTE [DISTWIDTH] IN BLOOD BY AUTOMATED COUNT: 48.1 FL (ref 35–45)
GFR SERPL CREATININE-BSD FRML MDRD: > 90 ML/MIN/1.73M2
GLUCOSE BLD-MCNC: 120 MG/DL (ref 70–108)
GLUCOSE BLD-MCNC: 97 MG/DL (ref 70–108)
HBA1C MFR BLD: 5.1 % (ref 4.4–6.4)
HCT VFR BLD CALC: 40.1 % (ref 42–52)
HDLC SERPL-MCNC: 71 MG/DL
HEMOGLOBIN: 13.3 GM/DL (ref 14–18)
LDL CHOLESTEROL CALCULATED: 62 MG/DL
MCH RBC QN AUTO: 30.9 PG (ref 26–33)
MCHC RBC AUTO-ENTMCNC: 33.2 GM/DL (ref 32.2–35.5)
MCV RBC AUTO: 93 FL (ref 80–94)
PLATELET # BLD: 193 THOU/MM3 (ref 130–400)
PMV BLD AUTO: 10.4 FL (ref 9.4–12.4)
POTASSIUM REFLEX MAGNESIUM: 3.9 MEQ/L (ref 3.5–5.2)
RBC # BLD: 4.31 MILL/MM3 (ref 4.7–6.1)
REASON FOR REJECTION: NORMAL
REJECTED TEST: NORMAL
SODIUM BLD-SCNC: 141 MEQ/L (ref 135–145)
TRIGL SERPL-MCNC: 39 MG/DL (ref 0–199)
TROPONIN T: < 0.01 NG/ML
WBC # BLD: 11.7 THOU/MM3 (ref 4.8–10.8)

## 2019-07-12 PROCEDURE — 6360000002 HC RX W HCPCS: Performed by: STUDENT IN AN ORGANIZED HEALTH CARE EDUCATION/TRAINING PROGRAM

## 2019-07-12 PROCEDURE — 99233 SBSQ HOSP IP/OBS HIGH 50: CPT | Performed by: FAMILY MEDICINE

## 2019-07-12 PROCEDURE — 2060000000 HC ICU INTERMEDIATE R&B

## 2019-07-12 PROCEDURE — 70545 MR ANGIOGRAPHY HEAD W/DYE: CPT

## 2019-07-12 PROCEDURE — 85730 THROMBOPLASTIN TIME PARTIAL: CPT

## 2019-07-12 PROCEDURE — 36591 DRAW BLOOD OFF VENOUS DEVICE: CPT

## 2019-07-12 PROCEDURE — 80061 LIPID PANEL: CPT

## 2019-07-12 PROCEDURE — 83036 HEMOGLOBIN GLYCOSYLATED A1C: CPT

## 2019-07-12 PROCEDURE — 94010 BREATHING CAPACITY TEST: CPT

## 2019-07-12 PROCEDURE — 71045 X-RAY EXAM CHEST 1 VIEW: CPT

## 2019-07-12 PROCEDURE — 84484 ASSAY OF TROPONIN QUANT: CPT

## 2019-07-12 PROCEDURE — 99233 SBSQ HOSP IP/OBS HIGH 50: CPT | Performed by: PSYCHIATRY & NEUROLOGY

## 2019-07-12 PROCEDURE — 70549 MR ANGIOGRAPH NECK W/O&W/DYE: CPT

## 2019-07-12 PROCEDURE — 6370000000 HC RX 637 (ALT 250 FOR IP): Performed by: STUDENT IN AN ORGANIZED HEALTH CARE EDUCATION/TRAINING PROGRAM

## 2019-07-12 PROCEDURE — 85027 COMPLETE CBC AUTOMATED: CPT

## 2019-07-12 PROCEDURE — 70551 MRI BRAIN STEM W/O DYE: CPT

## 2019-07-12 PROCEDURE — 6360000004 HC RX CONTRAST MEDICATION: Performed by: STUDENT IN AN ORGANIZED HEALTH CARE EDUCATION/TRAINING PROGRAM

## 2019-07-12 PROCEDURE — 2700000000 HC OXYGEN THERAPY PER DAY

## 2019-07-12 PROCEDURE — 93925 LOWER EXTREMITY STUDY: CPT

## 2019-07-12 PROCEDURE — 2709999900 HC NON-CHARGEABLE SUPPLY

## 2019-07-12 PROCEDURE — 82948 REAGENT STRIP/BLOOD GLUCOSE: CPT

## 2019-07-12 PROCEDURE — A9579 GAD-BASE MR CONTRAST NOS,1ML: HCPCS | Performed by: STUDENT IN AN ORGANIZED HEALTH CARE EDUCATION/TRAINING PROGRAM

## 2019-07-12 PROCEDURE — 2500000003 HC RX 250 WO HCPCS: Performed by: STUDENT IN AN ORGANIZED HEALTH CARE EDUCATION/TRAINING PROGRAM

## 2019-07-12 PROCEDURE — 70553 MRI BRAIN STEM W/O & W/DYE: CPT

## 2019-07-12 PROCEDURE — 2580000003 HC RX 258: Performed by: PHYSICIAN ASSISTANT

## 2019-07-12 PROCEDURE — 6370000000 HC RX 637 (ALT 250 FOR IP): Performed by: PHYSICIAN ASSISTANT

## 2019-07-12 PROCEDURE — 2580000003 HC RX 258: Performed by: STUDENT IN AN ORGANIZED HEALTH CARE EDUCATION/TRAINING PROGRAM

## 2019-07-12 PROCEDURE — 80048 BASIC METABOLIC PNL TOTAL CA: CPT

## 2019-07-12 PROCEDURE — 92610 EVALUATE SWALLOWING FUNCTION: CPT

## 2019-07-12 PROCEDURE — 94669 MECHANICAL CHEST WALL OSCILL: CPT

## 2019-07-12 RX ORDER — MORPHINE SULFATE 2 MG/ML
2 INJECTION, SOLUTION INTRAMUSCULAR; INTRAVENOUS EVERY 4 HOURS PRN
Status: DISCONTINUED | OUTPATIENT
Start: 2019-07-12 | End: 2019-07-15

## 2019-07-12 RX ORDER — OXYCODONE HCL 20 MG/ML
10 CONCENTRATE, ORAL ORAL EVERY 8 HOURS PRN
Status: DISCONTINUED | OUTPATIENT
Start: 2019-07-12 | End: 2019-07-12

## 2019-07-12 RX ORDER — ACETAMINOPHEN 160 MG/5ML
650 SOLUTION ORAL EVERY 4 HOURS PRN
Status: DISCONTINUED | OUTPATIENT
Start: 2019-07-12 | End: 2019-07-18 | Stop reason: HOSPADM

## 2019-07-12 RX ORDER — MORPHINE SULFATE 2 MG/ML
1 INJECTION, SOLUTION INTRAMUSCULAR; INTRAVENOUS EVERY 4 HOURS PRN
Status: DISCONTINUED | OUTPATIENT
Start: 2019-07-12 | End: 2019-07-15

## 2019-07-12 RX ADMIN — CEFEPIME HYDROCHLORIDE 2 G: 2 INJECTION, POWDER, FOR SOLUTION INTRAVENOUS at 11:12

## 2019-07-12 RX ADMIN — Medication 10 MG: at 06:08

## 2019-07-12 RX ADMIN — MORPHINE SULFATE 1 MG: 2 INJECTION, SOLUTION INTRAMUSCULAR; INTRAVENOUS at 04:22

## 2019-07-12 RX ADMIN — ASPIRIN 300 MG: 300 SUPPOSITORY RECTAL at 10:02

## 2019-07-12 RX ADMIN — MICONAZOLE NITRATE: 20 POWDER TOPICAL at 21:49

## 2019-07-12 RX ADMIN — SODIUM CHLORIDE: 9 INJECTION, SOLUTION INTRAVENOUS at 11:13

## 2019-07-12 RX ADMIN — GADOTERIDOL 15 ML: 279.3 INJECTION, SOLUTION INTRAVENOUS at 15:35

## 2019-07-12 RX ADMIN — VANCOMYCIN HYDROCHLORIDE 1000 MG: 1 INJECTION, POWDER, LYOPHILIZED, FOR SOLUTION INTRAVENOUS at 12:13

## 2019-07-12 RX ADMIN — HEPARIN SODIUM 14 UNITS/KG/HR: 10000 INJECTION, SOLUTION INTRAVENOUS at 19:24

## 2019-07-12 RX ADMIN — MORPHINE SULFATE 2 MG: 2 INJECTION, SOLUTION INTRAMUSCULAR; INTRAVENOUS at 21:07

## 2019-07-12 RX ADMIN — MORPHINE SULFATE 2 MG: 2 INJECTION, SOLUTION INTRAMUSCULAR; INTRAVENOUS at 16:52

## 2019-07-12 RX ADMIN — MORPHINE SULFATE 1 MG: 2 INJECTION, SOLUTION INTRAMUSCULAR; INTRAVENOUS at 01:19

## 2019-07-12 RX ADMIN — VANCOMYCIN HYDROCHLORIDE 1000 MG: 1 INJECTION, POWDER, LYOPHILIZED, FOR SOLUTION INTRAVENOUS at 21:49

## 2019-07-12 RX ADMIN — ACETAMINOPHEN 650 MG: 650 SOLUTION ORAL at 02:01

## 2019-07-12 RX ADMIN — CEFEPIME HYDROCHLORIDE 2 G: 2 INJECTION, POWDER, FOR SOLUTION INTRAVENOUS at 16:55

## 2019-07-12 RX ADMIN — MORPHINE SULFATE 1 MG: 2 INJECTION, SOLUTION INTRAMUSCULAR; INTRAVENOUS at 12:12

## 2019-07-12 ASSESSMENT — PAIN DESCRIPTION - FREQUENCY
FREQUENCY: CONTINUOUS

## 2019-07-12 ASSESSMENT — PAIN - FUNCTIONAL ASSESSMENT
PAIN_FUNCTIONAL_ASSESSMENT: PREVENTS OR INTERFERES SOME ACTIVE ACTIVITIES AND ADLS
PAIN_FUNCTIONAL_ASSESSMENT: ACTIVITIES ARE NOT PREVENTED

## 2019-07-12 ASSESSMENT — PAIN DESCRIPTION - PAIN TYPE
TYPE: ACUTE PAIN
TYPE: ACUTE PAIN;CHRONIC PAIN
TYPE: ACUTE PAIN

## 2019-07-12 ASSESSMENT — PAIN DESCRIPTION - PROGRESSION
CLINICAL_PROGRESSION: NOT CHANGED

## 2019-07-12 ASSESSMENT — PAIN DESCRIPTION - ONSET
ONSET: ON-GOING

## 2019-07-12 ASSESSMENT — PAIN DESCRIPTION - LOCATION
LOCATION: BACK;RIB CAGE
LOCATION: GENERALIZED

## 2019-07-12 ASSESSMENT — PAIN DESCRIPTION - DESCRIPTORS
DESCRIPTORS: ACHING

## 2019-07-12 ASSESSMENT — PATIENT HEALTH QUESTIONNAIRE - PHQ9: SUM OF ALL RESPONSES TO PHQ QUESTIONS 1-9: 12

## 2019-07-12 ASSESSMENT — PAIN SCALES - GENERAL
PAINLEVEL_OUTOF10: 7
PAINLEVEL_OUTOF10: 8
PAINLEVEL_OUTOF10: 7
PAINLEVEL_OUTOF10: 7
PAINLEVEL_OUTOF10: 8
PAINLEVEL_OUTOF10: 6
PAINLEVEL_OUTOF10: 8
PAINLEVEL_OUTOF10: 10

## 2019-07-12 ASSESSMENT — PAIN DESCRIPTION - ORIENTATION: ORIENTATION: LEFT

## 2019-07-12 NOTE — PROGRESS NOTES
Nutrition Assessment    Type and Reason for Visit: Initial, Positive Nutrition Screen(poor oral intake, unplanned weight loss, chewing/swallowing trouble)    Nutrition Recommendations:   -Diet recommendations per SLP, plan ONS start if/when diet resumed.  -If unable to start diet due to dysphagia, recommend consider feeding tube for EN, would recommend Two Konrad HN goal 50 ml/hr (1200 ml, 2400 kcals, 100 grams protein, 262 grma CHO, 840 mls water per 24 hours). Would recommend 250 mls free water flush every 4 hours when off IVF's (2340 mls water/24 hours with recommended TF at goal). -Recommend consider a multivitamin. Nutrition Assessment:   Pt. severely malnourished AEB reports of poor appetite for a year (tonsil cancer diagnosis/treatment), 36.1% unplanned weight loss in the last year, and severe muscle mass loss. At risk for further nutritional compromise r/t dysphagia, NPO status, poor appetite, history of diabetes, increased nutrient needs due to history of COPD, and need for nutrition support. Will start ONS, glucerna TID, when/if diet resumed. Diet recommendations per SLP. If tubefeedings needed would recommend as above. Recommend consider a multivitamin. Malnutrition Assessment:  · Malnutrition Status: Meets the criteria for severe malnutrition  · Context: Chronic illness  · Findings of the 6 clinical characteristics of malnutrition (Minimum of 2 out of 6 clinical characteristics is required to make the diagnosis of moderate or severe Protein Calorie Malnutrition based on AND/ASPEN Guidelines):  1. Energy Intake-Less than or equal to 75% of estimated energy requirement, Greater than or equal to 1 month    2. Weight Loss-20% loss or greater(36.1% loss), in 1 year  3.  Muscle Loss-Severe muscle mass loss, Thigh (quadriceps)    Nutrition Risk Level: High    Nutrient Needs:  · Estimated Daily Total Kcal: 2683-3248 kcals (25-30 kcals/kg/day based on 77 kg)   · Estimated Daily Protein (g): 92 grams Chewing/Swallowing, Monitor Bowel Function      Electronically signed by Dylan Mcghee RD, HINA on 7/12/19 at 11:26 AM    Contact Number: [phone]

## 2019-07-12 NOTE — PROGRESS NOTES
subacute infarct in the right posterior cerebral artery territory. 2. Mild to moderate scattered focal areas of T2/FLAIR hyperintensity in the supratentorial white matter as evidence for chronic microvascular angiopathy and old lacunar infarcts. MRA BRAIN:  High-grade stenosis at the P1 segment of the right posterior cerebral artery without definite distal flow. MRA NECK:  1. No significant stenosis at the internal carotid artery takeoffs by NASCET criteria. 2. Nodular prominence of the left supraglottic larynx on the source images. Correlate with prior MRI of the neck. **This report has been created using voice recognition software. It may contain minor errors which are inherent in voice recognition technology. **    Final report electronically signed by Dr. Jayjay Johnson MD on 4/11/2019 9:05 AM     Results for orders placed during the hospital encounter of 05/31/19   CTA HEAD W WO CONTRAST    Narrative PROCEDURE: CTA NECK W WO CONTRAST, CTA HEAD W WO CONTRAST    CLINICAL INFORMATION: headache, dizziness. COMPARISON: None available. Correlation is made to MR angiogram of the neck dated April 10, 2019. TECHNIQUE: 1 mm axial images were obtained through the head and neck after the fast bolus administration of contrast. A noncontrast localizer was obtained. 3-D reconstructions were performed on a dedicated 3-D workstation. These include multiplanar MPR   images and multiplanar MIP images. Centerline reconstructions were obtained of the carotid systems. 80 mL Isovue-370 intravenous contrast was given. All CT scans at this facility use dose modulation, iterative reconstruction, and/or weight-based dosing when appropriate to reduce radiation dose to as low as reasonably achievable. FINDINGS:    Atherosclerotic calcification is present within the aortic arch. There is atherosclerotic calcification at the origins of the great vessels off the arch without flow-limiting stenosis. within the bilateral cavernous and clinoid internal carotid arteries without flow-limiting stenosis. The bilateral MCAs and ACAs are patent with normal arborization of the distal branches. There is a   patent anterior communicating artery. The left T1 segment is absent and there is a fetal origin of the left PCA, a normal variant. There is tapering and suggestion of complete obstruction of the right P2 segment of the right PCA. This is best seen on   image 467 of series 6 and image 17 of series 11. This appears similar to the prior MR angiogram dated April 10, 2019. The superior sagittal sinus, vein of Parish, internal cerebral veins, straight sinus, transverse sinuses and sigmoid sinuses are patent. Hypoattenuation is present within the medial aspect of the right occipital lobe. This likely corresponds to prior ischemia. The visualized orbits and temporal bone structures are within normal limits. A mucous retention cyst is again noted within the   right maxillary sinus. Multilevel degenerative changes are present within the cervical spine. No suspicious osseous lesion is grossly identified. Remote, healed rib fractures are noted on the right. No suspicious finding is grossly identified within the   paraspinal soft tissues on this nondedicated exam. No suspicious finding is grossly identified within the visualized lung apices. Calcifications are noted within the mediastinum which likely correspond to sequela of prior granulomatous disease. Impression    1. There is irregular and narrowing and occlusion of the right P2 segment of the right PCA. Occlusion was also noted on the prior MR angiogram of the brain dated April 10, 2019. There is hypoattenuation within the right occipital lobe which likely   corresponds to associated prior ischemic changes. 2. Atherosclerotic calcification is present at the bilateral carotid bifurcations.  There is atherosclerotic calcification at the proximal left cervical internal carotid artery resulting in approximately 63% luminal narrowing by NASCET criteria. Atherosclerotic calcification at the right carotid bifurcation and proximal right cervical internal carotid artery results in less than 50% luminal narrowing by NASCET criteria. **This report has been created using voice recognition software. It may contain minor errors which are inherent in voice recognition technology. **    Final report electronically signed by Dr. Odalys White on 5/31/2019 11:40 AM     Results for orders placed during the hospital encounter of 07/11/19   MRI BRAIN WO CONTRAST    Narrative PROCEDURE: MRI BRAIN WO CONTRAST, MRA NECK W WO CONTRAST, MRA HEAD W CONTRAST    CLINICAL INFORMATION: STROKE. Additional history obtained from the electronic medical record indicates the patient has had frequent falls and dizziness for 2 days. COMPARISON: MRI of the brain dated April 17, 2019. TECHNIQUE: Multiplanar and multiple spin echo T1 and T2-weighted images were obtained through the brain. Note is made that postcontrast images through the brain could not be obtained secondary to patient motion. Coronal contrast-enhanced MRA images were   obtained through the vessels of the neck. Multiplanar reconstructions were obtained. These include MIP images. 2-D time-of-flight images with associated reconstructions were also obtained. Contrast-enhanced 3-D time-of-flight images were obtained through   the brain. Multiplanar reconstructions were obtained. Contrast-enhanced images were obtained after administration of 15 mL ProHance intravenous contrast.     FINDINGS:  The images are degraded by motion artifacts which limits detailed evaluation. There is prominence of the sulcal spaces and ventricular system secondary to generalized, age-related parenchymal volume loss. Encephalomalacia is noted involving the inferior medial right occipital lobe from a prior infarct.  Patchy areas of hyperintense   T2 sequences. FINDINGS:   Brain:  There is a large area of restricted diffusion involving the mesial and posterior temporal lobe and occipital lobe with associated hyperintense T2/FLAIR signal corresponding to the large area of hypodensity on prior CT. There is effacement of sulci. No   midline shift is present. There are multiple small foci of restricted diffusion in the right thalamus and splenium of the corpus callosum associated focal areas of T2/FLAIR prolongation. No other focal areas of restricted diffusion are present. There are   mild to moderate scattered focal areas of T2/FLAIR prolongation in the subcortical deep white matter without focal areas of restricted diffusion. There are small foci of encephalomalacia interspersed among the foci of T2/FLAIR abnormality. The   ventricles, cisterns and sulci are otherwise symmetric in size and configuration. No intra or extra-axial mass is identified. Following contrast administration, there is minimally prominent venous enhancement in the large area of infarction consistent with luxury perfusion. Otherwise no focal areas of abnormal parenchymal or meningeal enhancement is identified. The major vascular flow voids appear patent. Orbits are unremarkable. There is a mucous retention cyst in the right maxillary sinus. Visualized paranasal sinuses are otherwise clear. There is small right mastoid effusion. MRA BRAIN:  Intracranial segments of the internal carotid arteries are patent without focal stenosis or aneurysmal dilatation. The bilateral middle cerebral and anterior cerebral arteries are patent without focal abnormality identified. The basilar artery is patent   without focal stenosis or aneurysmal dilatation identified. There is minimal flow in the P1 segment of the right posterior cerebral artery with long segment severe stenosis. No definite distal flow is identified. The left posterior cerebral artery has a   fetal origin and appears patent.

## 2019-07-12 NOTE — PLAN OF CARE
care  Outcome: Ongoing  Note:   Patient is requesting home health care when discharged. Care plan reviewed with patient. Patient verbalize understanding of the plan of care and contribute to goal setting.

## 2019-07-12 NOTE — PROGRESS NOTES
2037    Culture Date Source Results   7/11/19 Blood x2 sent   7/11/19 Respiratory sent   7/11/19 Strep pneumo sent   7/11/19 Legionella  sent       Ht Readings from Last 1 Encounters:   07/11/19 6' (1.829 m)        Wt Readings from Last 1 Encounters:   07/11/19 150 lb (68 kg)         Body mass index is 20.34 kg/m². CrCl: 149 mL/min      Assessment/Plan:  Will initiate vancomycin 1000 mg IV every 12 hours. Timing of trough level will be determined based on culture results, renal function, and clinical response. Thank you for the consult. Will continue to follow. Kristin BERNARDO  [facility]  PGY-1 Pharmacy Resident  7/11/2019  8:48 PM

## 2019-07-12 NOTE — PLAN OF CARE
Problem: Falls - Risk of:  Goal: Will remain free from falls  Description  Will remain free from falls  Outcome: Ongoing  Note:   Call light in reach, bed in lowest position, and bed alarm activated. Education given on use of call light before ambulation and when in need of assistance. Patient expressed understanding. Hourly visual checks performed and charted. Toileting offered to patient. No falls this shift, at any time. Arm band and falling star in place. Will continue to monitor. Problem: Falls - Risk of:  Goal: Absence of physical injury  Description  Absence of physical injury  Outcome: Ongoing     Problem: Pain:  Goal: Pain level will decrease  Description  Pain level will decrease  Outcome: Ongoing  Note:   Patient has chronic pain. He currently is NPO and is receiving IV morphine. Problem: Neurological  Goal: Maximum potential motor/sensory/cognitive function  Outcome: Ongoing     Problem: Cardiovascular  Goal: No DVT, peripheral vascular complications  Outcome: Ongoing  Note:   Patient remains on a heparin gtt at this time. Problem: Cardiovascular  Goal: Hemodynamic stability  Outcome: Ongoing     Problem: Respiratory  Goal: O2 Sat > 90%  Outcome: Ongoing     Problem: Nutrition  Goal: Optimal nutrition therapy  7/12/2019 1841 by Luis Raygoza RN  Outcome: Ongoing  7/12/2019 1126 by Teofilo Vance RD, LD  Outcome: Ongoing     Problem: Discharge Planning:  Goal: Discharged to appropriate level of care  Description  Discharged to appropriate level of care  7/12/2019 1841 by Luis Raygoza RN  Outcome: Ongoing  Note:    consulted. 7/12/2019 1312 by SIMONA Michel  Outcome: Ongoing     Problem: DISCHARGE BARRIERS  Goal: Patient's continuum of care needs are met  7/12/2019 1841 by Luis Raygoza RN  Outcome: Ongoing  7/12/2019 1312 by SIMONA Michel  Outcome: Ongoing   Care plan reviewed with patient.   Patient verbalize understanding of the plan of care and contribute to goal setting.

## 2019-07-12 NOTE — PROGRESS NOTES
position self   []Quadriplegic or bed rest, unable to position self  Pulmonary Function Test   []None available   []Normal   [x]Obstructive  []Restrictive   []Diffusion defect        LAB  Hematology:   Lab Results   Component Value Date    WBC 11.7 2019    RBC 4.31 2019    HGB 13.3 2019    HCT 40.1 2019     2019       Home pulmonary medications: Albuterol Sulfate inhaler Q6PRN  Home Bipap or CPAP {YES / [id number]}  Pulmonary Diagnosis: None recorded. [facility] RESPIRATORY ASSESSMENT PROGRAM (RAP)  30 kg and over      [facility] 4A-07/007-A : 1960     Admitting diagnosis: Acute CVA     ASSESSMENT     Vitals  Pulse: 75   Resp: 14  BP: (!) 145/79  SpO2: 98 %  Temp: 97.9 °F (36.6 °C)  Breath Sounds:***  Comment: ***    Inspiratory Capacity:   Preoperative/predictive value: *** ml   33% of value: *** ml      75% of value: *** ml   10 ml/kg of IBW: *** ml   Patient's Actual Inspiratory Capacity: *** ml    CARE PLAN  All Care Plan selections must be based on the approved algorithms located on line in the Policy and Procedures under Respiratory Assessment Adult Protocol Handbook    INDICATIONS FOR THERAPY BASED ON HISTORY AND ASSESSMENT    Bronchial Hygiene Goal: Improvement in sputum mobilization in patients with ineffective airway clearance. Reverse the atelectasis. No indications  Volume Expansion Goal: Prevent atelectasis, Absence or improvement in signs of atelectasis, improve respiratory muscle performance   Presence of pulmonary atelectasis or conditions predisposing to the development of pulmonary atelectasis. Example: Upper/lower abdominal surgery, thoracic surgery, surgery in COPD patient, prolonged bed rest, lack of pain control, presence of thoracic or abdominal binders. Inhaled Medications Goal: Improve respiratory functions in patients with airway disease and decrease WOB  Albuterol Indications   Home regimen.   Ipratropium Bromide

## 2019-07-12 NOTE — PROGRESS NOTES
PROGRESS NOTE      Patient:  Author Viera      Unit/Bed:4A-07/007-A    YOB: 1960    MRN: [id number]       Acct: [de-identified]     PCP: Marco Antonio Srivastava MD    Date of Admission: 7/11/2019    Chief Complaint:   Chief Complaint   Patient presents with   [facility] Course:     Please see H/P for more details. Patient is a 61 yo male who presented to ED with dizziness and Falls. Patient was evaluated by trauma team in ED found to have fractured left 9th rib. CT of head showed a large area of diminished attenuation in left cerebellar hemisphere with mass effect effacement of left basal cistern. CT chest showed patchy infiltrated in left lung base. XR of left shoulder was negative. Neuro exam was stable in ED. Patient admitted for further evaluation of stroke. 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 Patient started on heparin drip in place of eliquis, started on Lipitor 80 mg, labetalol with holding parameters. Patient given IV mannitol due to edema present on CT exam. Patient started on Cefepime and vancomycin for possible pneumonia. 7/12/19: Patient evaluated by SLP- determined to be NPO, PT and OT pending, Hgb A1c 5.1, Lipid panel was normal. MRI brain, MRA head and neck ordered for today. Subjective: Patient states that he feels 75% better today. Not feeling as nauseous today. Patient states that his equilibrium is still off. States that he is not SOB or having CP. Patient states that he is continuing to cough today but not as bad as yesterday. Patient still producing flem with cough.         Medications:  Reviewed    Infusion Medications    sodium chloride 100 mL/hr at 07/12/19 1113    heparin (porcine) 13 Units/kg/hr (07/12/19 0536)     Scheduled Medications    naloxegol  25 mg Oral QAM    lidocaine  2 patch Transdermal Daily    nicotine  1 patch Transdermal Daily    sertraline  200 mg Oral Daily    sodium chloride flush  10 mL Intravenous 2 times per day    atorvastatin BILIDIR <0.2   BILITOT 0.7   ALKPHOS 87     No results for input(s): INR in the last 72 hours. Recent Labs     07/11/19  1204   CKTOTAL 75       Urinalysis:      Lab Results   Component Value Date    NITRU NEGATIVE 07/11/2019    WBCUA 2-4 07/07/2019    BACTERIA NONE 07/07/2019    RBCUA 3-5 07/07/2019    BLOODU NEGATIVE 07/11/2019    SPECGRAV 1.029 05/31/2019    GLUCOSEU NEGATIVE 07/11/2019       Radiology:  XR CHEST PORTABLE   Final Result      No acute pneumonia. Stable mild medial right basilar atelectasis. COPD. Additional findings as detailed above. **This report has been created using voice recognition software. It may contain minor errors which are inherent in voice recognition technology. **      Final report electronically signed by Dr. Reid Rasheed on 7/12/2019 5:13 AM      CT HEAD WO CONTRAST   Final Result   Large area of diminished attenuation in the left cerebellar hemisphere with mass effect effacement of the left basal cistern. Possibly representing a subacute infarction. Underlying mass is not excludable. This was called to Malik Adhikari at the time of exam.         **This report has been created using voice recognition software. It may contain minor errors which are inherent in voice recognition technology. **      Final report electronically signed by Dr. Gabriel Parra on 7/11/2019 1:20 PM      CT CERVICAL SPINE WO CONTRAST   Final Result   No acute fracture. Focal sclerotic lesion C7 representing either a benign cortical bone island or an osteoblastic metastasis. This is stable compared to PET/CT dated 11/7/2018            **This report has been created using voice recognition software. It may contain minor errors which are inherent in voice recognition technology. **      Final report electronically signed by Dr. Gabriel Parra on 7/11/2019 1:29 PM      CT Chest W Contrast   Final Result       1. Chronic changes with scarring/atelectasis in the right lung base.  Old right rib inherent in voice recognition technology. **      Final report electronically signed by Dr. Treva Moritz on 7/11/2019 1:37 PM      CT THORACIC RECONSTRUCTION WO POST PROCESS   Final Result       1. Chronic changes with scarring/atelectasis in the right lung base. Old right rib fractures. 2. Patchy and nodular airspace opacity in the left lung base suggestive of infiltrate. Correlation with symptoms and follow-up is advised to document complete resolution. 3. Cortical irregularity of the lateral ninth rib concerning for fracture. Correlate for injury or point tenderness. **This report has been created using voice recognition software. It may contain minor errors which are inherent in voice recognition technology. **      Final report electronically signed by Dr. Treva Moritz on 7/11/2019 1:29 PM      XR SHOULDER LEFT (MIN 2 VIEWS)   Final Result   No acute fracture or dislocation. **This report has been created using voice recognition software. It may contain minor errors which are inherent in voice recognition technology. **      Final report electronically signed by Dr. Treva Moritz on 7/11/2019 12:24 PM      MRI brain with contrast    (Results Pending)   MRA neck with and without contrast    (Results Pending)   VL DUP LOWER EXTREMITY ARTERIES BILATERAL    (Results Pending)       Diet: Diet NPO Effective Now    DVT prophylaxis: [] Lovenox                                 [] SCDs                                 [] SQ Heparin                                 [] Encourage ambulation           [x] Already on Anticoagulation     Disposition:    [] Home       [] TCU       [x] Rehab       [] Psych       [] SNF       [] [facility]       [] Other-    Code Status: Full Code    PT/OT Eval Status: pending    Assessment/Plan:    Anticipated Discharge in : TBD    Active Hospital Problems    Diagnosis Date Noted    Fall [W19. XXXA] 07/11/2019    Closed fracture of one rib of left side [S22.32XA] stroke  -labetalol ordered with holding parameters     6.  LAURA/MDD     -will continue sertraline 100 mg 2 tabs once a day            Electronically signed by Guille Olmstead DO on 7/12/2019 at 12:55 PM

## 2019-07-13 LAB
ANION GAP SERPL CALCULATED.3IONS-SCNC: 12 MEQ/L (ref 8–16)
APTT: 39.5 SECONDS (ref 22–38)
APTT: 39.7 SECONDS (ref 22–38)
APTT: 49.4 SECONDS (ref 22–38)
BASOPHILS # BLD: 0.3 %
BASOPHILS ABSOLUTE: 0.1 THOU/MM3 (ref 0–0.1)
BUN BLDV-MCNC: 12 MG/DL (ref 7–22)
CALCIUM SERPL-MCNC: 8.8 MG/DL (ref 8.5–10.5)
CHLORIDE BLD-SCNC: 103 MEQ/L (ref 98–111)
CO2: 26 MEQ/L (ref 23–33)
CREAT SERPL-MCNC: 0.5 MG/DL (ref 0.4–1.2)
EOSINOPHIL # BLD: 0.3 %
EOSINOPHILS ABSOLUTE: 0.1 THOU/MM3 (ref 0–0.4)
ERYTHROCYTE [DISTWIDTH] IN BLOOD BY AUTOMATED COUNT: 14 % (ref 11.5–14.5)
ERYTHROCYTE [DISTWIDTH] IN BLOOD BY AUTOMATED COUNT: 48.2 FL (ref 35–45)
GFR SERPL CREATININE-BSD FRML MDRD: > 90 ML/MIN/1.73M2
GLUCOSE BLD-MCNC: 105 MG/DL (ref 70–108)
GLUCOSE BLD-MCNC: 105 MG/DL (ref 70–108)
GLUCOSE BLD-MCNC: 112 MG/DL (ref 70–108)
GLUCOSE BLD-MCNC: 91 MG/DL (ref 70–108)
GLUCOSE BLD-MCNC: 95 MG/DL (ref 70–108)
GLUCOSE BLD-MCNC: 95 MG/DL (ref 70–108)
HCT VFR BLD CALC: 41.1 % (ref 42–52)
HEMOGLOBIN: 13.4 GM/DL (ref 14–18)
IMMATURE GRANS (ABS): 0.09 THOU/MM3 (ref 0–0.07)
IMMATURE GRANULOCYTES: 0.5 %
LYMPHOCYTES # BLD: 2.7 %
LYMPHOCYTES ABSOLUTE: 0.5 THOU/MM3 (ref 1–4.8)
MCH RBC QN AUTO: 30.4 PG (ref 26–33)
MCHC RBC AUTO-ENTMCNC: 32.6 GM/DL (ref 32.2–35.5)
MCV RBC AUTO: 93.2 FL (ref 80–94)
MONOCYTES # BLD: 8.1 %
MONOCYTES ABSOLUTE: 1.4 THOU/MM3 (ref 0.4–1.3)
NUCLEATED RED BLOOD CELLS: 0 /100 WBC
PLATELET # BLD: 170 THOU/MM3 (ref 130–400)
PLATELET # BLD: 174 THOU/MM3 (ref 130–400)
PMV BLD AUTO: 10.3 FL (ref 9.4–12.4)
POTASSIUM SERPL-SCNC: 3.6 MEQ/L (ref 3.5–5.2)
RBC # BLD: 4.41 MILL/MM3 (ref 4.7–6.1)
SEG NEUTROPHILS: 88.1 %
SEGMENTED NEUTROPHILS ABSOLUTE COUNT: 15.7 THOU/MM3 (ref 1.8–7.7)
SODIUM BLD-SCNC: 141 MEQ/L (ref 135–145)
WBC # BLD: 17.8 THOU/MM3 (ref 4.8–10.8)

## 2019-07-13 PROCEDURE — 82948 REAGENT STRIP/BLOOD GLUCOSE: CPT

## 2019-07-13 PROCEDURE — 2580000003 HC RX 258: Performed by: PHYSICIAN ASSISTANT

## 2019-07-13 PROCEDURE — 6370000000 HC RX 637 (ALT 250 FOR IP): Performed by: STUDENT IN AN ORGANIZED HEALTH CARE EDUCATION/TRAINING PROGRAM

## 2019-07-13 PROCEDURE — 6370000000 HC RX 637 (ALT 250 FOR IP): Performed by: PHYSICIAN ASSISTANT

## 2019-07-13 PROCEDURE — 97530 THERAPEUTIC ACTIVITIES: CPT

## 2019-07-13 PROCEDURE — 97162 PT EVAL MOD COMPLEX 30 MIN: CPT

## 2019-07-13 PROCEDURE — 92610 EVALUATE SWALLOWING FUNCTION: CPT

## 2019-07-13 PROCEDURE — 2709999900 HC NON-CHARGEABLE SUPPLY

## 2019-07-13 PROCEDURE — 2580000003 HC RX 258: Performed by: STUDENT IN AN ORGANIZED HEALTH CARE EDUCATION/TRAINING PROGRAM

## 2019-07-13 PROCEDURE — 85049 AUTOMATED PLATELET COUNT: CPT

## 2019-07-13 PROCEDURE — 80048 BASIC METABOLIC PNL TOTAL CA: CPT

## 2019-07-13 PROCEDURE — 80202 ASSAY OF VANCOMYCIN: CPT

## 2019-07-13 PROCEDURE — 99232 SBSQ HOSP IP/OBS MODERATE 35: CPT | Performed by: PSYCHIATRY & NEUROLOGY

## 2019-07-13 PROCEDURE — 99232 SBSQ HOSP IP/OBS MODERATE 35: CPT | Performed by: FAMILY MEDICINE

## 2019-07-13 PROCEDURE — 94010 BREATHING CAPACITY TEST: CPT

## 2019-07-13 PROCEDURE — 85025 COMPLETE CBC W/AUTO DIFF WBC: CPT

## 2019-07-13 PROCEDURE — 94640 AIRWAY INHALATION TREATMENT: CPT

## 2019-07-13 PROCEDURE — 85730 THROMBOPLASTIN TIME PARTIAL: CPT

## 2019-07-13 PROCEDURE — 6360000002 HC RX W HCPCS: Performed by: STUDENT IN AN ORGANIZED HEALTH CARE EDUCATION/TRAINING PROGRAM

## 2019-07-13 PROCEDURE — 2060000000 HC ICU INTERMEDIATE R&B

## 2019-07-13 RX ORDER — IPRATROPIUM BROMIDE AND ALBUTEROL SULFATE 2.5; .5 MG/3ML; MG/3ML
1 SOLUTION RESPIRATORY (INHALATION) EVERY 4 HOURS PRN
Status: DISCONTINUED | OUTPATIENT
Start: 2019-07-13 | End: 2019-07-18 | Stop reason: HOSPADM

## 2019-07-13 RX ORDER — IPRATROPIUM BROMIDE AND ALBUTEROL SULFATE 2.5; .5 MG/3ML; MG/3ML
1 SOLUTION RESPIRATORY (INHALATION) 3 TIMES DAILY
Status: DISCONTINUED | OUTPATIENT
Start: 2019-07-13 | End: 2019-07-18

## 2019-07-13 RX ADMIN — CEFEPIME HYDROCHLORIDE 2 G: 2 INJECTION, POWDER, FOR SOLUTION INTRAVENOUS at 01:16

## 2019-07-13 RX ADMIN — VANCOMYCIN HYDROCHLORIDE 1000 MG: 1 INJECTION, POWDER, LYOPHILIZED, FOR SOLUTION INTRAVENOUS at 23:14

## 2019-07-13 RX ADMIN — PIPERACILLIN SODIUM,TAZOBACTAM SODIUM 3.38 G: 3; .375 INJECTION, POWDER, FOR SOLUTION INTRAVENOUS at 18:40

## 2019-07-13 RX ADMIN — HEPARIN SODIUM 16 UNITS/KG/HR: 10000 INJECTION, SOLUTION INTRAVENOUS at 22:16

## 2019-07-13 RX ADMIN — MORPHINE SULFATE 2 MG: 2 INJECTION, SOLUTION INTRAMUSCULAR; INTRAVENOUS at 21:29

## 2019-07-13 RX ADMIN — MORPHINE SULFATE 2 MG: 2 INJECTION, SOLUTION INTRAMUSCULAR; INTRAVENOUS at 05:13

## 2019-07-13 RX ADMIN — MICONAZOLE NITRATE: 20 POWDER TOPICAL at 10:32

## 2019-07-13 RX ADMIN — Medication 10 ML: at 13:17

## 2019-07-13 RX ADMIN — VANCOMYCIN HYDROCHLORIDE 1000 MG: 1 INJECTION, POWDER, LYOPHILIZED, FOR SOLUTION INTRAVENOUS at 10:28

## 2019-07-13 RX ADMIN — ACETAMINOPHEN 650 MG: 650 SUPPOSITORY RECTAL at 20:06

## 2019-07-13 RX ADMIN — SODIUM CHLORIDE: 9 INJECTION, SOLUTION INTRAVENOUS at 17:15

## 2019-07-13 RX ADMIN — MORPHINE SULFATE 2 MG: 2 INJECTION, SOLUTION INTRAMUSCULAR; INTRAVENOUS at 01:16

## 2019-07-13 RX ADMIN — IPRATROPIUM BROMIDE AND ALBUTEROL SULFATE 1 AMPULE: .5; 3 SOLUTION RESPIRATORY (INHALATION) at 18:44

## 2019-07-13 RX ADMIN — CEFEPIME HYDROCHLORIDE 2 G: 2 INJECTION, POWDER, FOR SOLUTION INTRAVENOUS at 08:51

## 2019-07-13 RX ADMIN — IPRATROPIUM BROMIDE AND ALBUTEROL SULFATE 1 AMPULE: .5; 3 SOLUTION RESPIRATORY (INHALATION) at 14:21

## 2019-07-13 RX ADMIN — MICONAZOLE NITRATE: 20 POWDER TOPICAL at 19:54

## 2019-07-13 RX ADMIN — MORPHINE SULFATE 2 MG: 2 INJECTION, SOLUTION INTRAMUSCULAR; INTRAVENOUS at 09:13

## 2019-07-13 RX ADMIN — MORPHINE SULFATE 2 MG: 2 INJECTION, SOLUTION INTRAMUSCULAR; INTRAVENOUS at 13:17

## 2019-07-13 RX ADMIN — MORPHINE SULFATE 2 MG: 2 INJECTION, SOLUTION INTRAMUSCULAR; INTRAVENOUS at 17:17

## 2019-07-13 RX ADMIN — PIPERACILLIN SODIUM,TAZOBACTAM SODIUM 3.38 G: 3; .375 INJECTION, POWDER, FOR SOLUTION INTRAVENOUS at 12:21

## 2019-07-13 ASSESSMENT — PAIN DESCRIPTION - FREQUENCY
FREQUENCY: CONTINUOUS

## 2019-07-13 ASSESSMENT — PAIN DESCRIPTION - LOCATION
LOCATION: HEAD;RIB CAGE;SHOULDER
LOCATION: HEAD;SHOULDER
LOCATION: BACK;RIB CAGE
LOCATION: HEAD;RIB CAGE;SHOULDER
LOCATION: HEAD;SHOULDER
LOCATION: HEAD;RIB CAGE;SHOULDER
LOCATION: BACK;SHOULDER;HIP
LOCATION: BACK;RIB CAGE
LOCATION: SHOULDER
LOCATION: SHOULDER;HEAD
LOCATION: HEAD;SHOULDER

## 2019-07-13 ASSESSMENT — PAIN SCALES - GENERAL
PAINLEVEL_OUTOF10: 8
PAINLEVEL_OUTOF10: 6
PAINLEVEL_OUTOF10: 8
PAINLEVEL_OUTOF10: 7
PAINLEVEL_OUTOF10: 0
PAINLEVEL_OUTOF10: 6
PAINLEVEL_OUTOF10: 10
PAINLEVEL_OUTOF10: 8
PAINLEVEL_OUTOF10: 9
PAINLEVEL_OUTOF10: 8
PAINLEVEL_OUTOF10: 8
PAINLEVEL_OUTOF10: 10

## 2019-07-13 ASSESSMENT — PAIN DESCRIPTION - PAIN TYPE
TYPE: ACUTE PAIN;CHRONIC PAIN

## 2019-07-13 ASSESSMENT — PAIN DESCRIPTION - ORIENTATION
ORIENTATION: LEFT

## 2019-07-13 ASSESSMENT — PAIN DESCRIPTION - DESCRIPTORS
DESCRIPTORS: ACHING
DESCRIPTORS: DISCOMFORT
DESCRIPTORS: ACHING
DESCRIPTORS: DISCOMFORT;HEADACHE
DESCRIPTORS: ACHING
DESCRIPTORS: DISCOMFORT;HEADACHE
DESCRIPTORS: DISCOMFORT;HEADACHE
DESCRIPTORS: ACHING

## 2019-07-13 ASSESSMENT — PAIN DESCRIPTION - ONSET
ONSET: ON-GOING

## 2019-07-13 ASSESSMENT — PAIN DESCRIPTION - PROGRESSION
CLINICAL_PROGRESSION: NOT CHANGED
CLINICAL_PROGRESSION: GRADUALLY IMPROVING
CLINICAL_PROGRESSION: NOT CHANGED
CLINICAL_PROGRESSION: GRADUALLY IMPROVING
CLINICAL_PROGRESSION: GRADUALLY IMPROVING

## 2019-07-13 NOTE — PROGRESS NOTES
PROGRESS NOTE      Patient:  iLdia Billing      Unit/Bed:4A-07/007-A    YOB: 1960    MRN: [id number]       Acct: [de-identified]     PCP: Darryn Cates MD    Date of Admission: 7/11/2019    Chief Complaint:   Chief Complaint   Patient presents with   [facility] Course:     Please see H/P for more details. Patient is a 61 yo male who presented to ED with dizziness and Falls. Patient was evaluated by trauma team in ED found to have fractured left 9th rib. CT of head showed a large area of diminished attenuation in left cerebellar hemisphere with mass effect effacement of left basal cistern. CT chest showed patchy infiltrated in left lung base. XR of left shoulder was negative. Neuro exam was stable in ED. Patient admitted for further evaluation of stroke. 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 Patient started on heparin drip in place of eliquis, started on Lipitor 80 mg, labetalol with holding parameters. Patient given IV mannitol due to edema present on CT exam. Patient started on Cefepime and vancomycin for possible pneumonia. 7/12/19: Patient evaluated by SLP- determined to be NPO, PT and OT pending, Hgb A1c 5.1, Lipid panel was normal. MRI brain, MRA head and neck ordered for today. Brain MRI w/o contrast and MRA head and neck showed large stroke on the inferior medial aspect of the left cerebellar hemisphere as well as the left side of the medulla. Encephalomalacia is present,inferior medial right occipital lobe likely a remote infarct, chronic microvascular ischemic angiopathy, R MCA and distal branches appear asymmetrically smaller compared to the left. The right PCA and SCA are not well-visualized and possibly occluded. No occlusion, flow-limiting stenosis or aneurysm in major cervical arterial structures. Subjective:  Patient states he is not doing very well this morning because he is very hungry. Patient states that he last ate 2 days ago.  Patient is also in a lot of Musculoskeletal: He exhibits no edema or deformity. Neurological: He is alert and oriented to person, place, and time. No cranial nerve deficit. Patient has dysmetric movements with left hand especially with finger to nose test. Patient found to have intention tremor of left hand. Gait not evaluated at this time. Skin: Skin is warm and dry. Capillary refill takes less than 2 seconds. No erythema. Psychiatric: He has a normal mood and affect. His behavior is normal.           Labs:   Recent Labs     07/11/19 2014 07/12/19  0423 07/13/19  0543 07/13/19  0705   WBC 11.5* 11.7*  --  17.8*   HGB 14.7 13.3*  --  13.4*   HCT 43.5 40.1*  --  41.1*    193 170 174     Recent Labs     07/11/19  1204 07/12/19  0423 07/13/19  0705    141 141   K 3.7 3.9 3.6   CL 98 102 103   CO2 30 30 26   BUN 19 14 12   CREATININE 0.5 0.5 0.5   CALCIUM 9.3 8.9 8.8     Recent Labs     07/11/19  1204   AST 18   ALT 10*   BILIDIR <0.2   BILITOT 0.7   ALKPHOS 87     No results for input(s): INR in the last 72 hours. Recent Labs     07/11/19  1204   CKTOTAL 75       Urinalysis:      Lab Results   Component Value Date    NITRU NEGATIVE 07/11/2019    WBCUA 2-4 07/07/2019    BACTERIA NONE 07/07/2019    RBCUA 3-5 07/07/2019    BLOODU NEGATIVE 07/11/2019    SPECGRAV 1.029 05/31/2019    GLUCOSEU NEGATIVE 07/11/2019       Radiology:  MRA neck with and without contrast   Final Result       1. Within the limits of a significantly motion degraded exam, there is a large area of restricted diffusion involving the inferior medial aspect of the left cerebellar hemisphere as well as the left side of the medulla. 2. Encephalomalacia is present involving the inferior medial right occipital lobe corresponding to a remote infarct. Mild to moderate patchy foci of hyperintense T2 signal are noted within the deep cerebral white matter which likely correspond to sequela    of chronic microvascular ischemic angiopathy.       3. The MR

## 2019-07-13 NOTE — PROGRESS NOTES
Heparin Consult  Lab Results   Component Value Date    APTT 99.3 07/12/2019     Lab Results   Component Value Date    HGB 13.3 07/12/2019    HCT 40.1 07/12/2019     07/12/2019    INR 1.15 05/31/2019       Current Rate: 14 units per kg per hour    Plan:  Bolus: none  Rate: decrease to 12 units per kg per hour  Next aPTT: 0445    Lachelle Cuevas RPh, BCPS, BCGP  7/12/2019     10:34 PM

## 2019-07-13 NOTE — PLAN OF CARE
Problem: Falls - Risk of:  Goal: Will remain free from falls  Description  Will remain free from falls  Outcome: Ongoing  Note:   Patient remained free from falls this shift. Bed is in low position with alarm on and siderails up x2. Education given on use of call light and patient voiced understanding. Call light and beside table within reach. Arm band and falling star in place. Hourly rounds completed. Will continue to monitor. Goal: Absence of physical injury  Description  Absence of physical injury  Outcome: Ongoing     Problem: Risk for Impaired Skin Integrity  Goal: Tissue integrity - skin and mucous membranes  Description  Structural intactness and normal physiological function of skin and  mucous membranes. Outcome: Ongoing  Note:   Patient able to hydrate orally and provide own oral care. Problem: Pain:  Goal: Pain level will decrease  Description  Pain level will decrease  Outcome: Ongoing  Note:   Patient able to communicate pain effectively. Has morphine for pain control. Problem: Neurological  Goal: Maximum potential motor/sensory/cognitive function  Outcome: Ongoing  Note:   Patient alert to name, time, place and situation. Patient able to communicate needs. Problem: Cardiovascular  Goal: No DVT, peripheral vascular complications  Outcome: Ongoing  Goal: Hemodynamic stability  Outcome: Ongoing  Note:   Patient NS on telemetry monitor. No changes in cardiac rhythm noted with each tele strip reading. No evidence of DVT this shift. DVT prophylaxis in place- patient has SCDs in place. Patient denies chest pain or shortness of breath with assessments. Problem: Respiratory  Goal: O2 Sat > 90%  Outcome: Ongoing     Problem: Nutrition  Goal: Optimal nutrition therapy  Outcome: Ongoing  Note:   Patient is on a Diet NPO Effective Now diet. Restrictions have been reviewed. Patient understands restrictions. Plan for peg tube placement tomorrow.         Problem: Discharge

## 2019-07-13 NOTE — FLOWSHEET NOTE
07/13/19 0804   Provider Notification   Reason for Communication Evaluate  (New consult)   Provider Name Muna Frank NP   Provider Notification Nurse Practitioner   Method of Communication Secure Message   Response Waiting for response   Notification Time 0804     Informed of new consult. Patient still rates pain 8-10/10 and is taking morphine 2 mg Q4 hours PRN. At home he was on morphine, oxycodone, and klonopin. He is NPO at this time.

## 2019-07-13 NOTE — PROGRESS NOTES
Mobility Training, Safety Education & Training, Equipment Evaluation, Education, & procurement    Goals:  Patient goals : Did not state  Short term goals  Time Frame for Short term goals: until discharge  Short term goal 1: Pt to go supine <->sit, +1 CGA, to get in/out of bed. Short term goal 2: Pt to sit edge of bed, SBA, reaching within base x10 reps, maintaining upright trunk, to access items on tray table. Short term goal 3: PT to assess tfs and gait once off of bedrest  Long term goals  Time Frame for Long term goals : NA due to expected short LOS    Evaluation Complexity: Based on the findings of patient history, examination, clinical presentation, and decision making during this evaluation, the evaluation of Lily Brady  is of medium complexity.

## 2019-07-14 LAB
ANION GAP SERPL CALCULATED.3IONS-SCNC: 9 MEQ/L (ref 8–16)
APTT: 41.2 SECONDS (ref 22–38)
APTT: 45.6 SECONDS (ref 22–38)
BASOPHILS # BLD: 0.3 %
BASOPHILS ABSOLUTE: 0 THOU/MM3 (ref 0–0.1)
BUN BLDV-MCNC: 14 MG/DL (ref 7–22)
CALCIUM SERPL-MCNC: 8.7 MG/DL (ref 8.5–10.5)
CHLORIDE BLD-SCNC: 104 MEQ/L (ref 98–111)
CO2: 29 MEQ/L (ref 23–33)
CREAT SERPL-MCNC: 0.5 MG/DL (ref 0.4–1.2)
EOSINOPHIL # BLD: 0.6 %
EOSINOPHILS ABSOLUTE: 0.1 THOU/MM3 (ref 0–0.4)
ERYTHROCYTE [DISTWIDTH] IN BLOOD BY AUTOMATED COUNT: 14.1 % (ref 11.5–14.5)
ERYTHROCYTE [DISTWIDTH] IN BLOOD BY AUTOMATED COUNT: 46.8 FL (ref 35–45)
GFR SERPL CREATININE-BSD FRML MDRD: > 90 ML/MIN/1.73M2
GLUCOSE BLD-MCNC: 107 MG/DL (ref 70–108)
GLUCOSE BLD-MCNC: 107 MG/DL (ref 70–108)
GLUCOSE BLD-MCNC: 113 MG/DL (ref 70–108)
GLUCOSE BLD-MCNC: 154 MG/DL (ref 70–108)
GLUCOSE BLD-MCNC: 89 MG/DL (ref 70–108)
GLUCOSE BLD-MCNC: 96 MG/DL (ref 70–108)
HCT VFR BLD CALC: 37.6 % (ref 42–52)
HEMOGLOBIN: 12.7 GM/DL (ref 14–18)
IMMATURE GRANS (ABS): 0.09 THOU/MM3 (ref 0–0.07)
IMMATURE GRANULOCYTES: 0.6 %
LEGIONELLA URINARY AG: NEGATIVE
LYMPHOCYTES # BLD: 3.5 %
LYMPHOCYTES ABSOLUTE: 0.5 THOU/MM3 (ref 1–4.8)
MCH RBC QN AUTO: 30.5 PG (ref 26–33)
MCHC RBC AUTO-ENTMCNC: 33.8 GM/DL (ref 32.2–35.5)
MCV RBC AUTO: 90.4 FL (ref 80–94)
MONOCYTES # BLD: 9.1 %
MONOCYTES ABSOLUTE: 1.3 THOU/MM3 (ref 0.4–1.3)
NUCLEATED RED BLOOD CELLS: 0 /100 WBC
PLATELET # BLD: 153 THOU/MM3 (ref 130–400)
PMV BLD AUTO: 11.1 FL (ref 9.4–12.4)
POTASSIUM SERPL-SCNC: 3.3 MEQ/L (ref 3.5–5.2)
RBC # BLD: 4.16 MILL/MM3 (ref 4.7–6.1)
SEG NEUTROPHILS: 85.9 %
SEGMENTED NEUTROPHILS ABSOLUTE COUNT: 12.5 THOU/MM3 (ref 1.8–7.7)
SODIUM BLD-SCNC: 142 MEQ/L (ref 135–145)
STREP PNEUMO AG, UR: NEGATIVE
VANCOMYCIN TROUGH: 11 UG/ML (ref 5–15)
WBC # BLD: 14.5 THOU/MM3 (ref 4.8–10.8)

## 2019-07-14 PROCEDURE — 2580000003 HC RX 258: Performed by: STUDENT IN AN ORGANIZED HEALTH CARE EDUCATION/TRAINING PROGRAM

## 2019-07-14 PROCEDURE — 2580000003 HC RX 258: Performed by: PHYSICIAN ASSISTANT

## 2019-07-14 PROCEDURE — 99152 MOD SED SAME PHYS/QHP 5/>YRS: CPT | Performed by: INTERNAL MEDICINE

## 2019-07-14 PROCEDURE — 99153 MOD SED SAME PHYS/QHP EA: CPT | Performed by: INTERNAL MEDICINE

## 2019-07-14 PROCEDURE — 2709999900 HC NON-CHARGEABLE SUPPLY: Performed by: INTERNAL MEDICINE

## 2019-07-14 PROCEDURE — 2700000000 HC OXYGEN THERAPY PER DAY

## 2019-07-14 PROCEDURE — 6360000002 HC RX W HCPCS: Performed by: STUDENT IN AN ORGANIZED HEALTH CARE EDUCATION/TRAINING PROGRAM

## 2019-07-14 PROCEDURE — 82948 REAGENT STRIP/BLOOD GLUCOSE: CPT

## 2019-07-14 PROCEDURE — 97166 OT EVAL MOD COMPLEX 45 MIN: CPT

## 2019-07-14 PROCEDURE — 99232 SBSQ HOSP IP/OBS MODERATE 35: CPT | Performed by: FAMILY MEDICINE

## 2019-07-14 PROCEDURE — 94640 AIRWAY INHALATION TREATMENT: CPT

## 2019-07-14 PROCEDURE — 97112 NEUROMUSCULAR REEDUCATION: CPT

## 2019-07-14 PROCEDURE — 6360000002 HC RX W HCPCS: Performed by: COUNSELOR

## 2019-07-14 PROCEDURE — 85730 THROMBOPLASTIN TIME PARTIAL: CPT

## 2019-07-14 PROCEDURE — 85025 COMPLETE CBC W/AUTO DIFF WBC: CPT

## 2019-07-14 PROCEDURE — 6370000000 HC RX 637 (ALT 250 FOR IP): Performed by: FAMILY MEDICINE

## 2019-07-14 PROCEDURE — 0DH63UZ INSERTION OF FEEDING DEVICE INTO STOMACH, PERCUTANEOUS APPROACH: ICD-10-PCS | Performed by: INTERNAL MEDICINE

## 2019-07-14 PROCEDURE — 6370000000 HC RX 637 (ALT 250 FOR IP): Performed by: PHYSICIAN ASSISTANT

## 2019-07-14 PROCEDURE — 94669 MECHANICAL CHEST WALL OSCILL: CPT

## 2019-07-14 PROCEDURE — 80048 BASIC METABOLIC PNL TOTAL CA: CPT

## 2019-07-14 PROCEDURE — 6360000002 HC RX W HCPCS: Performed by: INTERNAL MEDICINE

## 2019-07-14 PROCEDURE — 99232 SBSQ HOSP IP/OBS MODERATE 35: CPT | Performed by: PSYCHIATRY & NEUROLOGY

## 2019-07-14 PROCEDURE — 6370000000 HC RX 637 (ALT 250 FOR IP): Performed by: STUDENT IN AN ORGANIZED HEALTH CARE EDUCATION/TRAINING PROGRAM

## 2019-07-14 PROCEDURE — 3609018000 HC EGD ESOPHAGOGASTRODUODENOSCOPY PEG TUBE INSERTION: Performed by: INTERNAL MEDICINE

## 2019-07-14 PROCEDURE — 2709999900 HC NON-CHARGEABLE SUPPLY

## 2019-07-14 PROCEDURE — 2060000000 HC ICU INTERMEDIATE R&B

## 2019-07-14 RX ORDER — MIDAZOLAM HYDROCHLORIDE 1 MG/ML
INJECTION INTRAMUSCULAR; INTRAVENOUS PRN
Status: DISCONTINUED | OUTPATIENT
Start: 2019-07-14 | End: 2019-07-14 | Stop reason: ALTCHOICE

## 2019-07-14 RX ORDER — FENTANYL CITRATE 50 UG/ML
INJECTION, SOLUTION INTRAMUSCULAR; INTRAVENOUS PRN
Status: DISCONTINUED | OUTPATIENT
Start: 2019-07-14 | End: 2019-07-14 | Stop reason: ALTCHOICE

## 2019-07-14 RX ORDER — ATORVASTATIN CALCIUM 80 MG/1
80 TABLET, FILM COATED ORAL NIGHTLY
Status: DISCONTINUED | OUTPATIENT
Start: 2019-07-14 | End: 2019-07-18 | Stop reason: HOSPADM

## 2019-07-14 RX ORDER — CLONAZEPAM 1 MG/1
1 TABLET ORAL 2 TIMES DAILY PRN
Status: DISCONTINUED | OUTPATIENT
Start: 2019-07-14 | End: 2019-07-18 | Stop reason: HOSPADM

## 2019-07-14 RX ORDER — MORPHINE SULFATE 15 MG/1
30 TABLET, FILM COATED, EXTENDED RELEASE ORAL EVERY 12 HOURS
Status: DISCONTINUED | OUTPATIENT
Start: 2019-07-14 | End: 2019-07-14

## 2019-07-14 RX ORDER — SERTRALINE HYDROCHLORIDE 100 MG/1
200 TABLET, FILM COATED ORAL DAILY
Status: DISCONTINUED | OUTPATIENT
Start: 2019-07-15 | End: 2019-07-18 | Stop reason: HOSPADM

## 2019-07-14 RX ORDER — FAMOTIDINE 20 MG/1
20 TABLET, FILM COATED ORAL 2 TIMES DAILY
Status: DISCONTINUED | OUTPATIENT
Start: 2019-07-14 | End: 2019-07-18 | Stop reason: HOSPADM

## 2019-07-14 RX ORDER — LACTULOSE 10 G/15ML
20 SOLUTION ORAL 2 TIMES DAILY PRN
Status: DISCONTINUED | OUTPATIENT
Start: 2019-07-14 | End: 2019-07-18 | Stop reason: HOSPADM

## 2019-07-14 RX ORDER — HYDROXYZINE HYDROCHLORIDE 25 MG/1
50 TABLET, FILM COATED ORAL NIGHTLY PRN
Status: DISCONTINUED | OUTPATIENT
Start: 2019-07-14 | End: 2019-07-18 | Stop reason: HOSPADM

## 2019-07-14 RX ADMIN — MICONAZOLE NITRATE: 20 POWDER TOPICAL at 20:38

## 2019-07-14 RX ADMIN — PIPERACILLIN SODIUM,TAZOBACTAM SODIUM 3.38 G: 3; .375 INJECTION, POWDER, FOR SOLUTION INTRAVENOUS at 04:55

## 2019-07-14 RX ADMIN — MORPHINE SULFATE 2 MG: 2 INJECTION, SOLUTION INTRAMUSCULAR; INTRAVENOUS at 01:34

## 2019-07-14 RX ADMIN — IPRATROPIUM BROMIDE AND ALBUTEROL SULFATE 1 AMPULE: .5; 3 SOLUTION RESPIRATORY (INHALATION) at 12:54

## 2019-07-14 RX ADMIN — IPRATROPIUM BROMIDE AND ALBUTEROL SULFATE 1 AMPULE: .5; 3 SOLUTION RESPIRATORY (INHALATION) at 17:50

## 2019-07-14 RX ADMIN — PIPERACILLIN SODIUM,TAZOBACTAM SODIUM 3.38 G: 3; .375 INJECTION, POWDER, FOR SOLUTION INTRAVENOUS at 11:44

## 2019-07-14 RX ADMIN — ACETAMINOPHEN 650 MG: 650 SOLUTION ORAL at 18:42

## 2019-07-14 RX ADMIN — SERTRALINE 200 MG: 100 TABLET, FILM COATED ORAL at 09:46

## 2019-07-14 RX ADMIN — VANCOMYCIN HYDROCHLORIDE 1250 MG: 1 INJECTION, POWDER, LYOPHILIZED, FOR SOLUTION INTRAVENOUS at 21:33

## 2019-07-14 RX ADMIN — CLONAZEPAM 1 MG: 1 TABLET ORAL at 20:38

## 2019-07-14 RX ADMIN — HEPARIN SODIUM 18 UNITS/KG/HR: 10000 INJECTION, SOLUTION INTRAVENOUS at 13:58

## 2019-07-14 RX ADMIN — PIPERACILLIN SODIUM,TAZOBACTAM SODIUM 3.38 G: 3; .375 INJECTION, POWDER, FOR SOLUTION INTRAVENOUS at 18:42

## 2019-07-14 RX ADMIN — MICONAZOLE NITRATE: 20 POWDER TOPICAL at 09:33

## 2019-07-14 RX ADMIN — FAMOTIDINE 20 MG: 20 TABLET ORAL at 20:38

## 2019-07-14 RX ADMIN — MORPHINE SULFATE 2 MG: 2 INJECTION, SOLUTION INTRAMUSCULAR; INTRAVENOUS at 06:55

## 2019-07-14 RX ADMIN — POTASSIUM BICARBONATE 40 MEQ: 391 TABLET, EFFERVESCENT ORAL at 11:44

## 2019-07-14 RX ADMIN — ATORVASTATIN CALCIUM 80 MG: 80 TABLET, FILM COATED ORAL at 20:38

## 2019-07-14 RX ADMIN — SODIUM CHLORIDE, PRESERVATIVE FREE 10 ML: 5 INJECTION INTRAVENOUS at 09:00

## 2019-07-14 RX ADMIN — MORPHINE SULFATE 2 MG: 2 INJECTION, SOLUTION INTRAMUSCULAR; INTRAVENOUS at 20:36

## 2019-07-14 RX ADMIN — SODIUM CHLORIDE: 9 INJECTION, SOLUTION INTRAVENOUS at 20:40

## 2019-07-14 RX ADMIN — VANCOMYCIN HYDROCHLORIDE 1250 MG: 1 INJECTION, POWDER, LYOPHILIZED, FOR SOLUTION INTRAVENOUS at 09:27

## 2019-07-14 ASSESSMENT — PAIN DESCRIPTION - FREQUENCY
FREQUENCY: CONTINUOUS

## 2019-07-14 ASSESSMENT — PAIN SCALES - GENERAL
PAINLEVEL_OUTOF10: 8
PAINLEVEL_OUTOF10: 0
PAINLEVEL_OUTOF10: 8
PAINLEVEL_OUTOF10: 8
PAINLEVEL_OUTOF10: 5
PAINLEVEL_OUTOF10: 10
PAINLEVEL_OUTOF10: 0
PAINLEVEL_OUTOF10: 0
PAINLEVEL_OUTOF10: 4

## 2019-07-14 ASSESSMENT — PAIN DESCRIPTION - DESCRIPTORS
DESCRIPTORS: DISCOMFORT
DESCRIPTORS: ACHING
DESCRIPTORS: ACHING

## 2019-07-14 ASSESSMENT — PAIN DESCRIPTION - PROGRESSION: CLINICAL_PROGRESSION: NOT CHANGED

## 2019-07-14 ASSESSMENT — PAIN DESCRIPTION - LOCATION
LOCATION: BACK;SHOULDER
LOCATION: BACK;SHOULDER
LOCATION: GENERALIZED
LOCATION: HEAD
LOCATION: SHOULDER;BACK

## 2019-07-14 ASSESSMENT — PAIN DESCRIPTION - ORIENTATION
ORIENTATION: LEFT
ORIENTATION: ANTERIOR

## 2019-07-14 ASSESSMENT — PAIN DESCRIPTION - PAIN TYPE
TYPE: ACUTE PAIN;CHRONIC PAIN
TYPE: ACUTE PAIN
TYPE: ACUTE PAIN;CHRONIC PAIN
TYPE: ACUTE PAIN
TYPE: ACUTE PAIN;CHRONIC PAIN

## 2019-07-14 ASSESSMENT — PAIN DESCRIPTION - ONSET
ONSET: ON-GOING

## 2019-07-14 ASSESSMENT — PAIN - FUNCTIONAL ASSESSMENT
PAIN_FUNCTIONAL_ASSESSMENT: 0-10
PAIN_FUNCTIONAL_ASSESSMENT: ACTIVITIES ARE NOT PREVENTED

## 2019-07-14 NOTE — PROGRESS NOTES
Pharmacy Vancomycin Consult     Vancomycin Day: 4  Current Dosin mg IV q12h    Temp max:  98.1    Recent Labs     19  0423 19  0705   BUN 14 12       Recent Labs     19  0423 19  0705   CREATININE 0.5 0.5       Recent Labs     19  0423 19  0705   WBC 11.7* 17.8*         Intake/Output Summary (Last 24 hours) at 2019 0315  Last data filed at 2019 2340  Gross per 24 hour   Intake 2429.7 ml   Output 515 ml   Net 1914.7 ml       Culture Date Source Results   19 BC#1 NGTD   19 BC#2 NGTD   19 Respiratory Pending   19 Strep pneumo In process   19 Legionella  In process       Ht Readings from Last 1 Encounters:   19 6' (1.829 m)        Wt Readings from Last 1 Encounters:   19 174 lb (78.9 kg)         Body mass index is 23.6 kg/m². CrCl: 170 mL/min    Trough: 11    Assessment/Plan:   Increase regimen to Vancomycin IV 1250 mg q12h. Will continue to monitor.   Maria Del Carmen Yarbrough 2019 3:23 AM

## 2019-07-14 NOTE — PROGRESS NOTES
the major cervical arterial structures. Findings were conveyed over the phone to the patient's nurse, Astrid Long at 1605 hours on 7/12/2019. **This report has been created using voice recognition software. It may contain minor errors which are inherent in voice recognition technology. **         Final report electronically signed by Dr. Ronny Camacho on 7/12/2019 4:06 PM      MRI BRAIN WO CONTRAST   Final Result       1. Within the limits of a significantly motion degraded exam, there is a large area of restricted diffusion involving the inferior medial aspect of the left cerebellar hemisphere as well as the left side of the medulla. 2. Encephalomalacia is present involving the inferior medial right occipital lobe corresponding to a remote infarct. Mild to moderate patchy foci of hyperintense T2 signal are noted within the deep cerebral white matter which likely correspond to sequela    of chronic microvascular ischemic angiopathy. 3. The MR angiogram images of the head are significantly limited by motion artifacts and are nondiagnostic for detailed evaluation. The right MCA and distal branches appear asymmetrically smaller compared to the left. The right PCA and SCA are not    well-visualized and possibly occluded. 4. No occlusion, flow-limiting stenosis or aneurysm is identified of the major cervical arterial structures. Findings were conveyed over the phone to the patient's nurse, Astrid Long at 1605 hours on 7/12/2019. **This report has been created using voice recognition software. It may contain minor errors which are inherent in voice recognition technology. **         Final report electronically signed by Dr. Ronny Camacho on 7/12/2019 4:06 PM      VL DUP LOWER EXTREMITY ARTERIES BILATERAL   Final Result   No significant abnormality seen. Excellent pulsatility throughout both legs.             **This report has been created using voice recognition the right lung base. Old right rib fractures. 2. Patchy and nodular airspace opacity in the left lung base suggestive of infiltrate. Correlation with symptoms and follow-up is advised to document complete resolution. 3. Cortical irregularity of the lateral ninth rib concerning for fracture. Correlate for injury or point tenderness. **This report has been created using voice recognition software. It may contain minor errors which are inherent in voice recognition technology. **      Final report electronically signed by Dr. Joel Stiles on 7/11/2019 1:29 PM      XR SHOULDER LEFT (MIN 2 VIEWS)   Final Result   No acute fracture or dislocation. **This report has been created using voice recognition software. It may contain minor errors which are inherent in voice recognition technology. **      Final report electronically signed by Dr. Joel Stiles on 7/11/2019 12:24 PM          Diet: DIET TUBE FEED CONTINUOUS/CYCLIC NPO; Fluid Restricted (Two Konrad HN); Gastrostomy; Continuous; 20; 50    DVT prophylaxis: [] Lovenox                                 [] SCDs                                 [] SQ Heparin                                 [] Encourage ambulation           [x] Already on Anticoagulation     Disposition:    [] Home       [x] TCU       [x] Rehab       [] Psych       [] SNF       [] [place]       [] Other-    Code Status: Full Code    PT/OT Eval Status:  Assessment/Plan:    Anticipated Discharge in :     [facility] [address] Problems    Diagnosis Date Noted    Essential hypertension [I10]     Fall [W19. XXXA] 07/11/2019    Closed fracture of one rib of left side [S22.32XA] 07/11/2019    CHI (closed head injury), initial encounter [S09.90XA] 07/11/2019    Acute CVA (cerebrovascular accident) ([address] [I63.9] 07/11/2019    Cerebrovascular accident (CVA) ([address] [I63.9]     Severe malnutrition ([address] [E43] 04/17/2019     Class: Chronic     1.  Acute Cerebrovascular Accident     -Sejal Alcocer tenderness.  -Seen by surgery in ED  -supportive care  -tylenol and lidoderm patch for pain  -Morphine does increased to 1mg for moderated pain and 2 mg for severe pain. Will continue to monitor pain     5. Diabetes Mellitus type 2     -controlled  -not on any current medications  -Hgb A1c 5.1  -no intervention     6. HTN     -Not on any current home medications  -BP continues to be elevated  -will allow permissive HTN, due to stroke  -labetalol ordered with holding parameters     7. LAURA/MDD     -will continue sertraline 100 mg 2 tabs once a day    8. Hx of DVT/PE  - has been on Eliquis  - questionable compliance, although patient stated that he was taking his medications  - will shift to Xarelto as suggested by Neuro  - Heparin held due to PEG insertion, clearance to start NOAC or heparin per GI  - no nelson bleeding at this time    9. Diet and Nutrition  - dietary consult  - start PEG feeding tomorrow    10. GI and DVT prophylaxis  - To start NOAC if ok with GI  - Pepcid     11. Severe malnutrition  - dietary consult    12.  Hx of tongue carcinoma and Left tonsillar SCC      Electronically signed by Leanna Rogers MD on 7/14/2019 at 2:21 PM

## 2019-07-14 NOTE — PROGRESS NOTES
sodium chloride flush  10 mL Intravenous 2 times per day    aspirin  81 mg Oral Daily    Or    aspirin  300 mg Rectal Daily    vancomycin (VANCOCIN) intermittent dosing (placeholder)   Other RX Placeholder       Data:   CBC:   Recent Labs     07/12/19  0423 07/13/19  0543 07/13/19  0705 07/14/19  0330   WBC 11.7*  --  17.8* 14.5*   HGB 13.3*  --  13.4* 12.7*    170 174 153     BMP:    Recent Labs     07/12/19  0423 07/13/19  0705 07/14/19  0330    141 142   K 3.9 3.6 3.3*    103 104   CO2 30 26 29   BUN 14 12 14   CREATININE 0.5 0.5 0.5   GLUCOSE 120* 105 107           No results found for this or any previous visit. Results for orders placed during the hospital encounter of 04/09/19   MRA HEAD WO CONTRAST    Narrative PROCEDURE: MRI BRAIN W WO CONTRAST, MRA HEAD WO CONTRAST, MRA NECK WO CONTRAST    CLINICAL INFORMATION: STROKE, hx of tonsil cancer, r/o cancer . Blurred vision and headaches for 3 days. History of tonsil cancer. COMPARISON: CT head dated 4/9/2019 and MR neck dated 3/21/2019. TECHNIQUE: Sagittal T1 3-D pre and postcontrast with axial and coronal reformatted images, axial T2, FLAIR, GRE and DWI images of the brain with post processed ADC map. 15 mL ProHance was injected in the left forearm. 3-D time-of-flight sequence of the   intracranial vasculature with volume rendered maximum intensity projection images. Axial 2-D and 3-D time-of-flight sequences of the carotid bifurcations with volume rendered maximum intensity projection sequences. FINDINGS:   Brain:  There is a large area of restricted diffusion involving the mesial and posterior temporal lobe and occipital lobe with associated hyperintense T2/FLAIR signal corresponding to the large area of hypodensity on prior CT. There is effacement of sulci. No   midline shift is present.  There are multiple small foci of restricted diffusion in the right thalamus and splenium of the corpus callosum associated focal areas of dated April 10, 2019. The superior sagittal sinus, vein of Parish, internal cerebral veins, straight sinus, transverse sinuses and sigmoid sinuses are patent. Hypoattenuation is present within the medial aspect of the right occipital lobe. This likely corresponds to prior ischemia. The visualized orbits and temporal bone structures are within normal limits. A mucous retention cyst is again noted within the   right maxillary sinus. Multilevel degenerative changes are present within the cervical spine. No suspicious osseous lesion is grossly identified. Remote, healed rib fractures are noted on the right. No suspicious finding is grossly identified within the   paraspinal soft tissues on this nondedicated exam. No suspicious finding is grossly identified within the visualized lung apices. Calcifications are noted within the mediastinum which likely correspond to sequela of prior granulomatous disease. Impression    1. There is irregular and narrowing and occlusion of the right P2 segment of the right PCA. Occlusion was also noted on the prior MR angiogram of the brain dated April 10, 2019. There is hypoattenuation within the right occipital lobe which likely   corresponds to associated prior ischemic changes. 2. Atherosclerotic calcification is present at the bilateral carotid bifurcations. There is atherosclerotic calcification at the proximal left cervical internal carotid artery resulting in approximately 63% luminal narrowing by NASCET criteria. Atherosclerotic calcification at the right carotid bifurcation and proximal right cervical internal carotid artery results in less than 50% luminal narrowing by NASCET criteria. **This report has been created using voice recognition software. It may contain minor errors which are inherent in voice recognition technology. **    Final report electronically signed by Dr. Lipscomb Comes on 5/31/2019 11:40 AM     Results for orders placed during the hospital encounter of 05/31/19   CTA NECK W WO CONTRAST    Narrative PROCEDURE: CTA NECK W WO CONTRAST, CTA HEAD W WO CONTRAST    CLINICAL INFORMATION: headache, dizziness. COMPARISON: None available. Correlation is made to MR angiogram of the neck dated April 10, 2019. TECHNIQUE: 1 mm axial images were obtained through the head and neck after the fast bolus administration of contrast. A noncontrast localizer was obtained. 3-D reconstructions were performed on a dedicated 3-D workstation. These include multiplanar MPR   images and multiplanar MIP images. Centerline reconstructions were obtained of the carotid systems. 80 mL Isovue-370 intravenous contrast was given. All CT scans at this facility use dose modulation, iterative reconstruction, and/or weight-based dosing when appropriate to reduce radiation dose to as low as reasonably achievable. FINDINGS:    Atherosclerotic calcification is present within the aortic arch. There is atherosclerotic calcification at the origins of the great vessels off the arch without flow-limiting stenosis. Atherosclerotic calcification is also present at the origin of the   right common carotid artery without flow-limiting stenosis. There is atherosclerotic calcification at the right carotid bifurcation and proximal right cervical internal carotid artery resulting in less than 50% luminal narrowing by NASCET criteria. The   remainder of the right cervical internal carotid artery is patent. There is atherosclerotic calcification at the left carotid bifurcation and proximal left cervical internal carotid artery. This results in approximately 63% luminal narrowing by NASCET criteria within the proximal left cervical internal carotid artery. The remainder of the left cervical internal carotid artery is patent. The origin of the right vertebral artery is patent. The right vertebral artery is nondominant. The remainder of the right vertebral artery is patent.  The hypoattenuation within the right occipital lobe which likely   corresponds to associated prior ischemic changes. 2. Atherosclerotic calcification is present at the bilateral carotid bifurcations. There is atherosclerotic calcification at the proximal left cervical internal carotid artery resulting in approximately 63% luminal narrowing by NASCET criteria. Atherosclerotic calcification at the right carotid bifurcation and proximal right cervical internal carotid artery results in less than 50% luminal narrowing by NASCET criteria. **This report has been created using voice recognition software. It may contain minor errors which are inherent in voice recognition technology. **    Final report electronically signed by Dr. Lipscomb Comes on 5/31/2019 11:40 AM     Results for orders placed during the hospital encounter of 07/11/19   MRI BRAIN WO CONTRAST    Narrative PROCEDURE: MRI BRAIN WO CONTRAST, MRA NECK W WO CONTRAST, MRA HEAD W CONTRAST    CLINICAL INFORMATION: STROKE. Additional history obtained from the electronic medical record indicates the patient has had frequent falls and dizziness for 2 days. COMPARISON: MRI of the brain dated April 17, 2019. TECHNIQUE: Multiplanar and multiple spin echo T1 and T2-weighted images were obtained through the brain. Note is made that postcontrast images through the brain could not be obtained secondary to patient motion. Coronal contrast-enhanced MRA images were   obtained through the vessels of the neck. Multiplanar reconstructions were obtained. These include MIP images. 2-D time-of-flight images with associated reconstructions were also obtained. Contrast-enhanced 3-D time-of-flight images were obtained through   the brain. Multiplanar reconstructions were obtained.  Contrast-enhanced images were obtained after administration of 15 mL ProHance intravenous contrast.     FINDINGS:  The images are degraded by motion artifacts which limits detailed evaluation. There is prominence of the sulcal spaces and ventricular system secondary to generalized, age-related parenchymal volume loss. Encephalomalacia is noted involving the inferior medial right occipital lobe from a prior infarct. Patchy areas of hyperintense   T2 signal are noted throughout the deep cerebral white matter and juan alberto which likely correspond to mild to moderate sequela of chronic microvascular ischemic angiopathy. A large area of restricted diffusion is noted involving the inferior medial aspect   of the left cerebellar hemisphere as well as the left side of the medulla. The gradient echo images are nondiagnostic. The ventricles are midline. There is ex vacuo dilatation of the trigone and temporal horn of the right lateral ventricle. No mass, mass effect or extra-axial fluid collection is identified. The basal cisterns and visualized vascular flow voids are   grossly patent. The pituitary is not well-visualized secondary to motion artifacts. The visualized orbits are grossly unremarkable. There is a mucous retention cyst within the right maxillary sinus. MRA HEAD:    The images are nondiagnostic for detailed evaluation. The right MCA appears smaller in caliber compared to the left with decreased size of the distal right MCA branches. The right PCA and SCA are not well-visualized. There is suggestion of a fetal origin   of the left PCA. MRA NECK:    There is an expected three-vessel branching pattern off the aortic arch. The origins of the great vessels off the arch are patent. Right: There is no stenosis of the distal common carotid artery. The carotid bulb is unremarkable. There is no significant stenosis at the origin of the internal carotid artery. Left: There is no stenosis of the distal common carotid artery. The carotid bulb is unremarkable. There is no significant stenosis at the origin of the internal carotid artery. Vertebral arteries:  There is antegrade flow in HEAD WO CONTRAST    Narrative PROCEDURE: CT HEAD WO CONTRAST    CLINICAL INFORMATION: Trauma . COMPARISON: 5/31/2019    TECHNIQUE: 2-D multiplanar noncontrast images of the brain  All CT scans at this facility use dose modulation, iterative reconstruction, and/or weight-based dosing when appropriate to reduce radiation dose to as low as reasonably achievable. FINDINGS: There is a large area of diminished attenuation involving the left cerebellar bowel. This is in the inferior portion of the cerebellum extending the mesial portion. This measures up to 4.3 x 3.0 cm There is slight mass effect and effacement of   the left basilar cistern. I do not identify associated hemorrhage. This is new since the prior exam.  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 Ventricles are normal. No extra-axial fluid collection. Small left frontal scalp hematoma. Calvarium is intact. Impression Large area of diminished attenuation in the left cerebellar hemisphere with mass effect effacement of the left basal cistern. Possibly representing a subacute infarction. Underlying mass is not excludable. This was called to Ciara Torres at the time of exam.      **This report has been created using voice recognition software. It may contain minor errors which are inherent in voice recognition technology. **    Final report electronically signed by Dr. Shahriar Rehman on 7/11/2019 1:20 PM         Assessment:  Active Problems:    Severe malnutrition ([address]    Fall    Closed fracture of one rib of left side    CHI (closed head injury), initial encounter    Cerebrovascular accident (CVA) ([address]    Acute CVA (cerebrovascular accident) [facility])    Essential hypertension  Resolved Problems:    * No resolved hospital problems. *    60 year old man with hx of DVT and PE, hx of right occipital stroke 3 months ago, now with acute left cerebellum stroke. Plan:    1. Cerebellum edema is stable, no need anymore osmotic therapy  2.  Patient is

## 2019-07-14 NOTE — PLAN OF CARE
Planning:  Goal: Discharged to appropriate level of care  Description  Discharged to appropriate level of care  Outcome: Ongoing  Note:   The current medical regimen is effective;  continue present plan and medications. Problem: DISCHARGE BARRIERS  Goal: Patient's continuum of care needs are met  Outcome: Ongoing  Note:   Monitoring discharge needs. Patient discharge  when medically stable. SW following      Care plan reviewed with patient. Patient verbalize understanding of the plan of care and contribute to goal setting.

## 2019-07-14 NOTE — PRE SEDATION
[facility]  Sedation/Analgesia History & Physical    Patient: Joe Ho : 1960  Med Rec#: [id number] Acc#: [id number]   Provider Performing Procedure: Dl Smith  Primary Care Physician: Bala Johnson MD    PRE-PROCEDURE   Full CODE [x]Yes  DNR-CCA/DNR-CC []Yes   Brief History/Pre-Procedure Diagnosis:dysphagia, tonsillar cancer, S/p chemoradiation therapy and CVA          MEDICAL HISTORY  []CAD/Valve  []Liver Disease  []Lung Disease []Diabetes  []Hypertension []Renal Disease  []Additional information:       has a past medical history of Anxiety, Bleeds easily ([address] Bruises easily, Cerebrovascular accident (CVA) due to embolism of right posterior cerebral artery ([address] Cigarette nicotine dependence without complication, COPD (chronic obstructive pulmonary disease) ([address] DVT (deep venous thrombosis) ([facility] [address] Enlarged lymph node, History of emotional problems, History of sleep apnea, Hypertension, Moderate episode of recurrent major depressive disorder ([address] NSTEMI (non-ST elevated myocardial infarction) ([address] Pulmonary embolism and infarction ([facility] [address] S/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 SURGICAL HISTORY   has a past surgical history that includes knee surgery (Right, ); Toe Surgery (Right, ); other surgical history (09); hip surgery (Right, 2014); Tunneled venous port placement; pr office/outpt visit,procedure only (Right, 10/16/2018); Cardiac catheterization (04/10/2019); and transesophageal echocardiogram (N/A, 2019).   Additional information:       ALLERGIES   Allergies as of 2019 - Review Complete 2019   Allergen Reaction Noted    Latex Hives 2018    Coumadin [warfarin sodium] Other (See Comments) 2014    Tape Maribell Moscoso tape] prn 5/22/19  Yes YAQUELIN Lee CNP   atorvastatin (LIPITOR) 40 MG tablet Take 1 tablet by mouth nightly 4/12/19  Yes [facility], DO   apixaban (ELIQUIS) 5 MG TABS tablet Take 1 tablet by mouth 2 times daily 4/12/19  Yes [facility], DO   docusate sodium (COLACE) 100 MG capsule Take 100 mg by mouth 3 times daily as needed for Constipation   Yes Historical Provider, MD   lactulose (CHRONULAC) 10 GM/15ML solution Take 30 mLs by mouth 2 times daily as needed (constipation) 8/23/18  Yes YAQUELIN Garcia CNP   lidocaine-prilocaine (EMLA) 2.5-2.5 % cream Apply topically as needed. 6/13/19   Pacheco Almanza MD   clonazePAM (KLONOPIN) 1 MG tablet Take 1 tablet by mouth 2 times daily as needed for Anxiety for up to 30 days.  5/22/19 6/21/19  YAQUELIN Lee CNP   albuterol sulfate  (90 Base) MCG/ACT inhaler Inhale 2 puffs into the lungs every 6 hours as needed for Wheezing 4/22/19   Jeff Alba MD   glucose monitoring kit (FREESTYLE) monitoring kit 1 kit by Does not apply route daily 4/15/19   Jeff Alba MD   nicotine (NICODERM CQ) 21 MG/24HR Place 1 patch onto the skin daily 4/13/19   Daphney GibbstownDO   naloxone [facility]) 4 MG/0.1ML LIQD nasal spray 1 spray by Nasal route as needed for Opioid Reversal 12/24/18   YAQUELIN Joe CNP     Additional information:       PHYSICAL:   Heart:  [x]Regular rate and rhythm  []Other:    Lungs:  [x]Clear    []Other:    Abdomen: [x]Soft    []Other:    Mental Status: [x]Alert & Oriented  []Other:      VITAL SIGNS   Patient Vitals for the past 24 hrs:   BP Temp Temp src Pulse Resp SpO2 Weight   07/14/19 0825 -- -- -- 93 15 90 % --   07/14/19 0824 -- -- -- 85 21 91 % --   07/14/19 0823 -- -- -- 83 13 92 % --   07/14/19 0822 (!) 154/74 -- -- 86 20 90 % --   07/14/19 0821 -- -- -- 81 15 91 % --   07/14/19 0820 (!) 153/73 -- -- 86 18 92 % --   07/14/19 0819 -- -- -- 88 28 (!) 89 % --   07/14/19 0818 -- -- -- 89 15 90 % --   07/14/19 0817 -- -- -- 87 20 (!) 88 % --   07/14/19 0816 (!) 196/90 -- -- 92 24 (!) 85 % --   07/14/19 0815 -- -- -- 93 15 (!) 88 % --   07/14/19 0814 -- -- -- 93 18 (!) 88 % --   07/14/19 0813 (!) 177/88 -- -- 95 26 (!) 89 % --   07/14/19 0812 -- -- -- 81 17 93 % --   07/14/19 0811 -- -- -- 83 13 92 % --   07/14/19 0810 124/68 -- -- 79 11 91 % --   07/14/19 0809 -- -- -- 79 10 90 % --   07/14/19 0808 -- -- -- 84 12 92 % --   07/14/19 0807 120/62 -- -- 77 11 (!) 89 % --   07/14/19 0806 -- -- -- 77 11 90 % --   07/14/19 0805 -- -- -- 71 9 91 % --   07/14/19 0804 (!) 123/59 -- -- 76 10 92 % --   07/14/19 0803 -- -- -- 79 10 92 % --   07/14/19 0802 -- -- -- 71 16 93 % --   07/14/19 0801 (!) 150/67 -- -- 71 19 93 % --   07/14/19 0800 -- -- -- 73 16 94 % --   07/14/19 0759 -- -- -- 75 20 95 % --   07/14/19 0758 126/66 -- -- 78 9 -- --   07/14/19 0757 -- -- -- 72 19 93 % --   07/14/19 0756 -- -- -- 72 14 93 % --   07/14/19 0755 (!) 141/68 -- -- 73 19 94 % --   07/14/19 0754 -- -- -- 71 19 94 % --   07/14/19 0753 138/65 -- -- 73 18 94 % --   07/14/19 0752 -- -- -- 73 18 93 % --   07/14/19 0751 (!) 113/57 -- -- 70 17 92 % --   07/14/19 0744 129/65 -- -- 72 22 92 % --   07/14/19 0408 (!) 109/56 97.9 °F (36.6 °C) Oral 69 18 92 % 174 lb 14.4 oz (79.3 kg)   07/13/19 2340 -- -- -- -- -- 93 % --   07/13/19 1950 (!) 150/65 99 °F (37.2 °C) Oral 88 20 92 % --   07/13/19 1612 (!) 144/67 98.2 °F (36.8 °C) Oral 81 16 91 % --   07/13/19 1207 (!) 142/66 98.1 °F (36.7 °C) Oral 79 16 91 % --       PLANNED PROCEDURE   [x]EGD  []Colonoscopy []Flex Sigmoid  []ERCP []EUS   []Cystoscopy  [] CATH [] BRONCH   Consent: I have discussed with the patient and/or the patient representative the indication, alternatives, and the possible risks and/or complications of the planned procedure and the anesthesia methods. The patient and/or patient representative appear to understand and agree to proceed.   SEDATION  Planned agent: [x]Midazolam []Meperidine [x]Sublimaze []Morphine  []Diazepam  []Other:     ASA Classification: Class 3 - A patient with severe systemic disease that limits activity but is not incapacitating    Airway Assessment: Mallampati Class II - (soft palate, fauces & uvula are visible)    Monitoring and Safety: The patient will be placed on a cardiac monitor and vital signs, pulse oximetry and level of consciousness will be continuously evaluated throughout the procedure. The patient will be closely monitored until recovery from the medications is complete and the patient has returned to baseline status. Respiratory therapy will be on standby during the procedure. [x]Pre-procedure diagnostic studies complete and results available. Comment:    [x]Previous sedation/anesthesia experiences assessed. Comment:    [x]The patient is an appropriate candidate to undergo the planned procedure sedation and anesthesia. (Refer to nursing sedation/analgesia documentation record)  [x]Formulation and discussion of sedation/procedure plan, risks, and expectations with patient and/or responsible adult completed. [x]Patient examined immediately prior to the procedure.  (Refer to nursing sedation/analgesia documentation record)    Prakash Samuels MD   Electronically signed 7/14/2019 at 8:00 am

## 2019-07-14 NOTE — PROGRESS NOTES
Strength Comment: 4/5     RUE Tone: Normotonic  LUE Tone: Normotonic  Coordination and Movement description: Fine motor impairments, Gross motor impairments, Left UE, Ataxia  Comment: Pt off by 4 inches with left finger to nose test        Activity Tolerance: Patient limited by fatigue  Limited by dizziness     Assessment:  Assessment: Pt presents to occupational therapy following CVA with patient complaints of dizziness, L hemibody weakness, and decreased midline orientation impacting patient's ability to complete self care at prior level of functioning. Due to patient's performance deficits, patient would greatly benefit from continued occupational therapy for ADL remediation, endurance building, strengthening and neuro re-education to return to prior level of functioning and safe return to home environment. Performance deficits / Impairments: Decreased functional mobility , Decreased ADL status, Decreased strength, Decreased safe awareness, Decreased balance, Decreased endurance, Decreased cognition, Decreased high-level IADLs, Decreased fine motor control, Decreased coordination  Prognosis: Good  REQUIRES OT FOLLOW UP: Yes  Decision Making: Medium Complexity  Safety Devices in place: Yes  Type of devices: All fall risk precautions in place, Patient at risk for falls, Left in bed, Call light within reach, Gait belt    Treatment Initiated: Treatment and education initiated within context of evaluation. Evaluation time included review of current medical information, gathering information related to past medical, social and functional history, completion of standardized testing, formal and informal observation of tasks, assessment of data and development of plan of care and goals. Treatment time included skilled education and facilitation of tasks to increase safety and independence with ADL's for improved functional independence and quality of life.     Discharge Recommendations:  IP Rehab(In my opinion, pt

## 2019-07-14 NOTE — PROGRESS NOTES
Photos taken, 20 FR gastrostomy tube placed at   2.5 Cm skin level, no biopsies taken,  EGD completed, pt tolerated well.

## 2019-07-14 NOTE — CONSULTS
Klonopin,  albuterol inhaler, Eliquis, he is off of it at this time; IV heparin at  this time, aspirin which is on hold . ALLERGIES:  LATEX, COUMADIN, and TAPE. SOCIAL HISTORY:  Smoker for 40 years. He daily smokes half a pack  _____. No alcohol abuse at this time. He works in construction. He  said he was exposed to asbestos in the past.    FAMILY HISTORY:  Lung cancer, CVA. PHYSICAL EXAMINATION:  GENERAL:  Pleasant, appeared to be weak, not short of breath, able to  communicate. VITAL SIGNS:  Afebrile. Blood pressure 142/66, respirations 16, pulse  79. HEENT:  Head is atraumatic. Oral cavity, dry mucosa. Deformity in the  oropharyngeal area likely because of radiation therapy in the past.  NECK:  He has central line on the left side and right side of the neck. CHEST:  Poor air entry bilaterally. CARDIOVASCULAR:  S1 and S2 regular. ABDOMEN:  Lost significant weight. No scar in the left upper quadrant. EXTREMITIES:  +1 edema. LABORATORY DATA:  Sodium and potassium is normal.  BUN and creatinine is  normal.  ALT is normal.  Tox screen positive for marijuana, opiate also. His white blood cell is 11.8, H and H 13.4 and 41.1, MCV 83.2. IMAGING STUDY:  He had a recent stroke, left cerebral hemisphere and the  medulla. IMPRESSION:  1. Dysphagia. 2.  Oropharyngeal cancer, tonsil cancer,  as well as CVA. 3.  COPD. 4.  Chronic smoker. 5.  Needs full anticoagulation. PLAN:  1. We will attempt EGD with PEG tube placement. To continue  antibiotics. 2.  We will hold heparin for 48 hours. 3.  We will hold aspirin for now. Explained to the patient risks of the procedure, bleeding, perforation,  urgent need for surgical management. If we fail to do that because we  are not able to enter the oropharyngeal area because of previous history  of radiation chemotherapy as well as tonsillar cancer, then we would  consult Surgery for gastrostomy tube placement.     Thank you for allowing me

## 2019-07-14 NOTE — PROGRESS NOTES
CONTRAST, CTA HEAD W WO CONTRAST    CLINICAL INFORMATION: headache, dizziness. COMPARISON: None available. Correlation is made to MR angiogram of the neck dated April 10, 2019. TECHNIQUE: 1 mm axial images were obtained through the head and neck after the fast bolus administration of contrast. A noncontrast localizer was obtained. 3-D reconstructions were performed on a dedicated 3-D workstation. These include multiplanar MPR   images and multiplanar MIP images. Centerline reconstructions were obtained of the carotid systems. 80 mL Isovue-370 intravenous contrast was given. All CT scans at this facility use dose modulation, iterative reconstruction, and/or weight-based dosing when appropriate to reduce radiation dose to as low as reasonably achievable. FINDINGS:    Atherosclerotic calcification is present within the aortic arch. There is atherosclerotic calcification at the origins of the great vessels off the arch without flow-limiting stenosis. Atherosclerotic calcification is also present at the origin of the   right common carotid artery without flow-limiting stenosis. There is atherosclerotic calcification at the right carotid bifurcation and proximal right cervical internal carotid artery resulting in less than 50% luminal narrowing by NASCET criteria. The   remainder of the right cervical internal carotid artery is patent. There is atherosclerotic calcification at the left carotid bifurcation and proximal left cervical internal carotid artery. This results in approximately 63% luminal narrowing by NASCET criteria within the proximal left cervical internal carotid artery. The remainder of the left cervical internal carotid artery is patent. The origin of the right vertebral artery is patent. The right vertebral artery is nondominant. The remainder of the right vertebral artery is patent. The origin of the left vertebral artery is patent.  The remainder of the left vertebral artery is rendered maximum intensity projection images. Axial 2-D and 3-D time-of-flight sequences of the carotid bifurcations with volume rendered maximum intensity projection sequences. FINDINGS:   Brain:  There is a large area of restricted diffusion involving the mesial and posterior temporal lobe and occipital lobe with associated hyperintense T2/FLAIR signal corresponding to the large area of hypodensity on prior CT. There is effacement of sulci. No   midline shift is present. There are multiple small foci of restricted diffusion in the right thalamus and splenium of the corpus callosum associated focal areas of T2/FLAIR prolongation. No other focal areas of restricted diffusion are present. There are   mild to moderate scattered focal areas of T2/FLAIR prolongation in the subcortical deep white matter without focal areas of restricted diffusion. There are small foci of encephalomalacia interspersed among the foci of T2/FLAIR abnormality. The   ventricles, cisterns and sulci are otherwise symmetric in size and configuration. No intra or extra-axial mass is identified. Following contrast administration, there is minimally prominent venous enhancement in the large area of infarction consistent with luxury perfusion. Otherwise no focal areas of abnormal parenchymal or meningeal enhancement is identified. The major vascular flow voids appear patent. Orbits are unremarkable. There is a mucous retention cyst in the right maxillary sinus. Visualized paranasal sinuses are otherwise clear. There is small right mastoid effusion. MRA BRAIN:  Intracranial segments of the internal carotid arteries are patent without focal stenosis or aneurysmal dilatation. The bilateral middle cerebral and anterior cerebral arteries are patent without focal abnormality identified. The basilar artery is patent   without focal stenosis or aneurysmal dilatation identified.  There is minimal flow in the P1 segment of the right posterior cerebral artery with long segment severe stenosis. No definite distal flow is identified. The left posterior cerebral artery has a   fetal origin and appears patent. Superior cerebellar arteries appear patent. MRA NECK:  Visualized portions of the common carotid arteries appear patent without focal stenosis. The carotid bifurcations appear widely patent without hemodynamically significant stenosis by NASCET criteria. Visualized portions of the cervical segments of the   internal carotid arteries appear patent without focal stenosis. The left vertebral artery is mildly dominant. Vertebral arteries appear patent without focal stenosis. There appears to be nodular thickening of the supraglottic larynx on the left on the   2-D time-of-flight source images. Correlate with prior MRI of the neck dated 3/21/2019. Impression  Brain:  1. Redemonstration of large acute to early subacute infarct in the right posterior cerebral artery territory. 2. Mild to moderate scattered focal areas of T2/FLAIR hyperintensity in the supratentorial white matter as evidence for chronic microvascular angiopathy and old lacunar infarcts. MRA BRAIN:  High-grade stenosis at the P1 segment of the right posterior cerebral artery without definite distal flow. MRA NECK:  1. No significant stenosis at the internal carotid artery takeoffs by NASCET criteria. 2. Nodular prominence of the left supraglottic larynx on the source images. Correlate with prior MRI of the neck. **This report has been created using voice recognition software. It may contain minor errors which are inherent in voice recognition technology. **    Final report electronically signed by Dr. Vivian Hollins MD on 4/11/2019 9:05 AM     No results found for this or any previous visit.   Results for orders placed during the hospital encounter of 07/11/19   CT HEAD WO CONTRAST    Narrative PROCEDURE: CT HEAD WO CONTRAST    CLINICAL INFORMATION: Trauma swallow, there needing PEG tube, once PEG tube is placed, please start anticoagulation, patient does not want warfarin, eliquis has failed, so we should try xarelto. 4. No further stroke workup is needed as it will not , since patient need to be on anticoagulation anyway  5. Con't close monitoring, mattie neuro exam  6. Aggressive PT/OT and swallow therapy.       I spend a total of 30 minutes with greater than 60% of time spent face to face, counseling, coordinating care, examining patient, reviewing images and labs personally, and speaking with team.      Maite Bailey MD, 7/13/2019 10:58 PM     Geraldine Gotti MD  Attending Neurologist/Neurointensivist

## 2019-07-15 LAB
ANION GAP SERPL CALCULATED.3IONS-SCNC: 8 MEQ/L (ref 8–16)
APTT: 58.9 SECONDS (ref 22–38)
BUN BLDV-MCNC: 14 MG/DL (ref 7–22)
CALCIUM SERPL-MCNC: 8.7 MG/DL (ref 8.5–10.5)
CHLORIDE BLD-SCNC: 103 MEQ/L (ref 98–111)
CO2: 29 MEQ/L (ref 23–33)
CREAT SERPL-MCNC: 0.5 MG/DL (ref 0.4–1.2)
ERYTHROCYTE [DISTWIDTH] IN BLOOD BY AUTOMATED COUNT: 14 % (ref 11.5–14.5)
ERYTHROCYTE [DISTWIDTH] IN BLOOD BY AUTOMATED COUNT: 47.4 FL (ref 35–45)
GFR SERPL CREATININE-BSD FRML MDRD: > 90 ML/MIN/1.73M2
GLUCOSE BLD-MCNC: 108 MG/DL (ref 70–108)
GLUCOSE BLD-MCNC: 118 MG/DL (ref 70–108)
GLUCOSE BLD-MCNC: 141 MG/DL (ref 70–108)
GLUCOSE BLD-MCNC: 158 MG/DL (ref 70–108)
HCT VFR BLD CALC: 37.3 % (ref 42–52)
HEMOGLOBIN: 12.3 GM/DL (ref 14–18)
MCH RBC QN AUTO: 30.3 PG (ref 26–33)
MCHC RBC AUTO-ENTMCNC: 33 GM/DL (ref 32.2–35.5)
MCV RBC AUTO: 91.9 FL (ref 80–94)
PLATELET # BLD: 145 THOU/MM3 (ref 130–400)
PMV BLD AUTO: 11 FL (ref 9.4–12.4)
POTASSIUM SERPL-SCNC: 3.5 MEQ/L (ref 3.5–5.2)
RBC # BLD: 4.06 MILL/MM3 (ref 4.7–6.1)
SODIUM BLD-SCNC: 140 MEQ/L (ref 135–145)
WBC # BLD: 12.7 THOU/MM3 (ref 4.8–10.8)

## 2019-07-15 PROCEDURE — 6370000000 HC RX 637 (ALT 250 FOR IP): Performed by: PHYSICIAN ASSISTANT

## 2019-07-15 PROCEDURE — 36591 DRAW BLOOD OFF VENOUS DEVICE: CPT

## 2019-07-15 PROCEDURE — 2060000000 HC ICU INTERMEDIATE R&B

## 2019-07-15 PROCEDURE — 2700000000 HC OXYGEN THERAPY PER DAY

## 2019-07-15 PROCEDURE — 2580000003 HC RX 258: Performed by: STUDENT IN AN ORGANIZED HEALTH CARE EDUCATION/TRAINING PROGRAM

## 2019-07-15 PROCEDURE — 85027 COMPLETE CBC AUTOMATED: CPT

## 2019-07-15 PROCEDURE — 2580000003 HC RX 258: Performed by: PHYSICIAN ASSISTANT

## 2019-07-15 PROCEDURE — 99232 SBSQ HOSP IP/OBS MODERATE 35: CPT | Performed by: FAMILY MEDICINE

## 2019-07-15 PROCEDURE — 82948 REAGENT STRIP/BLOOD GLUCOSE: CPT

## 2019-07-15 PROCEDURE — 6360000002 HC RX W HCPCS: Performed by: STUDENT IN AN ORGANIZED HEALTH CARE EDUCATION/TRAINING PROGRAM

## 2019-07-15 PROCEDURE — 87205 SMEAR GRAM STAIN: CPT

## 2019-07-15 PROCEDURE — 6370000000 HC RX 637 (ALT 250 FOR IP): Performed by: STUDENT IN AN ORGANIZED HEALTH CARE EDUCATION/TRAINING PROGRAM

## 2019-07-15 PROCEDURE — 94640 AIRWAY INHALATION TREATMENT: CPT

## 2019-07-15 PROCEDURE — 6370000000 HC RX 637 (ALT 250 FOR IP): Performed by: FAMILY MEDICINE

## 2019-07-15 PROCEDURE — 87070 CULTURE OTHR SPECIMN AEROBIC: CPT

## 2019-07-15 PROCEDURE — 6360000002 HC RX W HCPCS: Performed by: COUNSELOR

## 2019-07-15 PROCEDURE — 80048 BASIC METABOLIC PNL TOTAL CA: CPT

## 2019-07-15 PROCEDURE — 6370000000 HC RX 637 (ALT 250 FOR IP): Performed by: NURSE PRACTITIONER

## 2019-07-15 PROCEDURE — 85730 THROMBOPLASTIN TIME PARTIAL: CPT

## 2019-07-15 PROCEDURE — 2709999900 HC NON-CHARGEABLE SUPPLY

## 2019-07-15 PROCEDURE — 99222 1ST HOSP IP/OBS MODERATE 55: CPT | Performed by: NURSE PRACTITIONER

## 2019-07-15 PROCEDURE — 97530 THERAPEUTIC ACTIVITIES: CPT

## 2019-07-15 RX ORDER — SENNA PLUS 8.6 MG/1
2 TABLET ORAL NIGHTLY
Status: DISCONTINUED | OUTPATIENT
Start: 2019-07-15 | End: 2019-07-18 | Stop reason: HOSPADM

## 2019-07-15 RX ORDER — MORPHINE SULFATE 10 MG/5ML
5 SOLUTION ORAL EVERY 4 HOURS PRN
Status: DISCONTINUED | OUTPATIENT
Start: 2019-07-15 | End: 2019-07-18 | Stop reason: HOSPADM

## 2019-07-15 RX ORDER — MORPHINE SULFATE 10 MG/5ML
10 SOLUTION ORAL EVERY 4 HOURS PRN
Status: DISCONTINUED | OUTPATIENT
Start: 2019-07-15 | End: 2019-07-18 | Stop reason: HOSPADM

## 2019-07-15 RX ORDER — LIDOCAINE 4 G/G
3 PATCH TOPICAL DAILY
Status: DISCONTINUED | OUTPATIENT
Start: 2019-07-16 | End: 2019-07-18 | Stop reason: HOSPADM

## 2019-07-15 RX ORDER — OXYCODONE HYDROCHLORIDE 5 MG/1
10 TABLET ORAL EVERY 8 HOURS PRN
Status: DISCONTINUED | OUTPATIENT
Start: 2019-07-15 | End: 2019-07-15

## 2019-07-15 RX ORDER — DOCUSATE SODIUM 100 MG/1
100 CAPSULE, LIQUID FILLED ORAL 2 TIMES DAILY
Status: DISCONTINUED | OUTPATIENT
Start: 2019-07-15 | End: 2019-07-17 | Stop reason: ALTCHOICE

## 2019-07-15 RX ADMIN — OXYCODONE HYDROCHLORIDE 10 MG: 5 TABLET ORAL at 12:29

## 2019-07-15 RX ADMIN — FAMOTIDINE 20 MG: 20 TABLET ORAL at 08:28

## 2019-07-15 RX ADMIN — SENNOSIDES 17.2 MG: 8.6 TABLET, FILM COATED ORAL at 19:57

## 2019-07-15 RX ADMIN — HYDROXYZINE HYDROCHLORIDE 50 MG: 25 TABLET, FILM COATED ORAL at 19:58

## 2019-07-15 RX ADMIN — MICONAZOLE NITRATE: 20 POWDER TOPICAL at 22:47

## 2019-07-15 RX ADMIN — CLONAZEPAM 1 MG: 1 TABLET ORAL at 19:57

## 2019-07-15 RX ADMIN — MORPHINE SULFATE 2 MG: 2 INJECTION, SOLUTION INTRAMUSCULAR; INTRAVENOUS at 08:28

## 2019-07-15 RX ADMIN — HEPARIN SODIUM 20 UNITS/KG/HR: 10000 INJECTION, SOLUTION INTRAVENOUS at 04:27

## 2019-07-15 RX ADMIN — RIVAROXABAN 20 MG: 20 TABLET, FILM COATED ORAL at 14:34

## 2019-07-15 RX ADMIN — SODIUM CHLORIDE: 9 INJECTION, SOLUTION INTRAVENOUS at 04:38

## 2019-07-15 RX ADMIN — HYDROXYZINE HYDROCHLORIDE 50 MG: 25 TABLET, FILM COATED ORAL at 00:24

## 2019-07-15 RX ADMIN — MORPHINE SULFATE 2 MG: 2 INJECTION, SOLUTION INTRAMUSCULAR; INTRAVENOUS at 00:22

## 2019-07-15 RX ADMIN — MICONAZOLE NITRATE: 20 POWDER TOPICAL at 08:30

## 2019-07-15 RX ADMIN — PIPERACILLIN SODIUM,TAZOBACTAM SODIUM 3.38 G: 3; .375 INJECTION, POWDER, FOR SOLUTION INTRAVENOUS at 03:00

## 2019-07-15 RX ADMIN — ATORVASTATIN CALCIUM 80 MG: 80 TABLET, FILM COATED ORAL at 19:58

## 2019-07-15 RX ADMIN — MORPHINE SULFATE 10 MG: 10 SOLUTION ORAL at 19:57

## 2019-07-15 RX ADMIN — FAMOTIDINE 20 MG: 20 TABLET ORAL at 19:57

## 2019-07-15 RX ADMIN — IPRATROPIUM BROMIDE AND ALBUTEROL SULFATE 1 AMPULE: .5; 3 SOLUTION RESPIRATORY (INHALATION) at 13:25

## 2019-07-15 RX ADMIN — CLONAZEPAM 1 MG: 1 TABLET ORAL at 08:36

## 2019-07-15 RX ADMIN — SERTRALINE 200 MG: 100 TABLET, FILM COATED ORAL at 08:30

## 2019-07-15 RX ADMIN — SODIUM CHLORIDE, PRESERVATIVE FREE 10 ML: 5 INJECTION INTRAVENOUS at 08:30

## 2019-07-15 RX ADMIN — VANCOMYCIN HYDROCHLORIDE 1250 MG: 1 INJECTION, POWDER, LYOPHILIZED, FOR SOLUTION INTRAVENOUS at 10:34

## 2019-07-15 RX ADMIN — PIPERACILLIN SODIUM,TAZOBACTAM SODIUM 3.38 G: 3; .375 INJECTION, POWDER, FOR SOLUTION INTRAVENOUS at 19:16

## 2019-07-15 RX ADMIN — PIPERACILLIN SODIUM,TAZOBACTAM SODIUM 3.38 G: 3; .375 INJECTION, POWDER, FOR SOLUTION INTRAVENOUS at 12:17

## 2019-07-15 RX ADMIN — ASPIRIN 81 MG: 81 TABLET ORAL at 08:28

## 2019-07-15 RX ADMIN — IPRATROPIUM BROMIDE AND ALBUTEROL SULFATE 1 AMPULE: .5; 3 SOLUTION RESPIRATORY (INHALATION) at 18:01

## 2019-07-15 RX ADMIN — DOCUSATE SODIUM 100 MG: 100 CAPSULE, LIQUID FILLED ORAL at 19:58

## 2019-07-15 RX ADMIN — MORPHINE SULFATE 2 MG: 2 INJECTION, SOLUTION INTRAMUSCULAR; INTRAVENOUS at 04:34

## 2019-07-15 RX ADMIN — IPRATROPIUM BROMIDE AND ALBUTEROL SULFATE 1 AMPULE: .5; 3 SOLUTION RESPIRATORY (INHALATION) at 07:27

## 2019-07-15 ASSESSMENT — PAIN DESCRIPTION - PROGRESSION
CLINICAL_PROGRESSION: NOT CHANGED

## 2019-07-15 ASSESSMENT — PAIN DESCRIPTION - FREQUENCY
FREQUENCY: CONTINUOUS

## 2019-07-15 ASSESSMENT — PAIN - FUNCTIONAL ASSESSMENT
PAIN_FUNCTIONAL_ASSESSMENT: PREVENTS OR INTERFERES SOME ACTIVE ACTIVITIES AND ADLS
PAIN_FUNCTIONAL_ASSESSMENT: ACTIVITIES ARE NOT PREVENTED
PAIN_FUNCTIONAL_ASSESSMENT: PREVENTS OR INTERFERES WITH MANY ACTIVE NOT PASSIVE ACTIVITIES
PAIN_FUNCTIONAL_ASSESSMENT: ACTIVITIES ARE NOT PREVENTED

## 2019-07-15 ASSESSMENT — PAIN DESCRIPTION - LOCATION
LOCATION: GENERALIZED

## 2019-07-15 ASSESSMENT — PAIN DESCRIPTION - ONSET
ONSET: ON-GOING
ONSET: PROGRESSIVE
ONSET: ON-GOING

## 2019-07-15 ASSESSMENT — PAIN SCALES - GENERAL
PAINLEVEL_OUTOF10: 7
PAINLEVEL_OUTOF10: 6
PAINLEVEL_OUTOF10: 8
PAINLEVEL_OUTOF10: 7

## 2019-07-15 ASSESSMENT — PAIN DESCRIPTION - DESCRIPTORS
DESCRIPTORS: ACHING
DESCRIPTORS: ACHING;CONSTANT
DESCRIPTORS: ACHING
DESCRIPTORS: ACHING;BURNING;CONSTANT

## 2019-07-15 ASSESSMENT — PAIN DESCRIPTION - PAIN TYPE
TYPE: ACUTE PAIN
TYPE: CHRONIC PAIN
TYPE: ACUTE PAIN

## 2019-07-15 ASSESSMENT — PAIN DESCRIPTION - ORIENTATION: ORIENTATION: OTHER (COMMENT)

## 2019-07-15 NOTE — FLOWSHEET NOTE
[address] 58  PHYSICAL THERAPY MISSED TREATMENT NOTE  ACUTE CARE    Date: 7/15/2019  Patient Name: Jagdeep Combs        MRN: [id number]   : 1960  (62 y.o.)  Gender: male   Referring Practitioner: Loida Escalante DO   Referring Practitioner: BILL Lara MD  Diagnosis: Fall   Diagnosis: Fall         REASON FOR MISSED TREATMENT:  Hold treatment per nursing request, reports pt being very upset and requests PT to try later. Will check back per schedule allows.

## 2019-07-15 NOTE — CONSULTS
Cerebrovascular accident (CVA) due to embolism of right posterior cerebral artery ([facility] [address] 04/09/2019    Cigarette nicotine dependence without complication 3/8/6892    COPD (chronic obstructive pulmonary disease) ([facility] [address]     DVT (deep venous thrombosis) ([facility] [address] 2009    Enlarged lymph node     History of emotional problems     History of sleep apnea     Hypertension     HCC    Moderate episode of recurrent major depressive disorder ([address] 4/16/2018    NSTEMI (non-ST elevated myocardial infarction) ([facility] [address] 4/9/2019    Pulmonary embolism and infarction ([address] 10/22/2018    S/P insertion of inferior vena caval filter 07/07/2009    Severe malnutrition ([facility] [address] 10/23/2018    Squamous cell carcinoma of tonsils ([facility] [address] 2018    left   - radiation    Type II or unspecified type diabetes mellitus without mention of complication, not stated as uncontrolled     lost weight, no meds at present 2018       Past Surgical History:        Procedure Laterality Date    CARDIAC CATHETERIZATION  04/10/2019    HIP SURGERY Right 06/30/2014    Excisin Right Hip Mass-Dr. Shani Sotelo     KNEE SURGERY Right 1980's    OTHER SURGICAL HISTORY  7-7-09    lisa filter placed     NC OFFICE/OUTPT VISIT,PROCEDURE ONLY Right 10/16/2018    RIGHT FOOT EXCISION ULCER, RIGHT 5th METATARSAL BASE RESECTION performed by Haroldo Traore DPM at [facility] TOE SURGERY Right 2013    wound to bone (second toe)     TRANSESOPHAGEAL ECHOCARDIOGRAM N/A 4/11/2019    TRANSESOPHAGEAL ECHOCARDIOGRAM performed by Merlin Campo MD at [facility]    TUNNELED VENOUS PORT PLACEMENT         Allergies:     Allergies   Allergen Reactions    Latex Hives    Coumadin [Warfarin Sodium] Other (See Comments)     Tears skin     Tape Agustina Sides Tape] Other (See Comments)     Tears skin         Current Medications:   Current Facility-Administered Medications: rivaroxaban (XARELTO) tablet 20 mg, 20 mg, Oral, Daily  oxyCODONE (ROXICODONE) immediate release tablet 10 mg, 10 mg, Oral, Q8H PRN  clonazePAM (KLONOPIN) tablet 1 mg, 1 mg, PEG Tube, BID PRN  hydrOXYzine (ATARAX) tablet 50 mg, 50 mg, PEG Tube, Nightly PRN  potassium replacement protocol, , Other, RX Placeholder  sertraline (ZOLOFT) tablet 200 mg, 200 mg, PEG Tube, Daily  lactulose (CHRONULAC) 10 GM/15ML solution 20 g, 20 g, PEG Tube, BID PRN  atorvastatin (LIPITOR) tablet 80 mg, 80 mg, PEG Tube, Nightly  famotidine (PEPCID) tablet 20 mg, 20 mg, Oral, BID  piperacillin-tazobactam (ZOSYN) 3.375 g in dextrose 5 % 50 mL IVPB extended infusion (mini-bag), 3.375 g, Intravenous, Q8H  ipratropium-albuterol (DUONEB) nebulizer solution 1 ampule, 1 ampule, Inhalation, TID  ipratropium-albuterol (DUONEB) nebulizer solution 1 ampule, 1 ampule, Inhalation, Q4H PRN  acetaminophen (TYLENOL) 160 MG/5ML solution 650 mg, 650 mg, Oral, Q4H PRN  [Held by provider] naloxegol (MOVANTIK) tablet 25 mg, 25 mg, Oral, QAM  miconazole (MICOTIN) 2 % powder, , Topical, BID  lidocaine 4 % external patch 2 patch, 2 patch, Transdermal, Daily  nicotine (NICODERM CQ) 21 MG/24HR 1 patch, 1 patch, Transdermal, Daily  labetalol (NORMODYNE;TRANDATE) injection syringe 10 mg, 10 mg, Intravenous, Q6H PRN  sodium chloride flush 0.9 % injection 10 mL, 10 mL, Intravenous, 2 times per day  sodium chloride flush 0.9 % injection 10 mL, 10 mL, Intravenous, PRN  magnesium hydroxide (MILK OF MAGNESIA) 400 MG/5ML suspension 30 mL, 30 mL, Oral, Daily PRN  ondansetron (ZOFRAN) injection 4 mg, 4 mg, Intravenous, Q6H PRN  aspirin EC tablet 81 mg, 81 mg, Oral, Daily **OR** aspirin suppository 300 mg, 300 mg, Rectal, Daily  acetaminophen (TYLENOL) suppository 650 mg, 650 mg, Rectal, Q4H PRN    Social History:  Social History     Socioeconomic History    Marital status:       Spouse name: Not on file    Number of children: Not on file    Years of education: Not on file    Highest education level: Not on file   Occupational History    Occupation: disability negative      Physical Exam:  BP (!) 109/59   Pulse 85   Temp 97.2 °F (36.2 °C) (Oral)   Resp 18   Ht 6' (1.829 m)   Wt 174 lb 14.4 oz (79.3 kg)   SpO2 94%   BMI 23.72 kg/m²      awake  Orientation:   person, place, time  Mood: within normal limits  Affect: quiet  General appearance: Patient is well nourished, well developed, well groomed and in no acute distress, appearing older than stated age    Memory:   normal,   Attention/Concentration: normal  Language:  normal    ROM:  decreased left side of body   Motor Exam:  Motor exam is 5 out of 5 all extremities with the exception of left upper and lower extremity   Tone:  abnormal      Heart: normal rate, regular rhythm, normal S1, S2, no murmurs, rubs, clicks or gallops  Lungs: Diminished, coarse, scattered wheezes, on 4 liters of oxygen per nasal cannula   Abdomen: soft, non-tender, non-distended, normal bowel sounds, no masses or organomegaly.  + peg tube with tube feeds     Skin: warm and dry, no rash or erythema  Peripheral vascular: Pulses: Normal upper and lower extremity pulses; Edema: no      Diagnostics:  Recent Results (from the past 24 hour(s))   POCT Glucose    Collection Time: 07/14/19  5:41 PM   Result Value Ref Range    POC Glucose 113 (H) 70 - 108 mg/dl   POCT Glucose    Collection Time: 07/14/19  7:47 PM   Result Value Ref Range    POC Glucose 154 (H) 70 - 108 mg/dl   APTT    Collection Time: 07/14/19  8:31 PM   Result Value Ref Range    aPTT 41.2 (H) 22.0 - 38.0 seconds   Basic Metabolic Panel    Collection Time: 07/15/19  6:00 AM   Result Value Ref Range    Sodium 140 135 - 145 meq/L    Potassium 3.5 3.5 - 5.2 meq/L    Chloride 103 98 - 111 meq/L    CO2 29 23 - 33 meq/L    Glucose 141 (H) 70 - 108 mg/dL    BUN 14 7 - 22 mg/dL    CREATININE 0.5 0.4 - 1.2 mg/dL    Calcium 8.7 8.5 - 10.5 mg/dL   CBC    Collection Time: 07/15/19  6:00 AM   Result Value Ref Range    WBC 12.7 (H) 4.8 - 10.8 thou/mm3    RBC 4.06 (L) 4.70 - 6.10 mill/mm3 Hemoglobin 12.3 (L) 14.0 - 18.0 gm/dl    Hematocrit 37.3 (L) 42.0 - 52.0 %    MCV 91.9 80.0 - 94.0 fL    MCH 30.3 26.0 - 33.0 pg    MCHC 33.0 32.2 - 35.5 gm/dl    RDW-CV 14.0 11.5 - 14.5 %    RDW-SD 47.4 (H) 35.0 - 45.0 fL    Platelets 852 977 - 733 thou/mm3    MPV 11.0 9.4 - 12.4 fL   APTT    Collection Time: 07/15/19  6:00 AM   Result Value Ref Range    aPTT 58.9 (H) 22.0 - 38.0 seconds   Anion Gap    Collection Time: 07/15/19  6:00 AM   Result Value Ref Range    Anion Gap 8.0 8.0 - 16.0 meq/L   Glomerular Filtration Rate, Estimated    Collection Time: 07/15/19  6:00 AM   Result Value Ref Range    Est, Glom Filt Rate >90 ml/min/1.73m2   POCT Glucose    Collection Time: 07/15/19  8:11 AM   Result Value Ref Range    POC Glucose 118 (H) 70 - 108 mg/dl   Respiratory Culture    Collection Time: 07/15/19 11:15 AM   Result Value Ref Range    Gram Stain Result       Moderate segmented neutrophils observed. Few epithelial cells observed. Few budding yeast.  Rare gram positive bacilli. POCT Glucose    Collection Time: 07/15/19 12:36 PM   Result Value Ref Range    POC Glucose 108 70 - 108 mg/dl       MRA of neck:   FINDINGS:  The images are degraded by motion artifacts which limits detailed evaluation. There is prominence of the sulcal spaces and ventricular system secondary to generalized, age-related parenchymal volume loss. Encephalomalacia is noted involving the inferior medial right occipital lobe from a prior infarct. Patchy areas of hyperintense   T2 signal are noted throughout the deep cerebral white matter and juan alberto which likely correspond to mild to moderate sequela of chronic microvascular ischemic angiopathy. A large area of restricted diffusion is noted involving the inferior medial aspect   of the left cerebellar hemisphere as well as the left side of the medulla. The gradient echo images are nondiagnostic. The ventricles are midline.  There is ex vacuo dilatation of the trigone and temporal horn of the right lateral ventricle. No mass, mass effect or extra-axial fluid collection is identified. The basal cisterns and visualized vascular flow voids are   grossly patent. The pituitary is not well-visualized secondary to motion artifacts. The visualized orbits are grossly unremarkable. There is a mucous retention cyst within the right maxillary sinus. MRA HEAD:    The images are nondiagnostic for detailed evaluation. The right MCA appears smaller in caliber compared to the left with decreased size of the distal right MCA branches. The right PCA and SCA are not well-visualized. There is suggestion of a fetal origin   of the left PCA. MRA NECK:    There is an expected three-vessel branching pattern off the aortic arch. The origins of the great vessels off the arch are patent. Right: There is no stenosis of the distal common carotid artery.  The carotid bulb is unremarkable. There is no significant stenosis at the origin of the internal carotid artery. Left: There is no stenosis of the distal common carotid artery.  The carotid bulb is unremarkable. There is no significant stenosis at the origin of the internal carotid artery. Vertebral arteries: There is antegrade flow in both vertebral arteries. The left vertebral artery is dominant.      Impression:          1. Within the limits of a significantly motion degraded exam, there is a large area of restricted diffusion involving the inferior medial aspect of the left cerebellar hemisphere as well as the left side of the medulla. 2. Encephalomalacia is present involving the inferior medial right occipital lobe corresponding to a remote infarct. Mild to moderate patchy foci of hyperintense T2 signal are noted within the deep cerebral white matter which likely correspond to sequela   of chronic microvascular ischemic angiopathy.     3. The MR angiogram images of the head are significantly limited by motion artifacts and are nondiagnostic for detailed evaluation. The right MCA and distal branches appear asymmetrically smaller compared to the left. The right PCA and SCA are not   well-visualized and possibly occluded. 4. No occlusion, flow-limiting stenosis or aneurysm is identified of the major cervical arterial structures. MRA of head:   FINDINGS:  The images are degraded by motion artifacts which limits detailed evaluation. There is prominence of the sulcal spaces and ventricular system secondary to generalized, age-related parenchymal volume loss. Encephalomalacia is noted involving the inferior medial right occipital lobe from a prior infarct. Patchy areas of hyperintense   T2 signal are noted throughout the deep cerebral white matter and juan alberto which likely correspond to mild to moderate sequela of chronic microvascular ischemic angiopathy. A large area of restricted diffusion is noted involving the inferior medial aspect   of the left cerebellar hemisphere as well as the left side of the medulla. The gradient echo images are nondiagnostic. The ventricles are midline. There is ex vacuo dilatation of the trigone and temporal horn of the right lateral ventricle. No mass, mass effect or extra-axial fluid collection is identified. The basal cisterns and visualized vascular flow voids are   grossly patent. The pituitary is not well-visualized secondary to motion artifacts. The visualized orbits are grossly unremarkable. There is a mucous retention cyst within the right maxillary sinus. MRA HEAD:    The images are nondiagnostic for detailed evaluation. The right MCA appears smaller in caliber compared to the left with decreased size of the distal right MCA branches. The right PCA and SCA are not well-visualized. There is suggestion of a fetal origin   of the left PCA. MRA NECK:    There is an expected three-vessel branching pattern off the aortic arch. The origins of the great vessels off the arch are patent.     Right: There is no stenosis of

## 2019-07-15 NOTE — FLOWSHEET NOTE
[facility] THERAP[facility] MISSED TREATMENT NOTE  [facility] NEUROSCIENCES 4A  [id number]/[id number]      Date: 7/15/2019  Patient Name: Bhavesh Rowan        CSN: [id number]   : 1960  (62 y.o.)  Gender: male   Referring Practitioner: Jennifer Coulter DO   Diagnosis: Fall          REASON FOR MISSED TREATMENT: Patient Refused. Pt recently received pain meds, perseverating on being NPO and wanting to eat food. \"Can't they just give me an apple that I can suck on then spit out or some mashed potatoes? \" Pt wanting to talk with MD re: NPO status, refusing therapy session at this time despite encouragement. RN informed. Will check back as able.

## 2019-07-15 NOTE — PLAN OF CARE
Problem: Falls - Risk of:  Goal: Will remain free from falls  Description  Will remain free from falls  Outcome: Ongoing  Note:   Patient without falls this shift. Remains fall risk due to PRN pain medications, perry catheter, IV's and numerous monitoring devices in place. Problem: Falls - Risk of:  Goal: Absence of physical injury  Description  Absence of physical injury  Outcome: Ongoing  Note:   Patient remains free of falls and injuries this shift. Remains on fall precautions. Problem: Risk for Impaired Skin Integrity  Goal: Tissue integrity - skin and mucous membranes  Description  Structural intactness and normal physiological function of skin and  mucous membranes. Outcome: Ongoing  Note:   No new skin breakdown noted. Patient turns selfs side-to-side. Problem: Pain:  Goal: Pain level will decrease  Description  Pain level will decrease  Outcome: Ongoing  Note:   Patient started taking PO pain meds and remains satisfied with pain med results and interventions. Encouraged repositioning         Problem: Neurological  Goal: Maximum potential motor/sensory/cognitive function  Outcome: Ongoing     Problem: Cardiovascular  Goal: No DVT, peripheral vascular complications  Outcome: Ongoing  Note:   Was on heparin drip and bridged to [facility] [address] [facility]        Problem: Cardiovascular  Goal: Hemodynamic stability  Outcome: Ongoing     Problem: Respiratory  Goal: O2 Sat > 90%  Outcome: Ongoing  Note:   Patient requires 4LNC to maintain O2 sats greater than 90%. Weaning oxygen/FiO2 per protocol as patient tolerates.          Problem: Nutrition  Goal: Optimal nutrition therapy  7/15/2019 1723 by Joe Bourne RN  Outcome: Ongoing  Note:   On 2calHN at goal of 50ml/hr and tolerating well      Problem: Discharge Planning:  Goal: Discharged to appropriate level of care  Description  Discharged to appropriate level of care  Outcome: Ongoing     Problem: Impaired respiratory status  Goal: Clear

## 2019-07-16 ENCOUNTER — APPOINTMENT (OUTPATIENT)
Dept: GENERAL RADIOLOGY | Age: 59
DRG: 064 | End: 2019-07-16
Payer: MEDICARE

## 2019-07-16 LAB
ERYTHROCYTE [DISTWIDTH] IN BLOOD BY AUTOMATED COUNT: 14.1 % (ref 11.5–14.5)
ERYTHROCYTE [DISTWIDTH] IN BLOOD BY AUTOMATED COUNT: 47.3 FL (ref 35–45)
HCT VFR BLD CALC: 36.2 % (ref 42–52)
HEMOGLOBIN: 12.1 GM/DL (ref 14–18)
MCH RBC QN AUTO: 30.4 PG (ref 26–33)
MCHC RBC AUTO-ENTMCNC: 33.4 GM/DL (ref 32.2–35.5)
MCV RBC AUTO: 91 FL (ref 80–94)
PLATELET # BLD: 146 THOU/MM3 (ref 130–400)
PMV BLD AUTO: 10.9 FL (ref 9.4–12.4)
RBC # BLD: 3.98 MILL/MM3 (ref 4.7–6.1)
WBC # BLD: 10.3 THOU/MM3 (ref 4.8–10.8)

## 2019-07-16 PROCEDURE — 97116 GAIT TRAINING THERAPY: CPT

## 2019-07-16 PROCEDURE — 6370000000 HC RX 637 (ALT 250 FOR IP): Performed by: PHYSICIAN ASSISTANT

## 2019-07-16 PROCEDURE — 6370000000 HC RX 637 (ALT 250 FOR IP): Performed by: FAMILY MEDICINE

## 2019-07-16 PROCEDURE — 2700000000 HC OXYGEN THERAPY PER DAY

## 2019-07-16 PROCEDURE — 2060000000 HC ICU INTERMEDIATE R&B

## 2019-07-16 PROCEDURE — 2580000003 HC RX 258: Performed by: STUDENT IN AN ORGANIZED HEALTH CARE EDUCATION/TRAINING PROGRAM

## 2019-07-16 PROCEDURE — 6370000000 HC RX 637 (ALT 250 FOR IP): Performed by: STUDENT IN AN ORGANIZED HEALTH CARE EDUCATION/TRAINING PROGRAM

## 2019-07-16 PROCEDURE — 92526 ORAL FUNCTION THERAPY: CPT

## 2019-07-16 PROCEDURE — 92523 SPEECH SOUND LANG COMPREHEN: CPT

## 2019-07-16 PROCEDURE — 97530 THERAPEUTIC ACTIVITIES: CPT

## 2019-07-16 PROCEDURE — 99232 SBSQ HOSP IP/OBS MODERATE 35: CPT | Performed by: FAMILY MEDICINE

## 2019-07-16 PROCEDURE — 85027 COMPLETE CBC AUTOMATED: CPT

## 2019-07-16 PROCEDURE — 6370000000 HC RX 637 (ALT 250 FOR IP): Performed by: NURSE PRACTITIONER

## 2019-07-16 PROCEDURE — 97110 THERAPEUTIC EXERCISES: CPT

## 2019-07-16 PROCEDURE — 2709999900 HC NON-CHARGEABLE SUPPLY

## 2019-07-16 PROCEDURE — 97535 SELF CARE MNGMENT TRAINING: CPT

## 2019-07-16 PROCEDURE — 99232 SBSQ HOSP IP/OBS MODERATE 35: CPT | Performed by: PAIN MEDICINE

## 2019-07-16 PROCEDURE — 71045 X-RAY EXAM CHEST 1 VIEW: CPT

## 2019-07-16 PROCEDURE — 94640 AIRWAY INHALATION TREATMENT: CPT

## 2019-07-16 PROCEDURE — 6360000002 HC RX W HCPCS: Performed by: STUDENT IN AN ORGANIZED HEALTH CARE EDUCATION/TRAINING PROGRAM

## 2019-07-16 RX ORDER — GUAIFENESIN/DEXTROMETHORPHAN 100-10MG/5
5 SYRUP ORAL EVERY 4 HOURS PRN
Status: DISCONTINUED | OUTPATIENT
Start: 2019-07-16 | End: 2019-07-18 | Stop reason: HOSPADM

## 2019-07-16 RX ADMIN — SERTRALINE 200 MG: 100 TABLET, FILM COATED ORAL at 10:01

## 2019-07-16 RX ADMIN — MICONAZOLE NITRATE: 20 POWDER TOPICAL at 20:18

## 2019-07-16 RX ADMIN — SENNOSIDES 17.2 MG: 8.6 TABLET, FILM COATED ORAL at 20:17

## 2019-07-16 RX ADMIN — IPRATROPIUM BROMIDE AND ALBUTEROL SULFATE 1 AMPULE: .5; 3 SOLUTION RESPIRATORY (INHALATION) at 13:16

## 2019-07-16 RX ADMIN — HYDROXYZINE HYDROCHLORIDE 50 MG: 25 TABLET, FILM COATED ORAL at 20:17

## 2019-07-16 RX ADMIN — ATORVASTATIN CALCIUM 80 MG: 80 TABLET, FILM COATED ORAL at 20:17

## 2019-07-16 RX ADMIN — SODIUM CHLORIDE, PRESERVATIVE FREE 10 ML: 5 INJECTION INTRAVENOUS at 11:19

## 2019-07-16 RX ADMIN — RIVAROXABAN 20 MG: 20 TABLET, FILM COATED ORAL at 18:08

## 2019-07-16 RX ADMIN — CLONAZEPAM 1 MG: 1 TABLET ORAL at 11:33

## 2019-07-16 RX ADMIN — CLONAZEPAM 1 MG: 1 TABLET ORAL at 20:17

## 2019-07-16 RX ADMIN — FAMOTIDINE 20 MG: 20 TABLET ORAL at 20:18

## 2019-07-16 RX ADMIN — IPRATROPIUM BROMIDE AND ALBUTEROL SULFATE 1 AMPULE: .5; 3 SOLUTION RESPIRATORY (INHALATION) at 19:40

## 2019-07-16 RX ADMIN — MORPHINE SULFATE 10 MG: 10 SOLUTION ORAL at 20:18

## 2019-07-16 RX ADMIN — PIPERACILLIN SODIUM,TAZOBACTAM SODIUM 3.38 G: 3; .375 INJECTION, POWDER, FOR SOLUTION INTRAVENOUS at 03:00

## 2019-07-16 RX ADMIN — PIPERACILLIN SODIUM,TAZOBACTAM SODIUM 3.38 G: 3; .375 INJECTION, POWDER, FOR SOLUTION INTRAVENOUS at 18:07

## 2019-07-16 RX ADMIN — IPRATROPIUM BROMIDE AND ALBUTEROL SULFATE 1 AMPULE: .5; 3 SOLUTION RESPIRATORY (INHALATION) at 08:40

## 2019-07-16 RX ADMIN — MORPHINE SULFATE 10 MG: 10 SOLUTION ORAL at 05:39

## 2019-07-16 RX ADMIN — MICONAZOLE NITRATE: 20 POWDER TOPICAL at 10:01

## 2019-07-16 RX ADMIN — ASPIRIN 81 MG: 81 TABLET ORAL at 10:01

## 2019-07-16 RX ADMIN — GUAIFENESIN AND DEXTROMETHORPHAN 5 ML: 100; 10 SYRUP ORAL at 20:18

## 2019-07-16 RX ADMIN — MORPHINE SULFATE 10 MG: 10 SOLUTION ORAL at 11:33

## 2019-07-16 RX ADMIN — MORPHINE SULFATE 10 MG: 10 SOLUTION ORAL at 00:47

## 2019-07-16 RX ADMIN — FAMOTIDINE 20 MG: 20 TABLET ORAL at 10:01

## 2019-07-16 RX ADMIN — PIPERACILLIN SODIUM,TAZOBACTAM SODIUM 3.38 G: 3; .375 INJECTION, POWDER, FOR SOLUTION INTRAVENOUS at 11:26

## 2019-07-16 ASSESSMENT — PAIN SCALES - GENERAL
PAINLEVEL_OUTOF10: 8
PAINLEVEL_OUTOF10: 8
PAINLEVEL_OUTOF10: 5
PAINLEVEL_OUTOF10: 8
PAINLEVEL_OUTOF10: 8
PAINLEVEL_OUTOF10: 2

## 2019-07-16 ASSESSMENT — PAIN DESCRIPTION - PROGRESSION: CLINICAL_PROGRESSION: GRADUALLY IMPROVING

## 2019-07-16 ASSESSMENT — PAIN DESCRIPTION - ONSET
ONSET: ON-GOING
ONSET: ON-GOING

## 2019-07-16 ASSESSMENT — PAIN - FUNCTIONAL ASSESSMENT: PAIN_FUNCTIONAL_ASSESSMENT: PREVENTS OR INTERFERES SOME ACTIVE ACTIVITIES AND ADLS

## 2019-07-16 ASSESSMENT — PAIN DESCRIPTION - LOCATION
LOCATION: GENERALIZED
LOCATION: GENERALIZED

## 2019-07-16 ASSESSMENT — PAIN DESCRIPTION - FREQUENCY
FREQUENCY: CONTINUOUS
FREQUENCY: CONTINUOUS

## 2019-07-16 ASSESSMENT — PAIN DESCRIPTION - DESCRIPTORS
DESCRIPTORS: ACHING
DESCRIPTORS: ACHING

## 2019-07-16 ASSESSMENT — PAIN DESCRIPTION - PAIN TYPE
TYPE: CHRONIC PAIN
TYPE: CHRONIC PAIN

## 2019-07-16 NOTE — CONSULTS
Patient seen and examined for acute LEFT cerebellar stroke. Note to follow. Patient is a good candidate for rehab admission and is agreeable to do so. He understands that insurance will need to approve his stay. Thank you for the consult.     Joey Williamson MD

## 2019-07-16 NOTE — PROGRESS NOTES
can be crushed and given via PEG today.     7/15 PEG feeding started. Heparin drip stopped. May start xarelto per GI.     7/16: worsening wheeze today but patient reports feeling overall better today. Subjective: Patient states that his pain has improved today. Also states that his cough and SOB have improved compared to when he was admitted to [address] [place] [address]. Patient states that he just wants to eat and we discussed with patient the need to maintain NPO due to swallow studies. Medications:  Reviewed    Infusion Medications   Scheduled Medications    rivaroxaban  20 mg Oral Daily    lidocaine  3 patch Transdermal Daily    senna  2 tablet Oral Nightly    docusate sodium  100 mg Oral BID    potassium replacement protocol   Other RX Placeholder    sertraline  200 mg PEG Tube Daily    atorvastatin  80 mg PEG Tube Nightly    famotidine  20 mg Oral BID    piperacillin-tazobactam  3.375 g Intravenous Q8H    ipratropium-albuterol  1 ampule Inhalation TID    [Held by provider] naloxegol  25 mg Oral QAM    miconazole   Topical BID    nicotine  1 patch Transdermal Daily    sodium chloride flush  10 mL Intravenous 2 times per day    aspirin  81 mg Oral Daily    Or    aspirin  300 mg Rectal Daily     PRN Meds: guaiFENesin-dextromethorphan, morphine **OR** morphine, clonazePAM, hydrOXYzine, lactulose, ipratropium-albuterol, acetaminophen, labetalol, sodium chloride flush, magnesium hydroxide, ondansetron, acetaminophen      Intake/Output Summary (Last 24 hours) at 7/16/2019 1501  Last data filed at 7/16/2019 1137  Gross per 24 hour   Intake 750.25 ml   Output 675 ml   Net 75.25 ml       Diet:  DIET TUBE FEED CONTINUOUS/CYCLIC NPO; Fluid Restricted (Two Konrad HN);  Gastrostomy; Continuous; 20; 50    Exam:  /76   Pulse 82   Temp 98.2 °F (36.8 °C) (Oral)   Resp 16   Ht 6' (1.829 m)   Wt 174 lb 14.4 oz (79.3 kg)   SpO2 96%   BMI 23.72 kg/m²     Physical Exam   Constitutional: He is oriented to person, place, Correlate for point tenderness. **This report has been created using voice recognition software. It may contain minor errors which are inherent in voice recognition technology. **      Final report electronically signed by Dr. Peace Park on 7/11/2019 1:37 PM      CT THORACIC RECONSTRUCTION WO POST PROCESS   Final Result       1. Chronic changes with scarring/atelectasis in the right lung base. Old right rib fractures. 2. Patchy and nodular airspace opacity in the left lung base suggestive of infiltrate. Correlation with symptoms and follow-up is advised to document complete resolution. 3. Cortical irregularity of the lateral ninth rib concerning for fracture. Correlate for injury or point tenderness. **This report has been created using voice recognition software. It may contain minor errors which are inherent in voice recognition technology. **      Final report electronically signed by Dr. Peace Park on 7/11/2019 1:29 PM      XR SHOULDER LEFT (MIN 2 VIEWS)   Final Result   No acute fracture or dislocation. **This report has been created using voice recognition software. It may contain minor errors which are inherent in voice recognition technology. **      Final report electronically signed by Dr. Peace Park on 7/11/2019 12:24 PM      XR CHEST PORTABLE    (Results Pending)       Diet: DIET TUBE FEED CONTINUOUS/CYCLIC NPO; Fluid Restricted (Two Konrad HN);  Gastrostomy; Continuous; 20; 50    DVT prophylaxis: [] Lovenox                                 [] SCDs                                 [] SQ Heparin                                 [] Encourage ambulation           [x] Already on Anticoagulation     Disposition:    [] Home       [] TCU       [x] Rehab       [] Psych       [] SNF       [] [place]       [] Other-    Code Status: Full Code    Assessment/Plan:    Anticipated Discharge in : Pending Precert placement    Active Hospital Problems    Diagnosis Date Noted   

## 2019-07-16 NOTE — PROGRESS NOTES
Pain Management    Progress Note      7/16/2019 2:06 PM     · SUBJECTIVE:    · Chief Complaint: Generalized pain, neck pain, cancer related pain   · Patient seen in his room working with physical therapy and just got up to the chair. Reports that pain medications are very effective and states that morphine takes pain completely away from a 8/10 to 0/10.    · He has used 4 doses of prn morphine in the past 24 hours   · Appeared comfortable today   · Medications effective:  Yes, \"pain goes away\"  · Pain rating is 6/10   · Constipation: + BM 7/13/2019    Current medications:    rivaroxaban  20 mg Oral Daily    lidocaine  3 patch Transdermal Daily    senna  2 tablet Oral Nightly    docusate sodium  100 mg Oral BID    potassium replacement protocol   Other RX Placeholder    sertraline  200 mg PEG Tube Daily    atorvastatin  80 mg PEG Tube Nightly    famotidine  20 mg Oral BID    piperacillin-tazobactam  3.375 g Intravenous Q8H    ipratropium-albuterol  1 ampule Inhalation TID    [Held by provider] naloxegol  25 mg Oral QAM    miconazole   Topical BID    nicotine  1 patch Transdermal Daily    sodium chloride flush  10 mL Intravenous 2 times per day    aspirin  81 mg Oral Daily    Or    aspirin  300 mg Rectal Daily   ·   ·                                    guaiFENesin-dextromethorphan, morphine **OR** morphine, clonazePAM, hydrOXYzine, lactulose, ipratropium-albuterol, acetaminophen, labetalol, sodium chloride flush, magnesium hydroxide, ondansetron, acetaminophen  ·                                    @MEDSINFUSIONS        REVIEW OF SYSTEMS:  CONSTITUTIONAL:  positive for left side weakness  EYES:  negative  HEENT:  positive for  sore mouth, sore throat, hoarseness and voice change  RESPIRATORY:  positive for  dry cough and wheezing  CARDIOVASCULAR:  negative  GASTROINTESTINAL:  positive for constipation, Last BM 7/13/2019  GENITOURINARY:  negative  SKIN:  negative  HEMATOLOGIC/LYMPHATIC:

## 2019-07-16 NOTE — PROGRESS NOTES
and PT able to assess mobility, pt very impulsive and a lot of safety concerns, assist of 1 for mobility, c/o dizziness with nystagmus noted, per pt has had dizziness since first CVAs earlier this year, recommend cont PT  Activity Tolerance:  Patient tolerance of  treatment: good. Equipment Recommendations:Equipment Needed: Yes(continue to assess. Has 4WW, w/c, canes)  Discharge Recommendations:  Discharge Recommendations: Continue to assess pending progress, IP Rehab, Patient would benefit from continued therapy after discharge    Plan: Times per week: 5x/ wk- N  Specific instructions for Next Treatment: core and LE strengthening ex, bed mobility, sitting balance . PT to assess transfers and gait when medically approved  Current Treatment Recommendations: Strengthening, Endurance Training, Balance Training, Functional Mobility Training, Safety Education & Training, Equipment Evaluation, Education, & procurement    Patient Education  Patient Education: Plan of Care, slow down for safety     Goals:  Patient goals : Did not state  Short term goals  Time Frame for Short term goals: until discharge  Short term goal 1: Pt to go supine <->sit, +1 CGA, to get in/out of bed. Short term goal 2: Pt to sit edge of bed, SBA, reaching within base x10 reps, maintaining upright trunk, to access items on tray table. Short term goal 3: transfer with SBA   Short term goal 4: amb >10'x1 with/without AD and CGA to walk safely to bathroom  Long term goals  Time Frame for Long term goals : NA due to expected short LOS    Following session, patient left in safe position with all fall risk precautions in place.

## 2019-07-16 NOTE — CONSULTS
chemotherapy (cisplatin) for which he sees Dr. Owen Mcgowan. 22. Hypertension. 23. COPD. 24. Type 2 DM, controlled. Last HgbA1c 5.1 on 7/12/2019.  25. 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 26. Osteopenia. 27. Nicotine dependence; current every day smoker. 28. Marijuana use. 29. History of anxiety and depression. 30. Medication non-compliance. Recommendations:  Patient is a good candidate for IPR and is agreeable to do so. Continue PT/OT/SLP. Can transfer once medically stable. It was my pleasure to evaluate Mary Khan today. Please call with questions. Dolores Huang MD       History:   History of Present Illness:  Mary Khan is a 62 y.o. male who  has a past medical history of Anxiety, Bleeds easily ([address] Bruises easily, Cerebrovascular accident (CVA) due to embolism of right posterior cerebral artery ([address] Cigarette nicotine dependence without complication, COPD (chronic obstructive pulmonary disease) ([address] DVT (deep venous thrombosis) ([address] Enlarged lymph node, LAURA (generalized anxiety disorder), History of sleep apnea, Hypertension, Moderate episode of recurrent major depressive disorder ([address] NSTEMI (non-ST elevated myocardial infarction) ([address] Pulmonary embolism and infarction ([address] S/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 He was admitted 7/11/2019 for complaint of injuries after a ground-level fall at home. The patient was reaching over his couch when he lost his balance and fell onto his left side causing him to strike his head without any endorsed loss of consciousness. There was a small superficial contusion of the left frontal region.   On initial work-up he was found to have a left ninth impulsive    Fall Risk    Current Rehabilitation Assessments:  PT:    ROM RLE: ankle df to neutral.  Others WFL  ROM LLE: WFL    Strength RLE: Exception  Comment: df 2-, knee ext 3+, hip flexion 3+, abduction tolerate min resistance  Strength LLE: Exception  Comment: hip flexion 3+, ABD tolerated min resistance, knee ext 3+, ankle WFL    OT:  ADL  LE Dressing: Maximum assistance     Bed mobility  Supine to Sit: Minimal assistance  Sit to Supine: Minimal assistance    Transfers  Sit Pivot Transfers: Minimal assistance(toward HOB )  Sit to stand: (Pt asked not to complete due to feeling dizzy. Recommend 2 assist for safety due to cerebellum CVA )    Balance  Sitting Balance: Contact guard assistance(kyphotic posture )        ST:  IMPRESSIONS: Pt presents with a mild-moderate cognitive deficit given impaired immediate/delayed recall, working memory, problem solving, verbal reasoning, math computation, executive function, thought organization, and mental flexibility. Significantly diminished sustained attention with overall poor focus in assessment, requiring MAX re-direction cues for progression of evaluation. Min deficits detected related to receptive language for answering complex yes/no and open-ended questions, however, suspect likely due to attention deficits presenting. No expressive language deficits at this time. Despite adequate speech intelligibility at date in connected speech, will need to monitor overall production measures given concerns for dysarthria. HIGHLY recommend skilled ST services targeting the above domains to obtain optimal level of cognitive functioning to permit safe participation in ADLs and IADLs    Impresssions: Patient presents with moderate-severe oropharyngeal dysphagia evidenced by evaluation results. Patient with decreased alertness and increased fatigue during evaluation impacting patient performance this date. Patient completed limited PO trials of ice chips and thin via spoon. nourished, [email or web address] and PEG    Memory:   grossly normal  Attention/Concentration: abnormal - distractible  Language:  normal    Cranial Nerves:  cranial nerves II-XII are grossly intact with exception of LEFT hemianopsia   ROM:  abnormal - limited RIGHT knee extension - lacking 20deg  Motor Exam:  Motor exam is 4 out of 5 all extremities with the exception of RIGHT ADF 1/5    Tone:  normal  Muscle bulk: within normal limits  Sensory:  Sensory intact  Coordination:   Decreased LEFT SUDARSHAN, FFM, FTN, and HTS  Deep Tendon Reflexes:  Reflexes are intact and symmetrical bilaterally     Skin: warm and dry, no rash or erythema  Peripheral vascular: Pulses: Normal upper and lower extremity pulses; Edema: trace    Relevant Studies:     Diagnostics:  Recent Results (from the past 24 hour(s))   Platelet count    Collection Time: 07/18/19 10:15 AM   Result Value Ref Range    Platelets 161 660 - 661 thou/mm3     Lab Results   Component Value Date    LABA1C 5.1 07/12/2019     No results found for: EAG  Recent Labs     07/18/19  1015 07/17/19  0352 07/17/19  0345 07/16/19  0400 07/15/19  0600 07/14/19  0330   WBC  --   --   --  10.3 12.7* 14.5*   HGB  --  11.1*  --  12.1* 12.3* 12.7*   HCT  --  34.0*  --  36.2* 37.3* 37.6*   MCV  --   --   --  91.0 91.9 90.4     --  127* 146 145 153     Imaging  Ct Head Wo Contrast    Result Date: 7/11/2019  PROCEDURE: CT HEAD WO CONTRAST CLINICAL INFORMATION: Trauma . COMPARISON: 5/31/2019 TECHNIQUE: 2-D multiplanar noncontrast images of the brain All CT scans at this facility use dose modulation, iterative reconstruction, and/or weight-based dosing when appropriate to reduce radiation dose to as low as reasonably achievable. FINDINGS: There is a large area of diminished attenuation involving the left cerebellar bowel. This is in the inferior portion of the cerebellum extending the mesial portion.  This measures up to 4.3 x 3.0 cm There is slight mass effect and effacement of the left basilar using voice recognition software. It may contain minor errors which are inherent in voice recognition technology. ** Final report electronically signed by Dr. Daniel Schmidt on 7/11/2019 1:29 PM    Ct Abdomen Pelvis W Iv Contrast Additional Contrast? None    Result Date: 7/11/2019  PROCEDURE: CT ABDOMEN PELVIS W IV CONTRAST, CT LUMBAR RECONSTRUCTION WO POST PROCESS CLINICAL INFORMATION: trauma . COMPARISON: None. TECHNIQUE: 5 mm axial CT images were obtained through the abdomen and pelvis after the administration of intravenous and oral contrast. Coronal and sagittal reconstructions were obtained. Dedicated lumbar spine CT was also performed with axial, coronal and sagittal reconstructions for better assessment of the osseous structures. All CT scans at this facility use dose modulation, iterative reconstruction, and/or weight-based dosing when appropriate to reduce radiation dose to as low as reasonably achievable. FINDINGS: Abdomen and pelvis: Old right rib fractures with bony remodeling and likely chronic changes in the right lung base. Patchy airspace opacity in the left lung base concerning for atelectasis, infiltrate or chronic change. Partially imaged probable left ninth lateral rib fracture. Abdominal aorta is normal caliber with atherosclerotic changes. Spleen, pancreas liver adrenal glands are unremarkable. Bilateral renal cortical thinning subcentimeter probable cysts suggesting medical renal disease. Gallbladder is not seen. Infrarenal IVC filter. There is moderate stool throughout the colon correlate for constipation. Distended bladder correlate with volume status. There is soft tissue anasarca. Few coarse calcifications in the prostate gland. Prostate gland measures 4.7 cm in transverse diameter. Lumbar spine. Mild lumbar scoliosis. Degenerative changes in the lumbar spine and pelvis. Nonspecific sclerotic lesion in the left iliac bone measuring 6 mm.  There is facet hypertrophy and mild neural foraminal ..:     No significant abnormality seen. Excellent pulsatility throughout both legs. **This report has been created using voice recognition software. It may contain minor errors which are inherent in voice recognition technology. ** Final report electronically signed by Dr. Lu Monge on 7/12/2019 1:45 PM    Mra Head W Contrast    Result Date: 7/12/2019  PROCEDURE: MRI BRAIN WO CONTRAST, MRA NECK W WO CONTRAST, MRA HEAD W CONTRAST CLINICAL INFORMATION: STROKE. Additional history obtained from the electronic medical record indicates the patient has had frequent falls and dizziness for 2 days. COMPARISON: MRI of the brain dated April 17, 2019. TECHNIQUE: Multiplanar and multiple spin echo T1 and T2-weighted images were obtained through the brain. Note is made that postcontrast images through the brain could not be obtained secondary to patient motion. Coronal contrast-enhanced MRA images were obtained through the vessels of the neck. Multiplanar reconstructions were obtained. These include MIP images. 2-D time-of-flight images with associated reconstructions were also obtained. Contrast-enhanced 3-D time-of-flight images were obtained through  the brain. Multiplanar reconstructions were obtained. Contrast-enhanced images were obtained after administration of 15 mL ProHance intravenous contrast. FINDINGS: The images are degraded by motion artifacts which limits detailed evaluation. There is prominence of the sulcal spaces and ventricular system secondary to generalized, age-related parenchymal volume loss. Encephalomalacia is noted involving the inferior medial right occipital lobe from a prior infarct. Patchy areas of hyperintense  T2 signal are noted throughout the deep cerebral white matter and juan alberto which likely correspond to mild to moderate sequela of chronic microvascular ischemic angiopathy.  A large area of restricted diffusion is noted involving the inferior medial aspect of the left cerebellar hemisphere as is unremarkable. Thoracic aorta is normal caliber with atherosclerotic changes. Few calcified hilar and mediastinal lymph nodes. No cardiomegaly. No pericardial effusion. No central pulmonary arterial embolism. There is right posterior lateral pleural thickening related to old rib fracture and bony remodeling. More rounded area of chronic atelectasis or scarring. Patchy and nodular airspace opacity in the left lung base suggestive of infiltrate. Correlation with symptoms and follow-up is advised to document complete resolution. Thoracic spine degenerative changes with prominent anterior osteophytes. There is no severe spinal stenosis or acute fracture. Alignment is maintained. Disc spaces are somewhat narrowed. Anterior bridging osteophytes are present. Sternum is intact. There is an cortical irregularity of the ninth lateral rib concerning for fracture. Thoracic scoliosis. Partially imaged IVC filter. 1. Chronic changes with scarring/atelectasis in the right lung base. Old right rib fractures. 2. Patchy and nodular airspace opacity in the left lung base suggestive of infiltrate. Correlation with symptoms and follow-up is advised to document complete resolution. 3. Cortical irregularity of the lateral ninth rib concerning for fracture. Correlate for injury or point tenderness. **This report has been created using voice recognition software. It may contain minor errors which are inherent in voice recognition technology. ** Final report electronically signed by Dr. Treva Moritz on 7/11/2019 1:29 PM    Mra Neck With And Without Contrast    Result Date: 7/12/2019  PROCEDURE: MRI BRAIN WO CONTRAST, MRA NECK W WO CONTRAST, MRA HEAD W CONTRAST CLINICAL INFORMATION: STROKE. Additional history obtained from the electronic medical record indicates the patient has had frequent falls and dizziness for 2 days. COMPARISON: MRI of the brain dated April 17, 2019.  TECHNIQUE: Multiplanar and multiple spin echo T1 and T2-weighted images were obtained through the brain. Note is made that postcontrast images through the brain could not be obtained secondary to patient motion. Coronal contrast-enhanced MRA images were obtained through the vessels of the neck. Multiplanar reconstructions were obtained. These include MIP images. 2-D time-of-flight images with associated reconstructions were also obtained. Contrast-enhanced 3-D time-of-flight images were obtained through  the brain. Multiplanar reconstructions were obtained. Contrast-enhanced images were obtained after administration of 15 mL ProHance intravenous contrast. FINDINGS: The images are degraded by motion artifacts which limits detailed evaluation. There is prominence of the sulcal spaces and ventricular system secondary to generalized, age-related parenchymal volume loss. Encephalomalacia is noted involving the inferior medial right occipital lobe from a prior infarct. Patchy areas of hyperintense  T2 signal are noted throughout the deep cerebral white matter and juan alberto which likely correspond to mild to moderate sequela of chronic microvascular ischemic angiopathy. A large area of restricted diffusion is noted involving the inferior medial aspect of the left cerebellar hemisphere as well as the left side of the medulla. The gradient echo images are nondiagnostic. The ventricles are midline. There is ex vacuo dilatation of the trigone and temporal horn of the right lateral ventricle. No mass, mass effect or extra-axial fluid collection is identified. The basal cisterns and visualized vascular flow voids are grossly patent. The pituitary is not well-visualized secondary to motion artifacts. The visualized orbits are grossly unremarkable. There is a mucous retention cyst within the right maxillary sinus. MRA HEAD: The images are nondiagnostic for detailed evaluation. The right MCA appears smaller in caliber compared to the left with decreased size of the distal right MCA branches.  The

## 2019-07-16 NOTE — PROGRESS NOTES
thought organization tasks with 70% accuracy given mod cues to improve independence with IADL completion. Goal 5: Pt will complete sustained attention tasks with no more than 3 re-directions within 3-5 minute time span to improve participation within therapies and ADLs.       LONG TERM GOALS:  No LTG established given short ZEEOS      Wilbur Rosales M.A., [facility]

## 2019-07-16 NOTE — PROGRESS NOTES
insight & safety awareness, low frustration tolerance throughout session    PAIN: 0/10: no c/o pain during session    COGNITION: Decreased Following Commands, Decreased Attention Span, Decreased Memory, Decreased Judgment, Decreased Problem Solving, Decreased Insight and Decreased Sequencing    ADL:   Feeding: Dependent. NPO. Grooming: min A for completeness of combing hair  LE dressing: max A; donned L sock with mod A for sitting balance EOB & dependent for donning R sock    BALANCE:  Sitting Balance:  Contact Guard Assistance for static balance. Mod A for dynamic sitting balance. Poor midline orientation & ataxia with LUE  Standing Balance: Moderate Assistance    BED MOBILITY:  Supine to Sit: Minimal Assistance unsafe technique    TRANSFERS:  Sit to Stand: Moderate Assistance. from EOB    FUNCTIONAL MOBILITY:  Assistive Device: Rolling Walker  Assist Level:  Maximum Assistance. SBA of another d/t impulsivity of Pt  Distance: 2 steps to recliner; ataxia noted with RLE, wide ADAMS, difficulty with stepping forward. Pt got frustrated & pushed RW out of the way, lounging for chair. very unsafe, frustrated with therapist's cues for technique      **Pt not safe to use RW for mobility on this date, recommend stand pivot  ADDITIONAL ACTIVITIES:  Completed BUE AROM for shoulder flexion, horizontal add/abduction (encouraged Pt to work toward clapping hands together for [facility] as well as working on opposition). Educate on using vision to compensate for proprioceptive deficits. ASSESSMENT:  Activity Tolerance:  Patient tolerance of  treatment: fair. Discharge Recommendations: IP Rehab  Equipment Recommendations: Equipment Needed: Yes  Mobility Devices: Alfonse Vasquez, ADL Assistive Devices  ADL Assistive Devices:  Shower Chair with back  Plan: Times per week: 6x  Current Treatment Recommendations: Strengthening, Positioning, Safety Education & Training, Balance Training, Patient/Caregiver Education & Training, Self-Care /

## 2019-07-17 ENCOUNTER — APPOINTMENT (OUTPATIENT)
Dept: GENERAL RADIOLOGY | Age: 59
DRG: 064 | End: 2019-07-17
Payer: MEDICARE

## 2019-07-17 LAB
BLOOD CULTURE, ROUTINE: NORMAL
BLOOD CULTURE, ROUTINE: NORMAL
GRAM STAIN RESULT: NORMAL
HCT VFR BLD CALC: 34 % (ref 42–52)
HEMOGLOBIN: 11.1 GM/DL (ref 14–18)
PLATELET # BLD: 127 THOU/MM3 (ref 130–400)
RESPIRATORY CULTURE: NORMAL

## 2019-07-17 PROCEDURE — 6370000000 HC RX 637 (ALT 250 FOR IP): Performed by: NURSE PRACTITIONER

## 2019-07-17 PROCEDURE — 85014 HEMATOCRIT: CPT

## 2019-07-17 PROCEDURE — 94640 AIRWAY INHALATION TREATMENT: CPT

## 2019-07-17 PROCEDURE — 94760 N-INVAS EAR/PLS OXIMETRY 1: CPT

## 2019-07-17 PROCEDURE — 97110 THERAPEUTIC EXERCISES: CPT

## 2019-07-17 PROCEDURE — 94669 MECHANICAL CHEST WALL OSCILL: CPT

## 2019-07-17 PROCEDURE — 85018 HEMOGLOBIN: CPT

## 2019-07-17 PROCEDURE — 6370000000 HC RX 637 (ALT 250 FOR IP): Performed by: FAMILY MEDICINE

## 2019-07-17 PROCEDURE — 6370000000 HC RX 637 (ALT 250 FOR IP): Performed by: PHYSICIAN ASSISTANT

## 2019-07-17 PROCEDURE — 6370000000 HC RX 637 (ALT 250 FOR IP): Performed by: STUDENT IN AN ORGANIZED HEALTH CARE EDUCATION/TRAINING PROGRAM

## 2019-07-17 PROCEDURE — 85049 AUTOMATED PLATELET COUNT: CPT

## 2019-07-17 PROCEDURE — 6360000002 HC RX W HCPCS: Performed by: STUDENT IN AN ORGANIZED HEALTH CARE EDUCATION/TRAINING PROGRAM

## 2019-07-17 PROCEDURE — 99232 SBSQ HOSP IP/OBS MODERATE 35: CPT | Performed by: FAMILY MEDICINE

## 2019-07-17 PROCEDURE — 2709999900 HC NON-CHARGEABLE SUPPLY

## 2019-07-17 PROCEDURE — 2580000003 HC RX 258: Performed by: STUDENT IN AN ORGANIZED HEALTH CARE EDUCATION/TRAINING PROGRAM

## 2019-07-17 PROCEDURE — 71045 X-RAY EXAM CHEST 1 VIEW: CPT

## 2019-07-17 PROCEDURE — 2060000000 HC ICU INTERMEDIATE R&B

## 2019-07-17 PROCEDURE — 2700000000 HC OXYGEN THERAPY PER DAY

## 2019-07-17 PROCEDURE — 97530 THERAPEUTIC ACTIVITIES: CPT

## 2019-07-17 RX ADMIN — FAMOTIDINE 20 MG: 20 TABLET ORAL at 09:38

## 2019-07-17 RX ADMIN — MORPHINE SULFATE: 10 SOLUTION ORAL at 09:39

## 2019-07-17 RX ADMIN — IPRATROPIUM BROMIDE AND ALBUTEROL SULFATE 1 AMPULE: .5; 3 SOLUTION RESPIRATORY (INHALATION) at 18:01

## 2019-07-17 RX ADMIN — PIPERACILLIN SODIUM,TAZOBACTAM SODIUM 3.38 G: 3; .375 INJECTION, POWDER, FOR SOLUTION INTRAVENOUS at 11:09

## 2019-07-17 RX ADMIN — FAMOTIDINE 20 MG: 20 TABLET ORAL at 20:32

## 2019-07-17 RX ADMIN — PIPERACILLIN SODIUM,TAZOBACTAM SODIUM 3.38 G: 3; .375 INJECTION, POWDER, FOR SOLUTION INTRAVENOUS at 03:49

## 2019-07-17 RX ADMIN — DOCUSATE SODIUM 100 MG: 50 LIQUID ORAL at 09:43

## 2019-07-17 RX ADMIN — IPRATROPIUM BROMIDE AND ALBUTEROL SULFATE 1 AMPULE: .5; 3 SOLUTION RESPIRATORY (INHALATION) at 08:13

## 2019-07-17 RX ADMIN — ASPIRIN 81 MG: 81 TABLET ORAL at 09:41

## 2019-07-17 RX ADMIN — MORPHINE SULFATE 10 MG: 10 SOLUTION ORAL at 03:50

## 2019-07-17 RX ADMIN — MORPHINE SULFATE 10 MG: 10 SOLUTION ORAL at 20:30

## 2019-07-17 RX ADMIN — RIVAROXABAN 20 MG: 20 TABLET, FILM COATED ORAL at 18:34

## 2019-07-17 RX ADMIN — SERTRALINE 200 MG: 100 TABLET, FILM COATED ORAL at 09:45

## 2019-07-17 RX ADMIN — MORPHINE SULFATE 5 MG: 10 SOLUTION ORAL at 14:52

## 2019-07-17 RX ADMIN — PIPERACILLIN SODIUM,TAZOBACTAM SODIUM 3.38 G: 3; .375 INJECTION, POWDER, FOR SOLUTION INTRAVENOUS at 18:34

## 2019-07-17 RX ADMIN — SENNOSIDES 17.2 MG: 8.6 TABLET, FILM COATED ORAL at 20:30

## 2019-07-17 RX ADMIN — MICONAZOLE NITRATE: 20 POWDER TOPICAL at 09:42

## 2019-07-17 RX ADMIN — MICONAZOLE NITRATE: 20 POWDER TOPICAL at 20:30

## 2019-07-17 RX ADMIN — ATORVASTATIN CALCIUM 80 MG: 80 TABLET, FILM COATED ORAL at 20:30

## 2019-07-17 RX ADMIN — IPRATROPIUM BROMIDE AND ALBUTEROL SULFATE 1 AMPULE: .5; 3 SOLUTION RESPIRATORY (INHALATION) at 14:06

## 2019-07-17 ASSESSMENT — PAIN DESCRIPTION - PROGRESSION: CLINICAL_PROGRESSION: GRADUALLY WORSENING

## 2019-07-17 ASSESSMENT — PAIN DESCRIPTION - LOCATION: LOCATION: GENERALIZED

## 2019-07-17 ASSESSMENT — PAIN DESCRIPTION - ONSET: ONSET: ON-GOING

## 2019-07-17 ASSESSMENT — PAIN SCALES - GENERAL
PAINLEVEL_OUTOF10: 8
PAINLEVEL_OUTOF10: 7
PAINLEVEL_OUTOF10: 7
PAINLEVEL_OUTOF10: 3

## 2019-07-17 ASSESSMENT — PAIN DESCRIPTION - PAIN TYPE: TYPE: CHRONIC PAIN

## 2019-07-17 ASSESSMENT — PAIN DESCRIPTION - DESCRIPTORS: DESCRIPTORS: ACHING;DISCOMFORT

## 2019-07-17 ASSESSMENT — PAIN - FUNCTIONAL ASSESSMENT: PAIN_FUNCTIONAL_ASSESSMENT: PREVENTS OR INTERFERES SOME ACTIVE ACTIVITIES AND ADLS

## 2019-07-17 ASSESSMENT — PAIN DESCRIPTION - ORIENTATION: ORIENTATION: OTHER (COMMENT)

## 2019-07-17 ASSESSMENT — PAIN DESCRIPTION - FREQUENCY: FREQUENCY: CONTINUOUS

## 2019-07-17 NOTE — PROGRESS NOTES
[facility]  INPATIENT PHYSICAL THERAPY  DAILY NOTE  [facility] 4A - 4A-07/007-A    Time In:   Time Out:   Timed Code Treatment Minutes: 25 Minutes  Minutes: 25          Date: 2019  Patient Name: Bertha Colon,  Gender:  male        MRN: [id number]  : 1960  (62 y.o.)     Referring Practitioner: BILL Lara MD  Diagnosis: Fall  Treatment Diagnosis: fall  Additional Pertinent Hx: Pt admitted through ED due to weakness and pain s/p fall at home. Hx:  CA, CVA. x-ray Lt 9th rib fx. MRI:  remote infarct, restricted diffusion inferior medial aspect LT cerebellar hemisphere and medula     Prior Level of Function:  Lives With: Alone(in duplex attached to sister in [address] [place] [address] )  Type of Home: House  Home Layout: One level  Home Access: Stairs to enter without rails  Entrance Stairs - Number of Steps: 1  Home Equipment: [employer], 4 wheeled walker, Cane, Quad cane, Hospital bed    Restrictions/Precautions:  Restrictions/Precautions: General Precautions, Fall Risk  Position Activity Restriction  Other position/activity restrictions: PEG , Oxygen, impulsive    SUBJECTIVE: RN approved session. Patient laying in bed upon arrival and agreeable to therapy. PAIN: 10/10: \"all over\" pain, nursing aware    OBJECTIVE:  Bed Mobility:  Supine to Sit: Contact Guard Assistance, poor trunk control  Scooting: Contact Guard Assistance, to edge of bed    Transfers:  Sit to Stand: Moderate Assistance, stood x2 trials, first trial to RW, cues for safety and sequencing, very unsteady, impulsive to sit on bed, second trial patient requested to reach for chair and pivot around, unsteady, difficulty advancing R LE, forward lean, cues for hand placement  Stand to [facility] 68, Minimal Assistance    Ambulation:  See tranfer section.  Patient unsafe, requires frequent cues for sequencing, difficulty advancing RLE, pivoted to chair    Exercise:  Patient was guided in 1 set(s) 10

## 2019-07-17 NOTE — PROGRESS NOTES
structures. Neurology consulted, advised to change eliquis to xarelto.     7/13 Evaluated by speech therapy, unable to start diet due to dysphagia, needing PEG tube. GI consulted for placement.     7/14 PEG tube placed by Dr. Tate Rowe. PO meds can be crushed and given via PEG today.     7/15 PEG feeding started. Heparin drip stopped. May start xarelto per GI.      7/16: worsening wheeze today but patient reports feeling overall better today. Zosyn continued. Subjective:     Patient seen and examined. Pt states that he's a little short of breath, still on 4L O2 via NC. He states that he still coughing. Had chills last night. Denies fever, new onset focal weakness or numbness. Medications:  Reviewed    Infusion Medications   Scheduled Medications    docusate  100 mg Oral BID    rivaroxaban  20 mg Oral Daily    lidocaine  3 patch Transdermal Daily    senna  2 tablet Oral Nightly    potassium replacement protocol   Other RX Placeholder    sertraline  200 mg PEG Tube Daily    atorvastatin  80 mg PEG Tube Nightly    famotidine  20 mg Oral BID    piperacillin-tazobactam  3.375 g Intravenous Q8H    ipratropium-albuterol  1 ampule Inhalation TID    [Held by provider] naloxegol  25 mg Oral QAM    miconazole   Topical BID    nicotine  1 patch Transdermal Daily    sodium chloride flush  10 mL Intravenous 2 times per day    aspirin  81 mg Oral Daily    Or    aspirin  300 mg Rectal Daily     PRN Meds: guaiFENesin-dextromethorphan, morphine **OR** morphine, clonazePAM, hydrOXYzine, lactulose, ipratropium-albuterol, acetaminophen, labetalol, sodium chloride flush, magnesium hydroxide, ondansetron, acetaminophen      Intake/Output Summary (Last 24 hours) at 7/17/2019 0957  Last data filed at 7/17/2019 0350  Gross per 24 hour   Intake 1400.52 ml   Output 275 ml   Net 1125.52 ml       Diet:  DIET TUBE FEED CONTINUOUS/CYCLIC NPO; Fluid Restricted (Two Konrad HN);  Gastrostomy; Continuous; 20; 50    Exam:  /66

## 2019-07-17 NOTE — PROGRESS NOTES
[facility] [address]  Occupational Therapy  Daily Note  Time:   Time In: 4493  Time Out: 09  Timed Code Treatment Minutes: 25 Minutes  Minutes: 25          Date: 2019  Patient Name: Wolf Sierra,   Gender: male      Room: [facility]-A  MRN: [id number]  : 1960  (62 y.o.)  Referring Practitioner: Edwin Carroll DO   Diagnosis: Fall   Additional Pertinent Hx: 62 y.o. male who presented to [address] [place] [address] with a 2 day history of falls and dizziness. Patient has a significant medical hx of prior CVA, HTN, COPD, chronic DVT and PE, squamous cell carcinoma of tonsil, DM2, and anxiety. Patient is on Eliquis 5mg BID for DVT/PE. Patient states that he had fallen last night and this morning due to dizziness. He states that he just feels like he has no equilibrium. Patient states that he fell forward and landed on his left shoulder hitting his head but not losing consciousness. Patient states that he has a lot of chronic pain from a prior stroke back in April but that he is having increased pain currently on his left side ribs. He also states he has chronic headaches but that today it is worse. Patient rates his pain at an 8/10. Nothing is making his pain any better. Patient states that he has chronic left sided weakness and numbness from his prior CVA. Patient presented to ED on 19 with fatigue and headaches and was discharged home. Patient states that he is currently dizzy with the room spinning. Patient also admits to SOB that he states he has had since his prior CVA and states that it is slightly worse today. CT head showed a left cerebellum stroke with mild edema. PEG placed . Restrictions/Precautions:  Restrictions/Precautions: General Precautions, Fall Risk  Position Activity Restriction  Other position/activity restrictions: PEG , Oxygen, impulsive    SUBJECTIVE: pt sitting up in recliner when arrived.  Pt stated he wants to go back to bed and angry words at times throughout session. \" I hate it here, I will walk out myself, I want some food. \"     PAIN:0/10: pt didn't verbalize any pain during therapy session     COGNITION: Decreased Following Commands, Decreased Judgment, Decreased Problem Solving and Decreased Insight    ADL:   Grooming: Minimal Assistance. sitting in chair to wash face and hands with set up. Pt very impulsive. .  Toileting:  Set up, pt given urinal to use sitting in recliner     BALANCE:  Sitting Balance:  Minimal Assistance sitting edge of chair for 5 min with cues for safety   Standing Balance: Moderate Assistance, with cues for safety, with verbal cues     BED MOBILITY:  Sit to Supine: Minimal Assistance verbal cues for slowing down and take time for safety. Pt very impulsive     TRANSFERS:  Sit to Stand: Moderate Assistance, with verbal cues. from recliner with max verbal cues for safety. Stand to Sit: Moderate Assistance, with verbal cues. to recliner , EOB   Stand pivot: Moderate Assistance with verbal cues to EOB from recliner. Pt very impulsive. Encouraged to slow down for safety but has poor carryover       ADDITIONAL ACTIVITIES:  Using LUE for reaching for items in room while seated in recliner. Pt is left handed he stated. Pt is using LUE for ADLS of holding urinal and opening depends. AROM complete in all joints and planes with LUE. ASSESSMENT:  Activity Tolerance:  Patient tolerance of  treatment: fair. Pt stated he was tired and wanted to go back to bed. Discharge Recommendations: IP Rehab  Equipment Recommendations: Equipment Needed: Yes  Mobility Devices: Marilygraciee Miguel Angel, ADL Assistive Devices  ADL Assistive Devices:  Shower Chair with back  Plan: Times per week: 6x  Current Treatment Recommendations: Strengthening, Positioning, Safety Education & Training, Balance Training, Patient/Caregiver Education & Training, Self-Care / ADL, Equipment Evaluation, Education, & procurement, Neuromuscular Re-education, Cognitive Reorientation    Patient Education  Patient Education: Importance of Increasing Activity, slow down and safety     Goals  Short term goals  Time Frame for Short term goals: By discharge   Short term goal 1: Pt will complete LUE neuro re-edu/ [address] tasks with no more than min difficulty to improve indep to thread clothing. Short term goal 2: Pt will tolerate sitting EOB in midline >10 minutes during 1 and 2 UE release tasks with SBA to improve indep with bathing/dressing tasks EOB. Short term goal 3: Pt will complete stand pivot transfers with moderate assistance X1 and moderate cuing for safety/technique to improve indep with BSC transfers  Short term goal 4: Pt will tolerate static standing X1 minute with moderate assistance X1 and minimal cues for midline orientation in prep for clothing management tasks. Short term goal 5: Pt will tolerate further assessment of functional mobility by OTR when appropriate. Long term goals  Time Frame for Long term goals : No LTG due to short ELOS     Following session, patient left in safe position with all fall risk precautions in place.

## 2019-07-18 ENCOUNTER — HOSPITAL ENCOUNTER (INPATIENT)
Age: 59
LOS: 27 days | Discharge: HOME HEALTH | DRG: 056 | End: 2019-08-14
Attending: PHYSICAL MEDICINE & REHABILITATION | Admitting: PHYSICAL MEDICINE & REHABILITATION
Payer: MEDICARE

## 2019-07-18 VITALS
RESPIRATION RATE: 16 BRPM | OXYGEN SATURATION: 90 % | DIASTOLIC BLOOD PRESSURE: 70 MMHG | SYSTOLIC BLOOD PRESSURE: 145 MMHG | WEIGHT: 174.9 LBS | HEART RATE: 76 BPM | TEMPERATURE: 98.1 F | HEIGHT: 72 IN | BODY MASS INDEX: 23.69 KG/M2

## 2019-07-18 DIAGNOSIS — C09.9 MALIGNANT NEOPLASM OF TONSIL (HCC): ICD-10-CM

## 2019-07-18 DIAGNOSIS — R13.12 OROPHARYNGEAL DYSPHAGIA: Primary | ICD-10-CM

## 2019-07-18 DIAGNOSIS — I63.9 ACUTE CVA (CEREBROVASCULAR ACCIDENT) (HCC): ICD-10-CM

## 2019-07-18 DIAGNOSIS — R22.1 MASS IN NECK: ICD-10-CM

## 2019-07-18 DIAGNOSIS — G89.4 CHRONIC PAIN SYNDROME: ICD-10-CM

## 2019-07-18 DIAGNOSIS — G89.3 CANCER ASSOCIATED PAIN: ICD-10-CM

## 2019-07-18 DIAGNOSIS — I63.9 CEREBELLAR INFARCT (HCC): ICD-10-CM

## 2019-07-18 DIAGNOSIS — F41.1 GENERALIZED ANXIETY DISORDER: ICD-10-CM

## 2019-07-18 DIAGNOSIS — T17.908D ASPIRATION INTO AIRWAY, SUBSEQUENT ENCOUNTER: ICD-10-CM

## 2019-07-18 DIAGNOSIS — F33.1 MODERATE EPISODE OF RECURRENT MAJOR DEPRESSIVE DISORDER (HCC): ICD-10-CM

## 2019-07-18 LAB — PLATELET # BLD: 147 THOU/MM3 (ref 130–400)

## 2019-07-18 PROCEDURE — 2500000003 HC RX 250 WO HCPCS: Performed by: STUDENT IN AN ORGANIZED HEALTH CARE EDUCATION/TRAINING PROGRAM

## 2019-07-18 PROCEDURE — 6370000000 HC RX 637 (ALT 250 FOR IP): Performed by: NURSE PRACTITIONER

## 2019-07-18 PROCEDURE — 6370000000 HC RX 637 (ALT 250 FOR IP): Performed by: FAMILY MEDICINE

## 2019-07-18 PROCEDURE — 2580000003 HC RX 258: Performed by: STUDENT IN AN ORGANIZED HEALTH CARE EDUCATION/TRAINING PROGRAM

## 2019-07-18 PROCEDURE — 6370000000 HC RX 637 (ALT 250 FOR IP): Performed by: PHYSICAL MEDICINE & REHABILITATION

## 2019-07-18 PROCEDURE — 85049 AUTOMATED PLATELET COUNT: CPT

## 2019-07-18 PROCEDURE — 99239 HOSP IP/OBS DSCHRG MGMT >30: CPT | Performed by: FAMILY MEDICINE

## 2019-07-18 PROCEDURE — 2709999900 HC NON-CHARGEABLE SUPPLY

## 2019-07-18 PROCEDURE — 6360000002 HC RX W HCPCS: Performed by: STUDENT IN AN ORGANIZED HEALTH CARE EDUCATION/TRAINING PROGRAM

## 2019-07-18 PROCEDURE — 1180000000 HC REHAB R&B

## 2019-07-18 PROCEDURE — 36415 COLL VENOUS BLD VENIPUNCTURE: CPT

## 2019-07-18 PROCEDURE — 6370000000 HC RX 637 (ALT 250 FOR IP): Performed by: STUDENT IN AN ORGANIZED HEALTH CARE EDUCATION/TRAINING PROGRAM

## 2019-07-18 PROCEDURE — 99232 SBSQ HOSP IP/OBS MODERATE 35: CPT | Performed by: NURSE PRACTITIONER

## 2019-07-18 RX ORDER — FAMOTIDINE 20 MG/1
20 TABLET, FILM COATED ORAL 2 TIMES DAILY
Status: DISCONTINUED | OUTPATIENT
Start: 2019-07-18 | End: 2019-07-31

## 2019-07-18 RX ORDER — SENNA PLUS 8.6 MG/1
2 TABLET ORAL NIGHTLY
Status: CANCELLED | OUTPATIENT
Start: 2019-07-18

## 2019-07-18 RX ORDER — SODIUM CHLORIDE 0.9 % (FLUSH) 0.9 %
10 SYRINGE (ML) INJECTION PRN
Status: DISCONTINUED | OUTPATIENT
Start: 2019-07-18 | End: 2019-07-21

## 2019-07-18 RX ORDER — HYDROXYZINE HYDROCHLORIDE 25 MG/1
50 TABLET, FILM COATED ORAL NIGHTLY PRN
Status: DISCONTINUED | OUTPATIENT
Start: 2019-07-18 | End: 2019-08-12

## 2019-07-18 RX ORDER — ATORVASTATIN CALCIUM 80 MG/1
80 TABLET, FILM COATED ORAL NIGHTLY
Status: DISCONTINUED | OUTPATIENT
Start: 2019-07-18 | End: 2019-08-14 | Stop reason: HOSPADM

## 2019-07-18 RX ORDER — MORPHINE SULFATE 10 MG/5ML
10 SOLUTION ORAL EVERY 4 HOURS PRN
Status: CANCELLED | OUTPATIENT
Start: 2019-07-18

## 2019-07-18 RX ORDER — ACETAMINOPHEN 650 MG/1
650 SUPPOSITORY RECTAL EVERY 4 HOURS PRN
Status: DISCONTINUED | OUTPATIENT
Start: 2019-07-18 | End: 2019-07-26

## 2019-07-18 RX ORDER — CLONAZEPAM 1 MG/1
1 TABLET ORAL 2 TIMES DAILY PRN
Status: CANCELLED | OUTPATIENT
Start: 2019-07-18

## 2019-07-18 RX ORDER — ASPIRIN 81 MG/1
81 TABLET ORAL DAILY
Status: DISCONTINUED | OUTPATIENT
Start: 2019-07-19 | End: 2019-08-14 | Stop reason: HOSPADM

## 2019-07-18 RX ORDER — SENNA PLUS 8.6 MG/1
2 TABLET ORAL NIGHTLY
Status: DISCONTINUED | OUTPATIENT
Start: 2019-07-18 | End: 2019-08-14 | Stop reason: HOSPADM

## 2019-07-18 RX ORDER — SENNA PLUS 8.6 MG/1
2 TABLET ORAL NIGHTLY
Status: DISCONTINUED | OUTPATIENT
Start: 2019-07-18 | End: 2019-07-18

## 2019-07-18 RX ORDER — ASPIRIN 300 MG/1
300 SUPPOSITORY RECTAL DAILY
Status: DISCONTINUED | OUTPATIENT
Start: 2019-07-19 | End: 2019-08-12

## 2019-07-18 RX ORDER — ATORVASTATIN CALCIUM 80 MG/1
80 TABLET, FILM COATED ORAL NIGHTLY
Status: CANCELLED | OUTPATIENT
Start: 2019-07-18

## 2019-07-18 RX ORDER — ONDANSETRON 4 MG/1
4 TABLET, FILM COATED ORAL EVERY 8 HOURS PRN
Status: DISCONTINUED | OUTPATIENT
Start: 2019-07-18 | End: 2019-08-14 | Stop reason: HOSPADM

## 2019-07-18 RX ORDER — AMOXICILLIN AND CLAVULANATE POTASSIUM 875; 125 MG/1; MG/1
1 TABLET, FILM COATED ORAL EVERY 12 HOURS SCHEDULED
Status: CANCELLED | OUTPATIENT
Start: 2019-07-18 | End: 2019-07-25

## 2019-07-18 RX ORDER — AMOXICILLIN AND CLAVULANATE POTASSIUM 875; 125 MG/1; MG/1
1 TABLET, FILM COATED ORAL EVERY 12 HOURS SCHEDULED
Status: COMPLETED | OUTPATIENT
Start: 2019-07-18 | End: 2019-07-25

## 2019-07-18 RX ORDER — NICOTINE 21 MG/24HR
1 PATCH, TRANSDERMAL 24 HOURS TRANSDERMAL DAILY
Status: CANCELLED | OUTPATIENT
Start: 2019-07-19

## 2019-07-18 RX ORDER — ACETAMINOPHEN 650 MG/1
650 SUPPOSITORY RECTAL EVERY 4 HOURS PRN
Status: CANCELLED | OUTPATIENT
Start: 2019-07-18

## 2019-07-18 RX ORDER — LIDOCAINE 4 G/G
3 PATCH TOPICAL DAILY
Status: CANCELLED | OUTPATIENT
Start: 2019-07-19

## 2019-07-18 RX ORDER — GUAIFENESIN/DEXTROMETHORPHAN 100-10MG/5
5 SYRUP ORAL EVERY 4 HOURS PRN
Status: DISCONTINUED | OUTPATIENT
Start: 2019-07-18 | End: 2019-08-12

## 2019-07-18 RX ORDER — ONDANSETRON 2 MG/ML
4 INJECTION INTRAMUSCULAR; INTRAVENOUS EVERY 6 HOURS PRN
Status: CANCELLED | OUTPATIENT
Start: 2019-07-18

## 2019-07-18 RX ORDER — ACETAMINOPHEN 160 MG/5ML
650 SOLUTION ORAL EVERY 4 HOURS PRN
Status: DISCONTINUED | OUTPATIENT
Start: 2019-07-18 | End: 2019-07-18

## 2019-07-18 RX ORDER — SERTRALINE HYDROCHLORIDE 100 MG/1
200 TABLET, FILM COATED ORAL DAILY
Status: DISCONTINUED | OUTPATIENT
Start: 2019-07-19 | End: 2019-08-14 | Stop reason: HOSPADM

## 2019-07-18 RX ORDER — CLONAZEPAM 0.5 MG/1
1 TABLET ORAL 2 TIMES DAILY PRN
Status: DISCONTINUED | OUTPATIENT
Start: 2019-07-18 | End: 2019-08-14 | Stop reason: HOSPADM

## 2019-07-18 RX ORDER — IPRATROPIUM BROMIDE AND ALBUTEROL SULFATE 2.5; .5 MG/3ML; MG/3ML
1 SOLUTION RESPIRATORY (INHALATION) EVERY 4 HOURS PRN
Status: DISCONTINUED | OUTPATIENT
Start: 2019-07-18 | End: 2019-07-29

## 2019-07-18 RX ORDER — SODIUM CHLORIDE 0.9 % (FLUSH) 0.9 %
10 SYRINGE (ML) INJECTION PRN
Status: CANCELLED | OUTPATIENT
Start: 2019-07-18

## 2019-07-18 RX ORDER — LABETALOL 20 MG/4 ML (5 MG/ML) INTRAVENOUS SYRINGE
10 EVERY 6 HOURS PRN
Status: CANCELLED | OUTPATIENT
Start: 2019-07-18

## 2019-07-18 RX ORDER — LABETALOL 20 MG/4 ML (5 MG/ML) INTRAVENOUS SYRINGE
10 EVERY 6 HOURS PRN
Status: DISCONTINUED | OUTPATIENT
Start: 2019-07-18 | End: 2019-07-18

## 2019-07-18 RX ORDER — IPRATROPIUM BROMIDE AND ALBUTEROL SULFATE 2.5; .5 MG/3ML; MG/3ML
1 SOLUTION RESPIRATORY (INHALATION) EVERY 4 HOURS PRN
Status: CANCELLED | OUTPATIENT
Start: 2019-07-18

## 2019-07-18 RX ORDER — HYDROXYZINE HYDROCHLORIDE 25 MG/1
50 TABLET, FILM COATED ORAL NIGHTLY PRN
Status: CANCELLED | OUTPATIENT
Start: 2019-07-18

## 2019-07-18 RX ORDER — GUAIFENESIN/DEXTROMETHORPHAN 100-10MG/5
5 SYRUP ORAL EVERY 4 HOURS PRN
Status: CANCELLED | OUTPATIENT
Start: 2019-07-18

## 2019-07-18 RX ORDER — MORPHINE SULFATE 10 MG/5ML
5 SOLUTION ORAL EVERY 4 HOURS PRN
Status: CANCELLED | OUTPATIENT
Start: 2019-07-18

## 2019-07-18 RX ORDER — NICOTINE 21 MG/24HR
1 PATCH, TRANSDERMAL 24 HOURS TRANSDERMAL DAILY
Status: DISCONTINUED | OUTPATIENT
Start: 2019-07-19 | End: 2019-08-14 | Stop reason: HOSPADM

## 2019-07-18 RX ORDER — SODIUM CHLORIDE 0.9 % (FLUSH) 0.9 %
10 SYRINGE (ML) INJECTION EVERY 12 HOURS SCHEDULED
Status: DISCONTINUED | OUTPATIENT
Start: 2019-07-18 | End: 2019-07-21

## 2019-07-18 RX ORDER — LACTULOSE 10 G/15ML
20 SOLUTION ORAL 2 TIMES DAILY PRN
Status: DISCONTINUED | OUTPATIENT
Start: 2019-07-18 | End: 2019-07-30

## 2019-07-18 RX ORDER — ASPIRIN 300 MG/1
300 SUPPOSITORY RECTAL DAILY
Status: CANCELLED | OUTPATIENT
Start: 2019-07-19

## 2019-07-18 RX ORDER — FAMOTIDINE 20 MG/1
20 TABLET, FILM COATED ORAL 2 TIMES DAILY
Status: DISCONTINUED | OUTPATIENT
Start: 2019-07-18 | End: 2019-07-18

## 2019-07-18 RX ORDER — LIDOCAINE 4 G/G
3 PATCH TOPICAL DAILY
Status: DISCONTINUED | OUTPATIENT
Start: 2019-07-19 | End: 2019-08-11

## 2019-07-18 RX ORDER — MORPHINE SULFATE 10 MG/5ML
10 SOLUTION ORAL EVERY 4 HOURS PRN
Status: DISCONTINUED | OUTPATIENT
Start: 2019-07-18 | End: 2019-08-14 | Stop reason: HOSPADM

## 2019-07-18 RX ORDER — ACETAMINOPHEN 160 MG/5ML
650 SOLUTION ORAL EVERY 4 HOURS PRN
Status: CANCELLED | OUTPATIENT
Start: 2019-07-18

## 2019-07-18 RX ORDER — ACETAMINOPHEN 160 MG/5ML
650 SOLUTION ORAL EVERY 4 HOURS PRN
Status: DISCONTINUED | OUTPATIENT
Start: 2019-07-18 | End: 2019-08-14 | Stop reason: HOSPADM

## 2019-07-18 RX ORDER — MORPHINE SULFATE 10 MG/5ML
5 SOLUTION ORAL EVERY 4 HOURS PRN
Status: DISCONTINUED | OUTPATIENT
Start: 2019-07-18 | End: 2019-08-14 | Stop reason: HOSPADM

## 2019-07-18 RX ORDER — AMOXICILLIN AND CLAVULANATE POTASSIUM 875; 125 MG/1; MG/1
1 TABLET, FILM COATED ORAL EVERY 12 HOURS SCHEDULED
Status: DISCONTINUED | OUTPATIENT
Start: 2019-07-18 | End: 2019-07-18

## 2019-07-18 RX ORDER — SERTRALINE HYDROCHLORIDE 100 MG/1
200 TABLET, FILM COATED ORAL DAILY
Status: CANCELLED | OUTPATIENT
Start: 2019-07-19

## 2019-07-18 RX ORDER — ONDANSETRON 2 MG/ML
4 INJECTION INTRAMUSCULAR; INTRAVENOUS EVERY 6 HOURS PRN
Status: DISCONTINUED | OUTPATIENT
Start: 2019-07-18 | End: 2019-07-30

## 2019-07-18 RX ORDER — FAMOTIDINE 20 MG/1
20 TABLET, FILM COATED ORAL 2 TIMES DAILY
Status: CANCELLED | OUTPATIENT
Start: 2019-07-18

## 2019-07-18 RX ORDER — SODIUM CHLORIDE 0.9 % (FLUSH) 0.9 %
10 SYRINGE (ML) INJECTION EVERY 12 HOURS SCHEDULED
Status: CANCELLED | OUTPATIENT
Start: 2019-07-18

## 2019-07-18 RX ORDER — BUSPIRONE HYDROCHLORIDE 10 MG/1
10 TABLET ORAL NIGHTLY
Status: DISCONTINUED | OUTPATIENT
Start: 2019-07-18 | End: 2019-08-14 | Stop reason: HOSPADM

## 2019-07-18 RX ORDER — ASPIRIN 81 MG/1
81 TABLET ORAL DAILY
Status: CANCELLED | OUTPATIENT
Start: 2019-07-19

## 2019-07-18 RX ORDER — LACTULOSE 10 G/15ML
20 SOLUTION ORAL 2 TIMES DAILY PRN
Status: CANCELLED | OUTPATIENT
Start: 2019-07-18

## 2019-07-18 RX ADMIN — PIPERACILLIN SODIUM,TAZOBACTAM SODIUM 3.38 G: 3; .375 INJECTION, POWDER, FOR SOLUTION INTRAVENOUS at 10:40

## 2019-07-18 RX ADMIN — MORPHINE SULFATE 10 MG: 10 SOLUTION ORAL at 22:34

## 2019-07-18 RX ADMIN — PIPERACILLIN SODIUM,TAZOBACTAM SODIUM 3.38 G: 3; .375 INJECTION, POWDER, FOR SOLUTION INTRAVENOUS at 03:15

## 2019-07-18 RX ADMIN — GUAIFENESIN AND DEXTROMETHORPHAN 5 ML: 100; 10 SYRUP ORAL at 21:53

## 2019-07-18 RX ADMIN — ASPIRIN 81 MG: 81 TABLET ORAL at 07:56

## 2019-07-18 RX ADMIN — FAMOTIDINE 20 MG: 20 TABLET ORAL at 21:53

## 2019-07-18 RX ADMIN — FAMOTIDINE 20 MG: 20 TABLET ORAL at 07:56

## 2019-07-18 RX ADMIN — MICONAZOLE NITRATE: 20 POWDER TOPICAL at 21:55

## 2019-07-18 RX ADMIN — CLONAZEPAM 1 MG: 1 TABLET ORAL at 07:56

## 2019-07-18 RX ADMIN — Medication 100 MG: at 21:54

## 2019-07-18 RX ADMIN — MORPHINE SULFATE 10 MG: 10 SOLUTION ORAL at 10:31

## 2019-07-18 RX ADMIN — MORPHINE SULFATE 5 MG: 10 SOLUTION ORAL at 18:39

## 2019-07-18 RX ADMIN — SENNOSIDES 17.2 MG: 8.6 TABLET, FILM COATED ORAL at 21:53

## 2019-07-18 RX ADMIN — BUSPIRONE HYDROCHLORIDE 10 MG: 10 TABLET ORAL at 22:34

## 2019-07-18 RX ADMIN — DOCUSATE SODIUM 100 MG: 50 LIQUID ORAL at 07:57

## 2019-07-18 RX ADMIN — ATORVASTATIN CALCIUM 80 MG: 80 TABLET, FILM COATED ORAL at 21:53

## 2019-07-18 RX ADMIN — RIVAROXABAN 20 MG: 20 TABLET, FILM COATED ORAL at 18:28

## 2019-07-18 RX ADMIN — MICONAZOLE NITRATE: 20 POWDER TOPICAL at 09:00

## 2019-07-18 RX ADMIN — MORPHINE SULFATE 10 MG: 10 SOLUTION ORAL at 00:36

## 2019-07-18 RX ADMIN — Medication 10 ML: at 21:55

## 2019-07-18 RX ADMIN — MORPHINE SULFATE 10 MG: 10 SOLUTION ORAL at 04:35

## 2019-07-18 RX ADMIN — AMOXICILLIN AND CLAVULANATE POTASSIUM 1 TABLET: 875; 125 TABLET, FILM COATED ORAL at 21:53

## 2019-07-18 RX ADMIN — MORPHINE SULFATE 10 MG: 10 SOLUTION ORAL at 14:32

## 2019-07-18 RX ADMIN — SERTRALINE 200 MG: 100 TABLET, FILM COATED ORAL at 07:57

## 2019-07-18 ASSESSMENT — PAIN DESCRIPTION - PROGRESSION
CLINICAL_PROGRESSION: GRADUALLY IMPROVING
CLINICAL_PROGRESSION: GRADUALLY WORSENING
CLINICAL_PROGRESSION: GRADUALLY IMPROVING

## 2019-07-18 ASSESSMENT — PAIN DESCRIPTION - LOCATION
LOCATION: HEAD;NECK
LOCATION: BACK
LOCATION: GENERALIZED
LOCATION: NECK;BACK;RIB CAGE
LOCATION: GENERALIZED

## 2019-07-18 ASSESSMENT — PAIN DESCRIPTION - PAIN TYPE
TYPE: CHRONIC PAIN

## 2019-07-18 ASSESSMENT — PAIN DESCRIPTION - ONSET
ONSET: ON-GOING

## 2019-07-18 ASSESSMENT — PAIN SCALES - GENERAL
PAINLEVEL_OUTOF10: 8
PAINLEVEL_OUTOF10: 7
PAINLEVEL_OUTOF10: 6
PAINLEVEL_OUTOF10: 8
PAINLEVEL_OUTOF10: 4
PAINLEVEL_OUTOF10: 0
PAINLEVEL_OUTOF10: 10
PAINLEVEL_OUTOF10: 7

## 2019-07-18 ASSESSMENT — PAIN DESCRIPTION - DESCRIPTORS
DESCRIPTORS: ACHING;DISCOMFORT
DESCRIPTORS: SHARP
DESCRIPTORS: SHARP
DESCRIPTORS: ACHING

## 2019-07-18 ASSESSMENT — PAIN DESCRIPTION - ORIENTATION
ORIENTATION: ANTERIOR
ORIENTATION: OTHER (COMMENT)
ORIENTATION: ANTERIOR;LEFT;LOWER
ORIENTATION: OTHER (COMMENT)

## 2019-07-18 ASSESSMENT — PAIN DESCRIPTION - FREQUENCY
FREQUENCY: CONTINUOUS

## 2019-07-18 ASSESSMENT — PAIN - FUNCTIONAL ASSESSMENT
PAIN_FUNCTIONAL_ASSESSMENT: PREVENTS OR INTERFERES SOME ACTIVE ACTIVITIES AND ADLS

## 2019-07-18 NOTE — DISCHARGE SUMMARY
RIGHT. 19. Pulmonary embolism in 2018. 20. Chronic anticoagulation status. 21. 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 Watson Clemons. 22. Hypertension. 23. COPD. 24. Type 2 DM, controlled.  Last HgbA1c 5.1 on 7/12/2019.  25. 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 26. Osteopenia. 27. Nicotine dependence; current every day smoker. 28. Marijuana use. Last positive UDS 7/11/2019.  29. History of anxiety and depression. 30. Medication non-compliance. 31. Hypovitaminosis D.  32. Orthostatic hypotension. 33. Insomnia. 34. Aspiration pneumonia with sepsis. 35. Near complete atelectasis of the RIGHT lung with mediastinal shift to the right, secondary to obstruction of the right mainstem bronchus with mucus plug. 36. History ANTHONY with use of CPAP. 40. Suicidal ideation.     Discharge Functional Status:    Physical therapy:  Bed Mobility: Scooting: Contact guard assistance  Transfers:  ,  ,    Mobility: Bed, Chair, Wheel Chair: 4 - Requires steadying assistance only <25% assist  and/or requires assist with one leg only  Walk: 2 - Maximal Assistance Requires up to Maximal Assistance AND requires assistance of one person to walk between  feet (Patient performs 25-49% of locomotion effort or goes between  feet)  Distance Walked: 50ft  Wheel Chair: 6 - Modified [place] Operates wheelchair at least 150 feet with an ambulatory device, orthosis or prosthesis OR requires extra amount of time OR there is concern for safety  Distance Traveled in Wheel Chair: 150ft  Stairs: 1- Total Assistance perfoms less than 25% of the effort, or requirs the assistance of two people, or goes up and down fewer than 4 stairs, PT Equipment of his recurrent strokes he was changed from Eliquis to Xarelto by the neurology service. He did have a FEES study to visualize his swallow function which was deemed to be severely impaired and was reviewed by Dr. Yinka Pascal, ENT with recommendation that the patient see his oncologist as soon as possible. The goal being that if his tonsillar cancer is cured then options for doing myofascial massage and vital stem to improve his swallow study would be possible with speech therapy providing these interventions. Based on the FEES study he is to remain NPO. He was treated with both IV and oral antibiotics for pneumonia with sepsis and again after his noted endobronchial mucous plugging which resulted in infiltrates being seen in the right lung which was atelectatic secondary to the mucous plugging. Pulmonology did do a bronchoscopy without any significant findings other than removing a mucous plug from the right mainstem bronchus which resulted in his oxygen requirement significantly decreasing. He did have persistent hypoxia and a home oxygen evaluation did qualify him with demonstrated needs for 1 L of oxygen via nasal cannula both with exercise and at rest.  The patient did express suicidal ideation prior to his discharge from the acute inpatient rehabilitation unit and was seen by psychiatry who cleared him to have the suicide sitter discontinued and recommended no further intervention. For the greater part of his stay on the acute inpatient rehabilitation unit he was hypotensive requiring use of Midrin at various dosings with the dosing having been decreased to 5 mg by time of discharge due to showing some increase in his blood pressures to a more normal range. Lastly he did have a low vitamin D level presenting at 16 initially with improvement to 21 through use of ergocalciferol every 3 days and this medication was prescribed to continue after discharge. Details are provided below.   The patient progressed Zoloft doesn't work. Medication has been continued after explanation to the patient of the difference between the medications that he is been using for his anxiety versus those that are for depression. 2. Klonopin. 3. Buspar nightly. 12. Medication non-compliance. The patient participated in an aggressive multidisciplinary inpatient rehabilitation program involving 3 hours per day, 5 days per week of rehabilitation. Estimated Discharge Date: 8/14  Destination: home health  Services at Discharge: [address] [place] [address] Rd, Occupational Therapy, Speech Therapy, , Nursing and an aide 3x week  Is patient appropriate for an outpatient driving evaluation? no  Equipment at Discharge: None    Diabetic management was maximized with diet and medication options to attempt to achieve blood sugar control between . Hyperlipidemia was considered and the patient was started or continued on a statin medication for any LDL>100. Appropriate stroke prophylaxis was maintained throughout rehabilitation stay. Appropriate DVT prophylaxis options were considered throughout rehabilitation stay. DME:  Audi Maya requires a Bedside Commode to complete bathing, toileting, dressing and grooming tasks. Patient requires a Bedside Commode due to Upper Extremity/Lower Extremity Weakness and limited ambulation and is unable to walk to the bathroom at home. Without the Bedside Commode, Audi Maya is at increased risk for falls and would require increased assistance for ADL's and mobility. Zoya Nur. Yolyt was evaluated today for the diagnosis of COPD, Near complete atelectasis of the RIGHT lung with mediastinal shift to the right, secondary to obstruction of the right mainstem bronchus with mucus plug. I entered a DME order for home oxygen because the diagnosis and testing requires the patient to have supplemental oxygen. Condition will improve or be benefited by oxygen use.   The patient is able to perform good mobility in a home setting and therefore does require the use of a portable oxygen system. The need for this equipment was discussed with the patient and he understands and is in agreement. Consults:   Trauma Surgery, Neurology, Gastroenterology, Pain Management, Internal Medicine, Pulmonology,  Psychiatry, Physical Medicine    Significant Diagnostics:     Lab Results   Component Value Date     08/12/2019    K 4.6 08/12/2019     08/12/2019    CO2 34 (H) 08/12/2019    BUN 23 (H) 08/12/2019    CREATININE 0.7 08/12/2019    GLUCOSE 86 08/12/2019    CALCIUM 9.0 08/12/2019    PROT 5.9 (L) 08/12/2019    LABALBU 2.9 (L) 08/12/2019    BILITOT 0.3 08/12/2019    ALKPHOS 95 08/12/2019    AST 17 08/12/2019    ALT 10 (L) 08/12/2019    LABGLOM >90 08/12/2019     Recent Labs     08/12/19  0644 08/05/19  0548 08/01/19  0539   WBC 6.3 7.2 5.9   HGB 11.3* 10.1* 10.9*   HCT 35.4* 31.8* 34.3*   MCV 94.1* 95.8* 94.2*    243 278     Recent Labs     08/12/19  0816 08/13/19  0748   POCGLU 73 87     Results for Danielle Lopez \"DUNG\" (MRN [id number]) as of 8/13/2019 19:16   Ref. Range 4/17/2019 14:17 7/12/2019 04:23   Hemoglobin A1C Latest Ref Range: 4.4 - 6.4 % 4.9 5.1     Results for Aylin Red" (MRN [id number]) as of 8/13/2019 19:16   Ref. Range 7/19/2019 04:28 8/5/2019 05:48 8/12/2019 06:44   Vit D, 25-Hydroxy Latest Ref Range: 30 - 100 ng/ml 16 (L) 19 (L) 21 (L)     Xr Chest Standard (2 Vw)    Result Date: 8/12/2019  1. Small bilateral pleural effusions are demonstrated. These are new from the prior examination. Recommend follow-up to document resolution. **This report has been created using voice recognition software. It may contain minor errors which are inherent in voice recognition technology. ** Final report electronically signed by Dr. Danea Hemphill on 8/12/2019 4:39 PM    Xr Chest Standard (2 Vw)    Result Date: 8/8/2019  1. Normal heart size.  A Loop recorder projects over left side are inherent in voice recognition technology. ** Final report electronically signed by Dr. Peggy Fields on 7/29/2019 12:33 AM    Ct Chest W Contrast    Result Date: 7/29/2019   Near complete atelectasis of the right lung with mediastinal shift to the right, secondary to obstruction of the right mainstem bronchus which may be due to mucous plugging. 5 x 3 mm left lower lobe pulmonary nodule. For lung nodules <6 mm in size, if the patient is low risk for malignancy, no routine follow-up is necessary. If high risk for malignancy, optional chest CT may be obtained at 12 months. Small right and minimal left pleural effusions. Multiple old healed bilateral rib fractures and a nonunion fracture of the lateral left ninth rib. Small sclerotic lesion in the posterior lateral right 12th rib, cannot exclude a metastasis. Mild bilateral gynecomastia. **This report has been created using voice recognition software. It may contain minor errors which are inherent in voice recognition technology. ** Final report electronically signed by Dr. Peggy Fields on 7/29/2019 3:56 AM    Xr Chest Portable    Result Date: 7/31/2019  No pneumothorax status post bronchoscopy **This report has been created using voice recognition software. It may contain minor errors which are inherent in voice recognition technology. ** Final report electronically signed by Dr. Snehal Dsouza on 7/31/2019 4:06 PM    Xr Chest Portable    Result Date: 7/31/2019  1. Improving right lung base aeration with residual scattered infiltrates. 2. Small bilateral effusions present. 3. Stable supportive devices locations. **This report has been created using voice recognition software. It may contain minor errors which are inherent in voice recognition technology. ** Final report electronically signed by Dr. Bharath Bravo on 7/31/2019 2:34 AM    Xr Chest Portable    Result Date: 7/17/2019  1.  Patchy ill-defined infiltrates of the right lung with scattered airspace changes

## 2019-07-18 NOTE — PROGRESS NOTES
Clinical Pharmacy Note  Pharmacy Antimicrobial Monitoring    Current antibiotic(s): Augmentin - transitioned from Zosyn    Total days of antibiotics: 13    Indication/Diagnosis: HCAP    Patient chart reviewed for signs/symptoms of infection: Yes    Signs/symptoms: CXR and recent antibiotic therapy and ER visit    BP (!) 152/72   Pulse 84   Temp 98.2 °F (36.8 °C) (Oral)   Resp 18   Ht 6' (1.829 m)   Wt 178 lb 3.2 oz (80.8 kg)   SpO2 90%   BMI 24.17 kg/m²   Lab Results   Component Value Date    WBC 10.3 07/16/2019           Recommended stop date: 7/25/19    Prescriber contacted: no     Recommendations accepted: stop date entered by Dr Sanjay Winters

## 2019-07-18 NOTE — DISCHARGE SUMMARY
21 MG/24HR  Place 1 patch onto the skin daily             oxyCODONE HCl (OXY-IR) 10 MG immediate release tablet  Take 1 tablet by mouth every 8 hours as needed for Pain for up to 30 days. sertraline (ZOLOFT) 100 MG tablet  TAKE 2 TABLETS BY MOUTH ONCE DAILY                 Time Spent on discharge is more than 1 hour in the examination, evaluation, counseling and review of medications and discharge plan. Signed: Thank you Ruel Harada, MD for the opportunity to be involved in this patient's care.     Electronically signed by Chad Penaloza DO on 7/18/2019 at 1:21 PM

## 2019-07-18 NOTE — H&P
normal limits  Sensory:  Sensory intact  Coordination:   Decreased LEFT SUDARSHAN, FFM, FTN, and HTS  Deep Tendon Reflexes:  Reflexes are intact and symmetrical bilaterally     Skin: warm and dry, no rash or erythema  Peripheral vascular: Pulses: Normal upper and lower extremity pulses; Edema: trace    Diagnostics:  Recent Results (from the past 24 hour(s))   CBC    Collection Time: 07/19/19  4:28 AM   Result Value Ref Range    WBC 9.1 4.8 - 10.8 thou/mm3    RBC 4.38 (L) 4.70 - 6.10 mill/mm3    Hemoglobin 13.3 (L) 14.0 - 18.0 gm/dl    Hematocrit 41.5 (L) 42.0 - 52.0 %    MCV 94.7 (H) 80.0 - 94.0 fL    MCH 30.4 26.0 - 33.0 pg    MCHC 32.0 (L) 32.2 - 35.5 gm/dl    RDW-CV 13.8 11.5 - 14.5 %    RDW-SD 48.2 (H) 35.0 - 45.0 fL    Platelets 978 571 - 435 thou/mm3    MPV 10.8 9.4 - 12.4 fL   Comprehensive Metabolic Panel    Collection Time: 07/19/19  4:28 AM   Result Value Ref Range    Glucose 105 70 - 108 mg/dL    CREATININE 0.6 0.4 - 1.2 mg/dL    BUN 16 7 - 22 mg/dL    Sodium 140 135 - 145 meq/L    Potassium 4.5 3.5 - 5.2 meq/L    Chloride 98 98 - 111 meq/L    CO2 34 (H) 23 - 33 meq/L    Calcium 9.2 8.5 - 10.5 mg/dL    AST 25 5 - 40 U/L    Alkaline Phosphatase 106 38 - 126 U/L    Total Protein 5.8 (L) 6.1 - 8.0 g/dL    Alb 2.4 (L) 3.5 - 5.1 g/dL    Total Bilirubin 0.5 0.3 - 1.2 mg/dL    ALT 17 11 - 66 U/L   Vitamin D 25 Hydroxy    Collection Time: 07/19/19  4:28 AM   Result Value Ref Range    Vit D, 25-Hydroxy 16 (L) 30 - 100 ng/ml   Anion Gap    Collection Time: 07/19/19  4:28 AM   Result Value Ref Range    Anion Gap 8.0 8.0 - 16.0 meq/L   Glomerular Filtration Rate, Estimated    Collection Time: 07/19/19  4:28 AM   Result Value Ref Range    Est, Glom Filt Rate >90 ml/min/1.73m2      Lab Results   Component Value Date    LABA1C 5.1 07/12/2019     No results found for: EAG  Recent Labs     07/19/19  0428 07/18/19  1015 07/17/19  0352 07/17/19  0345 07/16/19  0400 07/15/19  0600   WBC 9.1  --   --   --  10.3 12.7*   HGB 13.3* weakness/Peripheral vision loss LEFT > RIGHT/Mild LEFT hemiparesis and ataxia. 1. PT/OT. 3. Concussion without LOC. 1. SLP. 4. Healthcare associated pneumonia versus Aspiration pneumonia/Acute hypoxic respiratory failure. 1. Zosyn completed; switched to Augmentin for 7 days. 5. Fracture of the lateral ninth rib. 1. Pain control. 2. ICS. 6. Mild cognitive deficit. (MOCA) version 8.2 completed. Patient scored 20/30 over prior ([address] [place]) version 7.2 completed.  Patient scored 15/25.  *adjusted score given pt unable to complete written subtests (L hand dominant)/Mild-moderate oral dysphagia and moderate to severe pharyngeal dysphagia. 1. SLP. 2. Plan is for fees study on 7/23. 3. Patient did trial ice chips on 7/19, unsuccessfully. 7. Nutrition:  Consultation to dietician for nutritional counseling and recommendations. 1. Severe malnutrition. 2. S/p PEG placement on 7/11/2019 by Dr. Serina Perry. 3. Wound care. 4. TotP 5.8, alb 2.4, Vitamin 25OH level of 16 on 7/19/2019.  5. Continue with tube feeds; nutrition has recommended bolus tube feeds with the plan to start those on 7/22. 6. Ergocalciferol every 3 days x 11 doses. 8. Electrolytes. 1. Normal on 7/19.  9. Bladder: No issues. 10. Bowel: Senna, colace, MOM  11. Rehabilitation nursing will be involved for bowel, bladder, skin, and pain management. Nursing will also provide education and training to patient and family. 12. Prophylaxis:  DVT: Xarelto. GI: Pepcid. 13.  and case management consultations for coordination of care and discharge planning. Chronic Problems:  1. NSTEMI on 4/9/2019/Hypertension. 1. ASA 81 mg.  2. Lipitor. 2. 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 1. Patient may benefit from an AFO; will let PT evaluate and help make decision.   3. Caval filter placed 2009 for extensive DVT of the LEFT leg as well as calf DVT on the RIGHT/Pulmonary embolism admitting condition is consistent with the findings of the preadmission assessment by the rehabilitation admissions coordinator.     Veronica Torres MD

## 2019-07-18 NOTE — PROGRESS NOTES
SYSTEMS:  CONSTITUTIONAL:  positive for left side weakness  EYES:  negative  HEENT:  positive for  sore mouth, sore throat, hoarseness and voice change  RESPIRATORY:  positive for  dry cough and wheezing  CARDIOVASCULAR:  negative  GASTROINTESTINAL:  positive for constipation, Last BM 7/16/2019  GENITOURINARY:  negative  SKIN:  negative  HEMATOLOGIC/LYMPHATIC:  negative  MUSCULOSKELETAL:  positive for  myalgias, arthralgias, pain, decreased range of motion and muscle weakness  NEUROLOGICAL:  Left sided weakness   BEHAVIOR/PSYCH:  negative  System review otherwise negative      PHYSICAL EXAM:  BP (!) 145/70   Pulse 76   Temp 98.1 °F (36.7 °C) (Oral)   Resp 16   Ht 6' (1.829 m)   Wt 174 lb 14.4 oz (79.3 kg)   SpO2 90%   BMI 23.72 kg/m²  I Body mass index is 23.72 kg/m². I   Wt Readings from Last 1 Encounters:   07/18/19 174 lb 14.4 oz (79.3 kg)      awake  Orientation:   person, place, time  Mood: within normal limits  Affect: quiet  General appearance: Patient is well nourished, well developed, well groomed and in no acute distress, appearing older than stated age     Memory:   Imapaired, decreased thought processing and recall   Attention/Concentration: normal  Language:  normal     ROM:  decreased left side of body   Motor Exam:  Motor exam is 5 out of 5 all extremities with the exception of left upper and lower extremity   Tone:  abnormal        Heart: normal rate, regular rhythm, normal S1, S2, no murmurs, rubs, clicks or gallops  Lungs: Diminished, coarse, scattered wheezes, on 4 liters of oxygen per nasal cannula   Abdomen: soft, non-tender, non-distended, normal bowel sounds, no masses or organomegaly.  + peg tube with tube feeds      Skin: warm and dry, no rash or erythema  Peripheral vascular: Pulses: Normal upper and lower extremity pulses; Edema: no       DATA    Recent Labs     07/16/19  0400 07/17/19  0345 07/17/19  0352 07/18/19  1015   WBC 10.3  --   --   --    RBC 3.98*  --   --   --    HGB

## 2019-07-19 ENCOUNTER — PROCEDURE VISIT (OUTPATIENT)
Dept: CARDIOLOGY CLINIC | Age: 59
End: 2019-07-19
Payer: MEDICARE

## 2019-07-19 ENCOUNTER — TELEPHONE (OUTPATIENT)
Dept: CARDIOLOGY CLINIC | Age: 59
End: 2019-07-19

## 2019-07-19 DIAGNOSIS — Z86.73 HISTORY OF STROKE: Primary | ICD-10-CM

## 2019-07-19 PROBLEM — I26.99 PULMONARY EMBOLISM AND INFARCTION (HCC): Status: RESOLVED | Noted: 2018-10-22 | Resolved: 2019-07-19

## 2019-07-19 LAB
ALBUMIN SERPL-MCNC: 2.4 G/DL (ref 3.5–5.1)
ALP BLD-CCNC: 106 U/L (ref 38–126)
ALT SERPL-CCNC: 17 U/L (ref 11–66)
ANION GAP SERPL CALCULATED.3IONS-SCNC: 8 MEQ/L (ref 8–16)
AST SERPL-CCNC: 25 U/L (ref 5–40)
BILIRUB SERPL-MCNC: 0.5 MG/DL (ref 0.3–1.2)
BUN BLDV-MCNC: 16 MG/DL (ref 7–22)
CALCIUM SERPL-MCNC: 9.2 MG/DL (ref 8.5–10.5)
CHLORIDE BLD-SCNC: 98 MEQ/L (ref 98–111)
CO2: 34 MEQ/L (ref 23–33)
CREAT SERPL-MCNC: 0.6 MG/DL (ref 0.4–1.2)
ERYTHROCYTE [DISTWIDTH] IN BLOOD BY AUTOMATED COUNT: 13.8 % (ref 11.5–14.5)
ERYTHROCYTE [DISTWIDTH] IN BLOOD BY AUTOMATED COUNT: 48.2 FL (ref 35–45)
GFR SERPL CREATININE-BSD FRML MDRD: > 90 ML/MIN/1.73M2
GLUCOSE BLD-MCNC: 105 MG/DL (ref 70–108)
HCT VFR BLD CALC: 41.5 % (ref 42–52)
HEMOGLOBIN: 13.3 GM/DL (ref 14–18)
MCH RBC QN AUTO: 30.4 PG (ref 26–33)
MCHC RBC AUTO-ENTMCNC: 32 GM/DL (ref 32.2–35.5)
MCV RBC AUTO: 94.7 FL (ref 80–94)
PLATELET # BLD: 161 THOU/MM3 (ref 130–400)
PMV BLD AUTO: 10.8 FL (ref 9.4–12.4)
POTASSIUM SERPL-SCNC: 4.5 MEQ/L (ref 3.5–5.2)
RBC # BLD: 4.38 MILL/MM3 (ref 4.7–6.1)
SODIUM BLD-SCNC: 140 MEQ/L (ref 135–145)
TOTAL PROTEIN: 5.8 G/DL (ref 6.1–8)
VITAMIN D 25-HYDROXY: 16 NG/ML (ref 30–100)
WBC # BLD: 9.1 THOU/MM3 (ref 4.8–10.8)

## 2019-07-19 PROCEDURE — 82306 VITAMIN D 25 HYDROXY: CPT

## 2019-07-19 PROCEDURE — 92610 EVALUATE SWALLOWING FUNCTION: CPT | Performed by: SPEECH-LANGUAGE PATHOLOGIST

## 2019-07-19 PROCEDURE — 93298 REM INTERROG DEV EVAL SCRMS: CPT | Performed by: INTERNAL MEDICINE

## 2019-07-19 PROCEDURE — 97112 NEUROMUSCULAR REEDUCATION: CPT

## 2019-07-19 PROCEDURE — 97530 THERAPEUTIC ACTIVITIES: CPT

## 2019-07-19 PROCEDURE — 97535 SELF CARE MNGMENT TRAINING: CPT

## 2019-07-19 PROCEDURE — 97110 THERAPEUTIC EXERCISES: CPT

## 2019-07-19 PROCEDURE — 2709999900 HC NON-CHARGEABLE SUPPLY

## 2019-07-19 PROCEDURE — 97166 OT EVAL MOD COMPLEX 45 MIN: CPT

## 2019-07-19 PROCEDURE — 97167 OT EVAL HIGH COMPLEX 60 MIN: CPT

## 2019-07-19 PROCEDURE — 93299 PR REM INTERROG ICPMS/SCRMS <30 D TECH REVIEW: CPT | Performed by: INTERNAL MEDICINE

## 2019-07-19 PROCEDURE — 6370000000 HC RX 637 (ALT 250 FOR IP): Performed by: PHYSICAL MEDICINE & REHABILITATION

## 2019-07-19 PROCEDURE — 92523 SPEECH SOUND LANG COMPREHEN: CPT | Performed by: SPEECH-LANGUAGE PATHOLOGIST

## 2019-07-19 PROCEDURE — 1180000000 HC REHAB R&B

## 2019-07-19 PROCEDURE — 6370000000 HC RX 637 (ALT 250 FOR IP): Performed by: STUDENT IN AN ORGANIZED HEALTH CARE EDUCATION/TRAINING PROGRAM

## 2019-07-19 PROCEDURE — 80053 COMPREHEN METABOLIC PANEL: CPT

## 2019-07-19 PROCEDURE — 85027 COMPLETE CBC AUTOMATED: CPT

## 2019-07-19 PROCEDURE — 36415 COLL VENOUS BLD VENIPUNCTURE: CPT

## 2019-07-19 PROCEDURE — 2580000003 HC RX 258: Performed by: STUDENT IN AN ORGANIZED HEALTH CARE EDUCATION/TRAINING PROGRAM

## 2019-07-19 PROCEDURE — 97163 PT EVAL HIGH COMPLEX 45 MIN: CPT

## 2019-07-19 PROCEDURE — 2500000003 HC RX 250 WO HCPCS: Performed by: STUDENT IN AN ORGANIZED HEALTH CARE EDUCATION/TRAINING PROGRAM

## 2019-07-19 RX ORDER — ERGOCALCIFEROL 1.25 MG/1
50000 CAPSULE ORAL
Status: DISCONTINUED | OUTPATIENT
Start: 2019-07-19 | End: 2019-08-14 | Stop reason: HOSPADM

## 2019-07-19 RX ADMIN — MORPHINE SULFATE 10 MG: 10 SOLUTION ORAL at 22:13

## 2019-07-19 RX ADMIN — MICONAZOLE NITRATE: 20 POWDER TOPICAL at 09:44

## 2019-07-19 RX ADMIN — BUSPIRONE HYDROCHLORIDE 10 MG: 10 TABLET ORAL at 20:08

## 2019-07-19 RX ADMIN — AMOXICILLIN AND CLAVULANATE POTASSIUM 1 TABLET: 875; 125 TABLET, FILM COATED ORAL at 09:42

## 2019-07-19 RX ADMIN — RIVAROXABAN 20 MG: 20 TABLET, FILM COATED ORAL at 18:08

## 2019-07-19 RX ADMIN — Medication 10 ML: at 09:50

## 2019-07-19 RX ADMIN — SENNOSIDES 17.2 MG: 8.6 TABLET, FILM COATED ORAL at 20:08

## 2019-07-19 RX ADMIN — Medication 100 MG: at 20:08

## 2019-07-19 RX ADMIN — ASPIRIN 81 MG: 81 TABLET ORAL at 09:42

## 2019-07-19 RX ADMIN — FAMOTIDINE 20 MG: 20 TABLET ORAL at 09:42

## 2019-07-19 RX ADMIN — ERGOCALCIFEROL 50000 UNITS: 1.25 CAPSULE ORAL at 09:41

## 2019-07-19 RX ADMIN — ACETAMINOPHEN 650 MG: 650 SOLUTION ORAL at 20:08

## 2019-07-19 RX ADMIN — MORPHINE SULFATE 5 MG: 10 SOLUTION ORAL at 12:31

## 2019-07-19 RX ADMIN — AMOXICILLIN AND CLAVULANATE POTASSIUM 1 TABLET: 875; 125 TABLET, FILM COATED ORAL at 20:08

## 2019-07-19 RX ADMIN — ATORVASTATIN CALCIUM 80 MG: 80 TABLET, FILM COATED ORAL at 20:08

## 2019-07-19 RX ADMIN — MORPHINE SULFATE 10 MG: 10 SOLUTION ORAL at 02:53

## 2019-07-19 RX ADMIN — Medication 100 MG: at 09:42

## 2019-07-19 RX ADMIN — FAMOTIDINE 20 MG: 20 TABLET ORAL at 20:08

## 2019-07-19 RX ADMIN — Medication 10 ML: at 20:09

## 2019-07-19 RX ADMIN — MICONAZOLE NITRATE: 20 POWDER TOPICAL at 20:09

## 2019-07-19 RX ADMIN — ACETAMINOPHEN 650 MG: 650 SOLUTION ORAL at 09:42

## 2019-07-19 RX ADMIN — MORPHINE SULFATE 10 MG: 10 SOLUTION ORAL at 06:51

## 2019-07-19 RX ADMIN — MORPHINE SULFATE 10 MG: 10 SOLUTION ORAL at 18:09

## 2019-07-19 RX ADMIN — SERTRALINE 200 MG: 100 TABLET, FILM COATED ORAL at 09:42

## 2019-07-19 ASSESSMENT — PAIN DESCRIPTION - ORIENTATION
ORIENTATION: ANTERIOR;LOWER;LEFT
ORIENTATION: LEFT
ORIENTATION: LEFT

## 2019-07-19 ASSESSMENT — 9 HOLE PEG TEST
TESTTIME_SECONDS: 48
TEST_RESULT: IMPAIRED
TEST_RESULT: IMPAIRED

## 2019-07-19 ASSESSMENT — PAIN SCALES - GENERAL
PAINLEVEL_OUTOF10: 3
PAINLEVEL_OUTOF10: 8
PAINLEVEL_OUTOF10: 8
PAINLEVEL_OUTOF10: 6
PAINLEVEL_OUTOF10: 7
PAINLEVEL_OUTOF10: 8
PAINLEVEL_OUTOF10: 8
PAINLEVEL_OUTOF10: 7
PAINLEVEL_OUTOF10: 3
PAINLEVEL_OUTOF10: 6
PAINLEVEL_OUTOF10: 8

## 2019-07-19 ASSESSMENT — PAIN DESCRIPTION - PROGRESSION
CLINICAL_PROGRESSION: NOT CHANGED

## 2019-07-19 ASSESSMENT — PAIN DESCRIPTION - LOCATION
LOCATION: RIB CAGE;BACK;SHOULDER
LOCATION: RIB CAGE;SHOULDER;HEAD
LOCATION: HEAD

## 2019-07-19 ASSESSMENT — PAIN DESCRIPTION - FREQUENCY
FREQUENCY: CONTINUOUS
FREQUENCY: CONTINUOUS

## 2019-07-19 ASSESSMENT — PAIN DESCRIPTION - PAIN TYPE
TYPE: CHRONIC PAIN;ACUTE PAIN
TYPE: ACUTE PAIN
TYPE: CHRONIC PAIN
TYPE: ACUTE PAIN;CHRONIC PAIN

## 2019-07-19 ASSESSMENT — PAIN DESCRIPTION - DESCRIPTORS
DESCRIPTORS: SHARP
DESCRIPTORS: SHARP

## 2019-07-19 ASSESSMENT — PAIN DESCRIPTION - ONSET: ONSET: ON-GOING

## 2019-07-19 NOTE — PROGRESS NOTES
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 Umang Singh. It is noted that his malignancy is stated to be in remission. 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 Restrictions/Precautions:  Restrictions/Precautions: Fall Risk, General Precautions(NPO)  Position Activity Restriction  Other position/activity restrictions: PEG 7/14, oxygen, impulsive, unsteady, left ninth rib fracture and also old rib fractures on the right side    Subjective  Chart Reviewed: Yes, Previous Admission, Progress Notes  Patient assessed for rehabilitation services?: Yes    Subjective: Pt sleeping supine upon arrival. Reported feeling nauseated and extremely hungry. Pt's BP was 119/69 when sitting in recliner. Pts spO2 was 80% on 2 L. Pt bumped up to 3 L and spO2 was 83%. Pt was then bumped up to 4 L. Nursing notified and plans to recheck.      Pain: 8/10: L shoulder, rib cage, and head       Social/Functional History:  Lives With: Alone  Type of Home: Apartment  Home Layout: One level  Home Access: Stairs to enter without rails  Entrance Stairs - Number of Steps: 1  Home Equipment: [employer], 4 wheeled walker, Rolling walker, Quad cane(hospital bed with bed rails)   Bathroom Shower/Tub: Shower chair with back  Bathroom Toilet: Standard  Bathroom Equipment: Shower chair    Receives Help From: Family, Home health  ADL Assistance: [facility] [address]: Independent  Homemaking Responsibilities: Yes  Ambulation Assistance: Independent  Transfer Assistance: Independent    Active : Yes  Patient's  Info: (Patient reports family will not let him drive. Transportation from family or [facility].)  Occupation: Retired, On disability  Type of occupation: On disability for approximately 20 years  Additional Comments: Pt reports he sleeps on foton at home. Pt reports ambulating ~1 block prior to admisison with 4WW. Pt denies use of AD in the home. Chronic Rt foot drop. Family to bring in AFO    Cognition/Orientation:  Overall Orientation Status: Within Normal Limits  Orientation Level: Oriented to place, Disoriented to time, Oriented to situation, Oriented to person  Cognition Comment: Decreased safety awareness    ADL;s:  Eatin - Staff performs feeding for patient or patient requires IV fluids for hydration/TPN/PPN(Staff performs tube feeding. Pt is NPO)   Groomin - Able to perform 3-4 tasks with touching help(Pt performed grooming sitting EOB with CGA to mod A)   Bathing: 3 - Able to bathe 5-7 areas(Pt performed sponge bath and could bathe chest, both arms, abdomen, and rene )   Dressing-Upper: 3 - Requires assist with 2 tasks(Pt required vc for orientation of shirt and min/mod A to maintain midline while donning shirt)   Dressing-Lower: 2 - Requires assist with 4-5 parts of dressing(S/OT threaded shorts and Pt laid supine to pull up shorts. S/OT donned socks)   Toiletin - Requires setup/supervision/cues(Setup to use urinal)            Vision - Basic Assessment  Prior Vision: No visual deficits  Patient Visual Report: Balance difficulty, Nausea/blurring vision with head movement  Oculo Motor Control: Impaired  Vision Comments: L eye noted to turn in when Pt reported feeling nauseated. Continue to assess. Functional Mobility:  Bed mobility  Supine to Sit: Contact guard assistance  Sit to Supine: Contact guard assistance          Balance:  Balance  Sitting Balance:  Moderate assistance(Ranged from CGA to min/mod A due to LOB)  Standing Balance: Minimal assistance(of 2 for safety)  Standing Balance  Time: 10 seconds  Activity:

## 2019-07-19 NOTE — PROGRESS NOTES
Patient asking for routine hs Buspar. Patient stated the Zoloft was d/c'd and he was started on Buspar. Dr. Elisa Heart phoned and after through review of chart N.O. Received. Patient educated on Buspar and Zoloft being in different classes and prescribed for different things.

## 2019-07-19 NOTE — LETTER
.. [address] [place] [address] [address]  [place] [address]  Phone: [phone]  Fax: [phone]    Neris Juan MD        July 23, 2019    [address] [place] [address] Dr Sondra Fall [address] [place] [id number]      Dear Cody rGeene:    Our office has been unable to contact you by phone. Please call the office at [phone]. If you have any questions or concerns, please don't hesitate to call.     Sincerely,        Neris Juan MD

## 2019-07-19 NOTE — PROGRESS NOTES
expressive language deficits noted at this time. Speech intelligibility adequate for conversational speech, with continue to follow to ensure appropriate clarity. Vocal quality is harsh/raspy. Highly recommend skilled ST services to address the above domains to obtain optimal level of functioning to permit for safe return to independent living. Pt presents with mild-moderate oral dysphagia and moderate to severe pharyngeal dysphagia as outlined above. Prior to completion of BSE, completed aggressive oral care with use of toothettes and hydrogen peroxide/sterile water rinse. Provided skilled education on the importance of oral care multiple times per day to reduce bacteria colonization. Following completion of oral care, completed ice chip trials x5. Immediate cough evident on 3/5 trials, due to what appeared to be delayed swallow initiation and significantly reduced hyolaryngeal elevation. Wet vocal quality noted intermittently throughout evaluation with a subsequent reflexive cough to follow. Given overt difficulty with ice chips and persistent decline in swallow function, recommend continuing NPO status with completion of a FEES to determine safety of return to po intake. RECOMMENDATIONS:     Fiberoptic Endoscopic Evaluation of Swallow (FEES) : [x] Is indicated to further assess        DIET RECOMMENDATIONS:  NPO pending completion of instrumental swallow assessment. Holding evaluation to complete several days of intensive dysphagia treatment. STRATEGIES: [x] Strategies pending FEES results.      COMMUNICATION  Comprehension: 4 - Patient understands basic needs 75-90%+ of the time  Expression: 4 - Expresses basic ideas/needs 75-90%+ of the time  SOCIAL COGNITION  Social Interaction: 4 - Patient appropriate 75-90%+ of the time  Problem Solving: 3 - Patient solves simple/routine tasks 50%-74%  Memory: 3 - Patient remembers 50%-74% of the time    EDUCATION:   Learner: [x]Patient        Education: [x]

## 2019-07-19 NOTE — PROGRESS NOTES
Nutrition Assessment    Type and Reason for Visit: Initial, Consult(Rehab nutrition assessment)    Nutrition Recommendations:   Continue current TF regimen: 2 Konrad HN, 50 ml/hr  Recommend 250 ml water every 4 hours when off IVF  Consider switch to bolus feeds: 2 Konrad  ml 4 times/day with 185 ml water before and after each bolus  Oral diet per SLP recommendations    Nutrition Assessment: Pt. severely malnourished AEB mild fat and muscle loss, decreased oral intake over the last year with unintentional weight loss. At risk for further nutritional compromise r/t dysphagia with recommendations for NPO, hx of catabolic illness (tonsil cancer and COPD). Will continue enteral nutrition support, water flush recommendations as noted above, consider switch to bolus feeds as noted above, recommend oral diet as per SLP recommendations. Malnutrition Assessment:  · Malnutrition Status: Meets the criteria for severe malnutrition  · Context: Chronic illness  · Findings of the 6 clinical characteristics of malnutrition (Minimum of 2 out of 6 clinical characteristics is required to make the diagnosis of moderate or severe Protein Calorie Malnutrition based on AND/ASPEN Guidelines):  1. Energy Intake-Less than or equal to 75% of estimated energy requirement, Greater than or equal to 1 month    2. Weight Loss-20% loss or greater, (7 months)  3. Fat Loss-Mild subcutaneous fat loss, Fat overlying the ribs, Orbital  4. Muscle Loss-Mild muscle mass loss, Thigh (quadriceps)  5. Fluid Accumulation-No significant fluid accumulation,    6.   Strength-Not measured    Nutrition Risk Level: High    Nutrient Needs:  · Estimated Daily Total Kcal: 1608-5228 (25-30 kcals/kg current weight of 81 kg on 7/18/19)  · Estimated Daily Protein (g): ~97 (~1.2 grams/kg current weight of 81 kg on 7/18/19)  · Estimated Daily Total Fluid (ml/day): 0046-5432 (25-30 ml/kr current weight of 81 kg on 7/18/19)    Nutrition Diagnosis:   · Problem:

## 2019-07-19 NOTE — PROGRESS NOTES
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 Fox Mazariegos. It is noted that his malignancy is stated to be in remission. 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 Restrictions/Precautions:  Restrictions/Precautions: Fall Risk, General Precautions(NPO)  Position Activity Restriction  Other position/activity restrictions: PEG , oxygen, impulsive, unsteady, left ninth rib fracture and also old rib fractures on the right side    SUBJECTIVE: Pt cooperative, pleasant and agreeable to OT. Improved affect this session. Pt very worried he has not had anything to eat today. OT reviewed NPO status and role of PEG tube for nutrition. PAIN: none stated  /10:      COGNITION: Decreased Memory, Decreased Judgment, Decreased Problem Solving and Decreased Insight    ADL:   No ADL's completed this session. Will Jay BALANCE:  Sitting Balance:  Contact Guard Assistance tolerated sitting EOB x20 minutes.      BED MOBILITY:  Supine to Sit: Contact Guard Assistance     Sit to supine: stand by assistance,     ADDITIONAL ACTIVITIES:  Gross motor coordination: Pt completed finger to nose test within 1 inch accuracy (improved from acute care)   BUE AROM WFL:    Sensatoin: reports tingling on LUE that is from prior CVA in April     Hand Dominance: Right(throwing a ball, bowling, fishing,  Pt writes with his left )  Left Hand Strength -  (lbs)  Handle Setting 2: 52, 50, 45  AV  Right Hand Strength -  (lbs)  Handle Setting 2: 54, 56, 58  AV    Fine Motor Skills  Left 9-Hole Peg Test: Impaired  Left 9 Hole Peg Test

## 2019-07-20 ENCOUNTER — CARE COORDINATION (OUTPATIENT)
Dept: CASE MANAGEMENT | Age: 59
End: 2019-07-20

## 2019-07-20 PROCEDURE — 2709999900 HC NON-CHARGEABLE SUPPLY

## 2019-07-20 PROCEDURE — 97110 THERAPEUTIC EXERCISES: CPT

## 2019-07-20 PROCEDURE — 6370000000 HC RX 637 (ALT 250 FOR IP): Performed by: PHYSICAL MEDICINE & REHABILITATION

## 2019-07-20 PROCEDURE — 1180000000 HC REHAB R&B

## 2019-07-20 PROCEDURE — 97530 THERAPEUTIC ACTIVITIES: CPT

## 2019-07-20 PROCEDURE — 92526 ORAL FUNCTION THERAPY: CPT

## 2019-07-20 PROCEDURE — 97542 WHEELCHAIR MNGMENT TRAINING: CPT

## 2019-07-20 PROCEDURE — 97535 SELF CARE MNGMENT TRAINING: CPT

## 2019-07-20 PROCEDURE — 2580000003 HC RX 258: Performed by: STUDENT IN AN ORGANIZED HEALTH CARE EDUCATION/TRAINING PROGRAM

## 2019-07-20 PROCEDURE — 6370000000 HC RX 637 (ALT 250 FOR IP): Performed by: STUDENT IN AN ORGANIZED HEALTH CARE EDUCATION/TRAINING PROGRAM

## 2019-07-20 RX ADMIN — SENNOSIDES 17.2 MG: 8.6 TABLET, FILM COATED ORAL at 20:23

## 2019-07-20 RX ADMIN — Medication 100 MG: at 08:10

## 2019-07-20 RX ADMIN — MORPHINE SULFATE 10 MG: 10 SOLUTION ORAL at 02:18

## 2019-07-20 RX ADMIN — CLONAZEPAM 1 MG: 0.5 TABLET ORAL at 08:11

## 2019-07-20 RX ADMIN — SERTRALINE 200 MG: 100 TABLET, FILM COATED ORAL at 08:12

## 2019-07-20 RX ADMIN — MORPHINE SULFATE 10 MG: 10 SOLUTION ORAL at 10:44

## 2019-07-20 RX ADMIN — ASPIRIN 81 MG: 81 TABLET ORAL at 08:13

## 2019-07-20 RX ADMIN — MICONAZOLE NITRATE: 20 POWDER TOPICAL at 08:17

## 2019-07-20 RX ADMIN — FAMOTIDINE 20 MG: 20 TABLET ORAL at 20:23

## 2019-07-20 RX ADMIN — Medication 10 ML: at 08:15

## 2019-07-20 RX ADMIN — RIVAROXABAN 20 MG: 20 TABLET, FILM COATED ORAL at 17:44

## 2019-07-20 RX ADMIN — AMOXICILLIN AND CLAVULANATE POTASSIUM 1 TABLET: 875; 125 TABLET, FILM COATED ORAL at 08:11

## 2019-07-20 RX ADMIN — MORPHINE SULFATE 10 MG: 10 SOLUTION ORAL at 22:50

## 2019-07-20 RX ADMIN — ATORVASTATIN CALCIUM 80 MG: 80 TABLET, FILM COATED ORAL at 20:23

## 2019-07-20 RX ADMIN — FAMOTIDINE 20 MG: 20 TABLET ORAL at 08:10

## 2019-07-20 RX ADMIN — MORPHINE SULFATE 10 MG: 10 SOLUTION ORAL at 14:49

## 2019-07-20 RX ADMIN — MICONAZOLE NITRATE: 20 POWDER TOPICAL at 20:23

## 2019-07-20 RX ADMIN — MORPHINE SULFATE 10 MG: 10 SOLUTION ORAL at 06:18

## 2019-07-20 RX ADMIN — Medication 10 ML: at 20:25

## 2019-07-20 RX ADMIN — Medication 100 MG: at 20:23

## 2019-07-20 RX ADMIN — MORPHINE SULFATE 10 MG: 10 SOLUTION ORAL at 18:49

## 2019-07-20 RX ADMIN — BUSPIRONE HYDROCHLORIDE 10 MG: 10 TABLET ORAL at 20:23

## 2019-07-20 RX ADMIN — AMOXICILLIN AND CLAVULANATE POTASSIUM 1 TABLET: 875; 125 TABLET, FILM COATED ORAL at 20:23

## 2019-07-20 ASSESSMENT — PAIN SCALES - GENERAL
PAINLEVEL_OUTOF10: 7
PAINLEVEL_OUTOF10: 4
PAINLEVEL_OUTOF10: 7
PAINLEVEL_OUTOF10: 0
PAINLEVEL_OUTOF10: 8
PAINLEVEL_OUTOF10: 4
PAINLEVEL_OUTOF10: 7
PAINLEVEL_OUTOF10: 8
PAINLEVEL_OUTOF10: 7
PAINLEVEL_OUTOF10: 0

## 2019-07-20 NOTE — PROGRESS NOTES
[address] [address]  INPATIENT PHYSICAL THERAPY  DAILY NOTE  [facility] - [id number]    Time In: 0830  Time Out: 930  Timed Code Treatment Minutes: 60 Minutes  Minutes: 60          Date: 2019  Patient Name: Parker Newsome,  Gender:  male        MRN: [id number]  : 1960  (62 y.o.)     Referring Practitioner: Chelly Tyler MD  Diagnosis: cerebellar infarct   Additional Pertinent Hx: Pt admitted through ED due to weakness and pain s/p fall at home. Hx:  CA, CVA. x-ray Lt 9th rib fx. MRI:  remote infarct, restricted diffusion inferior medial aspect LT cerebellar hemisphere and medula. Pt admited to hospital on 2019 and transferred to rehab on . Prior Level of Function:  Lives With: Alone  Type of Home: Apartment  Home Layout: One level  Home Access: Stairs to enter without rails  Entrance Stairs - Number of Steps: 1  Home Equipment: [employer], 4 wheeled walker, Rolling walker, Quad cane(hospital bed with bed rails)    Restrictions/Precautions:  Restrictions/Precautions: Fall Risk, General Precautions(NPO)  Position Activity Restriction  Other position/activity restrictions: PEG , oxygen, impulsive, unsteady, left ninth rib fracture and also old rib fractures on the right side    SUBJECTIVE: Pt in bed upon arrival and agrees to therapy. Pt pleasant and cooperative once he got going. Pt initially somewhat agitated about fatigue and wanting more morphine. PAIN: did not rate or c/o pain throughout session however mentioned mult times that he can have his morphine at 1010    OBJECTIVE:  Bed Mobility:  Rolling to Left: Minimal Assistance, with verbal cues    Supine to Sit: Minimal Assistance, with verbal cues , with increased time for completion  Sit to Supine: Stand By Assistance, [employer], with increased time for completion   Scooting: Contact Guard Assistance    Transfers:  Sit to Stand:  Moderate Assistance, Maximum Assistance, X 1, transfers and gait when medically approved  Current Treatment Recommendations: Strengthening, Endurance Training, Balance Training, Functional Mobility Training, Safety Education & Training, Equipment Evaluation, Education, & procurement    Patient Education  Patient Education: Plan of Care, [employer] Mobility, Transfers, posture/midline    Goals:  Patient goals : Did not state  Short term goals  Time Frame for Short term goals: 1 week  Short term goal 1: Pt to go supine <->sit,  supervision A, to get in/out of bed. Short term goal 2: sit <> stand with contact guard assist for ease of transfers  Short term goal 3: ambulate 15 feet with use of LRAD and modAx1 to prepare for in home mobility  Short term goal 4: WC mobility with mod I for 300ft to prepare for community mobility   Long term goals  Time Frame for Long term goals : 4 weeks   Long term goal 1: bed mobility with mod I for ease of getting in/out of bed  Long term goal 2: sit <> stand with mod I for safe of transfers   Long term goal 3: ambulate 75ft with use of LRAD and stand-by assist for safe in home mobility   Long term goal 4: negotiate 1 step no rails with stand-by assist and use of LRAD for safe entry/exit of his home     Following session, patient left in safe position with all fall risk precautions in place.

## 2019-07-20 NOTE — PROGRESS NOTES
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 Raymond Donohue. It is noted that his malignancy is stated to be in remission. 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 Restrictions/Precautions:  Restrictions/Precautions: Fall Risk, General Precautions(NPO)  Position Activity Restriction  Other position/activity restrictions: PEG , oxygen, impulsive, unsteady, left ninth rib fracture and also old rib fractures on the right side    SUBJECTIVE: Pt seated in w/c upon arrival, agreeable to OT session. PAIN: Denies    COGNITION: Decreased Attention Span, Decreased Judgment, Decreased Problem Solving, Decreased Insight, Decreased Initiation, Decreased Sequencing and pt is impulsive. ADL:   See FIM Information Below. Toiletin - Requires setup/supervision/cues(For urinal use lying supine.)         BALANCE:  Sitting Balance:  Contact Guard Assistance, Minimal Assistance  Standing Balance: Moderate Assistance, Maximum Assistance, with verbal cues   Static stand x1 minute x1 trial, x30 seconds x1 trial. Pt is very unsteady requiring max v/c to correct balance. BED MOBILITY:  Sit to Supine: Moderate Assistance To raise BLE onto bed. Scooting: Contact Guard Assistance EOB. TRANSFERS:  Sit to Stand:  Minimal Assistance, Moderate Assistance. From w/c. Stand to Sit: Minimal Assistance, Moderate Assistance. For slow decent to w/c. Stand Pivot: Minimal Assistance X 1, Moderate Assistance X1. From w/c to EOB- mod cues for sequencing.     ADDITIONAL ACTIVITIES:  Pt participated in dynamic sitting

## 2019-07-20 NOTE — PROGRESS NOTES
align his LEs before trying to stand, pt leaning L, R LE IR and abducted  Stand to Sit:Maximum Assistance cues to reach back and control descent  Stand Pivot:Maximum Assistance, EOB <> WC using RW, pt unsteady leaning heavily forward an to the L assist to stabilize and guide AD    Balance:  Static Standing Balance: Maximum Assistance +2 assist for standing to void with urinal per pt preference. Pt  Leaning heavily to L and forward, cues for foot placement and to keep hands on AD for safety. Stood ~3 mins  Pt stood at AD in front of mirror for postural feedback , pt leaning heavily forward and to the L, pt stood ~30\" needing assist to tie his bottoms/adjust them once completed pt then reports he has to pee. ASSESSMENT:  Assessment: Patient progressing toward established goals. Activity Tolerance:  Patient tolerance of  treatment: good. Pt unsteady on feet, demos decreased strength, balance, and independence with mobility. Pt will benefit from cont skilled PT at this time     Equipment Recommendations: Other: will continue to assess  Discharge Recommendations:  Discharge Recommendations: Continue to assess pending progress    Plan: Times per week: 5x/wk 90 min, 1x wk 30 min  Specific instructions for Next Treatment: core and LE strengthening ex, bed mobility, sitting balance . PT to assess transfers and gait when medically approved  Current Treatment Recommendations: Strengthening, Endurance Training, Balance Training, Functional Mobility Training, Safety Education & Training, Equipment Evaluation, Education, & procurement    Patient Education  Patient Education: Plan of Care, [employer] Mobility, Transfers    Goals:  Patient goals : Did not state  Short term goals  Time Frame for Short term goals: 1 week  Short term goal 1: Pt to go supine <->sit,  supervision A, to get in/out of bed.    Short term goal 2: sit <> stand with contact guard assist for ease of transfers  Short term goal 3: ambulate 15 feet with use of LRAD and modAx1 to prepare for in home mobility  Short term goal 4: WC mobility with mod I for 300ft to prepare for community mobility   Long term goals  Time Frame for Long term goals : 4 weeks   Long term goal 1: bed mobility with mod I for ease of getting in/out of bed  Long term goal 2: sit <> stand with mod I for safe of transfers   Long term goal 3: ambulate 75ft with use of LRAD and stand-by assist for safe in home mobility   Long term goal 4: negotiate 1 step no rails with stand-by assist and use of LRAD for safe entry/exit of his home     Following session, patient left in safe position with all fall risk precautions in place.

## 2019-07-20 NOTE — PROGRESS NOTES
[address]   SLP Individual Minutes  Time In: 0930  Time Out: 1000  Minutes: 30  Timed Code Treatment Minutes: 0 Minutes       [x]Daily Note  []Progress Note  []Discharge Note    Date: 2019  Patient Name: Knox Dance        MRN: [id number]    : 1960  (62 y.o.)  Gender: male   Primary Provider: Rebeca Arellano MD  Admitting Diagnosis:  Cerebellar infarct   Secondary Diagnosis: Cognitive deficits, Dysphagia, Dysphonia   Precautions: fall risk, aspiration risk  Swallowing Status/Diet: NPO, PEG tube in place for nutrition and hydration  Swallowing Strategies: NPO  DATE of last MBS:  2019  Pain:  None reported     Subjective: Pt seen upright in wheelchair, alert and cooperative. Pt highly receptive to PO intake of ice chips. Excellent participation in therapeutic exercises. No family present;     SHORT TERM GOAL #1:  Goal 1: Pt will safely tolerate PO trials of ice chips, thin liquids, and thin purees with ST ONLY to determine readiness to complete formal instrumentation. INTERVENTIONS: Ice chips x6 trialed this date. Pt with good overall manipulation of ice in oral cavity. Throat clear x2,, immediate cough x3 and delayed cough x4 noted throughout trials. Pt with productive cough x2, expelling mucous/phlemgm into tissue. Decreased hyolaryngeal elevation noted upon palpation. Pt demonstrating understanding of NPO recommendation this date, explaining to ST that he does not want food to \"cause him more trouble. \" Despite understanding, pt with continued desire for PO intake at this time. He reports lemon glycerin swabs help satisfy him. Pharyngeal strengthening exercises completed as follows:  -Effortful swallow x10 reps with good success. Pt with good effort throughout completion of exercises  -Janelle x8; use of lemon glycerin swab to assist with swallow initiation x2/8.  Pt with fair success completing of the time  Problem Solving: 3 - Patient solves simple/routine tasks 50%-74%  Memory: 3 - Patient remembers 50%-74% of the time    ASSESSMENT:  Assessment: [x]Progressing towards goals          []Not Progressing towards goals       Patient Tolerance of Treatment:   [x]Tolerated well []Tolerated fair []Required rest breaks []Fatigued  Plan for Next Session: pharyngeal strengthening exercises   Education:  Learner:  [x]Patient          []Significant Other          []Other  Education provided regarding:  [x]Goals and POC   [x]Diet and swallowing precautions    [x]Home Exercise Program  [x]Progress and/or discharge information  Method of Education:  [x]Discussion          [x]Demonstration          [x]Handout          []Other  Evaluation of Education:   [x]Verbalized understanding   [x]Demonstrates without assistance  []Demonstrates with assistance  [x]Needs further instruction     []No evidence of learning                  [x]No family present      Plan: [x]Continue with current plan of care    []Modify current plan of care as follows:    []Discharge patient    Discharge Location:    Services/Supervision Recommended:     [x]Patient continues to require treatment by a licensed therapist to address functional deficits as outlined in the established plan of care.     Rianna Gutierrez M.S., CF-SLP

## 2019-07-21 PROCEDURE — 6360000002 HC RX W HCPCS: Performed by: STUDENT IN AN ORGANIZED HEALTH CARE EDUCATION/TRAINING PROGRAM

## 2019-07-21 PROCEDURE — 6370000000 HC RX 637 (ALT 250 FOR IP): Performed by: PHYSICAL MEDICINE & REHABILITATION

## 2019-07-21 PROCEDURE — 2580000003 HC RX 258: Performed by: STUDENT IN AN ORGANIZED HEALTH CARE EDUCATION/TRAINING PROGRAM

## 2019-07-21 PROCEDURE — 1180000000 HC REHAB R&B

## 2019-07-21 PROCEDURE — 2709999900 HC NON-CHARGEABLE SUPPLY

## 2019-07-21 PROCEDURE — 6360000002 HC RX W HCPCS: Performed by: PHYSICAL MEDICINE & REHABILITATION

## 2019-07-21 PROCEDURE — 6370000000 HC RX 637 (ALT 250 FOR IP): Performed by: STUDENT IN AN ORGANIZED HEALTH CARE EDUCATION/TRAINING PROGRAM

## 2019-07-21 RX ADMIN — MICONAZOLE NITRATE: 20 POWDER TOPICAL at 10:29

## 2019-07-21 RX ADMIN — ASPIRIN 81 MG: 81 TABLET ORAL at 10:08

## 2019-07-21 RX ADMIN — Medication 100 MG: at 22:00

## 2019-07-21 RX ADMIN — ACETAMINOPHEN 650 MG: 650 SOLUTION ORAL at 04:58

## 2019-07-21 RX ADMIN — BUSPIRONE HYDROCHLORIDE 10 MG: 10 TABLET ORAL at 22:00

## 2019-07-21 RX ADMIN — FAMOTIDINE 20 MG: 20 TABLET ORAL at 22:00

## 2019-07-21 RX ADMIN — Medication 10 ML: at 10:11

## 2019-07-21 RX ADMIN — ATORVASTATIN CALCIUM 80 MG: 80 TABLET, FILM COATED ORAL at 22:00

## 2019-07-21 RX ADMIN — AMOXICILLIN AND CLAVULANATE POTASSIUM 1 TABLET: 875; 125 TABLET, FILM COATED ORAL at 10:08

## 2019-07-21 RX ADMIN — SENNOSIDES 17.2 MG: 8.6 TABLET, FILM COATED ORAL at 21:59

## 2019-07-21 RX ADMIN — MORPHINE SULFATE 10 MG: 10 SOLUTION ORAL at 10:09

## 2019-07-21 RX ADMIN — CLONAZEPAM 1 MG: 0.5 TABLET ORAL at 21:59

## 2019-07-21 RX ADMIN — FAMOTIDINE 20 MG: 20 TABLET ORAL at 10:08

## 2019-07-21 RX ADMIN — MORPHINE SULFATE 10 MG: 10 SOLUTION ORAL at 22:04

## 2019-07-21 RX ADMIN — AMOXICILLIN AND CLAVULANATE POTASSIUM 1 TABLET: 875; 125 TABLET, FILM COATED ORAL at 22:00

## 2019-07-21 RX ADMIN — RIVAROXABAN 20 MG: 20 TABLET, FILM COATED ORAL at 18:35

## 2019-07-21 RX ADMIN — MICONAZOLE NITRATE: 20 POWDER TOPICAL at 22:03

## 2019-07-21 RX ADMIN — HEPARIN 500 UNITS: 100 SYRINGE at 16:55

## 2019-07-21 RX ADMIN — SERTRALINE 200 MG: 100 TABLET, FILM COATED ORAL at 10:11

## 2019-07-21 RX ADMIN — Medication 100 MG: at 10:13

## 2019-07-21 RX ADMIN — MORPHINE SULFATE 10 MG: 10 SOLUTION ORAL at 02:57

## 2019-07-21 RX ADMIN — MAGNESIUM HYDROXIDE 30 ML: 400 SUSPENSION ORAL at 18:35

## 2019-07-21 ASSESSMENT — PAIN SCALES - GENERAL
PAINLEVEL_OUTOF10: 5
PAINLEVEL_OUTOF10: 3
PAINLEVEL_OUTOF10: 8
PAINLEVEL_OUTOF10: 7

## 2019-07-21 NOTE — PROGRESS NOTES
Patient cooperated with staff today with activity including ride in hallway with staff. Patient sat in recliner for afternoon, patient used incentive spirometer after nurse educated him on the importance of it's use in combination with activity outside of bed.

## 2019-07-22 PROCEDURE — 97530 THERAPEUTIC ACTIVITIES: CPT

## 2019-07-22 PROCEDURE — 1180000000 HC REHAB R&B

## 2019-07-22 PROCEDURE — 6370000000 HC RX 637 (ALT 250 FOR IP): Performed by: PHYSICAL MEDICINE & REHABILITATION

## 2019-07-22 PROCEDURE — 97127 HC SP THER IVNTJ W/FOCUS COG FUNCJ: CPT

## 2019-07-22 PROCEDURE — 97112 NEUROMUSCULAR REEDUCATION: CPT

## 2019-07-22 PROCEDURE — 6370000000 HC RX 637 (ALT 250 FOR IP): Performed by: STUDENT IN AN ORGANIZED HEALTH CARE EDUCATION/TRAINING PROGRAM

## 2019-07-22 PROCEDURE — 6360000002 HC RX W HCPCS: Performed by: PHYSICAL MEDICINE & REHABILITATION

## 2019-07-22 PROCEDURE — 92526 ORAL FUNCTION THERAPY: CPT

## 2019-07-22 PROCEDURE — 97535 SELF CARE MNGMENT TRAINING: CPT

## 2019-07-22 PROCEDURE — 97110 THERAPEUTIC EXERCISES: CPT

## 2019-07-22 RX ORDER — SODIUM CHLORIDE 0.9 % (FLUSH) 0.9 %
10 SYRINGE (ML) INJECTION EVERY 12 HOURS SCHEDULED
Status: DISCONTINUED | OUTPATIENT
Start: 2019-07-22 | End: 2019-08-14 | Stop reason: HOSPADM

## 2019-07-22 RX ORDER — MIDODRINE HYDROCHLORIDE 2.5 MG/1
2.5 TABLET ORAL
Status: DISCONTINUED | OUTPATIENT
Start: 2019-07-23 | End: 2019-07-24

## 2019-07-22 RX ORDER — SODIUM CHLORIDE 0.9 % (FLUSH) 0.9 %
10 SYRINGE (ML) INJECTION PRN
Status: DISCONTINUED | OUTPATIENT
Start: 2019-07-22 | End: 2019-08-14 | Stop reason: HOSPADM

## 2019-07-22 RX ADMIN — FAMOTIDINE 20 MG: 20 TABLET ORAL at 22:31

## 2019-07-22 RX ADMIN — MICONAZOLE NITRATE: 20 POWDER TOPICAL at 09:52

## 2019-07-22 RX ADMIN — CLONAZEPAM 1 MG: 0.5 TABLET ORAL at 22:32

## 2019-07-22 RX ADMIN — BUSPIRONE HYDROCHLORIDE 10 MG: 10 TABLET ORAL at 22:31

## 2019-07-22 RX ADMIN — RIVAROXABAN 20 MG: 20 TABLET, FILM COATED ORAL at 17:31

## 2019-07-22 RX ADMIN — ACETAMINOPHEN 650 MG: 650 SUPPOSITORY RECTAL at 16:39

## 2019-07-22 RX ADMIN — MORPHINE SULFATE 10 MG: 10 SOLUTION ORAL at 02:34

## 2019-07-22 RX ADMIN — Medication 100 MG: at 22:29

## 2019-07-22 RX ADMIN — HEPARIN 500 UNITS: 100 SYRINGE at 10:39

## 2019-07-22 RX ADMIN — AMOXICILLIN AND CLAVULANATE POTASSIUM 1 TABLET: 875; 125 TABLET, FILM COATED ORAL at 09:50

## 2019-07-22 RX ADMIN — SERTRALINE 200 MG: 100 TABLET, FILM COATED ORAL at 09:53

## 2019-07-22 RX ADMIN — MORPHINE SULFATE 10 MG: 10 SOLUTION ORAL at 14:08

## 2019-07-22 RX ADMIN — ASPIRIN 81 MG: 81 TABLET ORAL at 09:57

## 2019-07-22 RX ADMIN — FAMOTIDINE 20 MG: 20 TABLET ORAL at 09:57

## 2019-07-22 RX ADMIN — MICONAZOLE NITRATE: 20 POWDER TOPICAL at 22:33

## 2019-07-22 RX ADMIN — MORPHINE SULFATE 10 MG: 10 SOLUTION ORAL at 22:29

## 2019-07-22 RX ADMIN — ONDANSETRON HYDROCHLORIDE 4 MG: 4 TABLET, FILM COATED ORAL at 22:31

## 2019-07-22 RX ADMIN — ERGOCALCIFEROL 50000 UNITS: 1.25 CAPSULE ORAL at 09:53

## 2019-07-22 RX ADMIN — AMOXICILLIN AND CLAVULANATE POTASSIUM 1 TABLET: 875; 125 TABLET, FILM COATED ORAL at 22:32

## 2019-07-22 RX ADMIN — ATORVASTATIN CALCIUM 80 MG: 80 TABLET, FILM COATED ORAL at 22:32

## 2019-07-22 RX ADMIN — MORPHINE SULFATE 10 MG: 10 SOLUTION ORAL at 09:58

## 2019-07-22 RX ADMIN — MORPHINE SULFATE 10 MG: 10 SOLUTION ORAL at 18:26

## 2019-07-22 RX ADMIN — SENNOSIDES 17.2 MG: 8.6 TABLET, FILM COATED ORAL at 22:31

## 2019-07-22 RX ADMIN — ONDANSETRON HYDROCHLORIDE 4 MG: 4 TABLET, FILM COATED ORAL at 02:34

## 2019-07-22 RX ADMIN — Medication 100 MG: at 09:58

## 2019-07-22 RX ADMIN — ONDANSETRON HYDROCHLORIDE 4 MG: 4 TABLET, FILM COATED ORAL at 09:57

## 2019-07-22 ASSESSMENT — PAIN SCALES - GENERAL
PAINLEVEL_OUTOF10: 8
PAINLEVEL_OUTOF10: 3
PAINLEVEL_OUTOF10: 0
PAINLEVEL_OUTOF10: 4
PAINLEVEL_OUTOF10: 8
PAINLEVEL_OUTOF10: 8
PAINLEVEL_OUTOF10: 0

## 2019-07-22 ASSESSMENT — PAIN DESCRIPTION - PAIN TYPE
TYPE: CHRONIC PAIN

## 2019-07-22 ASSESSMENT — PAIN DESCRIPTION - DESCRIPTORS: DESCRIPTORS: ACHING;CONSTANT

## 2019-07-22 ASSESSMENT — PAIN DESCRIPTION - LOCATION
LOCATION: ABDOMEN
LOCATION: BACK;RIB CAGE
LOCATION: ABDOMEN

## 2019-07-22 ASSESSMENT — PAIN DESCRIPTION - ORIENTATION
ORIENTATION: LEFT

## 2019-07-22 ASSESSMENT — PAIN DESCRIPTION - FREQUENCY: FREQUENCY: CONTINUOUS

## 2019-07-22 ASSESSMENT — PAIN DESCRIPTION - DIRECTION: RADIATING_TOWARDS: TO MID BACK

## 2019-07-22 ASSESSMENT — PAIN DESCRIPTION - ONSET: ONSET: ON-GOING

## 2019-07-22 ASSESSMENT — PAIN - FUNCTIONAL ASSESSMENT: PAIN_FUNCTIONAL_ASSESSMENT: PREVENTS OR INTERFERES SOME ACTIVE ACTIVITIES AND ADLS

## 2019-07-22 ASSESSMENT — PAIN DESCRIPTION - PROGRESSION: CLINICAL_PROGRESSION: NOT CHANGED

## 2019-07-22 NOTE — PROGRESS NOTES
anticoagulation status. 21. 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 Hali Harris. 22. Hypertension. 23. COPD. 24. Type 2 DM, controlled.  Last HgbA1c 5.1 on 7/12/2019.  25. 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 26. Osteopenia. 27. Nicotine dependence; current every day smoker. 28. Marijuana use. Last positive UDS 7/11/2019.  29. History of anxiety and depression. 30. Medication non-compliance. 31. Hypovitaminosis D.  32. Hypovitaminosis D.  33. Orthostatic hypotension. 34. Insomnia. 35. Aspiration pneumonia with sepsis. 36. Near complete atelectasis of the RIGHT lung with mediastinal shift to the right, secondary to obstruction of the right mainstem bronchus with mucus plug. 37. History ANTHONY with use of CPAP.      Altagracia Celaya MD

## 2019-07-22 NOTE — PROGRESS NOTES
hydration levels. WithOUT ST services, pt would not make a safe return to PLOF and would chronically be at HIGH risk for aspiration. OCCUPATIONAL THERAPY  Pt is making progress towards his goals, though no goals met yet due to short length of stay. Pt is limited by fatigue, nausea overall and both impacted his participation with OT today with OT missing 48 minutes. Pt demonstrates decreased insight into his deficits and tends to perseverate on eating food, though he is NPO. PT continues to demonstrate L hemibody weakness decreased midline orientation, decreased safety awareness impacting patient's ability to complete self care at prior level of functioning. Due to patient's performance deficits, patient would greatly benefit from continued occupational therapy for ADL remediation, endurance building, strengthening and neuro re-education to return to prior level of functioning and safe return to home environment. Equipment Needed: Yes  ADL Assistive Devices: Toileting - Raised Toilet Seat with arms    RECREATIONAL THERAPY  Patient has been offered participation in recreational therapy activities and participates as able. Chart indicates Pt. Being independent with bathing, dressing but needs assistance with driving.- Pt. Used to enjoy fishing a lot.- Pt. Likes to watch movies.- Pt. Enjoys playing card games such as TutorialTab, [place] hold 'em, and Crush on original products.- Pt. Likes to complete arts and crafts.- Pt. Enjoys bingo.- Pt. Likes to play games on the wii.- Pt's affect was bright when talking about leisure interests and family.- Pt. Was sociable and pleasant with RT student.-Upon arrival, Pt. Was laying in the bed with sheets over his head. - RT student asked Pt. If he'd like to join his peers in the dining room  later in the afternoon for activity .- Pt. Denied stating that he doesn't feel too good at the moment, but he'll attend a group activity tomorrow.     NUTRITION  Weight: 175 lb 3.2 oz (79.5 kg) / Body mass index is 23.76 kg/m². Current diet: DIET TUBE FEED CONTINUOUS/CYCLIC NPO; Fluid Restricted (Two Konrad HN); Gastrostomy; Continuous; 20; 50  Please see nutrition note for details. NURSING  Continent of Bowel and Bladder: No, both bowel and bladder  Pain is Managed:  Yes  Signs and Symptoms of Infection:  No  Signs and Symptoms of Skin Breakdown:  Yes, redness over coccyx and abdominal folds  Injury and Fall Free during Inpatient Rehabilitation Admission: Yes    Consultations/Labs/X-rays: Trauma Surgery, Neurology, Gastroenterology, Pain Management, Internal Medicine, Physical Medicine    Stroke Premorbid Conditions: Male over 54, HTN, ANTHONY, History of DVT/PE, NSTEMI, Malignancy, medication non-compliance    Diabetes: No results for input(s): POCGLU in the last 72 hours. Hyperlipidemia:   Lab Results   Component Value Date    LDLCALC 62 07/12/2019     Patient is on statin? Yes    Hypertension:   Vitals:    07/22/19 0945 07/22/19 1345 07/22/19 1413 07/22/19 2138   BP: (!) 90/52   107/60   Pulse: 66 67 67 62   Resp: 18   16   Temp: 98 °F (36.7 °C)   98.5 °F (36.9 °C)   TempSrc: Oral   Oral   SpO2: 98%   93%   Weight:       Height:          Patient is on Blood Pressure Medicine?  NA    Stroke Prophylaxis: Patient with ischemic CVA and is on medication to prevent recurrent CVA    Family Education: Need to make contact with family to initiate education  Fall Risk:  Falling star program initiated  Is the patient appropriate for a stay in the functional apartment? no    Discharge Plan   Estimated Discharge Date: reassess next week  Destination: undetermined at this time  Services at Discharge: undetermined at this time  Is patient appropriate for an outpatient driving evaluation? no  Equipment at Discharge: undetermined at this time  Factors facilitating achievement of predicted outcomes: Family support, Motivated, Cooperative, Pleasant, Has needed Durable Medical Equipment at home and Home is wheelchair accessible  Barriers to the achievement of predicted outcomes: Pain, Cognitive deficit, Limited insight into deficits, Unrealistic expectations, Upper extremity weakness, Lower extremity weakness, Long standing deficits, Impaired vision, Hypotension, and PEG tube status    Team Members Present at Conference:  :Irais Morillo [place]  Occupational Therapist: Lawanda Kennedy OTR/L [id number]. Physical Therapist: Gillie Lesches, PT [id number]  Speech Therapist: Stacey Green Prashanth [address], [address] Nurse: Tameka Brandon, ALBIN   Psychologist: Orin Carroll, PhD.    I approve the established interdisciplinary plan of care as documented within the medical record of Lily Baker

## 2019-07-22 NOTE — PROGRESS NOTES
[address]   SLP Individual Minutes  Time In: 0674  Time Out: 1230  Minutes: 24  Timed Code Treatment Minutes: 12 Minutes      Dysphagia Treatment: 12 minutes  Cognitive Treatment: 12 minutes     []Daily Note  [x]Progress Note  []Discharge Note    Date: 2019  Patient Name: Parker Newsome        MRN: [id number]    : 1960  (62 y.o.)  Gender: male   Primary Provider: Ed Dueñas MD  Admitting Diagnosis:  Cerebellar infarct   Secondary Diagnosis: Cognitive deficits, Dysphagia, Dysphonia   Precautions: fall risk, aspiration risk  Swallowing Status/Diet: NPO, PEG tube in place for nutrition and hydration  Swallowing Strategies: NPO  DATE of last MBS:  2019  Pain:  None reported     Subjective: Pt seen following PT services. Pt alert and pleasant for session, immediately requesting \"water\" upon ST arrival.     SHORT TERM GOAL #1:  Goal 1: Pt will safely tolerate PO trials of ice chips, thin liquids, and thin purees with ST ONLY to determine readiness to complete formal instrumentation. - GOAL NOT MET, CONTINUE  INTERVENTIONS: ST with provision of x1 ice chip this date. Pt with wet, immediate cough x3 followed by additional delayed coughing. Pt with eventual expulsion of phlegm into tissue x4 throughout remainder of session.    *ST discussed potential completion of planned FEES for tomorrow or subsequent session; pt verbalized understanding and agreement     Pharyngeal strengthening exercises completed:  -Effortful swallow x4 (with rest break senior care)  *decreased elevation noted evidenced by manual palpation; pt reporting, \"I just cant do it today\"  *ST suggested HEP be completed throughout rest of day for additional swallow completions      SHORT TERM GOAL #2:  Goal 2: Pt will exhibit return demonstration on need for completion of comprehensive oral care with 90% accuracy given no cues to reduce potential bacteria colonization and to maintain an improved oral cavity appearance. - GOAL NOT CONSISTENTLY MET, CONTINUE  INTERVENTIONS: Pt with independent explanation of reasoning behind need for comprehensive oral care; improved insight noted. SHORT TERM GOAL #3:  Goal 3: Pt will complete immediate, delayed, and working memory tasks with 60% accuracy given mod cues to improve retention of pertinent information.  - GOAL NOT FULLY MET, CONTINUE   INTERVENTIONS: Immediate recall task; 2 sentence retention- 8/10 indep, 1/10 min cues, 1/10 mod cues  *good processing speed demonstrated    Immediate recall of picture scene- 8/10 indep, 2/10 min cues  *ST discussed rationale behind use of visualization strategies to improve recall    SHORT TERM GOAL #4:  Goal 4: Pt will complete problem solving, verbal/visual reasoning (time, money/math/finances/medications), and thought organization tasks with 70% accuracy given mod cues to improve independence with IADL completion. - GOAL MET   REVISED GOAL: Pt will complete problem solving, verbal/visual reasoning (time, money/math/finances/medications), and thought organization tasks with 70% accuracy given min cues to improve independence with IADL completion. INTERVENTIONS: Interpretation of calendar- 7/8 indep, 1/8 min cues  *good visual scanning  *adequate time management skills noted     SHORT TERM GOAL #5:  Goal 5: Pt will complete sustained attention tasks with no more than 3 re-directions within 3-5 minute time span to improve participation within therapies and ADLs.- GOAL NOT MET, CONTINUE   INTERVENTIONS: DNT due to focus on other goals. No data yet obtained d/t treatment focus on swallowing and short rehab stay thus far.       Long-term Goals  Timeframe for Long-term Goals: 4 weeks  Goal 1: Pt will safely tolerate puree diet with thin liquid without difficulty or overt s/s of aspiration. - GOAL NOT MET, CONTINUE  Goal 2: Pt will improve cognitive functioning to a superivision/mod I

## 2019-07-22 NOTE — PROGRESS NOTES
FIMS: Comprehension: 5 - Patient understands basic needs (hungry/hot/pain)  Expression: 5 - Expresses basic ideas/needs only (hungry/hot/pain)  Social Interaction: 4 - Patient appropriate 75-90%+ of the time  Problem Solving: 3 - Patient solves simple/routine tasks 50%-74%  Memory: 3 - Patient remembers 50%-74% of the time    Review of Systems:  CONSTITUTIONAL:  positive for fatigue  EYES:  positive for  blind spots  HEENT:  negative  RESPIRATORY:  positive for  dyspnea  CARDIOVASCULAR:  negative  GASTROINTESTINAL:  positive for dysphagia and PEG  GENITOURINARY:  negative  SKIN:  negative  HEMATOLOGIC/LYMPHATIC:  negative  MUSCULOSKELETAL:  positive for  myalgias, arthralgias, pain and muscle weakness  NEUROLOGICAL:  positive for headaches, memory problems, visual disturbance, coordination problems, gait problems, dysphagia, pain, and weakness, Dizziness  BEHAVIOR/PSYCH:  negative  10 point system review otherwise negative    Objective:  /60   Pulse 62   Temp 98.5 °F (36.9 °C) (Oral)   Resp 16   Ht 6' (1.829 m)   Wt 175 lb 3.2 oz (79.5 kg)   SpO2 93%   BMI 23.76 kg/m²   CURRENT VITALS:  height is 6' (1.829 m) and weight is 175 lb 3.2 oz (79.5 kg). His oral temperature is 98.5 °F (36.9 °C). His blood pressure is 107/60 and his pulse is 62. His respiration is 16 and oxygen saturation is 93%. Body mass index is 23.76 kg/m².   Temperature Range (24h):Temp: 98.5 °F (36.9 °C) Temp  Av.3 °F (36.8 °C)  Min: 98 °F (36.7 °C)  Max: 98.5 °F (36.9 °C)  BP Range (47U): Systolic (84AGZ), BNL:67 , Min:90 , XRN:519     Diastolic (59GGH), VTV:10, Min:52, Max:60    Pulse Range (24h): Pulse  Av.5  Min: 62  Max: 67  Respiration Range (24h): Resp  Av  Min: 16  Max: 18  Current Pulse Ox (24h):  SpO2: 93 %  Pulse Ox Range (24h):  SpO2  Av.5 %  Min: 93 %  Max: 98 %  Oxygen Amount and Delivery: O2 Flow Rate (L/min): 4 L/min    awake  Orientation:   person, place, time, situation  Mood: anxious  Affect: flat  General appearance:  in no acute distress, PEG, up in bed     Memory:   grossly normal  Attention/Concentration: abnormal - distractible  Language:  normal     Cranial Nerves:  cranial nerves II-XII are grossly intact with exception of LEFT hemianopsia   ROM:  abnormal - limited RIGHT knee extension - lacking 20deg  Motor Exam:  Motor exam is 4 out of 5 all extremities with the exception of RIGHT ADF 1/5     Tone:  normal  Muscle bulk: within normal limits  Sensory:  Sensory intact  Coordination:   Decreased LEFT SUDARSHAN, FFM, FTN, and HTS  Deep Tendon Reflexes:  Reflexes are intact and symmetrical bilaterally     Skin: warm and dry, no rash or erythema  Peripheral vascular: Pulses: Normal upper and lower extremity pulses; Edema: trace    Diagnostics:   No results found for this or any previous visit (from the past 24 hour(s)). Impression:  1. Acute CVA  large area of restricted diffusion involving the inferior medial aspect of the LEFT cerebellar hemisphere as well as the LEFT side of the medulla. 2. Chronic CVA with large infarct in the RIGHT posterior cerebral artery territory secondary to high-grade stenosis at the P1 segment of the RIGHT posterior cerebral artery without definite distal flow involving the RIGHT occipital lobe and tracking anteriorly along the medial aspect of the RIGHT temporal lobe with associated local mass effect with sulcal effacement without midline shift or associated hemorrhage.   3. Gait instability. 4. Fall. 5. Concussion without LOC. 6. Physical debility with generalized weakness. 7. Healthcare associated pneumonia versus Aspiration pneumonia. 8. Acute hypoxic respiratory failure.   9. Fracture of the lateral ninth rib. 10. Peripheral vision loss LEFT > RIGHT. 11. Mild LEFT hemiparesis and ataxia. 12. Severe malnutrition. 13. Mild-moderate oral dysphagia and moderate to severe pharyngeal dysphagia.   14. S/p PEG placement on 7/11/2019 the RIGHT posterior cerebral artery without definite distal flow involving the RIGHT occipital lobe and tracking anteriorly along the medial aspect of the RIGHT temporal lobe with associated local mass effect with sulcal effacement without midline shift or associated hemorrhage.   1. Secondary stroke prevention. 2. Gait instability/Fall/Physical debility with generalized weakness/Peripheral vision loss LEFT > RIGHT/Mild LEFT hemiparesis and ataxia. 1. PT/OT. 3. Concussion without LOC. 1. SLP. 4. Healthcare associated pneumonia versus Aspiration pneumonia/Acute hypoxic respiratory failure. 1. Zosyn completed; switched to Augmentin for 7 days. 5. Fracture of the lateral ninth rib. 1. Pain control. 2. ICS. 6. Mild cognitive deficit.  (MOCA) version 8.2 completed.  Patient scored 20/30 over prior ([address] [place] [address]) version 7.2 completed.  Patient scored 15/25.  *adjusted score given pt unable to complete written subtests (L hand dominant)/Mild-moderate oral dysphagia and moderate to severe pharyngeal dysphagia. 1. SLP. 2. Plan is for fees study on . 3. Patient did trial ice chips on , unsuccessfully. 7. Nutrition:  Consultation to dietician for nutritional counseling and recommendations. 1. Severe malnutrition. 2. S/p PEG placement on 2019 by Dr. Yanet Varela. 3. Wound care. 4. TotP 5.8, alb 2.4, Vitamin 25OH level of 16 on 2019.  5. Continue with tube feeds; nutrition has recommended bolus tube feeds with the plan to start those on . 6. Ergocalciferol every 3 days x 11 doses. 8. Orthostatic hypotension. 1. Supine: 124/60. Sittin/61 and standing was 88/61 on . 2. Start Midodrine 2.5mg TID on .   9. Electrolytes. 1. Normal on .  10. Bladder: No issues. 11. Bowel: Senna, colace, MOM  12. Rehabilitation nursing will be involved for bowel, bladder, skin, and pain management. Nursing will also provide education and training to patient and family. 13. Prophylaxis:  DVT: Xarelto.

## 2019-07-22 NOTE — PROGRESS NOTES
from cont PT to inc functional mobility  Activity Tolerance:  Patient tolerance of  treatment: fair. Limited due to increased dizziness and fatigue,  Unable to tolerate ambulation this date due to items listed above and BP issues. Equipment Recommendations: Other: will continue to assess  Discharge Recommendations:  Discharge Recommendations: Continue to assess pending progress    Plan: Times per week: 5x/wk 90 min, 1x wk 30 min  Specific instructions for Next Treatment: core and LE strengthening ex, bed mobility, sitting balance . PT to assess transfers and gait when medically approved  Current Treatment Recommendations: Strengthening, Endurance Training, Balance Training, Functional Mobility Training, Safety Education & Training, Equipment Evaluation, Education, & procurement    Patient Education  Patient Education: Plan of Care, Precautions/Restrictions, [employer] Mobility, Transfers, Reviewed Prior Education, Wheelchair Mobility, safety, midline orientation,  - Patient Verbalized Understanding, - Patient Requires Continued Education    Goals:  Patient goals : Did not state  Short term goals  Time Frame for Short term goals: 1 week  Short term goal 1: Pt to go supine <->sit,  supervision A, to get in/out of bed.   NOT MET   Short term goal 2: sit <> stand with contact guard assist for ease of transfers NOT MET   Short term goal 3: ambulate 15 feet with use of LRAD and modAx1 to prepare for in home mobility-NOT ADDRESSED   Short term goal 4: WC mobility with mod I for 300ft to prepare for community mobility -NOT MET   Long term goals  Time Frame for Long term goals : 4 weeks   Long term goal 1: bed mobility with mod I for ease of getting in/out of bed-NOT MET   Long term goal 2: sit <> stand with mod I for safe of transfers -NOT MET   Long term goal 3: ambulate 75ft with use of LRAD and stand-by assist for safe in home mobility -NOT ADDRESSED   Long term goal 4: negotiate 1 step no rails with stand-by assist and use of LRAD for safe entry/exit of his home -NOT ADDRESSED     Following session, patient left in safe position with all fall risk precautions in place. Treatment session and note completed by Kennedi Gr PTA.   Assessment and goal revision of Progress Note completed by María Valdez PT.

## 2019-07-22 NOTE — PROGRESS NOTES
[facility]  INPATIENT PHYSICAL THERAPY  DAILY NOTE  [facility] - [id number]    Time In: 1330  Time Out: 1400  Timed Code Treatment Minutes: 30 Minutes  Minutes: 30          Date: 2019  Patient Name: Frances Mario,  Gender:  male        MRN: [id number]  : 1960  (62 y.o.)     Referring Practitioner: Stacey Baldwin MD  Diagnosis: cerebellar infarct   Additional Pertinent Hx: Pt admitted through ED due to weakness and pain s/p fall at home. Hx:  CA, CVA. x-ray Lt 9th rib fx. MRI:  remote infarct, restricted diffusion inferior medial aspect LT cerebellar hemisphere and medula. Pt admited to hospital on 2019 and transferred to rehab on . Prior Level of Function:  Lives With: Alone  Type of Home: Apartment  Home Layout: One level  Home Access: Stairs to enter without rails  Entrance Stairs - Number of Steps: 1  Home Equipment: [employer], 4 wheeled walker, Rolling walker, Quad cane(hospital bed with bed rails)    Restrictions/Precautions:  Restrictions/Precautions: Fall Risk, General Precautions(NPO)  Position Activity Restriction  Other position/activity restrictions: PEG , oxygen, impulsive, unsteady, left ninth rib fracture and also old rib fractures on the right side    SUBJECTIVE: Patient sitting up in [address] upon arrival, agreed and cooperative for therapy. RN needing to get orthostatic BP (per MD) readings during session. Supine:  124/60. Sittin/61 and standing was 88/61. Patient reports feeling dizzy when going from supine-> sitting. MD notified of readings.       PAIN: No pain reported to therapist     OBJECTIVE:  Bed Mobility:  Supine to Sit: Contact Guard Assistance, with head of bed raised, with verbal cues , 30 degree HOB for peg tube precautions  Sit to Supine: Minimal Assistance, X 1, with verbal cues , to lower trunk onto side secondary to dizziness   Scooting: Contact Guard Assistance    Transfers:  Sit to Stand: Minimal

## 2019-07-22 NOTE — PROGRESS NOTES
level of functioning and safe return to home environment. Discharge Recommendations: Home with Home health OT  Equipment Recommendations: Equipment Needed: Yes  ADL Assistive Devices: Toileting - Raised Toilet Seat with arms  Plan: Times per week: 5xs week x 90 min, 1 x week x 30 min   Current Treatment Recommendations: Strengthening, Endurance Training, Neuromuscular Re-education, Patient/Caregiver Education & Training, Equipment Evaluation, Education, & procurement, Self-Care / ADL, Balance Training, Functional Mobility Training, Safety Education & Training    Patient Education  Patient Education: Adaptive ADL Techniques, Orientation, Reviewed Prior Education and Home Safety    Goals  Short term goals  Time Frame for Short term goals: 1 week  Short term goal 1: Pt will dynamically sit EOB for 5 minutes with SBA to improve independence with bathing/dressing tasks EOB. NOT MET CONTINUE   Short term goal 2: Pt will tolerate static standing for 1 minute with mod A and min cues for midline orientation in prep for washing hands at sink. NOT MET CONTINUE   Short term goal 3: Pt will complete stand pivot transfers with mod A x1 and min verbal cues for safety to improve independence with w/c transfers NOT MET CONTINUE   Short term goal 4: Pt will improve LUE coordination as evidenced by completion of 9 hole peg test in < 4 minutes to improve indep with gathering items. NOT MET CONTINUE   Short term goal 5: Pt will tolerate further assessment of functional mobility by OTR when appropriate to improve indep with ambulating to toilet. NOT MET CONTINUE   Long term goals  Time Frame for Long term goals : 3 weeks  Long term goal 1: Pt will complete ADL routine with SBA and no cues for safety or adapted technique to increase ability to perform self care tasks NOT MET CONTINUE   Long term goal 2: Pt will complete light standing IADL task using RW with SBA and no cues for walker safety to improve indep within home.   NOT MET CONTINUE     Following session, patient left in safe position with all fall risk precautions in place.

## 2019-07-23 PROCEDURE — 6360000002 HC RX W HCPCS: Performed by: PHYSICAL MEDICINE & REHABILITATION

## 2019-07-23 PROCEDURE — 97110 THERAPEUTIC EXERCISES: CPT

## 2019-07-23 PROCEDURE — 2580000003 HC RX 258: Performed by: PHYSICAL MEDICINE & REHABILITATION

## 2019-07-23 PROCEDURE — 6370000000 HC RX 637 (ALT 250 FOR IP): Performed by: PHYSICAL MEDICINE & REHABILITATION

## 2019-07-23 PROCEDURE — 97530 THERAPEUTIC ACTIVITIES: CPT

## 2019-07-23 PROCEDURE — 1180000000 HC REHAB R&B

## 2019-07-23 PROCEDURE — 97127 HC SP THER IVNTJ W/FOCUS COG FUNCJ: CPT | Performed by: SPEECH-LANGUAGE PATHOLOGIST

## 2019-07-23 PROCEDURE — 2709999900 HC NON-CHARGEABLE SUPPLY

## 2019-07-23 PROCEDURE — 97542 WHEELCHAIR MNGMENT TRAINING: CPT

## 2019-07-23 PROCEDURE — 97535 SELF CARE MNGMENT TRAINING: CPT

## 2019-07-23 PROCEDURE — 99232 SBSQ HOSP IP/OBS MODERATE 35: CPT | Performed by: PAIN MEDICINE

## 2019-07-23 PROCEDURE — 6370000000 HC RX 637 (ALT 250 FOR IP): Performed by: STUDENT IN AN ORGANIZED HEALTH CARE EDUCATION/TRAINING PROGRAM

## 2019-07-23 PROCEDURE — 92526 ORAL FUNCTION THERAPY: CPT | Performed by: SPEECH-LANGUAGE PATHOLOGIST

## 2019-07-23 RX ORDER — TRAZODONE HYDROCHLORIDE 100 MG/1
100 TABLET ORAL NIGHTLY
Status: DISCONTINUED | OUTPATIENT
Start: 2019-07-23 | End: 2019-08-14 | Stop reason: HOSPADM

## 2019-07-23 RX ADMIN — MICONAZOLE NITRATE: 20 POWDER TOPICAL at 20:32

## 2019-07-23 RX ADMIN — MIDODRINE HYDROCHLORIDE 2.5 MG: 2.5 TABLET ORAL at 13:01

## 2019-07-23 RX ADMIN — MICONAZOLE NITRATE: 20 POWDER TOPICAL at 08:55

## 2019-07-23 RX ADMIN — RIVAROXABAN 20 MG: 20 TABLET, FILM COATED ORAL at 17:51

## 2019-07-23 RX ADMIN — ASPIRIN 81 MG: 81 TABLET ORAL at 08:53

## 2019-07-23 RX ADMIN — Medication 10 ML: at 08:48

## 2019-07-23 RX ADMIN — MORPHINE SULFATE 10 MG: 10 SOLUTION ORAL at 13:00

## 2019-07-23 RX ADMIN — ACETAMINOPHEN 650 MG: 650 SOLUTION ORAL at 17:51

## 2019-07-23 RX ADMIN — ONDANSETRON HYDROCHLORIDE 4 MG: 4 TABLET, FILM COATED ORAL at 17:50

## 2019-07-23 RX ADMIN — TRAZODONE HYDROCHLORIDE 100 MG: 100 TABLET ORAL at 20:10

## 2019-07-23 RX ADMIN — HEPARIN 500 UNITS: 100 SYRINGE at 08:46

## 2019-07-23 RX ADMIN — MIDODRINE HYDROCHLORIDE 2.5 MG: 2.5 TABLET ORAL at 08:47

## 2019-07-23 RX ADMIN — MORPHINE SULFATE 10 MG: 10 SOLUTION ORAL at 03:50

## 2019-07-23 RX ADMIN — MORPHINE SULFATE 10 MG: 10 SOLUTION ORAL at 20:10

## 2019-07-23 RX ADMIN — SERTRALINE 200 MG: 100 TABLET, FILM COATED ORAL at 08:44

## 2019-07-23 RX ADMIN — ATORVASTATIN CALCIUM 80 MG: 80 TABLET, FILM COATED ORAL at 20:10

## 2019-07-23 RX ADMIN — MORPHINE SULFATE 10 MG: 10 SOLUTION ORAL at 08:53

## 2019-07-23 RX ADMIN — FAMOTIDINE 20 MG: 20 TABLET ORAL at 08:44

## 2019-07-23 RX ADMIN — Medication 100 MG: at 20:10

## 2019-07-23 RX ADMIN — SENNOSIDES 17.2 MG: 8.6 TABLET, FILM COATED ORAL at 20:10

## 2019-07-23 RX ADMIN — MIDODRINE HYDROCHLORIDE 2.5 MG: 2.5 TABLET ORAL at 17:50

## 2019-07-23 RX ADMIN — AMOXICILLIN AND CLAVULANATE POTASSIUM 1 TABLET: 875; 125 TABLET, FILM COATED ORAL at 08:44

## 2019-07-23 RX ADMIN — BUSPIRONE HYDROCHLORIDE 10 MG: 10 TABLET ORAL at 20:10

## 2019-07-23 RX ADMIN — FAMOTIDINE 20 MG: 20 TABLET ORAL at 20:10

## 2019-07-23 RX ADMIN — AMOXICILLIN AND CLAVULANATE POTASSIUM 1 TABLET: 875; 125 TABLET, FILM COATED ORAL at 20:10

## 2019-07-23 ASSESSMENT — PAIN DESCRIPTION - LOCATION: LOCATION: BACK;RIB CAGE

## 2019-07-23 ASSESSMENT — PAIN SCALES - GENERAL
PAINLEVEL_OUTOF10: 8
PAINLEVEL_OUTOF10: 4
PAINLEVEL_OUTOF10: 7
PAINLEVEL_OUTOF10: 8

## 2019-07-23 ASSESSMENT — PAIN DESCRIPTION - ORIENTATION: ORIENTATION: LEFT

## 2019-07-23 ASSESSMENT — PAIN DESCRIPTION - DESCRIPTORS: DESCRIPTORS: ACHING;CONSTANT

## 2019-07-23 ASSESSMENT — PAIN DESCRIPTION - PROGRESSION: CLINICAL_PROGRESSION: NOT CHANGED

## 2019-07-23 ASSESSMENT — PAIN - FUNCTIONAL ASSESSMENT: PAIN_FUNCTIONAL_ASSESSMENT: PREVENTS OR INTERFERES SOME ACTIVE ACTIVITIES AND ADLS

## 2019-07-23 ASSESSMENT — PAIN DESCRIPTION - FREQUENCY: FREQUENCY: CONTINUOUS

## 2019-07-23 ASSESSMENT — PAIN DESCRIPTION - PAIN TYPE: TYPE: ACUTE PAIN

## 2019-07-23 ASSESSMENT — PAIN DESCRIPTION - ONSET: ONSET: ON-GOING

## 2019-07-23 ASSESSMENT — PAIN DESCRIPTION - DIRECTION: RADIATING_TOWARDS: MID BACK

## 2019-07-23 NOTE — PROGRESS NOTES
leaning to L and forward, occasionally needing mult attempts. cues for foot placement prior to stanidng due to lean L  Stand to Sit:Minimal Assistance, Moderate Assistance, with verbal cues, one trial with mod A due to LOB posteriror  Stand Pivot: Moderate Assistance, with verbal cues, using RW EOB > WC and WC > recliner cues for posture, foot placement, leans L and forward, cues for slow steps, ataxic    Wheelchair Mobility:  Stand By Assistance  Extremities Used: Bilateral Upper Extremities  Type of Chair:Manual  Surface: Level Tile  Distance: 150'x2  Quality: Slow Velocity and Short Strokes    Balance:  Static Standing Balance: Contact Guard Assistance, Moderate Assistance, X 1, initially mod to CGA with mirror for visual feedback pt leaning forward and to the L, many cues for foot placement and posture/midine  Dynamic Standing Balance: Minimal Assistance, Moderate Assistance, Pt moving rings on half hula hoop R<>L sides with RUE. pt unsteady, leans to L side and forward. faiguw qucikly    Exercise:  Patient was guided in 1 set(s) 10 reps of exercise shoulder retractions and abd bracing. Exercises were completed for increased independence with functional mobility. ASSESSMENT:  Assessment: Patient progressing toward established goals. Activity Tolerance:  Patient tolerance of  treatment: good. Pt tolerated session well. Pt demos decreased sense of midline, strength, endurance and will benefit from cont skilled PT at this time to improve stability on feet, independence with functional transfers and mobility. Equipment Recommendations: Other: will continue to assess  Discharge Recommendations:  Discharge Recommendations: Continue to assess pending progress    Plan: Times per week: 5x/wk 90 min, 1x wk 30 min  Specific instructions for Next Treatment: core and LE strengthening ex, bed mobility, sitting balance .   PT to assess transfers and gait when medically approved  Current Treatment Recommendations: Strengthening, Endurance Training, Balance Training, Functional Mobility Training, Safety Education & Training, Equipment Evaluation, Education, & procurement    Patient Education  Patient Education: Plan of Care, [employer] Mobility, Transfers, midline/posture    Goals:  Patient goals : Did not state  Short term goals  Time Frame for Short term goals: 1 week  Short term goal 1: Pt to go supine <->sit,  supervision A, to get in/out of bed. Short term goal 2: sit <> stand with contact guard assist for ease of transfers  Short term goal 3: ambulate 15 feet with use of LRAD and modAx1 to prepare for in home mobility  Short term goal 4: WC mobility with mod I for 300ft to prepare for community mobility   Long term goals  Time Frame for Long term goals : 4 weeks   Long term goal 1: bed mobility with mod I for ease of getting in/out of bed  Long term goal 2: sit <> stand with mod I for safe of transfers   Long term goal 3: ambulate 75ft with use of LRAD and stand-by assist for safe in home mobility   Long term goal 4: negotiate 1 step no rails with stand-by assist and use of LRAD for safe entry/exit of his home     Following session, patient left in safe position with all fall risk precautions in place.

## 2019-07-23 NOTE — PROGRESS NOTES
agree with above documentation. Patient pain regimen to remain the same today.   Electronically signed by Rony Andrade MD on 7/23/2019 at 4:26 PM

## 2019-07-23 NOTE — PROGRESS NOTES
Assistance with 1 UE release to wash self in shower     BED MOBILITY:  Supine to Sit: Contact Guard Assistance    Sit to Supine: Minimal Assistance      TRANSFERS:  Sit to Stand:  Minimal Assistance, X 1.    Stand to Sit: Minimal Assistance, X 1.    Squat Pivot: Minimal Assistance, X 2.       ADDITIONAL ACTIVITIES:  OT facilitated LUE [address] task with 1.5# weight on LUE with improvement in ataxia noted. ASSESSMENT:  Activity Tolerance:  Patient tolerance of  treatment: good. Discharge Recommendations: Home with Home health OT  Equipment Recommendations: Equipment Needed: Yes  ADL Assistive Devices: Toileting - Raised Toilet Seat with arms  Plan: Times per week: 5xs week x 90 min, 1 x week x 30 min   Current Treatment Recommendations: Strengthening, Endurance Training, Neuromuscular Re-education, Patient/Caregiver Education & Training, Equipment Evaluation, Education, & procurement, Self-Care / ADL, Balance Training, Functional Mobility Training, Safety Education & Training    Patient Education  Patient Education: Plan of Care, Precautions, Equipment Education, Reviewed Prior Education and Home Safety     Goals  Short term goals  Time Frame for Short term goals: 1 week  Short term goal 1: Pt will dynamically sit EOB for 5 minutes with SBA to improve independence with bathing/dressing tasks EOB. Short term goal 2: Pt will tolerate static standing for 1 minute with mod A and min cues for midline orientation in prep for washing hands at sink. Short term goal 3: Pt will complete stand pivot transfers with mod A x1 and min verbal cues for safety to improve independence with w/c transfers  Short term goal 4: Pt will improve LUE coordination as evidenced by completion of 9 hole peg test in < 4 minutes to improve indep with gathering items. Short term goal 5: Pt will tolerate further assessment of functional mobility by OTR when appropriate to improve indep with ambulating to toilet.    Long term goals  Time Frame

## 2019-07-23 NOTE — PROGRESS NOTES
[facility]  INPATIENT PHYSICAL THERAPY  DAILY NOTE  [address] [facility] - 7E-70/070-A    Time In: 1332  Time Out: 1401  Timed Code Treatment Minutes: 29 Minutes  Minutes: 29  PT Concurrent Minutes  Time In: 0000       Date: 2019  Patient Name: Frances Mario,  Gender:  male        MRN: [id number]  : 1960  (62 y.o.)     Referring Practitioner: Stacey Baldwin MD  Diagnosis: cerebellar infarct   Additional Pertinent Hx: Pt admitted through ED due to weakness and pain s/p fall at home. Hx:  CA, CVA. x-ray Lt 9th rib fx. MRI:  remote infarct, restricted diffusion inferior medial aspect LT cerebellar hemisphere and medula. Pt admited to hospital on 2019 and transferred to rehab on . Prior Level of Function:  Lives With: Alone  Type of Home: Apartment  Home Layout: One level  Home Access: Stairs to enter without rails  Entrance Stairs - Number of Steps: 1  Home Equipment: Carolina Mocha, 4 wheeled walker, Rolling walker, Quad cane(hospital bed with bed rails)    Restrictions/Precautions:  Restrictions/Precautions: Fall Risk, General Precautions(NPO)  Position Activity Restriction  Other position/activity restrictions: PEG , oxygen, impulsive, unsteady, left ninth rib fracture and also old rib fractures on the right side    SUBJECTIVE: Pt resting in bed. Just completed ST. Cooperative with encouragement    PAIN: 0/10:     OBJECTIVE:  Bed Mobility:  Supine to Sit: Minimal Assistance to elevate trunk, coming up from Rt side  Sit to Supine: Minimal Assistance to lift LLE onto bed and guide trunk    Transfers:  Sit to Stand: Minimal Assistance to mod assist coming up from edge of bed to walker. Tactile and manual cues for more dynamic trunk with forward translation of wt over base and then to extend edelmira knees once up. Numerous reps.   Progressed to graded lifts from elevated bed with same assist  Stand to Sit:Moderate Assistance to min assist for improved control. Ambulation:  Moderate Assistance  Distance: 2 ft sidestepping to RT along edge of bed  Surface: Level Tile  Device:Rolling Walker  Gait Deviations:  Tends to adduct LLE too far with lean to LT then noted. Ataxic trunk/all 4s throughout  EXERCISES:  The following exercises were completed to improve functional mobility: manually resisted hip/knee flex/ext BLEs and hip aBD BLEs x10 reps   ASSESSMENT:  Assessment: Patient progressing toward established goals. Activity Tolerance:  Patient tolerance of  treatment: good. Equipment Recommendations: Other: will continue to assess  Discharge Recommendations:  Discharge Recommendations: Continue to assess pending progress    Plan: Times per week: 5x/wk 90 min, 1x wk 30 min  Specific instructions for Next Treatment: core and LE strengthening ex, bed mobility, sitting balance . PT to assess transfers and gait when medically approved  Current Treatment Recommendations: Strengthening, Endurance Training, Balance Training, Functional Mobility Training, Safety Education & Training, Equipment Evaluation, Education, & procurement    Patient Education  Patient Education: [employer], Transfers. Further education indicated    Goals:  Patient goals : Did not state  Short term goals  Time Frame for Short term goals: 1 week  Short term goal 1: Pt to go supine <->sit,  supervision A, to get in/out of bed.    Short term goal 2: sit <> stand with contact guard assist for ease of transfers  Short term goal 3: ambulate 15 feet with use of LRAD and modAx1 to prepare for in home mobility  Short term goal 4: WC mobility with mod I for 300ft to prepare for community mobility   Long term goals  Time Frame for Long term goals : 4 weeks   Long term goal 1: bed mobility with mod I for ease of getting in/out of bed  Long term goal 2: sit <> stand with mod I for safe of transfers   Long term goal 3: ambulate 75ft with use of LRAD and stand-by assist for safe in home mobility   Long term goal 4: negotiate 1 step no rails with stand-by assist and use of LRAD for safe entry/exit of his home     Following session, patient left in safe position with all fall risk precautions in place.

## 2019-07-23 NOTE — PROGRESS NOTES
Physical Medicine & Rehabilitation  Progress Note    Chief Complaint:  LEFT cerebellar CVA    Subjective:  Care conference held today with proposed discharge date to be reassessed at next care conference. Family not present. \"I need something for sleep. \"  Pain Team saw him and emphasized that he is not a candidate for oxycodone due to swallow issues and because the liquid morphine results in complete pain relief. FEES planned for tomorrow. Has complaint of bloody phlegm that he feels is coming from his nose secondary to the O2 via NC. No other concerns. Rehabilitation:  OBJECTIVE:  Bed Mobility:  Rolling to Right: Stand By Assistance   Supine to Sit: Stand By Assistance  Scooting: Contact Guard Assistance   Min unsteady  When sitting EOB, leaning to L and posteiror able to correct with RUE support on rail     Transfers:  Sit to Stand: Minimal Assistance, Moderate Assistance, with verbal cues, pt leaning to L and forward, occasionally needing mult attempts. cues for foot placement prior to standing due to lean L  Stand to Sit:Minimal Assistance, Moderate Assistance, with verbal cues, one trial with mod A due to LOB posteriror  Stand Pivot: Moderate Assistance, with verbal cues, using RW EOB > WC and WC > recliner cues for posture, foot placement, leans L and forward, cues for slow steps, ataxic     Wheelchair Mobility:  Stand By Assistance  Extremities Used: Bilateral Upper Extremities  Type of Chair:Manual  Surface: Level Tile  Distance: 150'x2  Quality: Slow Velocity and Short Strokes     Balance:  Static Standing Balance: Contact Guard Assistance, Moderate Assistance, X 1, initially mod to CGA with mirror for visual feedback pt leaning forward and to the L, many cues for foot placement and posture/midine  Dynamic Standing Balance: Minimal Assistance, Moderate Assistance, Pt moving rings on half hula hoop R<>L sides with RUE. pt unsteady, leans to L side and forward.  riley roberts    FIMS: Bed, Chair, Wheel decreased midline orientation, decreased safety awareness impacting patient's ability to complete self care at prior level of functioning. Due to patient's performance deficits, patient would greatly benefit from continued occupational therapy for ADL remediation, endurance building, strengthening and neuro re-education to return to prior level of functioning and safe return to home environment. Speech therapy: FIMS: Comprehension: 5 - Patient understands basic needs (hungry/hot/pain)  Expression: 5 - Expresses basic ideas/needs only (hungry/hot/pain)  Social Interaction: 4 - Patient appropriate 75-90%+ of the time  Problem Solving: 3 - Patient solves simple/routine tasks 50%-74%  Memory: 3 - Patient remembers 50%-74% of the time    Review of Systems:  CONSTITUTIONAL:  positive for fatigue  EYES:  positive for  blind spots  HEENT:  negative  RESPIRATORY:  positive for dyspnea  CARDIOVASCULAR:  negative  GASTROINTESTINAL:  positive for dysphagia and PEG  GENITOURINARY:  negative  SKIN:  negative  HEMATOLOGIC/LYMPHATIC:  negative  MUSCULOSKELETAL:  positive for  myalgias, arthralgias, pain and muscle weakness  NEUROLOGICAL:  positive for headaches, memory problems, visual disturbance, coordination problems, gait problems, dysphagia, pain, and weakness  BEHAVIOR/PSYCH:  negative  10 point system review otherwise negative    Objective:  /60   Pulse 62   Temp 98.5 °F (36.9 °C) (Oral)   Resp 16   Ht 6' (1.829 m)   Wt 175 lb 3.2 oz (79.5 kg)   SpO2 93%   BMI 23.76 kg/m²   CURRENT VITALS:  height is 6' (1.829 m) and weight is 175 lb 3.2 oz (79.5 kg). His oral temperature is 98.5 °F (36.9 °C). His blood pressure is 107/60 and his pulse is 62. His respiration is 16 and oxygen saturation is 93%. Body mass index is 23.76 kg/m².   Temperature Range (24h):Temp: 98.5 °F (36.9 °C) Temp  Av.5 °F (36.9 °C)  Min: 98.5 °F (36.9 °C)  Max: 98.5 °F (36.9 °C)  BP Range (61M): Systolic (97WPV), FX , Min:107 , generalized weakness. 7. Healthcare associated pneumonia versus Aspiration pneumonia. 8. Acute hypoxic respiratory failure.   9. Fracture of the lateral ninth rib. 10. Peripheral vision loss LEFT > RIGHT. 11. Mild LEFT hemiparesis and ataxia. 12. Severe malnutrition. 13. Mild-moderate oral dysphagia and moderate to severe pharyngeal dysphagia. 14. S/p PEG placement on 7/11/2019 by Dr. No Roman. 15. Mild cognitive deficit.  (MOCA) version 8.2 completed.  Patient scored 20/30 over prior (MOCA) version 7.2 completed.  Patient scored 15/25.  *adjusted score given pt unable to complete written subtests (L hand dominant). 16. NSTEMI on 4/9/2019. 17. 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 18. Caval filter placed 2009 for extensive DVT of the LEFT leg as well as calf DVT on the RIGHT. 19. Pulmonary embolism in 2018. 20. Chronic anticoagulation status. 21. 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 Jimmy Astorga. 22. Hypertension. 23. COPD. 24. Type 2 DM, controlled.  Last HgbA1c 5.1 on 7/12/2019.  25. 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 26. Osteopenia. 27. Nicotine dependence; current every day smoker. 28. Marijuana use. Last positive UDS 7/11/2019.  29. History of anxiety and depression. 30. Medication non-compliance. 31. Hypovitaminosis D.  32. Orthostatic hypotension. Plan:     Medical management: Per primary team.     Consultants:  Trauma Surgery, Neurology, Gastroenterology, Pain Management, Internal Medicine, Physical Medicine      Narcotic usage:  Morphine Last 24 hour usage 50mg. Increased.   Last BM:   7/19  Ambulation/Mobility:            FUNCTIONAL OUTCOMES TOOLS:    LOWE -      Tinetti -      Acute/Rehabilitation Problems:  1. Acute CVA  large area of restricted diffusion involving the inferior medial aspect of the LEFT cerebellar hemisphere as well as the LEFT side of the medulla/Chronic CVA with large infarct in the RIGHT posterior cerebral artery territory secondary to high-grade stenosis at the P1 segment of the RIGHT posterior cerebral artery without definite distal flow involving the RIGHT occipital lobe and tracking anteriorly along the medial aspect of the RIGHT temporal lobe with associated local mass effect with sulcal effacement without midline shift or associated hemorrhage.   1. Secondary stroke prevention. 2. Gait instability/Fall/Physical debility with generalized weakness/Peripheral vision loss LEFT > RIGHT/Mild LEFT hemiparesis and ataxia. 1. PT/OT. 3. Concussion without LOC. 1. SLP. 4. Healthcare associated pneumonia versus Aspiration pneumonia/Acute hypoxic respiratory failure. 1. Zosyn completed; switched to Augmentin for 7 days. 5. Fracture of the lateral ninth rib. 1. Pain control. 2. ICS. 6. Mild cognitive deficit.  (MOCA) version 8.2 completed.  Patient scored 20/30 over prior ([address] [place] [address]) version 7.2 completed.  Patient scored 15/25.  *adjusted score given pt unable to complete written subtests (L hand dominant)/Mild-moderate oral dysphagia and moderate to severe pharyngeal dysphagia. 1. SLP. 2. Plan is for fees study on 7/23. 3. Patient did trial ice chips on 7/19, unsuccessfully. 7. Nutrition:  Consultation to dietician for nutritional counseling and recommendations. 1. Severe malnutrition. 2. S/p PEG placement on 7/11/2019 by Dr. Katerin Meza. 3. Wound care. 4. TotP 5.8, alb 2.4, Vitamin 25OH level of 16 on 7/19/2019.  5. Continue with tube feeds; nutrition has recommended bolus tube feeds with the plan to start those on 7/22. 6. Ergocalciferol every 3 days x 11 doses. 8. Orthostatic hypotension. 1. Supine: 124/60.  Sitting:

## 2019-07-23 NOTE — PROGRESS NOTES
issues with swallow. 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 Venida Ganser. It is noted that his malignancy is stated to be in remission. 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 Restrictions/Precautions:  Restrictions/Precautions: Fall Risk, General Precautions(NPO)  Position Activity Restriction  Other position/activity restrictions: PEG 7/14, oxygen, impulsive, unsteady, left ninth rib fracture and also old rib fractures on the right side    SUBJECTIVE: Pt supine upon arrival. Pt agreeable to OT. PAIN: Reported L shoulder pain    COGNITION: Decreased Judgment, Decreased Insight and Decreased Initiation    ADL:   See FIM Information Below. Dressing-Upper: 4 - Requires assist with buttons/zippers only and/or requires assist with one arm only(CGA when doffing shirt. SBA to don shirt)          BALANCE:  Sitting Balance:  Contact Guard Assistance  Standing Balance: Minimal Assistance. OT supported Pt at hips. BED MOBILITY:  Supine to Sit: Contact Guard Assistance due to Pt's occasional LOB  Sit to Supine: Stand By Assistance     TRANSFERS:  Sit to Stand:  Minimal Assistance. from w/c  Stand to Sit: Minimal Assistance. to w/c  Squat Pivot: Minimal Assistance. to bed  Attempted to stand hisself 2x without assistance due to frustration of not being able to find shoes. Pt called family and his shoes are at home. FUNCTIONAL MOBILITY:  Assistive Device: Wheelchair  Assist Level:  Stand By Assistance. Distance:  To and from therapy gym  Pt demonstrated decreased safety

## 2019-07-24 PROCEDURE — 97110 THERAPEUTIC EXERCISES: CPT

## 2019-07-24 PROCEDURE — 2580000003 HC RX 258: Performed by: PHYSICAL MEDICINE & REHABILITATION

## 2019-07-24 PROCEDURE — 1180000000 HC REHAB R&B

## 2019-07-24 PROCEDURE — 6370000000 HC RX 637 (ALT 250 FOR IP): Performed by: PHYSICAL MEDICINE & REHABILITATION

## 2019-07-24 PROCEDURE — 6360000002 HC RX W HCPCS: Performed by: PHYSICAL MEDICINE & REHABILITATION

## 2019-07-24 PROCEDURE — 97530 THERAPEUTIC ACTIVITIES: CPT

## 2019-07-24 PROCEDURE — 92612 ENDOSCOPY SWALLOW (FEES) VID: CPT | Performed by: SPEECH-LANGUAGE PATHOLOGIST

## 2019-07-24 PROCEDURE — 97535 SELF CARE MNGMENT TRAINING: CPT

## 2019-07-24 PROCEDURE — 6370000000 HC RX 637 (ALT 250 FOR IP): Performed by: STUDENT IN AN ORGANIZED HEALTH CARE EDUCATION/TRAINING PROGRAM

## 2019-07-24 PROCEDURE — 97542 WHEELCHAIR MNGMENT TRAINING: CPT

## 2019-07-24 PROCEDURE — 2709999900 HC NON-CHARGEABLE SUPPLY

## 2019-07-24 RX ORDER — MAGNESIUM SULFATE IN WATER 40 MG/ML
2 INJECTION, SOLUTION INTRAVENOUS ONCE
Status: COMPLETED | OUTPATIENT
Start: 2019-07-24 | End: 2019-07-24

## 2019-07-24 RX ORDER — METOCLOPRAMIDE HYDROCHLORIDE 5 MG/ML
10 INJECTION INTRAMUSCULAR; INTRAVENOUS ONCE
Status: COMPLETED | OUTPATIENT
Start: 2019-07-24 | End: 2019-07-24

## 2019-07-24 RX ORDER — KETOROLAC TROMETHAMINE 30 MG/ML
30 INJECTION, SOLUTION INTRAMUSCULAR; INTRAVENOUS ONCE
Status: COMPLETED | OUTPATIENT
Start: 2019-07-24 | End: 2019-07-24

## 2019-07-24 RX ORDER — MIDODRINE HYDROCHLORIDE 5 MG/1
5 TABLET ORAL
Status: DISCONTINUED | OUTPATIENT
Start: 2019-07-25 | End: 2019-08-04

## 2019-07-24 RX ADMIN — MIDODRINE HYDROCHLORIDE 2.5 MG: 2.5 TABLET ORAL at 14:12

## 2019-07-24 RX ADMIN — Medication 100 MG: at 09:52

## 2019-07-24 RX ADMIN — Medication 10 ML: at 09:54

## 2019-07-24 RX ADMIN — HEPARIN 500 UNITS: 100 SYRINGE at 09:55

## 2019-07-24 RX ADMIN — ASPIRIN 81 MG: 81 TABLET ORAL at 09:53

## 2019-07-24 RX ADMIN — CLONAZEPAM 1 MG: 0.5 TABLET ORAL at 22:14

## 2019-07-24 RX ADMIN — BUSPIRONE HYDROCHLORIDE 10 MG: 10 TABLET ORAL at 20:25

## 2019-07-24 RX ADMIN — Medication 100 MG: at 20:25

## 2019-07-24 RX ADMIN — MORPHINE SULFATE 10 MG: 10 SOLUTION ORAL at 14:11

## 2019-07-24 RX ADMIN — AMOXICILLIN AND CLAVULANATE POTASSIUM 1 TABLET: 875; 125 TABLET, FILM COATED ORAL at 09:52

## 2019-07-24 RX ADMIN — FAMOTIDINE 20 MG: 20 TABLET ORAL at 20:25

## 2019-07-24 RX ADMIN — MIDODRINE HYDROCHLORIDE 2.5 MG: 2.5 TABLET ORAL at 18:05

## 2019-07-24 RX ADMIN — AMOXICILLIN AND CLAVULANATE POTASSIUM 1 TABLET: 875; 125 TABLET, FILM COATED ORAL at 20:26

## 2019-07-24 RX ADMIN — FAMOTIDINE 20 MG: 20 TABLET ORAL at 09:52

## 2019-07-24 RX ADMIN — MIDODRINE HYDROCHLORIDE 2.5 MG: 2.5 TABLET ORAL at 09:53

## 2019-07-24 RX ADMIN — MORPHINE SULFATE 10 MG: 10 SOLUTION ORAL at 09:46

## 2019-07-24 RX ADMIN — ATORVASTATIN CALCIUM 80 MG: 80 TABLET, FILM COATED ORAL at 20:26

## 2019-07-24 RX ADMIN — SENNOSIDES 17.2 MG: 8.6 TABLET, FILM COATED ORAL at 20:25

## 2019-07-24 RX ADMIN — TRAZODONE HYDROCHLORIDE 100 MG: 100 TABLET ORAL at 20:25

## 2019-07-24 RX ADMIN — Medication 10 ML: at 20:27

## 2019-07-24 RX ADMIN — RIVAROXABAN 20 MG: 20 TABLET, FILM COATED ORAL at 18:05

## 2019-07-24 RX ADMIN — MICONAZOLE NITRATE: 20 POWDER TOPICAL at 10:14

## 2019-07-24 RX ADMIN — MAGNESIUM SULFATE HEPTAHYDRATE 2 G: 40 INJECTION, SOLUTION INTRAVENOUS at 19:15

## 2019-07-24 RX ADMIN — SERTRALINE 200 MG: 100 TABLET, FILM COATED ORAL at 09:52

## 2019-07-24 RX ADMIN — METOCLOPRAMIDE 10 MG: 5 INJECTION, SOLUTION INTRAMUSCULAR; INTRAVENOUS at 19:22

## 2019-07-24 RX ADMIN — MICONAZOLE NITRATE: 20 POWDER TOPICAL at 20:48

## 2019-07-24 RX ADMIN — KETOROLAC TROMETHAMINE 30 MG: 30 INJECTION, SOLUTION INTRAMUSCULAR at 19:22

## 2019-07-24 RX ADMIN — MORPHINE SULFATE 10 MG: 10 SOLUTION ORAL at 05:40

## 2019-07-24 ASSESSMENT — PAIN SCALES - GENERAL
PAINLEVEL_OUTOF10: 8
PAINLEVEL_OUTOF10: 10
PAINLEVEL_OUTOF10: 5
PAINLEVEL_OUTOF10: 8
PAINLEVEL_OUTOF10: 8
PAINLEVEL_OUTOF10: 9

## 2019-07-24 NOTE — PROGRESS NOTES
[address]  Occupational Therapy  Daily Note  Time:   Time In: 1300  Time Out: 1334  Timed Code Treatment Minutes: 34 Minutes  Minutes: 34     Variance: -18  Reason: Refusal    Date: 2019  Patient Name: Mack Brooks,   Gender: male      Room: [facility]70/070-A  MRN: [id number]  : 1960  (62 y.o.)  Referring Practitioner: Caro Long MD  Diagnosis: Cerebellar infarct  Additional Pertinent Hx: He was admitted 2019 for complaint of injuries after a ground-level fall at home. The patient was reaching over his couch when he lost his balance and fell onto his left side causing him to strike his head without any endorsed loss of consciousness. There was a small superficial contusion of the left frontal region. On initial work-up he was found to have a left ninth rib fracture and also old rib fractures on the right side. CT imaging of the chest showed patchy nodular airspace opacities in the left lung base suggestive of infiltrates with an elevated white count of 15.3 noted. CT imaging of the head did show an acute infarct of the inferior medial aspect of the left cerebellar hemisphere as well as the left side of the medulla. The patient was admitted for further medical care. Note was also made that the patient was disheveled and malodorous and had questionable ability to care for himself. He did undergo a modified barium swallow study on  which showed the patient to have mild oral dysphagia and moderate pharyngeal dysphasia with recommendation at that time for thin and moist purées with thin liquids and strict use of compensatory strategies. He is currently on Zosyn for treating a presumptive aspiration pneumonia versus a hospital-acquired pneumonia. Aca Summer Hx of prior CVA in april with chronic pain.   The patient was just recently in the emergency department on 2019 for complaint of unsteady gait and nausea and vomiting as well as issues ACTIVITIES:  Pt completed bilateral AROM exercises with SBA of shoulder elevation, shoulder retraction, shoulder flexion, elbow flexion, digit flexion, digit extension, and chin tucks 15 times each with rest breaks in between to increase UB strength and endurance in preparation for functional transfers. Pt tolerated well and required min verbal cues for slow and controlled movements. ASSESSMENT:  Activity Tolerance:  Patient tolerance of  treatment: good. Discharge Recommendations: Home with Home health OT  Equipment Recommendations: Equipment Needed: Yes  ADL Assistive Devices: Toileting - Raised Toilet Seat with arms  Plan: Times per week: 5xs week x 90 min, 1 x week x 30 min   Current Treatment Recommendations: Strengthening, Endurance Training, Neuromuscular Re-education, Patient/Caregiver Education & Training, Equipment Evaluation, Education, & procurement, Self-Care / ADL, Balance Training, Functional Mobility Training, Safety Education & Training    Patient Education  Patient Education: Role of OT, Home Exercise Program, Precautions, Reviewed Prior Education and Importance of Increasing Activity    Goals  Short term goals  Time Frame for Short term goals: 1 week  Short term goal 1: Pt will dynamically sit EOB for 5 minutes with SBA to improve independence with bathing/dressing tasks EOB. Short term goal 2: Pt will tolerate static standing for 1 minute with mod A and min cues for midline orientation in prep for washing hands at sink. Short term goal 3: Pt will complete stand pivot transfers with mod A x1 and min verbal cues for safety to improve independence with w/c transfers   Short term goal 4: Pt will improve LUE coordination as evidenced by completion of 9 hole peg test in < 4 minutes to improve indep with gathering items. Short term goal 5: Pt will tolerate further assessment of functional mobility by OTR when appropriate to improve indep with ambulating to toilet.  GOAL MET, REVISE  Long term goals  Time Frame for Long term goals : 3 weeks  Long term goal 1: Pt will complete ADL routine with SBA and no cues for safety or adapted technique to increase ability to perform self care tasks  Long term goal 2: Pt will complete light standing IADL task using RW with SBA and no cues for walker safety to improve indep within home. Revised Short-Term Goals  Short term goals  Time Frame for Short term goals: 1 week  Short term goal 1: Pt will dynamically sit EOB for 5 minutes with SBA to improve independence with bathing/dressing tasks EOB. Short term goal 2: Pt will tolerate static standing for 1 minute with mod A and min cues for midline orientation in prep for washing hands at sink. Short term goal 3: Pt will complete stand pivot transfers with mod A x1 and min verbal cues for safety to improve independence with w/c transfers  Short term goal 4: Pt will improve LUE coordination as evidenced by completion of 9 hole peg test in < 4 minutes to improve indep with gathering items. Short term goal 5: Pt will safely navigate RW within room with Min A x1 and CGA of another to improve independence to [facility]. Long term goals  Time Frame for Long term goals : 3 weeks  Long term goal 1: Pt will complete ADL routine with SBA and no cues for safety or adapted technique to increase ability to perform self care tasks  Long term goal 2: Pt will complete light standing IADL task using RW with SBA and no cues for walker safety to improve indep within home. Following session, patient left in safe position with all fall risk precautions in place.

## 2019-07-24 NOTE — PROGRESS NOTES
[address] [address]  INPATIENT PHYSICAL THERAPY  DAILY NOTE  [address] [facility] - 7E-70/070-A    Time In: 1130  Time Out: 1230  Timed Code Treatment Minutes: 60 Minutes  Minutes: 60          Date: 2019  Patient Name: Parker Newsome,  Gender:  male        MRN: [id number]  : 1960  (62 y.o.)     Referring Practitioner: Chelly Tyler MD  Diagnosis: cerebellar infarct   Additional Pertinent Hx: Pt admitted through ED due to weakness and pain s/p fall at home. Hx:  CA, CVA. x-ray Lt 9th rib fx. MRI:  remote infarct, restricted diffusion inferior medial aspect LT cerebellar hemisphere and medula. Pt admited to hospital on 2019 and transferred to rehab on . Prior Level of Function:  Lives With: Alone  Type of Home: Apartment  Home Layout: One level  Home Access: Stairs to enter without rails  Entrance Stairs - Number of Steps: 1  Home Equipment: Ben Bonnet, 4 wheeled walker, Rolling walker, Quad cane(hospital bed with bed rails)    Restrictions/Precautions:  Restrictions/Precautions: Fall Risk, General Precautions(NPO)  Position Activity Restriction  Other position/activity restrictions: PEG , oxygen, impulsive, unsteady, left ninth rib fracture and also old rib fractures on the right side    SUBJECTIVE: Pt. Laying in bed upon arrival and agreeable to therapy session. Pt. Impulsive throughout session and requires many cues for safety. Pt. Requests to use restroom during session.      PAIN: 8/10: Back, Ribs, L hip    OBJECTIVE:  Bed Mobility:  Rolling to Right: Stand By Assistance   Supine to Sit: Minimal Assistance  Sit to Supine: Stand By Assistance     Transfers:  Sit to Stand: Contact Guard Assistance, Minimal Assistance  Stand to [facility] 68, Minimal Assistance, with verbal cues  Stand Pivot:Contact Guard Assistance, Minimal Assistance With RW. Pt. Unsteady due to ataxia    Ambulation:  None    Wheelchair Mobility:  Stand By Assistance  Extremities Used: Bilateral Upper Extremities  Type of Chair:Manual  Surface: Level Tile  Distance: 130' x 2 35' x 1  Quality: Slow Velocity and Decreased Fluidity Pt. Fatigues easily and requires 1 seated rest break to complete longer distance. Balance:  Static Standing Balance: Contact Guard Assistance, with verbal cues   Dynamic Standing Balance: Minimal Assistance, Moderate Assistance, with verbal cues    Pt. Stood at walker while pulling up/down pants prior to and after toileting with assistance for clothing management and for balance. Pt. Unsteady and easily loses balance. Pt. Also statically stood at walker with cues for postural alignment and CGA. Pt. Able to statically stand for ~1' x 3      Exercise:  Patient was guided in 1 set(s) 10 reps of exercise to both lower extremities. Glut sets, Long arc quads, Hip abduction/adduction, Seated hip flexion, Seated hamstring curls, Seated heel/toe raises and Seated isometric hip adduction. Exercises were completed for increased independence with functional mobility. Functional Outcome Measures: Not completed       ASSESSMENT:  Assessment: Patient progressing toward established goals. Activity Tolerance:  Patient tolerance of  treatment: good. Equipment Recommendations: Other: will continue to assess  Discharge Recommendations: Continue to assess pending progress    Plan: Times per week: 5x/wk 90 min, 1x wk 30 min  Specific instructions for Next Treatment: core and LE strengthening ex, bed mobility, sitting balance .   PT to assess transfers and gait when medically approved  Current Treatment Recommendations: Strengthening, Endurance Training, Balance Training, Functional Mobility Training, Safety Education & Training, Equipment Evaluation, Education, & procurement    Patient Education  Patient Education: Plan of Care, [employer] Mobility, Transfers, Wheelchair Mobility, Verbal Exercise Instruction    Goals:  Patient goals : Did not state  Short

## 2019-07-24 NOTE — PROGRESS NOTES
Secretions - appearance: Thin, clear secretions noted throughout the pharynx. Secretions - location:Diffusely throught pharynx- valleculae, pyriform sinus, and base of tongue regions. Valleculae: No deficits observed  Pyriform Sinus: Edema of tissue in pyriform sinus space  Glottal Closure: No deficits observed            VLS Completed:  No    PATIENT WAS EVALUATED USING:  X Secretions   X Ice Chips   X Thin    Nectar   X Honey    Pudding Thick   X Puree    Soft    Solid         SWALLOW OBSERVATION:  \"X\" indicates positive observation  X Delayed oral transit    Nasal Regurgitation   X Premature Spillage    Piecemeal Deglutition   X Incomplete Epiglottic Inversion   X Residue - Vallecular   X Residue - Pyriform   X Residue - Base of Tongue   X Residue - Posterior Pharyngeal wall    Esophageal Backflow         LARYNGEAL PENETRATION/ASPIRATION:   No evidence of laryngeal penetration    No evidence of aspiration   X Laryngeal penetration evident before, during and after swallow   X Audible aspiration evident before, during and after swallow    Silent aspiration evident before, during and after swallow         PHARYNGEAL PHASE MIRA SCORE (dysphagia outcome and severity score)  1 = Severe dysphagia:  NPO; Unable to tolerate any po safely; Severe residue unable to clear; Silent aspiration with two or more consistencies, non-functional cough;  OR Unable to achieve a swallow.     PENETRATION-ASPIRATION SCALE (PAS)  6=Material enters the airway, passes below the vocal folds, and is ejected into the larynx or out of the airway    ESOPHAGEAL PHASE: No significant findings     ATTEMPTED TECHNIQUES:  Small bolus size Not Effective   Straw Not Attempted   Cup Not Attempted   Chin tuck Not Attempted   Head Turn Not Attempted   Spoon presentations Not Effective   Volitional cough Effective   Spontaneous cough Effective         DIAGNOSTIC IMPRESSIONS: Patient presents with mild oral and severe pharyngeal dysphagia evidenced by significant premature pharyngeal entry, primarily to the right pyriform sinus with NO epiglottic movement. Spillage over the epiglottic edge noted with denser consistencies resulting in laryngeal penetration and tracheal aspiration before the swallow. In addition, moderate pharyngeal residue noted in the valleculae, pyriform sinus and along the posterior pharyngeal wall. Residue from the pyriform sinus noted to spill into the airway following initial swallow. Reflexive cough triggered with strong response to clear material from the airway. Pt requiring ~5-6 swallows to clear a single 1/4 tsp size bolus with recurrent episodes of laryngeal penetration and tracheal aspiration of residuals. Left vocal fold appears to be immobile with stiffening of tissue in the posterior pharyngeal wall laterally to the left. Pharyngeal swallow function is NON FUNCTIONAL at this time. Pt remains at HIGH risk for aspiration with both dense and thin consistencies, resulting in recommendations for continuation of NPO status and use of alternate means of nutrition to meet caloric/nutritional needs. DIET RECOMMENDATIONS: NPO    ADDITIONAL TESTING RECOMMENDATIONS: Recommend f/u with oncologist     EDUCATION:  Education Provided: Reviewed results and recommendations of this evaluation, signs, symptoms, and risk of aspiration, as well as diet recommendations and strategies. Reviewed and discussed goals and plan of care. Learner:patient  Method:discussion  Evaluation:Verbalized understanding    PATIENT GOALS:  Return to least restricted diet possible    PLAN/TREATMENT RECOMMENDATIONS  Skilled dysphagia treatment 5 times per week for 4 weeks. Specific Interventions for next treatment may include: Pharyngeal swallow exercises    SHORT TERM GOALS: Short-term Goals  Timeframe for Short-term Goals: 1 week  Goal 1: Pt will safely tolerate PO trials of ice chips with ST ONLY to determine readiness for re-peat formal instrumentation.    Goal 2: Pt will exhibit return demonstration on need for completion of comprehensive oral care with 90% accuracy given no cues to reduce potential bacteria colonization and to maintain an improved oral cavity appearance. Goal 3: Pt will complete immediate, delayed, and working memory tasks with 60% accuracy given mod cues to improve retention of pertinent information. Goal 4: Pt will complete problem solving, verbal/visual reasoning (time, money/math/finances/medications), and thought organization tasks with 70% accuracy given mod cues to improve independence with IADL completion. Goal 5: Pt will complete sustained attention tasks with no more than 3 re-directions within 3-5 minute time span to improve participation within therapies and ADLs. LONG TERM GOALS: Long-term Goals  Timeframe for Long-term Goals: 4 weeks  Goal 1: Pt will safely tolerate puree diet with thin liquid without difficulty or overt s/s of aspiration. Goal 2: Pt will improve cognitive functioning to a superivision/mod I level for safe and functional return to home environment. Jose Francisco Schmid.  [address] [facility] [address], [address]

## 2019-07-24 NOTE — PROGRESS NOTES
[address]  Occupational Therapy  Daily Note  Time:    Time In: 730  Time Out: 3085  Timed Code Treatment Minutes: 8 Minutes  Minutes: 8          Date: 2019  Patient Name: Adeola Short,   Gender: male      Room: [facility]/070-A  MRN: [id number]  : 1960  (62 y.o.)  Referring Practitioner: Remy Guevara MD  Diagnosis: Cerebellar infarct  Additional Pertinent Hx: He was admitted 2019 for complaint of injuries after a ground-level fall at home. The patient was reaching over his couch when he lost his balance and fell onto his left side causing him to strike his head without any endorsed loss of consciousness. There was a small superficial contusion of the left frontal region. On initial work-up he was found to have a left ninth rib fracture and also old rib fractures on the right side. CT imaging of the chest showed patchy nodular airspace opacities in the left lung base suggestive of infiltrates with an elevated white count of 15.3 noted. CT imaging of the head did show an acute infarct of the inferior medial aspect of the left cerebellar hemisphere as well as the left side of the medulla. The patient was admitted for further medical care. Note was also made that the patient was disheveled and malodorous and had questionable ability to care for himself. He did undergo a modified barium swallow study on  which showed the patient to have mild oral dysphagia and moderate pharyngeal dysphasia with recommendation at that time for thin and moist purées with thin liquids and strict use of compensatory strategies. He is currently on Zosyn for treating a presumptive aspiration pneumonia versus a hospital-acquired pneumonia. Margarita Schwarz Hx of prior CVA in april with chronic pain. The patient was just recently in the emergency department on 2019 for complaint of unsteady gait and nausea and vomiting as well as issues with swallow.   Pertinent medical Evaluation, Education, & procurement, Self-Care / ADL, Balance Training, Functional Mobility Training, Safety Education & Training    Patient Education  Patient Education: Role of OT, Plan of Care and Adaptive ADL Techniques     Goals  Short term goals  Time Frame for Short term goals: 1 week  Short term goal 1: Pt will dynamically sit EOB for 5 minutes with SBA to improve independence with bathing/dressing tasks EOB. Short term goal 2: Pt will tolerate static standing for 1 minute with mod A and min cues for midline orientation in prep for washing hands at sink. Short term goal 3: Pt will complete stand pivot transfers with mod A x1 and min verbal cues for safety to improve independence with w/c transfers  Short term goal 4: Pt will improve LUE coordination as evidenced by completion of 9 hole peg test in < 4 minutes to improve indep with gathering items. Short term goal 5: Pt will tolerate further assessment of functional mobility by OTR when appropriate to improve indep with ambulating to toilet. Long term goals  Time Frame for Long term goals : 3 weeks  Long term goal 1: Pt will complete ADL routine with SBA and no cues for safety or adapted technique to increase ability to perform self care tasks  Long term goal 2: Pt will complete light standing IADL task using RW with SBA and no cues for walker safety to improve indep within home. Following session, patient left in safe position with all fall risk precautions in place.

## 2019-07-24 NOTE — PROGRESS NOTES
Systems:  CONSTITUTIONAL:  positive for fatigue  EYES:  positive for  blind spots  HEENT:  negative  RESPIRATORY:  positive for dyspnea  CARDIOVASCULAR:  negative  GASTROINTESTINAL:  positive for dysphagia and PEG  GENITOURINARY:  negative  SKIN:  negative  HEMATOLOGIC/LYMPHATIC:  negative  MUSCULOSKELETAL:  positive for  myalgias, arthralgias, pain and muscle weakness  NEUROLOGICAL:  positive for headaches, memory problems, visual disturbance, coordination problems, gait problems, dysphagia, pain, and weakness  BEHAVIOR/PSYCH:  negative  10 point system review otherwise negative    Objective:  BP (!) 100/55   Pulse 65   Temp 98.6 °F (37 °C) (Oral)   Resp 20   Ht 6' (1.829 m)   Wt 175 lb 9.6 oz (79.7 kg)   SpO2 97%   BMI 23.82 kg/m²   CURRENT VITALS:  height is 6' (1.829 m) and weight is 175 lb 9.6 oz (79.7 kg). His oral temperature is 98.6 °F (37 °C). His blood pressure is 100/55 (abnormal) and his pulse is 65. His respiration is 20 and oxygen saturation is 97%. Body mass index is 23.82 kg/m².   Temperature Range (24h):Temp: 98.6 °F (37 °C) Temp  Av.2 °F (36.8 °C)  Min: 97.7 °F (36.5 °C)  Max: 98.6 °F (37 °C)  BP Range (40S): Systolic (21GAR), EDM:307 , Min:96 , LHT:267     Diastolic (27DSI), FBS:52, Min:55, Max:65    Pulse Range (24h): Pulse  Av.5  Min: 62  Max: 65  Respiration Range (24h): Resp  Av  Min: 18  Max: 20  Current Pulse Ox (24h):  SpO2: 97 %  Pulse Ox Range (24h):  SpO2  Av.5 %  Min: 97 %  Max: 98 %  Oxygen Amount and Delivery: O2 Flow Rate (L/min): 2.5 L/min    awake  Orientation:   person, place, time, situation  Mood: anxious  Affect: flat  General appearance:  in no acute distress, PEG, up in WC, [email or web address]     Memory:   grossly normal  Attention/Concentration: abnormal - distractible  Language:  normal     Cranial Nerves:  cranial nerves II-XII are grossly intact with exception of LEFT hemianopsia   ROM:  abnormal - limited RIGHT knee extension - lacking 20deg  Motor generalized weakness/Peripheral vision loss LEFT > RIGHT/Mild LEFT hemiparesis and ataxia. 1. PT/OT. 3. Concussion without LOC. 1. SLP. 4. Healthcare associated pneumonia versus Aspiration pneumonia/Acute hypoxic respiratory failure. 1. Zosyn completed; switched to Augmentin for 7 days. END on . 5. Fracture of the lateral ninth rib. 1. Pain control. 2. ICS. 6. Mild cognitive deficit.  (MOCA) version 8.2 completed.  Patient scored 20/30 over prior ([facility]) version 7.2 completed.  Patient scored 15/25.  *adjusted score given pt unable to complete written subtests (L hand dominant)/Mild-moderate oral dysphagia and moderate to severe pharyngeal dysphagia. 1. SLP. 2. Plan is for fees study on . Completed with patient remaining n.p.o. at this time. Dr. Tameka Boss, ENT will review the imaging on . 3. Patient did trial ice chips on , unsuccessfully. 7. Nutrition:  Consultation to dietician for nutritional counseling and recommendations. 1. Severe malnutrition. 2. S/p PEG placement on 2019 by Dr. Raul Mayorga. 3. Wound care. 4. TotP 5.8, alb 2.4, Vitamin 25OH level of 16 on 2019.  5. Continue with tube feeds; nutrition has recommended bolus tube feeds with the plan to start those on . 6. Ergocalciferol every 3 days x 11 doses. 8. Orthostatic hypotension. 1. Supine: 124/60. Sittin/61 and standing was 88/61 on . 2. Start Midodrine 2.5mg TID on . Pressures improved; but still has some issues with lightheadedness and the dosing was increased to 5 mg 3 times daily on .  9. Insomnia. 1. Trazodone at 100 mg was started last night to good effect per patient's feedback on .  10. Electrolytes. 1. Normal on .  11. Bladder: No issues. 12. Bowel: Senna, colace, MOM  13. Rehabilitation nursing will be involved for bowel, bladder, skin, and pain management. Nursing will also provide education and training to patient and family. 14. Prophylaxis:  DVT: Xarelto.   GI:

## 2019-07-25 PROCEDURE — 6370000000 HC RX 637 (ALT 250 FOR IP): Performed by: STUDENT IN AN ORGANIZED HEALTH CARE EDUCATION/TRAINING PROGRAM

## 2019-07-25 PROCEDURE — 97110 THERAPEUTIC EXERCISES: CPT

## 2019-07-25 PROCEDURE — 97127 HC SP THER IVNTJ W/FOCUS COG FUNCJ: CPT | Performed by: SPEECH-LANGUAGE PATHOLOGIST

## 2019-07-25 PROCEDURE — 97530 THERAPEUTIC ACTIVITIES: CPT

## 2019-07-25 PROCEDURE — 6360000002 HC RX W HCPCS: Performed by: PHYSICAL MEDICINE & REHABILITATION

## 2019-07-25 PROCEDURE — 94761 N-INVAS EAR/PLS OXIMETRY MLT: CPT

## 2019-07-25 PROCEDURE — 97112 NEUROMUSCULAR REEDUCATION: CPT

## 2019-07-25 PROCEDURE — 2709999900 HC NON-CHARGEABLE SUPPLY

## 2019-07-25 PROCEDURE — 2700000000 HC OXYGEN THERAPY PER DAY

## 2019-07-25 PROCEDURE — 1180000000 HC REHAB R&B

## 2019-07-25 PROCEDURE — 6370000000 HC RX 637 (ALT 250 FOR IP): Performed by: PHYSICAL MEDICINE & REHABILITATION

## 2019-07-25 PROCEDURE — 94640 AIRWAY INHALATION TREATMENT: CPT

## 2019-07-25 PROCEDURE — 97542 WHEELCHAIR MNGMENT TRAINING: CPT

## 2019-07-25 PROCEDURE — 92526 ORAL FUNCTION THERAPY: CPT | Performed by: SPEECH-LANGUAGE PATHOLOGIST

## 2019-07-25 PROCEDURE — 2580000003 HC RX 258: Performed by: PHYSICAL MEDICINE & REHABILITATION

## 2019-07-25 RX ADMIN — FAMOTIDINE 20 MG: 20 TABLET ORAL at 20:25

## 2019-07-25 RX ADMIN — SERTRALINE 200 MG: 100 TABLET, FILM COATED ORAL at 07:37

## 2019-07-25 RX ADMIN — MORPHINE SULFATE 10 MG: 10 SOLUTION ORAL at 12:32

## 2019-07-25 RX ADMIN — MAGNESIUM HYDROXIDE 30 ML: 400 SUSPENSION ORAL at 03:29

## 2019-07-25 RX ADMIN — TRAZODONE HYDROCHLORIDE 100 MG: 100 TABLET ORAL at 20:25

## 2019-07-25 RX ADMIN — HEPARIN 500 UNITS: 100 SYRINGE at 16:56

## 2019-07-25 RX ADMIN — MICONAZOLE NITRATE: 20 POWDER TOPICAL at 07:38

## 2019-07-25 RX ADMIN — AMOXICILLIN AND CLAVULANATE POTASSIUM 1 TABLET: 875; 125 TABLET, FILM COATED ORAL at 07:39

## 2019-07-25 RX ADMIN — MIDODRINE HYDROCHLORIDE 5 MG: 5 TABLET ORAL at 07:42

## 2019-07-25 RX ADMIN — MICONAZOLE NITRATE: 20 POWDER TOPICAL at 20:26

## 2019-07-25 RX ADMIN — MAGNESIUM HYDROXIDE 30 ML: 400 SUSPENSION ORAL at 16:55

## 2019-07-25 RX ADMIN — MORPHINE SULFATE 10 MG: 10 SOLUTION ORAL at 03:29

## 2019-07-25 RX ADMIN — IPRATROPIUM BROMIDE AND ALBUTEROL SULFATE 1 AMPULE: .5; 3 SOLUTION RESPIRATORY (INHALATION) at 21:18

## 2019-07-25 RX ADMIN — RIVAROXABAN 20 MG: 20 TABLET, FILM COATED ORAL at 16:56

## 2019-07-25 RX ADMIN — FAMOTIDINE 20 MG: 20 TABLET ORAL at 07:38

## 2019-07-25 RX ADMIN — HYDROXYZINE HYDROCHLORIDE 50 MG: 25 TABLET, FILM COATED ORAL at 20:25

## 2019-07-25 RX ADMIN — ATORVASTATIN CALCIUM 80 MG: 80 TABLET, FILM COATED ORAL at 20:25

## 2019-07-25 RX ADMIN — ERGOCALCIFEROL 50000 UNITS: 1.25 CAPSULE ORAL at 07:37

## 2019-07-25 RX ADMIN — Medication 10 ML: at 16:57

## 2019-07-25 RX ADMIN — Medication 100 MG: at 07:38

## 2019-07-25 RX ADMIN — ASPIRIN 81 MG: 81 TABLET ORAL at 07:39

## 2019-07-25 RX ADMIN — BUSPIRONE HYDROCHLORIDE 10 MG: 10 TABLET ORAL at 20:25

## 2019-07-25 RX ADMIN — MIDODRINE HYDROCHLORIDE 5 MG: 5 TABLET ORAL at 18:50

## 2019-07-25 RX ADMIN — MORPHINE SULFATE 10 MG: 10 SOLUTION ORAL at 16:55

## 2019-07-25 RX ADMIN — MIDODRINE HYDROCHLORIDE 5 MG: 5 TABLET ORAL at 12:33

## 2019-07-25 ASSESSMENT — PAIN DESCRIPTION - FREQUENCY: FREQUENCY: CONTINUOUS

## 2019-07-25 ASSESSMENT — PAIN SCALES - GENERAL
PAINLEVEL_OUTOF10: 3
PAINLEVEL_OUTOF10: 3
PAINLEVEL_OUTOF10: 8

## 2019-07-25 ASSESSMENT — PAIN DESCRIPTION - LOCATION: LOCATION: HEAD;SHOULDER

## 2019-07-25 ASSESSMENT — PAIN - FUNCTIONAL ASSESSMENT: PAIN_FUNCTIONAL_ASSESSMENT: ACTIVITIES ARE NOT PREVENTED

## 2019-07-25 ASSESSMENT — PAIN DESCRIPTION - PAIN TYPE: TYPE: CHRONIC PAIN

## 2019-07-25 ASSESSMENT — PAIN DESCRIPTION - PROGRESSION: CLINICAL_PROGRESSION: NOT CHANGED

## 2019-07-25 ASSESSMENT — PAIN DESCRIPTION - ONSET: ONSET: ON-GOING

## 2019-07-25 NOTE — PROGRESS NOTES
bolus. Pt with good responsiveness. Pharyngeal strengthening exercises completed:  -Janelle swallow- x5 with fair success  -CTAR- 2 sets of x10 with good success. -Base of tongue retraction- 2 sets of 20 with good success  -Effortful swallow- 45+, completed with ice chips. Stressed the importance of swallowing \"hard and fast.\"   *ST suggested HEP be completed throughout rest of day for additional swallow completions. Written HEP handout provided Pt expressed understanding. SHORT TERM GOAL #2:  Goal 2: Pt will exhibit return demonstration on need for completion of comprehensive oral care with 90% accuracy given no cues to reduce potential bacteria colonization and to maintain an improved oral cavity appearance. INTERVENTIONS: See goal #1 for details. SHORT TERM GOAL #3:  Goal 3: Pt will complete immediate, delayed, and working memory tasks with 60% accuracy given mod cues to improve retention of pertinent information. INTERVENTIONS: Did not address due to focus on other goals. SHORT TERM GOAL #4:  Goal 4:  Pt will complete problem solving, verbal/visual reasoning (time, money/math/finances/medications), and thought organization tasks with 70% accuracy given min cues to improve independence with IADL completion. INTERVENTIONS: Time word problems- 8/10 independently, 2/10 with min cues for task analysis. *Good success with working memory and math computation this date. SHORT TERM GOAL #5:  Goal 5: Pt will complete sustained attention tasks with no more than 3 re-directions within 3-5 minute time span to improve participation within therapies and ADLs. INTERVENTIONS:Excellent success with sustained attention this session. Patient highly motivated and engaged in task. No need for redirection. Long-term Goals  Timeframe for Long-term Goals: 4 weeks  Goal 1: Pt will safely tolerate puree diet with thin liquid without difficulty or overt s/s of aspiration.    Goal 2: Pt will improve cognitive functioning to a superivision/mod I level for safe and functional return to home environment. COMMUNICATION  Comprehension: 5 - Patient understands basic needs (hungry/hot/pain)  Expression: 5 - Expresses basic ideas/needs only (hungry/hot/pain)  SOCIAL COGNITION  Social Interaction: 5 - Patient is appropriate with supervision/cues  Problem Solvin - Patient solves simple/routine tasks 75-90%+   Memory: 3 - Patient remembers 50%-74% of the time    ASSESSMENT:  Assessment: [x]Progressing towards goals          []Not Progressing towards goals    Patient Tolerance of Treatment:   [x]Tolerated well []Tolerated fair []Required rest breaks []Fatigued  Plan for Next Session:   Education:  Learner:  [x]Patient          []Significant Other          [x]Other: Benny Coombs  Education provided regarding:  [x]Goals and POC   [x]Diet and swallowing precautions    [x]Home Exercise Program  [x]Progress and/or discharge information  Method of Education:  [x]Discussion          [x]Demonstration          [x]Handout          []Other  Evaluation of Education:   [x]Verbalized understanding   [x]Demonstrates without assistance  []Demonstrates with assistance  [x]Needs further instruction     []No evidence of learning                  []No family present      Plan: [x]Continue with current plan of care    []Modify current plan of care as follows:    []Discharge patient    Discharge Location:    Services/Supervision Recommended:     [x]Patient continues to require treatment by a licensed therapist to address functional deficits as outlined in the established plan of care. Alyse Sanchez.  [address] [facility] [address], [address]

## 2019-07-25 NOTE — PROGRESS NOTES
lines  Extremities Used: Right Upper Extremity and Left Upper Extremity  Type of Chair:Manual  Surface: Level Tile  Distance: 130' x 2  Quality: Slow Velocity and Short Strokes      Balance:  Dynamic Sitting Balance: Contact Guard Assistance  Dynamic Standing Balance: Contact Guard Assistance  Pt. Completed dynamaic balance activity (ring toss) reaching outside ADAMS and across midline throughout. Pt. Initially completed while sitting on dynadisk and progressed to standing at walker. Pt. Requiring min A at times for balance due to lateral LOB. Pt. Also requires cues for walker placement while standing. Neuromuscular Education: Pt. Completed pre-gait activity stepping forward toward numbered pods and back to original position. Pt. Limited by ataxia at L LE. Cues required for midline, postural alignment, and walker placement. Activity completed to improve lateral weight-shifting, increase strength, and to improve proprioception to progress to gait training. Exercise:  None    Functional Outcome Measures: Not completed       ASSESSMENT:  Assessment: Patient progressing toward established goals. Activity Tolerance:  Patient tolerance of  treatment: good. Equipment Recommendations: Other: will continue to assess  Discharge Recommendations: Continue to assess pending progress    Plan: Times per week: 5x/wk 90 min, 1x wk 30 min  Specific instructions for Next Treatment: core and LE strengthening ex, bed mobility, sitting balance .   PT to assess transfers and gait when medically approved  Current Treatment Recommendations: Strengthening, Endurance Training, Balance Training, Functional Mobility Training, Safety Education & Training, Equipment Evaluation, Education, & procurement    Patient Education  Patient Education: Plan of Care, [employer] Mobility, Transfers    Goals:  Patient goals : Did not state  Short term goals  Time Frame for Short term goals: 1 week  Short term goal 1: Pt to go supine <->sit,  supervision

## 2019-07-25 NOTE — PROGRESS NOTES
improve LUE coordination as evidenced by completion of 9 hole peg test in < 4 minutes to improve indep with gathering items. Short term goal 5: Pt will safely navigate RW within room with Min A x1 and CGA of another to improve independence to [facility]. Long term goals  Time Frame for Long term goals : 3 weeks  Long term goal 1: Pt will complete ADL routine with SBA and no cues for safety or adapted technique to increase ability to perform self care tasks  Long term goal 2: Pt will complete light standing IADL task using RW with SBA and no cues for walker safety to improve indep within home. Following session, patient left in safe position with all fall risk precautions in place.

## 2019-07-25 NOTE — PROGRESS NOTES
His respiration is 16 and oxygen saturation is 93%. Body mass index is 23.96 kg/m². Temperature Range (24h):Temp: 98.2 °F (36.8 °C) Temp  Av.4 °F (36.9 °C)  Min: 98.2 °F (36.8 °C)  Max: 98.5 °F (36.9 °C)  BP Range (11V): Systolic (61PTN), HCI:094 , Min:106 , BTO:618     Diastolic (21YSE), GQM:89, Min:58, Max:74    Pulse Range (24h): Pulse  Av.7  Min: 62  Max: 68  Respiration Range (24h): Resp  Av  Min: 16  Max: 18  Current Pulse Ox (24h):  SpO2: 93 %  Pulse Ox Range (24h):  SpO2  Av.7 %  Min: 93 %  Max: 98 %  Oxygen Amount and Delivery: O2 Flow Rate (L/min): 3.5 L/min    awake  Orientation:   person, place, time, situation  Mood: anxious  Affect: flat good hydroxyzine windowing 8 days and then suddenly decided hydroxyzine to stop the purpose of taking hydroxyzine when you get Xanax and pain  General appearance:  in no acute distress, PEG, up in WC pain medications  Memory:   grossly normal  Attention/Concentration: abnormal - distractible  Language:  normal     Cranial Nerves:  cranial nerves II-XII are grossly intact with exception of LEFT hemianopsia   ROM:  abnormal - limited RIGHT knee extension - lacking 20deg  Motor Exam:  Motor exam is 4 out of 5 all extremities with the exception of RIGHT ADF 1/5     Tone:  normal  Muscle bulk: within normal limits  Sensory:  Sensory intact  Coordination:   Decreased LEFT SUDARSHAN, FFM, FTN, and HTS  Deep Tendon Reflexes:  Reflexes are intact and symmetrical bilaterally     Skin: warm and dry, no rash or erythema  Peripheral vascular: Pulses: Normal upper and lower extremity pulses; Edema: trace    Diagnostics:   No results found for this or any previous visit (from the past 24 hour(s)). Impression:  1. Acute CVA  large area of restricted diffusion involving the inferior medial aspect of the LEFT cerebellar hemisphere as well as the LEFT side of the medulla.    2. Chronic CVA with large infarct in the RIGHT posterior cerebral artery territory secondary to

## 2019-07-25 NOTE — PROGRESS NOTES
In bed. Eyes closed easy respirations. Oriented to person and place reoriented to time. Blood pressure 120/74 left arm lying. Apical pulse 67. Respirations 18, unlabored. Pulse ox 98% on room air. FABIENNE 4 mm to 5 mm. Mucous membranes pink moist and intact. Radial pulses strong bilaterally. Arm drift negative bilaterally. Capillary refill less than 3 seconds. Skin turgor brisk. Abdomen soft and non-tender to palpation. Bowel sounds active in all 4 quadrants. PEG tube in place no signs or redness, swelling drainage or warmth. Bilateral leg lifts strong. Bilateral pedal push and pull strong. Negative Gamaliel's sign bilaterally. Bilateral pedal pulses strong. Sequential compression devices in place bilateral lower extremities. Indicated no needs at this time. Bed in low position, wheels locked, call light and over bed table within reach.

## 2019-07-25 NOTE — PROGRESS NOTES
requiring min rest breaks throughout task. ASSESSMENT:  Activity Tolerance:  Patient tolerance of  treatment: good. Discharge Recommendations: Home with Home health OT  Equipment Recommendations: Equipment Needed: Yes  ADL Assistive Devices: Toileting - Raised Toilet Seat with arms  Plan: Times per week: 5xs week x 90 min, 1 x week x 30 min   Current Treatment Recommendations: Strengthening, Endurance Training, Neuromuscular Re-education, Patient/Caregiver Education & Training, Equipment Evaluation, Education, & procurement, Self-Care / ADL, Balance Training, Functional Mobility Training, Safety Education & Training    Patient Education  Patient Education: Home Exercise Program and Assistive Device Safety    Goals  Short term goals  Time Frame for Short term goals: 1 week  Short term goal 1: Pt will dynamically sit EOB for 5 minutes with SBA to improve independence with bathing/dressing tasks EOB. Short term goal 2: Pt will tolerate static standing for 1 minute with mod A and min cues for midline orientation in prep for washing hands at sink. Short term goal 3: Pt will complete stand pivot transfers with mod A x1 and min verbal cues for safety to improve independence with w/c transfers  Short term goal 4: Pt will improve LUE coordination as evidenced by completion of 9 hole peg test in < 4 minutes to improve indep with gathering items. Short term goal 5: Pt will safely navigate RW within room with Min A x1 and CGA of another to improve independence to [facility]. Long term goals  Time Frame for Long term goals : 3 weeks  Long term goal 1: Pt will complete ADL routine with SBA and no cues for safety or adapted technique to increase ability to perform self care tasks  Long term goal 2: Pt will complete light standing IADL task using RW with SBA and no cues for walker safety to improve indep within home.      Following session, patient left in safe position with all fall risk precautions in

## 2019-07-25 NOTE — PROGRESS NOTES
term goals : 4 weeks   Long term goal 1: bed mobility with mod I for ease of getting in/out of bed  Long term goal 2: sit <> stand with mod I for safe of transfers   Long term goal 3: ambulate 75ft with use of LRAD and stand-by assist for safe in home mobility   Long term goal 4: negotiate 1 step no rails with stand-by assist and use of LRAD for safe entry/exit of his home     Following session, patient left in safe position with all fall risk precautions in place.

## 2019-07-26 LAB — GLUCOSE BLD-MCNC: 86 MG/DL (ref 70–108)

## 2019-07-26 PROCEDURE — 1180000000 HC REHAB R&B

## 2019-07-26 PROCEDURE — 82948 REAGENT STRIP/BLOOD GLUCOSE: CPT

## 2019-07-26 PROCEDURE — 97112 NEUROMUSCULAR REEDUCATION: CPT

## 2019-07-26 PROCEDURE — 97110 THERAPEUTIC EXERCISES: CPT

## 2019-07-26 PROCEDURE — 97530 THERAPEUTIC ACTIVITIES: CPT

## 2019-07-26 PROCEDURE — 97127 HC SP THER IVNTJ W/FOCUS COG FUNCJ: CPT

## 2019-07-26 PROCEDURE — 6360000002 HC RX W HCPCS: Performed by: PHYSICAL MEDICINE & REHABILITATION

## 2019-07-26 PROCEDURE — 6370000000 HC RX 637 (ALT 250 FOR IP): Performed by: PHYSICAL MEDICINE & REHABILITATION

## 2019-07-26 PROCEDURE — 97542 WHEELCHAIR MNGMENT TRAINING: CPT

## 2019-07-26 PROCEDURE — 2580000003 HC RX 258: Performed by: PHYSICAL MEDICINE & REHABILITATION

## 2019-07-26 PROCEDURE — 92526 ORAL FUNCTION THERAPY: CPT

## 2019-07-26 PROCEDURE — 6370000000 HC RX 637 (ALT 250 FOR IP): Performed by: STUDENT IN AN ORGANIZED HEALTH CARE EDUCATION/TRAINING PROGRAM

## 2019-07-26 PROCEDURE — 2709999900 HC NON-CHARGEABLE SUPPLY

## 2019-07-26 RX ORDER — BUTALBITAL, ACETAMINOPHEN AND CAFFEINE 50; 325; 40 MG/1; MG/1; MG/1
1 TABLET ORAL EVERY 6 HOURS PRN
Status: DISCONTINUED | OUTPATIENT
Start: 2019-07-26 | End: 2019-08-14 | Stop reason: HOSPADM

## 2019-07-26 RX ADMIN — FAMOTIDINE 20 MG: 20 TABLET ORAL at 08:14

## 2019-07-26 RX ADMIN — MIDODRINE HYDROCHLORIDE 5 MG: 5 TABLET ORAL at 12:56

## 2019-07-26 RX ADMIN — BUTALBITAL, ACETAMINOPHEN, AND CAFFEINE 1 TABLET: 50; 325; 40 TABLET ORAL at 18:12

## 2019-07-26 RX ADMIN — TRAZODONE HYDROCHLORIDE 100 MG: 100 TABLET ORAL at 21:49

## 2019-07-26 RX ADMIN — Medication 100 MG: at 21:49

## 2019-07-26 RX ADMIN — BUSPIRONE HYDROCHLORIDE 10 MG: 10 TABLET ORAL at 21:49

## 2019-07-26 RX ADMIN — HYDROXYZINE HYDROCHLORIDE 50 MG: 25 TABLET, FILM COATED ORAL at 21:48

## 2019-07-26 RX ADMIN — HEPARIN 500 UNITS: 100 SYRINGE at 09:23

## 2019-07-26 RX ADMIN — MIDODRINE HYDROCHLORIDE 5 MG: 5 TABLET ORAL at 18:11

## 2019-07-26 RX ADMIN — MICONAZOLE NITRATE: 20 POWDER TOPICAL at 22:00

## 2019-07-26 RX ADMIN — MIDODRINE HYDROCHLORIDE 5 MG: 5 TABLET ORAL at 08:11

## 2019-07-26 RX ADMIN — ONDANSETRON HYDROCHLORIDE 4 MG: 4 TABLET, FILM COATED ORAL at 01:41

## 2019-07-26 RX ADMIN — ONDANSETRON HYDROCHLORIDE 4 MG: 4 TABLET, FILM COATED ORAL at 21:49

## 2019-07-26 RX ADMIN — MORPHINE SULFATE 10 MG: 10 SOLUTION ORAL at 01:41

## 2019-07-26 RX ADMIN — Medication 10 ML: at 09:23

## 2019-07-26 RX ADMIN — ASPIRIN 81 MG: 81 TABLET ORAL at 08:11

## 2019-07-26 RX ADMIN — ATORVASTATIN CALCIUM 80 MG: 80 TABLET, FILM COATED ORAL at 21:49

## 2019-07-26 RX ADMIN — SERTRALINE 200 MG: 100 TABLET, FILM COATED ORAL at 08:14

## 2019-07-26 RX ADMIN — MORPHINE SULFATE 10 MG: 10 SOLUTION ORAL at 21:49

## 2019-07-26 RX ADMIN — Medication 100 MG: at 08:12

## 2019-07-26 RX ADMIN — Medication 10 ML: at 01:42

## 2019-07-26 RX ADMIN — FAMOTIDINE 20 MG: 20 TABLET ORAL at 21:49

## 2019-07-26 RX ADMIN — MORPHINE SULFATE 2.5 MG: 10 SOLUTION ORAL at 12:58

## 2019-07-26 RX ADMIN — SENNOSIDES 17.2 MG: 8.6 TABLET, FILM COATED ORAL at 21:49

## 2019-07-26 RX ADMIN — RIVAROXABAN 20 MG: 20 TABLET, FILM COATED ORAL at 21:49

## 2019-07-26 RX ADMIN — MICONAZOLE NITRATE: 20 POWDER TOPICAL at 13:40

## 2019-07-26 ASSESSMENT — PAIN DESCRIPTION - FREQUENCY
FREQUENCY: CONTINUOUS

## 2019-07-26 ASSESSMENT — PAIN DESCRIPTION - ORIENTATION
ORIENTATION: LEFT

## 2019-07-26 ASSESSMENT — PAIN DESCRIPTION - LOCATION
LOCATION: HEAD;ABDOMEN
LOCATION: RIB CAGE;SHOULDER
LOCATION: BACK;RIB CAGE;SHOULDER
LOCATION: BACK;RIB CAGE;SHOULDER
LOCATION: RIB CAGE

## 2019-07-26 ASSESSMENT — PAIN SCALES - GENERAL
PAINLEVEL_OUTOF10: 7
PAINLEVEL_OUTOF10: 8
PAINLEVEL_OUTOF10: 6
PAINLEVEL_OUTOF10: 8
PAINLEVEL_OUTOF10: 6
PAINLEVEL_OUTOF10: 7
PAINLEVEL_OUTOF10: 7
PAINLEVEL_OUTOF10: 8
PAINLEVEL_OUTOF10: 4
PAINLEVEL_OUTOF10: 6

## 2019-07-26 ASSESSMENT — PAIN DESCRIPTION - PAIN TYPE
TYPE: ACUTE PAIN

## 2019-07-26 ASSESSMENT — PAIN DESCRIPTION - DESCRIPTORS
DESCRIPTORS: ACHING
DESCRIPTORS: ACHING
DESCRIPTORS: ACHING;CONSTANT
DESCRIPTORS: ACHING
DESCRIPTORS: BURNING;THROBBING
DESCRIPTORS: ACHING

## 2019-07-26 ASSESSMENT — PAIN DESCRIPTION - ONSET
ONSET: ON-GOING

## 2019-07-26 ASSESSMENT — PAIN DESCRIPTION - PROGRESSION
CLINICAL_PROGRESSION: NOT CHANGED

## 2019-07-26 NOTE — PROGRESS NOTES
[address] [address]  INPATIENT PHYSICAL THERAPY  DAILY NOTE  [facility] - 7E-70/070-A    Time In: 0930  Time Out: 1030  Timed Code Treatment Minutes: 60 Minutes  Minutes: 60          Date: 2019  Patient Name: Jagdeep Combs,  Gender:  male        MRN: [id number]  : 1960  (62 y.o.)     Referring Practitioner: Carmella Pandya MD  Diagnosis: cerebellar infarct   Additional Pertinent Hx: Pt admitted through ED due to weakness and pain s/p fall at home. Hx:  CA, CVA. x-ray Lt 9th rib fx. MRI:  remote infarct, restricted diffusion inferior medial aspect LT cerebellar hemisphere and medula. Pt admited to hospital on 2019 and transferred to rehab on . Prior Level of Function:  Lives With: Alone  Type of Home: Apartment  Home Layout: One level  Home Access: Stairs to enter without rails  Entrance Stairs - Number of Steps: 1  Home Equipment: [employer], 4 wheeled walker, Rolling walker, Quad cane(hospital bed with bed rails)    Restrictions/Precautions:  Restrictions/Precautions: Fall Risk, General Precautions(NPO)  Position Activity Restriction  Other position/activity restrictions: PEG , oxygen, impulsive, unsteady, left ninth rib fracture and also old rib fractures on the right side    SUBJECTIVE: Pt. Laying in bed upon arrival and agrees to therapy session with min encouragement. Pt. States that he did not sleep well overnight. Pt. Requests to use restroom during session and found to be incontinent of urine requiring depends change. PAIN: Did not rate.  L UE    OBJECTIVE:  Bed Mobility:  Rolling to Right: Stand By Assistance   Supine to Sit: Moderate Assistance    Transfers:  Sit to Stand: Minimal Assistance  Stand to Sit:Minimal Assistance  Stand Pivot:Minimal Assistance, Maximum Assistance, Pt. unsteady with multiple LOBs requiring max A to correct    Ambulation:  Not tested    Wheelchair Mobility:  Supervision  Extremities Used: Bilateral Upper Extremities  Type of Chair:Manual  Surface: Level Tile  Distance: 140' x 1  Quality: Slow Velocity    Neuromuscular Education:   Pt. Completed pre-gait activity stepping onto numbered dots placed on ground in front of him in alternating pattern. Pt. With difficulty maintaining midline with multiple LOBs requiring mod A to correct. Balance:  Static Standing Balance: Contact Guard Assistance, Moderate Assistance, Maximum Assistance   Pt. Stood at [employer] while PTA assisted with pulling up pants and depends after toileting. Pt. Requires cues for hand placement for safety. Pt. Impulsive leading to LOBs requiring mod-max A to correct. Pt. Also stood at walker with B UE support and CGA-Min A for balance to improve standing tolerance and balance. Exercise:  Patient was guided in 1 set(s) 10 reps of exercise to both lower extremities. Glut sets, Long arc quads, Hip abduction/adduction, Seated hip flexion, Seated hamstring curls, Seated heel/toe raises and Seated isometric hip adduction. Exercises were completed for increased independence with functional mobility. Functional Outcome Measures: Not completed       ASSESSMENT:  Assessment: Patient progressing toward established goals. Pt. Impulsive throughout session requiring many cues for safety. Activity Tolerance:  Patient tolerance of  treatment: good. Equipment Recommendations: Other: will continue to assess  Discharge Recommendations: Continue to assess pending progress    Plan: Times per week: 5x/wk 90 min, 1x wk 30 min  Specific instructions for Next Treatment: core and LE strengthening ex, bed mobility, sitting balance .   PT to assess transfers and gait when medically approved  Current Treatment Recommendations: Strengthening, Endurance Training, Balance Training, Functional Mobility Training, Safety Education & Training, Equipment Evaluation, Education, & procurement    Patient Education  Patient Education: Plan of Care, Bed Mobility,

## 2019-07-26 NOTE — PROGRESS NOTES
[address]  Occupational Therapy  Daily Note  Time:   Time In: 1030  Time Out: 1130  Timed Code Treatment Minutes: 60 Minutes  Minutes: 60        Date: 2019  Patient Name: Luiz Trejo,   Gender: male      Room: [facility]/070-A  MRN: [id number]  : 1960  (62 y.o.)  Referring Practitioner: Mario Amado MD  Diagnosis: Cerebellar infarct  Additional Pertinent Hx: He was admitted 2019 for complaint of injuries after a ground-level fall at home. The patient was reaching over his couch when he lost his balance and fell onto his left side causing him to strike his head without any endorsed loss of consciousness. There was a small superficial contusion of the left frontal region. On initial work-up he was found to have a left ninth rib fracture and also old rib fractures on the right side. CT imaging of the chest showed patchy nodular airspace opacities in the left lung base suggestive of infiltrates with an elevated white count of 15.3 noted. CT imaging of the head did show an acute infarct of the inferior medial aspect of the left cerebellar hemisphere as well as the left side of the medulla. The patient was admitted for further medical care. Note was also made that the patient was disheveled and malodorous and had questionable ability to care for himself. He did undergo a modified barium swallow study on  which showed the patient to have mild oral dysphagia and moderate pharyngeal dysphasia with recommendation at that time for thin and moist purées with thin liquids and strict use of compensatory strategies. He is currently on Zosyn for treating a presumptive aspiration pneumonia versus a hospital-acquired pneumonia. Lynne Escobar Hx of prior CVA in april with chronic pain. The patient was just recently in the emergency department on 2019 for complaint of unsteady gait and nausea and vomiting as well as issues with swallow.   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 Ritu Luke. It is noted that his malignancy is stated to be in remission. 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 Restrictions/Precautions:  Restrictions/Precautions: Fall Risk, General Precautions(NPO)  Position Activity Restriction  Other position/activity restrictions: PEG 7/14, oxygen, impulsive, unsteady, left ninth rib fracture and also old rib fractures on the right side    SUBJECTIVE: Pt seated in w/c finishing PT session within therapy gym upon arrival, agreeable to OT session. PAIN: Denies. COGNITION: Decreased Judgment, Decreased Problem Solving, Decreased Initiation and Decreased Sequencing    ADL:   No ADL's completed this session. BALANCE:  Sitting Balance:  Stand By Assistance  Standing Balance: Contact Guard Assistance, Minimal Assistance  Dynamic standing activity tolerated x7 minutes x1 trial, x4 minutes x1 trial.    TRANSFERS:  Sit to Stand:  Minimal Assistance. Stand to Sit: Contact Guard Assistance. ADDITIONAL ACTIVITIES:  Pt participated in dynamic standing tabletop activity to challenge balance and facilitate functional reach into various planes for game piece and place onto target with BUE and 1 UE release from RW. Pt stood for x7 minutes x1 trial, x4 minutes x1 trial with Devon-CGA before requiring lenghty seated rest break, mod-min fatigue noted. Completed to increase safety with dynamic standing required for showering tasks.  Pt completed remainder of activity while seated in w/c using LUE with 2# wrist weight to position with all fall risk precautions in place.

## 2019-07-26 NOTE — PROGRESS NOTES
Patient lying in bed, eyes closed. Calm and cooperative interaction. Alert and oriented to person, place, and time. FABIENNE 4mm to 3mm, brisk. Mucous membranes pink, moist, and intact. Hair dry and shiny. Skin warm and dry. Speech clear. Productive cough with clear, thin mucus. Respirations regular and unlabored. Medi Port to right upper chest without swelling, heat, or drainage. Transparent dressing to site, intact, clean, and dry. Heart sounds regular and strong. Bowel sounds active in all four quadrants. Abdomen soft, round, and non tender to palpation. PEG tube intact with split sponge gauze. Small amount of dry red drainage around insertion site. Denied and difficulty or burning when urinating. Radial pulses strong and equal bilaterally. Hand grasp strong bilaterally. Capillary refill less than three seconds. Skin turgor brisk. Pedal push and pull strong bilaterally. Gamaliel's sign negative bilaterally. Leg lift strong bilaterally. Bed in low position, bed wheels locked, side rails up times two. Patient left room with therapy at 0945.

## 2019-07-26 NOTE — PROGRESS NOTES
Physical Medicine & Rehabilitation  Progress Note    Chief Complaint:  LEFT cerebellar CVA    Subjective:   He notes good tolerance of the TF boluses started today. Headache is unchanged and he is agreeable to start Fioricet. Lightheadedness is minimal after increasing the midodrine. No other concerns at this time. Rehabilitation:  Physical Therapy:  OBJECTIVE:  Bed Mobility:  Sit to Supine: Stand By Assistance      Transfers:  Sit to Stand: Minimal Assistance  Stand to Sit:Minimal Assistance  Stand Pivot:Minimal Assistance, Moderate Assistance, with verbal cues for safety     Ambulation:  Not tested     Wheelchair Mobility:  Stand By Assistance, with verbal cues , VCs required for direction. Extremities Used: Bilateral Upper Extremities  Type of Chair:Manual  Surface: Level Tile  Distance: 140' x 1, 70' x 2  Quality: Slow Velocity. 1 rest break required secondary to nausea    FIMS: Bed, Chair, Wheel Chair: 1 - Requires >75% assitance to transfer  Walk: 0 - Activity Not Assessed/Does Not Occur  Wheel Chair: 2 - Maximal Assistance Requires up to Maximal Assistance AND requires assistance of one person to operate wheelchair between  feet  Stairs: 0 - Activity Does not Occur ( 0 only for the admission assessment), PT Equipment Recommendations  Other: will continue to assess, Assessment: Pt demonstrates a decrease in baseline by way of bed mobility, transfers and ambulation secondary to decreased balance, decreased endurance, trunk control, and coordination. Pt will benefit from skilled PT services during this admission and post d/c for improved functional I.      Occupational therapy: FIMS:  Eatin - Staff performs feeding for patient or patient requires IV fluids for hydration/TPN/PPN  Groomin - Requires setup/cues to do all tasks(Pt washed face with setup and SBA)  Bathin - Able to bathe 8-9 areas(CGA sitting balance on shower chair, A to wash buttocks, )  Dressing-Upper: 4 - Requires assist with buttons/zippers only and/or requires assist with one arm only(CGA when doffing shirt. SBA to don shirt)  Dressing-Lower: 3 - Requires assist with 2-3 parts of dressing(donning shoes while seated EOB)  Toiletin - Able to perform 1 task only (e.g. hygiene)(A to place urinal, A with clothing mgt )  Toilet Transfer: 3 - Requires 25-49% assist getting off toilet  Shower Transfer: 3 - Moderate assistance, pt. expends 50%-74% effort,  , Assessment: Pt is making progress towards his goals, though no goals met yet due to short length of stay. Pt is limited by fatigue, nausea overall and both impacted his participation with OT today with OT missing 48 minutes. Pt demonstrates decreased insight into his deficits and tends to perseverate on eating food, though he is NPO. PT continues to demonstrate L hemibody weakness decreased midline orientation, decreased safety awareness impacting patient's ability to complete self care at prior level of functioning. Due to patient's performance deficits, patient would greatly benefit from continued occupational therapy for ADL remediation, endurance building, strengthening and neuro re-education to return to prior level of functioning and safe return to home environment. Speech therapy: FIMS: Comprehension: 5 - Patient understands basic needs (hungry/hot/pain)  Expression: 5 - Expresses basic ideas/needs only (hungry/hot/pain)  Social Interaction: 5 - Patient is appropriate with supervision/cues  Problem Solvin - Patient solves simple/routine tasks 75-90%+   Memory: 4 - Patient remembers 75-90%+ of the time    DIAGNOSTIC IMPRESSIONS: Patient presents with mild oral and severe pharyngeal dysphagia evidenced by significant premature pharyngeal entry, primarily to the right pyriform sinus with NO epiglottic movement. Spillage over the epiglottic edge noted with denser consistencies resulting in laryngeal penetration and tracheal aspiration before the swallow.  In addition, moderate pharyngeal residue noted in the valleculae, pyriform sinus and along the posterior pharyngeal wall. Residue from the pyriform sinus noted to spill into the airway following initial swallow. Reflexive cough triggered with strong response to clear material from the airway. Pt requiring ~5-6 swallows to clear a single 1/4 tsp size bolus with recurrent episodes of laryngeal penetration and tracheal aspiration of residuals. Left vocal fold appears to be immobile with stiffening of tissue in the posterior pharyngeal wall laterally to the left. Pharyngeal swallow function is NON FUNCTIONAL at this time. Pt remains at HIGH risk for aspiration with both dense and thin consistencies, resulting in recommendations for continuation of NPO status and use of alternate means of nutrition to meet caloric/nutritional needs. Review of Systems:  CONSTITUTIONAL:  positive for fatigue  EYES:  positive for  blind spots  HEENT:  negative  RESPIRATORY:  positive for dyspnea  CARDIOVASCULAR:  negative  GASTROINTESTINAL:  positive for dysphagia and PEG  GENITOURINARY:  negative  SKIN:  negative  HEMATOLOGIC/LYMPHATIC:  negative  MUSCULOSKELETAL:  positive for  myalgias, arthralgias, pain and muscle weakness  NEUROLOGICAL:  positive for headaches, memory problems, visual disturbance, coordination problems, gait problems, dysphagia, pain, and weakness, headaches  BEHAVIOR/PSYCH:  negative  10 point system review otherwise negative    Objective:  BP (!) 110/58   Pulse 64   Temp 98.3 °F (36.8 °C) (Oral)   Resp 18   Ht 6' (1.829 m)   Wt 175 lb 3.2 oz (79.5 kg)   SpO2 99%   BMI 23.76 kg/m²   CURRENT VITALS:  height is 6' (1.829 m) and weight is 175 lb 3.2 oz (79.5 kg). His oral temperature is 98.3 °F (36.8 °C). His blood pressure is 110/58 (abnormal) and his pulse is 64. His respiration is 18 and oxygen saturation is 99%. Body mass index is 23.76 kg/m².   Temperature Range (24h):Temp: 98.3 °F (36.8 °C) Temp  Av °F (36.7 °C) Min: 97.6 °F (36.4 °C)  Max: 98.3 °F (36.8 °C)  BP Range (11W): Systolic (40AGH), HSR:776 , Min:102 , ZTN:057     Diastolic (88OYK), VWJ:81, Min:52, Max:58    Pulse Range (24h): Pulse  Av.5  Min: 62  Max: 76  Respiration Range (24h): Resp  Av.3  Min: 16  Max: 18  Current Pulse Ox (24h):  SpO2: 99 %  Pulse Ox Range (24h):  SpO2  Av.4 %  Min: 93 %  Max: 99 %  Oxygen Amount and Delivery: O2 Flow Rate (L/min): 3.5 L/min    awake  Orientation:   person, place, time, situation  Mood: anxious  Affect: flat good hydroxyzine windowing 8 days and then suddenly decided hydroxyzine to stop the purpose of taking hydroxyzine when you get Xanax and pain  General appearance:  in no acute distress, PEG, up in WC pain medications  Memory:   grossly normal  Attention/Concentration: abnormal - distractible  Language:  normal     Cranial Nerves:  cranial nerves II-XII are grossly intact with exception of LEFT hemianopsia   ROM:  abnormal - limited RIGHT knee extension - lacking 20deg  Motor Exam:  Motor exam is 4 out of 5 all extremities with the exception of RIGHT ADF 1/5     Tone:  normal  Muscle bulk: within normal limits  Sensory:  Sensory intact  Coordination:   Decreased LEFT SUDARSHAN, FFM, FTN, and HTS  Deep Tendon Reflexes:  Reflexes are intact and symmetrical bilaterally     Skin: warm and dry, no rash or erythema  Peripheral vascular: Pulses: Normal upper and lower extremity pulses; Edema: trace    Diagnostics:   Recent Results (from the past 24 hour(s))   POCT glucose    Collection Time: 19  8:08 AM   Result Value Ref Range    POC Glucose 86 70 - 108 mg/dl     Impression:  1. Acute CVA  large area of restricted diffusion involving the inferior medial aspect of the LEFT cerebellar hemisphere as well as the LEFT side of the medulla.    2. Chronic CVA with large infarct in the RIGHT posterior cerebral artery territory secondary to high-grade stenosis at the P1 segment of the RIGHT posterior cerebral artery

## 2019-07-27 LAB — GLUCOSE BLD-MCNC: 81 MG/DL (ref 70–108)

## 2019-07-27 PROCEDURE — 97110 THERAPEUTIC EXERCISES: CPT

## 2019-07-27 PROCEDURE — 6370000000 HC RX 637 (ALT 250 FOR IP): Performed by: PHYSICAL MEDICINE & REHABILITATION

## 2019-07-27 PROCEDURE — 1180000000 HC REHAB R&B

## 2019-07-27 PROCEDURE — 82948 REAGENT STRIP/BLOOD GLUCOSE: CPT

## 2019-07-27 PROCEDURE — 6360000002 HC RX W HCPCS: Performed by: PHYSICAL MEDICINE & REHABILITATION

## 2019-07-27 PROCEDURE — 6370000000 HC RX 637 (ALT 250 FOR IP): Performed by: STUDENT IN AN ORGANIZED HEALTH CARE EDUCATION/TRAINING PROGRAM

## 2019-07-27 PROCEDURE — 97116 GAIT TRAINING THERAPY: CPT

## 2019-07-27 PROCEDURE — 97530 THERAPEUTIC ACTIVITIES: CPT

## 2019-07-27 PROCEDURE — 2709999900 HC NON-CHARGEABLE SUPPLY

## 2019-07-27 PROCEDURE — 2580000003 HC RX 258: Performed by: PHYSICAL MEDICINE & REHABILITATION

## 2019-07-27 RX ADMIN — BUTALBITAL, ACETAMINOPHEN, AND CAFFEINE 1 TABLET: 50; 325; 40 TABLET ORAL at 18:49

## 2019-07-27 RX ADMIN — Medication 100 MG: at 20:33

## 2019-07-27 RX ADMIN — Medication 10 ML: at 09:27

## 2019-07-27 RX ADMIN — FAMOTIDINE 20 MG: 20 TABLET ORAL at 09:39

## 2019-07-27 RX ADMIN — Medication 100 MG: at 09:39

## 2019-07-27 RX ADMIN — HEPARIN 500 UNITS: 100 SYRINGE at 09:27

## 2019-07-27 RX ADMIN — ATORVASTATIN CALCIUM 80 MG: 80 TABLET, FILM COATED ORAL at 20:32

## 2019-07-27 RX ADMIN — TRAZODONE HYDROCHLORIDE 100 MG: 100 TABLET ORAL at 20:32

## 2019-07-27 RX ADMIN — MICONAZOLE NITRATE: 20 POWDER TOPICAL at 20:35

## 2019-07-27 RX ADMIN — MIDODRINE HYDROCHLORIDE 5 MG: 5 TABLET ORAL at 17:54

## 2019-07-27 RX ADMIN — ONDANSETRON HYDROCHLORIDE 4 MG: 4 TABLET, FILM COATED ORAL at 09:59

## 2019-07-27 RX ADMIN — BUSPIRONE HYDROCHLORIDE 10 MG: 10 TABLET ORAL at 20:32

## 2019-07-27 RX ADMIN — MORPHINE SULFATE 10 MG: 10 SOLUTION ORAL at 06:19

## 2019-07-27 RX ADMIN — MORPHINE SULFATE 10 MG: 10 SOLUTION ORAL at 16:28

## 2019-07-27 RX ADMIN — FAMOTIDINE 20 MG: 20 TABLET ORAL at 20:33

## 2019-07-27 RX ADMIN — BUTALBITAL, ACETAMINOPHEN, AND CAFFEINE 1 TABLET: 50; 325; 40 TABLET ORAL at 09:39

## 2019-07-27 RX ADMIN — SENNOSIDES 17.2 MG: 8.6 TABLET, FILM COATED ORAL at 20:33

## 2019-07-27 RX ADMIN — MIDODRINE HYDROCHLORIDE 5 MG: 5 TABLET ORAL at 09:39

## 2019-07-27 RX ADMIN — RIVAROXABAN 20 MG: 20 TABLET, FILM COATED ORAL at 17:54

## 2019-07-27 RX ADMIN — SERTRALINE 200 MG: 100 TABLET, FILM COATED ORAL at 09:39

## 2019-07-27 RX ADMIN — ASPIRIN 81 MG: 81 TABLET ORAL at 09:40

## 2019-07-27 RX ADMIN — ACETAMINOPHEN 650 MG: 650 SOLUTION ORAL at 20:31

## 2019-07-27 RX ADMIN — MICONAZOLE NITRATE: 20 POWDER TOPICAL at 09:40

## 2019-07-27 RX ADMIN — Medication 10 ML: at 06:09

## 2019-07-27 RX ADMIN — MORPHINE SULFATE 10 MG: 10 SOLUTION ORAL at 11:40

## 2019-07-27 ASSESSMENT — PAIN SCALES - GENERAL
PAINLEVEL_OUTOF10: 8
PAINLEVEL_OUTOF10: 4
PAINLEVEL_OUTOF10: 8

## 2019-07-27 ASSESSMENT — PAIN - FUNCTIONAL ASSESSMENT: PAIN_FUNCTIONAL_ASSESSMENT: PREVENTS OR INTERFERES SOME ACTIVE ACTIVITIES AND ADLS

## 2019-07-27 ASSESSMENT — PAIN DESCRIPTION - PROGRESSION: CLINICAL_PROGRESSION: NOT CHANGED

## 2019-07-27 ASSESSMENT — PAIN DESCRIPTION - ORIENTATION: ORIENTATION: RIGHT

## 2019-07-27 ASSESSMENT — PAIN DESCRIPTION - FREQUENCY: FREQUENCY: CONTINUOUS

## 2019-07-27 ASSESSMENT — PAIN DESCRIPTION - LOCATION: LOCATION: SHOULDER;HEAD

## 2019-07-27 ASSESSMENT — PAIN DESCRIPTION - ONSET: ONSET: ON-GOING

## 2019-07-27 ASSESSMENT — PAIN DESCRIPTION - DESCRIPTORS: DESCRIPTORS: ACHING;STABBING

## 2019-07-27 NOTE — PROGRESS NOTES
mobility with mod I for ease of getting in/out of bed  Long term goal 2: sit <> stand with mod I for safe of transfers   Long term goal 3: ambulate 75ft with use of LRAD and stand-by assist for safe in home mobility   Long term goal 4: negotiate 1 step no rails with stand-by assist and use of LRAD for safe entry/exit of his home     Following session, patient left in safe position with all fall risk precautions in place.

## 2019-07-27 NOTE — PROGRESS NOTES
Pt refused 2200 tube feed stating he was full. 185ml free water flush was given prior and after evening medication administration. Placement of peg tube checked. 15ml of residual (bile color) was drawn up and returned. Will continue to monitor.

## 2019-07-27 NOTE — PROGRESS NOTES
[address]  Occupational Therapy  Daily Note  Time:   Time In: 1200  Time Out: 1230  Timed Code Treatment Minutes: 30 Minutes  Minutes: 30          Date: 2019  Patient Name: Elver Hubbard,   Gender: male      Room: [facility]70/070-A  MRN: [id number]  : 1960  (62 y.o.)  Referring Practitioner: Otto Bernard MD  Diagnosis: Cerebellar infarct  Additional Pertinent Hx: He was admitted 2019 for complaint of injuries after a ground-level fall at home. The patient was reaching over his couch when he lost his balance and fell onto his left side causing him to strike his head without any endorsed loss of consciousness. There was a small superficial contusion of the left frontal region. On initial work-up he was found to have a left ninth rib fracture and also old rib fractures on the right side. CT imaging of the chest showed patchy nodular airspace opacities in the left lung base suggestive of infiltrates with an elevated white count of 15.3 noted. CT imaging of the head did show an acute infarct of the inferior medial aspect of the left cerebellar hemisphere as well as the left side of the medulla. The patient was admitted for further medical care. Note was also made that the patient was disheveled and malodorous and had questionable ability to care for himself. He did undergo a modified barium swallow study on  which showed the patient to have mild oral dysphagia and moderate pharyngeal dysphasia with recommendation at that time for thin and moist purées with thin liquids and strict use of compensatory strategies. He is currently on Zosyn for treating a presumptive aspiration pneumonia versus a hospital-acquired pneumonia. Samantha Nick Hx of prior CVA in april with chronic pain. The patient was just recently in the emergency department on 2019 for complaint of unsteady gait and nausea and vomiting as well as issues with swallow.   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 Tamela Sahni. It is noted that his malignancy is stated to be in remission. 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 Restrictions/Precautions:  Restrictions/Precautions: Fall Risk, General Precautions(NPO)  Position Activity Restriction  Other position/activity restrictions: PEG 7/14, oxygen, impulsive, unsteady, left ninth rib fracture and also old rib fractures on the right side    SUBJECTIVE: Patient in wheelchair upon arrival     PAIN: Denies     ADL:   No ADL's completed this session. Domonique Munson BALANCE:  Sitting Balance:  Stand By Assistance  Standing Balance: Contact Guard Assistance    BED MOBILITY:  Sit to Supine: Contact Guard Assistance    Scooting: Contact Guard Assistance      TRANSFERS:  Sit to Stand:  Contact Guard Assistance. from wheelchair   Stand to Sit: [address] to EOB   Stand Pivot: Contact Guard Assistance. min unsteady no LOB    ADDITIONAL ACTIVITIES:  Patient completed [facility] activity with LUE stocking small blocks. Patient dmeo'd mod difficulty with coordination and grasp and release of blocks, knocking down blocks. Patient also completed [employer] activity with grasp and release of LUE moving cones from right side to left side of table. Min difficulty release cones. Patient completed IADL tasks of folding pillow cases with BUE while seated. Demo'd min difficulty manipulating pillow case with LUE, increased time to fold 4 pillow cases.  Activities completed to increase [facility] and [employer] of LUE to increase ease and independence of self care

## 2019-07-28 ENCOUNTER — APPOINTMENT (OUTPATIENT)
Dept: CT IMAGING | Age: 59
DRG: 056 | End: 2019-07-28
Attending: PHYSICAL MEDICINE & REHABILITATION
Payer: MEDICARE

## 2019-07-28 LAB
ALLEN TEST: POSITIVE
BASE EXCESS (CALCULATED): 9.5 MMOL/L (ref -2.5–2.5)
COLLECTED BY:: ABNORMAL
DEVICE: ABNORMAL
GLUCOSE BLD-MCNC: 90 MG/DL (ref 70–108)
HCO3: 36 MMOL/L (ref 23–28)
IFIO2: 100
O2 SATURATION: 91 %
PCO2: 54 MMHG (ref 35–45)
PH BLOOD GAS: 7.43 (ref 7.35–7.45)
PO2: 62 MMHG (ref 71–104)
SOURCE, BLOOD GAS: ABNORMAL

## 2019-07-28 PROCEDURE — 6360000002 HC RX W HCPCS: Performed by: PHYSICAL MEDICINE & REHABILITATION

## 2019-07-28 PROCEDURE — 70450 CT HEAD/BRAIN W/O DYE: CPT

## 2019-07-28 PROCEDURE — 6370000000 HC RX 637 (ALT 250 FOR IP): Performed by: STUDENT IN AN ORGANIZED HEALTH CARE EDUCATION/TRAINING PROGRAM

## 2019-07-28 PROCEDURE — 2580000003 HC RX 258: Performed by: PHYSICAL MEDICINE & REHABILITATION

## 2019-07-28 PROCEDURE — 36600 WITHDRAWAL OF ARTERIAL BLOOD: CPT

## 2019-07-28 PROCEDURE — 6370000000 HC RX 637 (ALT 250 FOR IP): Performed by: PHYSICAL MEDICINE & REHABILITATION

## 2019-07-28 PROCEDURE — 82803 BLOOD GASES ANY COMBINATION: CPT

## 2019-07-28 PROCEDURE — 94640 AIRWAY INHALATION TREATMENT: CPT

## 2019-07-28 PROCEDURE — 94761 N-INVAS EAR/PLS OXIMETRY MLT: CPT

## 2019-07-28 PROCEDURE — 82948 REAGENT STRIP/BLOOD GLUCOSE: CPT

## 2019-07-28 PROCEDURE — 2700000000 HC OXYGEN THERAPY PER DAY

## 2019-07-28 PROCEDURE — 1180000000 HC REHAB R&B

## 2019-07-28 PROCEDURE — 2709999900 HC NON-CHARGEABLE SUPPLY

## 2019-07-28 RX ADMIN — MIDODRINE HYDROCHLORIDE 5 MG: 5 TABLET ORAL at 17:15

## 2019-07-28 RX ADMIN — ATORVASTATIN CALCIUM 80 MG: 80 TABLET, FILM COATED ORAL at 22:50

## 2019-07-28 RX ADMIN — HEPARIN 500 UNITS: 100 SYRINGE at 09:01

## 2019-07-28 RX ADMIN — SERTRALINE 200 MG: 100 TABLET, FILM COATED ORAL at 10:04

## 2019-07-28 RX ADMIN — ERGOCALCIFEROL 50000 UNITS: 1.25 CAPSULE ORAL at 10:06

## 2019-07-28 RX ADMIN — IPRATROPIUM BROMIDE AND ALBUTEROL SULFATE 1 AMPULE: .5; 3 SOLUTION RESPIRATORY (INHALATION) at 22:56

## 2019-07-28 RX ADMIN — BUTALBITAL, ACETAMINOPHEN, AND CAFFEINE 1 TABLET: 50; 325; 40 TABLET ORAL at 10:03

## 2019-07-28 RX ADMIN — MIDODRINE HYDROCHLORIDE 5 MG: 5 TABLET ORAL at 10:04

## 2019-07-28 RX ADMIN — Medication 10 ML: at 10:09

## 2019-07-28 RX ADMIN — MORPHINE SULFATE 10 MG: 10 SOLUTION ORAL at 12:05

## 2019-07-28 RX ADMIN — Medication 10 ML: at 05:55

## 2019-07-28 RX ADMIN — BUTALBITAL, ACETAMINOPHEN, AND CAFFEINE 1 TABLET: 50; 325; 40 TABLET ORAL at 17:14

## 2019-07-28 RX ADMIN — ASPIRIN 81 MG: 81 TABLET ORAL at 10:04

## 2019-07-28 RX ADMIN — MORPHINE SULFATE 10 MG: 10 SOLUTION ORAL at 05:39

## 2019-07-28 RX ADMIN — MIDODRINE HYDROCHLORIDE 5 MG: 5 TABLET ORAL at 14:11

## 2019-07-28 RX ADMIN — SENNOSIDES 17.2 MG: 8.6 TABLET, FILM COATED ORAL at 22:53

## 2019-07-28 RX ADMIN — Medication 100 MG: at 10:06

## 2019-07-28 RX ADMIN — MORPHINE SULFATE 10 MG: 10 SOLUTION ORAL at 17:15

## 2019-07-28 RX ADMIN — BUSPIRONE HYDROCHLORIDE 10 MG: 10 TABLET ORAL at 22:50

## 2019-07-28 RX ADMIN — MICONAZOLE NITRATE: 20 POWDER TOPICAL at 10:07

## 2019-07-28 RX ADMIN — FAMOTIDINE 20 MG: 20 TABLET ORAL at 10:04

## 2019-07-28 RX ADMIN — RIVAROXABAN 20 MG: 20 TABLET, FILM COATED ORAL at 17:14

## 2019-07-28 ASSESSMENT — PAIN SCALES - GENERAL
PAINLEVEL_OUTOF10: 9
PAINLEVEL_OUTOF10: 8
PAINLEVEL_OUTOF10: 6
PAINLEVEL_OUTOF10: 9
PAINLEVEL_OUTOF10: 8
PAINLEVEL_OUTOF10: 8

## 2019-07-28 ASSESSMENT — PAIN DESCRIPTION - ORIENTATION: ORIENTATION: LEFT

## 2019-07-28 ASSESSMENT — PAIN DESCRIPTION - FREQUENCY
FREQUENCY: CONTINUOUS
FREQUENCY: CONTINUOUS

## 2019-07-28 ASSESSMENT — PAIN DESCRIPTION - PAIN TYPE
TYPE: CHRONIC PAIN
TYPE: ACUTE PAIN

## 2019-07-28 ASSESSMENT — PAIN DESCRIPTION - PROGRESSION
CLINICAL_PROGRESSION: NOT CHANGED

## 2019-07-28 ASSESSMENT — PAIN DESCRIPTION - DESCRIPTORS
DESCRIPTORS: ACHING
DESCRIPTORS: THROBBING

## 2019-07-28 ASSESSMENT — PAIN DESCRIPTION - ONSET
ONSET: ON-GOING
ONSET: ON-GOING

## 2019-07-28 ASSESSMENT — PAIN DESCRIPTION - LOCATION
LOCATION: HEAD
LOCATION: LEG;ARM

## 2019-07-28 NOTE — PROGRESS NOTES
300ml 2 Konrad HN administered via peg tube. With 185 ml pre and post bolus water flush. Patient tolerated well. Tube placement checked. 20ml bile colored residual noted.

## 2019-07-29 ENCOUNTER — APPOINTMENT (OUTPATIENT)
Dept: CT IMAGING | Age: 59
DRG: 056 | End: 2019-07-29
Attending: PHYSICAL MEDICINE & REHABILITATION
Payer: MEDICARE

## 2019-07-29 ENCOUNTER — APPOINTMENT (OUTPATIENT)
Dept: GENERAL RADIOLOGY | Age: 59
DRG: 056 | End: 2019-07-29
Attending: PHYSICAL MEDICINE & REHABILITATION
Payer: MEDICARE

## 2019-07-29 ENCOUNTER — APPOINTMENT (OUTPATIENT)
Dept: ULTRASOUND IMAGING | Age: 59
DRG: 056 | End: 2019-07-29
Attending: PHYSICAL MEDICINE & REHABILITATION
Payer: MEDICARE

## 2019-07-29 LAB
ANION GAP SERPL CALCULATED.3IONS-SCNC: 11 MEQ/L (ref 8–16)
BASOPHILS # BLD: 0.5 %
BASOPHILS ABSOLUTE: 0.1 THOU/MM3 (ref 0–0.1)
BUN BLDV-MCNC: 26 MG/DL (ref 7–22)
CALCIUM SERPL-MCNC: 8.9 MG/DL (ref 8.5–10.5)
CHLORIDE BLD-SCNC: 96 MEQ/L (ref 98–111)
CO2: 32 MEQ/L (ref 23–33)
CREAT SERPL-MCNC: 0.6 MG/DL (ref 0.4–1.2)
EOSINOPHIL # BLD: 1.6 %
EOSINOPHILS ABSOLUTE: 0.2 THOU/MM3 (ref 0–0.4)
ERYTHROCYTE [DISTWIDTH] IN BLOOD BY AUTOMATED COUNT: 13.2 % (ref 11.5–14.5)
ERYTHROCYTE [DISTWIDTH] IN BLOOD BY AUTOMATED COUNT: 46 FL (ref 35–45)
GFR SERPL CREATININE-BSD FRML MDRD: > 90 ML/MIN/1.73M2
GLUCOSE BLD-MCNC: 130 MG/DL (ref 70–108)
GLUCOSE BLD-MCNC: 81 MG/DL (ref 70–108)
HCT VFR BLD CALC: 37.5 % (ref 42–52)
HEMOGLOBIN: 11.9 GM/DL (ref 14–18)
IMMATURE GRANS (ABS): 0.05 THOU/MM3 (ref 0–0.07)
IMMATURE GRANULOCYTES: 0 %
INR BLD: 1.1 (ref 0.85–1.13)
LYMPHOCYTES # BLD: 4.8 %
LYMPHOCYTES ABSOLUTE: 0.6 THOU/MM3 (ref 1–4.8)
MCH RBC QN AUTO: 30.3 PG (ref 26–33)
MCHC RBC AUTO-ENTMCNC: 31.7 GM/DL (ref 32.2–35.5)
MCV RBC AUTO: 95.4 FL (ref 80–94)
MONOCYTES # BLD: 9.4 %
MONOCYTES ABSOLUTE: 1.2 THOU/MM3 (ref 0.4–1.3)
NUCLEATED RED BLOOD CELLS: 0 /100 WBC
PLATELET # BLD: 300 THOU/MM3 (ref 130–400)
PMV BLD AUTO: 11.1 FL (ref 9.4–12.4)
POTASSIUM SERPL-SCNC: 4.1 MEQ/L (ref 3.5–5.2)
RBC # BLD: 3.93 MILL/MM3 (ref 4.7–6.1)
SEG NEUTROPHILS: 83.3 %
SEGMENTED NEUTROPHILS ABSOLUTE COUNT: 10.9 THOU/MM3 (ref 1.8–7.7)
SODIUM BLD-SCNC: 139 MEQ/L (ref 135–145)
WBC # BLD: 13.1 THOU/MM3 (ref 4.8–10.8)

## 2019-07-29 PROCEDURE — 6370000000 HC RX 637 (ALT 250 FOR IP): Performed by: STUDENT IN AN ORGANIZED HEALTH CARE EDUCATION/TRAINING PROGRAM

## 2019-07-29 PROCEDURE — 36415 COLL VENOUS BLD VENIPUNCTURE: CPT

## 2019-07-29 PROCEDURE — 94760 N-INVAS EAR/PLS OXIMETRY 1: CPT

## 2019-07-29 PROCEDURE — 76604 US EXAM CHEST: CPT

## 2019-07-29 PROCEDURE — 2580000003 HC RX 258: Performed by: PHYSICAL MEDICINE & REHABILITATION

## 2019-07-29 PROCEDURE — 71046 X-RAY EXAM CHEST 2 VIEWS: CPT

## 2019-07-29 PROCEDURE — 2700000000 HC OXYGEN THERAPY PER DAY

## 2019-07-29 PROCEDURE — 6360000004 HC RX CONTRAST MEDICATION: Performed by: PHYSICAL MEDICINE & REHABILITATION

## 2019-07-29 PROCEDURE — 94669 MECHANICAL CHEST WALL OSCILL: CPT

## 2019-07-29 PROCEDURE — 2709999900 HC NON-CHARGEABLE SUPPLY

## 2019-07-29 PROCEDURE — 99233 SBSQ HOSP IP/OBS HIGH 50: CPT | Performed by: INTERNAL MEDICINE

## 2019-07-29 PROCEDURE — 85610 PROTHROMBIN TIME: CPT

## 2019-07-29 PROCEDURE — 82948 REAGENT STRIP/BLOOD GLUCOSE: CPT

## 2019-07-29 PROCEDURE — 6370000000 HC RX 637 (ALT 250 FOR IP): Performed by: PHYSICAL MEDICINE & REHABILITATION

## 2019-07-29 PROCEDURE — 80048 BASIC METABOLIC PNL TOTAL CA: CPT

## 2019-07-29 PROCEDURE — 71260 CT THORAX DX C+: CPT

## 2019-07-29 PROCEDURE — 6360000002 HC RX W HCPCS: Performed by: PHYSICAL MEDICINE & REHABILITATION

## 2019-07-29 PROCEDURE — 6370000000 HC RX 637 (ALT 250 FOR IP): Performed by: INTERNAL MEDICINE

## 2019-07-29 PROCEDURE — 85025 COMPLETE CBC W/AUTO DIFF WBC: CPT

## 2019-07-29 PROCEDURE — 1180000000 HC REHAB R&B

## 2019-07-29 RX ORDER — IPRATROPIUM BROMIDE AND ALBUTEROL SULFATE 2.5; .5 MG/3ML; MG/3ML
1 SOLUTION RESPIRATORY (INHALATION) 4 TIMES DAILY
Status: DISCONTINUED | OUTPATIENT
Start: 2019-07-29 | End: 2019-08-12

## 2019-07-29 RX ADMIN — MICONAZOLE NITRATE: 20 POWDER TOPICAL at 09:20

## 2019-07-29 RX ADMIN — MORPHINE SULFATE 10 MG: 10 SOLUTION ORAL at 22:17

## 2019-07-29 RX ADMIN — IOPAMIDOL 80 ML: 755 INJECTION, SOLUTION INTRAVENOUS at 03:16

## 2019-07-29 RX ADMIN — PIPERACILLIN SODIUM,TAZOBACTAM SODIUM 3.38 G: 3; .375 INJECTION, POWDER, FOR SOLUTION INTRAVENOUS at 18:39

## 2019-07-29 RX ADMIN — Medication 100 MG: at 22:17

## 2019-07-29 RX ADMIN — PIPERACILLIN SODIUM,TAZOBACTAM SODIUM 3.38 G: 3; .375 INJECTION, POWDER, FOR SOLUTION INTRAVENOUS at 10:32

## 2019-07-29 RX ADMIN — MICONAZOLE NITRATE: 20 POWDER TOPICAL at 00:46

## 2019-07-29 RX ADMIN — MORPHINE SULFATE 10 MG: 10 SOLUTION ORAL at 18:39

## 2019-07-29 RX ADMIN — HYDROXYZINE HYDROCHLORIDE 50 MG: 25 TABLET, FILM COATED ORAL at 03:42

## 2019-07-29 RX ADMIN — Medication 10 ML: at 18:39

## 2019-07-29 RX ADMIN — BUTALBITAL, ACETAMINOPHEN, AND CAFFEINE 1 TABLET: 50; 325; 40 TABLET ORAL at 03:43

## 2019-07-29 RX ADMIN — MIDODRINE HYDROCHLORIDE 5 MG: 5 TABLET ORAL at 13:56

## 2019-07-29 RX ADMIN — Medication 100 MG: at 00:45

## 2019-07-29 RX ADMIN — FAMOTIDINE 20 MG: 20 TABLET ORAL at 09:19

## 2019-07-29 RX ADMIN — SENNOSIDES 17.2 MG: 8.6 TABLET, FILM COATED ORAL at 22:17

## 2019-07-29 RX ADMIN — MORPHINE SULFATE 10 MG: 10 SOLUTION ORAL at 03:41

## 2019-07-29 RX ADMIN — Medication 10 ML: at 03:50

## 2019-07-29 RX ADMIN — FAMOTIDINE 20 MG: 20 TABLET ORAL at 00:45

## 2019-07-29 RX ADMIN — Medication 10 ML: at 10:36

## 2019-07-29 RX ADMIN — Medication 100 MG: at 09:18

## 2019-07-29 RX ADMIN — SERTRALINE 200 MG: 100 TABLET, FILM COATED ORAL at 09:20

## 2019-07-29 RX ADMIN — MIDODRINE HYDROCHLORIDE 5 MG: 5 TABLET ORAL at 09:19

## 2019-07-29 RX ADMIN — PIPERACILLIN SODIUM,TAZOBACTAM SODIUM 3.38 G: 3; .375 INJECTION, POWDER, FOR SOLUTION INTRAVENOUS at 03:41

## 2019-07-29 RX ADMIN — MIDODRINE HYDROCHLORIDE 5 MG: 5 TABLET ORAL at 18:39

## 2019-07-29 RX ADMIN — ASPIRIN 81 MG: 81 TABLET ORAL at 13:56

## 2019-07-29 RX ADMIN — IPRATROPIUM BROMIDE AND ALBUTEROL SULFATE 1 AMPULE: .5; 3 SOLUTION RESPIRATORY (INHALATION) at 17:32

## 2019-07-29 RX ADMIN — ATORVASTATIN CALCIUM 80 MG: 80 TABLET, FILM COATED ORAL at 22:17

## 2019-07-29 RX ADMIN — BUSPIRONE HYDROCHLORIDE 10 MG: 10 TABLET ORAL at 22:17

## 2019-07-29 RX ADMIN — Medication 10 ML: at 10:32

## 2019-07-29 RX ADMIN — BUTALBITAL, ACETAMINOPHEN, AND CAFFEINE 1 TABLET: 50; 325; 40 TABLET ORAL at 09:19

## 2019-07-29 RX ADMIN — FAMOTIDINE 20 MG: 20 TABLET ORAL at 22:17

## 2019-07-29 RX ADMIN — MORPHINE SULFATE 10 MG: 10 SOLUTION ORAL at 13:56

## 2019-07-29 RX ADMIN — GUAIFENESIN AND DEXTROMETHORPHAN 5 ML: 100; 10 SYRUP ORAL at 22:17

## 2019-07-29 RX ADMIN — TRAZODONE HYDROCHLORIDE 100 MG: 100 TABLET ORAL at 22:17

## 2019-07-29 ASSESSMENT — PAIN DESCRIPTION - FREQUENCY
FREQUENCY: CONTINUOUS
FREQUENCY: INTERMITTENT

## 2019-07-29 ASSESSMENT — PAIN SCALES - GENERAL
PAINLEVEL_OUTOF10: 8

## 2019-07-29 ASSESSMENT — PAIN DESCRIPTION - LOCATION: LOCATION: HEAD

## 2019-07-29 ASSESSMENT — PAIN DESCRIPTION - DESCRIPTORS
DESCRIPTORS: ACHING
DESCRIPTORS: ACHING;THROBBING

## 2019-07-29 ASSESSMENT — PAIN DESCRIPTION - ONSET
ONSET: ON-GOING
ONSET: ON-GOING

## 2019-07-29 NOTE — PROGRESS NOTES
arms    RECREATIONAL THERAPY  Patient has been offered participation in recreational therapy activities and participates as able. NUTRITION  Weight: 175 lb 9.6 oz (79.7 kg) / Body mass index is 23.82 kg/m². Current diet: DIET TUBE FEEDING BOLUS NPO; Gastrostomy  Please see nutrition note for details. NURSING  Continent of Bowel and Bladder: No, Bladder  Pain is Managed:  Yes  Signs and Symptoms of Infection:  No  Signs and Symptoms of Skin Breakdown:  No  Injury and Fall Free during Inpatient Rehabilitation Admission: Yes    Consultations/Labs/X-rays: Trauma Surgery, Neurology, Gastroenterology, Pain Management, Internal Medicine, Pulmonology, Physical Medicine     Stroke Premorbid Conditions: Male over 54, HTN, ANTHONY, History of DVT/PE, NSTEMI, Malignancy, medication non-compliance     Diabetes:   Recent Labs     07/28/19  0817 07/29/19  0803 07/30/19  0743   POCGLU 90 81 89     Hyperlipidemia:   Lab Results   Component Value Date    [address] 62 07/12/2019     Patient is on statin? Yes    Hypertension:   Vitals:    07/29/19 1830 07/29/19 2217 07/30/19 0002 07/30/19 0756   BP:  (!) 112/58  (!) 102/57   Pulse:  67  62   Resp:  17  16   Temp:  98 °F (36.7 °C)  98.3 °F (36.8 °C)   TempSrc:  Oral  Oral   SpO2: 98% 95%  95%   Weight:   175 lb 9.6 oz (79.7 kg)    Height:          Patient is on Blood Pressure Medicine?  Yes    Stroke Prophylaxis: Patient with ischemic CVA and is on medication to prevent recurrent CVA    Family Education: Need to make contact with family to initiate education  Fall Risk:  Falling star program initiated  Is the patient appropriate for a stay in the functional apartment? no    Discharge Plan   Estimated Discharge Date: 8/9   Destination: home health  Services at Discharge: [address] [place] [address], Occupational Therapy, Speech Therapy, , Nursing and an aide 3x week  Is patient appropriate for an outpatient driving evaluation? no  Equipment at Discharge: [facility]  Factors facilitating achievement of predicted outcomes: Family support, Motivated, Cooperative, Pleasant, Has needed Durable Medical Equipment at home and Home is wheelchair accessible  Barriers to the achievement of predicted outcomes: Pain, Cognitive deficit, Limited insight into deficits, Unrealistic expectations, Upper extremity weakness, Lower extremity weakness, Long standing deficits, Impaired vision, Hypotension, Dysphagia, and PEG tube status    Team Members Present at Conference:  :Moreno Gonzales  Occupational Therapist: Beti Sesay OTR/L [id number]. Physical Therapist: Myra Peace, [address] [place], [place] [phone]  Speech Therapist: Stacey Velazquez 87, [address] Nurse: Arya Velasquez, ALBIN   Psychologist: Pura Rose, PhD.    I approve the established interdisciplinary plan of care as documented within the medical record of Deann Garcia.     Abdi Vora

## 2019-07-29 NOTE — PROGRESS NOTES
[facility] THERAPY MISSED TREATMENT NOTE  [address] [facility],[address]    Missed Minutes: -30 minutes       Date: 2019  Patient Name: Luisa Garcia        MRN: [id number]    : 1960  (62 y.o.)    REASON FOR MISSED TREATMENT:  Attempted to see patient for schedule treatment session at 1330. Upon attempt ALBIN Clarke reports patient on \"medical hold. \"  Per ALBIN Clarke patient consumed PO intake brought in by family (pop) resulting in pulmonary decline. Per chart review patient with chest x-ray to follow indicated moderate pleural effusions. Patient with thoracentesis completed this date with consult to pulmonology. ST with attempt to complete OME ONLY with NO PO trials of ice chips and NO mobility from bedrest. Upon ST attempt patient stating \"Oh I feel terrible today. I really do, I can't do anything. You can do all you want tomorrow but I am in so much pain right now. \"  Patient refusing oral care and OME at this time. ST reviewed rational of NPO recommendation, POC and encouraged continued OME completed later this date as HEP. Patient verbalized understanding. MD notified. Will plan to see patient for schedule treatment .      MELL Ybarra 23

## 2019-07-29 NOTE — PROGRESS NOTES
Pt is NPO and consumed a can of soda on 7/27/19    Upon assessment pt o2 sat - 88% on 6L of o2 via NC. Contacted respiratory dept. Respiratory changed NC to venturi mask - o2 sat increased to 90%. Changed to non-rebreather mask - o2 sat increased to 93%. Notified Dr. Shobha Newsome of this. Orders received - Blood gases, ct head, xr chest, ct chest, bmp, cbc, zosyn 3.375mg See results. XR chest showed - moderate pleural effusion - Dr. Shobha Newsome notified      Pt will be having a US thoracentesis today. Will continue you monitor.

## 2019-07-30 LAB
BASOPHILS # BLD: 0.6 %
BASOPHILS ABSOLUTE: 0.1 THOU/MM3 (ref 0–0.1)
EOSINOPHIL # BLD: 2.5 %
EOSINOPHILS ABSOLUTE: 0.2 THOU/MM3 (ref 0–0.4)
ERYTHROCYTE [DISTWIDTH] IN BLOOD BY AUTOMATED COUNT: 13.2 % (ref 11.5–14.5)
ERYTHROCYTE [DISTWIDTH] IN BLOOD BY AUTOMATED COUNT: 46.2 FL (ref 35–45)
GLUCOSE BLD-MCNC: 89 MG/DL (ref 70–108)
HCT VFR BLD CALC: 34.8 % (ref 42–52)
HEMOGLOBIN: 11.1 GM/DL (ref 14–18)
IMMATURE GRANS (ABS): 0.04 THOU/MM3 (ref 0–0.07)
IMMATURE GRANULOCYTES: 0 %
LYMPHOCYTES # BLD: 6.6 %
LYMPHOCYTES ABSOLUTE: 0.6 THOU/MM3 (ref 1–4.8)
MCH RBC QN AUTO: 30.2 PG (ref 26–33)
MCHC RBC AUTO-ENTMCNC: 31.9 GM/DL (ref 32.2–35.5)
MCV RBC AUTO: 94.8 FL (ref 80–94)
MONOCYTES # BLD: 13.2 %
MONOCYTES ABSOLUTE: 1.3 THOU/MM3 (ref 0.4–1.3)
NUCLEATED RED BLOOD CELLS: 0 /100 WBC
PLATELET # BLD: 272 THOU/MM3 (ref 130–400)
PMV BLD AUTO: 11.3 FL (ref 9.4–12.4)
RBC # BLD: 3.67 MILL/MM3 (ref 4.7–6.1)
SEG NEUTROPHILS: 76.7 %
SEGMENTED NEUTROPHILS ABSOLUTE COUNT: 7.5 THOU/MM3 (ref 1.8–7.7)
WBC # BLD: 9.8 THOU/MM3 (ref 4.8–10.8)

## 2019-07-30 PROCEDURE — 99232 SBSQ HOSP IP/OBS MODERATE 35: CPT | Performed by: INTERNAL MEDICINE

## 2019-07-30 PROCEDURE — 94640 AIRWAY INHALATION TREATMENT: CPT

## 2019-07-30 PROCEDURE — 2580000003 HC RX 258: Performed by: PHYSICAL MEDICINE & REHABILITATION

## 2019-07-30 PROCEDURE — 97542 WHEELCHAIR MNGMENT TRAINING: CPT

## 2019-07-30 PROCEDURE — 1180000000 HC REHAB R&B

## 2019-07-30 PROCEDURE — 36415 COLL VENOUS BLD VENIPUNCTURE: CPT

## 2019-07-30 PROCEDURE — 97110 THERAPEUTIC EXERCISES: CPT

## 2019-07-30 PROCEDURE — 6360000002 HC RX W HCPCS: Performed by: PHYSICAL MEDICINE & REHABILITATION

## 2019-07-30 PROCEDURE — 97530 THERAPEUTIC ACTIVITIES: CPT

## 2019-07-30 PROCEDURE — 94760 N-INVAS EAR/PLS OXIMETRY 1: CPT

## 2019-07-30 PROCEDURE — 85025 COMPLETE CBC W/AUTO DIFF WBC: CPT

## 2019-07-30 PROCEDURE — 6370000000 HC RX 637 (ALT 250 FOR IP): Performed by: STUDENT IN AN ORGANIZED HEALTH CARE EDUCATION/TRAINING PROGRAM

## 2019-07-30 PROCEDURE — 6370000000 HC RX 637 (ALT 250 FOR IP): Performed by: PHYSICAL MEDICINE & REHABILITATION

## 2019-07-30 PROCEDURE — 6370000000 HC RX 637 (ALT 250 FOR IP): Performed by: INTERNAL MEDICINE

## 2019-07-30 PROCEDURE — 92526 ORAL FUNCTION THERAPY: CPT | Performed by: SPEECH-LANGUAGE PATHOLOGIST

## 2019-07-30 PROCEDURE — 97127 HC SP THER IVNTJ W/FOCUS COG FUNCJ: CPT | Performed by: SPEECH-LANGUAGE PATHOLOGIST

## 2019-07-30 PROCEDURE — APPSS30 APP SPLIT SHARED TIME 16-30 MINUTES: Performed by: NURSE PRACTITIONER

## 2019-07-30 PROCEDURE — 2500000003 HC RX 250 WO HCPCS

## 2019-07-30 PROCEDURE — 94669 MECHANICAL CHEST WALL OSCILL: CPT

## 2019-07-30 PROCEDURE — 2709999900 HC NON-CHARGEABLE SUPPLY

## 2019-07-30 PROCEDURE — 94667 MNPJ CHEST WALL 1ST: CPT

## 2019-07-30 PROCEDURE — 97112 NEUROMUSCULAR REEDUCATION: CPT

## 2019-07-30 PROCEDURE — 82948 REAGENT STRIP/BLOOD GLUCOSE: CPT

## 2019-07-30 RX ORDER — LACTULOSE 10 G/15ML
20 SOLUTION ORAL 2 TIMES DAILY
Status: DISCONTINUED | OUTPATIENT
Start: 2019-07-30 | End: 2019-08-05

## 2019-07-30 RX ADMIN — MICONAZOLE NITRATE: 20 POWDER TOPICAL at 21:43

## 2019-07-30 RX ADMIN — HEPARIN 500 UNITS: 100 SYRINGE at 08:29

## 2019-07-30 RX ADMIN — BUTALBITAL, ACETAMINOPHEN, AND CAFFEINE 1 TABLET: 50; 325; 40 TABLET ORAL at 12:55

## 2019-07-30 RX ADMIN — Medication 10 ML: at 08:30

## 2019-07-30 RX ADMIN — MAGNESIUM HYDROXIDE 30 ML: 400 SUSPENSION ORAL at 17:01

## 2019-07-30 RX ADMIN — SERTRALINE 200 MG: 100 TABLET, FILM COATED ORAL at 08:30

## 2019-07-30 RX ADMIN — HYDROXYZINE HYDROCHLORIDE 50 MG: 25 TABLET, FILM COATED ORAL at 21:31

## 2019-07-30 RX ADMIN — CLONAZEPAM 1 MG: 0.5 TABLET ORAL at 13:08

## 2019-07-30 RX ADMIN — PIPERACILLIN SODIUM,TAZOBACTAM SODIUM 3.38 G: 3; .375 INJECTION, POWDER, FOR SOLUTION INTRAVENOUS at 03:16

## 2019-07-30 RX ADMIN — BUSPIRONE HYDROCHLORIDE 10 MG: 10 TABLET ORAL at 21:31

## 2019-07-30 RX ADMIN — SENNOSIDES 17.2 MG: 8.6 TABLET, FILM COATED ORAL at 21:31

## 2019-07-30 RX ADMIN — MORPHINE SULFATE 10 MG: 10 SOLUTION ORAL at 03:33

## 2019-07-30 RX ADMIN — TRAZODONE HYDROCHLORIDE 100 MG: 100 TABLET ORAL at 21:31

## 2019-07-30 RX ADMIN — FAMOTIDINE 20 MG: 20 TABLET ORAL at 21:31

## 2019-07-30 RX ADMIN — MIDODRINE HYDROCHLORIDE 5 MG: 5 TABLET ORAL at 13:08

## 2019-07-30 RX ADMIN — MORPHINE SULFATE 10 MG: 10 SOLUTION ORAL at 17:01

## 2019-07-30 RX ADMIN — LACTULOSE 20 G: 10 SOLUTION ORAL at 21:42

## 2019-07-30 RX ADMIN — PIPERACILLIN SODIUM,TAZOBACTAM SODIUM 3.38 G: 3; .375 INJECTION, POWDER, FOR SOLUTION INTRAVENOUS at 21:45

## 2019-07-30 RX ADMIN — BUTALBITAL, ACETAMINOPHEN, AND CAFFEINE 1 TABLET: 50; 325; 40 TABLET ORAL at 03:33

## 2019-07-30 RX ADMIN — MIDODRINE HYDROCHLORIDE 5 MG: 5 TABLET ORAL at 08:30

## 2019-07-30 RX ADMIN — MICONAZOLE NITRATE: 20 POWDER TOPICAL at 09:08

## 2019-07-30 RX ADMIN — ATORVASTATIN CALCIUM 80 MG: 80 TABLET, FILM COATED ORAL at 21:31

## 2019-07-30 RX ADMIN — Medication 10 ML: at 21:43

## 2019-07-30 RX ADMIN — IPRATROPIUM BROMIDE AND ALBUTEROL SULFATE 1 AMPULE: .5; 3 SOLUTION RESPIRATORY (INHALATION) at 13:33

## 2019-07-30 RX ADMIN — Medication 100 MG: at 08:30

## 2019-07-30 RX ADMIN — Medication 10 ML: at 09:09

## 2019-07-30 RX ADMIN — BUTALBITAL, ACETAMINOPHEN, AND CAFFEINE 1 TABLET: 50; 325; 40 TABLET ORAL at 21:31

## 2019-07-30 RX ADMIN — Medication 100 MG: at 21:42

## 2019-07-30 RX ADMIN — MIDODRINE HYDROCHLORIDE 5 MG: 5 TABLET ORAL at 17:00

## 2019-07-30 RX ADMIN — IPRATROPIUM BROMIDE AND ALBUTEROL SULFATE 1 AMPULE: .5; 3 SOLUTION RESPIRATORY (INHALATION) at 18:33

## 2019-07-30 RX ADMIN — FAMOTIDINE 20 MG: 20 TABLET ORAL at 08:30

## 2019-07-30 RX ADMIN — PIPERACILLIN SODIUM,TAZOBACTAM SODIUM 3.38 G: 3; .375 INJECTION, POWDER, FOR SOLUTION INTRAVENOUS at 12:46

## 2019-07-30 ASSESSMENT — PAIN DESCRIPTION - PAIN TYPE: TYPE: CHRONIC PAIN

## 2019-07-30 ASSESSMENT — 9 HOLE PEG TEST: TESTTIME_SECONDS: 97

## 2019-07-30 ASSESSMENT — PAIN SCALES - GENERAL
PAINLEVEL_OUTOF10: 8
PAINLEVEL_OUTOF10: 8
PAINLEVEL_OUTOF10: 0
PAINLEVEL_OUTOF10: 8
PAINLEVEL_OUTOF10: 8
PAINLEVEL_OUTOF10: 0

## 2019-07-30 ASSESSMENT — PAIN - FUNCTIONAL ASSESSMENT: PAIN_FUNCTIONAL_ASSESSMENT: ACTIVITIES ARE NOT PREVENTED

## 2019-07-30 ASSESSMENT — PAIN DESCRIPTION - FREQUENCY: FREQUENCY: CONTINUOUS

## 2019-07-30 ASSESSMENT — PAIN DESCRIPTION - ONSET: ONSET: ON-GOING

## 2019-07-30 ASSESSMENT — PAIN DESCRIPTION - DESCRIPTORS: DESCRIPTORS: ACHING

## 2019-07-30 ASSESSMENT — PAIN DESCRIPTION - ORIENTATION: ORIENTATION: LEFT

## 2019-07-30 ASSESSMENT — PAIN DESCRIPTION - LOCATION: LOCATION: LEG;HIP;SHOULDER

## 2019-07-30 ASSESSMENT — PAIN DESCRIPTION - PROGRESSION: CLINICAL_PROGRESSION: NOT CHANGED

## 2019-07-30 NOTE — PROGRESS NOTES
and PEG  GENITOURINARY:  negative  SKIN:  negative  HEMATOLOGIC/LYMPHATIC:  negative  MUSCULOSKELETAL:  positive for  myalgias, arthralgias, pain and muscle weakness  NEUROLOGICAL:  positive for headaches, memory problems, visual disturbance, coordination problems, gait problems, dysphagia, pain, and weakness, headaches  BEHAVIOR/PSYCH:  negative  10 point system review otherwise negative    Objective:  /62   Pulse 58   Temp 98.3 °F (36.8 °C) (Oral)   Resp 16   Ht 6' (1.829 m)   Wt 175 lb 9.6 oz (79.7 kg)   SpO2 98%   BMI 23.82 kg/m²   CURRENT VITALS:  height is 6' (1.829 m) and weight is 175 lb 9.6 oz (79.7 kg). His oral temperature is 98.3 °F (36.8 °C). His blood pressure is 103/62 and his pulse is 58. His respiration is 16 and oxygen saturation is 98%. Body mass index is 23.82 kg/m².   Temperature Range (24h):Temp: 98.3 °F (36.8 °C) Temp  Av.3 °F (36.8 °C)  Min: 98.3 °F (36.8 °C)  Max: 98.3 °F (36.8 °C)  BP Range (15U): Systolic (51WJQ), AAV:534 , Min:102 , VVS:058     Diastolic (89NJM), RTI:53, Min:57, Max:62    Pulse Range (24h): Pulse  Av  Min: 58  Max: 62  Respiration Range (24h): Resp  Av  Min: 16  Max: 16  Current Pulse Ox (24h):  SpO2: 98 %  Pulse Ox Range (24h):  SpO2  Av %  Min: 95 %  Max: 98 %  Oxygen Amount and Delivery: O2 Flow Rate (L/min): 5 L/min(turned down to 4L/min)    awake  Orientation:   person, place, time, situation  Mood: anxious  Affect: flat     General appearance:  in no acute distress, PEG, up in bed with nasal cannula at 5L  Memory:   grossly normal  Attention/Concentration: abnormal - distractible  Language:  normal     Cranial Nerves:  cranial nerves II-XII are grossly intact with exception of LEFT hemianopsia   ROM:  abnormal - limited RIGHT knee extension - lacking 20deg  Motor Exam:  Motor exam is 4 out of 5 all extremities with the exception of RIGHT ADF 1/5     Tone:  normal  Muscle bulk: within normal limits  Sensory:  Sensory LOC.  6. Physical debility with generalized weakness. 7. Healthcare associated pneumonia versus Aspiration pneumonia. 8. Acute hypoxic respiratory failure.   9. Fracture of the lateral ninth rib. 10. Peripheral vision loss LEFT > RIGHT. 11. Mild LEFT hemiparesis and ataxia. 12. Severe malnutrition. 13. Mild-moderate oral dysphagia and severe pharyngeal dysphagia. 14. S/p PEG placement on 7/11/2019 by Dr. Serina Perry. 15. Mild cognitive deficit.  (MOCA) version 8.2 completed.  Patient scored 20/30 over prior (MOCA) version 7.2 completed.  Patient scored 15/25.  *adjusted score given pt unable to complete written subtests (L hand dominant). 16. NSTEMI on 4/9/2019. 17. 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 18. Caval filter placed 2009 for extensive DVT of the LEFT leg as well as calf DVT on the RIGHT. 19. Pulmonary embolism in 2018. 20. Chronic anticoagulation status. 21. 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 Sai Rodriguez. 22. Hypertension. 23. COPD. 24. Type 2 DM, controlled.  Last HgbA1c 5.1 on 7/12/2019.  25. Paravertebral ossifications at numerous levels along the thoracic spine with partial mineralization of the anterior longitudinal ligament consuistent with DISH.  26. Osteopenia. 27. Nicotine dependence; current every day smoker. 28. Marijuana use. Last positive UDS 7/11/2019.  29. History of anxiety and depression. 30. Medication non-compliance. 31. Hypovitaminosis D.  32. Orthostatic hypotension. 33. Insomnia. 34. Aspiration pneumonia with sepsis. 35. Near complete atelectasis of the RIGHT lung with mediastinal shift to the right, secondary to obstruction of the right mainstem bronchus.     Plan:     Medical management: Per primary the right mainstem bronchus. This is a be likely aspirate material consistent with the patient having been observed to be eating \"Cheetos\" on . Pulmonology was consulted with plan for bronchoscopy to remove the material on . Procedure has been rescheduled for  due to the patient having been given a bolus tube feed prior to the time of the scheduled procedure. 5. The leukocytosis has resolved on  with plan to continue the Zosyn for 4 days and total and then potentially switch to Augmentin. 6. Fracture of the lateral ninth rib. 1. Pain control. 2. ICS. 7. Mild cognitive deficit.  (MOCA) version 8.2 completed.  Patient scored 20/30 over prior ([address] [place]) version 7.2 completed.  Patient scored 15/25.  *adjusted score given pt unable to complete written subtests (L hand dominant)/Mild-moderate oral dysphagia and moderate to severe pharyngeal dysphagia. 1. SLP. 2. Fees study on . Completed with patient remaining n.p.o. at this time. Dr. Darrell Boeck, ENT reviewed the imaging on . Recommended Oncology follow up after discharge. 3. Patient did trial ice chips on , unsuccessfully. 4. Start TF boluses on . Tolerating well. 8. Nutrition:  Consultation to dietician for nutritional counseling and recommendations. 1. Severe malnutrition. 2. S/p PEG placement on 2019 by Dr. Jacquie Bautista. 3. Wound care. 4. TotP 5.8, alb 2.4, Vitamin 25OH level of 16 on 2019.  5. Continue with tube feeds; nutrition has recommended bolus tube feeds with the plan to start those on . 6. Ergocalciferol every 3 days x 11 doses. 9. Orthostatic hypotension. 1. Supine: 124/60. Sittin/61 and standing was 88/61 on . 2. Start Midodrine 2.5mg TID on . Pressures improved; but still has some issues with lightheadedness and the dosing was increased to 5 mg 3 times daily on . He is less symptomatic at this dose. 10. Insomnia.   1. Trazodone at 100 mg was started last night to good effect per

## 2019-07-30 NOTE — PROGRESS NOTES
Strengthening, Endurance Training, Neuromuscular Re-education, Patient/Caregiver Education & Training, Equipment Evaluation, Education, & procurement, Self-Care / ADL, Balance Training, Functional Mobility Training, Safety Education & Training    Patient Education  Patient Education: Home Exercise Program    Goals  Short term goals  Time Frame for Short term goals: 1 week  Short term goal 1: Pt will dynamically sit EOB for 5 minutes with Mod I  to improve independence with bathing/dressing tasks EOB. Short term goal 2: Pt will tolerate static standing for 1 minute with CGA with 1 UE release and min cues for midline orientation in prep for washing hands at sink. Short term goal 3: Pt will complete stand pivot transfers with CGA A x1 and min verbal cues for safety to improve independence with w/c transfers  Short term goal 4: Pt will improve LUE coordination as evidenced by completion of 9 hole peg test in < 4 minutes to improve indep with gathering items. Short term goal 5: Pt will safely navigate RW within room with CGA to improve independence to [facility]. Long term goals  Time Frame for Long term goals : 3 weeks  Long term goal 1: Pt will complete ADL routine with SBA and no cues for safety or adapted technique to increase ability to perform self care tasks  Long term goal 2: Pt will complete light standing IADL task using RW with SBA and no cues for walker safety to improve indep within home. Following session, patient left in safe position with all fall risk precautions in place.

## 2019-07-30 NOTE — PROGRESS NOTES
Assistive Devices: Toileting - Raised Toilet Seat with arms  Plan: Times per week: 5xs week x 90 min, 1 x week x 30 min   Current Treatment Recommendations: Strengthening, Endurance Training, Neuromuscular Re-education, Patient/Caregiver Education & Training, Equipment Evaluation, Education, & procurement, Self-Care / ADL, Balance Training, Functional Mobility Training, Safety Education & Training    Patient Education  Patient Education: balance, transfer safety    Goals  Short term goals  Time Frame for Short term goals: 1 week  Short term goal 1: Pt will dynamically sit EOB for 5 minutes with Mod I  to improve independence with bathing/dressing tasks EOB. Short term goal 2: Pt will tolerate static standing for 1 minute with CGA with 1 UE release and min cues for midline orientation in prep for washing hands at sink. Short term goal 3: Pt will complete stand pivot transfers with CGA A x1 and min verbal cues for safety to improve independence with w/c transfers  Short term goal 4: Pt will improve LUE coordination as evidenced by completion of 9 hole peg test in < 4 minutes to improve indep with gathering items. Short term goal 5: Pt will safely navigate RW within room with CGA to improve independence to [facility]. Long term goals  Time Frame for Long term goals : 3 weeks  Long term goal 1: Pt will complete ADL routine with SBA and no cues for safety or adapted technique to increase ability to perform self care tasks  Long term goal 2: Pt will complete light standing IADL task using RW with SBA and no cues for walker safety to improve indep within home. Following session, patient left in safe position with all fall risk precautions in place.

## 2019-07-30 NOTE — PROGRESS NOTES
[facility]  INPATIENT PHYSICAL THERAPY  DAILY NOTE  [facility] - 7E-70/070-A    Time In: 1430  Time Out: 1500  Timed Code Treatment Minutes: 30 Minutes  Minutes: 30          Date: 2019  Patient Name: Esperanza James,  Gender:  male        MRN: [id number]  : 1960  (62 y.o.)     Referring Practitioner: Braxton Perrin MD  Diagnosis: cerebellar infarct   Additional Pertinent Hx: Pt admitted through ED due to weakness and pain s/p fall at home. Hx:  CA, CVA. x-ray Lt 9th rib fx. MRI:  remote infarct, restricted diffusion inferior medial aspect LT cerebellar hemisphere and medula. Pt admited to hospital on 2019 and transferred to rehab on . Prior Level of Function:  Lives With: Alone  Type of Home: Apartment  Home Layout: One level  Home Access: Stairs to enter without rails  Entrance Stairs - Number of Steps: 1  Home Equipment: [employer], 4 wheeled walker, Rolling walker, Quad cane(hospital bed with bed rails)    Restrictions/Precautions:  Restrictions/Precautions: Fall Risk, General Precautions(NPO)  Position Activity Restriction  Other position/activity restrictions: PEG , oxygen, impulsive, unsteady, left ninth rib fracture and also old rib fractures on the right side    SUBJECTIVE: Pt in therapy gym upon arrival, pleasant and agreeable to therapy.  Pt cooperative and pleasant    PAIN: 0/10: denies    OBJECTIVE:  Bed Mobility:  Sit to Supine: Stand By Assistance   Scooting: Stand By Assistance, with increased time for completion    Transfers:  Sit to Stand: Minimal Assistance  Stand to [facility], with verbal cues, cues for posture, midline and to go slow for safety   Pt cont to lean forward and to the R    Wheelchair Mobility:  Stand By Assistance  Extremities Used: Bilateral Upper Extremities  Type of Chair:Manual  Surface: Level Tile  Distance: 125'x1 15'x1  Quality: Slow Velocity and No

## 2019-07-30 NOTE — PROGRESS NOTES
[address]   SLP Individual Minutes  Time In: 0900  Time Out: 0930  Minutes: 30  Timed Code Treatment Minutes: 15 Minutes      Dysphagia Treatment: 15 minutes  Cognitive Treatment: 15 minutes     []Daily Note  [x]Progress Note- MT during yesterday's session  []Discharge Note    Date: 2019  Patient Name: Elver Hubbard        MRN: [id number]    : 1960  (62 y.o.)  Gender: male   Primary Provider: Otto Bernard MD  Admitting Diagnosis:  Cerebellar infarct   Secondary Diagnosis: Cognitive deficits, Dysphagia, Dysphonia   Precautions: fall risk, aspiration risk  Swallowing Status/Diet: NPO, PEG tube in place for nutrition and hydration  Swallowing Strategies: NPO  DATE of last MBS:  2019; FEES 2019  Pain: 0/10    Subjective: Patient resting in bed with sheet pulled up over his head when SLP entered the room. Patient extremely sarcastic and angry throughout session, but did apologize as the session ended. SHORT TERM GOAL #1:  Goal 1: Pt will safely tolerate PO trials of ice chips with ST ONLY to determine readiness for re-peat formal instrumentation. GOAL NOT MET  REVISED GOAL: Pt will complete pharyngeal strengthening exercise program with completion of exercises x10 to improve anterior hyoid excursion and pharyngeal strengthening to increase airway protection and pharyngeal contraction during the swallow. INTERVENTIONS:   Pt inquiring about when he was going to be able to eat. Reviewed results of FEES with discussion regarding the fact that he had several aspiration events with multiple consistencies. Also reviewed that he was going for a bronchoscopy today to clear food material from his airway as he had consumed soda and chips over the weekend. Provided skilled education regarding his CONTINUED HIGH risk for aspiration due to a NON-FUNCTIONAL pharyngeal swallow.  Explained to the patient that the

## 2019-07-30 NOTE — PROGRESS NOTES
[address] [address]  INPATIENT PHYSICAL THERAPY  DAILY NOTE  [address] [facility] - 7E-70/070-A    Time In: 0361  Time Out: 4932  Timed Code Treatment Minutes: 30 Minutes  Minutes: 30          Date: 2019  Patient Name: Frances Mario,  Gender:  male        MRN: [id number]  : 1960  (62 y.o.)     Referring Practitioner: Stacey Baldwin MD  Diagnosis: cerebellar infarct   Additional Pertinent Hx: Pt admitted through ED due to weakness and pain s/p fall at home. Hx:  CA, CVA. x-ray Lt 9th rib fx. MRI:  remote infarct, restricted diffusion inferior medial aspect LT cerebellar hemisphere and medula. Pt admited to hospital on 2019 and transferred to rehab on . Prior Level of Function:  Lives With: Alone  Type of Home: Apartment  Home Layout: One level  Home Access: Stairs to enter without rails  Entrance Stairs - Number of Steps: 1  Home Equipment: Carolina Mocha, 4 wheeled walker, Rolling walker, Quad cane(hospital bed with bed rails)    Restrictions/Precautions:  Restrictions/Precautions: Fall Risk, General Precautions(NPO)  Position Activity Restriction  Other position/activity restrictions: PEG , oxygen, impulsive, unsteady, left ninth rib fracture and also old rib fractures on the right side    SUBJECTIVE: Pt in bed upon arrival and agrees to therex, pt reporting he is tired today. PAIN: no c/o pain during session    OBJECTIVE:  Bed Mobility:  Scooting: Stand By Assistance, with increased time for completion, to [facility] used rails    Exercise:  Patient was guided in 1 set(s) 20 reps of exercise to both lower extremities. Ankle pumps, Glut sets, Quad sets, Heelslides, Hip abduction/adduction, Hooklying hip abduction/adduction, Straight leg raises, Bridges, Lower trunk rotations and coordination exercises- corssing ankles, heel to toe, heel to knee, rest breaks needed throughout. decresed conrol and coordination noted n LLE.   Exercises were completed for increased

## 2019-07-30 NOTE — PROGRESS NOTES
[facility]  Recreational Therapy  Daily Note  [address]    Time Spent with Patient: 10 minutes    Date:  7/30/2019       Patient Name: Rosario Gordon      MRN: [id number]      YOB: 1960 (60 y.o.)       Gender: male  Diagnosis: Cerebellar infarct  Referring Practitioner: Phill Stevens MD    RESTRICTIONS/PRECAUTIONS:  Restrictions/Precautions: Fall Risk, General Precautions(NPO)          PAIN: 0    SUBJECTIVE:  I am looking forward to that     OBJECTIVE:  Pt looking forward to getting his hair cut tomorrow and his beard trimmed by our beautician-paid the money already and appreciative     Social Interaction: 3 - Patient appropriate  50%-74% of the time    Patient Education  New Education Provided: Importance of Leisure,     Electronically signed by: RADHA Blank  Date: 7/30/2019

## 2019-07-30 NOTE — PROGRESS NOTES
· Problem: Severe malnutrition, In context of chronic illness  · Etiology: related to Catabolic illness, Alteration in GI function, Insufficient energy/nutrient consumption     Signs and symptoms:  as evidenced by Diet history of poor intake, Swallow study results, Weight loss, Mild muscle loss, Mild loss of subcutaneous fat    Objective Information:  · Nutrition-Focused Physical Findings: Hx of tonsil cancer in 2018, CVA 4/9/19. Pt. reports no BM, I alerted nurse. Mentions overall tolerating TF. · Wound Type: (Right anterior thumb, Right anterior toe, Left anterior toe, Right inner buttocks)  · Current Nutrition Therapies:  · Oral Diet Orders: NPO   · Oral Diet intake: NPO  · Oral Nutrition Supplement (ONS) Orders: None  · ONS intake: NPO  · Tube Feeding (TF) Orders:   · Feeding Route: Gastrostomy  · Formula: Fluid Restricted(2 Konrad HN)  · Rate (ml/hr):300ml (0800, 1200, 1600, 2000)    · Volume (ml/day): 1200 ml  · Duration: Bolus  · Additives/Modulars:    · Water Flushes: 185ml free water before & after each bolus  · Current TF & Flush Orders Provides: at goal  · Goal TF & Flush Orders Provides: 2400kcals, 100 grams protein, 262 grams CHO, 1480ml water (from water flushes )+ 840ml free water from TF yields 2320ml water total (29ml/kgm wt.  of 80kgm 7/30)  · Anthropometric Measures:  · Ht: 6' (182.9 cm)   · Current Body Wt: 175 lb 11.3 oz (79.7 kg)(edema not documented)  · Admission Body Wt: 178 lb (80.7 kg)(7/18/19, actual, no edema)  · Usual Body Wt: 200 lb (90.7 kg)(EMR, 1/8/19, standing scale)  · Weight Change: 12.5% wt. loss in 6 1/2 months per EMR   · Ideal Body Wt: 178 lb (80.7 kg), % Ideal Body 98%  ·    · BMI Classification: BMI 18.5 - 24.9 Normal Weight    Nutrition Interventions:   Continue NPO, Continue current Tube Feeding  Continued Inpatient Monitoring, Speech Therapy, Education Not Indicated, Coordination of Care    Nutrition Evaluation:   · Evaluation: No progress toward goals   · Goals: Patient

## 2019-07-30 NOTE — PROGRESS NOTES
[facility]    Patient - Jagdeep Combs   MRN -  [id number]   Acct # - [de-identified]   - 1960      Date of Admission -  2019  3:25 PM  Date of evaluation -  2019  [facility] - [address] [place] [address]-RAKAN Louis MD Primary Care Physician - Tonio Yarbrough MD   Chief Complaint   CC: Aspiration  Active Hospital Problem List      Active Hospital Problems    Diagnosis Date Noted    Cerebellar infarct [facility]) [I63.9] 2019     HPI   Jagdeep Combs is a 62 y.o. male currently in rehab for L cerebellar CVA, smoker with COPD (listed diagnosis in Epic) the nursing staff noted increased oxygen requirements and CXR revealed decreased volume in the RLL, confirmed with CT chest revealing evidence of endobronchial obstruction on the right side. In conversations with nursing and Dr Nika Baird aparently family fed Reese Paddock orally over the weekend. Past 24 Hours   -Afebrile  -CX bronch today received TF over night  -Denies any increased SOB or chest pain  -Stable on 5LPM- 96%    -All systems reviewed.      Past Medical History         Diagnosis Date    Anxiety     Bleeds easily ([address]     Bruises easily     Cerebrovascular accident (CVA) due to embolism of right posterior cerebral artery ([address] 2019    Cigarette nicotine dependence without complication 3723    COPD (chronic obstructive pulmonary disease) ([address]     DVT (deep venous thrombosis) ([address]     Enlarged lymph node     LAURA (generalized anxiety disorder)     History of sleep apnea     Hypertension     HCC    Moderate episode of recurrent major depressive disorder ([address] 2018    NSTEMI (non-ST elevated myocardial infarction) ([address] 2019    Pulmonary embolism and infarction ([address] 10/22/2018    S/P insertion of inferior vena caval filter 2009    Severe malnutrition ([address] 10/23/2018    Squamous cell carcinoma of tonsils ([address]     left   - radiation    Type II or unspecified type diabetes mellitus without mention of complication, not stated as uncontrolled     lost weight, no meds at present 2018      Past Surgical History           Procedure Laterality Date    CARDIAC CATHETERIZATION  04/10/2019    HIP SURGERY Right 06/30/2014    Excision Right Hip Mass-Dr. Abel Marquez KNEE SURGERY Right 1980's    NE OFFICE/OUTPT VISIT,PROCEDURE ONLY Right 10/16/2018    RIGHT FOOT EXCISION ULCER, RIGHT 5th METATARSAL BASE RESECTION performed by Asha Vivas DPM at [facility] TOE SURGERY Right 2013    wound to bone (second toe)     TRANSESOPHAGEAL ECHOCARDIOGRAM N/A 4/11/2019    TRANSESOPHAGEAL ECHOCARDIOGRAM performed by Lay Black MD at [facility]    TUNNELED [address]  07/07/2009     Diet    DIET TUBE FEEDING BOLUS NPO; Gastrostomy  Allergies    Latex; Coumadin [warfarin sodium]; and Tape Allean Box tape]  Social History     Social History     Socioeconomic History    Marital status:       Spouse name: Not on file    Number of children: Not on file    Years of education: Not on file    Highest education level: Not on file   Occupational History    Occupation: disability   Social Needs    Financial resource strain: Not on file    Food insecurity:     Worry: Not on file     Inability: Not on file    Transportation needs:     Medical: Not on file     Non-medical: Not on file   Tobacco Use    Smoking status: Current Every Day Smoker     Packs/day: 0.15     Years: 40.00     Pack years: 6.00     Types: Cigarettes    Smokeless tobacco: Never Used    Tobacco comment: 10 cigs a day   Substance and Sexual Activity    Alcohol use: No    Drug use: Yes     Types: Marijuana    Sexual activity: Not on file   Lifestyle    Physical activity:     Days per week: Not on file     Minutes per session: Not on file    Stress: Not on file   Relationships    Social connections:     Talks on phone: Not on file (abnormal) and his pulse is 62. His respiration is 16 and oxygen saturation is 96%. O2 Flow Rate (L/min): 5 L/min  I/O    Intake/Output Summary (Last 24 hours) at 7/30/2019 1600  Last data filed at 7/30/2019 1326  Gross per 24 hour   Intake 2250 ml   Output 300 ml   Net 1950 ml     Patient Vitals for the past 96 hrs (Last 3 readings):   Weight   07/30/19 0002 175 lb 9.6 oz (79.7 kg)   07/29/19 0227 174 lb (78.9 kg)   07/28/19 0145 175 lb (79.4 kg)     Exam   Physical Exam   Constitutional: No distress on O2 5LPM per NC. Patient appears moderately built and  moderately nourished. No complaints  Head: Normocephalic and atraumatic. Mouth/Throat: Oropharynx is clear and moist.  No oral thrush. Eyes: Conjunctivae are normal. Pupils are equal, round. No scleral icterus. Neck: Neck supple. No tracheal deviation present. Cardiovascular: HRRR. S1 and S2 with no murmur. No peripheral edema  Pulmonary/Chest: RRR. Normal effort with bilateral air entry, clear breath sounds slightly diminished RLL. No stridor. No respiratory distress. Patient exhibits no tenderness. No wheezing/rales. Mediport right chest. Loop recorder left chest  Abdominal: Soft. Bowel sounds audible. No distension or tenderness to palp. PEG in place  Musculoskeletal: Moves all extremities; no cyanosis or clubbing. Neurological: Patient is alert and oriented to person, place, and time. Skin: Warm and dry. No bruising or bleeding.    Labs   ABG  Lab Results   Component Value Date    PH 7.43 07/28/2019    PO2 62 07/28/2019    PCO2 54 07/28/2019    HCO3 36 07/28/2019    O2SAT 91 07/28/2019     Lab Results   Component Value Date    IFIO2 100 07/28/2019     CBC  Recent Labs     07/29/19  0143 07/30/19  0559   WBC 13.1* 9.8   RBC 3.93* 3.67*   HGB 11.9* 11.1*   HCT 37.5* 34.8*   MCV 95.4* 94.8*   MCH 30.3 30.2   MCHC 31.7* 31.9*    272   MPV 11.1 11.3      BMP  Recent Labs     07/29/19  0143      K 4.1   CL 96*   CO2 32   BUN 26* patient care plan.     Electronically signed by     Chip Jacobs MD on 7/30/2019 at 5:11 PM

## 2019-07-31 ENCOUNTER — APPOINTMENT (OUTPATIENT)
Dept: GENERAL RADIOLOGY | Age: 59
DRG: 056 | End: 2019-07-31
Attending: PHYSICAL MEDICINE & REHABILITATION
Payer: MEDICARE

## 2019-07-31 ENCOUNTER — ANESTHESIA EVENT (OUTPATIENT)
Dept: OPERATING ROOM | Age: 59
DRG: 056 | End: 2019-07-31
Payer: MEDICARE

## 2019-07-31 ENCOUNTER — ANESTHESIA (OUTPATIENT)
Dept: OPERATING ROOM | Age: 59
DRG: 056 | End: 2019-07-31
Payer: MEDICARE

## 2019-07-31 VITALS
OXYGEN SATURATION: 99 % | SYSTOLIC BLOOD PRESSURE: 103 MMHG | DIASTOLIC BLOOD PRESSURE: 56 MMHG | TEMPERATURE: 96.8 F | RESPIRATION RATE: 1 BRPM

## 2019-07-31 LAB
CYTOLOGY-NON GYN: NORMAL
GLUCOSE BLD-MCNC: 87 MG/DL (ref 70–108)
GLUCOSE BLD-MCNC: 92 MG/DL (ref 70–108)

## 2019-07-31 PROCEDURE — 2580000003 HC RX 258: Performed by: PHYSICAL MEDICINE & REHABILITATION

## 2019-07-31 PROCEDURE — 88312 SPECIAL STAINS GROUP 1: CPT

## 2019-07-31 PROCEDURE — 97116 GAIT TRAINING THERAPY: CPT

## 2019-07-31 PROCEDURE — 99232 SBSQ HOSP IP/OBS MODERATE 35: CPT | Performed by: INTERNAL MEDICINE

## 2019-07-31 PROCEDURE — 97110 THERAPEUTIC EXERCISES: CPT

## 2019-07-31 PROCEDURE — 6360000002 HC RX W HCPCS: Performed by: NURSE ANESTHETIST, CERTIFIED REGISTERED

## 2019-07-31 PROCEDURE — 97530 THERAPEUTIC ACTIVITIES: CPT

## 2019-07-31 PROCEDURE — 94669 MECHANICAL CHEST WALL OSCILL: CPT

## 2019-07-31 PROCEDURE — 87102 FUNGUS ISOLATION CULTURE: CPT

## 2019-07-31 PROCEDURE — 2709999900 HC NON-CHARGEABLE SUPPLY: Performed by: INTERNAL MEDICINE

## 2019-07-31 PROCEDURE — 97542 WHEELCHAIR MNGMENT TRAINING: CPT

## 2019-07-31 PROCEDURE — 6360000002 HC RX W HCPCS: Performed by: PHYSICAL MEDICINE & REHABILITATION

## 2019-07-31 PROCEDURE — 87205 SMEAR GRAM STAIN: CPT

## 2019-07-31 PROCEDURE — 2580000003 HC RX 258: Performed by: NURSE ANESTHETIST, CERTIFIED REGISTERED

## 2019-07-31 PROCEDURE — 94668 MNPJ CHEST WALL SBSQ: CPT

## 2019-07-31 PROCEDURE — 87116 MYCOBACTERIA CULTURE: CPT

## 2019-07-31 PROCEDURE — 88112 CYTOPATH CELL ENHANCE TECH: CPT

## 2019-07-31 PROCEDURE — 88108 CYTOPATH CONCENTRATE TECH: CPT

## 2019-07-31 PROCEDURE — 6370000000 HC RX 637 (ALT 250 FOR IP): Performed by: STUDENT IN AN ORGANIZED HEALTH CARE EDUCATION/TRAINING PROGRAM

## 2019-07-31 PROCEDURE — 94761 N-INVAS EAR/PLS OXIMETRY MLT: CPT

## 2019-07-31 PROCEDURE — 6370000000 HC RX 637 (ALT 250 FOR IP): Performed by: PHYSICAL MEDICINE & REHABILITATION

## 2019-07-31 PROCEDURE — 3700000001 HC ADD 15 MINUTES (ANESTHESIA): Performed by: INTERNAL MEDICINE

## 2019-07-31 PROCEDURE — 2709999900 HC NON-CHARGEABLE SUPPLY

## 2019-07-31 PROCEDURE — 7100000001 HC PACU RECOVERY - ADDTL 15 MIN: Performed by: INTERNAL MEDICINE

## 2019-07-31 PROCEDURE — 3609027000 HC BRONCHOSCOPY: Performed by: INTERNAL MEDICINE

## 2019-07-31 PROCEDURE — 2500000003 HC RX 250 WO HCPCS: Performed by: NURSE ANESTHETIST, CERTIFIED REGISTERED

## 2019-07-31 PROCEDURE — 3700000000 HC ANESTHESIA ATTENDED CARE: Performed by: INTERNAL MEDICINE

## 2019-07-31 PROCEDURE — 82948 REAGENT STRIP/BLOOD GLUCOSE: CPT

## 2019-07-31 PROCEDURE — 2700000000 HC OXYGEN THERAPY PER DAY

## 2019-07-31 PROCEDURE — 31622 DX BRONCHOSCOPE/WASH: CPT | Performed by: INTERNAL MEDICINE

## 2019-07-31 PROCEDURE — 71045 X-RAY EXAM CHEST 1 VIEW: CPT

## 2019-07-31 PROCEDURE — 92526 ORAL FUNCTION THERAPY: CPT | Performed by: SPEECH-LANGUAGE PATHOLOGIST

## 2019-07-31 PROCEDURE — 6370000000 HC RX 637 (ALT 250 FOR IP): Performed by: INTERNAL MEDICINE

## 2019-07-31 PROCEDURE — 97535 SELF CARE MNGMENT TRAINING: CPT

## 2019-07-31 PROCEDURE — 1180000000 HC REHAB R&B

## 2019-07-31 PROCEDURE — 97127 HC SP THER IVNTJ W/FOCUS COG FUNCJ: CPT | Performed by: SPEECH-LANGUAGE PATHOLOGIST

## 2019-07-31 PROCEDURE — 87070 CULTURE OTHR SPECIMN AEROBIC: CPT

## 2019-07-31 PROCEDURE — 7100000000 HC PACU RECOVERY - FIRST 15 MIN: Performed by: INTERNAL MEDICINE

## 2019-07-31 PROCEDURE — 88305 TISSUE EXAM BY PATHOLOGIST: CPT

## 2019-07-31 PROCEDURE — APPSS30 APP SPLIT SHARED TIME 16-30 MINUTES: Performed by: NURSE PRACTITIONER

## 2019-07-31 PROCEDURE — 0BC68ZZ EXTIRPATION OF MATTER FROM RIGHT LOWER LOBE BRONCHUS, VIA NATURAL OR ARTIFICIAL OPENING ENDOSCOPIC: ICD-10-PCS | Performed by: INTERNAL MEDICINE

## 2019-07-31 RX ORDER — PROPOFOL 10 MG/ML
INJECTION, EMULSION INTRAVENOUS PRN
Status: DISCONTINUED | OUTPATIENT
Start: 2019-07-31 | End: 2019-07-31 | Stop reason: SDUPTHER

## 2019-07-31 RX ORDER — DEXAMETHASONE SODIUM PHOSPHATE 4 MG/ML
INJECTION, SOLUTION INTRA-ARTICULAR; INTRALESIONAL; INTRAMUSCULAR; INTRAVENOUS; SOFT TISSUE PRN
Status: DISCONTINUED | OUTPATIENT
Start: 2019-07-31 | End: 2019-07-31 | Stop reason: SDUPTHER

## 2019-07-31 RX ORDER — MEPERIDINE HYDROCHLORIDE 25 MG/ML
12.5 INJECTION INTRAMUSCULAR; INTRAVENOUS; SUBCUTANEOUS EVERY 5 MIN PRN
Status: DISCONTINUED | OUTPATIENT
Start: 2019-07-31 | End: 2019-07-31 | Stop reason: HOSPADM

## 2019-07-31 RX ORDER — FAMOTIDINE 20 MG/1
20 TABLET, FILM COATED ORAL 2 TIMES DAILY
Status: DISCONTINUED | OUTPATIENT
Start: 2019-07-31 | End: 2019-08-14 | Stop reason: HOSPADM

## 2019-07-31 RX ORDER — SUCCINYLCHOLINE/SOD CL,ISO/PF 200MG/10ML
SYRINGE (ML) INTRAVENOUS PRN
Status: DISCONTINUED | OUTPATIENT
Start: 2019-07-31 | End: 2019-07-31 | Stop reason: SDUPTHER

## 2019-07-31 RX ORDER — SODIUM CHLORIDE 9 MG/ML
INJECTION, SOLUTION INTRAVENOUS CONTINUOUS PRN
Status: DISCONTINUED | OUTPATIENT
Start: 2019-07-31 | End: 2019-07-31 | Stop reason: SDUPTHER

## 2019-07-31 RX ORDER — LABETALOL 20 MG/4 ML (5 MG/ML) INTRAVENOUS SYRINGE
5 EVERY 10 MIN PRN
Status: DISCONTINUED | OUTPATIENT
Start: 2019-07-31 | End: 2019-07-31 | Stop reason: HOSPADM

## 2019-07-31 RX ORDER — KETAMINE HCL IN NACL, ISO-OSM 100MG/10ML
SYRINGE (ML) INJECTION PRN
Status: DISCONTINUED | OUTPATIENT
Start: 2019-07-31 | End: 2019-07-31 | Stop reason: SDUPTHER

## 2019-07-31 RX ORDER — FENTANYL CITRATE 50 UG/ML
25 INJECTION, SOLUTION INTRAMUSCULAR; INTRAVENOUS EVERY 5 MIN PRN
Status: DISCONTINUED | OUTPATIENT
Start: 2019-07-31 | End: 2019-07-31 | Stop reason: HOSPADM

## 2019-07-31 RX ORDER — ONDANSETRON 2 MG/ML
4 INJECTION INTRAMUSCULAR; INTRAVENOUS
Status: DISCONTINUED | OUTPATIENT
Start: 2019-07-31 | End: 2019-07-31 | Stop reason: HOSPADM

## 2019-07-31 RX ORDER — FENTANYL CITRATE 50 UG/ML
50 INJECTION, SOLUTION INTRAMUSCULAR; INTRAVENOUS EVERY 5 MIN PRN
Status: DISCONTINUED | OUTPATIENT
Start: 2019-07-31 | End: 2019-07-31 | Stop reason: HOSPADM

## 2019-07-31 RX ORDER — FENTANYL CITRATE 50 UG/ML
INJECTION, SOLUTION INTRAMUSCULAR; INTRAVENOUS PRN
Status: DISCONTINUED | OUTPATIENT
Start: 2019-07-31 | End: 2019-07-31 | Stop reason: SDUPTHER

## 2019-07-31 RX ORDER — PROMETHAZINE HYDROCHLORIDE 25 MG/ML
6.25 INJECTION, SOLUTION INTRAMUSCULAR; INTRAVENOUS
Status: DISCONTINUED | OUTPATIENT
Start: 2019-07-31 | End: 2019-07-31 | Stop reason: HOSPADM

## 2019-07-31 RX ORDER — LIDOCAINE HCL/PF 100 MG/5ML
SYRINGE (ML) INJECTION PRN
Status: DISCONTINUED | OUTPATIENT
Start: 2019-07-31 | End: 2019-07-31 | Stop reason: SDUPTHER

## 2019-07-31 RX ORDER — ONDANSETRON 2 MG/ML
INJECTION INTRAMUSCULAR; INTRAVENOUS PRN
Status: DISCONTINUED | OUTPATIENT
Start: 2019-07-31 | End: 2019-07-31 | Stop reason: SDUPTHER

## 2019-07-31 RX ADMIN — IPRATROPIUM BROMIDE AND ALBUTEROL SULFATE 1 AMPULE: .5; 3 SOLUTION RESPIRATORY (INHALATION) at 13:48

## 2019-07-31 RX ADMIN — MIDODRINE HYDROCHLORIDE 5 MG: 5 TABLET ORAL at 17:09

## 2019-07-31 RX ADMIN — IPRATROPIUM BROMIDE AND ALBUTEROL SULFATE 1 AMPULE: .5; 3 SOLUTION RESPIRATORY (INHALATION) at 17:26

## 2019-07-31 RX ADMIN — DEXAMETHASONE SODIUM PHOSPHATE 10 MG: 4 INJECTION, SOLUTION INTRAMUSCULAR; INTRAVENOUS at 12:37

## 2019-07-31 RX ADMIN — Medication 20 MG: at 12:23

## 2019-07-31 RX ADMIN — SERTRALINE 200 MG: 100 TABLET, FILM COATED ORAL at 22:18

## 2019-07-31 RX ADMIN — Medication 80 MG: at 12:15

## 2019-07-31 RX ADMIN — MORPHINE SULFATE 10 MG: 10 SOLUTION ORAL at 22:18

## 2019-07-31 RX ADMIN — BUSPIRONE HYDROCHLORIDE 10 MG: 10 TABLET ORAL at 22:19

## 2019-07-31 RX ADMIN — Medication 100 MG: at 12:15

## 2019-07-31 RX ADMIN — MORPHINE SULFATE 10 MG: 10 SOLUTION ORAL at 17:14

## 2019-07-31 RX ADMIN — GUAIFENESIN AND DEXTROMETHORPHAN 5 ML: 100; 10 SYRUP ORAL at 22:18

## 2019-07-31 RX ADMIN — IPRATROPIUM BROMIDE AND ALBUTEROL SULFATE 1 AMPULE: .5; 3 SOLUTION RESPIRATORY (INHALATION) at 05:49

## 2019-07-31 RX ADMIN — MIDODRINE HYDROCHLORIDE 5 MG: 5 TABLET ORAL at 17:30

## 2019-07-31 RX ADMIN — FENTANYL CITRATE 25 MCG: 50 INJECTION INTRAMUSCULAR; INTRAVENOUS at 12:15

## 2019-07-31 RX ADMIN — SENNOSIDES 17.2 MG: 8.6 TABLET, FILM COATED ORAL at 22:21

## 2019-07-31 RX ADMIN — Medication 100 MG: at 22:18

## 2019-07-31 RX ADMIN — BUTALBITAL, ACETAMINOPHEN, AND CAFFEINE 1 TABLET: 50; 325; 40 TABLET ORAL at 22:21

## 2019-07-31 RX ADMIN — Medication 30 MG: at 12:15

## 2019-07-31 RX ADMIN — SODIUM CHLORIDE: 9 INJECTION, SOLUTION INTRAVENOUS at 12:10

## 2019-07-31 RX ADMIN — TRAZODONE HYDROCHLORIDE 100 MG: 100 TABLET ORAL at 22:19

## 2019-07-31 RX ADMIN — LACTULOSE 20 G: 10 SOLUTION ORAL at 22:18

## 2019-07-31 RX ADMIN — FENTANYL CITRATE 75 MCG: 50 INJECTION INTRAMUSCULAR; INTRAVENOUS at 12:23

## 2019-07-31 RX ADMIN — FAMOTIDINE 20 MG: 20 TABLET ORAL at 22:19

## 2019-07-31 RX ADMIN — PIPERACILLIN SODIUM,TAZOBACTAM SODIUM 3.38 G: 3; .375 INJECTION, POWDER, FOR SOLUTION INTRAVENOUS at 05:44

## 2019-07-31 RX ADMIN — MICONAZOLE NITRATE: 20 POWDER TOPICAL at 22:19

## 2019-07-31 RX ADMIN — ONDANSETRON HYDROCHLORIDE 4 MG: 4 INJECTION, SOLUTION INTRAMUSCULAR; INTRAVENOUS at 12:37

## 2019-07-31 RX ADMIN — PIPERACILLIN SODIUM,TAZOBACTAM SODIUM 3.38 G: 3; .375 INJECTION, POWDER, FOR SOLUTION INTRAVENOUS at 17:12

## 2019-07-31 RX ADMIN — HYDROXYZINE HYDROCHLORIDE 50 MG: 25 TABLET, FILM COATED ORAL at 22:21

## 2019-07-31 RX ADMIN — ATORVASTATIN CALCIUM 80 MG: 80 TABLET, FILM COATED ORAL at 22:21

## 2019-07-31 RX ADMIN — PROPOFOL 80 MG: 10 INJECTION, EMULSION INTRAVENOUS at 12:15

## 2019-07-31 ASSESSMENT — PULMONARY FUNCTION TESTS
PIF_VALUE: 2
PIF_VALUE: 26
PIF_VALUE: 1
PIF_VALUE: 1
PIF_VALUE: 34
PIF_VALUE: 46
PIF_VALUE: 13
PIF_VALUE: 1
PIF_VALUE: 0
PIF_VALUE: 21
PIF_VALUE: 0
PIF_VALUE: 34
PIF_VALUE: 31
PIF_VALUE: 17
PIF_VALUE: 35
PIF_VALUE: 15
PIF_VALUE: 1
PIF_VALUE: 19
PIF_VALUE: 1
PIF_VALUE: 5
PIF_VALUE: 19
PIF_VALUE: 1
PIF_VALUE: 2
PIF_VALUE: 1
PIF_VALUE: 1
PIF_VALUE: 0
PIF_VALUE: 2
PIF_VALUE: 33
PIF_VALUE: 26
PIF_VALUE: 2
PIF_VALUE: 20
PIF_VALUE: 1
PIF_VALUE: 30

## 2019-07-31 ASSESSMENT — PAIN DESCRIPTION - FREQUENCY
FREQUENCY: INTERMITTENT
FREQUENCY: CONTINUOUS

## 2019-07-31 ASSESSMENT — PAIN DESCRIPTION - ORIENTATION: ORIENTATION: LEFT

## 2019-07-31 ASSESSMENT — PAIN SCALES - GENERAL
PAINLEVEL_OUTOF10: 7
PAINLEVEL_OUTOF10: 8
PAINLEVEL_OUTOF10: 4
PAINLEVEL_OUTOF10: 7
PAINLEVEL_OUTOF10: 0
PAINLEVEL_OUTOF10: 7
PAINLEVEL_OUTOF10: 0

## 2019-07-31 ASSESSMENT — PAIN DESCRIPTION - ONSET: ONSET: ON-GOING

## 2019-07-31 ASSESSMENT — ENCOUNTER SYMPTOMS: SHORTNESS OF BREATH: 1

## 2019-07-31 ASSESSMENT — PAIN DESCRIPTION - DIRECTION
RADIATING_TOWARDS: MID BACK
RADIATING_TOWARDS: MID BACK

## 2019-07-31 ASSESSMENT — PAIN DESCRIPTION - PAIN TYPE: TYPE: ACUTE PAIN

## 2019-07-31 ASSESSMENT — PAIN DESCRIPTION - LOCATION: LOCATION: OTHER (COMMENT)

## 2019-07-31 ASSESSMENT — PAIN DESCRIPTION - DESCRIPTORS: DESCRIPTORS: ACHING;SHARP

## 2019-07-31 NOTE — ANESTHESIA PRE PROCEDURE
tablet 10 mg  10 mg Oral Nightly Kimi Hawkins MD   10 mg at 07/30/19 2131       Allergies: Allergies   Allergen Reactions    Latex Hives    Coumadin [Warfarin Sodium] Other (See Comments)     Tears skin     Tape Agustina Sides Tape] Other (See Comments)     Tears skin        Problem List:    Patient Active Problem List   Diagnosis Code    Mass in neck R22.1    Type 2 diabetes mellitus without complication, without long-term current use of insulin (HCC) E11.9    Generalized anxiety disorder F41.1    Moderate episode of recurrent major depressive disorder ([address] F33.1    Malignant neoplasm of tonsil (HCC) C09.9    Squamous cell carcinoma of left tonsil (HCC) C09.9    Tongue carcinoma (HCC) C02.9    Encounter for antineoplastic chemotherapy Z51.11    Cigarette nicotine dependence without complication W18.136    Unstageable pressure ulcer of right foot ([facility]) L89.890    Severe malnutrition (HCC) E43    Severe protein-calorie malnutrition (HCC) E43    NSTEMI (non-ST elevated myocardial infarction) ([facility]) I21.4    Cerebrovascular accident (CVA) due to embolism of right posterior cerebral artery ([facility]) I63.431    Tonsil cancer (HCC) C09.9    Left homonymous hemianopsia due to recent cerebral infarction I69.398, H53.462    Stroke-like symptom R29.90    Nausea & vomiting R11.2    SOB (shortness of breath) R06.02    Troponin level elevated R74.8    Severe malnutrition (HCC) E43    History of stroke Z86.73    Acute cystitis without hematuria N30.00    Dizziness R42    Claudication ([facility]) I73.9    Other headache syndrome G44.89    Pre-syncope R55    Orthostatic hypotension I95.1    Chronic anemia D64.9    Fall W19. Dyllan Keens    Closed fracture of one rib of left side S22.32XA    CHI (closed head injury), initial encounter S09.90XA    Cerebrovascular accident (CVA) ([address] I63.9    Acute CVA (cerebrovascular accident) ([address] I63.9    Essential hypertension I10    Cancer associated pain G89.3    (If Applicable): No results found for: PREGTESTUR, PREGSERUM, HCG, HCGQUANT     ABGs: No results found for: PHART, PO2ART, VIW5PED, FZA8KIX, BEART, G3WBYXNG     Type & Screen (If Applicable):  Lab Results   Component Value Date    LABRH POS 04/10/2019       Anesthesia Evaluation   no history of anesthetic complications:   Airway: Mallampati: II  TM distance: >3 FB   Neck ROM: limited  Mouth opening: > = 3 FB Dental:          Pulmonary:normal exam    (+) COPD:  shortness of breath:  sleep apnea:            Patient did not smoke on day of surgery. Cardiovascular:  Exercise tolerance: good (>4 METS),   (+) hypertension:,                   Neuro/Psych:   (+) CVA:, psychiatric history:            GI/Hepatic/Renal:             Endo/Other:    (+) Diabetes, . Pt had no PAT visit       Abdominal:           Vascular: negative vascular ROS. Anesthesia Plan      general     ASA 3       Induction: intravenous. MIPS: Postoperative opioids intended and Prophylactic antiemetics administered. Anesthetic plan and risks discussed with patient. Plan discussed with CRNA.                   Michele Bashir MD   7/31/2019

## 2019-07-31 NOTE — PROGRESS NOTES
[address]  Occupational Therapy  Daily Note  Time:   Time In: 1400  Time Out: 1430  Timed Code Treatment Minutes: 30 Minutes  Minutes: 30    Date: 2019  Patient Name: Luciana Schwab,   Gender: male      Room: 7E-70/070-A  MRN: [id number]  : 1960  (62 y.o.)  Referring Practitioner: Cale Shell MD  Diagnosis: Cerebellar infarct  Additional Pertinent Hx: He was admitted 2019 for complaint of injuries after a ground-level fall at home. The patient was reaching over his couch when he lost his balance and fell onto his left side causing him to strike his head without any endorsed loss of consciousness. There was a small superficial contusion of the left frontal region. On initial work-up he was found to have a left ninth rib fracture and also old rib fractures on the right side. CT imaging of the chest showed patchy nodular airspace opacities in the left lung base suggestive of infiltrates with an elevated white count of 15.3 noted. CT imaging of the head did show an acute infarct of the inferior medial aspect of the left cerebellar hemisphere as well as the left side of the medulla. The patient was admitted for further medical care. Note was also made that the patient was disheveled and malodorous and had questionable ability to care for himself. He did undergo a modified barium swallow study on  which showed the patient to have mild oral dysphagia and moderate pharyngeal dysphasia with recommendation at that time for thin and moist purées with thin liquids and strict use of compensatory strategies. He is currently on Zosyn for treating a presumptive aspiration pneumonia versus a hospital-acquired pneumonia. Annamary Ripa Hx of prior CVA in april with chronic pain. The patient was just recently in the emergency department on 2019 for complaint of unsteady gait and nausea and vomiting as well as issues with swallow.   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 Gabrielle Pa. It is noted that his malignancy is stated to be in remission. The patient also has a significant history of pulmonary emboli in 2018 and deep vein thromboses of the bilateral lower limbs in 2018 and had a Ardara filter placed in 2009. Restrictions/Precautions:  Restrictions/Precautions: Fall Risk, General Precautions(NPO)  Position Activity Restriction  Other position/activity restrictions: PEG 7/14, oxygen, impulsive, unsteady, left ninth rib fracture and also old rib fractures on the right side    SUBJECTIVE: Patient required mod encouragement to participate in OT initially, reporting he is frustrated from bronchoscopy. After  Education, pt became agreeable and then motivated to continue     PAIN: No c/o    COGNITION: Decreased Judgment    ADL:   No ADL's completed this session. Caroline Loose BALANCE:  Sitting Balance:  Stand By Assistance  Standing Balance: Contact Guard Assistance    BED MOBILITY:  Supine to Sit: Contact Guard Assistance      TRANSFERS:  Sit to Stand:  Contact Guard Assistance. Stand to Sit: Contact Guard Assistance. Stand Pivot: Contact Guard Assistance. ADDITIONAL ACTIVITIES:  Pt completed BUE strengthening exercises x15 reps x1 set this date with a 3# dowel benny in all joints and all planes in order to increase strength and improve activity tolerance required for functional toilet & shower transfers and ADL routine. Pt tolerated well, requiring min rest breaks throughout task. ASSESSMENT:  Activity Tolerance:  Patient tolerance of  treatment: good.        Discharge Recommendations: Home with Home health OT, Home with nursing aide  Equipment

## 2019-07-31 NOTE — PROGRESS NOTES
Bronchoscopy with washings completed. Washing specimens labeled and sent to lab. Patient tolerated well.

## 2019-07-31 NOTE — PROCEDURES
Author Maximiliano is a 62 y.o. male patient. No diagnosis found. Past Medical History:   Diagnosis Date    Anxiety     [facility]     Bruises easily     Cerebrovascular accident (CVA) due to embolism of right posterior cerebral artery ([address] 04/09/2019    Cigarette nicotine dependence without complication 2/7/9577    COPD (chronic obstructive pulmonary disease) ([address]     DVT (deep venous thrombosis) ([address] 2009    Enlarged lymph node     LAURA (generalized anxiety disorder)     History of sleep apnea     Hypertension     HCC    Moderate episode of recurrent major depressive disorder ([address] 4/16/2018    NSTEMI (non-ST elevated myocardial infarction) ([address] 4/9/2019    Pulmonary embolism and infarction ([address] 10/22/2018    S/P insertion of inferior vena caval filter 07/07/2009    Severe malnutrition ([address] 10/23/2018    Squamous cell carcinoma of tonsils ([facility] 2018    left   - radiation    Type II or unspecified type diabetes mellitus without mention of complication, not stated as uncontrolled     lost weight, no meds at present 2018     Blood pressure (!) 98/57, pulse 58, temperature 97.9 °F (36.6 °C), temperature source Temporal, resp. rate 24, height 6' (1.829 m), weight 171 lb (77.6 kg), SpO2 97 %.     Procedures  Bronchoscopy Procedure Note    Date of Operation: 7/31/2019    Pre-op Diagnosis: R lung atelectasis, suspect mucus plug    Post-op Diagnosis: Mucus plug in SUSIE bronchus    Surgeon: Jazz Phelps    Anesthesia: General endotracheal anesthesia    Operation: Flexible fiberoptic bronchoscopy with bronchial washings and aspiration    Estimated Blood Loss: None    Complications: None inmediate    Indications and History:  The patient is a 62 y.o. male with smoker in Rehab unit after cerebellar CVA has PEG and not supposed to eat by mouth noted to have increased oxygen requirements and CXR revealed loss of volume in right side with no ventilation to right base, nursing reports that

## 2019-07-31 NOTE — PROGRESS NOTES
Physical Medicine & Rehabilitation  Progress Note    Chief Complaint:  LEFT cerebellar CVA    Subjective:   He had his bronchoscopy today without any noted issues and now has improved aeration of the right long. The patient overheard of discussion in the gym regarding use of his CPAP and he then states that he was using one at home and would like to begin using a CPAP. A hospital.  His pain remains controlled. No other concerns at this time. Rehabilitation:  Physical Therapy:  OBJECTIVE:  Transfers:  Sit to Stand: Minimal Assistance, with verbal cues  Stand to Sit:Contact Guard Assistance, Maximum Assistance, Max A to lower to chair following LOB.    Ambulation:  Minimal Assistance, Maximum Assistance, with verbal cues , with increased time for completion, Assistance level varies throughout. Max A required secondary to LOB to L side. Distance: 15' x 2  Surface: Level Tile  Device:Rolling Walker  Gait Deviations:   Forward Flexed Posture, Slow Cassandra, Decreased Step Length Bilaterally, Decreased Weight Shift Bilaterally, Decreased Trunk Rotation, Decreased Gait Speed, Decreased Heel Strike Bilaterally, Ataxia, Wide Base of Support, Moderate Path Deviations, Unsteady Gait and Increased reliance on walker.      Wheelchair Mobility:  Stand By Assistance  Extremities Used: Bilateral Upper Extremities  Type of Chair:Manual  Surface: Level Tile  Distance: 150' x 1  Quality: Slow Velocity and Veers Right    FIMS: Bed, Chair, Wheel Chair: 1 - Requires >75% assitance to transfer  Walk: 0 - Activity Not Assessed/Does Not Occur  Wheel Chair: 2 - Maximal Assistance Requires up to Maximal Assistance AND requires assistance of one person to operate wheelchair between  feet  Stairs: 0 - Activity Does not Occur ( 0 only for the admission assessment), PT Equipment Recommendations  Equipment Needed: No(cont to assess, has RW, 4WW, w/c, canes)  Other: will continue to assess, Assessment: Pt demonstrates a decrease in LEFT palatine tonsil and base of the tongue with palpable cervical lymphadenopathy s/p radiation and chemotherapy (cisplatin) for which he sees Dr. Hali Harris. 22. Hypertension. 23. COPD. 24. Type 2 DM, controlled.  Last HgbA1c 5.1 on 7/12/2019.  25. Paravertebral ossifications at numerous levels along the thoracic spine with partial mineralization of the anterior longitudinal ligament consuistent with DISH.  26. Osteopenia. 27. Nicotine dependence; current every day smoker. 28. Marijuana use. Last positive UDS 7/11/2019.  29. History of anxiety and depression. 30. Medication non-compliance. 31. Hypovitaminosis D.  32. Orthostatic hypotension. 33. Insomnia. 34. Aspiration pneumonia with sepsis. 35. Near complete atelectasis of the RIGHT lung with mediastinal shift to the right, secondary to obstruction of the right mainstem bronchus with mucus plug. 36. ANTHONY with use of CPAP. Plan:     Medical management: Per primary team.     Consultants:  Trauma Surgery, Neurology, Gastroenterology, Pain Management, Internal Medicine, Pulmonology, Physical Medicine      Narcotic usage:  Morphine Last 24 hour usage 20mg. Stable. Last BM:   7/26  Ambulation/Mobility:            FUNCTIONAL OUTCOMES TOOLS:    LOWE -      Tinetti -      Acute/Rehabilitation Problems:  1. Acute CVA  large area of restricted diffusion involving the inferior medial aspect of the LEFT cerebellar hemisphere as well as the LEFT side of the medulla/Chronic CVA with large infarct in the RIGHT posterior cerebral artery territory secondary to high-grade stenosis at the P1 segment of the RIGHT posterior cerebral artery without definite distal flow involving the RIGHT occipital lobe and tracking anteriorly along the medial aspect of the RIGHT temporal lobe with associated local mass effect with sulcal effacement without midline shift or associated hemorrhage.   1. Secondary stroke prevention.   2. Gait instability/Fall/Physical debility with

## 2019-07-31 NOTE — PROGRESS NOTES
Decreased Step Length Bilaterally, Decreased Weight Shift Bilaterally, Decreased Trunk Rotation, Decreased Gait Speed, Decreased Heel Strike Bilaterally, Ataxia, Wide Base of Support, Moderate Path Deviations, Unsteady Gait and Increased reliance on walker. Wheelchair Mobility:  Stand By Assistance  Extremities Used: Bilateral Upper Extremities  Type of Chair:Manual  Surface: Level Tile  Distance: 150' x 1  Quality: Slow Velocity and Veers Right    Exercise:  Patient was guided in 1 set(s) 10 reps of exercise to both lower extremities. Glut sets, Long arc quads, Hip abduction/adduction, Seated hip flexion, Seated hamstring curls, Seated heel/toe raises and Seated isometric hip adduction. Exercises were completed for increased independence with functional mobility. Functional Outcome Measures: Not completed       ASSESSMENT:  Assessment: Patient progressing toward established goals. Activity Tolerance:  Patient tolerance of  treatment: good. Equipment Recommendations:Equipment Needed: No(cont to assess, has RW, 4WW, w/c, canes)  Other: will continue to assess  Discharge Recommendations: Continue to assess pending progress    Plan: Times per week: 5x/wk 90 min, 1x wk 30 min  Specific instructions for Next Treatment: core and LE strengthening ex, bed mobility, sitting balance . PT to assess transfers and gait when medically approved  Current Treatment Recommendations: Strengthening, Endurance Training, Balance Training, Functional Mobility Training, Safety Education & Training, Equipment Evaluation, Education, & procurement    Patient Education  Patient Education: Plan of Care, Transfers, Gait, Verbal Exercise Instruction    Goals:  Patient goals : Did not state  Short term goals  Time Frame for Short term goals: 1 week  Short term goal 1: Pt to go supine <->sit,  supervision A, to get in/out of bed.    Short term goal 2: sit <> stand with contact guard assist for ease of transfers  Short term goal 3:

## 2019-07-31 NOTE — PROGRESS NOTES
state  Short term goals  Time Frame for Short term goals: 1 week  Short term goal 1: Pt to go supine <->sit,  supervision A, to get in/out of bed. Short term goal 2: sit <> stand with contact guard assist for ease of transfers    Short term goal 3: ambulate 15 feet with use of LRAD and modAx1 to prepare for in home mobility    Short term goal 4: WC mobility with mod I for 300ft to prepare for community mobility   Long term goals  Time Frame for Long term goals : 4 weeks   Long term goal 1: bed mobility with mod I for ease of getting in/out of bed    Long term goal 2: sit <> stand with mod I for safe of transfers   Long term goal 3: ambulate 75ft with use of LRAD and stand-by assist for safe in home mobility     Long term goal 4: negotiate 1 step no rails with stand-by assist and use of LRAD for safe entry/exit of his home  NOT MET    Following session, patient left in safe position with all fall risk precautions in place. Treatment session and note completed by Rolanda Poe PTA.

## 2019-08-01 LAB
ERYTHROCYTE [DISTWIDTH] IN BLOOD BY AUTOMATED COUNT: 13.2 % (ref 11.5–14.5)
ERYTHROCYTE [DISTWIDTH] IN BLOOD BY AUTOMATED COUNT: 45.6 FL (ref 35–45)
GLUCOSE BLD-MCNC: 84 MG/DL (ref 70–108)
HCT VFR BLD CALC: 34.3 % (ref 42–52)
HEMOGLOBIN: 10.9 GM/DL (ref 14–18)
MCH RBC QN AUTO: 29.9 PG (ref 26–33)
MCHC RBC AUTO-ENTMCNC: 31.8 GM/DL (ref 32.2–35.5)
MCV RBC AUTO: 94.2 FL (ref 80–94)
PLATELET # BLD: 278 THOU/MM3 (ref 130–400)
PMV BLD AUTO: 11.5 FL (ref 9.4–12.4)
RBC # BLD: 3.64 MILL/MM3 (ref 4.7–6.1)
WBC # BLD: 5.9 THOU/MM3 (ref 4.8–10.8)

## 2019-08-01 PROCEDURE — 94669 MECHANICAL CHEST WALL OSCILL: CPT

## 2019-08-01 PROCEDURE — 2709999900 HC NON-CHARGEABLE SUPPLY

## 2019-08-01 PROCEDURE — 82948 REAGENT STRIP/BLOOD GLUCOSE: CPT

## 2019-08-01 PROCEDURE — 1180000000 HC REHAB R&B

## 2019-08-01 PROCEDURE — 97530 THERAPEUTIC ACTIVITIES: CPT

## 2019-08-01 PROCEDURE — 94660 CPAP INITIATION&MGMT: CPT

## 2019-08-01 PROCEDURE — 94760 N-INVAS EAR/PLS OXIMETRY 1: CPT

## 2019-08-01 PROCEDURE — 6370000000 HC RX 637 (ALT 250 FOR IP): Performed by: STUDENT IN AN ORGANIZED HEALTH CARE EDUCATION/TRAINING PROGRAM

## 2019-08-01 PROCEDURE — 85027 COMPLETE CBC AUTOMATED: CPT

## 2019-08-01 PROCEDURE — 94640 AIRWAY INHALATION TREATMENT: CPT

## 2019-08-01 PROCEDURE — 6360000002 HC RX W HCPCS: Performed by: PHYSICAL MEDICINE & REHABILITATION

## 2019-08-01 PROCEDURE — 6370000000 HC RX 637 (ALT 250 FOR IP): Performed by: INTERNAL MEDICINE

## 2019-08-01 PROCEDURE — 97110 THERAPEUTIC EXERCISES: CPT

## 2019-08-01 PROCEDURE — 6370000000 HC RX 637 (ALT 250 FOR IP): Performed by: PHYSICAL MEDICINE & REHABILITATION

## 2019-08-01 PROCEDURE — 92526 ORAL FUNCTION THERAPY: CPT | Performed by: SPEECH-LANGUAGE PATHOLOGIST

## 2019-08-01 PROCEDURE — 97127 HC SP THER IVNTJ W/FOCUS COG FUNCJ: CPT | Performed by: SPEECH-LANGUAGE PATHOLOGIST

## 2019-08-01 PROCEDURE — 97535 SELF CARE MNGMENT TRAINING: CPT

## 2019-08-01 PROCEDURE — 36415 COLL VENOUS BLD VENIPUNCTURE: CPT

## 2019-08-01 PROCEDURE — 97542 WHEELCHAIR MNGMENT TRAINING: CPT

## 2019-08-01 PROCEDURE — 94668 MNPJ CHEST WALL SBSQ: CPT

## 2019-08-01 PROCEDURE — 2580000003 HC RX 258: Performed by: PHYSICAL MEDICINE & REHABILITATION

## 2019-08-01 RX ADMIN — LACTULOSE 20 G: 10 SOLUTION ORAL at 21:17

## 2019-08-01 RX ADMIN — MIDODRINE HYDROCHLORIDE 5 MG: 5 TABLET ORAL at 12:40

## 2019-08-01 RX ADMIN — PIPERACILLIN SODIUM,TAZOBACTAM SODIUM 3.38 G: 3; .375 INJECTION, POWDER, FOR SOLUTION INTRAVENOUS at 13:30

## 2019-08-01 RX ADMIN — FAMOTIDINE 20 MG: 20 TABLET ORAL at 21:17

## 2019-08-01 RX ADMIN — MORPHINE SULFATE 10 MG: 10 SOLUTION ORAL at 12:40

## 2019-08-01 RX ADMIN — BUSPIRONE HYDROCHLORIDE 10 MG: 10 TABLET ORAL at 21:17

## 2019-08-01 RX ADMIN — ATORVASTATIN CALCIUM 80 MG: 80 TABLET, FILM COATED ORAL at 21:17

## 2019-08-01 RX ADMIN — FAMOTIDINE 20 MG: 20 TABLET ORAL at 08:39

## 2019-08-01 RX ADMIN — SENNOSIDES 17.2 MG: 8.6 TABLET, FILM COATED ORAL at 21:17

## 2019-08-01 RX ADMIN — IPRATROPIUM BROMIDE AND ALBUTEROL SULFATE 1 AMPULE: .5; 3 SOLUTION RESPIRATORY (INHALATION) at 05:41

## 2019-08-01 RX ADMIN — SERTRALINE 200 MG: 100 TABLET, FILM COATED ORAL at 08:41

## 2019-08-01 RX ADMIN — IPRATROPIUM BROMIDE AND ALBUTEROL SULFATE 1 AMPULE: .5; 3 SOLUTION RESPIRATORY (INHALATION) at 18:44

## 2019-08-01 RX ADMIN — PIPERACILLIN SODIUM,TAZOBACTAM SODIUM 3.38 G: 3; .375 INJECTION, POWDER, FOR SOLUTION INTRAVENOUS at 01:27

## 2019-08-01 RX ADMIN — LACTULOSE 20 G: 10 SOLUTION ORAL at 08:39

## 2019-08-01 RX ADMIN — MORPHINE SULFATE 10 MG: 10 SOLUTION ORAL at 21:18

## 2019-08-01 RX ADMIN — Medication 10 ML: at 13:30

## 2019-08-01 RX ADMIN — MIDODRINE HYDROCHLORIDE 5 MG: 5 TABLET ORAL at 18:29

## 2019-08-01 RX ADMIN — Medication 100 MG: at 08:39

## 2019-08-01 RX ADMIN — ASPIRIN 81 MG: 81 TABLET ORAL at 08:39

## 2019-08-01 RX ADMIN — Medication 100 MG: at 21:17

## 2019-08-01 RX ADMIN — IPRATROPIUM BROMIDE AND ALBUTEROL SULFATE 1 AMPULE: .5; 3 SOLUTION RESPIRATORY (INHALATION) at 13:54

## 2019-08-01 RX ADMIN — TRAZODONE HYDROCHLORIDE 100 MG: 100 TABLET ORAL at 21:17

## 2019-08-01 RX ADMIN — MIDODRINE HYDROCHLORIDE 5 MG: 5 TABLET ORAL at 08:41

## 2019-08-01 RX ADMIN — IPRATROPIUM BROMIDE AND ALBUTEROL SULFATE 1 AMPULE: .5; 3 SOLUTION RESPIRATORY (INHALATION) at 09:25

## 2019-08-01 RX ADMIN — MICONAZOLE NITRATE: 20 POWDER TOPICAL at 16:22

## 2019-08-01 ASSESSMENT — PAIN SCALES - GENERAL
PAINLEVEL_OUTOF10: 8
PAINLEVEL_OUTOF10: 10

## 2019-08-01 ASSESSMENT — PAIN DESCRIPTION - LOCATION: LOCATION: BACK

## 2019-08-01 ASSESSMENT — PAIN DESCRIPTION - PAIN TYPE: TYPE: CHRONIC PAIN;NEUROPATHIC PAIN

## 2019-08-01 NOTE — PROGRESS NOTES
(hungry/hot/pain)  Social Interaction: 5 - Patient is appropriate with supervision/cues  Problem Solvin - Patient solves simple/routine tasks 75-90%+   Memory: 4 - Patient remembers 75-90%+ of the time    DIAGNOSTIC IMPRESSIONS: Patient presents with mild oral and severe pharyngeal dysphagia evidenced by significant premature pharyngeal entry, primarily to the right pyriform sinus with NO epiglottic movement. Spillage over the epiglottic edge noted with denser consistencies resulting in laryngeal penetration and tracheal aspiration before the swallow. In addition, moderate pharyngeal residue noted in the valleculae, pyriform sinus and along the posterior pharyngeal wall. Residue from the pyriform sinus noted to spill into the airway following initial swallow. Reflexive cough triggered with strong response to clear material from the airway. Pt requiring ~5-6 swallows to clear a single 1/4 tsp size bolus with recurrent episodes of laryngeal penetration and tracheal aspiration of residuals. Left vocal fold appears to be immobile with stiffening of tissue in the posterior pharyngeal wall laterally to the left. Pharyngeal swallow function is NON FUNCTIONAL at this time. Pt remains at HIGH risk for aspiration with both dense and thin consistencies, resulting in recommendations for continuation of NPO status and use of alternate means of nutrition to meet caloric/nutritional needs.      Review of Systems:  CONSTITUTIONAL:  positive for fatigue  EYES:  positive for  blind spots  HEENT:  negative  RESPIRATORY:  positive for dyspnea  CARDIOVASCULAR:  negative  GASTROINTESTINAL:  positive for dysphagia and PEG  GENITOURINARY:  negative  SKIN:  negative  HEMATOLOGIC/LYMPHATIC:  negative  MUSCULOSKELETAL:  positive for  myalgias, arthralgias, pain and muscle weakness  NEUROLOGICAL:  positive for headaches, memory problems, visual disturbance, coordination problems, gait problems, dysphagia, pain, and weakness, has been created using voice recognition software. It may contain minor errors which are inherent in voice recognition technology. ** Final report electronically signed by Dr. Mirna Goodman on 7/31/2019 2:34 AM    Impression:  1. Acute CVA  large area of restricted diffusion involving the inferior medial aspect of the LEFT cerebellar hemisphere as well as the LEFT side of the medulla. 2. Chronic CVA with large infarct in the RIGHT posterior cerebral artery territory secondary to high-grade stenosis at the P1 segment of the RIGHT posterior cerebral artery without definite distal flow involving the RIGHT occipital lobe and tracking anteriorly along the medial aspect of the RIGHT temporal lobe with associated local mass effect with sulcal effacement without midline shift or associated hemorrhage.   3. Gait instability. 4. Fall. 5. Concussion without LOC. 6. Physical debility with generalized weakness. 7. Healthcare associated pneumonia versus Aspiration pneumonia. 8. Acute hypoxic respiratory failure.   9. Fracture of the lateral ninth rib. 10. Peripheral vision loss LEFT > RIGHT. 11. Mild LEFT hemiparesis and ataxia. 12. Severe malnutrition. 13. Mild-moderate oral dysphagia and severe pharyngeal dysphagia. 14. S/p PEG placement on 7/11/2019 by Dr. Yanet Varela. 15. Mild cognitive deficit.  (MOCA) version 8.2 completed.  Patient scored 20/30 over prior (MOCA) version 7.2 completed.  Patient scored 15/25.  *adjusted score given pt unable to complete written subtests (L hand dominant). 16. NSTEMI on 4/9/2019. 17. 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 18. Caval filter placed 2009 for extensive DVT of the LEFT leg as well as calf DVT on the RIGHT. 19. Pulmonary embolism in 2018. 20. Chronic anticoagulation status.   21. 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 1. SLP. 2. Fees study on . Completed with patient remaining n.p.o. at this time. Dr. Lakshmi Odonnell, ENT reviewed the imaging on . Recommended Oncology follow up after discharge. 3. Patient did trial ice chips on , unsuccessfully. 4. Start TF boluses on . Tolerating well. 8. ANTHONY with use of CPAP. 1. CPAP ordered on  at patient's request.  9. Nutrition:  Consultation to dietician for nutritional counseling and recommendations. 1. Severe malnutrition. 2. S/p PEG placement on 2019 by Dr. Jenelle Dial. 3. Wound care. 4. TotP 5.8, alb 2.4, Vitamin 25OH level of 16 on 2019.  5. Continue with tube feeds; nutrition has recommended bolus tube feeds with the plan to start those on . 6. Ergocalciferol every 3 days x 11 doses. 10. Orthostatic hypotension. 1. Supine: 124/60. Sittin/61 and standing was 88/61 on . 2. Start Midodrine 2.5mg TID on . Pressures improved; but still has some issues with lightheadedness and the dosing was increased to 5 mg 3 times daily on . He is less symptomatic at this dose. 11. Insomnia. 1. Trazodone at 100 mg was started last night to good effect per patient's feedback on . 12. Electrolytes. 1. Normal on .  13. Bladder: No issues. 14. Bowel: Senna, colace, MOM  15. Rehabilitation nursing will be involved for bowel, bladder, skin, and pain management. Nursing will also provide education and training to patient and family. 16. Prophylaxis:  DVT: Xarelto. GI: Pepcid. 17.  and case management consultations for coordination of care and discharge planning.     Chronic Problems:  1. NSTEMI on 2019/Hypertension. 1. ASA 81 mg.  2. Lipitor. 2. 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 1. Patient may benefit from an AFO; will let PT evaluate and help make decision.   3. Caval filter placed 2009

## 2019-08-01 NOTE — PROGRESS NOTES
In chair upon entering room. Alert and oriented to person, place and year. Skin warm and dry. Speech clear. Heart sounds strong and regular. Lungs clear anterior, posterior and lateral. Abdomen soft and round. Denies pain on palpation. Hand grasp strong and equal. Radial pulses strong and equal. Skin turgor brisk. Pedal push and pull weak. Pedal pulses weak bilaterally. No edema noted. No concerns voiced at this time. Applied chapstick for dry lips. Bed in low position. Wheels locked. Side rails up times 2.  Call light and bedside table within reach

## 2019-08-02 LAB
GLUCOSE BLD-MCNC: 79 MG/DL (ref 70–108)
GRAM STAIN RESULT: NORMAL
RESPIRATORY CULTURE: NORMAL

## 2019-08-02 PROCEDURE — 6370000000 HC RX 637 (ALT 250 FOR IP): Performed by: STUDENT IN AN ORGANIZED HEALTH CARE EDUCATION/TRAINING PROGRAM

## 2019-08-02 PROCEDURE — 97110 THERAPEUTIC EXERCISES: CPT

## 2019-08-02 PROCEDURE — 2500000003 HC RX 250 WO HCPCS: Performed by: STUDENT IN AN ORGANIZED HEALTH CARE EDUCATION/TRAINING PROGRAM

## 2019-08-02 PROCEDURE — 94668 MNPJ CHEST WALL SBSQ: CPT

## 2019-08-02 PROCEDURE — 6370000000 HC RX 637 (ALT 250 FOR IP): Performed by: INTERNAL MEDICINE

## 2019-08-02 PROCEDURE — 6370000000 HC RX 637 (ALT 250 FOR IP): Performed by: PHYSICAL MEDICINE & REHABILITATION

## 2019-08-02 PROCEDURE — 2580000003 HC RX 258: Performed by: PHYSICAL MEDICINE & REHABILITATION

## 2019-08-02 PROCEDURE — 2709999900 HC NON-CHARGEABLE SUPPLY

## 2019-08-02 PROCEDURE — 97116 GAIT TRAINING THERAPY: CPT

## 2019-08-02 PROCEDURE — 94761 N-INVAS EAR/PLS OXIMETRY MLT: CPT

## 2019-08-02 PROCEDURE — 92507 TX SP LANG VOICE COMM INDIV: CPT | Performed by: SPEECH-LANGUAGE PATHOLOGIST

## 2019-08-02 PROCEDURE — 99232 SBSQ HOSP IP/OBS MODERATE 35: CPT | Performed by: INTERNAL MEDICINE

## 2019-08-02 PROCEDURE — 82948 REAGENT STRIP/BLOOD GLUCOSE: CPT

## 2019-08-02 PROCEDURE — 97530 THERAPEUTIC ACTIVITIES: CPT

## 2019-08-02 PROCEDURE — 94760 N-INVAS EAR/PLS OXIMETRY 1: CPT

## 2019-08-02 PROCEDURE — 1180000000 HC REHAB R&B

## 2019-08-02 PROCEDURE — 94669 MECHANICAL CHEST WALL OSCILL: CPT

## 2019-08-02 PROCEDURE — 6360000002 HC RX W HCPCS: Performed by: PHYSICAL MEDICINE & REHABILITATION

## 2019-08-02 PROCEDURE — 2700000000 HC OXYGEN THERAPY PER DAY

## 2019-08-02 PROCEDURE — APPSS30 APP SPLIT SHARED TIME 16-30 MINUTES: Performed by: NURSE PRACTITIONER

## 2019-08-02 PROCEDURE — 97535 SELF CARE MNGMENT TRAINING: CPT

## 2019-08-02 RX ADMIN — MIDODRINE HYDROCHLORIDE 5 MG: 5 TABLET ORAL at 08:53

## 2019-08-02 RX ADMIN — SERTRALINE 200 MG: 100 TABLET, FILM COATED ORAL at 08:52

## 2019-08-02 RX ADMIN — Medication 10 ML: at 13:57

## 2019-08-02 RX ADMIN — IPRATROPIUM BROMIDE AND ALBUTEROL SULFATE 1 AMPULE: .5; 3 SOLUTION RESPIRATORY (INHALATION) at 16:02

## 2019-08-02 RX ADMIN — BUTALBITAL, ACETAMINOPHEN, AND CAFFEINE 1 TABLET: 50; 325; 40 TABLET ORAL at 21:58

## 2019-08-02 RX ADMIN — MIDODRINE HYDROCHLORIDE 5 MG: 5 TABLET ORAL at 13:59

## 2019-08-02 RX ADMIN — PIPERACILLIN SODIUM,TAZOBACTAM SODIUM 3.38 G: 3; .375 INJECTION, POWDER, FOR SOLUTION INTRAVENOUS at 17:45

## 2019-08-02 RX ADMIN — MORPHINE SULFATE 10 MG: 10 SOLUTION ORAL at 02:11

## 2019-08-02 RX ADMIN — MICONAZOLE NITRATE: 20 POWDER TOPICAL at 08:53

## 2019-08-02 RX ADMIN — MORPHINE SULFATE 10 MG: 10 SOLUTION ORAL at 14:03

## 2019-08-02 RX ADMIN — MORPHINE SULFATE 10 MG: 10 SOLUTION ORAL at 18:24

## 2019-08-02 RX ADMIN — LACTULOSE 20 G: 10 SOLUTION ORAL at 21:58

## 2019-08-02 RX ADMIN — IPRATROPIUM BROMIDE AND ALBUTEROL SULFATE 1 AMPULE: .5; 3 SOLUTION RESPIRATORY (INHALATION) at 05:23

## 2019-08-02 RX ADMIN — MIDODRINE HYDROCHLORIDE 5 MG: 5 TABLET ORAL at 17:45

## 2019-08-02 RX ADMIN — ASPIRIN 81 MG: 81 TABLET ORAL at 08:50

## 2019-08-02 RX ADMIN — GUAIFENESIN AND DEXTROMETHORPHAN 5 ML: 100; 10 SYRUP ORAL at 21:58

## 2019-08-02 RX ADMIN — TRAZODONE HYDROCHLORIDE 100 MG: 100 TABLET ORAL at 21:59

## 2019-08-02 RX ADMIN — MORPHINE SULFATE 10 MG: 10 SOLUTION ORAL at 06:47

## 2019-08-02 RX ADMIN — FAMOTIDINE 20 MG: 20 TABLET ORAL at 08:50

## 2019-08-02 RX ADMIN — MICONAZOLE NITRATE: 20 POWDER TOPICAL at 22:23

## 2019-08-02 RX ADMIN — CLONAZEPAM 1 MG: 0.5 TABLET ORAL at 21:59

## 2019-08-02 RX ADMIN — BUSPIRONE HYDROCHLORIDE 10 MG: 10 TABLET ORAL at 21:59

## 2019-08-02 RX ADMIN — Medication 100 MG: at 13:56

## 2019-08-02 RX ADMIN — LACTULOSE 20 G: 10 SOLUTION ORAL at 08:50

## 2019-08-02 RX ADMIN — PIPERACILLIN SODIUM,TAZOBACTAM SODIUM 3.38 G: 3; .375 INJECTION, POWDER, FOR SOLUTION INTRAVENOUS at 01:29

## 2019-08-02 RX ADMIN — Medication 100 MG: at 21:58

## 2019-08-02 RX ADMIN — MICONAZOLE NITRATE: 20 POWDER TOPICAL at 02:10

## 2019-08-02 RX ADMIN — FAMOTIDINE 20 MG: 20 TABLET ORAL at 21:59

## 2019-08-02 RX ADMIN — Medication 10 ML: at 21:58

## 2019-08-02 RX ADMIN — ATORVASTATIN CALCIUM 80 MG: 80 TABLET, FILM COATED ORAL at 21:59

## 2019-08-02 RX ADMIN — PIPERACILLIN SODIUM,TAZOBACTAM SODIUM 3.38 G: 3; .375 INJECTION, POWDER, FOR SOLUTION INTRAVENOUS at 09:45

## 2019-08-02 RX ADMIN — Medication 10 ML: at 01:26

## 2019-08-02 ASSESSMENT — PAIN DESCRIPTION - DESCRIPTORS
DESCRIPTORS: ACHING
DESCRIPTORS: SQUEEZING;THROBBING

## 2019-08-02 ASSESSMENT — PAIN SCALES - GENERAL
PAINLEVEL_OUTOF10: 7
PAINLEVEL_OUTOF10: 10
PAINLEVEL_OUTOF10: 9
PAINLEVEL_OUTOF10: 9
PAINLEVEL_OUTOF10: 10
PAINLEVEL_OUTOF10: 7
PAINLEVEL_OUTOF10: 10
PAINLEVEL_OUTOF10: 9
PAINLEVEL_OUTOF10: 10

## 2019-08-02 ASSESSMENT — PAIN DESCRIPTION - PAIN TYPE
TYPE: CHRONIC PAIN
TYPE: CHRONIC PAIN;NEUROPATHIC PAIN

## 2019-08-02 ASSESSMENT — PAIN DESCRIPTION - LOCATION
LOCATION: ABDOMEN;BACK;ARM
LOCATION: ARM;BACK
LOCATION: GENERALIZED
LOCATION: BACK;HEAD

## 2019-08-02 ASSESSMENT — PAIN DESCRIPTION - PROGRESSION: CLINICAL_PROGRESSION: NOT CHANGED

## 2019-08-02 ASSESSMENT — PAIN DESCRIPTION - FREQUENCY
FREQUENCY: CONTINUOUS

## 2019-08-02 ASSESSMENT — PAIN DESCRIPTION - ORIENTATION: ORIENTATION: LEFT

## 2019-08-02 ASSESSMENT — PAIN DESCRIPTION - ONSET: ONSET: ON-GOING

## 2019-08-02 ASSESSMENT — PAIN - FUNCTIONAL ASSESSMENT: PAIN_FUNCTIONAL_ASSESSMENT: ACTIVITIES ARE NOT PREVENTED

## 2019-08-02 NOTE — PROGRESS NOTES
[address]  Occupational Therapy  Daily Note  Time:   Time In: 1400  Time Out: 1430  Timed Code Treatment Minutes: 30 Minutes  Minutes: 30          Date: 2019  Patient Name: Vanessa Duncan,   Gender: male      Room: [facility]70/070-A  MRN: [id number]  : 1960  (62 y.o.)  Referring Practitioner: Fidel Faustin MD  Diagnosis: Cerebellar infarct  Additional Pertinent Hx: He was admitted 2019 for complaint of injuries after a ground-level fall at home. The patient was reaching over his couch when he lost his balance and fell onto his left side causing him to strike his head without any endorsed loss of consciousness. There was a small superficial contusion of the left frontal region. On initial work-up he was found to have a left ninth rib fracture and also old rib fractures on the right side. CT imaging of the chest showed patchy nodular airspace opacities in the left lung base suggestive of infiltrates with an elevated white count of 15.3 noted. CT imaging of the head did show an acute infarct of the inferior medial aspect of the left cerebellar hemisphere as well as the left side of the medulla. The patient was admitted for further medical care. Note was also made that the patient was disheveled and malodorous and had questionable ability to care for himself. He did undergo a modified barium swallow study on  which showed the patient to have mild oral dysphagia and moderate pharyngeal dysphasia with recommendation at that time for thin and moist purées with thin liquids and strict use of compensatory strategies. He is currently on Zosyn for treating a presumptive aspiration pneumonia versus a hospital-acquired pneumonia. Joey Henriquez Hx of prior CVA in april with chronic pain. The patient was just recently in the emergency department on 2019 for complaint of unsteady gait and nausea and vomiting as well as issues with swallow.   Pertinent medical

## 2019-08-02 NOTE — PROGRESS NOTES
Physical Medicine & Rehabilitation  Progress Note    Chief Complaint:  LEFT cerebellar CVA    Subjective:   His oxygen requirement remains stable at 3 L via nasal cannula. He has been refusing his breathing treatments. The patient requested to discontinue the CPAP as he does not feel that he needs it and states that he used one when he was a \"585 pounds\". No other concerns at this time. Rehabilitation:  Physical Therapy:  OBJECTIVE:  Bed Mobility:  Supine to Sit: Contact Guard Assistance, with head of bed raised, with verbal cues   Scooting: Contact Guard Assistance, with verbal cues      Transfers:  Sit to Stand: Contact Guard Assistance, Minimal Assistance, with verbal cues, to/from chair with arms, occasional posterior lean requiring assist  Stand to [place] [id number], with verbal cues, to/from chair with arms, cues for eccentric control, fair performance     Ambulation:  Minimal Assistance, with cues for safety, with verbal cues   Distance: 12ft, 8ft  Surface: Level Tile  Device:Rolling Walker  Gait Deviations:   Forward Flexed Posture, Decreased Step Length Bilaterally, Decreased Weight Shift Right, Decreased Gait Speed, Decreased Heel Strike Bilaterally, Moderate Path Deviations, Unsteady Gait and Decreased Terminal Knee Extension     Wheelchair Mobility:  Stand By Assistance  Extremities Used: Right Upper Extremity and Left Upper Extremity  Type of Chair:Manual  Surface: Level Tile  Distance: 150ft  Quality: Slow Velocity    FIMS: Bed, Chair, Wheel Chair: 1 - Requires >75% assitance to transfer  Walk: 0 - Activity Not Assessed/Does Not Occur  Wheel Chair: 2 - Maximal Assistance Requires up to Maximal Assistance AND requires assistance of one person to operate wheelchair between  feet  Stairs: 0 - Activity Does not Occur ( 0 only for the admission assessment), PT Equipment Recommendations  Equipment Needed: No(cont to assess, has RW, 4WW, w/c, canes)  Other: will continue to assess,

## 2019-08-02 NOTE — PROGRESS NOTES
Decreased Trunk Rotation, Decreased Gait Speed, Decreased Heel Strike Bilaterally, Wide Base of Support, Moderate Path Deviations and Unsteady Gait    Exercise:  Patient was guided in 1 set(s) 10-20 reps of exercise to both lower extremities. Glut sets, Long arc quads, Hip abduction/adduction, Seated hip flexion, Seated hamstring curls, Seated heel/toe raises and Seated isometric hip adduction. Exercises were completed for increased independence with functional mobility. Balance:  Dynamic Sitting Balance: Contact Guard Assistance   Pt. Sat at EOB while donning pants and waiting while RN administered IV antibiotics prior to transfer. Pt. Slightly unsteady at times but able to correct all LOBs. Functional Outcome Measures: Not completed       ASSESSMENT:  Assessment: Patient progressing toward established goals. Activity Tolerance:  Patient tolerance of  treatment: good. Equipment Recommendations:Equipment Needed: No(cont to assess, has RW, 4WW, w/c, canes)  Other: will continue to assess  Discharge Recommendations: Continue to assess pending progress    Plan: Times per week: 5x/wk 90 min, 1x wk 30 min  Specific instructions for Next Treatment: core and LE strengthening ex, bed mobility, sitting balance . PT to assess transfers and gait when medically approved  Current Treatment Recommendations: Strengthening, Endurance Training, Balance Training, Functional Mobility Training, Safety Education & Training, Equipment Evaluation, Education, & procurement    Patient Education  Patient Education: Plan of Care, [employer] Mobility, Transfers, Gait, Verbal Exercise Instruction    Goals:  Patient goals : Did not state  Short term goals  Time Frame for Short term goals: 1 week  Short term goal 1: Pt to go supine <->sit,  supervision A, to get in/out of bed.    Short term goal 2: sit <> stand with contact guard assist for ease of transfers  Short term goal 3: ambulate 15 feet with use of LRAD and modAx1 to prepare for

## 2019-08-02 NOTE — PROGRESS NOTES
[address] [place] [address]  INPATIENT PHYSICAL THERAPY  DAILY NOTE  [facility] - 7E-70/070-A    Time In: 1430  Time Out: 1500  Timed Code Treatment Minutes: 30 Minutes  Minutes: 30          Date: 2019  Patient Name: Keven Ware,  Gender:  male        MRN: [id number]  : 1960  (62 y.o.)     Referring Practitioner: Dick Joya MD  Diagnosis: cerebellar infarct   Additional Pertinent Hx: Pt admitted through ED due to weakness and pain s/p fall at home. Hx:  CA, CVA. x-ray Lt 9th rib fx. MRI:  remote infarct, restricted diffusion inferior medial aspect LT cerebellar hemisphere and medula. Pt admited to hospital on 2019 and transferred to rehab on . Prior Level of Function:  Lives With: Alone  Type of Home: Apartment  Home Layout: One level  Home Access: Stairs to enter without rails  Entrance Stairs - Number of Steps: 1  Home Equipment: [facility], 4 wheeled walker, Rolling walker, Quad cane(hospital bed with bed rails)    Restrictions/Precautions:  Restrictions/Precautions: Fall Risk, General Precautions(NPO)  Position Activity Restriction  Other position/activity restrictions: PEG 14, oxygen, impulsive, unsteady, left ninth rib fracture and also old rib fractures on the right side    SUBJECTIVE: Pt in therapy gym upon arrival just having finished with OT. Pt pleasant and cooperative. PAIN: 0/10: denies    OBJECTIVE:  Bed Mobility:  Sit to Supine: Stand By Assistance   Scooting: Stand By Assistance    Transfers:  Sit to Stand: Contact Guard Assistance  Stand to [employer] Assistance    Ambulation:  Contact Guard Assistance, Minimal Assistance, with verbal cues   Distance: 25'x1 5'x1  Surface: Level Tile  Device:Rolling Walker  Gait Deviations:   Forward Flexed Posture, Slow Cassandra, Decreased Step Length Bilaterally, Decreased Gait Speed, Decreased Heel Strike Bilaterally and Ataxia, cues to keep AD closer to body    Wheelchair Mobility:  Supervision  Extremities Used: Bilateral Upper Extremities  Type of Chair:Manual  Surface: Level Tile  Distance: 125'x1  Quality: Slow Velocity and Short Strokes    Balance:  Dynamic Standing Balance: Contact Guard Assistance, Minimal Assistance pt reaching in all directions for ring and tossing at target t reaching outside of ADAMS and across midline with both hands. Pt supported with single UE support throughout. 1 LOB forward due to forward lean. Cues to keep AD closer to body    Functional Outcome Measures: Not completed       ASSESSMENT:  Assessment: Patient progressing toward established goals. Activity Tolerance:  Patient tolerance of  treatment: good. Pt tolerated session well. Pt demos decreased strength, balance, endurance, and stability on feet. Pt would benefit from cont skilled PT to imrove mobility at this time. Equipment Recommendations:Equipment Needed: No(cont to assess, has RW, 4WW, w/c, canes)  Other: will continue to assess  Discharge Recommendations:  Discharge Recommendations: Continue to assess pending progress    Plan: Times per week: 5x/wk 90 min, 1x wk 30 min  Specific instructions for Next Treatment: core and LE strengthening ex, bed mobility, sitting balance . PT to assess transfers and gait when medically approved  Current Treatment Recommendations: Strengthening, Endurance Training, Balance Training, Functional Mobility Training, Safety Education & Training, Equipment Evaluation, Education, & procurement    Patient Education  Patient Education: Plan of Care, Transfers, Gait    Goals:  Patient goals : Did not state  Short term goals  Time Frame for Short term goals: 1 week  Short term goal 1: Pt to go supine <->sit,  supervision A, to get in/out of bed.    Short term goal 2: sit <> stand with contact guard assist for ease of transfers  Short term goal 3: ambulate 15 feet with use of LRAD and modAx1 to prepare for in home mobility  Short term goal 4: WC mobility with mod I

## 2019-08-02 NOTE — PROGRESS NOTES
Pt stood for x1 minute x2 trials with Min-CGA before requiring seated rest break, mod dizziness reported. Activity graded to seated position to focus on LUE coordination using 2# wrist weight- min cues to incorporate into task as he attempted to use RUE occasionally. Completed to increase safety with dynamic standing and increase LUE coordination required for showering tasks. Pt participated in IADL task to fold linens of various sizes while standing at RW with 1-2 UE release. Pt required Min-CGA for balance standing x4 minutes before reporting dizziness and requiring lengthy seated rest break. Completed to increase safety and facilitate functional reaching required for IADL tasks. ASSESSMENT:  Activity Tolerance:  Patient tolerance of  treatment: fair. Pt limited by decreased standing balance. Discharge Recommendations: Home with Home health OT, Home with nursing aide  Equipment Recommendations: Equipment Needed: Yes  ADL Assistive Devices: Toileting - Raised Toilet Seat with arms  Plan: Times per week: 5xs week x 90 min, 1 x week x 30 min   Current Treatment Recommendations: Strengthening, Endurance Training, Neuromuscular Re-education, Patient/Caregiver Education & Training, Equipment Evaluation, Education, & procurement, Self-Care / ADL, Balance Training, Functional Mobility Training, Safety Education & Training    Patient Education  Patient Education: Safety with transfers and mobility, dynamic standing balance, LUE coordination. Goals  Short term goals  Time Frame for Short term goals: 1 week  Short term goal 1: Pt will dynamically sit EOB for 5 minutes with Mod I  to improve independence with bathing/dressing tasks EOB. Short term goal 2: Pt will tolerate static standing for 1 minute with CGA with 1 UE release and min cues for midline orientation in prep for washing hands at sink.   Short term goal 3: Pt will complete stand pivot transfers with CGA A x1 and min verbal cues for safety to improve independence with w/c transfers  Short term goal 4: Pt will improve LUE coordination as evidenced by completion of 9 hole peg test in < 4 minutes to improve indep with gathering items. Short term goal 5: Pt will safely navigate RW within room with CGA to improve independence to [facility]. Long term goals  Time Frame for Long term goals : 3 weeks  Long term goal 1: Pt will complete ADL routine with SBA and no cues for safety or adapted technique to increase ability to perform self care tasks  Long term goal 2: Pt will complete light standing IADL task using RW with SBA and no cues for walker safety to improve indep within home. Following session, patient left in safe position with all fall risk precautions in place.

## 2019-08-02 NOTE — PROGRESS NOTES
Nutrition Assessment (Enteral Nutrition)    Type and Reason for Visit: Reassess    Nutrition Recommendations: Continue current TF, free water flushes. Diet vs NPO per SLP. Will provide TF education prior to discharge. Nutrition Assessment:  Pt improving from a nutritional standpoint AEB tolerating TF fairly well at goal.  Remains at risk for further nutritional compromise r/t dysphagia; hx of tonsil cancer, COPD and severe malnutrition. Pt. Reports depressed with current NPO status and looks forward to eating when/if he is allowed. Recommendations as per above. Malnutrition Assessment:  · Malnutrition Status: Meets the criteria for severe malnutrition  · Context: Chronic illness  · Findings of the 6 clinical characteristics of malnutrition (Minimum of 2 out of 6 clinical characteristics is required to make the diagnosis of moderate or severe Protein Calorie Malnutrition based on AND/ASPEN Guidelines):  1. Energy Intake-Less than or equal to 75% of estimated energy requirement, Greater than or equal to 1 month    2. Weight Loss-20% loss or greater, (7 months)    Nutrition Risk Level: High    Nutrition Needs:  · Estimated Daily Total Kcal: 5578-0337 (25-30 kcals/kg current weight of 81 kg on 7/18/19)  · Estimated Daily Protein (g): ~97 (~1.2 grams/kg current weight of 81 kg on 7/18/19)  · Estimated Daily Fluid (ml/day): 2578-3423 (25-30 ml/kr current weight of 81 kg on 7/18/19)    Nutrition Diagnosis:   · Problem: Severe malnutrition, In context of chronic illness  · Etiology: related to Catabolic illness, Alteration in GI function, Insufficient energy/nutrient consumption    Signs and symptoms:  as evidenced by Diet history of poor intake, Weight loss    Objective Information:  · Nutrition-Focused Physical Findings: SLP following - rec NPO: 1 BM noted past 24 hours;  Rx includes Lactulose, Senokot and Colace, Vitamin D; RN reports no abdominal distention; minimal residuals; pt c/o

## 2019-08-02 NOTE — PROGRESS NOTES
importance of being diligent with working with speech therapy in order to make gains with swallow function. Patient reporting feeling discouraged regarding lack of progress with swallow function. Educated patient on need to be able to tolerate intensive therapeutic intervention and diligent completion of HEP to make functional gains. Discussed need for BID treatment and active participation throughout the session. Patient reported understanding and verbalized that he will improve active participation in future sessions. SHORT TERM GOAL #2:  Goal 2: Pt will exhibit return demonstration on need for completion of comprehensive oral care with 90% accuracy given no cues to reduce potential bacteria colonization and to maintain an improved oral cavity appearance. INTERVENTIONS: Discussed management of oral care when patient returns home. Reviewed importance of continuing to utilize a mouthwash produce containing a peroxide solution at least twice daily and to ensure completion of oral cleansing every 2 hours. SHORT TERM GOAL #3:  Goal 3:Pt will complete immediate, delayed, and working memory tasks with 80% accuracy given mod cues to improve retention of pertinent information. INTERVENTIONS: Did not address due to focus on other goals. SHORT TERM GOAL #4:  Goal 4:   Pt will complete problem solving, verbal/visual reasoning (time, money/math/finances/medications), and thought organization tasks with 80% accuracy given min cues to improve independence with IADL completion. INTERVENTIONS: Did not address due to focus on other goals. SHORT TERM GOAL #5:  Goal 5:  Pt will complete complex attention tasks with no more than 2 errors within a 5-7 minutes time span to improve higher level attention. INTERVENTIONS: Did not address due to focus on other goals.         Long-term Goals  Timeframe for Long-term Goals: 4 weeks  Goal 1: Pt will safely tolerate puree diet with thin liquid without difficulty or overt s/s of aspiration. Goal 2: Pt will improve cognitive functioning to a superivision/mod I level for safe and functional return to home environment. COMMUNICATION  Comprehension: 5 - Patient understands basic needs (hungry/hot/pain)  Expression: 5 - Expresses basic ideas/needs only (hungry/hot/pain)  SOCIAL COGNITION  Social Interaction: 5 - Patient is appropriate with supervision/cues  Problem Solvin - Patient solves simple/routine tasks 75-90%+   Memory: 4 - Patient remembers 75-90%+ of the time          ASSESSMENT:  Assessment: [x]Progressing towards goals          []Not Progressing towards goals      Patient Tolerance of Treatment:   []Tolerated well [x]Tolerated fair []Required rest breaks [x]Fatigued  Plan for Next Session:  pharyngeal exercises, oral care, complex attention  Education:  Learner:  [x]Patient          []Significant Other          []Other: Benny Lenz  Education provided regarding:  [x]Goals and POC   [x]Diet and swallowing precautions    [x]Home Exercise Program  [x]Progress and/or discharge information  Method of Education:  [x]Discussion          [x]Demonstration          []Handout          []Other  Evaluation of Education:   [x]Verbalized understanding   [x]Demonstrates without assistance  []Demonstrates with assistance  [x]Needs further instruction     []No evidence of learning                  [x]No family present      Plan: [x]Continue with current plan of care    []Modify current plan of care as follows:    []Discharge patient    Discharge Location:    Services/Supervision Recommended:     [x]Patient continues to require treatment by a licensed therapist to address functional deficits as outlined in the established plan of care. Myriam Krishna.  [address] [facility], [facility] [address], [address]

## 2019-08-03 LAB — GLUCOSE BLD-MCNC: 72 MG/DL (ref 70–108)

## 2019-08-03 PROCEDURE — 97110 THERAPEUTIC EXERCISES: CPT

## 2019-08-03 PROCEDURE — 2709999900 HC NON-CHARGEABLE SUPPLY

## 2019-08-03 PROCEDURE — 82948 REAGENT STRIP/BLOOD GLUCOSE: CPT

## 2019-08-03 PROCEDURE — 6370000000 HC RX 637 (ALT 250 FOR IP): Performed by: STUDENT IN AN ORGANIZED HEALTH CARE EDUCATION/TRAINING PROGRAM

## 2019-08-03 PROCEDURE — 94668 MNPJ CHEST WALL SBSQ: CPT

## 2019-08-03 PROCEDURE — 2580000003 HC RX 258: Performed by: PHYSICAL MEDICINE & REHABILITATION

## 2019-08-03 PROCEDURE — 6370000000 HC RX 637 (ALT 250 FOR IP): Performed by: PHYSICAL MEDICINE & REHABILITATION

## 2019-08-03 PROCEDURE — 2700000000 HC OXYGEN THERAPY PER DAY

## 2019-08-03 PROCEDURE — 94640 AIRWAY INHALATION TREATMENT: CPT

## 2019-08-03 PROCEDURE — 1180000000 HC REHAB R&B

## 2019-08-03 PROCEDURE — 94669 MECHANICAL CHEST WALL OSCILL: CPT

## 2019-08-03 PROCEDURE — 97530 THERAPEUTIC ACTIVITIES: CPT

## 2019-08-03 PROCEDURE — 6370000000 HC RX 637 (ALT 250 FOR IP): Performed by: INTERNAL MEDICINE

## 2019-08-03 PROCEDURE — 97116 GAIT TRAINING THERAPY: CPT

## 2019-08-03 PROCEDURE — 6360000002 HC RX W HCPCS: Performed by: PHYSICAL MEDICINE & REHABILITATION

## 2019-08-03 PROCEDURE — 97535 SELF CARE MNGMENT TRAINING: CPT

## 2019-08-03 RX ADMIN — Medication 100 MG: at 21:32

## 2019-08-03 RX ADMIN — Medication 10 ML: at 16:24

## 2019-08-03 RX ADMIN — IPRATROPIUM BROMIDE AND ALBUTEROL SULFATE 1 AMPULE: .5; 3 SOLUTION RESPIRATORY (INHALATION) at 10:11

## 2019-08-03 RX ADMIN — ASPIRIN 81 MG: 81 TABLET ORAL at 08:27

## 2019-08-03 RX ADMIN — MIDODRINE HYDROCHLORIDE 5 MG: 5 TABLET ORAL at 16:22

## 2019-08-03 RX ADMIN — SERTRALINE 200 MG: 100 TABLET, FILM COATED ORAL at 08:27

## 2019-08-03 RX ADMIN — PIPERACILLIN SODIUM,TAZOBACTAM SODIUM 3.38 G: 3; .375 INJECTION, POWDER, FOR SOLUTION INTRAVENOUS at 16:24

## 2019-08-03 RX ADMIN — FAMOTIDINE 20 MG: 20 TABLET ORAL at 08:27

## 2019-08-03 RX ADMIN — IPRATROPIUM BROMIDE AND ALBUTEROL SULFATE 1 AMPULE: .5; 3 SOLUTION RESPIRATORY (INHALATION) at 17:10

## 2019-08-03 RX ADMIN — HYDROXYZINE HYDROCHLORIDE 50 MG: 25 TABLET, FILM COATED ORAL at 01:37

## 2019-08-03 RX ADMIN — MIDODRINE HYDROCHLORIDE 5 MG: 5 TABLET ORAL at 12:42

## 2019-08-03 RX ADMIN — MICONAZOLE NITRATE: 20 POWDER TOPICAL at 21:32

## 2019-08-03 RX ADMIN — MORPHINE SULFATE 10 MG: 10 SOLUTION ORAL at 01:37

## 2019-08-03 RX ADMIN — LACTULOSE 20 G: 10 SOLUTION ORAL at 08:28

## 2019-08-03 RX ADMIN — FAMOTIDINE 20 MG: 20 TABLET ORAL at 21:31

## 2019-08-03 RX ADMIN — MORPHINE SULFATE 10 MG: 10 SOLUTION ORAL at 12:43

## 2019-08-03 RX ADMIN — MICONAZOLE NITRATE: 20 POWDER TOPICAL at 08:41

## 2019-08-03 RX ADMIN — TRAZODONE HYDROCHLORIDE 100 MG: 100 TABLET ORAL at 21:31

## 2019-08-03 RX ADMIN — PIPERACILLIN SODIUM,TAZOBACTAM SODIUM 3.38 G: 3; .375 INJECTION, POWDER, FOR SOLUTION INTRAVENOUS at 08:46

## 2019-08-03 RX ADMIN — ATORVASTATIN CALCIUM 80 MG: 80 TABLET, FILM COATED ORAL at 21:31

## 2019-08-03 RX ADMIN — Medication 10 ML: at 01:28

## 2019-08-03 RX ADMIN — CLONAZEPAM 1 MG: 0.5 TABLET ORAL at 21:31

## 2019-08-03 RX ADMIN — Medication 10 ML: at 22:04

## 2019-08-03 RX ADMIN — MORPHINE SULFATE 10 MG: 10 SOLUTION ORAL at 08:28

## 2019-08-03 RX ADMIN — IPRATROPIUM BROMIDE AND ALBUTEROL SULFATE 1 AMPULE: .5; 3 SOLUTION RESPIRATORY (INHALATION) at 05:42

## 2019-08-03 RX ADMIN — GUAIFENESIN AND DEXTROMETHORPHAN 5 ML: 100; 10 SYRUP ORAL at 08:38

## 2019-08-03 RX ADMIN — BUSPIRONE HYDROCHLORIDE 10 MG: 10 TABLET ORAL at 21:31

## 2019-08-03 RX ADMIN — MORPHINE SULFATE 10 MG: 10 SOLUTION ORAL at 18:07

## 2019-08-03 RX ADMIN — ERGOCALCIFEROL 50000 UNITS: 1.25 CAPSULE ORAL at 08:27

## 2019-08-03 RX ADMIN — Medication 10 ML: at 08:52

## 2019-08-03 RX ADMIN — LACTULOSE 20 G: 10 SOLUTION ORAL at 21:31

## 2019-08-03 RX ADMIN — Medication 100 MG: at 08:38

## 2019-08-03 RX ADMIN — MIDODRINE HYDROCHLORIDE 5 MG: 5 TABLET ORAL at 08:27

## 2019-08-03 RX ADMIN — PIPERACILLIN SODIUM,TAZOBACTAM SODIUM 3.38 G: 3; .375 INJECTION, POWDER, FOR SOLUTION INTRAVENOUS at 01:26

## 2019-08-03 RX ADMIN — SENNOSIDES 17.2 MG: 8.6 TABLET, FILM COATED ORAL at 21:31

## 2019-08-03 RX ADMIN — IPRATROPIUM BROMIDE AND ALBUTEROL SULFATE 1 AMPULE: .5; 3 SOLUTION RESPIRATORY (INHALATION) at 14:05

## 2019-08-03 RX ADMIN — BUTALBITAL, ACETAMINOPHEN, AND CAFFEINE 1 TABLET: 50; 325; 40 TABLET ORAL at 16:22

## 2019-08-03 RX ADMIN — HEPARIN 500 UNITS: 100 SYRINGE at 12:42

## 2019-08-03 RX ADMIN — Medication 10 ML: at 12:43

## 2019-08-03 ASSESSMENT — PAIN DESCRIPTION - ONSET
ONSET: ON-GOING
ONSET: ON-GOING

## 2019-08-03 ASSESSMENT — PAIN DESCRIPTION - DESCRIPTORS
DESCRIPTORS: ACHING
DESCRIPTORS: THROBBING;ACHING
DESCRIPTORS: ACHING

## 2019-08-03 ASSESSMENT — PAIN SCALES - GENERAL
PAINLEVEL_OUTOF10: 8
PAINLEVEL_OUTOF10: 8
PAINLEVEL_OUTOF10: 7
PAINLEVEL_OUTOF10: 10
PAINLEVEL_OUTOF10: 10
PAINLEVEL_OUTOF10: 9
PAINLEVEL_OUTOF10: 10
PAINLEVEL_OUTOF10: 9
PAINLEVEL_OUTOF10: 7
PAINLEVEL_OUTOF10: 10

## 2019-08-03 ASSESSMENT — PAIN DESCRIPTION - PROGRESSION
CLINICAL_PROGRESSION: NOT CHANGED
CLINICAL_PROGRESSION: NOT CHANGED

## 2019-08-03 ASSESSMENT — PAIN DESCRIPTION - LOCATION
LOCATION: HEAD
LOCATION: BACK;LEG
LOCATION: OTHER (COMMENT)

## 2019-08-03 ASSESSMENT — PAIN DESCRIPTION - PAIN TYPE
TYPE: ACUTE PAIN;CHRONIC PAIN
TYPE: CHRONIC PAIN
TYPE: CHRONIC PAIN;ACUTE PAIN

## 2019-08-03 ASSESSMENT — PAIN DESCRIPTION - ORIENTATION: ORIENTATION: LOWER

## 2019-08-03 ASSESSMENT — PAIN DESCRIPTION - DIRECTION: RADIATING_TOWARDS: HEADACHE

## 2019-08-03 ASSESSMENT — PAIN DESCRIPTION - FREQUENCY
FREQUENCY: CONTINUOUS
FREQUENCY: CONTINUOUS

## 2019-08-03 ASSESSMENT — PAIN - FUNCTIONAL ASSESSMENT: PAIN_FUNCTIONAL_ASSESSMENT: ACTIVITIES ARE NOT PREVENTED

## 2019-08-04 LAB — GLUCOSE BLD-MCNC: 85 MG/DL (ref 70–108)

## 2019-08-04 PROCEDURE — 6370000000 HC RX 637 (ALT 250 FOR IP): Performed by: INTERNAL MEDICINE

## 2019-08-04 PROCEDURE — 94668 MNPJ CHEST WALL SBSQ: CPT

## 2019-08-04 PROCEDURE — 82948 REAGENT STRIP/BLOOD GLUCOSE: CPT

## 2019-08-04 PROCEDURE — 1180000000 HC REHAB R&B

## 2019-08-04 PROCEDURE — 6370000000 HC RX 637 (ALT 250 FOR IP): Performed by: PHYSICAL MEDICINE & REHABILITATION

## 2019-08-04 PROCEDURE — 6360000002 HC RX W HCPCS: Performed by: PHYSICAL MEDICINE & REHABILITATION

## 2019-08-04 PROCEDURE — 94640 AIRWAY INHALATION TREATMENT: CPT

## 2019-08-04 PROCEDURE — 2580000003 HC RX 258: Performed by: PHYSICAL MEDICINE & REHABILITATION

## 2019-08-04 PROCEDURE — 2700000000 HC OXYGEN THERAPY PER DAY

## 2019-08-04 PROCEDURE — 6370000000 HC RX 637 (ALT 250 FOR IP): Performed by: STUDENT IN AN ORGANIZED HEALTH CARE EDUCATION/TRAINING PROGRAM

## 2019-08-04 PROCEDURE — 2709999900 HC NON-CHARGEABLE SUPPLY

## 2019-08-04 PROCEDURE — 94761 N-INVAS EAR/PLS OXIMETRY MLT: CPT

## 2019-08-04 PROCEDURE — 94669 MECHANICAL CHEST WALL OSCILL: CPT

## 2019-08-04 RX ORDER — MIDODRINE HYDROCHLORIDE 10 MG/1
10 TABLET ORAL
Status: DISCONTINUED | OUTPATIENT
Start: 2019-08-05 | End: 2019-08-11

## 2019-08-04 RX ADMIN — ACETAMINOPHEN 650 MG: 650 SOLUTION ORAL at 23:50

## 2019-08-04 RX ADMIN — ASPIRIN 81 MG: 81 TABLET ORAL at 07:37

## 2019-08-04 RX ADMIN — IPRATROPIUM BROMIDE AND ALBUTEROL SULFATE 1 AMPULE: .5; 3 SOLUTION RESPIRATORY (INHALATION) at 05:27

## 2019-08-04 RX ADMIN — LACTULOSE 20 G: 10 SOLUTION ORAL at 20:06

## 2019-08-04 RX ADMIN — Medication 10 ML: at 12:44

## 2019-08-04 RX ADMIN — HEPARIN 500 UNITS: 100 SYRINGE at 04:15

## 2019-08-04 RX ADMIN — ACETAMINOPHEN 650 MG: 650 SOLUTION ORAL at 07:38

## 2019-08-04 RX ADMIN — HYDROXYZINE HYDROCHLORIDE 50 MG: 25 TABLET, FILM COATED ORAL at 23:50

## 2019-08-04 RX ADMIN — LACTULOSE 20 G: 10 SOLUTION ORAL at 07:38

## 2019-08-04 RX ADMIN — Medication 100 MG: at 20:07

## 2019-08-04 RX ADMIN — IPRATROPIUM BROMIDE AND ALBUTEROL SULFATE 1 AMPULE: .5; 3 SOLUTION RESPIRATORY (INHALATION) at 10:07

## 2019-08-04 RX ADMIN — MICONAZOLE NITRATE: 20 POWDER TOPICAL at 07:38

## 2019-08-04 RX ADMIN — PIPERACILLIN SODIUM,TAZOBACTAM SODIUM 3.38 G: 3; .375 INJECTION, POWDER, FOR SOLUTION INTRAVENOUS at 09:54

## 2019-08-04 RX ADMIN — TRAZODONE HYDROCHLORIDE 100 MG: 100 TABLET ORAL at 20:07

## 2019-08-04 RX ADMIN — HEPARIN 500 UNITS: 100 SYRINGE at 12:44

## 2019-08-04 RX ADMIN — IPRATROPIUM BROMIDE AND ALBUTEROL SULFATE 1 AMPULE: .5; 3 SOLUTION RESPIRATORY (INHALATION) at 16:40

## 2019-08-04 RX ADMIN — SERTRALINE 200 MG: 100 TABLET, FILM COATED ORAL at 07:38

## 2019-08-04 RX ADMIN — MORPHINE SULFATE 10 MG: 10 SOLUTION ORAL at 17:14

## 2019-08-04 RX ADMIN — Medication 10 ML: at 20:34

## 2019-08-04 RX ADMIN — BUTALBITAL, ACETAMINOPHEN, AND CAFFEINE 1 TABLET: 50; 325; 40 TABLET ORAL at 05:01

## 2019-08-04 RX ADMIN — MIDODRINE HYDROCHLORIDE 5 MG: 5 TABLET ORAL at 12:30

## 2019-08-04 RX ADMIN — MORPHINE SULFATE 10 MG: 10 SOLUTION ORAL at 05:54

## 2019-08-04 RX ADMIN — MORPHINE SULFATE 10 MG: 10 SOLUTION ORAL at 21:45

## 2019-08-04 RX ADMIN — FAMOTIDINE 20 MG: 20 TABLET ORAL at 07:37

## 2019-08-04 RX ADMIN — CLONAZEPAM 1 MG: 0.5 TABLET ORAL at 21:45

## 2019-08-04 RX ADMIN — PIPERACILLIN SODIUM,TAZOBACTAM SODIUM 3.38 G: 3; .375 INJECTION, POWDER, FOR SOLUTION INTRAVENOUS at 17:14

## 2019-08-04 RX ADMIN — Medication 10 ML: at 09:54

## 2019-08-04 RX ADMIN — MIDODRINE HYDROCHLORIDE 5 MG: 5 TABLET ORAL at 07:38

## 2019-08-04 RX ADMIN — BUTALBITAL, ACETAMINOPHEN, AND CAFFEINE 1 TABLET: 50; 325; 40 TABLET ORAL at 12:30

## 2019-08-04 RX ADMIN — ATORVASTATIN CALCIUM 80 MG: 80 TABLET, FILM COATED ORAL at 20:07

## 2019-08-04 RX ADMIN — SENNOSIDES 17.2 MG: 8.6 TABLET, FILM COATED ORAL at 20:07

## 2019-08-04 RX ADMIN — MIDODRINE HYDROCHLORIDE 5 MG: 5 TABLET ORAL at 17:14

## 2019-08-04 RX ADMIN — IPRATROPIUM BROMIDE AND ALBUTEROL SULFATE 1 AMPULE: .5; 3 SOLUTION RESPIRATORY (INHALATION) at 13:42

## 2019-08-04 RX ADMIN — MORPHINE SULFATE 10 MG: 10 SOLUTION ORAL at 01:40

## 2019-08-04 RX ADMIN — GUAIFENESIN AND DEXTROMETHORPHAN 5 ML: 100; 10 SYRUP ORAL at 07:38

## 2019-08-04 RX ADMIN — BUSPIRONE HYDROCHLORIDE 10 MG: 10 TABLET ORAL at 20:07

## 2019-08-04 RX ADMIN — PIPERACILLIN SODIUM,TAZOBACTAM SODIUM 3.38 G: 3; .375 INJECTION, POWDER, FOR SOLUTION INTRAVENOUS at 00:57

## 2019-08-04 RX ADMIN — MICONAZOLE NITRATE: 20 POWDER TOPICAL at 20:07

## 2019-08-04 RX ADMIN — FAMOTIDINE 20 MG: 20 TABLET ORAL at 20:07

## 2019-08-04 RX ADMIN — MORPHINE SULFATE 10 MG: 10 SOLUTION ORAL at 09:55

## 2019-08-04 ASSESSMENT — PAIN DESCRIPTION - ORIENTATION: ORIENTATION: UPPER;LOWER

## 2019-08-04 ASSESSMENT — PAIN SCALES - GENERAL
PAINLEVEL_OUTOF10: 10
PAINLEVEL_OUTOF10: 5
PAINLEVEL_OUTOF10: 10
PAINLEVEL_OUTOF10: 9
PAINLEVEL_OUTOF10: 10
PAINLEVEL_OUTOF10: 8
PAINLEVEL_OUTOF10: 10
PAINLEVEL_OUTOF10: 9
PAINLEVEL_OUTOF10: 8

## 2019-08-04 ASSESSMENT — PAIN DESCRIPTION - LOCATION
LOCATION: NECK;BACK
LOCATION: OTHER (COMMENT)

## 2019-08-04 ASSESSMENT — PAIN DESCRIPTION - DESCRIPTORS
DESCRIPTORS: ACHING
DESCRIPTORS: ACHING

## 2019-08-04 ASSESSMENT — PAIN DESCRIPTION - PROGRESSION
CLINICAL_PROGRESSION: NOT CHANGED
CLINICAL_PROGRESSION: NOT CHANGED

## 2019-08-04 ASSESSMENT — PAIN DESCRIPTION - PAIN TYPE
TYPE: ACUTE PAIN;CHRONIC PAIN
TYPE: ACUTE PAIN;CHRONIC PAIN

## 2019-08-04 ASSESSMENT — PAIN DESCRIPTION - FREQUENCY
FREQUENCY: CONTINUOUS
FREQUENCY: CONTINUOUS

## 2019-08-04 ASSESSMENT — PAIN - FUNCTIONAL ASSESSMENT
PAIN_FUNCTIONAL_ASSESSMENT: ACTIVITIES ARE NOT PREVENTED
PAIN_FUNCTIONAL_ASSESSMENT: ACTIVITIES ARE NOT PREVENTED

## 2019-08-04 ASSESSMENT — PAIN DESCRIPTION - ONSET
ONSET: ON-GOING
ONSET: ON-GOING

## 2019-08-05 ENCOUNTER — APPOINTMENT (OUTPATIENT)
Dept: GENERAL RADIOLOGY | Age: 59
DRG: 056 | End: 2019-08-05
Attending: PHYSICAL MEDICINE & REHABILITATION
Payer: MEDICARE

## 2019-08-05 LAB
ALBUMIN SERPL-MCNC: 2.6 G/DL (ref 3.5–5.1)
ALP BLD-CCNC: 94 U/L (ref 38–126)
ALT SERPL-CCNC: 8 U/L (ref 11–66)
ANION GAP SERPL CALCULATED.3IONS-SCNC: 10 MEQ/L (ref 8–16)
AST SERPL-CCNC: 13 U/L (ref 5–40)
BILIRUB SERPL-MCNC: < 0.2 MG/DL (ref 0.3–1.2)
BUN BLDV-MCNC: 25 MG/DL (ref 7–22)
CALCIUM SERPL-MCNC: 8.6 MG/DL (ref 8.5–10.5)
CHLORIDE BLD-SCNC: 99 MEQ/L (ref 98–111)
CO2: 34 MEQ/L (ref 23–33)
CREAT SERPL-MCNC: 0.7 MG/DL (ref 0.4–1.2)
ERYTHROCYTE [DISTWIDTH] IN BLOOD BY AUTOMATED COUNT: 13.5 % (ref 11.5–14.5)
ERYTHROCYTE [DISTWIDTH] IN BLOOD BY AUTOMATED COUNT: 47.6 FL (ref 35–45)
GFR SERPL CREATININE-BSD FRML MDRD: > 90 ML/MIN/1.73M2
GLUCOSE BLD-MCNC: 86 MG/DL (ref 70–108)
GLUCOSE BLD-MCNC: 89 MG/DL (ref 70–108)
HCT VFR BLD CALC: 31.8 % (ref 42–52)
HEMOGLOBIN: 10.1 GM/DL (ref 14–18)
MCH RBC QN AUTO: 30.4 PG (ref 26–33)
MCHC RBC AUTO-ENTMCNC: 31.8 GM/DL (ref 32.2–35.5)
MCV RBC AUTO: 95.8 FL (ref 80–94)
PLATELET # BLD: 243 THOU/MM3 (ref 130–400)
PMV BLD AUTO: 11.5 FL (ref 9.4–12.4)
POTASSIUM SERPL-SCNC: 4 MEQ/L (ref 3.5–5.2)
RBC # BLD: 3.32 MILL/MM3 (ref 4.7–6.1)
SODIUM BLD-SCNC: 143 MEQ/L (ref 135–145)
TOTAL PROTEIN: 5.5 G/DL (ref 6.1–8)
VITAMIN D 25-HYDROXY: 19 NG/ML (ref 30–100)
WBC # BLD: 7.2 THOU/MM3 (ref 4.8–10.8)

## 2019-08-05 PROCEDURE — APPSS30 APP SPLIT SHARED TIME 16-30 MINUTES: Performed by: NURSE PRACTITIONER

## 2019-08-05 PROCEDURE — 97535 SELF CARE MNGMENT TRAINING: CPT

## 2019-08-05 PROCEDURE — 94668 MNPJ CHEST WALL SBSQ: CPT

## 2019-08-05 PROCEDURE — 6370000000 HC RX 637 (ALT 250 FOR IP): Performed by: STUDENT IN AN ORGANIZED HEALTH CARE EDUCATION/TRAINING PROGRAM

## 2019-08-05 PROCEDURE — 82306 VITAMIN D 25 HYDROXY: CPT

## 2019-08-05 PROCEDURE — 97530 THERAPEUTIC ACTIVITIES: CPT

## 2019-08-05 PROCEDURE — 36415 COLL VENOUS BLD VENIPUNCTURE: CPT

## 2019-08-05 PROCEDURE — 1180000000 HC REHAB R&B

## 2019-08-05 PROCEDURE — 85027 COMPLETE CBC AUTOMATED: CPT

## 2019-08-05 PROCEDURE — 94669 MECHANICAL CHEST WALL OSCILL: CPT

## 2019-08-05 PROCEDURE — 6370000000 HC RX 637 (ALT 250 FOR IP): Performed by: PHYSICAL MEDICINE & REHABILITATION

## 2019-08-05 PROCEDURE — 97127 HC SP THER IVNTJ W/FOCUS COG FUNCJ: CPT | Performed by: SPEECH-LANGUAGE PATHOLOGIST

## 2019-08-05 PROCEDURE — 80053 COMPREHEN METABOLIC PANEL: CPT

## 2019-08-05 PROCEDURE — 97116 GAIT TRAINING THERAPY: CPT

## 2019-08-05 PROCEDURE — 97542 WHEELCHAIR MNGMENT TRAINING: CPT

## 2019-08-05 PROCEDURE — 2580000003 HC RX 258: Performed by: PHYSICAL MEDICINE & REHABILITATION

## 2019-08-05 PROCEDURE — 97110 THERAPEUTIC EXERCISES: CPT

## 2019-08-05 PROCEDURE — 71046 X-RAY EXAM CHEST 2 VIEWS: CPT

## 2019-08-05 PROCEDURE — 92526 ORAL FUNCTION THERAPY: CPT | Performed by: SPEECH-LANGUAGE PATHOLOGIST

## 2019-08-05 PROCEDURE — 2700000000 HC OXYGEN THERAPY PER DAY

## 2019-08-05 PROCEDURE — 6360000002 HC RX W HCPCS: Performed by: PHYSICAL MEDICINE & REHABILITATION

## 2019-08-05 PROCEDURE — 6370000000 HC RX 637 (ALT 250 FOR IP): Performed by: INTERNAL MEDICINE

## 2019-08-05 PROCEDURE — 82948 REAGENT STRIP/BLOOD GLUCOSE: CPT

## 2019-08-05 PROCEDURE — 2709999900 HC NON-CHARGEABLE SUPPLY

## 2019-08-05 RX ORDER — LACTOBACILLUS RHAMNOSUS GG 10B CELL
1 CAPSULE ORAL 2 TIMES DAILY WITH MEALS
Status: DISCONTINUED | OUTPATIENT
Start: 2019-08-06 | End: 2019-08-08

## 2019-08-05 RX ADMIN — MICONAZOLE NITRATE: 20 POWDER TOPICAL at 08:46

## 2019-08-05 RX ADMIN — MORPHINE SULFATE 10 MG: 10 SOLUTION ORAL at 13:41

## 2019-08-05 RX ADMIN — ASPIRIN 81 MG: 81 TABLET ORAL at 08:46

## 2019-08-05 RX ADMIN — IPRATROPIUM BROMIDE AND ALBUTEROL SULFATE 1 AMPULE: .5; 3 SOLUTION RESPIRATORY (INHALATION) at 16:49

## 2019-08-05 RX ADMIN — IPRATROPIUM BROMIDE AND ALBUTEROL SULFATE 1 AMPULE: .5; 3 SOLUTION RESPIRATORY (INHALATION) at 10:36

## 2019-08-05 RX ADMIN — MORPHINE SULFATE 10 MG: 10 SOLUTION ORAL at 17:44

## 2019-08-05 RX ADMIN — Medication 100 MG: at 08:45

## 2019-08-05 RX ADMIN — IPRATROPIUM BROMIDE AND ALBUTEROL SULFATE 1 AMPULE: .5; 3 SOLUTION RESPIRATORY (INHALATION) at 05:53

## 2019-08-05 RX ADMIN — RIVAROXABAN 20 MG: 20 TABLET, FILM COATED ORAL at 17:44

## 2019-08-05 RX ADMIN — MORPHINE SULFATE 10 MG: 10 SOLUTION ORAL at 06:18

## 2019-08-05 RX ADMIN — MICONAZOLE NITRATE: 20 POWDER TOPICAL at 21:33

## 2019-08-05 RX ADMIN — MIDODRINE HYDROCHLORIDE 10 MG: 10 TABLET ORAL at 17:44

## 2019-08-05 RX ADMIN — FAMOTIDINE 20 MG: 20 TABLET ORAL at 21:32

## 2019-08-05 RX ADMIN — Medication 10 ML: at 10:36

## 2019-08-05 RX ADMIN — Medication 10 ML: at 21:49

## 2019-08-05 RX ADMIN — TRAZODONE HYDROCHLORIDE 100 MG: 100 TABLET ORAL at 21:32

## 2019-08-05 RX ADMIN — MIDODRINE HYDROCHLORIDE 10 MG: 10 TABLET ORAL at 08:45

## 2019-08-05 RX ADMIN — PIPERACILLIN SODIUM,TAZOBACTAM SODIUM 3.38 G: 3; .375 INJECTION, POWDER, FOR SOLUTION INTRAVENOUS at 00:58

## 2019-08-05 RX ADMIN — SERTRALINE 200 MG: 100 TABLET, FILM COATED ORAL at 08:46

## 2019-08-05 RX ADMIN — BUTALBITAL, ACETAMINOPHEN, AND CAFFEINE 1 TABLET: 50; 325; 40 TABLET ORAL at 08:45

## 2019-08-05 RX ADMIN — MORPHINE SULFATE 10 MG: 10 SOLUTION ORAL at 22:54

## 2019-08-05 RX ADMIN — HEPARIN 500 UNITS: 100 SYRINGE at 10:36

## 2019-08-05 RX ADMIN — LACTULOSE 20 G: 10 SOLUTION ORAL at 08:46

## 2019-08-05 RX ADMIN — MIDODRINE HYDROCHLORIDE 10 MG: 10 TABLET ORAL at 13:41

## 2019-08-05 RX ADMIN — IPRATROPIUM BROMIDE AND ALBUTEROL SULFATE 1 AMPULE: .5; 3 SOLUTION RESPIRATORY (INHALATION) at 13:44

## 2019-08-05 RX ADMIN — MORPHINE SULFATE 10 MG: 10 SOLUTION ORAL at 01:56

## 2019-08-05 RX ADMIN — BUSPIRONE HYDROCHLORIDE 10 MG: 10 TABLET ORAL at 21:32

## 2019-08-05 RX ADMIN — FAMOTIDINE 20 MG: 20 TABLET ORAL at 08:46

## 2019-08-05 RX ADMIN — BUTALBITAL, ACETAMINOPHEN, AND CAFFEINE 1 TABLET: 50; 325; 40 TABLET ORAL at 21:32

## 2019-08-05 RX ADMIN — ATORVASTATIN CALCIUM 80 MG: 80 TABLET, FILM COATED ORAL at 21:32

## 2019-08-05 RX ADMIN — CLONAZEPAM 1 MG: 0.5 TABLET ORAL at 21:32

## 2019-08-05 ASSESSMENT — PAIN SCALES - GENERAL
PAINLEVEL_OUTOF10: 8
PAINLEVEL_OUTOF10: 10
PAINLEVEL_OUTOF10: 8
PAINLEVEL_OUTOF10: 8
PAINLEVEL_OUTOF10: 10
PAINLEVEL_OUTOF10: 10
PAINLEVEL_OUTOF10: 8

## 2019-08-05 ASSESSMENT — PAIN - FUNCTIONAL ASSESSMENT: PAIN_FUNCTIONAL_ASSESSMENT: ACTIVITIES ARE NOT PREVENTED

## 2019-08-05 ASSESSMENT — PAIN DESCRIPTION - FREQUENCY: FREQUENCY: CONTINUOUS

## 2019-08-05 ASSESSMENT — PAIN DESCRIPTION - ORIENTATION: ORIENTATION: LOWER;UPPER

## 2019-08-05 ASSESSMENT — PAIN DESCRIPTION - PAIN TYPE: TYPE: ACUTE PAIN;CHRONIC PAIN

## 2019-08-05 ASSESSMENT — PAIN DESCRIPTION - PROGRESSION: CLINICAL_PROGRESSION: NOT CHANGED

## 2019-08-05 ASSESSMENT — PAIN DESCRIPTION - ONSET: ONSET: ON-GOING

## 2019-08-05 ASSESSMENT — PAIN DESCRIPTION - DESCRIPTORS: DESCRIPTORS: ACHING

## 2019-08-05 ASSESSMENT — PAIN DESCRIPTION - LOCATION: LOCATION: GENERALIZED

## 2019-08-05 NOTE — PROGRESS NOTES
time  Expression: 5 - Expresses basic ideas/needs only (hungry/hot/pain)  SOCIAL COGNITION  Social Interaction: 6 - Patient requires medication for mood and/or effect  Problem Solvin - Patient solves simple/routine tasks 75-90%+   Memory: 3 - Patient remembers 50%-74% of the time          ASSESSMENT:  Assessment: [x]Progressing towards goals          []Not Progressing towards goals      Patient Tolerance of Treatment:   [x]Tolerated well []Tolerated fair []Required rest breaks []Fatigued  Plan for Next Session:  pharyngeal exercises,  complex attention  Education:  Learner:  [x]Patient          []Significant Other          []Other: Benny Anderson  Education provided regarding:  [x]Goals and POC   [x]Diet and swallowing precautions    [x]Home Exercise Program  [x]Progress and/or discharge information  Method of Education:  [x]Discussion          [x]Demonstration          []Handout          []Other  Evaluation of Education:   [x]Verbalized understanding   [x]Demonstrates without assistance  []Demonstrates with assistance  [x]Needs further instruction     []No evidence of learning                  [x]No family present      Plan: [x]Continue with current plan of care    []Modify current plan of care as follows:    []Discharge patient    Discharge Location:    Services/Supervision Recommended:     [x]Patient continues to require treatment by a licensed therapist to address functional deficits as outlined in the established plan of care. Terence Brasher.  [address] [facility], [facility], [address]

## 2019-08-05 NOTE — PROGRESS NOTES
erythema  Peripheral vascular: Pulses: Normal upper and lower extremity pulses; Edema: trace    Diagnostics:   Recent Results (from the past 24 hour(s))   CBC    Collection Time: 08/05/19  5:48 AM   Result Value Ref Range    WBC 7.2 4.8 - 10.8 thou/mm3    RBC 3.32 (L) 4.70 - 6.10 mill/mm3    Hemoglobin 10.1 (L) 14.0 - 18.0 gm/dl    Hematocrit 31.8 (L) 42.0 - 52.0 %    MCV 95.8 (H) 80.0 - 94.0 fL    MCH 30.4 26.0 - 33.0 pg    MCHC 31.8 (L) 32.2 - 35.5 gm/dl    RDW-CV 13.5 11.5 - 14.5 %    RDW-SD 47.6 (H) 35.0 - 45.0 fL    Platelets 537 091 - 356 thou/mm3    MPV 11.5 9.4 - 12.4 fL   Comprehensive Metabolic Panel    Collection Time: 08/05/19  5:48 AM   Result Value Ref Range    Glucose 86 70 - 108 mg/dL    CREATININE 0.7 0.4 - 1.2 mg/dL    BUN 25 (H) 7 - 22 mg/dL    Sodium 143 135 - 145 meq/L    Potassium 4.0 3.5 - 5.2 meq/L    Chloride 99 98 - 111 meq/L    CO2 34 (H) 23 - 33 meq/L    Calcium 8.6 8.5 - 10.5 mg/dL    AST 13 5 - 40 U/L    Alkaline Phosphatase 94 38 - 126 U/L    Total Protein 5.5 (L) 6.1 - 8.0 g/dL    Alb 2.6 (L) 3.5 - 5.1 g/dL    Total Bilirubin <0.2 (L) 0.3 - 1.2 mg/dL    ALT 8 (L) 11 - 66 U/L   Vitamin D 25 Hydroxy    Collection Time: 08/05/19  5:48 AM   Result Value Ref Range    Vit D, 25-Hydroxy 19 (L) 30 - 100 ng/ml   Anion Gap    Collection Time: 08/05/19  5:48 AM   Result Value Ref Range    Anion Gap 10.0 8.0 - 16.0 meq/L   Glomerular Filtration Rate, Estimated    Collection Time: 08/05/19  5:48 AM   Result Value Ref Range    Est, Glom Filt Rate >90 ml/min/1.73m2   POCT glucose    Collection Time: 08/05/19  7:32 AM   Result Value Ref Range    POC Glucose 89 70 - 108 mg/dl     Xr Chest Standard (2 Vw)    Result Date: 8/5/2019  PROCEDURE: XR CHEST (2 VW) CLINICAL INFORMATION: Interval image for prior R mainstem bonchus obstruction s/p broncchoscopy. COMPARISON: July 31, 2019 TECHNIQUE: PA and lateral views the chest. FINDINGS: The heart and mediastinum remain within normal limits.  There is the left hemithorax. There is improving aeration of the right lung with patchy scattered airspace opacities. The right Mediport catheter remains stable. Small residual effusions are present at the bases right greater than left. There is no visualized pneumothorax. Degenerative skeletal changes are stable. 1. Improving right lung base aeration with residual scattered infiltrates. 2. Small bilateral effusions present. 3. Stable supportive devices locations. **This report has been created using voice recognition software. It may contain minor errors which are inherent in voice recognition technology. ** Final report electronically signed by Dr. Jerome Bryant on 7/31/2019 2:34 AMAM    Impression:  1. Acute CVA  large area of restricted diffusion involving the inferior medial aspect of the LEFT cerebellar hemisphere as well as the LEFT side of the medulla. 2. Chronic CVA with large infarct in the RIGHT posterior cerebral artery territory secondary to high-grade stenosis at the P1 segment of the RIGHT posterior cerebral artery without definite distal flow involving the RIGHT occipital lobe and tracking anteriorly along the medial aspect of the RIGHT temporal lobe with associated local mass effect with sulcal effacement without midline shift or associated hemorrhage.   3. Gait instability. 4. Fall. 5. Concussion without LOC. 6. Physical debility with generalized weakness. 7. Healthcare associated pneumonia versus Aspiration pneumonia. 8. Acute hypoxic respiratory failure.   9. Fracture of the lateral ninth rib. 10. Peripheral vision loss LEFT > RIGHT. 11. Mild LEFT hemiparesis and ataxia. 12. Severe malnutrition. 13. Mild-moderate oral dysphagia and severe pharyngeal dysphagia. 14. S/p PEG placement on 7/11/2019 by Dr. Sandrita Rodriguez.   15. Mild cognitive deficit.  (MOCA) version 8.2 completed.  Patient scored 20/30 over prior ([address] [place] [address]) version 7.2 completed.  Patient scored 15/25.  *adjusted score given pt unable to complete written subtests (L hand dominant). 16. NSTEMI on 4/9/2019. 17. 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 18. Caval filter placed 2009 for extensive DVT of the LEFT leg as well as calf DVT on the RIGHT. 19. Pulmonary embolism in 2018. 20. Chronic anticoagulation status. 21. 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 Christy Harris. 22. Hypertension. 23. COPD. 24. Type 2 DM, controlled.  Last HgbA1c 5.1 on 7/12/2019.  25. Paravertebral ossifications at numerous levels along the thoracic spine with partial mineralization of the anterior longitudinal ligament consuistent with DISH.  26. Osteopenia. 27. Nicotine dependence; current every day smoker. 28. Marijuana use. Last positive UDS 7/11/2019.  29. History of anxiety and depression. 30. Medication non-compliance. 31. Hypovitaminosis D.  32. Orthostatic hypotension. 33. Insomnia. 34. Aspiration pneumonia with sepsis. 35. Near complete atelectasis of the RIGHT lung with mediastinal shift to the right, secondary to obstruction of the right mainstem bronchus with mucus plug. 36. ANTHONY with use of CPAP. Plan:     Medical management: Per primary team.     Consultants:  Trauma Surgery, Neurology, Gastroenterology, Pain Management, Internal Medicine, Pulmonology, Physical Medicine      Narcotic usage:  Morphine Last 24 hour usage 40mg. Increased. Last BM:  Stool Amount: Large (08/05/19 4676)  Ambulation/Mobility:            FUNCTIONAL OUTCOMES TOOLS:    LOWE -      Tinetti -      Acute/Rehabilitation Problems:  1.  Acute CVA  large area of restricted diffusion involving the inferior medial aspect of the LEFT cerebellar hemisphere as well as the LEFT side of the medulla/Chronic CVA with large infarct in the RIGHT posterior cerebral artery territory secondary to high-grade stenosis at the P1 segment of the RIGHT posterior cerebral artery without definite distal flow involving the RIGHT occipital lobe and tracking anteriorly along the medial aspect of the RIGHT temporal lobe with associated local mass effect with sulcal effacement without midline shift or associated hemorrhage.   1. Secondary stroke prevention. 2. Gait instability/Fall/Physical debility with generalized weakness/Peripheral vision loss LEFT > RIGHT/Mild LEFT hemiparesis and ataxia. 1. PT/OT. 3. Concussion without LOC. 1. SLP. 4. Healthcare associated pneumonia versus Aspiration pneumonia/Acute hypoxic respiratory failure. 1. Zosyn completed; switched to Augmentin for 7 days. ENDED on 7/25.  5. Aspiration pneumonia with sepsis. 1. On 7/29 WBC/ANC 13/10. 9. Zosyn started for presumptive aspiration pneumonia and sepsis with increased requirement for oxygen. 2. Placed on rebreather mask with titration to maintain 90 to 92% O2 saturation. 3. Initially chest x-ray was suggestive of a moderate right-sided pleural effusion for which an ultrasound-guided thoracentesis was ordered; but the actual volume was found to be too small to safely complete the procedure. 4. CT of the chest with and without contrast showed a near complete atelectasis of the right lung with mediastinal shift to the right, secondary to obstruction of the right mainstem bronchus. This is a be likely aspirate material consistent with the patient having been observed to be eating \"Cheetos\" on 7/27. Pulmonology was consulted with plan for bronchoscopy to remove the material on 7/30. Procedure has been rescheduled for 7/31 due to the patient having been given a bolus tube feed prior to the time of the scheduled procedure. 5. The bronchoscopy was completed with findings of mucous plugging with samples sent for laboratory analysis.   A subsequent chest x-ray showed improved aeration of the right lung. DuoNeb and MetaNeb were ordered. On  patient states that the [facility] is causing him headaches and pulmonology was asked if this medication could be made as needed. 6. The leukocytosis has resolved on  with plan to continue the Zosyn for 8 days and total and then potentially switch to Augmentin. 7. Repeated CXR on  with findings of coarse bilateral airspace opacities and infiltrates on the right greater than left. 6. Fracture of the lateral ninth rib. 1. Pain control. 2. ICS. 7. Mild cognitive deficit.  (MOCA) version 8.2 completed.  Patient scored 20/30 over prior ([address] [place]) version 7.2 completed.  Patient scored 15/25.  *adjusted score given pt unable to complete written subtests (L hand dominant)/Mild-moderate oral dysphagia and moderate to severe pharyngeal dysphagia. 1. SLP. 2. Fees study on . Completed with patient remaining n.p.o. at this time. Dr. Tameka Boss, ENT reviewed the imaging on . Recommended Oncology follow up after discharge. 3. Patient did trial ice chips on , unsuccessfully. 4. Start TF boluses on . Tolerating well. 8. ANTHONY with use of CPAP. 1. CPAP ordered on  at patient's request.  Once a on  the patient requested to discontinue the CPAP. 9. Nutrition:  Consultation to dietician for nutritional counseling and recommendations. 1. Severe malnutrition. 2. S/p PEG placement on 2019 by Dr. Raul Mayorga. 3. Wound care. 4. TotP 5.8, alb 2.4, Vitamin 25OH level of 16 on 2019.  5. Continue with tube feeds; nutrition has recommended bolus tube feeds with the plan to start those on . 6. Ergocalciferol every 3 days x 11 doses. 10. Orthostatic hypotension. 1. Supine: 124/60. Sittin/61 and standing was 88/61 on . 2. Start Midodrine 2.5mg TID on . Pressures improved; but still has some issues with lightheadedness and the dosing was increased to 5 mg 3 times daily on 7/25. He is less symptomatic at this dose.   Pressures still running below 100 for SBP; increased to 10mg on 8/5. Currently no complaints of lightheadedness when standing with therapy. 11. Insomnia. 1. Trazodone at 100 mg was started last night to good effect per patient's feedback on 7/24. 12. Electrolytes. 1. Normal on 8/5. 13. Bladder: No issues. 14. Bowel: Senna, colace, MOM  15. Rehabilitation nursing will be involved for bowel, bladder, skin, and pain management. Nursing will also provide education and training to patient and family. 16. Prophylaxis:  DVT: Xarelto. GI: Pepcid. 17.  and case management consultations for coordination of care and discharge planning.     Chronic Problems:  1. NSTEMI on 4/9/2019/Hypertension. 1. ASA 81 mg.  2. Lipitor. 2. 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 1. Patient may benefit from an AFO; will let PT evaluate and help make decision. 3. Caval filter placed 2009 for extensive DVT of the LEFT leg as well as calf DVT on the RIGHT/Pulmonary embolism in 2018/Chronic anticoagulation status. 1. Patient is currently on Xarelto. 4. 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 Brook Keenan. 1. Oral morphine as ordered PRN. Continued as ordered by Pain Team.  2. On 7/24 the patient endorses that his pain medications are working. On closer questioning he states that he has a headache; but otherwise his current pain medications are working. His endorsed headache is 7/10 and he states he has had this since his strokes. It was discussed with him that these may be post stroke headaches; but as a starting point to address his pain a headache cocktail consisting of Toradol, Reglan, and magnesium was ordered on 7/24.   Will consider starting the patient on Topamax if his headaches persist, however it is expected that it could take up to 2 weeks for the Topamax to actually start working. 3. Chose to start Fioricet on 7/26.  4. Tylenol. 5. Lidoderm patches. 5. COPD. 1. Robitussin. 2. DuoNeb. 6. Type 2 DM, controlled.  Last HgbA1c 5.1 on 7/12/2019.  1. Stable. 7. Paravertebral ossifications at numerous levels along the thoracic spine with partial mineralization of the anterior longitudinal ligament consistent with DISH. 8. Osteopenia. 1. Vitamin D level. 16 on 7/19/2019.  9. Nicotine dependence; current every day smoker. 1. NicoDerm patches. 10. Marijuana use. 11. History of anxiety and depression. 1. States that his Zoloft doesn't work. Medication has been continued after explanation to the patient of the difference between the medications that he is been using for his anxiety versus those that are for depression. 2. Klonopin. 3. Buspar nightly. 12. Medication non-compliance.     Labs:  1. 7/19.  2. 7/26.  3. 8/5.     Infectious Disease:  1. Zosyn. Missed Therapy Time:  7/22 Missed 34 minutes of OT due to nausea. 7/24 Missed 18 minutes due nausea and feeling tired. 7/26 Missed 180 minutes due to not feeling well secondary to aspiration pneumonia, sepsis, and need for rebreather mask with high flow oxygen.     DME:    Discharge Plan:  Estimated Discharge Date: 8/9   Destination: home health  Services at Discharge: [address] [place] [address] Rd, Occupational Therapy, Speech Therapy, , Nursing and an aide 3x week  Is patient appropriate for an outpatient driving evaluation? no  Equipment at Discharge: Anastacio Girard MD

## 2019-08-05 NOTE — DISCHARGE INSTR - COC
07/07/2011       Immunization History:   Immunization History   Administered Date(s) Administered    Pneumococcal Polysaccharide ([id number]) 10/30/2018       Active Problems:  Patient Active Problem List   Diagnosis Code    Mass in neck R22.1    Type 2 diabetes mellitus without complication, without long-term current use of insulin ([facility]) E11.9    Generalized anxiety disorder F41.1    Moderate episode of recurrent major depressive disorder ([facility] F33.1    Malignant neoplasm of tonsil ([facility] C09.9    Squamous cell carcinoma of left tonsil (HCC) C09.9    Tongue carcinoma ([facility] C02.9    Encounter for antineoplastic chemotherapy Z51.11    Cigarette nicotine dependence without complication T96.860    Unstageable pressure ulcer of right foot ([facility] L89.890    Severe malnutrition ([facility]) E43    Severe protein-calorie malnutrition ([facility] E43    NSTEMI (non-ST elevated myocardial infarction) ([facility] I21.4    Cerebrovascular accident (CVA) due to embolism of right posterior cerebral artery ([facility]) I63.431    Tonsil cancer ([facility] C09.9    Left homonymous hemianopsia due to recent cerebral infarction I69.398, H53.462    Stroke-like symptom R29.90    Nausea & vomiting R11.2    SOB (shortness of breath) R06.02    Troponin level elevated R74.8    Severe malnutrition (HCC) E43    History of stroke Z86.73    Acute cystitis without hematuria N30.00    Dizziness R42    Claudication ([facility]) I73.9    Other headache syndrome G44.89    Pre-syncope R55    Orthostatic hypotension I95.1    Chronic anemia D64.9    Fall W19. Lorin Annette    Closed fracture of one rib of left side S22.32XA    CHI (closed head injury), initial encounter S09.90XA    Cerebrovascular accident (CVA) ([facility] I63.9    Acute CVA (cerebrovascular accident) ([facility] I63.9    Essential hypertension I10    Cancer associated pain G89.3    Chronic pain syndrome G89.4    Cerebellar infarct (HCC) I63.9    Acute respiratory failure with hypoxia ([facility]) J96.01    to spill urinal in bed  Urinary Catheter: None   Colostomy/Ileostomy/Ileal Conduit: No       Date of Last BM: 08/13/19    Intake/Output Summary (Last 24 hours) at 8/5/2019 1850  Last data filed at 8/5/2019 1826  Gross per 24 hour   Intake 2970 ml   Output 1250 ml   Net 1720 ml     I/O last 3 completed shifts: In: 2950 [I.V.:10; NG/GT:2940]  Out: 200 [Urine:975]    Safety Concerns:     History of Falls (last 30 days) and At Risk for Falls    Impairments/Disabilities:      None    Nutrition Therapy:  Current Nutrition Therapy:   - Tube Feedings:  2 Konrad  ml 4 times per day    Routes of Feeding: PEG tube  Liquids: NPO 185ml water flush before and after bolus feeds. Daily Fluid Restriction: no  Last Modified Barium Swallow with Video (Video Swallowing Test): 6/11/2019; FEES 7/23/2019    Treatments at the Time of Hospital Discharge:   Respiratory Treatments: PRN  Oxygen Therapy:  is on oxygen at 2 L/min per nasal cannula.   Ventilator:    - No ventilator support    Rehab Therapies: Physical Therapy, Occupational Therapy and Speech/Language Therapy  Weight Bearing Status/Restrictions: No weight bearing restirctions  Other Medical Equipment (for information only, NOT a DME order):  wheelchair, walker and bedside commode  Other Treatments: None    Patient's personal belongings (please select all that are sent with patient):  Glasses    RN SIGNATURE:  Electronically signed by Marisol Ho RN on 8/14/2019 at 12:08 PM

## 2019-08-05 NOTE — PROGRESS NOTES
Nicole Villarreal 58  INPATIENT OCCUPATIONAL THERAPY  [facility]  PROGRESS NOTE    Time:  Time In: 930  Time Out: 1030  Timed Code Treatment Minutes: 60 Minutes  Minutes: 60        Date: 2019  Patient Name: Felipe Helms,   Gender: male      MRN: [id number]  : 1960  (62 y.o.)  Referring Practitioner: Duncan Reyes MD  Diagnosis: Cerebellar infarct  Additional Pertinent Hx: He was admitted 2019 for complaint of injuries after a ground-level fall at home. The patient was reaching over his couch when he lost his balance and fell onto his left side causing him to strike his head without any endorsed loss of consciousness. There was a small superficial contusion of the left frontal region. On initial work-up he was found to have a left ninth rib fracture and also old rib fractures on the right side. CT imaging of the chest showed patchy nodular airspace opacities in the left lung base suggestive of infiltrates with an elevated white count of 15.3 noted. CT imaging of the head did show an acute infarct of the inferior medial aspect of the left cerebellar hemisphere as well as the left side of the medulla. The patient was admitted for further medical care. Note was also made that the patient was disheveled and malodorous and had questionable ability to care for himself. He did undergo a modified barium swallow study on  which showed the patient to have mild oral dysphagia and moderate pharyngeal dysphasia with recommendation at that time for thin and moist purées with thin liquids and strict use of compensatory strategies. He is currently on Zosyn for treating a presumptive aspiration pneumonia versus a hospital-acquired pneumonia. Lois Kim Hx of prior CVA in april with chronic pain. The patient was just recently in the emergency department on 2019 for complaint of unsteady gait and nausea and vomiting as well as issues with swallow.   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 Marita Phillips. It is noted that his malignancy is stated to be in remission. The patient also has a significant history of pulmonary emboli in 2018 and deep vein thromboses of the bilateral lower limbs in 2018 and had a [place] filter placed in 2009.      Restrictions/Precautions:  Fall Risk, General Precautions(NPO)                    Other position/activity restrictions: PEG 7/14, oxygen, impulsive, unsteady, left ninth rib fracture and also old rib fractures on the right side       Past Medical History:   Diagnosis Date    Anxiety     Bleeds easily ([address]     Bruises easily     Cerebrovascular accident (CVA) due to embolism of right posterior cerebral artery ([address] 04/09/2019    Cigarette nicotine dependence without complication 8/1/6666    COPD (chronic obstructive pulmonary disease) ([address]     DVT (deep venous thrombosis) ([address] 2009    Enlarged lymph node     LAURA (generalized anxiety disorder)     History of sleep apnea     Hypertension     HCC    Moderate episode of recurrent major depressive disorder ([address] 4/16/2018    NSTEMI (non-ST elevated myocardial infarction) ([address] 4/9/2019    Pulmonary embolism and infarction ([address] 10/22/2018    S/P insertion of inferior vena caval filter 07/07/2009    Severe malnutrition ([address] 10/23/2018    Squamous cell carcinoma of tonsils ([address] 2018    left   - radiation    Type II or unspecified type diabetes mellitus without mention of complication, not stated as uncontrolled     lost weight, no meds at present 2018     Past Surgical History:   Procedure Laterality Date    BRONCHOSCOPY N/A 7/31/2019    BRONCHOSCOPY performed by Dulce Dance, MD at [address]  04/10/2019    HIP SURGERY Right 06/30/2014    Excision Right Hip Mass-Dr. Nicol De KNEE SURGERY Right 1980's    NH OFFICE/OUTPT VISIT,PROCEDURE ONLY Right 10/16/2018    RIGHT FOOT EXCISION ULCER, RIGHT 5th METATARSAL BASE RESECTION performed by Ania Alejandra DPM at [facility] TOE SURGERY Right 2013    wound to bone (second toe)     TRANSESOPHAGEAL ECHOCARDIOGRAM N/A 4/11/2019    TRANSESOPHAGEAL ECHOCARDIOGRAM performed by Verito Willams MD at [facility]    TUNNELED VENOUS PORT PLACEMENT      VENA CAVA FILTER PLACEMENT  07/07/2009           Subjective  Subjective: Pt lying supine upon arrival, agreeable to OT session. Overall Orientation Status: Within Normal Limits    Pain:  Pain Assessment  Patient Currently in Pain: Denies    Objective  Overall Cognitive Status: Exceptions  Attention Span: Attends with cues to redirect; Difficulty dividing attention  Safety Judgement: Decreased awareness of need for assistance;Decreased awareness of need for safety  Problem Solving: Assistance required to correct errors made;Assistance required to identify errors made;Decreased awareness of errors;Assistance required to generate solutions;Assistance required to implement solutions  Insights: Decreased awareness of deficits  Cognition Comment: Impulsive    Bed mobility  Supine to Sit: Stand by assistance  Scooting: Contact guard assistance    Transfers  Sit to stand: Contact guard assistance  Stand to sit: Contact guard assistance    Balance  Sitting Balance: Contact guard assistance(-SBA)  Standing Balance: Minimal assistance     Time: x1 minute     Functional Mobility  Functional - Mobility Device: Rolling Walker  Activity: To/from bathroom  Assist Level: Minimal assistance  Functional Mobility Comments: Completed functional mobility within walk-in shower room and short distance to/from BR at slow pace, no LOB noted with mod-min unsteadiness noted.  Pt static standing for 1 minute with CGA with 1 UE release and min cues for midline orientation in prep for washing hands at sink. GOAL MET, REVISE  Short term goal 3: Pt will complete stand pivot transfers with CGA A x1 and min verbal cues for safety to improve independence with w/c transfers GOAL MET, REVISE  Short term goal 4: Pt will improve LUE coordination as evidenced by completion of 9 hole peg test in < 4 minutes to improve indep with gathering items. GOAL MET, REVISE  Short term goal 5: Pt will safely navigate RW within room with CGA to improve independence to [facility]. GOAL MET, REVISE  Long term goals  Time Frame for Long term goals : 3 weeks  Long term goal 1: Pt will complete ADL routine with SBA and no cues for safety or adapted technique to increase ability to perform self care tasks GOAL NOT MET, CONTINUE  Long term goal 2: Pt will complete light standing IADL task using RW with SBA and no cues for walker safety to improve indep within home. GOAL NOT MET, CONTINUE  Revised Short-Term Goals  Short term goals  Time Frame for Short term goals: 1 week  Short term goal 1: Pt will dynamically sit EOB for 5 minutes with Mod I  to improve independence with bathing/dressing tasks EOB. Short term goal 2: Pt will tolerate static standing for >3 minutes CGA with 1 UE release and min cues for midline orientation in prep for washing hands at sink. Short term goal 3: Pt will complete stand pivot transfers with SBA x1 and min verbal cues for safety to improve independence with w/c transfers. Short term goal 4: Pt will improve LUE coordination as evidenced by completion of 9 hole peg test in < 90 secs to improve indep with gathering items. Short term goal 5: Pt will safely navigate RW with CGA to BR improve independence with toileting routine.   Long term goals  Time Frame for Long term goals : 3 weeks  Long term goal 1: Pt will complete ADL routine with SBA and no cues for safety or adapted technique to increase

## 2019-08-05 NOTE — PROGRESS NOTES
7/31/19 7/31/2019   XR CHEST PORTABLE   1. Improving right lung base aeration with residual scattered infiltrates. 2. Small bilateral effusions present. 3. Stable supportive devices locations. 7/29/19      Impression       New moderate right pleural effusion. Underlying atelectasis is likely, cannot exclude pneumonia. Mediastinal shift to the right consistent with right lung volume loss.           **This report has been created using voice recognition software. It may contain minor errors which are inherent in voice recognition technology. **       Final report electronically signed by Dr. Omi Castrejon on 7/29/2019 12:56 AM       CT Scans    Impression    Near complete atelectasis of the right lung with mediastinal shift to the right, secondary to obstruction of the right mainstem bronchus which may be due to mucous plugging. 5 x 3 mm left lower lobe pulmonary nodule.      For lung nodules <6 mm in size, if the patient is low risk for malignancy, no routine follow-up is necessary. If high risk for malignancy, optional chest CT may be obtained at 12 months. Small right and minimal left pleural effusions. Multiple old healed bilateral rib fractures and a nonunion fracture of the lateral left ninth rib. Small sclerotic lesion in the posterior lateral right 12th rib, cannot exclude a metastasis. Mild bilateral gynecomastia.               **This report has been created using voice recognition software. It may contain minor errors which are inherent in voice recognition technology. **       Final report electronically signed by Dr. Omi Castrejon on 7/29/2019 3:56 AM       Assessment   Acute hypoxemic respiratory failure secondary to aspiration of particulate material VS mucus plug- resolved  7/31/19 Bronchoscopy right lung mucus plug removed- improving atelectasis- all cultures (-).  Cytology (-)  Smoker  3 mm lung nodule  Cerebellar CVA  Full Code  Recommendations   -Monitor spO2 to keep >

## 2019-08-05 NOTE — PROGRESS NOTES
[address] NOTE    Conference Date: 2019  Admit Date:  2019  3:25 PM  Patient Name: Mary Khan    MRN: [id number]    : 1960  (62 y.o.)  Rehabilitation Admitting Diagnosis:  Cerebellar infarct [facility]) [I63.9]  Referring Practitioner: Minerva Vazquez MD    CASE MANAGEMENT  Current issues/needs regarding patient and family discharge status: Patient continues to progress with PT/OT/ST. Plan for discharge home on Friday, 2019. SW has attempted to contact patient's niece, Jomar Ramesh, multiple times regarding family education. SW has yet to receive returned phone call. Anticipate patient will require home oxygen, suction machine, and tube feedings and supplies. Additionally, recommendation for home health care services to provide RN/PT/OT/ST/HHA/RISHABH. SW to follow and maintain involvement in discharge planning. Marco A Muñoz continues to make steady progress during his stay, meeting 2/4 STGs this assessment period. At this time he requires SBA for supine to/from sit and CGA with occasional cues for safety to complete sit to/from stands. He is able to ambulate short distances up to 45ft with RW at [place] secondary to instability and B LE weakness. He will continue to benefit from skilled PT services to maximize independence and mobility prior to returning home. Equipment Needed: No(cont to assess, has RW, 4WW, w/c, canes)  Other: will continue to assess    SPEECH THERAPY  Pt met 3/5 short term goals and 0/2 long term goals this treatment period. Improved motivation to participate in therapy noted this session, however overall gains have been slow.  Pt continues to present with mild-moderate cognitive deficits evidenced by inconsistent recall of newly learned information, reduced executive functioning and insight into physical limitations and overall ability to function in the home environment, as well as reduced higher level reasoning and judgement. In terms of swallow function, patient remains NPO due to severe pharyngeal swallow impairments marked by reduced epiglottic inversion with poor airway protection (as evidenced on FEES completed on 7/23). Patient engaging in pharyngeal swallow exercise program, but concerned about prognosis due to inability to engage in level of intensity needed to make functional gains. Initiated addition of more time with ST to address dysphagia for functional gains. Recommend supervision/assistance from family with tube feedings, transportation, daily schedule management, medications and finances/paperwork upon discharge. Further ST services are warranted at this time to address the deficits outline above to maximize independence in the home environment. OCCUPATIONAL THERAPY  Pt is motivated and making steady progress towards his goals, meeting 4/5 STGs. Pt demonstrates improved sitting and standing balance which has improved his indep requiring up to [place] for ADLs. Pt is limited by decreased endurance and decreased safety awareness. Pt demonstrates decreased insight into his deficits and tends to perseverate on eating food, though he is NPO. Pt continues to demonstrate L hemibody weakness and ataxia, flucuating midline orientation, and decreased safety awareness impacting patient's ability to complete self care at prior level of functioning. Due to patient's performance deficits, patient would greatly benefit from continued occupational therapy for ADL remediation, endurance building, strengthening and neuro re-education to return to prior level of functioning and safe return to home environment. Equipment Needed: Yes  ADL Assistive Devices: Toileting - Raised Toilet Seat with arms    RECREATIONAL THERAPY  Patient has been offered participation in recreational therapy activities and participates as able.-Pt. Propelled himself independently via W/c to the apartment.- Pt.  Initiated conversation by asking what movie was playing.- Pt. Talked about his favorite movie.- Pt. Asked if the RT student would be willing to have another movie night so he can attend. - Pt's affect was flat during the ending of the movie, but brighten at the aspect of another movie night soon.- Pt. Propelled himself independently back to room via w/c.- Pt looking forward to getting his hair cut tomorrow and his beard trimmed by our beautician-paid the money already and appreciative. - Patient enjoyed spending time with our pet therapy dogs. Pt working with O.T. And took brief break to pet our pet therapy dogs Anjali Qureshi and Ward Ramesh this am-affect bright and social     NUTRITION  Weight: 185 lb 11.2 oz (84.2 kg) / Body mass index is 25.19 kg/m². Current diet: DIET TUBE FEEDING BOLUS NPO; Gastrostomy  Please see nutrition note for details. NURSING  Continent of Bowel and Bladder: Yes  Pain is Managed:  Yes  Signs and Symptoms of Infection:  No  Signs and Symptoms of Skin Breakdown:  Yes, shear   Injury and Fall Free during Inpatient Rehabilitation Admission: Yes    Consultations/Labs/X-rays: Trauma Surgery, Neurology, Gastroenterology, Pain Management, Internal Medicine, Pulmonology, Physical Medicine     Stroke Premorbid Conditions: Male over 54, HTN, ANTHONY, History of DVT/PE, NSTEMI, Malignancy, medication non-compliance    Diabetes:   Recent Labs     08/04/19  0730 08/05/19  0732 08/06/19  0759   POCGLU 85 89 83     Hyperlipidemia:   Lab Results   Component Value Date    [address] 62 07/12/2019     Patient is on statin? Yes    Hypertension:   Vitals:    08/05/19 1502 08/05/19 2115 08/06/19 0649 08/06/19 0836   BP: 115/61 119/60  (!) 97/58   Pulse: 65 62  61   Resp:  16 16 20   Temp:  98.5 °F (36.9 °C)  97.9 °F (36.6 °C)   TempSrc:  Oral  Oral   SpO2:  92% 93% 98%   Weight:       Height:          Patient is on Blood Pressure Medicine?  NA    Stroke Prophylaxis: Patient with ischemic CVA and is on medication to prevent recurrent CVA    Family Education: Need to make contact with family to initiate education  Fall Risk:  Falling star program initiated  Is the patient appropriate for a stay in the functional apartment? no    Discharge Plan   Estimated Discharge Date: 8/9   Destination: home health  Services at Discharge: [address] [place] [address] Rd, Occupational Therapy, Speech Therapy, , Nursing and an aide 3x week  Is patient appropriate for an outpatient driving evaluation? no  Equipment at Discharge: WC, BSC  Factors facilitating achievement of predicted outcomes: Family support, Motivated, Cooperative, Pleasant, Has needed Durable Medical Equipment at home and Home is wheelchair accessible  Barriers to the achievement of predicted outcomes: Pain, Cognitive deficit, Limited insight into deficits, Unrealistic expectations, Upper extremity weakness, Lower extremity weakness, Long standing deficits, Impaired vision, Hypotension, Dysphagia, and PEG tube status    Team Members Present at Conference:  :Irais Kelly [place]  Occupational Therapist: Brittani Webber OTR/L [id number]. Physical Therapist: Annabel Hylton [place] [id number]  Speech Therapist: Stacey Dinh 87, [address] Nurse: Samantha Lr, RN   Psychologist: Angela Elizabeth, PhD.    I approve the established interdisciplinary plan of care as documented within the medical record of Rose Martell.     Anthony Urias

## 2019-08-05 NOTE — PROGRESS NOTES
1  Quality: Slow Velocity and Decreased Fluidity      Stairs:  Platform:  2\" platform X 1 using Rolling Walker and Minimal Assistance, Moderate Assistance, with verbal cues , with increased time for completion. Pt. Unsteady when ascending and descending step onto scale requiring min-mod A to correct LOBs. Cues required for walker management and hand placement. Balance:  Dynamic Standing Balance: Contact Guard Assistance, Minimal Assistance, Moderate Assistance   Pt. Completed ring toss activity with single UE support on walker. Pt. Reaching outside [place] throughout. Pt. Slightly unsteady with posterior lean at times requiring min-mod A to correct. Cues provided for weight shifting and for safety as pt. Is impulsive at times during activity. Exercise:  None    Functional Outcome Measures: Not completed       ASSESSMENT:  Assessment: Patient progressing toward established goals. Activity Tolerance:  Patient tolerance of  treatment: good. Equipment Recommendations:Equipment Needed: No(cont to assess, has RW, 4WW, w/c, canes)  Other: will continue to assess  Discharge Recommendations: Continue to assess pending progress    Plan: Times per week: 5x/wk 90 min, 1x wk 30 min  Specific instructions for Next Treatment: core and LE strengthening ex, bed mobility, sitting balance . PT to assess transfers and gait when medically approved  Current Treatment Recommendations: Strengthening, Endurance Training, Balance Training, Functional Mobility Training, Safety Education & Training, Equipment Evaluation, Education, & procurement    Patient Education  Patient Education: Plan of Care, Transfers, Gait, Stairs    Goals:  Patient goals : Did not state  Short term goals  Time Frame for Short term goals: 1 week  Short term goal 1: Pt to go supine <->sit,  supervision A, to get in/out of bed.    Short term goal 2: sit <> stand with contact guard assist for ease of transfers  Short term goal 3: ambulate 15 feet with use

## 2019-08-05 NOTE — PROGRESS NOTES
Training, Equipment Evaluation, Education, & procurement    Patient Education  Patient Education: Plan of Care, [employer] Mobility, Transfers, Gait, Verbal Exercise Instruction    Goals:  Patient goals : Did not state  Short term goals  Time Frame for Short term goals: 1 week  Short term goal 1: Pt to go supine <->sit,  supervision A, to get in/out of bed. NOT MET  Short term goal 2: sit <> stand with contact guard assist for ease of transfers  MET, SEE LTG  Short term goal 3: ambulate 15 feet with use of LRAD and modAx1 to prepare for in home mobility  GOAL MET, SEE LTG  Short term goal 4: WC mobility with mod I for 300ft to prepare for community mobility   NOT MET  Long term goals  Time Frame for Long term goals : 4 weeks   Long term goal 1: bed mobility with mod I for ease of getting in/out of bed    Long term goal 2: sit <> stand with mod I for safe of transfers   NOT MET  Long term goal 3: ambulate 75ft with use of LRAD and stand-by assist for safe in home mobility  NOT MET   Long term goal 4: negotiate 1 step no rails with stand-by assist and use of LRAD for safe entry/exit of his home  SEE PM NOTE    Following session, patient left in safe position with all fall risk precautions in place. Treatment session and note completed by Brad Poe PTA. Assessment and goal revision of Progress Note completed by Gutierrez Hough PT. Revised Short-Term Goals:    Short term goals  Time Frame for Short term goals: 1 week  Short term goal 1: Pt to go supine <->sit,  supervision A, to get in/out of bed.    Short term goal 2: sit <> stand with SBA for ease of transfers  Short term goal 3: ambulate 50 feet with use of RW and CGA to prepare for in home mobility  Short term goal 4: WC mobility with mod I for 300ft to prepare for community mobility       Revised Long-Term Goals  Long term goals  Time Frame for Long term goals : 4 weeks   Long term goal 1: bed mobility with mod I for ease of getting in/out of bed  Long term

## 2019-08-05 NOTE — PROGRESS NOTES
[address]   SLP Individual Minutes  Time In: 1300  Time Out: 1330  Minutes: 30  Timed Code Treatment Minutes: 22 Minutes      Dysphagia tx- 22 minutes  Cognitive tx- 8 minutes    []Daily Note  [x]Progress Note-  []Discharge Note    Date: 2019  Patient Name: Felipe Helms        MRN: [id number]    : 1960  (62 y.o.)  Gender: male   Primary Provider: Duncan Reyes MD  Admitting Diagnosis:  Cerebellar infarct   Secondary Diagnosis: Cognitive deficits, Dysphagia, Dysphonia   Precautions: fall risk, aspiration risk  Swallowing Status/Diet: NPO, PEG tube in place for nutrition and hydration  Swallowing Strategies: NPO  DATE of last MBS:  2019; FEES 2019  Pain: 0/10    Subjective: Patient sitting upright in wheelchair, alert and cooperative. No family present. SHORT TERM GOAL #1:  Goal 1:  Pt will complete pharyngeal strengthening exercise program with completion of exercises x10 to improve anterior hyoid excursion and pharyngeal strengthening to increase airway protection and pharyngeal contraction during the swallow. GOAL MET- CONTINUE  INTERVENTIONS: Completed the following pharyngeal swallow exercises to improve hyolaryngeal elevation and anterior excursion to maximize epiglottic movement during the swallow. Instruction provided on proper technique.   -Effortful swallow- 1 sets of 10, fair- good success  -Janelle swallow- 1 sets of 10, fair -good success  -CTAR- 1 sets of 10 with good success. *Recommended patient continue to complete exercises independently 2-3x per day to maximize benefits for improved swallow function. *Discussed need for continued NPO status pending repeat instrumental swallow assessment in 3-4 weeks.      SHORT TERM GOAL #2:  Goal 2: Pt will exhibit return demonstration on need for completion of comprehensive oral care with 90% accuracy given no cues to reduce potential bacteria colonization and to maintain an improved oral cavity appearance. GOAL MET- CONTINUE  INTERVENTIONS: Did not address due to focus on other goals. Previous session : Completed oral care with patient prior to swallow exercises, utilizing 1 part hydrogen peroxide and 1 part sterile water with toothette for oral cleansing. Provided education on how to creat the solution and how frequently during the day he should be completing oral care. Patient expressed understanding. SHORT TERM GOAL #3:  Goal 3:Pt will complete immediate, delayed, and working memory tasks with 80% accuracy given mod cues to improve retention of pertinent information. GOAL NOT MET- CONTINUE  INTERVENTIONS: Greeted therapist by name as she entered the room, but later in the session called her by a different name. AM Session: Pt recognized this therapist and apologized for not being motivated to complete exercises last session. However, patient with poor recall of name of therapist and discharge date, verbalizing multiple times that he is leaving next Monday even after the therapist corrected him. *Pt did report that palliative care spoke with him over the weekend. SHORT TERM GOAL #4:  Goal 4:   Pt will complete problem solving, verbal/visual reasoning (time, money/math/finances/medications), and thought organization tasks with 80% accuracy given min cues to improve independence with IADL completion. GAL MET  REVISED GOAL:Pt will complete problem solving, verbal/visual reasoning (time, money/math/finances/medications), and thought organization tasks with 90% accuracy given min cues to improve independence with IADL completion. INTERVENTIONS: Time related word problems- 5/6 independently for math computation, 1/6 with min cues for math reasoning. *Increased time needed to complete tasks due to visual deficits.       SHORT TERM GOAL #5:  Goal 5:  Pt will complete complex attention tasks with no more than 2 errors within a 5-7 minutes time span to improve higher level attention. GOAL NOT MET- CONTINUE  INTERVENTIONS: Pt prompted to complete a double visual cancellation task while simultaneously listening for an auditory target. Pt sustained attention to task for ~7 minutes with accurate identification of all 7 auditory targets. However, patient had x4 errors on the visual scanning task (x2 omitted and x2 arbitrary   items marked). Long-term Goals  Timeframe for Long-term Goals: 4 weeks  Goal 1: Pt will safely tolerate puree diet with thin liquid without difficulty or overt s/s of aspiration. GOAL NOT MET- CONTINUE   Goal 2: Pt will improve cognitive functioning to a superivision/mod I level for safe and functional return to home environment.  GOAL NOT MET- CONTINUE      COMMUNICATION  Comprehension: 4 - Patient understands basic needs 75-90%+ of the time  Expression: 5 - Expresses basic ideas/needs only (hungry/hot/pain)  SOCIAL COGNITION  Social Interaction: 6 - Patient requires medication for mood and/or effect  Problem Solvin - Patient solves simple/routine tasks 75-90%+   Memory: 3 - Patient remembers 50%-74% of the time    ST FIM ASSESSMENT:     Admission score Current score Goal Status   COMMUNICATION 4 - Patient understands basic needs 75-90%+ of the time   4 - Patient understands basic needs 75-90%+ of the time   Stable   EXPRESSION 4 - Expresses basic ideas/needs 75-90%+ of the time     5 - Expresses basic ideas/needs only (hungry/hot/pain)   Progressing   SOCIAL INTERACTION 4 - Patient appropriate 75-90%+ of the time   6 - Patient requires medication for mood and/or effect   Progressing   PROBLEM SOLVING 3 - Patient solves simple/routine tasks 50%-74%   4 - Patient solves simple/routine tasks 75-90%+    Progressing   MEMORY 3 - Patient remembers 50%-74% of the time   3 - Patient remembers 50%-74% of the time   Stable           ASSESSMENT:  Assessment: [x]Progressing towards goals          []Not Progressing towards goals  Pt met

## 2019-08-06 LAB — GLUCOSE BLD-MCNC: 83 MG/DL (ref 70–108)

## 2019-08-06 PROCEDURE — 97110 THERAPEUTIC EXERCISES: CPT

## 2019-08-06 PROCEDURE — 97116 GAIT TRAINING THERAPY: CPT

## 2019-08-06 PROCEDURE — 6370000000 HC RX 637 (ALT 250 FOR IP): Performed by: INTERNAL MEDICINE

## 2019-08-06 PROCEDURE — 6360000002 HC RX W HCPCS: Performed by: PHYSICAL MEDICINE & REHABILITATION

## 2019-08-06 PROCEDURE — 2709999900 HC NON-CHARGEABLE SUPPLY

## 2019-08-06 PROCEDURE — 1180000000 HC REHAB R&B

## 2019-08-06 PROCEDURE — 97535 SELF CARE MNGMENT TRAINING: CPT

## 2019-08-06 PROCEDURE — 97542 WHEELCHAIR MNGMENT TRAINING: CPT

## 2019-08-06 PROCEDURE — 94760 N-INVAS EAR/PLS OXIMETRY 1: CPT

## 2019-08-06 PROCEDURE — 92526 ORAL FUNCTION THERAPY: CPT

## 2019-08-06 PROCEDURE — 2580000003 HC RX 258: Performed by: PHYSICAL MEDICINE & REHABILITATION

## 2019-08-06 PROCEDURE — 99232 SBSQ HOSP IP/OBS MODERATE 35: CPT | Performed by: PAIN MEDICINE

## 2019-08-06 PROCEDURE — 2700000000 HC OXYGEN THERAPY PER DAY

## 2019-08-06 PROCEDURE — 94640 AIRWAY INHALATION TREATMENT: CPT

## 2019-08-06 PROCEDURE — 92526 ORAL FUNCTION THERAPY: CPT | Performed by: SPEECH-LANGUAGE PATHOLOGIST

## 2019-08-06 PROCEDURE — 6370000000 HC RX 637 (ALT 250 FOR IP): Performed by: STUDENT IN AN ORGANIZED HEALTH CARE EDUCATION/TRAINING PROGRAM

## 2019-08-06 PROCEDURE — 97127 HC SP THER IVNTJ W/FOCUS COG FUNCJ: CPT | Performed by: SPEECH-LANGUAGE PATHOLOGIST

## 2019-08-06 PROCEDURE — 97530 THERAPEUTIC ACTIVITIES: CPT

## 2019-08-06 PROCEDURE — 82948 REAGENT STRIP/BLOOD GLUCOSE: CPT

## 2019-08-06 PROCEDURE — 6370000000 HC RX 637 (ALT 250 FOR IP): Performed by: PHYSICAL MEDICINE & REHABILITATION

## 2019-08-06 PROCEDURE — 94668 MNPJ CHEST WALL SBSQ: CPT

## 2019-08-06 PROCEDURE — 97127 HC SP THER IVNTJ W/FOCUS COG FUNCJ: CPT

## 2019-08-06 RX ORDER — MORPHINE SULFATE 10 MG/5ML
5-10 SOLUTION ORAL EVERY 6 HOURS PRN
Qty: 280 ML | Refills: 0 | Status: SHIPPED | OUTPATIENT
Start: 2019-08-06 | End: 2019-08-20 | Stop reason: SDUPTHER

## 2019-08-06 RX ADMIN — FAMOTIDINE 20 MG: 20 TABLET ORAL at 08:39

## 2019-08-06 RX ADMIN — MIDODRINE HYDROCHLORIDE 10 MG: 10 TABLET ORAL at 13:35

## 2019-08-06 RX ADMIN — IPRATROPIUM BROMIDE AND ALBUTEROL SULFATE 1 AMPULE: .5; 3 SOLUTION RESPIRATORY (INHALATION) at 13:52

## 2019-08-06 RX ADMIN — FAMOTIDINE 20 MG: 20 TABLET ORAL at 21:00

## 2019-08-06 RX ADMIN — RIVAROXABAN 20 MG: 20 TABLET, FILM COATED ORAL at 17:34

## 2019-08-06 RX ADMIN — MORPHINE SULFATE 10 MG: 10 SOLUTION ORAL at 08:39

## 2019-08-06 RX ADMIN — MIDODRINE HYDROCHLORIDE 10 MG: 10 TABLET ORAL at 08:39

## 2019-08-06 RX ADMIN — MIDODRINE HYDROCHLORIDE 10 MG: 10 TABLET ORAL at 17:33

## 2019-08-06 RX ADMIN — IPRATROPIUM BROMIDE AND ALBUTEROL SULFATE 1 AMPULE: .5; 3 SOLUTION RESPIRATORY (INHALATION) at 16:51

## 2019-08-06 RX ADMIN — Medication 1 CAPSULE: at 17:34

## 2019-08-06 RX ADMIN — MICONAZOLE NITRATE: 20 POWDER TOPICAL at 08:41

## 2019-08-06 RX ADMIN — ERGOCALCIFEROL 50000 UNITS: 1.25 CAPSULE ORAL at 08:39

## 2019-08-06 RX ADMIN — ACETAMINOPHEN 650 MG: 650 SOLUTION ORAL at 17:33

## 2019-08-06 RX ADMIN — Medication 100 MG: at 08:39

## 2019-08-06 RX ADMIN — IPRATROPIUM BROMIDE AND ALBUTEROL SULFATE 1 AMPULE: .5; 3 SOLUTION RESPIRATORY (INHALATION) at 06:48

## 2019-08-06 RX ADMIN — TRAZODONE HYDROCHLORIDE 100 MG: 100 TABLET ORAL at 21:00

## 2019-08-06 RX ADMIN — SERTRALINE 200 MG: 100 TABLET, FILM COATED ORAL at 08:39

## 2019-08-06 RX ADMIN — HEPARIN 500 UNITS: 100 SYRINGE at 14:41

## 2019-08-06 RX ADMIN — ATORVASTATIN CALCIUM 80 MG: 80 TABLET, FILM COATED ORAL at 21:00

## 2019-08-06 RX ADMIN — Medication 1 CAPSULE: at 08:40

## 2019-08-06 RX ADMIN — ASPIRIN 81 MG: 81 TABLET ORAL at 08:39

## 2019-08-06 RX ADMIN — MICONAZOLE NITRATE: 20 POWDER TOPICAL at 21:00

## 2019-08-06 RX ADMIN — MORPHINE SULFATE 10 MG: 10 SOLUTION ORAL at 13:35

## 2019-08-06 RX ADMIN — Medication 10 ML: at 14:41

## 2019-08-06 RX ADMIN — BUSPIRONE HYDROCHLORIDE 10 MG: 10 TABLET ORAL at 21:00

## 2019-08-06 ASSESSMENT — PAIN SCALES - GENERAL
PAINLEVEL_OUTOF10: 10
PAINLEVEL_OUTOF10: 9
PAINLEVEL_OUTOF10: 0
PAINLEVEL_OUTOF10: 8
PAINLEVEL_OUTOF10: 8
PAINLEVEL_OUTOF10: 10

## 2019-08-06 ASSESSMENT — PAIN DESCRIPTION - PROGRESSION: CLINICAL_PROGRESSION: NOT CHANGED

## 2019-08-06 ASSESSMENT — PAIN DESCRIPTION - ONSET: ONSET: ON-GOING

## 2019-08-06 ASSESSMENT — PAIN DESCRIPTION - PAIN TYPE: TYPE: CHRONIC PAIN

## 2019-08-06 ASSESSMENT — PAIN - FUNCTIONAL ASSESSMENT: PAIN_FUNCTIONAL_ASSESSMENT: ACTIVITIES ARE NOT PREVENTED

## 2019-08-06 ASSESSMENT — PAIN DESCRIPTION - DESCRIPTORS: DESCRIPTORS: ACHING;SHARP

## 2019-08-06 ASSESSMENT — PAIN DESCRIPTION - LOCATION: LOCATION: SHOULDER;BACK

## 2019-08-06 ASSESSMENT — PAIN DESCRIPTION - FREQUENCY: FREQUENCY: CONTINUOUS

## 2019-08-06 ASSESSMENT — PAIN DESCRIPTION - ORIENTATION: ORIENTATION: LEFT

## 2019-08-06 NOTE — PROGRESS NOTES
balance for clothing threading. edu on how to lay in supine to don shoes for safety when home alone)  Toileting: 3 - Able to perform 2 tasks  Toilet Transfer: 3 - Requires 25-49% assist getting off toilet  Shower Transfer: 4 - Minimal contact assistance, pt. expends 75% or more effort,  , Assessment: Pt is motivated and making steady progress towards his goals, meeting 4/5 STGs. Pt demonstrates improved sitting and standing balance which has improved his indep requiring up to [place] for ADLs. Pt is limited by decreased endurance and decreased safety awareness. Pt demonstrates decreased insight into his deficits and tends to perseverate on eating food, though he is NPO. Pt continues to demonstrate L hemibody weakness and ataxia, flucuating midline orientation, and decreased safety awareness impacting patient's ability to complete self care at prior level of functioning. Due to patient's performance deficits, patient would greatly benefit from continued occupational therapy for ADL remediation, endurance building, strengthening and neuro re-education to return to prior level of functioning and safe return to home environment. Speech therapy: FIMS: Comprehension: 4 - Patient understands basic needs 75-90%+ of the time  Expression: 5 - Expresses basic ideas/needs only (hungry/hot/pain)  Social Interaction: 6 - Patient requires medication for mood and/or effect  Problem Solving: 3 - Patient solves simple/routine tasks 50%-74%  Memory: 3 - Patient remembers 50%-74% of the time    DIAGNOSTIC IMPRESSIONS: Patient presents with mild oral and severe pharyngeal dysphagia evidenced by significant premature pharyngeal entry, primarily to the right pyriform sinus with NO epiglottic movement. Spillage over the epiglottic edge noted with denser consistencies resulting in laryngeal penetration and tracheal aspiration before the swallow.  In addition, moderate pharyngeal residue noted in the valleculae, pyriform sinus and along the (24hrs), Av , Min:97 , XKU:[id number]     Diastolic (53HHB), QYP:14, Min:58, Max:60    Pulse Range (24h): Pulse  Av.5  Min: 61  Max: 62  Respiration Range (24h): Resp  Av  Min: 16  Max: 20  Current Pulse Ox (24h):  SpO2: 98 %  Pulse Ox Range (24h):  SpO2  Av.3 %  Min: 92 %  Max: 98 %  Oxygen Amount and Delivery: O2 Flow Rate (L/min): 2 L/min    awake  Orientation:   person, place, time, situation  Mood: anxious  Affect: calm    General appearance:  in no acute distress, PEG, up in WC in therapy gym with nasal cannula at 2L  Memory:   grossly normal  Attention/Concentration: normal  Language:  normal     Cranial Nerves:  cranial nerves II-XII are grossly intact with exception of LEFT hemianopsia   ROM:  abnormal - limited RIGHT knee extension - lacking 20deg  Motor Exam:  Motor exam is 4 out of 5 all extremities with the exception of RIGHT ADF 1/5     Tone:  normal  Muscle bulk: within normal limits  Sensory:  Sensory intact  Coordination:   Decreased LEFT SUDARSHAN, FFM, FTN, and HTS  Deep Tendon Reflexes:  Reflexes are intact and symmetrical bilaterally     Skin: warm and dry, no rash or erythema  Peripheral vascular: Pulses: Normal upper and lower extremity pulses; Edema: trace    Diagnostics:   Recent Results (from the past 24 hour(s))   POCT glucose    Collection Time: 19  7:59 AM   Result Value Ref Range    POC Glucose 83 70 - 108 mg/dl     Impression:  1. Acute CVA  large area of restricted diffusion involving the inferior medial aspect of the LEFT cerebellar hemisphere as well as the LEFT side of the medulla.    2. Chronic CVA with large infarct in the RIGHT posterior cerebral artery territory secondary to high-grade stenosis at the P1 segment of the RIGHT posterior cerebral artery without definite distal flow involving the RIGHT occipital lobe and tracking anteriorly along the medial aspect of the RIGHT temporal lobe with associated local mass effect with sulcal effacement without midline shift

## 2019-08-06 NOTE — PROGRESS NOTES
fractures on the right side    SUBJECTIVE: pt in bed on arrival he reported that he didn't have a good night     PAIN: 8/10: edelmira LEs and low back and left side    OBJECTIVE:  Bed Mobility:  Supine to Sit: Stand By Assistance  Sit to Supine: Stand By Assistance     Exercise:  Patient was guided in 1 set(s) 10 reps of exercise to both lower extremities. Ankle pumps, Glut sets, Quad sets, Heelslides, Short arc quads, Long arc quads, Hip abduction/adduction, Bridges, Lower trunk rotations and coord ex at left LE due to decreased control and accuracy . Exercises were completed for increased independence with functional mobility. Functional Outcome Measures: none       ASSESSMENT:  Assessment: Patient progressing toward established goals. Activity Tolerance:  Patient tolerance of  treatment: fair. Equipment Recommendations:Equipment Needed: No(cont to assess, has RW, 4WW, w/c, canes)  Other: will continue to assess  Discharge Recommendations:    Continue to assess pending progress    Plan: Times per week: 5x/wk 90 min, 1x wk 30 min  Specific instructions for Next Treatment: core and LE strengthening ex, bed mobility, sitting balance . PT to assess transfers and gait when medically approved  Current Treatment Recommendations: Strengthening, Endurance Training, Balance Training, Functional Mobility Training, Safety Education & Training, Equipment Evaluation, Education, & procurement    Patient Education  Patient Education: Plan of Care    Goals:  Patient goals : Did not state  Short term goals  Time Frame for Short term goals: 1 week  Short term goal 1: Pt to go supine <->sit,  supervision A, to get in/out of bed.    Short term goal 2: sit <> stand with SBA for ease of transfers  Short term goal 3: ambulate 50 feet with use of RW and CGA to prepare for in home mobility  Short term goal 4: WC mobility with mod I for 300ft to prepare for community mobility   Long term goals  Time Frame for Long term goals : 4 weeks   Long term goal 1: bed mobility with mod I for ease of getting in/out of bed  Long term goal 2: sit <> stand with mod I for safe of transfers   Long term goal 3: ambulate 75ft with use of LRAD and stand-by assist for safe in home mobility   Long term goal 4: negotiate 1 step no rails with stand-by assist and use of LRAD for safe entry/exit of his home   Long term goal 5: pt to perform car transfer at [facility] for safe transport home    Following session, patient left in safe position with all fall risk precautions in place.

## 2019-08-06 NOTE — PROGRESS NOTES
cues    Pt stood, supported at AD for cone taps, pt demos ataxia B mattie L, unsteady at times, cues to keep AD loser to body. Pt needing cues on posture and midline. Exercise:  Patient was guided in 1 set(s) 10 reps of exercise to left lower extremities. Long arc quads. Exercises were completed for increased independence with functional mobility. Completed with 3# weight to LLE    Functional Outcome Measures: Not completed       ASSESSMENT:  Assessment: Patient progressing toward established goals. Activity Tolerance:  Patient tolerance of  treatment: good. Pt tolerated session well. Pt demos decreased strength, endurance balance, increased ataxia 2 LOB with assist to control pt into WC. Pt would require cont skilled PT at this time to improve safety awareness, and to decrease the risk for falls. Pt unsteady on feet at this time. Equipment Recommendations:Equipment Needed: No(cont to assess, has RW, 4WW, w/c, canes)  Other: will continue to assess  Discharge Recommendations:  Continue to assess pending progress    Plan: Times per week: 5x/wk 90 min, 1x wk 30 min  Specific instructions for Next Treatment: core and LE strengthening ex, bed mobility, sitting balance . PT to assess transfers and gait when medically approved  Current Treatment Recommendations: Strengthening, Endurance Training, Balance Training, Functional Mobility Training, Safety Education & Training, Equipment Evaluation, Education, & procurement    Patient Education  Patient Education: Plan of Care, [employer] Mobility, Equipment Education, Transfers, Gait, posture, midline, safety    Goals:  Patient goals : Did not state  Short term goals  Time Frame for Short term goals: 1 week  Short term goal 1: Pt to go supine <->sit,  supervision A, to get in/out of bed.    Short term goal 2: sit <> stand with SBA for ease of transfers  Short term goal 3: ambulate 50 feet with use of RW and CGA to prepare for in home mobility  Short term goal 4: WC mobility

## 2019-08-06 NOTE — PROGRESS NOTES
[address]   SLP Individual Minutes  Time In: 0900  Time Out: 0930  Minutes: 30  Timed Code Treatment Minutes: 8 Minutes      Dysphagia tx- 22 minutes  Cognitive tx- 8 minutes    [x]Daily Note  []Progress Note-  []Discharge Note    Date: 2019  Patient Name: Luciana Schwab        MRN: [id number]    : 1960  (62 y.o.)  Gender: male   Primary Provider: Cale Shell MD  Admitting Diagnosis:  Cerebellar infarct   Secondary Diagnosis: Cognitive deficits, Dysphagia, Dysphonia   Precautions: fall risk, aspiration risk  Swallowing Status/Diet: NPO, PEG tube in place for nutrition and hydration  Swallowing Strategies: NPO  DATE of last MBS:  2019; FEES 2019  Pain: 0/10    Subjective: Patient seen at bedside, fatigued but agreeable to treatment. Patient motivated to participate. SHORT TERM GOAL #1:  Goal 1:  Pt will complete pharyngeal strengthening exercise program with completion of exercises x10 to improve anterior hyoid excursion and pharyngeal strengthening to increase airway protection and pharyngeal contraction during the swallow. INTERVENTIONS: Completed the following pharyngeal swallow exercises to improve hyolaryngeal elevation and anterior excursion to maximize epiglottic movement during the swallow. Instruction provided on proper technique for the following :   -Effortful swallow- 1 set of 10, fair- good success  -Janelle swallow- 1 set of 10, fair -good success  -CTAR- 1 sets of 10 with good success. -BOT retraction- 1 set of 20 with good success   *Recommended patient continue to complete exercises independently 2-3x per day to maximize benefits for improved swallow function. *Patient again inquiring about when he was going to be able to eat and if he can have ice chips. Re-educated patient on recommendations for repeat swallow assessment in 3-4 weeks, as an outpatient.  As well need to tasks with no more than 2 errors within a 5-7 minutes time span to improve higher level attention. INTERVENTIONS: Did not address due to focus on other goals. Long-term Goals  Timeframe for Long-term Goals: 4 weeks  Goal 1: Pt will safely tolerate puree diet with thin liquid without difficulty or overt s/s of aspiration. Goal 2: Pt will improve cognitive functioning to a superivision/mod I level for safe and functional return to home environment.        COMMUNICATION  Comprehension: 4 - Patient understands basic needs 75-90%+ of the time  Expression: 5 - Expresses basic ideas/needs only (hungry/hot/pain)  SOCIAL COGNITION  Social Interaction: 6 - Patient requires medication for mood and/or effect  Problem Solving: 3 - Patient solves simple/routine tasks 50%-74%  Memory: 3 - Patient remembers 50%-74% of the time      ASSESSMENT:  Assessment: [x]Progressing towards goals          []Not Progressing towards goals    Patient Tolerance of Treatment:   [x]Tolerated well []Tolerated fair []Required rest breaks []Fatigued  Plan for Next Session:  pharyngeal exercises, delayed recall  Education:  Learner:  [x]Patient          []Significant Other          []Other: Benny Sujeyanette Montano  Education provided regarding:  [x]Goals and POC   [x]Diet and swallowing precautions    [x]Home Exercise Program  [x]Progress and/or discharge information  Method of Education:  [x]Discussion          [x]Demonstration          []Handout          []Other  Evaluation of Education:   [x]Verbalized understanding   [x]Demonstrates without assistance  []Demonstrates with assistance  [x]Needs further instruction     []No evidence of learning                  [x]No family present      Plan: [x]Continue with current plan of care    []Modify current plan of care as follows:    []Discharge patient    Discharge Location:    Services/Supervision Recommended:     [x]Patient continues to require treatment by a licensed therapist to address functional deficits

## 2019-08-06 NOTE — PROGRESS NOTES
Neuromuscular Re-education, Patient/Caregiver Education & Training, Equipment Evaluation, Education, & procurement, Self-Care / ADL, Balance Training, Functional Mobility Training, Safety Education & Training    Patient Education  Patient Education: Home Exercise Program    Goals  Short term goals  Time Frame for Short term goals: 1 week  Short term goal 1: Pt will dynamically sit EOB for 5 minutes with Mod I  to improve independence with bathing/dressing tasks EOB. Short term goal 2: Pt will tolerate static standing for >3 minutes CGA with 1 UE release and min cues for midline orientation in prep for washing hands at sink. Short term goal 3: Pt will complete stand pivot transfers with SBA x1 and min verbal cues for safety to improve independence with w/c transfers. Short term goal 4: Pt will improve LUE coordination as evidenced by completion of 9 hole peg test in < 90 secs to improve indep with gathering items. Short term goal 5: Pt will safely navigate RW with CGA to BR improve independence with toileting routine. Long term goals  Time Frame for Long term goals : 3 weeks  Long term goal 1: Pt will complete ADL routine with SBA and no cues for safety or adapted technique to increase ability to perform self care tasks GOAL NOT MET, CONTINUE  Long term goal 2: Pt will complete light standing IADL task using RW with SBA and no cues for walker safety to improve indep within home. GOAL NOT MET, CONTINUE    Following session, patient left in safe position with all fall risk precautions in place.

## 2019-08-06 NOTE — PROGRESS NOTES
[address]  Occupational Therapy  Daily Note  Time:   Time In: 1000  Time Out: 1100  Timed Code Treatment Minutes: 60 Minutes  Minutes: 60    Date: 2019  Patient Name: Frances Mario,   Gender: male      Room: [facility]/070-A  MRN: [id number]  : 1960  (62 y.o.)  Referring Practitioner: Nando Verdugo MD  Diagnosis: Cerebellar infarct  Additional Pertinent Hx: He was admitted 2019 for complaint of injuries after a ground-level fall at home. The patient was reaching over his couch when he lost his balance and fell onto his left side causing him to strike his head without any endorsed loss of consciousness. There was a small superficial contusion of the left frontal region. On initial work-up he was found to have a left ninth rib fracture and also old rib fractures on the right side. CT imaging of the chest showed patchy nodular airspace opacities in the left lung base suggestive of infiltrates with an elevated white count of 15.3 noted. CT imaging of the head did show an acute infarct of the inferior medial aspect of the left cerebellar hemisphere as well as the left side of the medulla. The patient was admitted for further medical care. Note was also made that the patient was disheveled and malodorous and had questionable ability to care for himself. He did undergo a modified barium swallow study on  which showed the patient to have mild oral dysphagia and moderate pharyngeal dysphasia with recommendation at that time for thin and moist purées with thin liquids and strict use of compensatory strategies. He is currently on Zosyn for treating a presumptive aspiration pneumonia versus a hospital-acquired pneumonia. Dandy Orellana Hx of prior CVA in april with chronic pain. The patient was just recently in the emergency department on 2019 for complaint of unsteady gait and nausea and vomiting as well as issues with swallow.   Pertinent medical

## 2019-08-07 ENCOUNTER — TELEPHONE (OUTPATIENT)
Dept: ONCOLOGY | Age: 59
End: 2019-08-07

## 2019-08-07 LAB — GLUCOSE BLD-MCNC: 85 MG/DL (ref 70–108)

## 2019-08-07 PROCEDURE — 6370000000 HC RX 637 (ALT 250 FOR IP): Performed by: INTERNAL MEDICINE

## 2019-08-07 PROCEDURE — 97116 GAIT TRAINING THERAPY: CPT

## 2019-08-07 PROCEDURE — 6370000000 HC RX 637 (ALT 250 FOR IP): Performed by: PHYSICAL MEDICINE & REHABILITATION

## 2019-08-07 PROCEDURE — 97530 THERAPEUTIC ACTIVITIES: CPT

## 2019-08-07 PROCEDURE — 2709999900 HC NON-CHARGEABLE SUPPLY

## 2019-08-07 PROCEDURE — 1180000000 HC REHAB R&B

## 2019-08-07 PROCEDURE — 97127 HC SP THER IVNTJ W/FOCUS COG FUNCJ: CPT | Performed by: SPEECH-LANGUAGE PATHOLOGIST

## 2019-08-07 PROCEDURE — 97542 WHEELCHAIR MNGMENT TRAINING: CPT

## 2019-08-07 PROCEDURE — 6370000000 HC RX 637 (ALT 250 FOR IP): Performed by: STUDENT IN AN ORGANIZED HEALTH CARE EDUCATION/TRAINING PROGRAM

## 2019-08-07 PROCEDURE — 97110 THERAPEUTIC EXERCISES: CPT

## 2019-08-07 PROCEDURE — 92526 ORAL FUNCTION THERAPY: CPT | Performed by: SPEECH-LANGUAGE PATHOLOGIST

## 2019-08-07 PROCEDURE — 94640 AIRWAY INHALATION TREATMENT: CPT

## 2019-08-07 PROCEDURE — 82948 REAGENT STRIP/BLOOD GLUCOSE: CPT

## 2019-08-07 PROCEDURE — 97535 SELF CARE MNGMENT TRAINING: CPT

## 2019-08-07 RX ADMIN — MIDODRINE HYDROCHLORIDE 10 MG: 10 TABLET ORAL at 17:20

## 2019-08-07 RX ADMIN — ATORVASTATIN CALCIUM 80 MG: 80 TABLET, FILM COATED ORAL at 20:51

## 2019-08-07 RX ADMIN — TRAZODONE HYDROCHLORIDE 100 MG: 100 TABLET ORAL at 20:51

## 2019-08-07 RX ADMIN — MICONAZOLE NITRATE: 20 POWDER TOPICAL at 21:04

## 2019-08-07 RX ADMIN — MORPHINE SULFATE 10 MG: 10 SOLUTION ORAL at 20:50

## 2019-08-07 RX ADMIN — FAMOTIDINE 20 MG: 20 TABLET ORAL at 20:51

## 2019-08-07 RX ADMIN — ACETAMINOPHEN 650 MG: 650 SOLUTION ORAL at 17:20

## 2019-08-07 RX ADMIN — Medication 1 CAPSULE: at 08:21

## 2019-08-07 RX ADMIN — FAMOTIDINE 20 MG: 20 TABLET ORAL at 08:21

## 2019-08-07 RX ADMIN — IPRATROPIUM BROMIDE AND ALBUTEROL SULFATE 1 AMPULE: .5; 3 SOLUTION RESPIRATORY (INHALATION) at 17:07

## 2019-08-07 RX ADMIN — SERTRALINE 200 MG: 100 TABLET, FILM COATED ORAL at 08:21

## 2019-08-07 RX ADMIN — Medication 100 MG: at 08:21

## 2019-08-07 RX ADMIN — RIVAROXABAN 20 MG: 20 TABLET, FILM COATED ORAL at 17:20

## 2019-08-07 RX ADMIN — MIDODRINE HYDROCHLORIDE 10 MG: 10 TABLET ORAL at 14:28

## 2019-08-07 RX ADMIN — BUTALBITAL, ACETAMINOPHEN, AND CAFFEINE 1 TABLET: 50; 325; 40 TABLET ORAL at 14:29

## 2019-08-07 RX ADMIN — IPRATROPIUM BROMIDE AND ALBUTEROL SULFATE 1 AMPULE: .5; 3 SOLUTION RESPIRATORY (INHALATION) at 13:40

## 2019-08-07 RX ADMIN — Medication 1 CAPSULE: at 17:20

## 2019-08-07 RX ADMIN — MORPHINE SULFATE 10 MG: 10 SOLUTION ORAL at 00:50

## 2019-08-07 RX ADMIN — ASPIRIN 81 MG: 81 TABLET ORAL at 08:22

## 2019-08-07 RX ADMIN — ACETAMINOPHEN 650 MG: 650 SOLUTION ORAL at 08:21

## 2019-08-07 RX ADMIN — MICONAZOLE NITRATE: 20 POWDER TOPICAL at 08:22

## 2019-08-07 RX ADMIN — MIDODRINE HYDROCHLORIDE 10 MG: 10 TABLET ORAL at 08:21

## 2019-08-07 RX ADMIN — BUSPIRONE HYDROCHLORIDE 10 MG: 10 TABLET ORAL at 20:51

## 2019-08-07 ASSESSMENT — PAIN SCALES - GENERAL
PAINLEVEL_OUTOF10: 2
PAINLEVEL_OUTOF10: 10
PAINLEVEL_OUTOF10: 7
PAINLEVEL_OUTOF10: 10
PAINLEVEL_OUTOF10: 8
PAINLEVEL_OUTOF10: 8
PAINLEVEL_OUTOF10: 10
PAINLEVEL_OUTOF10: 10

## 2019-08-07 NOTE — PROGRESS NOTES
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 Denny Chau. It is noted that his malignancy is stated to be in remission. 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 Restrictions/Precautions:  Restrictions/Precautions: Fall Risk, General Precautions(NPO)  Position Activity Restriction  Other position/activity restrictions: PEG , oxygen, impulsive, unsteady, left ninth rib fracture and also old rib fractures on the right side    SUBJECTIVE: Seated in gym upon arrival, agreeable to OT. PAIN: No c/o    COGNITION: Decreased Attention Span, Decreased Problem Solving and Decreased Insight    ADL:   See FIM Information Below. Groomin - Requires setup/cues to do all tasks(s/u hand hygiene)           Dressing-Lower: 4 - Requires assist with buttons/zippers/shoelaces and/or assist with shoes only(CGA standing balance, pt doffed and donned sitting on toilet. mod increased time. )   Toileting: 3 - Able to perform 2 tasks(A for bowel hygiene for completeness only. CGA balance.)   Toilet Transfer: 4 - Requires steadying assistance only < 25% assist(CGA, ETS)    Mod increased time for patient to use restroom and problem solve how to doff and don new clothing and depends while on ETS. Patient fidgeting and becoming distracted by PEG tube during session, requiring cue's to sustain attention. At one point patient had air freshener in one hand and the peg tube in the other and opened up cap to \"dump\" out the remaining TPN. Notified YANG Pickering.  Education on the importance of having clean hands and a clean

## 2019-08-07 NOTE — PROGRESS NOTES
[address] [place] [address]  Occupational Therapy  Daily Note  Time:   Time In: 1400  Time Out: 1430  Timed Code Treatment Minutes: 30 Minutes  Minutes: 30    Date: 2019  Patient Name: Mariel Palomino,   Gender: male      Room: [facility]/070-A  MRN: [id number]  : 1960  (62 y.o.)  Referring Practitioner: Darci Giordano MD  Diagnosis: Cerebellar infarct  Additional Pertinent Hx: He was admitted 2019 for complaint of injuries after a ground-level fall at home. The patient was reaching over his couch when he lost his balance and fell onto his left side causing him to strike his head without any endorsed loss of consciousness. There was a small superficial contusion of the left frontal region. On initial work-up he was found to have a left ninth rib fracture and also old rib fractures on the right side. CT imaging of the chest showed patchy nodular airspace opacities in the left lung base suggestive of infiltrates with an elevated white count of 15.3 noted. CT imaging of the head did show an acute infarct of the inferior medial aspect of the left cerebellar hemisphere as well as the left side of the medulla. The patient was admitted for further medical care. Note was also made that the patient was disheveled and malodorous and had questionable ability to care for himself. He did undergo a modified barium swallow study on  which showed the patient to have mild oral dysphagia and moderate pharyngeal dysphasia with recommendation at that time for thin and moist purées with thin liquids and strict use of compensatory strategies. He is currently on Zosyn for treating a presumptive aspiration pneumonia versus a hospital-acquired pneumonia. Leicester Laly Hx of prior CVA in april with chronic pain. The patient was just recently in the emergency department on 2019 for complaint of unsteady gait and nausea and vomiting as well as issues with swallow.   Pertinent medical coordination. ASSESSMENT:  Activity Tolerance:  Patient tolerance of  treatment: good. Discharge Recommendations: Home with Home health OT, Home with nursing aide, 24 hour supervision or assist  Equipment Recommendations: Equipment Needed: Yes  ADL Assistive Devices: Toileting - Raised Toilet Seat with arms  Plan: Times per week: 5xs week x 90 min, 1 x week x 30 min   Current Treatment Recommendations: Strengthening, Endurance Training, Neuromuscular Re-education, Patient/Caregiver Education & Training, Equipment Evaluation, Education, & procurement, Self-Care / ADL, Balance Training, Functional Mobility Training, Safety Education & Training    Patient Education  Patient Education: IADL Safety    Goals  Short term goals  Time Frame for Short term goals: 1 week  Short term goal 1: Pt will dynamically sit EOB for 5 minutes with Mod I  to improve independence with bathing/dressing tasks EOB. Short term goal 2: Pt will tolerate static standing for >3 minutes CGA with 1 UE release and min cues for midline orientation in prep for washing hands at sink. Short term goal 3: Pt will complete stand pivot transfers with SBA x1 and min verbal cues for safety to improve independence with w/c transfers. Short term goal 4: Pt will improve LUE coordination as evidenced by completion of 9 hole peg test in < 90 secs to improve indep with gathering items. Short term goal 5: Pt will safely navigate RW with CGA to BR improve independence with toileting routine. Long term goals  Time Frame for Long term goals : 3 weeks  Long term goal 1: Pt will complete ADL routine with SBA and no cues for safety or adapted technique to increase ability to perform self care tasks   Long term goal 2: Pt will complete light standing IADL task using RW with SBA and no cues for walker safety to improve indep within home. Following session, patient left in safe position with all fall risk precautions in place.

## 2019-08-07 NOTE — PROGRESS NOTES
[address]   SLP Individual Minutes  Time In: 1430  Time Out: 1500  Minutes: 30  Timed Code Treatment Minutes: 15 Minutes        [x]Daily Note  []Progress Note-  []Discharge Note    Date: 2019  Patient Name: Bertha Colon        MRN: [id number]    : 1960  (62 y.o.)  Gender: male   Primary Provider: Bang Quinones MD  Admitting Diagnosis:  Cerebellar infarct   Secondary Diagnosis: Cognitive deficits, Dysphagia, Dysphonia   Precautions: fall risk, aspiration risk  Swallowing Status/Diet: NPO, PEG tube in place for nutrition and hydration  Swallowing Strategies: NPO  DATE of last MBS:  2019; FEES 2019  Pain: 2/10    Subjective: Patient seen at bedside. Nurse present to provide tube feeding. Patient with frequent tangential speech perseverating on how \"good\" his nurse, [facility] and [facility]. Required consistent cues to revert attention back to task. SHORT TERM GOAL #1:  Goal 1:  Pt will complete pharyngeal strengthening exercise program with completion of exercises x10 to improve anterior hyoid excursion and pharyngeal strengthening to increase airway protection and pharyngeal contraction during the swallow. INTERVENTIONS: Completed the following pharyngeal swallow exercises to improve hyolaryngeal elevation and anterior excursion to maximize epiglottic movement during the swallow. Instruction provided on proper technique for the following:   -Effortful swallow- 2 sets of 10, fair success  -Janelle swallow- 2 sets of 10, fair success  -Base of tongue retraction- 2 sets of 20, fair success  -CTAR- 2 sets of 10, fair success. *Patient able to complete exercises, but question if level of intensity is significant enough to make improvements.      SHORT TERM GOAL #2:  Goal 2: Pt will exhibit return demonstration on need for completion of comprehensive oral care with 90% accuracy given no cues to reduce

## 2019-08-07 NOTE — PROGRESS NOTES
[facility]  INPATIENT PHYSICAL THERAPY  DAILY NOTE  [facility] - 7E-70/070-A  Time In: 1130  Time Out: 1200  Timed Code Treatment Minutes: 30 Minutes  Minutes: 30          Date: 2019  Patient Name: Veronica Wilson,  Gender:  male        MRN: [id number]  : 1960  (62 y.o.)     Referring Practitioner: Charlanne Lefort, MD  Diagnosis: cerebellar infarct   Additional Pertinent Hx: Pt admitted through ED due to weakness and pain s/p fall at home. Hx:  CA, CVA. x-ray Lt 9th rib fx. MRI:  remote infarct, restricted diffusion inferior medial aspect LT cerebellar hemisphere and medula. Pt admited to hospital on 2019 and transferred to rehab on . Prior Level of Function:  Lives With: Alone  Type of Home: Apartment  Home Layout: One level  Home Access: Stairs to enter without rails  Entrance Stairs - Number of Steps: 1  Home Equipment: Wheelchair-manual, 4 wheeled walker, Rolling walker, Quad cane(hospital bed with bed rails)   ADL Assistance: [address] [place] [address] Avenue: Independent  Homemaking Responsibilities: Yes  Meal Prep Responsibility: Secondary  Laundry Responsibility: Secondary  Cleaning Responsibility: No  Bill Paying/Finance Responsibility: Primary  Shopping Responsibility: Secondary  Dependent Care Responsibility: No  Ambulation Assistance: Independent  Transfer Assistance: Independent  Additional Comments: Pt reports he sleeps on foton at home. Pt reports ambulating ~1 block prior to admisison with 4WW. Pt denies use of AD in the home. Chronic Rt foot drop. Family to bring in AFO    Restrictions/Precautions:  Restrictions/Precautions: Fall Risk, General Precautions(NPO)  Position Activity Restriction  Other position/activity restrictions: PEG , oxygen, impulsive, unsteady, left ninth rib fracture and also old rib fractures on the right side    SUBJECTIVE: Pt very pleasant and cooperative. Motivated.      PAIN: denies    OBJECTIVE:  Bed

## 2019-08-07 NOTE — PROGRESS NOTES
one person to operate wheelchair between [address]: 0 - Activity Does not Occur ( 0 only for the admission assessment)    COMMUNICATION  Comprehension: 4 - Patient understands basic needs 75-90%+ of the time  Expression: 5 - Expresses basic ideas/needs only (hungry/hot/pain)    SOCIAL COGNITION  Social Interaction: 6 - Patient requires medication for mood and/or effect  Problem Solving: 3 - Patient solves simple/routine tasks 50%-74%  Memory: 3 - Patient remembers 50%-74% of the time    EQUIPMENT AND DEVICES:  Assistive Devices  Assistive Devices: Eyeglasses, Shower chair, Walker, Other (Comment), Wheelchair(hospital bed)    SWALLOWING:   Difficulty Chewing or Swallowing Food: Yes (Comment)(has peg with tube feedings)    VISION AND HEARING:   Sensory Problems  Visual impairment: Glasses    PHYSICIANS INVOLVED WITH CARE:    Ed Dueñas MD  No ref.  provider found  MD Ed Mariee MD      Immunization History   Administered Date(s) Administered    Pneumococcal Polysaccharide ([id number]) 10/30/2018         Portable Patient Profile was provided to patient and/or family on 8/7/2019

## 2019-08-07 NOTE — PROGRESS NOTES
pertinent information. INTERVENTIONS:   Recall of ST name: x1 indep  Recall of discharge date and time- x2 indep. Poor working memory evidenced by continually forgetting his place in completion of pharyngeal swallow exercises. SHORT TERM GOAL #4:  Goal 4:   Pt will complete problem solving, verbal/visual reasoning (time, money/math/finances/medications), and thought organization tasks with 90% accuracy given min cues to improve independence with IADL completion. INTERVENTIONS: Calendar navigation task - 3/6 independently, 3/6 with min-mod cues for scanning, working memory, and math computation. *Patient requiring almost the entire session to work through tasks due to impaired vision and amount of time needed to scan each line for selecting target information. SHORT TERM GOAL #5:  Goal 5:  Pt will complete complex attention tasks with no more than 2 errors within a 5-7 minutes time span to improve higher level attention. INTERVENTIONS: Poor sustained attention this session with tangential speech patterns noted. Cueing needed for re-direction to task. Long-term Goals  Timeframe for Long-term Goals: 4 weeks  Goal 1: Pt will safely tolerate puree diet with thin liquid without difficulty or overt s/s of aspiration. Goal 2: Pt will improve cognitive functioning to a superivision/mod I level for safe and functional return to home environment.        COMMUNICATION  Comprehension: 4 - Patient understands basic needs 75-90%+ of the time  Expression: 5 - Expresses basic ideas/needs only (hungry/hot/pain)  SOCIAL COGNITION  Social Interaction: 6 - Patient requires medication for mood and/or effect  Problem Solvin - Patient solves simple/routine tasks 75-90%+   Memory: 3 - Patient remembers 50%-74% of the time      ASSESSMENT:  Assessment: [x]Progressing towards goals          []Not Progressing towards goals    Patient Tolerance of Treatment:   [x]Tolerated well []Tolerated fair []Required rest

## 2019-08-08 ENCOUNTER — APPOINTMENT (OUTPATIENT)
Dept: GENERAL RADIOLOGY | Age: 59
DRG: 056 | End: 2019-08-08
Attending: PHYSICAL MEDICINE & REHABILITATION
Payer: MEDICARE

## 2019-08-08 LAB — GLUCOSE BLD-MCNC: 85 MG/DL (ref 70–108)

## 2019-08-08 PROCEDURE — 6370000000 HC RX 637 (ALT 250 FOR IP): Performed by: STUDENT IN AN ORGANIZED HEALTH CARE EDUCATION/TRAINING PROGRAM

## 2019-08-08 PROCEDURE — 82948 REAGENT STRIP/BLOOD GLUCOSE: CPT

## 2019-08-08 PROCEDURE — 6370000000 HC RX 637 (ALT 250 FOR IP): Performed by: PHYSICAL MEDICINE & REHABILITATION

## 2019-08-08 PROCEDURE — 97116 GAIT TRAINING THERAPY: CPT

## 2019-08-08 PROCEDURE — 97127 HC SP THER IVNTJ W/FOCUS COG FUNCJ: CPT | Performed by: SPEECH-LANGUAGE PATHOLOGIST

## 2019-08-08 PROCEDURE — 92526 ORAL FUNCTION THERAPY: CPT | Performed by: SPEECH-LANGUAGE PATHOLOGIST

## 2019-08-08 PROCEDURE — 97535 SELF CARE MNGMENT TRAINING: CPT

## 2019-08-08 PROCEDURE — 71046 X-RAY EXAM CHEST 2 VIEWS: CPT

## 2019-08-08 PROCEDURE — 97530 THERAPEUTIC ACTIVITIES: CPT

## 2019-08-08 PROCEDURE — 94668 MNPJ CHEST WALL SBSQ: CPT

## 2019-08-08 PROCEDURE — 1180000000 HC REHAB R&B

## 2019-08-08 PROCEDURE — 94761 N-INVAS EAR/PLS OXIMETRY MLT: CPT

## 2019-08-08 PROCEDURE — 6370000000 HC RX 637 (ALT 250 FOR IP): Performed by: INTERNAL MEDICINE

## 2019-08-08 PROCEDURE — 97542 WHEELCHAIR MNGMENT TRAINING: CPT

## 2019-08-08 PROCEDURE — 2709999900 HC NON-CHARGEABLE SUPPLY

## 2019-08-08 PROCEDURE — 94640 AIRWAY INHALATION TREATMENT: CPT

## 2019-08-08 PROCEDURE — 2700000000 HC OXYGEN THERAPY PER DAY

## 2019-08-08 RX ORDER — LACTOBACILLUS RHAMNOSUS GG 10B CELL
1 CAPSULE ORAL EVERY 12 HOURS
Status: DISCONTINUED | OUTPATIENT
Start: 2019-08-15 | End: 2019-08-08

## 2019-08-08 RX ORDER — AMOXICILLIN AND CLAVULANATE POTASSIUM 875; 125 MG/1; MG/1
1 TABLET, FILM COATED ORAL EVERY 12 HOURS SCHEDULED
Status: DISCONTINUED | OUTPATIENT
Start: 2019-08-08 | End: 2019-08-14 | Stop reason: HOSPADM

## 2019-08-08 RX ORDER — LACTOBACILLUS RHAMNOSUS GG 10B CELL
1 CAPSULE ORAL 2 TIMES DAILY WITH MEALS
Status: DISCONTINUED | OUTPATIENT
Start: 2019-08-09 | End: 2019-08-14 | Stop reason: HOSPADM

## 2019-08-08 RX ADMIN — FAMOTIDINE 20 MG: 20 TABLET ORAL at 08:28

## 2019-08-08 RX ADMIN — MORPHINE SULFATE 10 MG: 10 SOLUTION ORAL at 08:28

## 2019-08-08 RX ADMIN — MICONAZOLE NITRATE: 20 POWDER TOPICAL at 08:35

## 2019-08-08 RX ADMIN — Medication 1 CAPSULE: at 08:30

## 2019-08-08 RX ADMIN — ATORVASTATIN CALCIUM 80 MG: 80 TABLET, FILM COATED ORAL at 20:45

## 2019-08-08 RX ADMIN — TRAZODONE HYDROCHLORIDE 100 MG: 100 TABLET ORAL at 20:45

## 2019-08-08 RX ADMIN — SERTRALINE 200 MG: 100 TABLET, FILM COATED ORAL at 08:29

## 2019-08-08 RX ADMIN — SENNOSIDES 17.2 MG: 8.6 TABLET, FILM COATED ORAL at 20:45

## 2019-08-08 RX ADMIN — CLONAZEPAM 1 MG: 0.5 TABLET ORAL at 11:53

## 2019-08-08 RX ADMIN — IPRATROPIUM BROMIDE AND ALBUTEROL SULFATE 1 AMPULE: .5; 3 SOLUTION RESPIRATORY (INHALATION) at 04:08

## 2019-08-08 RX ADMIN — FAMOTIDINE 20 MG: 20 TABLET ORAL at 20:45

## 2019-08-08 RX ADMIN — MIDODRINE HYDROCHLORIDE 10 MG: 10 TABLET ORAL at 08:29

## 2019-08-08 RX ADMIN — MIDODRINE HYDROCHLORIDE 10 MG: 10 TABLET ORAL at 16:33

## 2019-08-08 RX ADMIN — MICONAZOLE NITRATE: 20 POWDER TOPICAL at 20:46

## 2019-08-08 RX ADMIN — Medication 100 MG: at 20:45

## 2019-08-08 RX ADMIN — IPRATROPIUM BROMIDE AND ALBUTEROL SULFATE 1 AMPULE: .5; 3 SOLUTION RESPIRATORY (INHALATION) at 11:00

## 2019-08-08 RX ADMIN — ASPIRIN 81 MG: 81 TABLET ORAL at 08:28

## 2019-08-08 RX ADMIN — RIVAROXABAN 20 MG: 20 TABLET, FILM COATED ORAL at 18:13

## 2019-08-08 RX ADMIN — BUSPIRONE HYDROCHLORIDE 10 MG: 10 TABLET ORAL at 20:45

## 2019-08-08 RX ADMIN — MIDODRINE HYDROCHLORIDE 10 MG: 10 TABLET ORAL at 11:53

## 2019-08-08 RX ADMIN — MORPHINE SULFATE 10 MG: 10 SOLUTION ORAL at 18:13

## 2019-08-08 RX ADMIN — AMOXICILLIN AND CLAVULANATE POTASSIUM 1 TABLET: 875; 125 TABLET, FILM COATED ORAL at 20:45

## 2019-08-08 RX ADMIN — IPRATROPIUM BROMIDE AND ALBUTEROL SULFATE 1 AMPULE: .5; 3 SOLUTION RESPIRATORY (INHALATION) at 08:32

## 2019-08-08 RX ADMIN — IPRATROPIUM BROMIDE AND ALBUTEROL SULFATE 1 AMPULE: .5; 3 SOLUTION RESPIRATORY (INHALATION) at 15:34

## 2019-08-08 RX ADMIN — BUTALBITAL, ACETAMINOPHEN, AND CAFFEINE 1 TABLET: 50; 325; 40 TABLET ORAL at 16:30

## 2019-08-08 RX ADMIN — Medication 100 MG: at 08:28

## 2019-08-08 ASSESSMENT — PAIN - FUNCTIONAL ASSESSMENT: PAIN_FUNCTIONAL_ASSESSMENT: ACTIVITIES ARE NOT PREVENTED

## 2019-08-08 ASSESSMENT — PAIN SCALES - GENERAL
PAINLEVEL_OUTOF10: 10
PAINLEVEL_OUTOF10: 10
PAINLEVEL_OUTOF10: 9
PAINLEVEL_OUTOF10: 10
PAINLEVEL_OUTOF10: 10

## 2019-08-08 ASSESSMENT — PAIN DESCRIPTION - ORIENTATION: ORIENTATION: LEFT

## 2019-08-08 ASSESSMENT — PAIN DESCRIPTION - DIRECTION: RADIATING_TOWARDS: HEADACHE

## 2019-08-08 ASSESSMENT — PAIN DESCRIPTION - DESCRIPTORS: DESCRIPTORS: ACHING

## 2019-08-08 ASSESSMENT — PAIN DESCRIPTION - LOCATION: LOCATION: SHOULDER;BACK

## 2019-08-08 ASSESSMENT — PAIN DESCRIPTION - PROGRESSION: CLINICAL_PROGRESSION: NOT CHANGED

## 2019-08-08 ASSESSMENT — PAIN DESCRIPTION - FREQUENCY: FREQUENCY: CONTINUOUS

## 2019-08-08 ASSESSMENT — PAIN DESCRIPTION - PAIN TYPE: TYPE: CHRONIC PAIN

## 2019-08-08 ASSESSMENT — PAIN DESCRIPTION - ONSET: ONSET: ON-GOING

## 2019-08-08 NOTE — PROGRESS NOTES
exam is 4 out of 5 all extremities with the exception of RIGHT ADF 1/5     Tone:  normal  Muscle bulk: within normal limits  Sensory:  Sensory intact  Coordination:   Decreased LEFT SUDARSHAN, FFM, FTN, and HTS  Deep Tendon Reflexes:  Reflexes are intact and symmetrical bilaterally     Skin: warm and dry, no rash or erythema  Peripheral vascular: Pulses: Normal upper and lower extremity pulses; Edema: trace    Diagnostics:   Recent Results (from the past 24 hour(s))   POCT glucose    Collection Time: 08/08/19  8:02 AM   Result Value Ref Range    POC Glucose 85 70 - 108 mg/dl     Xr Chest Standard (2 Vw)    Result Date: 8/8/2019  PROCEDURE: XR CHEST (2 VW) CLINICAL INFORMATION: Interval image for prior noted scattered infiltrates in RIGHT lung. COMPARISON: 8/5/2019 TECHNIQUE: PA and lateral views of the chest were obtained. 1. Normal heart size. A Loop recorder projects over left side of chest. Mediport right side, catheter tip the cavoatrial junction. 2. Increased parenchymal markings right perihilar region and both lung bases, worse on the right, consistent with atelectasis/pneumonia with possible superimposed mild element of fibrosis. Mild pleural thickening lateral aspect right lung base. 3. Overall appearance of chest improved from prior. **This report has been created using voice recognition software. It may contain minor errors which are inherent in voice recognition technology. ** Final report electronically signed by Dr. Kanwal Walker on 8/8/2019 11:54 AM    Impression:  1. Acute CVA  large area of restricted diffusion involving the inferior medial aspect of the LEFT cerebellar hemisphere as well as the LEFT side of the medulla.    2. Chronic CVA with large infarct in the RIGHT posterior cerebral artery territory secondary to high-grade stenosis at the P1 segment of the RIGHT posterior cerebral artery without definite distal flow involving the RIGHT occipital lobe and tracking anteriorly along the medial aspect of the RIGHT temporal lobe with associated local mass effect with sulcal effacement without midline shift or associated hemorrhage.   3. Gait instability. 4. Fall. 5. Concussion without LOC. 6. Physical debility with generalized weakness. 7. Healthcare associated pneumonia versus Aspiration pneumonia. 8. Acute hypoxic respiratory failure.   9. Fracture of the lateral ninth rib. 10. Peripheral vision loss LEFT > RIGHT. 11. Mild LEFT hemiparesis and ataxia. 12. Severe malnutrition. 13. Mild-moderate oral dysphagia and severe pharyngeal dysphagia. 14. S/p PEG placement on 7/11/2019 by Dr. Peace Staton. 15. Mild cognitive deficit.  (MOCA) version 8.2 completed.  Patient scored 20/30 over prior (MOCA) version 7.2 completed.  Patient scored 15/25.  *adjusted score given pt unable to complete written subtests (L hand dominant). 16. NSTEMI on 4/9/2019. 17. 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 18. Caval filter placed 2009 for extensive DVT of the LEFT leg as well as calf DVT on the RIGHT. 19. Pulmonary embolism in 2018. 20. Chronic anticoagulation status. 21. 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 Dionisio Herrera. 22. Hypertension. 23. COPD. 24. Type 2 DM, controlled.  Last HgbA1c 5.1 on 7/12/2019.  25. Paravertebral ossifications at numerous levels along the thoracic spine with partial mineralization of the anterior longitudinal ligament consistent with DISH.  26. Osteopenia. 27. Nicotine dependence; current every day smoker. 28. Marijuana use. Last positive UDS 7/11/2019.  29. History of anxiety and depression. 30. Medication non-compliance. 31. Hypovitaminosis D.  32. Orthostatic hypotension. 33. Insomnia. 34. Aspiration pneumonia with sepsis.   35. Near room air at rest. Patients SpO2 was below 89% and qualified for home oxygen. Oxygen was applied at 1 lpm via nasal cannula to maintain a SpO2 between 90-92% while at rest. Actual SpO2 was 94 %. Patient can ambulate for exercise flow rate. Patients was ambulated, SpO2 was 92% on 2 lpm to maintain SpO2 between 90-92% while exercising. 6. Fracture of the lateral ninth rib. 1. Pain control. 2. ICS. 7. Mild cognitive deficit.  (MOCA) version 8.2 completed.  Patient scored 20/30 over prior ([address]) version 7.2 completed.  Patient scored 15/25.  *adjusted score given pt unable to complete written subtests (L hand dominant)/Mild-moderate oral dysphagia and moderate to severe pharyngeal dysphagia. 1. SLP. 2. Fees study on . Completed with patient remaining n.p.o. at this time. Dr. Barry De La Fuente, ENT reviewed the imaging on . Recommended Oncology follow up after discharge to ascertain whether the patient may be a candidate for vital stem or deep tissue massage dependent upon whether he has any active known malignancy. 3. Patient did trial ice chips on , unsuccessfully. 4. Started TF boluses on . Tolerating well. 5. Repeat MBS in 3 to 4 weeks after discharge from the acute inpatient rehabilitation unit. Order has been written for discharge. 8. ANTHONY with use of CPAP. 1. CPAP ordered on  at patient's request.  Once a on  the patient requested to discontinue the CPAP. 9. Nutrition:  Consultation to dietician for nutritional counseling and recommendations. 1. Severe malnutrition. 2. S/p PEG placement on 2019 by Dr. Daryn Azevedo. 3. Wound care. 4. TotP 5.8, alb 2.4, Vitamin 25OH level of 16 on 2019.  5. Continue with tube feeds; nutrition has recommended bolus tube feeds with the plan to start those on . 6. Ergocalciferol every 3 days x 11 doses. 10. Orthostatic hypotension. 1. Supine: 124/60. Sittin/61 and standing was 88/61 on . 2. Start Midodrine 2.5mg TID on . Pressures improved; but still has some issues with lightheadedness and the dosing was increased to 5 mg 3 times daily on 7/25. He is less symptomatic at this dose. Pressures still running below 100 for SBP; increased to 10mg on 8/5. Currently no complaints of lightheadedness when standing with therapy. 11. Insomnia. 1. Trazodone at 100 mg was started last night to good effect per patient's feedback on 7/24. 12. Electrolytes. 1. Normal on 8/5. 13. Bladder: No issues. 14. Bowel: Senna, colace, MOM  15. Rehabilitation nursing will be involved for bowel, bladder, skin, and pain management. Nursing will also provide education and training to patient and family. 16. Prophylaxis:  DVT: Xarelto. GI: Pepcid. 17.  and case management consultations for coordination of care and discharge planning.     Chronic Problems:  1. NSTEMI on 4/9/2019/Hypertension. 1. ASA 81 mg.  2. Lipitor. 2. 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 1. Patient may benefit from an AFO; will let PT evaluate and help make decision. 3. Caval filter placed 2009 for extensive DVT of the LEFT leg as well as calf DVT on the RIGHT/Pulmonary embolism in 2018/Chronic anticoagulation status. 1. Patient is currently on Xarelto. 4. 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 Jazmine Brady. 1. Oral morphine as ordered PRN. Continued as ordered by Pain Team.  2. On 7/24 the patient endorses that his pain medications are working. On closer questioning he states that he has a headache; but otherwise his current pain medications are working. His endorsed headache is 7/10 and he states he has had this since his strokes.   It was discussed with him that these may be post stroke

## 2019-08-08 NOTE — PROGRESS NOTES
[address]  Occupational Therapy  Daily Note  Time:   Time In: 1000  Time Out: 1100  Timed Code Treatment Minutes: 60 Minutes  Minutes: 60          Date: 2019  Patient Name: Luisa Garcia,   Gender: male      Room: [facility]/070-A  MRN: [id number]  : 1960  (62 y.o.)  Referring Practitioner: Sharona Howell MD  Diagnosis: Cerebellar infarct  Additional Pertinent Hx: He was admitted 2019 for complaint of injuries after a ground-level fall at home. The patient was reaching over his couch when he lost his balance and fell onto his left side causing him to strike his head without any endorsed loss of consciousness. There was a small superficial contusion of the left frontal region. On initial work-up he was found to have a left ninth rib fracture and also old rib fractures on the right side. CT imaging of the chest showed patchy nodular airspace opacities in the left lung base suggestive of infiltrates with an elevated white count of 15.3 noted. CT imaging of the head did show an acute infarct of the inferior medial aspect of the left cerebellar hemisphere as well as the left side of the medulla. The patient was admitted for further medical care. Note was also made that the patient was disheveled and malodorous and had questionable ability to care for himself. He did undergo a modified barium swallow study on  which showed the patient to have mild oral dysphagia and moderate pharyngeal dysphasia with recommendation at that time for thin and moist purées with thin liquids and strict use of compensatory strategies. He is currently on Zosyn for treating a presumptive aspiration pneumonia versus a hospital-acquired pneumonia. Jevon Luu Hx of prior CVA in april with chronic pain. The patient was just recently in the emergency department on 2019 for complaint of unsteady gait and nausea and vomiting as well as issues with swallow.   Pertinent medical bend to floor to pull up pants. Toilet Transfer: 4 - Requires steadying assistance only < 25% assist With use of grab bar        Shower Transfer: 4 - Minimal contact assistance, pt. expends 75% or more effort - use of grab bars from shower bench             BALANCE:  Sitting Balance:  Contact Guard Assistance    Standing Balance: Minimal Assistance, X 1, with cues for safety, with verbal cues   In preparation for stand pivots, Multiple stand pivots    TRANSFERS:  Sit to Stand:  Contact Guard Assistance, X 1, with verbal cues, to/from chair with arms. Stand to Sit: Contact Guard Assistance. to from recliner, wheel chair    Patient completing Min A with stand pivots and verbal cues for safety with standing balance and with walker. ASSESSMENT:  Activity Tolerance:  Patient tolerance of  treatment: fair. Discharge Recommendations: Home with Home health OT, Home with nursing aide, 24 hour supervision or assist  Equipment Recommendations: Equipment Needed: Yes  ADL Assistive Devices: Toileting - Standard Commode  Other: BSC ordered 8/7. Plan: Times per week: 5xs week x 90 min, 1 x week x 30 min   Current Treatment Recommendations: Strengthening, Endurance Training, Neuromuscular Re-education, Patient/Caregiver Education & Training, Equipment Evaluation, Education, & procurement, Self-Care / ADL, Balance Training, Functional Mobility Training, Safety Education & Training    Patient Education  Patient Education: Plan of Care, Adaptive ADL Techniques and Assistive Device Safety    Goals  Short term goals  Time Frame for Short term goals: 1 week  Short term goal 1: Pt will dynamically sit EOB for 5 minutes with Mod I  to improve independence with bathing/dressing tasks EOB. Short term goal 2: Pt will tolerate static standing for >3 minutes CGA with 1 UE release and min cues for midline orientation in prep for washing hands at sink.    Short term goal 3: Pt will complete stand pivot transfers with SBA x1 and min verbal cues for safety to improve independence with w/c transfers. Short term goal 4: Pt will improve LUE coordination as evidenced by completion of 9 hole peg test in < 90 secs to improve indep with gathering items. Short term goal 5: Pt will safely navigate RW with CGA to BR improve independence with toileting routine. Long term goals  Time Frame for Long term goals : 3 weeks  Long term goal 1: Pt will complete ADL routine with SBA and no cues for safety or adapted technique to increase ability to perform self care tasks GOAL NOT MET, CONTINUE  Long term goal 2: Pt will complete light standing IADL task using RW with SBA and no cues for walker safety to improve indep within home. GOAL NOT MET, CONTINUE    Following session, patient left in safe position with all fall risk precautions in place.

## 2019-08-08 NOTE — PROGRESS NOTES
A home oxygen evaluation has been completed. [x]Patient is an inpatient. It is expected that the patient will be discharged within the next 48 hours. Qualified provider to write order for home prescription if patient qualifies. Social service/care managers will arrange for home oxygen. If patient is active, arrange for Home Medical supplier to assess for Oxygen Conserving Device per pulse oximetry. []Patient is an outpatient. Results will be faxed to the ordering provider. Qualified provider to write order for home prescription if patient qualifies and arranges for home oxygen. Patient was placed on room air for 2 minutes. SpO2 was 88 % on room air at rest. Patients SpO2 was below 89% and qualified for home oxygen. Oxygen was applied at 1 lpm via nasal cannula to maintain a SpO2 between 90-92% while at rest. Actual SpO2 was 94 %. Patient can ambulate for exercise flow rate. Patients was ambulated, SpO2 was 92% on 2 lpm to maintain SpO2 between 90-92% while exercising.

## 2019-08-08 NOTE — PROGRESS NOTES
crushing medication and placing in the PEG tube). Patient expressed understanding, but required max assist for reading labels, opening container lids, and physically filling the containers. Poor sustained attention noted throughout task with mod cues/rephrasing of instructions to maintain focus on next step in the sequence. Pt unable to read labels initially, requiring therapist to read the instructions aloud. Pt able to verbalize an appropriate time he would take them, but required cues to initiate physically filling. Errors noted x2, one omission of medication placement and one with too many pills placed. Patient able self correct one error during review, but failed to identify and correct the other error. *Patient will need direct 1:1 assistance with management of medication at home. *Recommend family education for review of care needs. *Due to extended length of time needed to complete process, only one medication was addressed this session. SHORT TERM GOAL #5:  Goal 5:  Pt will complete complex attention tasks with no more than 2 errors within a 5-7 minutes time span to improve higher level attention. INTERVENTIONS: Poor sustained attention throughout session, requiring at least x4 re-directions during session. Long-term Goals  Timeframe for Long-term Goals: 4 weeks  Goal 1: Pt will safely tolerate puree diet with thin liquid without difficulty or overt s/s of aspiration. Goal 2: Pt will improve cognitive functioning to a superivision/mod I level for safe and functional return to home environment.        COMMUNICATION  Comprehension: 4 - Patient understands basic needs 75-90%+ of the time  Expression: 5 - Expresses basic ideas/needs only (hungry/hot/pain)  SOCIAL COGNITION  Social Interaction: 6 - Patient requires medication for mood and/or effect  Problem Solving: 3 - Patient solves simple/routine tasks 50%-74%  Memory: 3 - Patient remembers 50%-74% of the time      ASSESSMENT:  Assessment:

## 2019-08-08 NOTE — PROGRESS NOTES
122/68 and his pulse is 64. His respiration is 18 and oxygen saturation is 98%. Body mass index is 25.19 kg/m². Temperature Range (24h):Temp: 98.2 °F (36.8 °C) Temp  Av °F (36.7 °C)  Min: 97.9 °F (36.6 °C)  Max: 98.2 °F (36.8 °C)  BP Range (26R): Systolic (01RGT), SQX:126 , Min:111 , PRISCILLA:558     Diastolic (80TSV), PUP:69, Min:67, Max:76    Pulse Range (24h): Pulse  Av  Min: 50  Max: 64  Respiration Range (24h): Resp  Av.7  Min: 18  Max: 20  Current Pulse Ox (24h):  SpO2: 98 %  Pulse Ox Range (24h):  SpO2  Av.5 %  Min: 97 %  Max: 98 %  Oxygen Amount and Delivery: O2 Flow Rate (L/min): 2 L/min    awake  Orientation:   person, place, time, situation  Mood: anxious  Affect: calm    General appearance:  in no acute distress, PEG, up in bed with nasal cannula at 2L  Memory:   grossly normal  Attention/Concentration: normal  Language:  normal     Cranial Nerves:  cranial nerves II-XII are grossly intact with exception of LEFT hemianopsia   ROM:  abnormal - limited RIGHT knee extension - lacking 20deg  Motor Exam:  Motor exam is 4 out of 5 all extremities with the exception of RIGHT ADF 1/5     Tone:  normal  Muscle bulk: within normal limits  Sensory:  Sensory intact  Coordination:   Decreased LEFT SUDARSHAN, FFM, FTN, and HTS  Deep Tendon Reflexes:  Reflexes are intact and symmetrical bilaterally     Skin: warm and dry, no rash or erythema  Peripheral vascular: Pulses: Normal upper and lower extremity pulses; Edema: trace    Diagnostics:   Recent Results (from the past 24 hour(s))   POCT glucose    Collection Time: 19  8:20 AM   Result Value Ref Range    POC Glucose 85 70 - 108 mg/dl     Impression:  1. Acute CVA  large area of restricted diffusion involving the inferior medial aspect of the LEFT cerebellar hemisphere as well as the LEFT side of the medulla.    2. Chronic CVA with large infarct in the RIGHT posterior cerebral artery territory secondary to high-grade stenosis at the P1 segment of on 8/5. 13. Bladder: No issues. 14. Bowel: Senna, colace, MOM  15. Rehabilitation nursing will be involved for bowel, bladder, skin, and pain management. Nursing will also provide education and training to patient and family. 16. Prophylaxis:  DVT: Xarelto. GI: Pepcid. 17.  and case management consultations for coordination of care and discharge planning.     Chronic Problems:  1. NSTEMI on 4/9/2019/Hypertension. 1. ASA 81 mg.  2. Lipitor. 2. 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 1. Patient may benefit from an AFO; will let PT evaluate and help make decision. 3. Caval filter placed 2009 for extensive DVT of the LEFT leg as well as calf DVT on the RIGHT/Pulmonary embolism in 2018/Chronic anticoagulation status. 1. Patient is currently on Xarelto. 4. 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 Hali Harris. 1. Oral morphine as ordered PRN. Continued as ordered by Pain Team.  2. On 7/24 the patient endorses that his pain medications are working. On closer questioning he states that he has a headache; but otherwise his current pain medications are working. His endorsed headache is 7/10 and he states he has had this since his strokes. It was discussed with him that these may be post stroke headaches; but as a starting point to address his pain a headache cocktail consisting of Toradol, Reglan, and magnesium was ordered on 7/24. Will consider starting the patient on Topamax if his headaches persist, however it is expected that it could take up to 2 weeks for the Topamax to actually start working. 3. Chose to start Fioricet on 7/26.  4. Tylenol. 5. Lidoderm patches. 5. COPD. 1. Robitussin. 2. DuoNeb. 6. Type 2 DM, controlled.

## 2019-08-08 NOTE — PROGRESS NOTES
term goal 3: ambulate 50 feet with use of RW and CGA to prepare for in home mobility  Short term goal 4: WC mobility with mod I for 300ft to prepare for community mobility   Long term goals  Time Frame for Long term goals : 4 weeks   Long term goal 1: bed mobility with mod I for ease of getting in/out of bed  Long term goal 2: sit <> stand with mod I for safe of transfers   Long term goal 3: ambulate 75ft with use of LRAD and stand-by assist for safe in home mobility   Long term goal 4: negotiate 1 step no rails with stand-by assist and use of LRAD for safe entry/exit of his home   Long term goal 5: pt to perform car transfer at [place] [id number] for safe transport home    Following session, patient left in safe position with all fall risk precautions in place.

## 2019-08-08 NOTE — PROGRESS NOTES
[address] [address]  INPATIENT PHYSICAL THERAPY  DAILY NOTE  [facility] - 7E-70/070-A    Time In: 1330  Time Out: 1400  Timed Code Treatment Minutes: 30 Minutes  Minutes: 30          Date: 2019  Patient Name: Donita Ortiz,  Gender:  male        MRN: [id number]  : 1960  (62 y.o.)     Referring Practitioner: Briseida Fisher MD  Diagnosis: cerebellar infarct   Additional Pertinent Hx: Pt admitted through ED due to weakness and pain s/p fall at home. Hx:  CA, CVA. x-ray Lt 9th rib fx. MRI:  remote infarct, restricted diffusion inferior medial aspect LT cerebellar hemisphere and medula. Pt admited to hospital on 2019 and transferred to rehab on . Prior Level of Function:  Lives With: Alone  Type of Home: Apartment  Home Layout: One level  Home Access: Stairs to enter without rails  Entrance Stairs - Number of Steps: 1  Home Equipment: Wheelchair-manual, 4 wheeled walker, Rolling walker, Quad cane(hospital bed with bed rails)   ADL Assistance: [address] [place] [address] Avenue: Independent  Homemaking Responsibilities: Yes  Meal Prep Responsibility: Secondary  Laundry Responsibility: Secondary  Cleaning Responsibility: No  Bill Paying/Finance Responsibility: Primary  Shopping Responsibility: Secondary  Dependent Care Responsibility: No  Ambulation Assistance: Independent  Transfer Assistance: Independent  Additional Comments: Pt reports he sleeps on foton at home. Pt reports ambulating ~1 block prior to admisison with 4WW. Pt denies use of AD in the home. Chronic Rt foot drop. Family to bring in AFO    Restrictions/Precautions:  Restrictions/Precautions: Fall Risk, General Precautions(NPO)  Position Activity Restriction  Other position/activity restrictions: PEG , oxygen, impulsive, unsteady, left ninth rib fracture and also old rib fractures on the right side    SUBJECTIVE: Pt in bed upon arrival and agrees to therapy.  Pt much more pleasant, talkative and

## 2019-08-08 NOTE — PROGRESS NOTES
[facility] THERAPY  [address]   SLP Individual Minutes  Time In: 1503  Time Out: 1523  Minutes: 20  Timed Code Treatment Minutes: 0 Minutes      Dysphagia tx- 20 minutes    [x]Daily Note  []Progress Note-  []Discharge Note    Date: 2019  Patient Name: Mariel Palomino        MRN: [id number]    : 1960  (62 y.o.)  Gender: male   Primary Provider: Darci Giordano MD  Admitting Diagnosis:  Cerebellar infarct   Secondary Diagnosis: Cognitive deficits, Dysphagia, Dysphonia   Precautions: fall risk, aspiration risk  Swallowing Status/Diet: NPO, PEG tube in place for nutrition and hydration  Swallowing Strategies: NPO  DATE of last MBS:  2019; FEES 2019  Pain: 0/10    Subjective: Pt on the family at the onset of the session, resulting in 10 minutes of missed ST time. Patient very emotional, engaging in repetitive verbalizations with the family member he was speaking to in regards to her \"resting and getting well,\" \"taking whatever money she needs,\" and \"not worrying about him, that he has everything covered. \" Spoke with patient following the phone conversation, that despite what he shared with his family, he WILL need 24 hour assistance when he is discharged. SHORT TERM GOAL #1:  Goal 1:  Pt will complete pharyngeal strengthening exercise program with completion of exercises x10 to improve anterior hyoid excursion and pharyngeal strengthening to increase airway protection and pharyngeal contraction during the swallow. INTERVENTIONS: Completed the following pharyngeal swallow exercises to improve hyolaryngeal elevation and anterior excursion to maximize epiglottic movement during the swallow. Instruction provided on proper technique for the following:   -Effortful swallow- 2 sets of 10, fair success  -Janelle swallow- 2 sets of 10, fair success  -CTAR- 2 sets of 10, fair success.    -Base of tongue retraction- 2 sets of 10, fair

## 2019-08-09 LAB — GLUCOSE BLD-MCNC: 92 MG/DL (ref 70–108)

## 2019-08-09 PROCEDURE — 6370000000 HC RX 637 (ALT 250 FOR IP): Performed by: INTERNAL MEDICINE

## 2019-08-09 PROCEDURE — 6370000000 HC RX 637 (ALT 250 FOR IP): Performed by: PHYSICAL MEDICINE & REHABILITATION

## 2019-08-09 PROCEDURE — 1180000000 HC REHAB R&B

## 2019-08-09 PROCEDURE — 94761 N-INVAS EAR/PLS OXIMETRY MLT: CPT

## 2019-08-09 PROCEDURE — 97116 GAIT TRAINING THERAPY: CPT

## 2019-08-09 PROCEDURE — 92526 ORAL FUNCTION THERAPY: CPT | Performed by: SPEECH-LANGUAGE PATHOLOGIST

## 2019-08-09 PROCEDURE — 97110 THERAPEUTIC EXERCISES: CPT

## 2019-08-09 PROCEDURE — 6370000000 HC RX 637 (ALT 250 FOR IP): Performed by: STUDENT IN AN ORGANIZED HEALTH CARE EDUCATION/TRAINING PROGRAM

## 2019-08-09 PROCEDURE — 97530 THERAPEUTIC ACTIVITIES: CPT

## 2019-08-09 PROCEDURE — 82948 REAGENT STRIP/BLOOD GLUCOSE: CPT

## 2019-08-09 PROCEDURE — 97127 HC SP THER IVNTJ W/FOCUS COG FUNCJ: CPT | Performed by: SPEECH-LANGUAGE PATHOLOGIST

## 2019-08-09 PROCEDURE — 94640 AIRWAY INHALATION TREATMENT: CPT

## 2019-08-09 PROCEDURE — 6360000002 HC RX W HCPCS: Performed by: PHYSICAL MEDICINE & REHABILITATION

## 2019-08-09 PROCEDURE — 97535 SELF CARE MNGMENT TRAINING: CPT

## 2019-08-09 PROCEDURE — 97542 WHEELCHAIR MNGMENT TRAINING: CPT

## 2019-08-09 PROCEDURE — 94668 MNPJ CHEST WALL SBSQ: CPT

## 2019-08-09 PROCEDURE — 2580000003 HC RX 258: Performed by: PHYSICAL MEDICINE & REHABILITATION

## 2019-08-09 PROCEDURE — 2700000000 HC OXYGEN THERAPY PER DAY

## 2019-08-09 RX ADMIN — MIDODRINE HYDROCHLORIDE 10 MG: 10 TABLET ORAL at 09:12

## 2019-08-09 RX ADMIN — FAMOTIDINE 20 MG: 20 TABLET ORAL at 22:17

## 2019-08-09 RX ADMIN — MIDODRINE HYDROCHLORIDE 10 MG: 10 TABLET ORAL at 12:58

## 2019-08-09 RX ADMIN — TRAZODONE HYDROCHLORIDE 100 MG: 100 TABLET ORAL at 22:44

## 2019-08-09 RX ADMIN — ERGOCALCIFEROL 50000 UNITS: 1.25 CAPSULE ORAL at 08:58

## 2019-08-09 RX ADMIN — AMOXICILLIN AND CLAVULANATE POTASSIUM 1 TABLET: 875; 125 TABLET, FILM COATED ORAL at 22:12

## 2019-08-09 RX ADMIN — HEPARIN 500 UNITS: 100 SYRINGE at 18:57

## 2019-08-09 RX ADMIN — IPRATROPIUM BROMIDE AND ALBUTEROL SULFATE 1 AMPULE: .5; 3 SOLUTION RESPIRATORY (INHALATION) at 05:50

## 2019-08-09 RX ADMIN — MIDODRINE HYDROCHLORIDE 10 MG: 10 TABLET ORAL at 18:49

## 2019-08-09 RX ADMIN — Medication 100 MG: at 22:17

## 2019-08-09 RX ADMIN — Medication 1 CAPSULE: at 18:47

## 2019-08-09 RX ADMIN — RIVAROXABAN 20 MG: 20 TABLET, FILM COATED ORAL at 18:47

## 2019-08-09 RX ADMIN — ATORVASTATIN CALCIUM 80 MG: 80 TABLET, FILM COATED ORAL at 22:12

## 2019-08-09 RX ADMIN — AMOXICILLIN AND CLAVULANATE POTASSIUM 1 TABLET: 875; 125 TABLET, FILM COATED ORAL at 08:58

## 2019-08-09 RX ADMIN — MORPHINE SULFATE 10 MG: 10 SOLUTION ORAL at 05:38

## 2019-08-09 RX ADMIN — Medication 100 MG: at 08:59

## 2019-08-09 RX ADMIN — FAMOTIDINE 20 MG: 20 TABLET ORAL at 09:01

## 2019-08-09 RX ADMIN — SERTRALINE 200 MG: 100 TABLET, FILM COATED ORAL at 08:58

## 2019-08-09 RX ADMIN — Medication 10 ML: at 18:56

## 2019-08-09 RX ADMIN — Medication 1 CAPSULE: at 09:11

## 2019-08-09 RX ADMIN — MORPHINE SULFATE 5 MG: 10 SOLUTION ORAL at 12:58

## 2019-08-09 RX ADMIN — MICONAZOLE NITRATE: 20 POWDER TOPICAL at 09:01

## 2019-08-09 RX ADMIN — MICONAZOLE NITRATE: 20 POWDER TOPICAL at 22:18

## 2019-08-09 RX ADMIN — BUSPIRONE HYDROCHLORIDE 10 MG: 10 TABLET ORAL at 22:12

## 2019-08-09 RX ADMIN — ASPIRIN 81 MG: 81 TABLET ORAL at 09:00

## 2019-08-09 ASSESSMENT — PAIN DESCRIPTION - PROGRESSION: CLINICAL_PROGRESSION: NOT CHANGED

## 2019-08-09 ASSESSMENT — PAIN SCALES - GENERAL
PAINLEVEL_OUTOF10: 0
PAINLEVEL_OUTOF10: 10

## 2019-08-09 ASSESSMENT — PAIN DESCRIPTION - PAIN TYPE: TYPE: CHRONIC PAIN

## 2019-08-09 ASSESSMENT — PAIN DESCRIPTION - LOCATION: LOCATION: HEAD

## 2019-08-09 NOTE — PROGRESS NOTES
cues for safety or adapted technique to increase ability to perform self care tasks GOAL NOT MET, CONTINUE  Long term goal 2: Pt will complete light standing IADL task using RW with SBA and no cues for walker safety to improve indep within home. GOAL NOT MET, CONTINUE    Following session, patient left in safe position with all fall risk precautions in place.

## 2019-08-09 NOTE — FLOWSHEET NOTE
[address]  OCCUPATIONAL THERAPY TREATMENT NOTE  [facility]  7E-70/070-A      Date: 2019  Patient Name: Jagdeep Combs        CSN: [id number]   : 1960  (62 y.o.)  Gender: male   Referring Practitioner: Nate Regan MD  Diagnosis: Cerebellar infarct         Collaborated with Dr. Nika Baird about spot patch glasses. Order received for spot patch glasses. Spot patch applied to his L glass lens. Pt later removed that patch, so pt given safety frame glasses with spot patch on L lens. Pt appreciative of glasses and noted to wear them to read newspaper following. Pt reports he feels glasses make it easier to see newspaper. Pt instructed to wear spot patch glasses when needed and decrease blurred vision. Continue to monitor.
[address]  PHYSICAL THERAPY MISSED TREATMENT NOTE  INPATIENT REHABILITATION    Date: 2019  Patient Name: Parker Newsome        MRN: [id number]   : 1960  (62 y.o.)  Gender: male   Referring Practitioner: Ed Dueñas MD  Referring Practitioner: Chelly Tyler MD  Diagnosis: Cerebellar infarct  Diagnosis: cerebellar infarct          REASON FOR MISSED TREATMENT:  Per MD pt on hold for therapy this date due to requiring thoracentesis this date. Will resume PT tomorrow,  pending medical clearance.       Gutierrez Hough PT,DPT [id number]
Pt was alone at the time of the visit. He was agitated and depressed. He said that he has had stroke twice and was coughing so hard. He said that he cannot walk without aid. He was not happy with how things are going with his health. He said that it's hard not to give up but wanted prayer to cope with his medical issues. I prayed asking God to heal him.      07/23/19 1038   Encounter Summary   Services provided to: Patient   Referral/Consult From: [address] [place] [address] Family members   Continue Visiting Yes  (7/23)   Complexity of Encounter Moderate   Length of Encounter 15 minutes   Spiritual/Baptism   Type Spiritual support   Assessment Approachable; Anxious   Intervention Active listening;Nurtured hope;Prayer;Empowerment;Sustaining presence/ Ministry of presence   Outcome Connection/belonging;Expressed gratitude;Encouraged; Hopeful;Receptive
Recommendations: Other: will continue to assess  Discharge Recommendations:Discharge Recommendations: Continue to assess pending progress    Plan: Times per week: 5x/wk 90 min, 1x wk 30 min  Specific instructions for Next Treatment: core and LE strengthening ex, bed mobility, sitting balance . PT to assess transfers and gait when medically approved  Current Treatment Recommendations: Strengthening, Endurance Training, Balance Training, Functional Mobility Training, Safety Education & Training, Equipment Evaluation, Education, & procurement      Goals:  Patient goals : Did not state  Short term goals  Time Frame for Short term goals: 1 week  Short term goal 1: Pt to go supine <->sit,  supervision A, to get in/out of bed. NOT MET  Short term goal 2: sit <> stand with contact guard assist for ease of transfers NOT MET  Short term goal 3: ambulate 15 feet with use of LRAD and modAx1 to prepare for in home mobility NOT MET  Short term goal 4: WC mobility with mod I for 300ft to prepare for community mobility NOT MET  Long term goals  Time Frame for Long term goals : 4 weeks   Long term goal 1: bed mobility with mod I for ease of getting in/out of bed  Long term goal 2: sit <> stand with mod I for safe of transfers   Long term goal 3: ambulate 75ft with use of LRAD and stand-by assist for safe in home mobility   Long term goal 4: negotiate 1 step no rails with stand-by assist and use of LRAD for safe entry/exit of his home     Following session, patient left in safe position with all fall risk precautions in place.

## 2019-08-09 NOTE — PROGRESS NOTES
span to improve higher level attention. INTERVENTIONS: Sustained attention fluctuates throughout the session, but patient still requiring at least min cues for focus on given task. *RECOMMENDING 24 hour supervision/assistance at discharge. Pt WILL need assist with PEG tube feedings, medication management, daily schedule management, finances, transportation, and physical care. Patient present with poor carryover, insight and judgement. Long-term Goals  Timeframe for Long-term Goals: 4 weeks  Goal 1: Pt will safely tolerate puree diet with thin liquid without difficulty or overt s/s of aspiration. Goal 2: Pt will improve cognitive functioning to a superivision/mod I level for safe and functional return to home environment.        COMMUNICATION  Comprehension: 4 - Patient understands basic needs 75-90%+ of the time  Expression: 5 - Expresses basic ideas/needs only (hungry/hot/pain)  SOCIAL COGNITION  Social Interaction: 6 - Patient requires medication for mood and/or effect  Problem Solving: 3 - Patient solves simple/routine tasks 50%-74%  Memory: 3 - Patient remembers 50%-74% of the time      ASSESSMENT:  Assessment: [x]Progressing towards goals          []Not Progressing towards goals    Patient Tolerance of Treatment:   [x]Tolerated well []Tolerated fair []Required rest breaks []Fatigued  Plan for Next Session:  pharyngeal exercises, medication management  Education:  Learner:  [x]Patient          []Significant Other          []Other: Benny Morrison  Education provided regarding:  [x]Goals and POC   [x]Diet and swallowing precautions    [x]Home Exercise Program  [x]Progress and/or discharge information  Method of Education:  [x]Discussion          [x]Demonstration          []Handout          []Other  Evaluation of Education:   [x]Verbalized understanding   [x]Demonstrates without assistance  []Demonstrates with assistance  [x]Needs further instruction     []No evidence of learning                  [x]No

## 2019-08-09 NOTE — PROGRESS NOTES
Nutrition Assessment (Enteral Nutrition)    Type and Reason for Visit: Reassess(TF Monitoring)    Nutrition Recommendations: Continue bolus TF as ordered. NPO per SLP. Nutrition Assessment: Pt improving from a nutritional standpoint AEB tolerating bolus TF regimen. Remains at risk for further nutritional compromise r/t NPO status, dysphagia, hx tonsillar CA, CVA, COPD and severe malntutrition. Continue current TF regimen as ordered. Completed home bolus TF education today. Malnutrition Assessment:  · Malnutrition Status: Meets the criteria for severe malnutrition  · Context: Chronic illness  · Findings of the 6 clinical characteristics of malnutrition (Minimum of 2 out of 6 clinical characteristics is required to make the diagnosis of moderate or severe Protein Calorie Malnutrition based on AND/ASPEN Guidelines):  1. Energy Intake-Less than or equal to 75% of estimated energy requirement, Greater than or equal to 1 month    2. Weight Loss-20% loss or greater, (7 months)  3. Fat Loss-Severe subcutaneous fat loss, Orbital, Fat overlying the ribs  4.  Muscle Loss-Severe muscle mass loss, Temples (temporalis muscle), Clavicles (pectoralis and deltoids)    Nutrition Risk Level: High    Nutrition Needs:  · Estimated Daily Total Kcal: 3880-6120 (25-30 kcals/kg current weight of 81 kg on 7/18/19)  · Estimated Daily Protein (g): ~97 (~1.2 grams/kg current weight of 81 kg on 7/18/19)  · Estimated Daily Fluid (ml/day): 6281-0917 (25-30 ml/kr current weight of 81 kg on 7/18/19)    Nutrition Diagnosis:   · Problem: Severe malnutrition, In context of chronic illness  · Etiology: related to Catabolic illness, Alteration in GI function, Insufficient energy/nutrient consumption     Signs and symptoms:  as evidenced by Diet history of poor intake, Weight loss, Severe muscle loss, Severe loss of subcutaneous fat    Objective Information:  · Nutrition-Focused Physical Findings: NPO per SLP; +diarrhea-last BM x1 today;

## 2019-08-09 NOTE — PROGRESS NOTES
technique to increase ability to perform self care tasks GOAL NOT MET, CONTINUE  Long term goal 2: Pt will complete light standing IADL task using RW with SBA and no cues for walker safety to improve indep within home. GOAL NOT MET, CONTINUE    Following session, patient left in safe position with all fall risk precautions in place.

## 2019-08-09 NOTE — PROGRESS NOTES
25% assist  Shower Transfer: 4 - Minimal contact assistance, pt. expends 75% or more effort,  , Assessment: Pt is motivated and making steady progress towards his goals, meeting 4/5 STGs. Pt demonstrates improved sitting and standing balance which has improved his indep requiring up to [place] for ADLs. Pt is limited by decreased endurance and decreased safety awareness. Pt demonstrates decreased insight into his deficits and tends to perseverate on eating food, though he is NPO. Pt continues to demonstrate L hemibody weakness and ataxia, flucuating midline orientation, and decreased safety awareness impacting patient's ability to complete self care at prior level of functioning. Due to patient's performance deficits, patient would greatly benefit from continued occupational therapy for ADL remediation, endurance building, strengthening and neuro re-education to return to prior level of functioning and safe return to home environment. Speech therapy: FIMS: Comprehension: 4 - Patient understands basic needs 75-90%+ of the time  Expression: 5 - Expresses basic ideas/needs only (hungry/hot/pain)  Social Interaction: 6 - Patient requires medication for mood and/or effect  Problem Solving: 3 - Patient solves simple/routine tasks 50%-74%  Memory: 3 - Patient remembers 50%-74% of the time    DIAGNOSTIC IMPRESSIONS: Patient presents with mild oral and severe pharyngeal dysphagia evidenced by significant premature pharyngeal entry, primarily to the right pyriform sinus with NO epiglottic movement. Spillage over the epiglottic edge noted with denser consistencies resulting in laryngeal penetration and tracheal aspiration before the swallow. In addition, moderate pharyngeal residue noted in the valleculae, pyriform sinus and along the posterior pharyngeal wall. Residue from the pyriform sinus noted to spill into the airway following initial swallow.  Reflexive cough triggered with strong response to clear material from the CPAP.  9. Nutrition:  Consultation to dietician for nutritional counseling and recommendations. 1. Severe malnutrition. 2. S/p PEG placement on 2019 by Dr. Gwynneth Osler. 3. Wound care. 4. TotP 5.8, alb 2.4, Vitamin 25OH level of 16 on 2019.  5. Continue with tube feeds; nutrition has recommended bolus tube feeds with the plan to start those on . 6. Ergocalciferol every 3 days x 11 doses. 10. Orthostatic hypotension. 1. Supine: 124/60. Sittin/61 and standing was 88/61 on . 2. Start Midodrine 2.5mg TID on . Pressures improved; but still has some issues with lightheadedness and the dosing was increased to 5 mg 3 times daily on . He is less symptomatic at this dose. Pressures still running below 100 for SBP; increased to 10mg on . Currently no complaints of lightheadedness when standing with therapy. Blood pressures remain improved as of  with systolic blood pressures ranging from 120-130. 11. Insomnia. 1. Trazodone at 100 mg was started last night to good effect per patient's feedback on . 12. Electrolytes. 1. Normal on . 13. Bladder: No issues. 14. Bowel: Senna, colace, MOM  15. Rehabilitation nursing will be involved for bowel, bladder, skin, and pain management. Nursing will also provide education and training to patient and family. 16. Prophylaxis:  DVT: Xarelto. GI: Pepcid. 17.  and case management consultations for coordination of care and discharge planning.     Chronic Problems:  1. NSTEMI on 2019/Hypertension. 1. ASA 81 mg.  2. Lipitor. 2. 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 1. Patient may benefit from an AFO; will let PT evaluate and help make decision. 3. Caval filter placed  for extensive DVT of the LEFT leg as well as calf DVT on the RIGHT/Pulmonary embolism in 2018/Chronic anticoagulation status. 1. Patient is currently on Xarelto.   4. 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 Johann Haider. 1. Oral morphine as ordered PRN. Continued as ordered by Pain Team.  2. On 7/24 the patient endorses that his pain medications are working. On closer questioning he states that he has a headache; but otherwise his current pain medications are working. His endorsed headache is 7/10 and he states he has had this since his strokes. It was discussed with him that these may be post stroke headaches; but as a starting point to address his pain a headache cocktail consisting of Toradol, Reglan, and magnesium was ordered on 7/24. Will consider starting the patient on Topamax if his headaches persist, however it is expected that it could take up to 2 weeks for the Topamax to actually start working. 3. Chose to start Fioricet on 7/26.  4. Tylenol. 5. Lidoderm patches. 5. COPD. 1. Robitussin. 2. DuoNeb. 6. Type 2 DM, controlled.  Last HgbA1c 5.1 on 7/12/2019.  1. Stable. 7. Paravertebral ossifications at numerous levels along the thoracic spine with partial mineralization of the anterior longitudinal ligament consistent with DISH. 8. Osteopenia. 1. Vitamin D level. 16 on 7/19/2019.  9. Nicotine dependence; current every day smoker. 1. NicoDerm patches. 10. Marijuana use. 11. History of anxiety and depression. 1. States that his Zoloft doesn't work. Medication has been continued after explanation to the patient of the difference between the medications that he is been using for his anxiety versus those that are for depression. 2. Klonopin. 3. Buspar nightly. 12. Medication non-compliance.     Labs:  1. 7/19.  2. 7/26.  3. 8/5.     Infectious Disease:  1. Zosyn. Missed Therapy Time:  7/22 Missed 34 minutes of OT due to nausea.   7/24 Missed 18 minutes due nausea and
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
## 2019-08-10 LAB — GLUCOSE BLD-MCNC: 70 MG/DL (ref 70–108)

## 2019-08-10 PROCEDURE — 94640 AIRWAY INHALATION TREATMENT: CPT

## 2019-08-10 PROCEDURE — 1180000000 HC REHAB R&B

## 2019-08-10 PROCEDURE — 6370000000 HC RX 637 (ALT 250 FOR IP): Performed by: INTERNAL MEDICINE

## 2019-08-10 PROCEDURE — 2580000003 HC RX 258: Performed by: PHYSICAL MEDICINE & REHABILITATION

## 2019-08-10 PROCEDURE — 97110 THERAPEUTIC EXERCISES: CPT

## 2019-08-10 PROCEDURE — 6370000000 HC RX 637 (ALT 250 FOR IP): Performed by: PHYSICAL MEDICINE & REHABILITATION

## 2019-08-10 PROCEDURE — 6370000000 HC RX 637 (ALT 250 FOR IP): Performed by: STUDENT IN AN ORGANIZED HEALTH CARE EDUCATION/TRAINING PROGRAM

## 2019-08-10 PROCEDURE — 82948 REAGENT STRIP/BLOOD GLUCOSE: CPT

## 2019-08-10 PROCEDURE — 6360000002 HC RX W HCPCS: Performed by: PHYSICAL MEDICINE & REHABILITATION

## 2019-08-10 PROCEDURE — 97535 SELF CARE MNGMENT TRAINING: CPT

## 2019-08-10 RX ADMIN — TRAZODONE HYDROCHLORIDE 100 MG: 100 TABLET ORAL at 22:44

## 2019-08-10 RX ADMIN — ASPIRIN 81 MG: 81 TABLET ORAL at 10:12

## 2019-08-10 RX ADMIN — IPRATROPIUM BROMIDE AND ALBUTEROL SULFATE 1 AMPULE: .5; 3 SOLUTION RESPIRATORY (INHALATION) at 17:25

## 2019-08-10 RX ADMIN — FAMOTIDINE 20 MG: 20 TABLET ORAL at 23:00

## 2019-08-10 RX ADMIN — MICONAZOLE NITRATE: 20 POWDER TOPICAL at 23:00

## 2019-08-10 RX ADMIN — MICONAZOLE NITRATE: 20 POWDER TOPICAL at 10:09

## 2019-08-10 RX ADMIN — Medication 1 CAPSULE: at 10:11

## 2019-08-10 RX ADMIN — BUSPIRONE HYDROCHLORIDE 10 MG: 10 TABLET ORAL at 22:43

## 2019-08-10 RX ADMIN — SERTRALINE 200 MG: 100 TABLET, FILM COATED ORAL at 10:11

## 2019-08-10 RX ADMIN — AMOXICILLIN AND CLAVULANATE POTASSIUM 1 TABLET: 875; 125 TABLET, FILM COATED ORAL at 10:12

## 2019-08-10 RX ADMIN — FAMOTIDINE 20 MG: 20 TABLET ORAL at 10:11

## 2019-08-10 RX ADMIN — MORPHINE SULFATE 10 MG: 10 SOLUTION ORAL at 22:44

## 2019-08-10 RX ADMIN — Medication 1 CAPSULE: at 17:39

## 2019-08-10 RX ADMIN — RIVAROXABAN 20 MG: 20 TABLET, FILM COATED ORAL at 17:39

## 2019-08-10 RX ADMIN — Medication 100 MG: at 22:43

## 2019-08-10 RX ADMIN — MIDODRINE HYDROCHLORIDE 10 MG: 10 TABLET ORAL at 10:11

## 2019-08-10 RX ADMIN — MORPHINE SULFATE 10 MG: 10 SOLUTION ORAL at 11:59

## 2019-08-10 RX ADMIN — AMOXICILLIN AND CLAVULANATE POTASSIUM 1 TABLET: 875; 125 TABLET, FILM COATED ORAL at 22:43

## 2019-08-10 RX ADMIN — ATORVASTATIN CALCIUM 80 MG: 80 TABLET, FILM COATED ORAL at 22:43

## 2019-08-10 ASSESSMENT — PAIN SCALES - GENERAL
PAINLEVEL_OUTOF10: 10

## 2019-08-10 NOTE — PROGRESS NOTES
therapy but then changes his mind and refuses to get OOB. With encouragement, pt. Agrees to seated and supine therex. Pt. Slightly agitated this date. Pt. Lornicka Flight down as session progresses. PAIN: Not rated; Eyes    OBJECTIVE:  Bed Mobility:  Sit to Supine: Supervision     Ambulation:  Pt. Refused    Exercise:  Patient was guided in 1 set(s) 15-20 reps of exercise to both lower extremities. Glut sets, Quad sets, Heelslides, Long arc quads, Hip abduction/adduction (seated and supine), Straight leg raises, Seated hip flexion and Seated heel/toe raises. Exercises were completed for increased independence with functional mobility. Functional Outcome Measures: Not completed       ASSESSMENT:  Assessment: Patient progressing toward established goals. Activity Tolerance:  Patient tolerance of  treatment: fair. Treatment limited by pt. Agitation and refusal to get OOB. Equipment Recommendations:Equipment Needed: No(cont to assess, has RW, 4WW, w/c, canes)  Other: will continue to assess  Discharge Recommendations:  Continue to assess pending progress    Plan: Times per week: 5x/wk 90 min, 1x wk 30 min  Specific instructions for Next Treatment: core and LE strengthening ex, bed mobility, sitting balance . PT to assess transfers and gait when medically approved  Current Treatment Recommendations: Strengthening, Endurance Training, Balance Training, Functional Mobility Training, Safety Education & Training, Equipment Evaluation, Education, & procurement    Patient Education  Patient Education: Plan of Care, Verbal Exercise Instruction    Goals:  Patient goals : Did not state  Short term goals  Time Frame for Short term goals: 1 week  Short term goal 1: Pt to go supine <->sit,  supervision A, to get in/out of bed.    Short term goal 2: sit <> stand with SBA for ease of transfers  Short term goal 3: ambulate 50 feet with use of RW and CGA to prepare for in home mobility  Short term goal 4: WC mobility with mod I for

## 2019-08-11 LAB
GLUCOSE BLD-MCNC: 89 MG/DL (ref 70–108)
GLUCOSE BLD-MCNC: 96 MG/DL (ref 70–108)

## 2019-08-11 PROCEDURE — 6370000000 HC RX 637 (ALT 250 FOR IP): Performed by: PHYSICAL MEDICINE & REHABILITATION

## 2019-08-11 PROCEDURE — 6370000000 HC RX 637 (ALT 250 FOR IP): Performed by: INTERNAL MEDICINE

## 2019-08-11 PROCEDURE — 2700000000 HC OXYGEN THERAPY PER DAY

## 2019-08-11 PROCEDURE — 82948 REAGENT STRIP/BLOOD GLUCOSE: CPT

## 2019-08-11 PROCEDURE — 94668 MNPJ CHEST WALL SBSQ: CPT

## 2019-08-11 PROCEDURE — 6370000000 HC RX 637 (ALT 250 FOR IP): Performed by: STUDENT IN AN ORGANIZED HEALTH CARE EDUCATION/TRAINING PROGRAM

## 2019-08-11 PROCEDURE — 94640 AIRWAY INHALATION TREATMENT: CPT

## 2019-08-11 PROCEDURE — 1180000000 HC REHAB R&B

## 2019-08-11 RX ORDER — MIDODRINE HYDROCHLORIDE 5 MG/1
5 TABLET ORAL
Status: DISCONTINUED | OUTPATIENT
Start: 2019-08-12 | End: 2019-08-14 | Stop reason: HOSPADM

## 2019-08-11 RX ADMIN — AMOXICILLIN AND CLAVULANATE POTASSIUM 1 TABLET: 875; 125 TABLET, FILM COATED ORAL at 10:50

## 2019-08-11 RX ADMIN — ASPIRIN 81 MG: 81 TABLET ORAL at 10:50

## 2019-08-11 RX ADMIN — BUSPIRONE HYDROCHLORIDE 10 MG: 10 TABLET ORAL at 20:42

## 2019-08-11 RX ADMIN — ATORVASTATIN CALCIUM 80 MG: 80 TABLET, FILM COATED ORAL at 20:32

## 2019-08-11 RX ADMIN — IPRATROPIUM BROMIDE AND ALBUTEROL SULFATE 1 AMPULE: .5; 3 SOLUTION RESPIRATORY (INHALATION) at 13:56

## 2019-08-11 RX ADMIN — MICONAZOLE NITRATE: 20 POWDER TOPICAL at 10:54

## 2019-08-11 RX ADMIN — MORPHINE SULFATE 10 MG: 10 SOLUTION ORAL at 10:49

## 2019-08-11 RX ADMIN — TRAZODONE HYDROCHLORIDE 100 MG: 100 TABLET ORAL at 20:32

## 2019-08-11 RX ADMIN — FAMOTIDINE 20 MG: 20 TABLET ORAL at 20:32

## 2019-08-11 RX ADMIN — IPRATROPIUM BROMIDE AND ALBUTEROL SULFATE 1 AMPULE: .5; 3 SOLUTION RESPIRATORY (INHALATION) at 17:49

## 2019-08-11 RX ADMIN — Medication 1 CAPSULE: at 18:58

## 2019-08-11 RX ADMIN — SERTRALINE 200 MG: 100 TABLET, FILM COATED ORAL at 10:50

## 2019-08-11 RX ADMIN — SENNOSIDES 17.2 MG: 8.6 TABLET, FILM COATED ORAL at 00:26

## 2019-08-11 RX ADMIN — MORPHINE SULFATE 10 MG: 10 SOLUTION ORAL at 20:32

## 2019-08-11 RX ADMIN — MIDODRINE HYDROCHLORIDE 10 MG: 10 TABLET ORAL at 11:00

## 2019-08-11 RX ADMIN — MICONAZOLE NITRATE: 20 POWDER TOPICAL at 20:44

## 2019-08-11 RX ADMIN — Medication 1 CAPSULE: at 10:50

## 2019-08-11 RX ADMIN — AMOXICILLIN AND CLAVULANATE POTASSIUM 1 TABLET: 875; 125 TABLET, FILM COATED ORAL at 20:32

## 2019-08-11 RX ADMIN — FAMOTIDINE 20 MG: 20 TABLET ORAL at 10:51

## 2019-08-11 RX ADMIN — RIVAROXABAN 20 MG: 20 TABLET, FILM COATED ORAL at 18:57

## 2019-08-11 RX ADMIN — CLONAZEPAM 1 MG: 0.5 TABLET ORAL at 18:57

## 2019-08-11 RX ADMIN — MIDODRINE HYDROCHLORIDE 10 MG: 10 TABLET ORAL at 13:43

## 2019-08-11 ASSESSMENT — PAIN SCALES - GENERAL
PAINLEVEL_OUTOF10: 10

## 2019-08-11 ASSESSMENT — PAIN DESCRIPTION - PROGRESSION

## 2019-08-11 ASSESSMENT — PAIN DESCRIPTION - FREQUENCY: FREQUENCY: CONTINUOUS

## 2019-08-11 ASSESSMENT — PAIN DESCRIPTION - DESCRIPTORS: DESCRIPTORS: ACHING

## 2019-08-11 ASSESSMENT — PAIN - FUNCTIONAL ASSESSMENT: PAIN_FUNCTIONAL_ASSESSMENT: ACTIVITIES ARE NOT PREVENTED

## 2019-08-11 ASSESSMENT — PAIN DESCRIPTION - ONSET: ONSET: ON-GOING

## 2019-08-11 ASSESSMENT — PAIN DESCRIPTION - LOCATION: LOCATION: OTHER (COMMENT)

## 2019-08-11 ASSESSMENT — PAIN DESCRIPTION - PAIN TYPE: TYPE: CHRONIC PAIN

## 2019-08-12 ENCOUNTER — APPOINTMENT (OUTPATIENT)
Dept: GENERAL RADIOLOGY | Age: 59
DRG: 056 | End: 2019-08-12
Attending: PHYSICAL MEDICINE & REHABILITATION
Payer: MEDICARE

## 2019-08-12 LAB
ALBUMIN SERPL-MCNC: 2.9 G/DL (ref 3.5–5.1)
ALP BLD-CCNC: 95 U/L (ref 38–126)
ALT SERPL-CCNC: 10 U/L (ref 11–66)
ANION GAP SERPL CALCULATED.3IONS-SCNC: 7 MEQ/L (ref 8–16)
AST SERPL-CCNC: 17 U/L (ref 5–40)
BILIRUB SERPL-MCNC: 0.3 MG/DL (ref 0.3–1.2)
BUN BLDV-MCNC: 23 MG/DL (ref 7–22)
CALCIUM SERPL-MCNC: 9 MG/DL (ref 8.5–10.5)
CHLORIDE BLD-SCNC: 104 MEQ/L (ref 98–111)
CO2: 34 MEQ/L (ref 23–33)
CREAT SERPL-MCNC: 0.7 MG/DL (ref 0.4–1.2)
ERYTHROCYTE [DISTWIDTH] IN BLOOD BY AUTOMATED COUNT: 13.5 % (ref 11.5–14.5)
ERYTHROCYTE [DISTWIDTH] IN BLOOD BY AUTOMATED COUNT: 45.9 FL (ref 35–45)
GFR SERPL CREATININE-BSD FRML MDRD: > 90 ML/MIN/1.73M2
GLUCOSE BLD-MCNC: 73 MG/DL (ref 70–108)
GLUCOSE BLD-MCNC: 86 MG/DL (ref 70–108)
HCT VFR BLD CALC: 35.4 % (ref 42–52)
HEMOGLOBIN: 11.3 GM/DL (ref 14–18)
MCH RBC QN AUTO: 30.1 PG (ref 26–33)
MCHC RBC AUTO-ENTMCNC: 31.9 GM/DL (ref 32.2–35.5)
MCV RBC AUTO: 94.1 FL (ref 80–94)
PLATELET # BLD: 166 THOU/MM3 (ref 130–400)
PMV BLD AUTO: 10.7 FL (ref 9.4–12.4)
POTASSIUM SERPL-SCNC: 4.6 MEQ/L (ref 3.5–5.2)
RBC # BLD: 3.76 MILL/MM3 (ref 4.7–6.1)
SODIUM BLD-SCNC: 145 MEQ/L (ref 135–145)
TOTAL PROTEIN: 5.9 G/DL (ref 6.1–8)
VITAMIN D 25-HYDROXY: 21 NG/ML (ref 30–100)
WBC # BLD: 6.3 THOU/MM3 (ref 4.8–10.8)

## 2019-08-12 PROCEDURE — 36415 COLL VENOUS BLD VENIPUNCTURE: CPT

## 2019-08-12 PROCEDURE — 97530 THERAPEUTIC ACTIVITIES: CPT

## 2019-08-12 PROCEDURE — 85027 COMPLETE CBC AUTOMATED: CPT

## 2019-08-12 PROCEDURE — 2709999900 HC NON-CHARGEABLE SUPPLY

## 2019-08-12 PROCEDURE — 92526 ORAL FUNCTION THERAPY: CPT

## 2019-08-12 PROCEDURE — 82306 VITAMIN D 25 HYDROXY: CPT

## 2019-08-12 PROCEDURE — 97127 HC SP THER IVNTJ W/FOCUS COG FUNCJ: CPT

## 2019-08-12 PROCEDURE — 6370000000 HC RX 637 (ALT 250 FOR IP): Performed by: PHYSICAL MEDICINE & REHABILITATION

## 2019-08-12 PROCEDURE — 97535 SELF CARE MNGMENT TRAINING: CPT

## 2019-08-12 PROCEDURE — 97110 THERAPEUTIC EXERCISES: CPT

## 2019-08-12 PROCEDURE — 90792 PSYCH DIAG EVAL W/MED SRVCS: CPT | Performed by: PSYCHIATRY & NEUROLOGY

## 2019-08-12 PROCEDURE — 2700000000 HC OXYGEN THERAPY PER DAY

## 2019-08-12 PROCEDURE — 1180000000 HC REHAB R&B

## 2019-08-12 PROCEDURE — 97116 GAIT TRAINING THERAPY: CPT

## 2019-08-12 PROCEDURE — 94640 AIRWAY INHALATION TREATMENT: CPT

## 2019-08-12 PROCEDURE — 71046 X-RAY EXAM CHEST 2 VIEWS: CPT

## 2019-08-12 PROCEDURE — 82948 REAGENT STRIP/BLOOD GLUCOSE: CPT

## 2019-08-12 PROCEDURE — 80053 COMPREHEN METABOLIC PANEL: CPT

## 2019-08-12 PROCEDURE — 6370000000 HC RX 637 (ALT 250 FOR IP): Performed by: INTERNAL MEDICINE

## 2019-08-12 PROCEDURE — 6370000000 HC RX 637 (ALT 250 FOR IP): Performed by: STUDENT IN AN ORGANIZED HEALTH CARE EDUCATION/TRAINING PROGRAM

## 2019-08-12 PROCEDURE — 94668 MNPJ CHEST WALL SBSQ: CPT

## 2019-08-12 RX ORDER — IPRATROPIUM BROMIDE AND ALBUTEROL SULFATE 2.5; .5 MG/3ML; MG/3ML
1 SOLUTION RESPIRATORY (INHALATION) 4 TIMES DAILY PRN
Status: DISCONTINUED | OUTPATIENT
Start: 2019-08-12 | End: 2019-08-14 | Stop reason: HOSPADM

## 2019-08-12 RX ADMIN — ACETAMINOPHEN 650 MG: 650 SOLUTION ORAL at 13:00

## 2019-08-12 RX ADMIN — FAMOTIDINE 20 MG: 20 TABLET ORAL at 22:17

## 2019-08-12 RX ADMIN — MORPHINE SULFATE 10 MG: 10 SOLUTION ORAL at 18:29

## 2019-08-12 RX ADMIN — RIVAROXABAN 20 MG: 20 TABLET, FILM COATED ORAL at 18:29

## 2019-08-12 RX ADMIN — AMOXICILLIN AND CLAVULANATE POTASSIUM 1 TABLET: 875; 125 TABLET, FILM COATED ORAL at 22:19

## 2019-08-12 RX ADMIN — SERTRALINE 200 MG: 100 TABLET, FILM COATED ORAL at 08:58

## 2019-08-12 RX ADMIN — Medication 1 CAPSULE: at 08:59

## 2019-08-12 RX ADMIN — IPRATROPIUM BROMIDE AND ALBUTEROL SULFATE 1 AMPULE: .5; 3 SOLUTION RESPIRATORY (INHALATION) at 07:40

## 2019-08-12 RX ADMIN — BUTALBITAL, ACETAMINOPHEN, AND CAFFEINE 1 TABLET: 50; 325; 40 TABLET ORAL at 08:58

## 2019-08-12 RX ADMIN — MICONAZOLE NITRATE: 20 POWDER TOPICAL at 22:31

## 2019-08-12 RX ADMIN — BUSPIRONE HYDROCHLORIDE 10 MG: 10 TABLET ORAL at 22:19

## 2019-08-12 RX ADMIN — ERGOCALCIFEROL 50000 UNITS: 1.25 CAPSULE ORAL at 08:59

## 2019-08-12 RX ADMIN — ASPIRIN 81 MG: 81 TABLET ORAL at 08:59

## 2019-08-12 RX ADMIN — Medication 100 MG: at 22:17

## 2019-08-12 RX ADMIN — TRAZODONE HYDROCHLORIDE 100 MG: 100 TABLET ORAL at 22:17

## 2019-08-12 RX ADMIN — IPRATROPIUM BROMIDE AND ALBUTEROL SULFATE 1 AMPULE: .5; 3 SOLUTION RESPIRATORY (INHALATION) at 17:37

## 2019-08-12 RX ADMIN — SENNOSIDES 17.2 MG: 8.6 TABLET, FILM COATED ORAL at 22:19

## 2019-08-12 RX ADMIN — MIDODRINE HYDROCHLORIDE 5 MG: 5 TABLET ORAL at 08:59

## 2019-08-12 RX ADMIN — Medication 1 CAPSULE: at 18:29

## 2019-08-12 RX ADMIN — BUTALBITAL, ACETAMINOPHEN, AND CAFFEINE 1 TABLET: 50; 325; 40 TABLET ORAL at 22:19

## 2019-08-12 RX ADMIN — ATORVASTATIN CALCIUM 80 MG: 80 TABLET, FILM COATED ORAL at 22:19

## 2019-08-12 RX ADMIN — MIDODRINE HYDROCHLORIDE 5 MG: 5 TABLET ORAL at 13:00

## 2019-08-12 RX ADMIN — IPRATROPIUM BROMIDE AND ALBUTEROL SULFATE 1 AMPULE: .5; 3 SOLUTION RESPIRATORY (INHALATION) at 12:15

## 2019-08-12 RX ADMIN — FAMOTIDINE 20 MG: 20 TABLET ORAL at 08:59

## 2019-08-12 RX ADMIN — MICONAZOLE NITRATE: 20 POWDER TOPICAL at 09:00

## 2019-08-12 RX ADMIN — AMOXICILLIN AND CLAVULANATE POTASSIUM 1 TABLET: 875; 125 TABLET, FILM COATED ORAL at 08:59

## 2019-08-12 ASSESSMENT — PAIN DESCRIPTION - PROGRESSION

## 2019-08-12 ASSESSMENT — PAIN DESCRIPTION - PAIN TYPE: TYPE: CHRONIC PAIN

## 2019-08-12 ASSESSMENT — PAIN DESCRIPTION - LOCATION: LOCATION: HEAD

## 2019-08-12 ASSESSMENT — PAIN SCALES - GENERAL
PAINLEVEL_OUTOF10: 8
PAINLEVEL_OUTOF10: 10
PAINLEVEL_OUTOF10: 8
PAINLEVEL_OUTOF10: 10

## 2019-08-12 NOTE — PROGRESS NOTES
0/10:     OBJECTIVE:  Bed Mobility:  Supine to Sit: Supervision  Sit to Supine: Supervision     Transfers:  Sit to Stand: [address], with increased time for completion, with verbal cues  Stand to [facility] [address], with increased time for completion, with verbal cues  Car:Contact Guard Assistance, with increased time for completion, with verbal cues    Ambulation:  Contact Guard Assistance, Minimal Assistance, with cues for safety, with verbal cues   Distance: 12ft  Surface: Level Tile  Device:Rolling Walker  Gait Deviations: Forward Flexed Posture, Slow Cassandra, Decreased Step Length Bilaterally, Decreased Weight Shift Bilaterally, Decreased Gait Speed, Decreased Heel Strike Bilaterally, Wide Base of Support, Moderate Path Deviations, Unsteady Gait, Decreased Terminal Knee Extension and L trunk lean at times requiring slight assist    Wheelchair Mobility:  Modified Independent  Extremities Used: Bilateral Upper Extremities and Bilateral Lower Extremities  Type of Chair:Manual  Surface: Level Tile  Distance: 325ft, 150ft, 60ft x2  Quality: Slow Velocity, Veers Right and Decreased Fluidity      Exercise:  nustep x6min 30sec level 6 all extremities to increase strength and improve endurance. Cues for orientation to task throughout    Functional Outcome Measures: Not completed       ASSESSMENT:  Assessment: Patient progressing toward established goals. The pt has met 2/4 STGs and 0 LTGs. He continues to be limited in progress towards goals by poor safety awareness and level of impulsivity. At this time he can perform supine to/from sit at S, transfers at [facility], and is walking short distances with use of RW at [facility] to [place] secondary to instability often while distracted. Activity Tolerance:  Patient tolerance of  treatment: good.  O2 sats 98% during session     Equipment Recommendations:Equipment Needed: No(cont to assess, has RW, 4WW, w/c, canes)  Other: will continue to assess  Discharge use of RW and CGA to prepare for in home mobility  Short term goal 4: WC mobility with mod I for 300ft to prepare for community mobility  GOALD MET, DISCONTINUE      Revised Long-Term Goals  Long term goals  Time Frame for Long term goals : 4 weeks   Long term goal 1: bed mobility with mod I for ease of getting in/out of bed  Long term goal 2: sit <> stand with mod I for safe of transfers   Long term goal 3: ambulate 75ft with use of LRAD and stand-by assist for safe in home mobility   Long term goal 4: negotiate 1 step no rails with stand-by assist and use of LRAD for safe entry/exit of his home   Long term goal 5: pt to perform car transfer at [facility] for safe transport home

## 2019-08-12 NOTE — PROGRESS NOTES
[address]   SLP Individual Minutes  Time In: 1332  Time Out: 1400  Minutes: 28  Timed Code Treatment Minutes: 28 Minutes       []Daily Note  [x]Progress Note  []Discharge Note    Date: 2019  Patient Name: Rosario Gordon        MRN: [id number]    : 1960  (62 y.o.)  Gender: male   Primary Provider: Phill Stevens MD  Admitting Diagnosis:  Cerebellar infarct   Secondary Diagnosis: Cognitive deficits, Dysphagia, Dysphonia   Precautions: fall risk, aspiration risk, live sitter d/t suicide precautions    Swallowing Status/Diet: NPO, PEG tube in place for nutrition and hydration  Swallowing Strategies: NPO  DATE of last MBS:  2019; FEES 2019  Pain: 0/10    Subjective: Pt seen upright in wheelchair. Repeated statements of \"do you feel safe in here with me? Do you think I'm going to harm you?, etc\". Pt required mild redirections for appropriate attention/focus back to therapeutic tasks. NO family present. **RNAbida made aware of above     SHORT TERM GOAL #1:  Goal 1:  Pt will complete pharyngeal strengthening exercise program with completion of exercises x10 to improve anterior hyoid excursion and pharyngeal strengthening to increase airway protection and pharyngeal contraction during the swallow. GOAL MET- CONTINUE  INTERVENTIONS: DNT d/t focus on other goals   AM session: Completed the following pharyngeal swallow exercises to improve hyolaryngeal elevation and anterior excursion to maximize epiglottic movement during the swallow. Instruction provided on proper technique for the following:   -Effortful swallow- 1 set of 10, fair success  -Janelle swallow- 1 set of 10, fair success  -CTAR- 1 set of 10, fair success. -Base of tongue retraction- 1 set of 20, good success  *minimal progress with pharyngeal swallow function noted at this time.    *Increased time needed to complete tasks due to poor sustained

## 2019-08-12 NOTE — PROGRESS NOTES
the patient appropriate for a stay in the functional apartment? no    Discharge Plan   Estimated Discharge Date: 8/14  Destination: home health  Services at Discharge: [address], Occupational Therapy, Speech Therapy, , Nursing and an aide 3x week  Is patient appropriate for an outpatient driving evaluation? no  Equipment at Discharge: None  Factors facilitating achievement of predicted outcomes: Family support, Motivated, Cooperative, Pleasant, Has needed Durable Medical Equipment at home and Home is wheelchair accessible  Barriers to the achievement of predicted outcomes: Pain, Cognitive deficit, Limited insight into deficits, Unrealistic expectations, Upper extremity weakness, Lower extremity weakness, Long standing deficits, Impaired vision, Hypotension, Dysphagia, and PEG tube status    Team Members Present at Conference:  :Irais Falcon [place]  Occupational Therapist: Gilberto Singh OTR/L [id number]. Physical Therapist: Lion Delval[place], [place] [id number]  Speech Therapist: Stacey Anglin [address], [address] Nurse: Emanuel Retana, RN   Psychologist: Gwen Calderon, PhD.    I approve the established interdisciplinary plan of care as documented within the medical record of Elver Hubbard.     [facility]

## 2019-08-12 NOTE — PROGRESS NOTES
[facility] [address] [facility]  Occupational Therapy  Daily Note  Time:   Time In: 7860  Time Out: 1788  Timed Code Treatment Minutes: 44 Minutes  Minutes: 39     Variance: -20  Reason: (Psychology in to see pt secondary suicidal ideations)    Date: 2019  Patient Name: Esperanza James,   Gender: male      Room: [address] [place] [address]  MRN: [id number]  : 1960  (62 y.o.)  Referring Practitioner: Kimi Hawkins MD  Diagnosis: Cerebellar infarct  Additional Pertinent Hx: He was admitted 2019 for complaint of injuries after a ground-level fall at home. The patient was reaching over his couch when he lost his balance and fell onto his left side causing him to strike his head without any endorsed loss of consciousness. There was a small superficial contusion of the left frontal region. On initial work-up he was found to have a left ninth rib fracture and also old rib fractures on the right side. CT imaging of the chest showed patchy nodular airspace opacities in the left lung base suggestive of infiltrates with an elevated white count of 15.3 noted. CT imaging of the head did show an acute infarct of the inferior medial aspect of the left cerebellar hemisphere as well as the left side of the medulla. The patient was admitted for further medical care. Note was also made that the patient was disheveled and malodorous and had questionable ability to care for himself. He did undergo a modified barium swallow study on  which showed the patient to have mild oral dysphagia and moderate pharyngeal dysphasia with recommendation at that time for thin and moist purées with thin liquids and strict use of compensatory strategies. He is currently on Zosyn for treating a presumptive aspiration pneumonia versus a hospital-acquired pneumonia. Jesus Francisco Hx of prior CVA in april with chronic pain.   The patient was just recently in the emergency department on 2019 for complaint of unsteady

## 2019-08-12 NOTE — PROGRESS NOTES
assistance  []Demonstrates with assistance  [x]Needs further instruction     []No evidence of learning                  [x]No family present      Plan: [x]Continue with current plan of care    []Modify current plan of care as follows:    []Discharge patient    Discharge Location:    Services/Supervision Recommended:     [x]Patient continues to require treatment by a licensed therapist to address functional deficits as outlined in the established plan of care.     MELL Voss 8/12/2019

## 2019-08-12 NOTE — PROGRESS NOTES
Physical Therapy   [facility]  INPATIENT PHYSICAL THERAPY  DAILY NOTE  [facility] - [id number]  Time In: 1100  Time Out: 1130  Timed Code Treatment Minutes: 30 Minutes  Minutes: 30          Date: 2019  Patient Name: Veronica Wilson,  Gender:  male        MRN: [id number]  : 1960  (62 y.o.)     Referring Practitioner: Charlanne Lefort, MD  Diagnosis: cerebellar infarct   Additional Pertinent Hx: Pt admitted through ED due to weakness and pain s/p fall at home. Hx:  CA, CVA. x-ray Lt 9th rib fx. MRI:  remote infarct, restricted diffusion inferior medial aspect LT cerebellar hemisphere and medula. Pt admited to hospital on 2019 and transferred to rehab on . Prior Level of Function:  Lives With: Alone  Type of Home: Apartment  Home Layout: One level  Home Access: Stairs to enter without rails  Entrance Stairs - Number of Steps: 1  Home Equipment: Wheelchair-manual, 4 wheeled walker, Rolling walker, Quad cane(hospital bed with bed rails)   ADL Assistance: [address] [place] [address] Avenue: Independent  Homemaking Responsibilities: Yes  Meal Prep Responsibility: Secondary  Laundry Responsibility: Secondary  Cleaning Responsibility: No  Bill Paying/Finance Responsibility: Primary  Shopping Responsibility: Secondary  Dependent Care Responsibility: No  Ambulation Assistance: Independent  Transfer Assistance: Independent  Additional Comments: Pt reports he sleeps on foton at home. Pt reports ambulating ~1 block prior to admisison with 4WW. Pt denies use of AD in the home. Chronic Rt foot drop.   Family to bring in AFO    Restrictions/Precautions:  Restrictions/Precautions: Fall Risk, General Precautions(NPO)  Position Activity Restriction  Other position/activity restrictions: PEG , oxygen, impulsive, unsteady, left ninth rib fracture and also old rib fractures on the right side, suicide precautions,     SUBJECTIVE: Patient in wheelchair upon arrival and

## 2019-08-12 NOTE — CONSULTS
**This is a Medical/ PA/ APRN Student Note and is charted for educational purposes. The non-physician staff attested note is not to be used for billing purposes or to guide patient care. Please see the physician modifications/ attestation for treatment plan/suggestions. This note has been reviewed and feedback has been provided to the student. **      Department of Psychiatry  Consult Service   Psychiatric Assessment      Thank you very much for allowing us to participate in the care of this patient. Reason for Consult:  Suicidal Ideation     HISTORY OF PRESENT ILLNESS:      The patient is a 62 y.o. male with significant history of Major Depressive Disorder who is admitted medically after falling at home on 19. He was diagnosed with acute cerebellar stroke. Patient made a statement yesterday that he \"might as well die\" when he goes home. He says he should not have made that statement but was feeling depressed. He denies suicidal and homicidal ideation today. Within the past 2 years, his wife, son, and dog have all  to cancer. He has been talking to the Taoist  during his visit at [address] [place] [address]. He is hopeful to buy a new eye-seeing dog. He is in energetic, happy mood. He was supposed to be discharged 19 but his family didn't show. They are waiting for his niece who will be his caretaker to be educated before he can be discharged. He reports trouble sleeping, poor appetite, and poor concentration.       PSYCHIATRIC HISTORY:      · Outpatient psychiatric provider:  N/A  · Suicide attempts: 0  · Inpatient psychiatric admissions: 0    Past psychiatric medications includes:     Trazodone, clonazepam, hydroxyzine    Adverse reactions from psychotropic medications:    ***      Lifetime Psychiatric Review of Systems  ***    ·    Obsessions and Compulsions: Denies    ·    Josiane or Hypomania: Denies  ·    Hallucinations: Denies  ·    Panic Attacks:  Denies  ·    Delusions:  Denies  ·

## 2019-08-12 NOTE — CONSULTS
Department of Psychiatry  Consult Service   Psychiatric Assessment      Thank you very much for allowing us to participate in the care of this patient. Reason for Consult:  Suicidal Ideation    HISTORY OF PRESENT ILLNESS:          The patient is a 62 y.o. male with significant history of Major Depressive Disorder who was medically admitted after falling at home on 7/11/19. He was diagnosed with acute cerebellar stroke. Patient made statement yesterday to nurse that he \"might as well die\" when he goes home. Significant stressors as the patient has been admitted for over a month and would like to go home. States that he never should had made that statement and thought that it was going to be confidential. States that he would never harm himself or anyone else and that he was just feeling depressed about not being able to come home. He was scheduled to be discharged on 8/9/19 but his family was a no show. He is having some poor appetite due to him having had been made NPO in July, states that he has been working with [address] [place] [address] Dr. He also states that he has had some problems with sleep as he has struggled with significant pain which requires morphine. He states that he is otherwise feeling okay today. Is currently taking Zoloft, Trazodone, Buspar, Klonopin, Atarax. PSYCHIATRIC HISTORY:      · Outpatient psychiatric provider:  N/A  · Suicide attempts: 0  · Inpatient psychiatric admissions: 0    Past psychiatric medications includes:     Trazodone, clonazepam, hydroxyzine, Zoloft    Adverse reactions from psychotropic medications:    None      Lifetime Psychiatric Review of Systems      ·    Obsessions and Compulsions: Denies    ·    Josiane or Hypomania: Denies  ·    Hallucinations: Denies  ·    Panic Attacks:  Denies  ·    Delusions:  Denies  ·    Phobias:  Denies  ·    Trauma: Denies    Prior to Admission medications    Medication Sig Start Date End Date Taking?  Authorizing Provider   morphine 10 MG/5ML solution 2.5-5 Oral, BID WC  famotidine (PEPCID) tablet 20 mg, 20 mg, PEG Tube, BID  magnesium hydroxide (MILK OF MAGNESIA) 400 MG/5ML suspension 30 mL, 30 mL, Per G Tube, Daily PRN  ipratropium-albuterol (DUONEB) nebulizer solution 1 ampule, 1 ampule, Inhalation, 4x daily  butalbital-acetaminophen-caffeine (FIORICET, ESGIC) per tablet 1 tablet, 1 tablet, PEG Tube, Q6H PRN  traZODone (DESYREL) tablet 100 mg, 100 mg, Oral, Nightly  sodium chloride flush 0.9 % injection 10 mL, 10 mL, Intravenous, 2 times per day  sodium chloride flush 0.9 % injection 10 mL, 10 mL, Intravenous, PRN  zinc oxide (PINXAV) 30 % ointment, , Topical, 4x Daily PRN  heparin flush (100 units/mL) injection 500 Units, 500 Units, Intracatheter, Daily  vitamin D (ERGOCALCIFEROL) capsule 50,000 Units, 50,000 Units, PEG Tube, Q3 Days  aspirin EC tablet 81 mg, 81 mg, Oral, Daily **OR** aspirin suppository 300 mg, 300 mg, Rectal, Daily  atorvastatin (LIPITOR) tablet 80 mg, 80 mg, PEG Tube, Nightly  clonazePAM (KLONOPIN) tablet 1 mg, 1 mg, PEG Tube, BID PRN  guaiFENesin-dextromethorphan (ROBITUSSIN DM) 100-10 MG/5ML syrup 5 mL, 5 mL, Per G Tube, Q4H PRN  hydrOXYzine (ATARAX) tablet 50 mg, 50 mg, PEG Tube, Nightly PRN  miconazole (MICOTIN) 2 % powder, , Topical, BID  morphine 10 MG/5ML solution 5 mg, 5 mg, PEG Tube, Q4H PRN **OR** morphine 10 MG/5ML solution 10 mg, 10 mg, PEG Tube, Q4H PRN  nicotine (NICODERM CQ) 21 MG/24HR 1 patch, 1 patch, Transdermal, Daily  [START ON 7/13/2020] potassium replacement protocol, , Other, RX Placeholder  rivaroxaban (XARELTO) tablet 20 mg, 20 mg, Oral, Daily  sertraline (ZOLOFT) tablet 200 mg, 200 mg, PEG Tube, Daily  acetaminophen (TYLENOL) 160 MG/5ML solution 650 mg, 650 mg, PEG Tube, Q4H PRN  ondansetron (ZOFRAN) tablet 4 mg, 4 mg, PEG Tube, Q8H PRN  docusate (COLACE) 50 MG/5ML liquid 100 mg, 100 mg, Per G Tube, BID  senna (SENOKOT) tablet 17.2 mg, 2 tablet, PEG Tube, Nightly  busPIRone (BUSPAR) tablet 10 mg, 10 mg, Oral, Nightly right in front of them  : Wife passed away from cancer    CHILDREN: 1 child who passed away from cancer  OCCUPATION: [employer]  EDUCATION: High School    SUBSTANCE USE HISTORY: Nicotine        Family Medical and Psychiatric History:     Hx of suicide by sister. Problem Relation Age of Onset    Cancer Mother         lung         Physical  /68   Pulse 60   Temp 97.5 °F (36.4 °C) (Oral)   Resp 16   Ht 6' (1.829 m)   Wt 187 lb 9.6 oz (85.1 kg)   SpO2 93%   BMI 25.44 kg/m²     Physical exam consistent with findings in ED and including the following:     Physical Exam  General Appearance:  awake, alert, oriented, in no acute distress and well developed, well nourished  Skin:  Skin color, texture, turgor normal. No rashes or lesions. Eyes:  No gross abnormalities. Abdomen:  Soft, non-tender, normal bowel sounds. No bruits, organomegaly or masses. Extremities: Extremities warm to touch, pink, with no edema.   Musculoskeletal: spontaneous movement of all extremitiews negative  Neurological: alert, oriented x3  speech: normal in context and clarity  memory: intact grossly  cranial nerves II-XII: intact  motor strength: full proximally and distally  reflexes: full and symmetric  plantar responses: downgoing bilaterally Normal gait and station     Mental Status Examination:  Level of consciousness:  Within normal limits  Appearance: hospital attire, lying in bed, fair grooming  Behavior/Motor:  no abnormalities noted  Attitude toward examiner:  cooperative, attentive and good eye contact  Speech:  Spontaneous, normal rate and volume  Mood:  Neutral  Affect: mood congruent  Thought processes:  Linear, goal directed, coherent  Thought content: denies suicidal ideations   denies homicidal ideations    Denies hallucinations   denies delusions  Cognition:  Oriented to self, situation, location, date  Concentration clinically adequate  Memory age appropriate  Insight & Judgment:  fair    DSM-5

## 2019-08-12 NOTE — PROGRESS NOTES
Exam:  Motor exam is 4 out of 5 all extremities with the exception of RIGHT ADF 1/5     Tone:  normal  Muscle bulk: within normal limits  Sensory:  Sensory intact  Coordination:   Decreased LEFT SUDARSHAN, FFM, FTN, and HTS  Deep Tendon Reflexes:  Reflexes are intact and symmetrical bilaterally     Skin: warm and dry, no rash or erythema  Peripheral vascular: Pulses: Normal upper and lower extremity pulses; Edema: trace    Diagnostics:   Recent Results (from the past 24 hour(s))   CBC    Collection Time: 08/12/19  6:44 AM   Result Value Ref Range    WBC 6.3 4.8 - 10.8 thou/mm3    RBC 3.76 (L) 4.70 - 6.10 mill/mm3    Hemoglobin 11.3 (L) 14.0 - 18.0 gm/dl    Hematocrit 35.4 (L) 42.0 - 52.0 %    MCV 94.1 (H) 80.0 - 94.0 fL    MCH 30.1 26.0 - 33.0 pg    MCHC 31.9 (L) 32.2 - 35.5 gm/dl    RDW-CV 13.5 11.5 - 14.5 %    RDW-SD 45.9 (H) 35.0 - 45.0 fL    Platelets 788 417 - 627 thou/mm3    MPV 10.7 9.4 - 12.4 fL   Comprehensive Metabolic Panel    Collection Time: 08/12/19  6:44 AM   Result Value Ref Range    Glucose 86 70 - 108 mg/dL    CREATININE 0.7 0.4 - 1.2 mg/dL    BUN 23 (H) 7 - 22 mg/dL    Sodium 145 135 - 145 meq/L    Potassium 4.6 3.5 - 5.2 meq/L    Chloride 104 98 - 111 meq/L    CO2 34 (H) 23 - 33 meq/L    Calcium 9.0 8.5 - 10.5 mg/dL    AST 17 5 - 40 U/L    Alkaline Phosphatase 95 38 - 126 U/L    Total Protein 5.9 (L) 6.1 - 8.0 g/dL    Alb 2.9 (L) 3.5 - 5.1 g/dL    Total Bilirubin 0.3 0.3 - 1.2 mg/dL    ALT 10 (L) 11 - 66 U/L   Vitamin D 25 Hydroxy    Collection Time: 08/12/19  6:44 AM   Result Value Ref Range    Vit D, 25-Hydroxy 21 (L) 30 - 100 ng/ml   Anion Gap    Collection Time: 08/12/19  6:44 AM   Result Value Ref Range    Anion Gap 7.0 (L) 8.0 - 16.0 meq/L   Glomerular Filtration Rate, Estimated    Collection Time: 08/12/19  6:44 AM   Result Value Ref Range    Est, Glom Filt Rate >90 ml/min/1.73m2   POCT glucose    Collection Time: 08/12/19  8:16 AM   Result Value Ref Range    POC Glucose 73 70 - 108 mg/dl     Xr diffusion involving the inferior medial aspect of the LEFT cerebellar hemisphere as well as the LEFT side of the medulla/Chronic CVA with large infarct in the RIGHT posterior cerebral artery territory secondary to high-grade stenosis at the P1 segment of the RIGHT posterior cerebral artery without definite distal flow involving the RIGHT occipital lobe and tracking anteriorly along the medial aspect of the RIGHT temporal lobe with associated local mass effect with sulcal effacement without midline shift or associated hemorrhage.   1. Secondary stroke prevention. 2. He still exhibits visual difficulty secondary to his left hemianopsia from his prior stroke and on occasion also exhibits medial rectus weakness on the left. Occupational therapy has suggested to begin taping his glasses which was started on 8/6 and also a neuro optometry follow-up after discharge. 2. Gait instability/Fall/Physical debility with generalized weakness/Peripheral vision loss LEFT > RIGHT/Mild LEFT hemiparesis and ataxia. 1. PT/OT. 3. Concussion without LOC. 1. SLP. 4. Healthcare associated pneumonia versus Aspiration pneumonia/Acute hypoxic respiratory failure. 1. Zosyn completed; switched to Augmentin for 7 days. ENDED on 7/25.  5. Aspiration pneumonia with sepsis. 1. On 7/29 WBC/ANC 13/10. 9. Zosyn started for presumptive aspiration pneumonia and sepsis with increased requirement for oxygen. Augmentin was started on 8/8 with a duration of 7 days due to persistence of increased parenchymal markings at the right perihilar region with the right worse than the left with suggestion of pneumonia/atelectasis. 2. Placed on rebreather mask with titration to maintain 90 to 92% O2 saturation. 3. Initially chest x-ray was suggestive of a moderate right-sided pleural effusion for which an ultrasound-guided thoracentesis was ordered; but the actual volume was found to be too small to safely complete the procedure.   4. CT of the chest with version 8.2 completed. Yissel Sanchez scored 20/30 over prior ([facility]) version 7.2 completed.  Patient scored 15/25.  *adjusted score given pt unable to complete written subtests (L hand dominant)/Mild-moderate oral dysphagia and moderate to severe pharyngeal dysphagia. 1. SLP. 2. Fees study on . Completed with patient remaining n.p.o. at this time. Dr. Guerda Álvarez, ENT reviewed the imaging on . Recommended Oncology follow up after discharge to ascertain whether the patient may be a candidate for vital stem or deep tissue massage dependent upon whether he has any active known malignancy. 3. Patient did trial ice chips on , unsuccessfully. 4. Started TF boluses on . Tolerating well. 5. Repeat MBS in 3 to 4 weeks after discharge from the acute inpatient rehabilitation unit. Order has been written for discharge. 8. ANTHONY with use of CPAP. 1. CPAP ordered on  at patient's request.  Once a on  the patient requested to discontinue the CPAP. 9. Nutrition:  Consultation to dietician for nutritional counseling and recommendations. 1. Severe malnutrition. 2. S/p PEG placement on 2019 by Dr. No Roman. 3. Wound care. 4. TotP 5.8, alb 2.4, Vitamin 25OH level of 16 on 2019.  5. Continue with tube feeds; nutrition has recommended bolus tube feeds with the plan to start those on . 6. Ergocalciferol every 3 days x 11 doses. Vitamin D improved to 21 on .  10. Orthostatic hypotension. 1. Supine: 124/60. Sittin/61 and standing was 88/61 on . 2. Start Midodrine 2.5mg TID on . Pressures improved; but still has some issues with lightheadedness and the dosing was increased to 5 mg 3 times daily on . He is less symptomatic at this dose. Pressures still running below 100 for SBP; increased to 10mg on . Currently no complaints of lightheadedness when standing with therapy. Blood pressures remain improved as of  with systolic blood pressures ranging from 120-130.   Systolic had this since his strokes. It was discussed with him that these may be post stroke headaches; but as a starting point to address his pain a headache cocktail consisting of Toradol, Reglan, and magnesium was ordered on 7/24. Will consider starting the patient on Topamax if his headaches persist, however it is expected that it could take up to 2 weeks for the Topamax to actually start working. 3. Chose to start Fioricet on 7/26.  4. Tylenol. 5. Lidoderm patches. 5. COPD. 1. Robitussin. 2. DuoNeb. 6. Type 2 DM, controlled.  Last HgbA1c 5.1 on 7/12/2019.  1. Stable. 7. Paravertebral ossifications at numerous levels along the thoracic spine with partial mineralization of the anterior longitudinal ligament consistent with DISH. 8. Osteopenia. 1. Vitamin D level. 16 on 7/19/2019.  9. Nicotine dependence; current every day smoker. 1. NicoDerm patches. 10. Marijuana use. 11. History of anxiety and depression. 1. States that his Zoloft doesn't work. Medication has been continued after explanation to the patient of the difference between the medications that he is been using for his anxiety versus those that are for depression. 2. Klonopin. 3. Buspar nightly. 12. Medication non-compliance.     Labs:  1. 7/19.  2. 7/26.  3. 8/5.  4. 8/12.     Infectious Disease:  1. Zosyn. Missed Therapy Time:  7/22 Missed 34 minutes of OT due to nausea. 7/24 Missed 18 minutes due nausea and feeling tired. 7/26 Missed 180 minutes due to not feeling well secondary to aspiration pneumonia, sepsis, and need for rebreather mask with high flow oxygen. DME:  Wolf Sierra requires a Bedside Commode to complete bathing, toileting, dressing and grooming tasks. Patient requires a Bedside Commode due to Upper Extremity/Lower Extremity Weakness and limited ambulation and is unable to walk to the bathroom at home.   Without the Bedside Commode, Wolf Sierra is at increased risk for falls and would require increased assistance for ADL's and mobility.      Discharge Plan:  Estimated Discharge Date: 8/15  Destination: home health  Services at Discharge: [address] [place] [address] Rd, Occupational Therapy, Speech Therapy, , Nursing and an aide 3x week  Is patient appropriate for an outpatient driving evaluation? no  Equipment at Discharge: Carrington Cruz MD

## 2019-08-13 PROBLEM — E43 SEVERE MALNUTRITION (HCC): Chronic | Status: RESOLVED | Noted: 2019-04-17 | Resolved: 2019-08-13

## 2019-08-13 PROBLEM — S22.32XA CLOSED FRACTURE OF ONE RIB OF LEFT SIDE: Status: RESOLVED | Noted: 2019-07-11 | Resolved: 2019-08-13

## 2019-08-13 LAB — GLUCOSE BLD-MCNC: 87 MG/DL (ref 70–108)

## 2019-08-13 PROCEDURE — 6370000000 HC RX 637 (ALT 250 FOR IP): Performed by: PHYSICAL MEDICINE & REHABILITATION

## 2019-08-13 PROCEDURE — 97110 THERAPEUTIC EXERCISES: CPT

## 2019-08-13 PROCEDURE — 97535 SELF CARE MNGMENT TRAINING: CPT

## 2019-08-13 PROCEDURE — 97530 THERAPEUTIC ACTIVITIES: CPT

## 2019-08-13 PROCEDURE — 97116 GAIT TRAINING THERAPY: CPT

## 2019-08-13 PROCEDURE — 1180000000 HC REHAB R&B

## 2019-08-13 PROCEDURE — 82948 REAGENT STRIP/BLOOD GLUCOSE: CPT

## 2019-08-13 PROCEDURE — 2700000000 HC OXYGEN THERAPY PER DAY

## 2019-08-13 PROCEDURE — 92526 ORAL FUNCTION THERAPY: CPT | Performed by: SPEECH-LANGUAGE PATHOLOGIST

## 2019-08-13 PROCEDURE — 94761 N-INVAS EAR/PLS OXIMETRY MLT: CPT

## 2019-08-13 PROCEDURE — 6370000000 HC RX 637 (ALT 250 FOR IP): Performed by: STUDENT IN AN ORGANIZED HEALTH CARE EDUCATION/TRAINING PROGRAM

## 2019-08-13 PROCEDURE — 97127 HC SP THER IVNTJ W/FOCUS COG FUNCJ: CPT | Performed by: SPEECH-LANGUAGE PATHOLOGIST

## 2019-08-13 RX ORDER — ATORVASTATIN CALCIUM 80 MG/1
80 TABLET, FILM COATED ORAL NIGHTLY
Qty: 30 TABLET | Refills: 3 | Status: SHIPPED | OUTPATIENT
Start: 2019-08-14

## 2019-08-13 RX ORDER — ASPIRIN 81 MG/1
81 TABLET ORAL DAILY
Qty: 30 TABLET | Refills: 3 | Status: SHIPPED | OUTPATIENT
Start: 2019-08-14

## 2019-08-13 RX ORDER — SERTRALINE HYDROCHLORIDE 100 MG/1
TABLET, FILM COATED ORAL
Qty: 90 TABLET | Refills: 3 | Status: SHIPPED | OUTPATIENT
Start: 2019-08-13

## 2019-08-13 RX ORDER — MIDODRINE HYDROCHLORIDE 5 MG/1
5 TABLET ORAL
Qty: 90 TABLET | Refills: 1 | Status: SHIPPED | OUTPATIENT
Start: 2019-08-14 | End: 2019-08-14

## 2019-08-13 RX ORDER — ALBUTEROL SULFATE 90 UG/1
2 AEROSOL, METERED RESPIRATORY (INHALATION) EVERY 6 HOURS PRN
Qty: 1 INHALER | Refills: 2 | Status: SHIPPED | OUTPATIENT
Start: 2019-08-13

## 2019-08-13 RX ORDER — ERGOCALCIFEROL (VITAMIN D2) 1250 MCG
50000 CAPSULE ORAL
Qty: 5 CAPSULE | Refills: 0 | Status: SHIPPED | OUTPATIENT
Start: 2019-08-15

## 2019-08-13 RX ORDER — CLONAZEPAM 1 MG/1
1 TABLET ORAL 2 TIMES DAILY PRN
Qty: 20 TABLET | Refills: 0 | Status: SHIPPED | OUTPATIENT
Start: 2019-08-13 | End: 2019-08-26 | Stop reason: SDUPTHER

## 2019-08-13 RX ORDER — BUSPIRONE HYDROCHLORIDE 10 MG/1
10 TABLET ORAL NIGHTLY
Qty: 30 TABLET | Refills: 0 | Status: SHIPPED | OUTPATIENT
Start: 2019-08-14

## 2019-08-13 RX ORDER — DOCUSATE SODIUM 100 MG/1
100 CAPSULE, LIQUID FILLED ORAL 3 TIMES DAILY PRN
Qty: 90 CAPSULE | Refills: 1 | Status: SHIPPED | OUTPATIENT
Start: 2019-08-13

## 2019-08-13 RX ORDER — NICOTINE 21 MG/24HR
1 PATCH, TRANSDERMAL 24 HOURS TRANSDERMAL DAILY
Qty: 14 PATCH | Refills: 0 | Status: SHIPPED | OUTPATIENT
Start: 2019-08-13 | End: 2019-09-03 | Stop reason: SDUPTHER

## 2019-08-13 RX ORDER — LIDOCAINE AND PRILOCAINE 25; 25 MG/G; MG/G
CREAM TOPICAL
Qty: 1 TUBE | Refills: 3 | Status: SHIPPED | OUTPATIENT
Start: 2019-08-13

## 2019-08-13 RX ORDER — LACTULOSE 10 G/15ML
20 SOLUTION ORAL 2 TIMES DAILY PRN
Qty: 1800 ML | Refills: 0 | Status: SHIPPED | OUTPATIENT
Start: 2019-08-13

## 2019-08-13 RX ORDER — NALOXONE HYDROCHLORIDE 4 MG/.1ML
1 SPRAY NASAL PRN
Qty: 1 EACH | Refills: 0 | Status: SHIPPED | OUTPATIENT
Start: 2019-08-13

## 2019-08-13 RX ADMIN — AMOXICILLIN AND CLAVULANATE POTASSIUM 1 TABLET: 875; 125 TABLET, FILM COATED ORAL at 08:56

## 2019-08-13 RX ADMIN — Medication 1 CAPSULE: at 08:56

## 2019-08-13 RX ADMIN — FAMOTIDINE 20 MG: 20 TABLET ORAL at 21:30

## 2019-08-13 RX ADMIN — ACETAMINOPHEN 650 MG: 650 SOLUTION ORAL at 08:56

## 2019-08-13 RX ADMIN — AMOXICILLIN AND CLAVULANATE POTASSIUM 1 TABLET: 875; 125 TABLET, FILM COATED ORAL at 21:30

## 2019-08-13 RX ADMIN — Medication 100 MG: at 21:30

## 2019-08-13 RX ADMIN — MORPHINE SULFATE 10 MG: 10 SOLUTION ORAL at 12:18

## 2019-08-13 RX ADMIN — ACETAMINOPHEN 650 MG: 650 SOLUTION ORAL at 18:35

## 2019-08-13 RX ADMIN — MORPHINE SULFATE 10 MG: 10 SOLUTION ORAL at 21:31

## 2019-08-13 RX ADMIN — Medication 1 CAPSULE: at 18:35

## 2019-08-13 RX ADMIN — ATORVASTATIN CALCIUM 80 MG: 80 TABLET, FILM COATED ORAL at 21:30

## 2019-08-13 RX ADMIN — MIDODRINE HYDROCHLORIDE 5 MG: 5 TABLET ORAL at 12:18

## 2019-08-13 RX ADMIN — BUSPIRONE HYDROCHLORIDE 10 MG: 10 TABLET ORAL at 21:30

## 2019-08-13 RX ADMIN — SERTRALINE 200 MG: 100 TABLET, FILM COATED ORAL at 08:55

## 2019-08-13 RX ADMIN — ASPIRIN 81 MG: 81 TABLET ORAL at 08:56

## 2019-08-13 RX ADMIN — FAMOTIDINE 20 MG: 20 TABLET ORAL at 08:56

## 2019-08-13 RX ADMIN — MICONAZOLE NITRATE: 20 POWDER TOPICAL at 08:56

## 2019-08-13 RX ADMIN — SENNOSIDES 17.2 MG: 8.6 TABLET, FILM COATED ORAL at 21:30

## 2019-08-13 RX ADMIN — Medication 100 MG: at 08:56

## 2019-08-13 RX ADMIN — RIVAROXABAN 20 MG: 20 TABLET, FILM COATED ORAL at 18:35

## 2019-08-13 RX ADMIN — TRAZODONE HYDROCHLORIDE 100 MG: 100 TABLET ORAL at 21:30

## 2019-08-13 ASSESSMENT — PAIN SCALES - GENERAL
PAINLEVEL_OUTOF10: 10
PAINLEVEL_OUTOF10: 10
PAINLEVEL_OUTOF10: 8
PAINLEVEL_OUTOF10: 10
PAINLEVEL_OUTOF10: 8
PAINLEVEL_OUTOF10: 3
PAINLEVEL_OUTOF10: 2
PAINLEVEL_OUTOF10: 3
PAINLEVEL_OUTOF10: 8

## 2019-08-13 ASSESSMENT — PAIN DESCRIPTION - PROGRESSION

## 2019-08-13 ASSESSMENT — PAIN DESCRIPTION - PAIN TYPE: TYPE: CHRONIC PAIN

## 2019-08-13 NOTE — PROGRESS NOTES
but did not rate or say where when asked/10:   OBJECTIVE:  Bed Mobility:  Rolling to Left: Modified Independent   Rolling to Right: Modified Independent   Supine to Sit: Supervision  Sit to Supine: Supervision     Transfers:  Sit to Stand: [employer], with verbal cues  Stand to Sit:Stand By Assistance, with verbal cues    Ambulation:  Contact Guard Assistance, Minimal Assistance, with verbal cues   Distance: 15ft  Surface: Level Tile  Device:Rolling Walker  Gait Deviations: Forward Flexed Posture, Lean to Right, Lean to Left, Decreased Gait Speed, Decreased Heel Strike Bilaterally, Ataxia, Wide Base of Support, Moderate Path Deviations, Unsteady Gait and Decreased Terminal Knee Extension    Wheelchair Mobility:  Modified Independent  Extremities Used: Bilateral Upper Extremities and Bilateral Lower Extremities  Type of Chair:Manual  Surface: Level Tile  Distance: 175ft x2  Quality: Veers Left, Veers Right and Decreased Fluidity      Exercise:  Patient was guided in 1 set(s) 10 reps of exercise to both lower extremities. Glut sets, Long arc quads, Hip abduction/adduction, Seated hip flexion, Seated heel/toe raises and provided with HEP for home. Exercises were completed for increased independence with functional mobility. Functional Outcome Measures: Not completed       ASSESSMENT:  Assessment: Patient progressing toward established goals. Activity Tolerance:  Patient tolerance of  treatment: good.  Remains impulsive with poor safety awareness     Equipment Recommendations:Equipment Needed: No(cont to assess, has RW, 4WW, w/c, canes)  Other: will continue to assess  Discharge Recommendations:  Continue to assess pending progress, 24 hour supervision or assist, Home with assist PRN, Home with Home health PT, Patient would benefit from continued therapy after discharge    Plan: Times per week: 5x/wk 90 min, 1x wk 30 min  Specific instructions for Next Treatment: core and LE strengthening ex, bed mobility, sitting balance . PT to assess transfers and gait when medically approved  Current Treatment Recommendations: Strengthening, Endurance Training, Balance Training, Functional Mobility Training, Safety Education & Training, Equipment Evaluation, Education, & procurement    Patient Education  Patient Education: Plan of Care, Home Exercise Program, [employer] Mobility, Transfers, Gait, Verbal Exercise Instruction    Goals:  Patient goals : Did not state  Short term goals  Time Frame for Short term goals: 1 week  Short term goal 1: Pt to go supine <->sit,  supervision A, to get in/out of bed. MET SEE LTG  Short term goal 2: sit <> stand with SBA for ease of transfers  Short term goal 3: ambulate 50 feet with use of RW and CGA to prepare for in home mobility  Short term goal 4: WC mobility with mod I for 300ft to prepare for community mobility  GOALD MET, DISCONTINUE  Long term goals  Time Frame for Long term goals : 4 weeks   Long term goal 1: bed mobility with mod I for ease of getting in/out of bed  Long term goal 2: sit <> stand with mod I for safe of transfers   Long term goal 3: ambulate 75ft with use of LRAD and stand-by assist for safe in home mobility   Long term goal 4: negotiate 1 step no rails with stand-by assist and use of LRAD for safe entry/exit of his home   Long term goal 5: pt to perform car transfer at [facility] for safe transport home    Following session, patient left in safe position with all fall risk precautions in place.

## 2019-08-13 NOTE — PROGRESS NOTES
Bilateral Upper Extremities  Type of Chair:Manual  Surface: Level Tile  Distance: 150ft  Quality: Slow Velocity and Short Strokes      Functional Outcome Measures: Not completed       ASSESSMENT:  Assessment: Patient progressing toward established goals. Activity Tolerance:  Patient tolerance of  treatment: good. Pt tolerated session well. Pt demos decreased strength, endurance, stability on feet, safety awareness and pt will require 24 hour care at home and cont PT att his time. Pt is unsafe to be home alone at this time. Equipment Recommendations:Equipment Needed: No(cont to assess, has RW, 4WW, w/c, canes)  Other: will continue to assess  Discharge Recommendations:  Continue to assess pending progress, 24 hour supervision or assist, Home with assist PRN, Home with Home health PT, Patient would benefit from continued therapy after discharge    Plan: Times per week: 5x/wk 90 min, 1x wk 30 min  Specific instructions for Next Treatment: core and LE strengthening ex, bed mobility, sitting balance . PT to assess transfers and gait when medically approved  Current Treatment Recommendations: Strengthening, Endurance Training, Balance Training, Functional Mobility Training, Safety Education & Training, Equipment Evaluation, Education, & procurement    Patient Education  Patient Education: Plan of Care, Home Exercise Program, Precautions/Restrictions, Family Education, [employer], Equipment Education, Transfers, Gait, Stairs, Use of Gait Belt, Car Transfers, Wheelchair Mobility, - Patient Requires Continued Education    Goals:  Patient goals : Did not state  Short term goals  Time Frame for Short term goals: 1 week  Short term goal 1: Pt to go supine <->sit,  supervision A, to get in/out of bed.  MET SEE LTG  Short term goal 2: sit <> stand with SBA for ease of transfers  Short term goal 3: ambulate 50 feet with use of RW and CGA to prepare for in home mobility  Short term goal 4: WC mobility with mod I for 300ft to prepare for community mobility  GOALD MET, DISCONTINUE  Long term goals  Time Frame for Long term goals : 4 weeks   Long term goal 1: bed mobility with mod I for ease of getting in/out of bed  Long term goal 2: sit <> stand with mod I for safe of transfers   Long term goal 3: ambulate 75ft with use of LRAD and stand-by assist for safe in home mobility   Long term goal 4: negotiate 1 step no rails with stand-by assist and use of LRAD for safe entry/exit of his home   Long term goal 5: pt to perform car transfer at [facility] for safe transport home    Following session, patient left in safe position with all fall risk precautions in place.

## 2019-08-13 NOTE — PROGRESS NOTES
success  -CTAR- 1 set of 10, fair success. *minimal progress with pharyngeal swallow function noted at this time. *Provided an additional copy of pharyngeal HEP for home use. Reviewed exercises with niece to assist with facilitating completion of exercises at home. *Reviewed recommendations for STRICT NPO status. Discussing that it is NOT recommended that he have food or drink in any form through his oral cavity. He is permitted to clean his mouth with a moistened toothette ONLY. All nutrition must be provided through his PEG tube (Nursing planning to complete training with niece at the noon feeding). Discussed recommendations for continuing to work with [facility] SLP, Cale Bean, on improving pharyngeal swallow function and following up with his oncologist to determine medical clearance for possible implementation of vital stim/myofacial release technique, with repeat swallow assessment to be complete in 3-4 weeks. All information discussed during the session was all provided in the written handout. SHORT TERM GOAL #2:  Goal 2: Pt will exhibit return demonstration on need for completion of comprehensive oral care with 90% accuracy given no cues to reduce potential bacteria colonization and to maintain an improved oral cavity appearance. INTERVENTIONS: Provided verbal and written skilled education to niece regarding oral hygiene management. Discussed need to use a toothbrush and toothpaste x2 daily with moistened swabs every two hours to follow (using clean water ONLY). Also discussed need to use 1 tsp peroxide and 1 tsp sterile water for oral swabbing following teeth brushing to reduce bacteria colonization. Niece expressed understanding. SHORT TERM GOAL #3:  Goal 3:Pt will complete immediate, delayed, and working memory tasks with 80% accuracy given mod cues to improve retention of pertinent information.    INTERVENTIONS: Provided skilled education regarding deficits related to working, immediate and environment. COMMUNICATION  Comprehension: 4 - Patient understands basic needs 75-90%+ of the time  Expression: 5 - Expresses basic ideas/needs only (hungry/hot/pain)  SOCIAL COGNITION  Social Interaction: 6 - Patient requires medication for mood and/or effect  Problem Solving: 3 - Patient solves simple/routine tasks 50%-74%  Memory: 3 - Patient remembers 50%-74% of the time      ASSESSMENT:  Assessment: [x]Progressing towards goals          []Not Progressing towards goals    Patient Tolerance of Treatment:   [x]Tolerated well []Tolerated fair []Required rest breaks []Fatigued  Plan for Next Session: pharyngeal strengthening, medications   Education:  Learner:  [x]Patient          []Significant Other          [x]Other: Benny Coombs  Education provided regarding:  [x]Goals and POC   [x]Diet and swallowing precautions    [x]Home Exercise Program  [x]Progress and/or discharge information  Method of Education:  [x]Discussion          [x]Demonstration          [x]Handouts- Listed above          []Other  Evaluation of Education:   [x]Verbalized understanding   [x]Demonstrates without assistance  []Demonstrates with assistance  [x]Needs further instruction     []No evidence of learning                  []No family present      Plan: [x]Continue with current plan of care    []Modify current plan of care as follows:    []Discharge patient    Discharge Location:    Services/Supervision Recommended:     [x]Patient continues to require treatment by a licensed therapist to address functional deficits as outlined in the established plan of care. Alyse Sanchez.  [address] [facility] [address], [address]

## 2019-08-13 NOTE — PROGRESS NOTES
setup/supervision/cues and/or requires assist with presthesis/brace only  Dressing-Lower: 5 - Requires setup/supervision/cues and/or staff applies TEDS/prosthesis/brace only(while in supine for safety, set-up, long sitting and bridged hips)  Toiletin - Requires steadying assistance only  Toilet Transfer: 4 - Requires steadying assistance only < 25% assist  Shower Transfer: 4 - Minimal contact assistance, pt. expends 75% or more effort,  , Assessment: Patient, Trish Maldonado, is contuining to make progress on IP Rehab. This week patient has had a noticeable decline in cognitive functioning, in terms of problem solving and attention to task. Pt demonstrates improved sitting and standing balance which has improved his indep requiring up to SBA for UB ADLs (sitting balance) to CGA, at times min A for LB ADLs. Patient has been educated on compensatory techniques within ADLs. Pt is limited by decreased endurance and decreased safety awareness. Pt demonstrates decreased insight into his deficits and tends to perseverate on eating food, though he is NPO. Pt continues to demonstrate L hemibody weakness and ataxia, flucuating midline orientation, and decreased safety awareness impacting patient's ability to complete self care at prior level of functioning. Due to patient's performance deficits, patient would greatly benefit from continued occupational therapy for ADL remediation, endurance building, strengthening and neuro re-education to return to prior level of functioning and safe return to home environment. Following discharge, patient requires 24 hour hands on assist due to declining cognitive status and physical limitations.      Speech therapy: FIMS: Comprehension: 4 - Patient understands basic needs 75-90%+ of the time  Expression: 5 - Expresses basic ideas/needs only (hungry/hot/pain)  Social Interaction: 6 - Patient requires medication for mood and/or effect  Problem Solving: 3 - Patient solves simple/routine tasks cognitive deficit.  (MOCA) version 8.2 completed.  Patient scored 20/30 over prior (MOCA) version 7.2 completed.  Patient scored 15/25.  *adjusted score given pt unable to complete written subtests (L hand dominant). 16. NSTEMI on 4/9/2019. 17. 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 18. Caval filter placed 2009 for extensive DVT of the LEFT leg as well as calf DVT on the RIGHT. 19. Pulmonary embolism in 2018. 20. Chronic anticoagulation status. 21. 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 Kimberly Tenorio. 22. Hypertension. 23. COPD. 24. Type 2 DM, controlled.  Last HgbA1c 5.1 on 7/12/2019.  25. Paravertebral ossifications at numerous levels along the thoracic spine with partial mineralization of the anterior longitudinal ligament consistent with DISH.  26. Osteopenia. 27. Nicotine dependence; current every day smoker. 28. Marijuana use. Last positive UDS 7/11/2019.  29. History of anxiety and depression. 30. Medication non-compliance. 31. Hypovitaminosis D.  32. Orthostatic hypotension. 33. Insomnia. 34. Aspiration pneumonia with sepsis. 35. Near complete atelectasis of the RIGHT lung with mediastinal shift to the right, secondary to obstruction of the right mainstem bronchus with mucus plug. 36. ANTHONY with use of CPAP. 40. Suicidal ideation. Plan:     Medical management: Per primary team.     Consultants:  Trauma Surgery, Neurology, Gastroenterology, Pain Management, Internal Medicine, Pulmonology,  Psychiatry, Physical Medicine      Narcotic usage:  Morphine Last 24 hour usage 10mg. Decreased. Last BM:  Stool Amount: Large (08/10/19 1055)    Acute/Rehabilitation Problems:  1.  Acute CVA  large area of restricted diffusion involving the inferior medial aspect of the LEFT cerebellar hemisphere as well as the LEFT side of the medulla/Chronic CVA with large infarct in the RIGHT posterior cerebral artery territory secondary to high-grade stenosis at the P1 segment of the RIGHT posterior cerebral artery without definite distal flow involving the RIGHT occipital lobe and tracking anteriorly along the medial aspect of the RIGHT temporal lobe with associated local mass effect with sulcal effacement without midline shift or associated hemorrhage.   1. Secondary stroke prevention. 2. He still exhibits visual difficulty secondary to his left hemianopsia from his prior stroke and on occasion also exhibits medial rectus weakness on the left. Occupational therapy has suggested to begin taping his glasses which was started on 8/6 and also a neuro optometry follow-up after discharge. 2. Gait instability/Fall/Physical debility with generalized weakness/Peripheral vision loss LEFT > RIGHT/Mild LEFT hemiparesis and ataxia. 1. PT/OT. 3. Concussion without LOC. 1. SLP. 4. Healthcare associated pneumonia versus Aspiration pneumonia/Acute hypoxic respiratory failure. 1. Zosyn completed; switched to Augmentin for 7 days. ENDED on 7/25.  5. Aspiration pneumonia with sepsis. 1. On 7/29 WBC/ANC 13/10. 9. Zosyn started for presumptive aspiration pneumonia and sepsis with increased requirement for oxygen. Augmentin was started on 8/8 with a duration of 7 days due to persistence of increased parenchymal markings at the right perihilar region with the right worse than the left with suggestion of pneumonia/atelectasis. 2. Placed on rebreather mask with titration to maintain 90 to 92% O2 saturation. 3. Initially chest x-ray was suggestive of a moderate right-sided pleural effusion for which an ultrasound-guided thoracentesis was ordered; but the actual volume was found to be too small to safely complete the procedure.   4. CT of the chest with and without contrast showed a near complete atelectasis of the right lung with mediastinal shift to the right, secondary to obstruction of the right mainstem bronchus. This is a be likely aspirate material consistent with the patient having been observed to be eating \"Cheetos\" on 7/27. Pulmonology was consulted with plan for bronchoscopy to remove the material on 7/30. Procedure has been rescheduled for 7/31 due to the patient having been given a bolus tube feed prior to the time of the scheduled procedure. 5. The bronchoscopy was completed with findings of mucous plugging with samples sent for laboratory analysis. A subsequent chest x-ray showed improved aeration of the right lung. DuoNeb and MetaNeb were ordered. On 8/5 patient states that the [facility] is causing him headaches and pulmonology was asked if this medication could be made as needed. 6. The leukocytosis has resolved on 7/30 with plan to continue the Zosyn for 8 days and total and then potentially switch to Augmentin. 7. Repeated CXR on 8/5 with findings of coarse bilateral airspace opacities and infiltrates on the right greater than left. Augmentin ordered on 8/8 for 7 days. 8. Repeat chest x-ray on 8/12 was negative for any previous findings; but did show bilateral small pleural effusions as a new finding. 9. Continue weaning oxygen. Currently at 2 L via nasal cannula on 8/6. A home oxygen evaluation has been ordered for 8/8.  1. Patient was placed on room air for 2 minutes. SpO2 was 88 % on room air at rest. Patients SpO2 was below 89% and qualified for home oxygen. Oxygen was applied at 1 lpm via nasal cannula to maintain a SpO2 between 90-92% while at rest. Actual SpO2 was 94 %. Patient can ambulate for exercise flow rate. Patients was ambulated, SpO2 was 92% on 2 lpm to maintain SpO2 between 90-92% while exercising. 2. Home oxygen evaluation was repeated on 8/13 secondary to the patient's discharge having been delayed.   Results for this evaluation were - Patient was

## 2019-08-14 ENCOUNTER — TELEPHONE (OUTPATIENT)
Dept: ONCOLOGY | Age: 59
End: 2019-08-14

## 2019-08-14 VITALS
HEIGHT: 72 IN | DIASTOLIC BLOOD PRESSURE: 67 MMHG | SYSTOLIC BLOOD PRESSURE: 124 MMHG | WEIGHT: 186.4 LBS | HEART RATE: 71 BPM | OXYGEN SATURATION: 99 % | TEMPERATURE: 97.5 F | BODY MASS INDEX: 25.25 KG/M2 | RESPIRATION RATE: 18 BRPM

## 2019-08-14 PROCEDURE — 97530 THERAPEUTIC ACTIVITIES: CPT

## 2019-08-14 PROCEDURE — 6370000000 HC RX 637 (ALT 250 FOR IP): Performed by: PHYSICAL MEDICINE & REHABILITATION

## 2019-08-14 PROCEDURE — 97110 THERAPEUTIC EXERCISES: CPT

## 2019-08-14 PROCEDURE — 97127 HC SP THER IVNTJ W/FOCUS COG FUNCJ: CPT | Performed by: SPEECH-LANGUAGE PATHOLOGIST

## 2019-08-14 PROCEDURE — 92526 ORAL FUNCTION THERAPY: CPT | Performed by: SPEECH-LANGUAGE PATHOLOGIST

## 2019-08-14 PROCEDURE — 2709999900 HC NON-CHARGEABLE SUPPLY

## 2019-08-14 PROCEDURE — 6370000000 HC RX 637 (ALT 250 FOR IP): Performed by: STUDENT IN AN ORGANIZED HEALTH CARE EDUCATION/TRAINING PROGRAM

## 2019-08-14 RX ORDER — MIDODRINE HYDROCHLORIDE 5 MG/1
5 TABLET ORAL
Qty: 90 TABLET | Refills: 1
Start: 2019-08-14

## 2019-08-14 RX ADMIN — MORPHINE SULFATE 5 MG: 10 SOLUTION ORAL at 10:06

## 2019-08-14 RX ADMIN — AMOXICILLIN AND CLAVULANATE POTASSIUM 1 TABLET: 875; 125 TABLET, FILM COATED ORAL at 10:07

## 2019-08-14 RX ADMIN — Medication 100 MG: at 10:07

## 2019-08-14 RX ADMIN — SERTRALINE 200 MG: 100 TABLET, FILM COATED ORAL at 10:07

## 2019-08-14 RX ADMIN — Medication 1 CAPSULE: at 10:07

## 2019-08-14 RX ADMIN — ASPIRIN 81 MG: 81 TABLET ORAL at 10:07

## 2019-08-14 RX ADMIN — ACETAMINOPHEN 650 MG: 650 SOLUTION ORAL at 05:05

## 2019-08-14 RX ADMIN — FAMOTIDINE 20 MG: 20 TABLET ORAL at 10:07

## 2019-08-14 RX ADMIN — MIDODRINE HYDROCHLORIDE 5 MG: 5 TABLET ORAL at 10:07

## 2019-08-14 RX ADMIN — MIDODRINE HYDROCHLORIDE 5 MG: 5 TABLET ORAL at 12:50

## 2019-08-14 ASSESSMENT — PAIN DESCRIPTION - PAIN TYPE: TYPE: CHRONIC PAIN

## 2019-08-14 ASSESSMENT — PAIN SCALES - GENERAL
PAINLEVEL_OUTOF10: 5
PAINLEVEL_OUTOF10: 0
PAINLEVEL_OUTOF10: 5
PAINLEVEL_OUTOF10: 4
PAINLEVEL_OUTOF10: 4

## 2019-08-14 NOTE — PLAN OF CARE
Patient continues on breathing treatments; tolerating well.   Will continue to monitor patients respiratory status
Problem: DISCHARGE BARRIERS  Goal: Patient's continuum of care needs are met  8/1/2019 1527 by SIMONA Stewart  Note:   SW contacted patient's niece, Reji Casey, at [phone]. No answer, left message requesting returned phone call. SW to follow and maintain involvement in discharge planning.
Problem: DISCHARGE BARRIERS  Goal: Patient's continuum of care needs are met  Note:   After multiple attempts, SW reached patient's nieceMerlin, regarding patient's discharge arrangements. RISHABH reviewed with nitessy, and sister-in-law, Ava Arriaza, that family education is a requirement in order for patient to be discharged home, as patient requires twenty four hour supervision and hands-on assistance with medication and tube feedings. Mica Zapata ability to complete family education tomorrow, 08/13/2019, from 9:00am to 12:30pm. Therapy board updated accordingly. RISHABH explained to patient and Merlin vázquez, that if family does not show up for education then further discussion will take place regarding potential need for nursing home. SW to follow and maintain involvement in discharge planning.
Problem: DISCHARGE BARRIERS  Goal: Patient's continuum of care needs are met  Note:   Plan remains for patient to discharge home today, 08/14/2019, with Abhinav sahni, to  around 1:00pm. RISHABH contacted [address] [place] [address] with discharge notification of today, 08/14/2019. Information provided to [place]. RISHABH contacted [facility] with discharge notification of today, 08/14/2019. Information provided to [address] [place] [address]. SW to follow as needed.
Problem: DISCHARGE BARRIERS  Goal: Patient's continuum of care needs are met  Note:   Team conference held Tuesday, 08/06/2019. Recommendations of the team were explained to the patient by SIMONA Camarillo, and Dr. Dima Pritchett. Team is recommending that patient continue on acute inpatient rehab for three more days, with expected discharge date of Friday, 08/09/2019, if family education able to be completed. Discussion with patient regarding importance of family involvement in order for patient to succeed at home. SW has yet to receive returned phone call from any family member. Following discharge, team is recommending home health care services for RN/PT/OT/ST/HHA/SW. Patient will also need tube feedings and supplies. Patient to be evaluated for home oxygen. Care plan reviewed with patient. Patient verbalized understanding of the plan of care and contributed to goal setting. Patient's sister-in-law, Tyrell Ramos, visited patient later in the day on Tuesday, 08/06/2019. SW discussed with patient's sister-in-law, Tyrell Ramos, amount of assistance patient will need upon discharge. Patient's sister-in-law, Kurtis Blend ability for self or daughter, Simone Montano, to assist patient with tube feedings, medications, etc., as someone is always home with niece's daughter, Teresa Alarcon. Plan for family education on Thursday, 08/08/2019, from 9:00am to 12:30pm. Therapy board updated accordingly. SW asked sister-in-law, Tyrell Ramos, to take a picture of patient's wheelchair and bring on Thursday, 08/08/2019, to ensure patient has correct sized wheelchair. RISHABH contacted [address] [place] [address] with referral for RN/PT/OT/ST/HHA/RISHABH. Referral information and discharge date of Friday, 08/09/2019, provided to Stella. RISHABH contacted [facility] to arrange tube feedings and supplies. Referral information and discharge date of Friday, 08/09/2019, provided to Marco Hernández.  SW to follow and maintain involvement in discharge
Problem: Falls - Risk of:  Goal: Absence of physical injury  Description  Absence of physical injury  Outcome: Ongoing    Encouraged to use call light, Frequently redirected with keeping legs in bed.  Does redirect easily
Problem: Falls - Risk of:  Goal: Will remain free from falls  Description  Will remain free from falls  Outcome: Ongoing    Patient continues to work with staff on safe transfers including use of gait belt and walker for transfers   Goal: Absence of physical injury  Description  Absence of physical injury  Outcome: Ongoing    Patient continues to work with staff on safe transfers including use of gait belt and walker for transfers      Problem: Risk for Impaired Skin Integrity  Goal: Tissue integrity - skin and mucous membranes  Description  Structural intactness and normal physiological function of skin and  mucous membranes. Outcome: Ongoing    Patient continues to require interventions for skin integrity including use of pharmaceutical powder and protective cream use. Problem: DISCHARGE BARRIERS  Goal: Patient's continuum of care needs are met  Outcome: Ongoing     Patient continues to be receptive to information related to home going instructions especially in the area of activity    Problem: HEMODYNAMIC STATUS  Goal: Patient has stable vital signs and fluid balance  Outcome: Ongoing     Patient is receiving PEG tube feedings and hydration due to being NPO. Problem: Bleeding:  Goal: Will show no signs and symptoms of excessive bleeding  Description  Will show no signs and symptoms of excessive bleeding  Outcome: Ongoing    Patient has had no signs or symptoms of excessive bleeding, patient is aware that they are on blood thinning agents for treatment and makes appropriate comments and asks pertinent questions related to this. Problem: Anxiety:  Goal: Level of anxiety will decrease  Description  Level of anxiety will decrease  Outcome: Ongoing    Patient continues to have episodes of frustration with staff and hospital policies exhibited in patient becoming inappropriate in language and in deed with staff members.       Problem: Nutrition  Goal: Optimal nutrition therapy  Outcome:
Problem: Impaired respiratory status  Goal: Clear lung sounds  8/3/2019 1021 by Jose Alfredo Nicholas RCP  Outcome: Ongoing  Continue therapy as ordered to improve breath sounds and aeration.
Problem: Impaired respiratory status  Goal: Clear lung sounds  Outcome: Ongoing
Problem: Impaired respiratory status  Goal: Clear lung sounds  Outcome: Ongoing   Continue therapy as ordered to improve breath sounds/aeration.
Problem: Impaired respiratory status  Goal: Clear lung sounds  Outcome: Ongoing  Note:   Patient has clear and diminished breath sounds. Patient on aerosol and pd&p therapy ordered.
Problem: Nutrition  Goal: Optimal nutrition therapy  Outcome: Ongoing     Problem: Nutrition  Goal: Optimal nutrition therapy  Outcome: Ongoing   Nutrition Problem: Severe malnutrition, In context of chronic illness  Intervention: Food and/or Nutrient Delivery: Continue NPO, Continue current Tube Feeding  Nutritional Goals: Patient will tolerate enteral nutrition to meet 80% or greater of nutrition needs until able to transition to oral diet during LOS
Problem: Pain:  Goal: Pain level will decrease  Description  Pain level will decrease  8/4/2019 0314 by Jose Roberto Salazar RN  Outcome: Ongoing  Note:   Taking pain medications as needed for generalized pain. Rates 10/10 at times. Problem: Falls - Risk of:  Goal: Will remain free from falls  Description  Will remain free from falls  8/4/2019 0314 by Jose Roberto Salazar RN  Outcome: Ongoing  Note:   No falls this shift. Pt uses call light for needs     Problem: Risk for Impaired Skin Integrity  Goal: Tissue integrity - skin and mucous membranes  Description  Structural intactness and normal physiological function of skin and  mucous membranes. 8/4/2019 0314 by Jose Roberto Salazar RN  Outcome: Ongoing  Note:   Able to reposition in bed. Problem: DISCHARGE BARRIERS  Goal: Patient's continuum of care needs are met  8/4/2019 0314 by Jose Roberto Salazar RN  Outcome: Ongoing     Problem: HEMODYNAMIC STATUS  Goal: Patient has stable vital signs and fluid balance  8/4/2019 0314 by Jose Roberto Salazar RN  Outcome: Ongoing  Note:   Vs stable,     Problem: Bleeding:  Goal: Will show no signs and symptoms of excessive bleeding  Description  Will show no signs and symptoms of excessive bleeding  8/4/2019 0314 by Jose Roberto Salazar RN  Outcome: Ongoing  Note:   No s/s of bleeding     Problem: Breathing Pattern - Ineffective  Goal: Effective breathing pattern  8/4/2019 0314 by Jose Roberto Salazar RN  Outcome: Ongoing  Note:   Pulse ox > 90 % on 2 L O2. Lungs diminished.
Problem: Pain:  Goal: Pain level will decrease  Description  Pain level will decrease  8/5/2019 0959 by Diana Kelsey LPN  Outcome: Not Met This Shift  Note:   Patient cont to rate pain at 8. Rests with eyes closed when therapy isn't working with patients. Problem: Pain:  Goal: Control of acute pain  Description  Control of acute pain  Outcome: Not Met This Shift     Problem: Pain:  Goal: Control of chronic pain  Description  Control of chronic pain  Outcome: Not Met This Shift     Problem: Falls - Risk of:  Goal: Will remain free from falls  Description  Will remain free from falls  8/5/2019 0959 by Diana Kelsey LPN  Outcome: Ongoing  Note:   Remaining in bed most of shift. Works with therapy well. Remain two assist to transfer. Problem: Falls - Risk of:  Goal: Absence of physical injury  Description  Absence of physical injury  Outcome: Ongoing     Problem: Risk for Impaired Skin Integrity  Goal: Tissue integrity - skin and mucous membranes  Description  Structural intactness and normal physiological function of skin and  mucous membranes. 8/5/2019 0959 by Diana Kelsey LPN  Outcome: Ongoing  Note:   Skin warm and dry. Mucous membranes pink and moist.      Problem: Bleeding:  Goal: Will show no signs and symptoms of excessive bleeding  Description  Will show no signs and symptoms of excessive bleeding  8/5/2019 0959 by Diana Kelsey LPN  Outcome: Ongoing  Note:   No s/sx excessive bleeding noted. Problem: Anxiety:  Goal: Level of anxiety will decrease  Description  Level of anxiety will decrease  8/5/2019 0959 by Diana Kelsey LPN  Outcome: Ongoing  Note:   Pain medication given per patient request for headache. No anxiety noted this shift.
Problem: Pain:  Goal: Pain level will decrease  Description  Pain level will decrease  Outcome: Met This Shift  Note:   Pt. States pain is improving each daily  Goal: Control of chronic pain  Description  Control of chronic pain  Outcome: Met This Shift     Problem: Falls - Risk of:  Goal: Will remain free from falls  Description  Will remain free from falls  Outcome: Met This Shift  Note:   Pt. Without falls Pt. Does use call light for assist.  Goal: Absence of physical injury  Description  Absence of physical injury  Outcome: Met This Shift     Problem: Risk for Impaired Skin Integrity  Goal: Tissue integrity - skin and mucous membranes  Description  Structural intactness and normal physiological function of skin and  mucous membranes. Outcome: Met This Shift  Note:   Pt. Without new skin issues. Pt. Coccyx are is pink. Barrier cream applied. Problem: HEMODYNAMIC STATUS  Goal: Patient has stable vital signs and fluid balance  Outcome: Met This Shift     Problem: Bleeding:  Goal: Will show no signs and symptoms of excessive bleeding  Description  Will show no signs and symptoms of excessive bleeding  Outcome: Met This Shift  Note:   No s/s of bleeding noted.
Problem: Pain:  Goal: Pain level will decrease  Description  Pain level will decrease  Outcome: Not Met This Shift  Note:   Pain cont to range between 6-8. Rests in bed most of shift. Offered ice patient refused. Problem: Falls - Risk of:  Goal: Will remain free from falls  Description  Will remain free from falls  Outcome: Ongoing     Problem: Falls - Risk of:  Goal: Absence of physical injury  Description  Absence of physical injury  Outcome: Ongoing     Problem: Risk for Impaired Skin Integrity  Goal: Tissue integrity - skin and mucous membranes  Description  Structural intactness and normal physiological function of skin and  mucous membranes. Outcome: Ongoing  Note:   Skin warm and dry. Protective dressing to buttock changed this shift. Mucous membranes pink and moist.      Problem: HEMODYNAMIC STATUS  Goal: Patient has stable vital signs and fluid balance  Outcome: Ongoing     Problem: Urinary Elimination:  Goal: Ability to maintain continence will improve  Description  Ability to maintain continence will improve  Outcome: Not Met This Shift  Note:   Unable to distinguish if patient is incontinent or spilling urinal in bed while using it. Problem: Bleeding:  Goal: Will show no signs and symptoms of excessive bleeding  Description  Will show no signs and symptoms of excessive bleeding  Outcome: Met This Shift  Note:   No s/sx excessive bleeding noted. Problem: Anxiety:  Goal: Level of anxiety will decrease  Description  Level of anxiety will decrease  Outcome: Met This Shift  Note:   Patient calm and cooperative this shift. No increased anxiety noted this shift.
Problem: Pain:  Goal: Pain level will decrease  Description  Pain level will decrease  Outcome: Ongoing  Note:   Cont to rate pain at 8 throughout shift. Rested in bed most of day. Ice offered patient refused. Problem: Pain:  Goal: Control of acute pain  Description  Control of acute pain  Outcome: Ongoing     Problem: Pain:  Goal: Control of chronic pain  Description  Control of chronic pain  Outcome: Ongoing     Problem: Falls - Risk of:  Goal: Will remain free from falls  Description  Will remain free from falls  Outcome: Ongoing  Note:   Transfer with assist of one. Tolerates well. Unsteady. Problem: Falls - Risk of:  Goal: Absence of physical injury  Description  Absence of physical injury  Outcome: Ongoing     Problem: Risk for Impaired Skin Integrity  Goal: Tissue integrity - skin and mucous membranes  Description  Structural intactness and normal physiological function of skin and  mucous membranes. Outcome: Ongoing  Note:   Skin warm and dry, mucous membranes pink and moist.      Problem: HEMODYNAMIC STATUS  Goal: Patient has stable vital signs and fluid balance  Outcome: Ongoing     Problem: Urinary Elimination:  Goal: Ability to maintain continence will improve  Description  Ability to maintain continence will improve  Outcome: Ongoing  Note:   Patient continent most times, incont. At times tends to spill urinal in bed. Problem: Bleeding:  Goal: Will show no signs and symptoms of excessive bleeding  Description  Will show no signs and symptoms of excessive bleeding  Outcome: Ongoing  Note:   No excessive bleeding noted. Problem: Anxiety:  Goal: Level of anxiety will decrease  Description  Level of anxiety will decrease  Outcome: Ongoing  Note:   Patient pleasant cooperative with staff, tends to be somewhat impatient at times with some increased anxiety.
Problem: Pain:  Goal: Pain level will decrease  Description  Pain level will decrease  Outcome: Ongoing  Note:   Pain level cont at 8-10 throughout shift. Rests in bed most of day while not in therapy. Problem: Falls - Risk of:  Goal: Will remain free from falls  Description  Will remain free from falls  Outcome: Ongoing  Note:   Up with assist of 2 for transfers. Unsteadiness cont. Tolerates well. Problem: Falls - Risk of:  Goal: Absence of physical injury  Description  Absence of physical injury  Outcome: Ongoing     Problem: Risk for Impaired Skin Integrity  Goal: Tissue integrity - skin and mucous membranes  Description  Structural intactness and normal physiological function of skin and  mucous membranes. Outcome: Ongoing  Note:   Skin warm and dry. Mucous membranes pink and moist.      Problem: DISCHARGE BARRIERS  Goal: Patient's continuum of care needs are met  Outcome: Ongoing  Note:   Continue to educate on peg tube care and feeding/flushing as well as medication administration. Problem: HEMODYNAMIC STATUS  Goal: Patient has stable vital signs and fluid balance  Outcome: Ongoing     Problem: Urinary Elimination:  Goal: Ability to maintain continence will improve  Description  Ability to maintain continence will improve  Outcome: Ongoing  Note:   Continence most of shift uses urinal, tends to spill while in bed due to poor coordination. Problem: Bleeding:  Goal: Will show no signs and symptoms of excessive bleeding  Description  Will show no signs and symptoms of excessive bleeding  Outcome: Ongoing  Note:   No Excessive bleeding noted this shift. Problem: Anxiety:  Goal: Level of anxiety will decrease  Description  Level of anxiety will decrease  8/6/2019 1534 by Sandrita Davey LPN  Outcome: Ongoing  Note:   Mild increased anxiety at times r/t going home and needing someone to help with care.
Problem: Pain:  Goal: Pain level will decrease  Description  Pain level will decrease  Outcome: Ongoing  Note:   Pain treated per physician's orders. Patient satisfied. Problem: Falls - Risk of:  Goal: Will remain free from falls  Description  Will remain free from falls  Outcome: Ongoing  Note:   Patient fell last shift trying to get up to use the bathroom. Tele sitter initiated. Patient is impulsive and confused. Reminding patient frequently to use call light so staff can assist.     Problem: DISCHARGE BARRIERS  Goal: Patient's continuum of care needs are met  Outcome: Ongoing  Note:   No discharge plans at this time. Problem: HEMODYNAMIC STATUS  Goal: Patient has stable vital signs and fluid balance  Outcome: Ongoing  Note:   Vital signs are stable; BP was somewhat elevated this shift. Problem: Anxiety:  Goal: Level of anxiety will decrease  Description  Level of anxiety will decrease  Outcome: Ongoing  Note:   Patient expressed no s/s of anxiety this shift. Problem: Nutrition  Goal: Optimal nutrition therapy  Outcome: Ongoing  Note:   Patient is getting bolus feedings of 2 jovan HN at 0800, 1200, 1600, and 2000 along with 185 mLs water before and after bolus.
Assistance: Independent  Homemaking Assistance: Needs assistance  Homemaking Responsibilities: Yes  Meal Prep Responsibility: Secondary  Laundry Responsibility: Secondary  Cleaning Responsibility: No  Bill Paying/Finance Responsibility: Primary  Shopping Responsibility: Secondary  Dependent Care Responsibility: No  Ambulation Assistance: Needs assistance  Transfer Assistance: Needs assistance  Active : No  Patient's  Info: (Patient reports family will not let him drive. Transportation from family or [employer] [facility] [employer] [place].)  Occupation: On disability  Type of occupation: On disability for approximately 20 years    Contact/Guardian Information: Eloise Hernández, [phone]. Hubert Sahni, [phone]. Community Resources Utilized: [employer] [employer] and [employer]. Sexuality/Intimacy: No issues or concerns noted at time of SW assessment. Complementary Health Approaches: Patient with no current interest in complementary health approaches at time of SW assessment. Anticipated Needs/Discharge Plans: Anticipate patient would benefit from continued home health care services upon discharge. SW met with patient to introduce self and explain role, complete SW assessment, and initiate discharge planning. Prior to second CVA, patient was receiving assistance from family with ADL's. Patient lives alone, but lives on the same property as sister-in-law, Tre Landin, and Hubert sahni. Since first CVA, patient has been ambulating with rollator walker. Patient was receiving home health care services through [facility]. Patient reports managing own finances and managing own medications. Patient reports Hubert sahni, will check pills weekly for accuracy. Patient reports checking blood sugar twice a day, but does not take any medication, including oral, for diabetes at home. Patient reports having working glucometer. Patient does not drive.  Patient reports using
Ongoing  Note:   Pt requires NRB mask this shift to maintain sats > 90 %. CXR and CT of the chest done this shift.
Problem: Urinary Elimination:  Goal: Ability to maintain continence will improve  Description  Ability to maintain continence will improve  Outcome: Ongoing    Patient will participate in urinary elimination during this admission as evidenced by patient calling for urinal use when needed.   Patient will remain continent during admission     Problem: Bleeding:  Goal: Will show no signs and symptoms of excessive bleeding  Description  Will show no signs and symptoms of excessive bleeding  Outcome: Ongoing    Patient will participate in bleeding precautions during this admission as evidenced by patient making good diet choices related to bleeding and patient will alert staff if he has concerns related to obvious bleeding     Problem: Anxiety:  Goal: Level of anxiety will decrease  Description  Level of anxiety will decrease  Outcome: Ongoing    Patient will participate in mood stability during this admission as evidenced by patient use of relaxation and rest for mood control as well as taking prescribed medications for mood
per notes. Sitter d/c. Patient cooperative with staff, very talkative all day. Cooperates with therapy. Frequently asking staff if they are afraid of him d/t suicide precautions patient thinks people are afraid of him, reassured patient staff isnt afraid of him stated okay good I would never hurt anyone. Problem: Breathing Pattern - Ineffective  Goal: Effective breathing pattern  Outcome: Met This Shift  Note:   O2 cont 2L/NC. Tolerates well spo2 above 90% throughout day.
Ongoing  7/25/2019 1431 by Roby Poe RN  Outcome: Ongoing    Patient will participate in bleeding precautions during this admission as evidenced by patient making good diet choices related to bleeding and patient will alert staff if he has concerns related to obvious bleeding     Problem: Swallowing difficulty (NC-1.1)  Goal: Food and/or Nutrient Delivery  Description  Individualized approach for food/nutrient provision. 7/25/2019 1511 by Eris Horton RD, LD  Outcome: Ongoing     Patient is not taking oral food at this time. Problem: Nutrition  Goal: Optimal nutrition therapy  7/25/2019 1612 by Roby Poe RN  Outcome: Ongoing  7/25/2019 1452 by Roby Poe RN  Outcome: Ongoing  7/25/2019 1431 by Roby Poe RN  Outcome: Ongoing     Patient will participate in improving nutritional status during this admission as evidenced by patient choosing diet appropriate for healing and nutrition as well as patient taking prescribed medications to enhance appetite.

## 2019-08-14 NOTE — PROGRESS NOTES
Mobility:  Rolling to Left: Modified Independent   Rolling to Right: Modified Independent   Supine to Sit: Modified Independent  Sit to Supine: Modified Independent     Transfers:  Sit to Stand: [employer], with verbal cues  [facility] , with verbal cues    Ambulation:  Contact Guard Assistance, with verbal cues   Distance: 25ft  Surface: Level Tile  Device:Rolling Walker  Gait Deviations: Forward Flexed Posture, Slow Cassandra, Decreased Gait Speed, Decreased Heel Strike Bilaterally, Ataxia, Wide Base of Support, Moderate Path Deviations and Unsteady Gait    Wheelchair Mobility:  Modified Independent  Extremities Used: Right Upper Extremity and Right Lower Extremity  Type of Chair:Manual  Surface: Level Tile and Ramp  Distance: 275ft, 200ft, up and down 15ft ramp  Quality: Slow Velocity, path deviations in both directions        Functional Outcome Measures: Not completed       ASSESSMENT:  Assessment: Patient progressing toward established goals. Activity Tolerance:  Patient tolerance of  treatment: good. Pt voices no concerns with returning home this date. He has made improvements in all mobility during his stay. His niece has received education for proper safety required at home and her an pt have voiced understanding. [facility] to further improve mobility and independence at home. Equipment Recommendations:Equipment Needed: No(cont to assess, has RW, 4WW, w/c, canes)  Other: will continue to assess  Discharge Recommendations:  Continue to assess pending progress, 24 hour supervision or assist, Home with assist PRN, Home with Home health PT, Patient would benefit from continued therapy after discharge    Plan: d/c to home with [facility]  Specific instructions for Next Treatment: core and LE strengthening ex, bed mobility, sitting balance .   PT to assess transfers and gait when medically approved  Current Treatment Recommendations: Strengthening, Endurance Training, Balance Training, Functional Mobility Training, Safety Education & Training, Equipment Evaluation, Education, & procurement    Patient Education  Patient Education: Plan of Care, Home Exercise Program, Precautions/Restrictions, [employer] Mobility, Transfers, Gait, Stairs, Wheelchair Mobility    Goals:  Patient goals : Did not state  Short term goals  Time Frame for Short term goals: 1 week  Short term goal 1: Pt to go supine <->sit,  supervision A, to get in/out of bed. MET SEE LTG  Short term goal 2: sit <> stand with SBA for ease of transfers  Short term goal 3: ambulate 50 feet with use of RW and CGA to prepare for in home mobility  Short term goal 4: WC mobility with mod I for 300ft to prepare for community mobility  GOALD MET, DISCONTINUE  Long term goals  Time Frame for Long term goals : 4 weeks   Long term goal 1: bed mobility with mod I for ease of getting in/out of bed  Long term goal 2: sit <> stand with mod I for safe of transfers   Long term goal 3: ambulate 75ft with use of LRAD and stand-by assist for safe in home mobility   Long term goal 4: negotiate 1 step no rails with stand-by assist and use of LRAD for safe entry/exit of his home   Long term goal 5: pt to perform car transfer at [facility] for safe transport home    Following session, patient left in safe position with all fall risk precautions in place.

## 2019-08-14 NOTE — PROGRESS NOTES
[facility]  Inpatient Rehabilitation  Occupational Therapy  Discharge Note  Time:  Time In: 1005  Time Out: 1030  Timed Code Treatment Minutes: 25 Minutes  Minutes: 25          Date: 2019  Patient Name: Bertha Colon,   Gender: male      Room: [facility]/070-A  MRN: [id number]  : 1960  (62 y.o.)  Referring Practitioner: Bang Quinones MD  Diagnosis: Cerebellar infarct  Additional Pertinent Hx: He was admitted 2019 for complaint of injuries after a ground-level fall at home. The patient was reaching over his couch when he lost his balance and fell onto his left side causing him to strike his head without any endorsed loss of consciousness. There was a small superficial contusion of the left frontal region. On initial work-up he was found to have a left ninth rib fracture and also old rib fractures on the right side. CT imaging of the chest showed patchy nodular airspace opacities in the left lung base suggestive of infiltrates with an elevated white count of 15.3 noted. CT imaging of the head did show an acute infarct of the inferior medial aspect of the left cerebellar hemisphere as well as the left side of the medulla. The patient was admitted for further medical care. Note was also made that the patient was disheveled and malodorous and had questionable ability to care for himself. He did undergo a modified barium swallow study on  which showed the patient to have mild oral dysphagia and moderate pharyngeal dysphasia with recommendation at that time for thin and moist purées with thin liquids and strict use of compensatory strategies. He is currently on Zosyn for treating a presumptive aspiration pneumonia versus a hospital-acquired pneumonia. Jaime Pulliam Hx of prior CVA in april with chronic pain. The patient was just recently in the emergency department on 2019 for complaint of unsteady gait and nausea and vomiting as well as issues with swallow.   Pertinent medical history Short term goals: 1 week  Short term goal 1: Pt will dynamically sit EOB for 5 minutes with Mod I  to improve independence with bathing/dressing tasks EOB. GOAL NOT MET   Short term goal 2: Pt will tolerate static standing for >3 minutes CGA with 1 UE release and min cues for midline orientation in prep for washing hands at sink. GOAL MET   Short term goal 3: Pt will complete stand pivot transfers with SBA x1 and min verbal cues for safety to improve independence with w/c transfers. GOAL NOT MET   Short term goal 4: Pt will improve LUE coordination as evidenced by completion of 9 hole peg test in < 90 secs to improve indep with gathering items. GOAL NOT MET   Short term goal 5: Pt will safely navigate RW with CGA to BR improve independence with toileting routine. GOAL MET   Long term goals  Time Frame for Long term goals : 3 weeks  Long term goal 1: Pt will complete ADL routine with SBA and no cues for safety or adapted technique to increase ability to perform self care tasks GOAL NOT MET   Long term goal 2: Pt will complete light standing IADL task using RW with SBA and no cues for walker safety to improve indep within home. GOAL NOT MET     Following session, patient left in safe position with all fall risk precautions in place.

## 2019-08-14 NOTE — PROGRESS NOTES
[address]   SLP Individual Minutes  Time In: 1100  Time Out: 1130  Minutes: 30  Timed Code Treatment Minutes: 15 Minutes   Cognitive tx- 15 minutes  Dysphagia tx- 15 minutes    []Daily Note  []Progress Note  [x]Discharge Note    Date: 2019  Patient Name: Jagdeep Combs        MRN: [id number]    : 1960  (62 y.o.)  Gender: male   Primary Provider: Nate Regan MD  Admitting Diagnosis:  Cerebellar infarct   Secondary Diagnosis: Cognitive deficits, Dysphagia, Dysphonia   Precautions: fall risk, aspiration risk  Swallowing Status/Diet: NPO, PEG tube in place for nutrition and hydration  Swallowing Strategies: NPO  DATE of last MBS:  2019; FEES 2019  Pain: 0/10    Subjective: Pt sitting upright in wheelchair, expressing excitement over discharge to home today. Reviewed at length recommendations for a family member to be with him . Patient expressed understanding. No family present at the time of the session. SHORT TERM GOAL #1:  Goal 1:  Pt will complete pharyngeal strengthening exercise program with completion of exercises x10 to improve anterior hyoid excursion and pharyngeal strengthening to increase airway protection and pharyngeal contraction during the swallow. GOAL MET  INTERVENTIONS: Completed the following pharyngeal swallow exercises to improve hyolaryngeal elevation and anterior excursion to maximize epiglottic movement during the swallow. Instruction provided on proper technique for the following:   -Effortful swallow- 1 set of 10, fair success  -Janelle swallow- 1 set of 10, fair success  -CTAR- 1 set of 10, fair success. *minimal progress with pharyngeal swallow function noted at this time. *Reviewed recommendations for completion of pharyngeal HEP at home 2-3x a day. *Reviewed recommendations for STRICT NPO status.  Patient stating, \"there are cookies over there on the window

## 2019-08-14 NOTE — TELEPHONE ENCOUNTER
Gojesusia called regarding  Grand charge to home with home health and speech need a new diagnosis of being cancer free? Scheduled for Sept, but requested for him to follow up within the week. Either give her a call back at [phone] if before 1 pm.  Or call patient at home at [phone] with follow up plan.

## 2019-08-15 ENCOUNTER — CARE COORDINATION (OUTPATIENT)
Dept: CASE MANAGEMENT | Age: 59
End: 2019-08-15

## 2019-08-16 ENCOUNTER — CARE COORDINATION (OUTPATIENT)
Dept: CASE MANAGEMENT | Age: 59
End: 2019-08-16

## 2019-08-16 NOTE — CARE COORDINATION
Vanessa [facility] Transitions Initial Follow Up Call    Call within 2 business days of discharge: Yes    Patient: Felipe Helms Patient : 1960   MRN: [id number]  Reason for Admission: CVA  Discharge Date: 19 RARS: Readmission Risk Score: 40      Last Discharge [facility]       Complaint Diagnosis Description Type Department Provider    19  Oropharyngeal dysphagia . .. Admission (Discharged) Rodolfo Patel MD           Spoke with: Judy Cardenas for initial Care Transition follow up. Identified self/role. Explained CTN process, verbalized understanding and agreeable to one time CTN call as patient as an established ACM. Facility: [address] [place] [address],[address] & [address] services provided:  Scheduled appointment with PCP-No scheduled appointment. Transferred to Pre-Service to schedule. Scheduled appointment with Specialist-Scheduled appointments with Dr. Mabelene Rubinstein 08/15/2019 at 10:15 AM, Dr. Orin Martinez on 2019 at 04:15 PM, Martha JAMISON-CNP on 2019 at 02:30 PM, and Abelardo SHERWOOD CNP on 2019 at 02:00 PM.   Obtained and reviewed discharge summary and/or continuity of care documents  Communication with home health agencies or other community services the patient is currently using-[facility] notified 08/15/2019 of patient discharge. First visit scheduled today. Education of patient/family/caregiver/guardian to support self-management-Reviewed signs/symptoms and chronic disease management. Assessment and support for treatment adherence and medication management-Deferred medication review to [facility]. 1111F not completed.      Care Transitions 24 Hour Call    Do you have any ongoing symptoms?:  Yes  Patient-reported symptoms:  Cough, Shortness of Breath, Weakness (Comment: SOB (Baseline), Left sided Weakness)  Do you have a copy of your discharge instructions?:  Yes  Do you have all of your prescriptions and are they filled?:  Yes  Have you been contacted by a [employer]?:

## 2019-08-19 ENCOUNTER — CARE COORDINATION (OUTPATIENT)
Dept: CARE COORDINATION | Age: 59
End: 2019-08-19

## 2019-08-19 LAB
FUNGUS IDENTIFIED: ABNORMAL
FUNGUS IDENTIFIED: ABNORMAL
FUNGUS SMEAR: ABNORMAL
ORGANISM: ABNORMAL

## 2019-08-20 ENCOUNTER — TELEPHONE (OUTPATIENT)
Dept: FAMILY MEDICINE CLINIC | Age: 59
End: 2019-08-20

## 2019-08-20 DIAGNOSIS — G62.9 NEUROPATHY: ICD-10-CM

## 2019-08-20 DIAGNOSIS — G89.3 CANCER ASSOCIATED PAIN: ICD-10-CM

## 2019-08-20 DIAGNOSIS — G89.4 CHRONIC PAIN SYNDROME: ICD-10-CM

## 2019-08-20 DIAGNOSIS — R22.1 MASS IN NECK: ICD-10-CM

## 2019-08-20 DIAGNOSIS — C09.9 SQUAMOUS CELL CARCINOMA OF LEFT TONSIL (HCC): ICD-10-CM

## 2019-08-20 DIAGNOSIS — C02.9 TONGUE CARCINOMA (HCC): ICD-10-CM

## 2019-08-20 DIAGNOSIS — C09.9 MALIGNANT NEOPLASM OF TONSIL (HCC): ICD-10-CM

## 2019-08-20 DIAGNOSIS — I26.99 PULMONARY EMBOLISM AND INFARCTION (HCC): ICD-10-CM

## 2019-08-20 NOTE — TELEPHONE ENCOUNTER
Malik Ovalle from [address] [place] [address] called stating she was out to see the patient today and he has thrush. His tongue is completely white. Can something be called in?     [facility] [place]  Questions call [phone]

## 2019-08-21 RX ORDER — OXYCODONE HYDROCHLORIDE 10 MG/1
10 TABLET ORAL EVERY 8 HOURS PRN
Qty: 90 TABLET | Refills: 0 | OUTPATIENT
Start: 2019-08-21 | End: 2019-09-20

## 2019-08-21 RX ORDER — MORPHINE SULFATE 10 MG/5ML
5-10 SOLUTION ORAL EVERY 6 HOURS PRN
Qty: 560 ML | Refills: 0 | Status: SHIPPED | OUTPATIENT
Start: 2019-08-23 | End: 2019-09-23 | Stop reason: SDUPTHER

## 2019-08-22 ENCOUNTER — TELEPHONE (OUTPATIENT)
Dept: FAMILY MEDICINE CLINIC | Age: 59
End: 2019-08-22

## 2019-08-23 ENCOUNTER — PATIENT MESSAGE (OUTPATIENT)
Dept: FAMILY MEDICINE CLINIC | Age: 59
End: 2019-08-23

## 2019-08-23 ENCOUNTER — PROCEDURE VISIT (OUTPATIENT)
Dept: CARDIOLOGY CLINIC | Age: 59
End: 2019-08-23
Payer: MEDICARE

## 2019-08-23 DIAGNOSIS — F41.1 GENERALIZED ANXIETY DISORDER: ICD-10-CM

## 2019-08-23 DIAGNOSIS — I63.9 CRYPTOGENIC STROKE (HCC): Primary | ICD-10-CM

## 2019-08-23 PROCEDURE — 93298 REM INTERROG DEV EVAL SCRMS: CPT | Performed by: INTERNAL MEDICINE

## 2019-08-23 PROCEDURE — 93299 PR REM INTERROG ICPMS/SCRMS <30 D TECH REVIEW: CPT | Performed by: INTERNAL MEDICINE

## 2019-08-26 RX ORDER — CLONAZEPAM 1 MG/1
1 TABLET ORAL 2 TIMES DAILY PRN
Qty: 20 TABLET | Refills: 0 | Status: SHIPPED | OUTPATIENT
Start: 2019-08-26 | End: 2019-09-04 | Stop reason: SDUPTHER

## 2019-08-27 ENCOUNTER — TELEPHONE (OUTPATIENT)
Dept: FAMILY MEDICINE CLINIC | Age: 59
End: 2019-08-27

## 2019-08-27 ENCOUNTER — CARE COORDINATION (OUTPATIENT)
Dept: CARE COORDINATION | Age: 59
End: 2019-08-27

## 2019-08-27 ASSESSMENT — ENCOUNTER SYMPTOMS: DYSPNEA ASSOCIATED WITH: EXERTION

## 2019-08-27 NOTE — CARE COORDINATION
Ambulatory Care Coordination Note  8/27/2019  CM Risk Score: 14  Charlson 10 Year Mortality Risk Score: 100%     ACC: Candido Love RN    Summary Note: Patient was called for continued Care Coordination follow up and s/p recent hospitalization. Patient stated he is currently wearing O2 at 1-1.5L NC all the time. Patient reported he has decreased his smoking and ACM reviewed the importance of not smoking at all with the use of O2 in the home and patient verbalized understanding. Patient denied any c/o SOB or cough being present. Patient stated his biggest complaint at this time is pain as his pain medications were adjusted while in the hospital and pain management has stated they need to see him prior to restarting medication. Patient was encouraged to f/u with pain management re: his concerns and to call them if he feels earlier appointment needed as he is currently scheduled for 9/9/19. Patient very focused on discussing pain medication during our call and support provided. Patient stated he is not taking anything orally as instructed and his niece continues to assist him with his care at home, including bolus TF 3-4 times a day. Patient shared previous thrush like symptoms have since resolved. Patient reported he remains current with [facility] and \"someone is here every day. \"  Patient denied any need for additional services at this time. Patient stated he would like prescription for new wheelchair. ACM discussed need for PCP f/u visit s/p recent hospitalization and to discuss possible new wheelchair. Patient agreeable to scheduling PCP appointment and appointment made for 9/3 @ 1030. Patient stated family will provide transportation and he denied any need for assistance. Patient denied any other questions, concerns, or needs and he was encouraged to call with any that may develop. ACM spoke with Tamika Vidal [facility] .   Zahira Phan was able to verify patient remains current with [facility] services and CHELSIE ESPINOZA II.VIMARIA EUGENIA   9/30/2019 10:45 AM Adwoa Catherine, APRN - CNP [facility]     ,   Diabetes Assessment    Meal Planning:  Avoidance of concentrated sweets   How often do you test your blood sugar?:  Meals, Daily   Do you have barriers with adherence to non-pharmacologic self-management interventions?  (Nutrition/Exercise/Self-Monitoring):  No   Have you ever had to go to the ED for symptoms of low blood sugar?:  No       No patient-reported symptoms   Do you have hyperglycemia symptoms?:  No   Do you have hypoglycemia symptoms?:  No   Blood Sugar Trends:  No Change      ,   COPD Assessment    Is the patient able to verbalize Rescue vs. Long Acting medications?:  No  Does the patient have a nebulizer?:  No  Does the patient use a space with inhaled medications?:  No     No patient-reported symptoms         Symptoms:      Symptom course:  stable  Breathlessness:  exertion  Increase use of rapid acting/rescue inhaled medications?:  No  Change in chronic cough?:  No/At Baseline  Change in sputum?:  No/At Baseline     ,   General Assessment    Do you have any symptoms that are causing concern?:  Yes  Progression since Onset:  Unchanged  Reported Symptoms:  Pain, Weakness      and Care Coordination Episodes    Type: Amb Care Coordination  Episode: Complex Care  Noted: 4/29/2019  Comments: CTC and DM edu

## 2019-09-03 ENCOUNTER — TELEPHONE (OUTPATIENT)
Dept: FAMILY MEDICINE CLINIC | Age: 59
End: 2019-09-03

## 2019-09-03 ENCOUNTER — CARE COORDINATION (OUTPATIENT)
Dept: CARE COORDINATION | Age: 59
End: 2019-09-03

## 2019-09-03 ENCOUNTER — OFFICE VISIT (OUTPATIENT)
Dept: FAMILY MEDICINE CLINIC | Age: 59
End: 2019-09-03
Payer: MEDICARE

## 2019-09-03 VITALS
RESPIRATION RATE: 16 BRPM | SYSTOLIC BLOOD PRESSURE: 102 MMHG | DIASTOLIC BLOOD PRESSURE: 60 MMHG | OXYGEN SATURATION: 97 % | HEART RATE: 73 BPM

## 2019-09-03 DIAGNOSIS — I10 ESSENTIAL HYPERTENSION: ICD-10-CM

## 2019-09-03 DIAGNOSIS — I69.398 HEMIPARESIS AND ALTERATION OF SENSATIONS AS LATE EFFECTS OF CEREBROVASCULAR ACCIDENT (HCC): ICD-10-CM

## 2019-09-03 DIAGNOSIS — E11.9 TYPE 2 DIABETES MELLITUS WITHOUT COMPLICATION, WITHOUT LONG-TERM CURRENT USE OF INSULIN (HCC): ICD-10-CM

## 2019-09-03 DIAGNOSIS — F17.210 CIGARETTE NICOTINE DEPENDENCE WITHOUT COMPLICATION: Primary | ICD-10-CM

## 2019-09-03 DIAGNOSIS — I69.359 HEMIPARESIS AND ALTERATION OF SENSATIONS AS LATE EFFECTS OF CEREBROVASCULAR ACCIDENT (HCC): ICD-10-CM

## 2019-09-03 DIAGNOSIS — C09.9 MALIGNANT NEOPLASM OF TONSIL (HCC): ICD-10-CM

## 2019-09-03 PROBLEM — L89.890 UNSTAGEABLE PRESSURE ULCER OF RIGHT FOOT (HCC): Status: RESOLVED | Noted: 2018-09-17 | Resolved: 2019-09-03

## 2019-09-03 PROCEDURE — 99215 OFFICE O/P EST HI 40 MIN: CPT | Performed by: FAMILY MEDICINE

## 2019-09-03 PROCEDURE — 1111F DSCHRG MED/CURRENT MED MERGE: CPT | Performed by: FAMILY MEDICINE

## 2019-09-03 RX ORDER — NICOTINE 21 MG/24HR
1 PATCH, TRANSDERMAL 24 HOURS TRANSDERMAL DAILY
Qty: 14 PATCH | Refills: 0 | Status: SHIPPED | OUTPATIENT
Start: 2019-09-03

## 2019-09-03 ASSESSMENT — ENCOUNTER SYMPTOMS
CONSTIPATION: 1
EYE DISCHARGE: 0
NAUSEA: 0
RHINORRHEA: 0
ABDOMINAL PAIN: 0
DIARRHEA: 0
WHEEZING: 0
TROUBLE SWALLOWING: 1
SHORTNESS OF BREATH: 1
COUGH: 0
BACK PAIN: 1
SORE THROAT: 0

## 2019-09-03 NOTE — CARE COORDINATION
Received call from PCP. Patient evaluated in the office today and patient is agreeable to [facility] placement and request [facility] ECF. PCP updated that [address]  is working on this process but is in need of H&P.  PCP will complete today. ACM spoke with Akbar Miguel Angel, and updated her of this information. Lesley Grady will f/u with ECF but needs completed H&P, updated med list and order for admission on PCP script pad so she can submit with her therapy notes to the [facility] admission coordinator. Will update PCP and office staff of this information. Sofi's fax is [phone].

## 2019-09-03 NOTE — TELEPHONE ENCOUNTER
Martina Mims (respiratory Therapist)  from [address] [place] [address] called and stated they are pulling Mr Seble Rai from there service due to his unsafe practices while on O2. She states that he has been told on a number of occassions that he can not continue on the O2 and smoke while the concentrator is in use yet he continues to do so. She would like to know after his visit here today if he intends to go with another company for Oxygen.     Martina Mims [phone]  Please call to inform

## 2019-09-03 NOTE — PROGRESS NOTES
Post-Discharge Transitional Care Management Services or Hospital Follow Up      Mary Khan   YOB: 1960    Date of Office Visit:  9/3/2019  Date of Hospital Admission: 7/18/19  Date of Hospital Discharge: 8/14/19  Readmission Risk Score(high >=14%.  Medium >=10%):Readmission Risk Score: 44      Care management risk score Rising risk (score 2-5) and Complex Care (Scores >=6): 14     Non face to face  following discharge, date last encounter closed (first attempt may have been earlier): *No documented post hospital discharge outreach found in the last 14 days *No documented post hospital discharge outreach found in the last 14 days    Call initiated 2 business days of discharge: *No response recorded in the last 14 days     Patient Active Problem List   Diagnosis    Mass in neck    Type 2 diabetes mellitus without complication, without long-term current use of insulin ([address]    Generalized anxiety disorder    Moderate episode of recurrent major depressive disorder ([address]    Malignant neoplasm of tonsil ([address]    Squamous cell carcinoma of left tonsil ([address]    Tongue carcinoma ([address]    Encounter for antineoplastic chemotherapy    Cigarette nicotine dependence without complication    Severe malnutrition ([address]    Severe protein-calorie malnutrition ([address]    NSTEMI (non-ST elevated myocardial infarction) ([address]    Cerebrovascular accident (CVA) due to embolism of right posterior cerebral artery ([address]    Tonsil cancer ([address]    Left homonymous hemianopsia due to recent cerebral infarction    Stroke-like symptom    Nausea & vomiting    SOB (shortness of breath)    Troponin level elevated    History of stroke    Claudication (HCC)    Other headache syndrome    Pre-syncope    Orthostatic hypotension    Chronic anemia    CHI (closed head injury), initial encounter    Cerebrovascular accident (CVA) ([address]    Acute CVA (cerebrovascular accident) ([address]    Essential hypertension    Cancer associated pain    Chronic pain syndrome    Cerebellar infarct (HCC)    Acute respiratory failure with hypoxia (HCC)    Atelectasis    Aspiration into airway    Smoker    Mucus plugging of bronchi       Allergies   Allergen Reactions    Latex Hives    Coumadin [Warfarin Sodium] Other (See Comments)     Tears skin     Tape [Adhesive Tape] Other (See Comments)     Tears skin        Medications listed as ordered at the time of discharge from hospital   Siomara Wells \"Tyrone\"   Home Medication Instructions RENATO:    Printed on:09/03/19 1221   Medication Information                      albuterol sulfate  (90 Base) MCG/ACT inhaler  Inhale 2 puffs into the lungs every 6 hours as needed for Wheezing             aspirin 81 MG EC tablet  Take 1 tablet by mouth daily             atorvastatin (LIPITOR) 80 MG tablet  1 tablet by PEG Tube route nightly             busPIRone (BUSPAR) 10 MG tablet  Take 1 tablet by mouth nightly             clonazePAM (KLONOPIN) 1 MG tablet  Take 1 tablet by mouth 2 times daily as needed for Anxiety for up to 30 days. docusate sodium (COLACE) 100 MG capsule  Take 1 capsule by mouth 3 times daily as needed for Constipation             ergocalciferol (ERGOCALCIFEROL) 31310 units capsule  1 capsule by PEG Tube route every 3 days             lactulose (CHRONULAC) 10 GM/15ML solution  Take 30 mLs by mouth 2 times daily as needed (constipation)             lidocaine-prilocaine (EMLA) 2.5-2.5 % cream  Apply topically as needed. midodrine (PROAMATINE) 5 MG tablet  Take 1 tablet by mouth 3 times daily (with meals) Hold for blood pressure greater than 120. Do not give after 1800 or within 4 hrs of bedtime. morphine 10 MG/5ML solution  2.5-5 mLs by PEG Tube route every 6 hours as needed for Pain for up to 30 days.              naloxone (NARCAN) 4 MG/0.1ML LIQD nasal spray  1 spray by Nasal route as needed for Opioid Reversal             nicotine (NICODERM CQ) 21 MG/24HR  Place 1 patch onto the skin daily             rivaroxaban (XARELTO) 20 MG TABS tablet  Take 1 tablet by mouth daily             sertraline (ZOLOFT) 100 MG tablet  TAKE 2 TABLETS BY MOUTH ONCE DAILY             zinc oxide (PINXAV) 30 % OINT  Apply 10 g topically 4 times daily as needed (Pressure wound on coccyx)                   Medications marked \"taking\" at this time  Outpatient Medications Marked as Taking for the 9/3/19 encounter (Office Visit) with Pavan Michel MD   Medication Sig Dispense Refill    nicotine (NICODERM CQ) 21 MG/24HR Place 1 patch onto the skin daily 14 patch 0    clonazePAM (KLONOPIN) 1 MG tablet Take 1 tablet by mouth 2 times daily as needed for Anxiety for up to 30 days. 20 tablet 0    morphine 10 MG/5ML solution 2.5-5 mLs by PEG Tube route every 6 hours as needed for Pain for up to 30 days. 560 mL 0    midodrine (PROAMATINE) 5 MG tablet Take 1 tablet by mouth 3 times daily (with meals) Hold for blood pressure greater than 120. Do not give after 1800 or within 4 hrs of bedtime.  90 tablet 1    albuterol sulfate  (90 Base) MCG/ACT inhaler Inhale 2 puffs into the lungs every 6 hours as needed for Wheezing 1 Inhaler 2    aspirin 81 MG EC tablet Take 1 tablet by mouth daily 30 tablet 3    atorvastatin (LIPITOR) 80 MG tablet 1 tablet by PEG Tube route nightly 30 tablet 3    busPIRone (BUSPAR) 10 MG tablet Take 1 tablet by mouth nightly 30 tablet 0    sertraline (ZOLOFT) 100 MG tablet TAKE 2 TABLETS BY MOUTH ONCE DAILY 90 tablet 3    docusate sodium (COLACE) 100 MG capsule Take 1 capsule by mouth 3 times daily as needed for Constipation 90 capsule 1    lactulose (CHRONULAC) 10 GM/15ML solution Take 30 mLs by mouth 2 times daily as needed (constipation) 1800 mL 0    rivaroxaban (XARELTO) 20 MG TABS tablet Take 1 tablet by mouth daily 30 tablet 1    naloxone (NARCAN) 4 MG/0.1ML LIQD nasal spray 1 spray by Nasal route as needed for Opioid Reversal 1 each chart.    Interval history/Current status: see HPI    Review of Systems   Constitutional: Positive for activity change, appetite change, fatigue and unexpected weight change. Negative for chills and fever. HENT: Positive for trouble swallowing. Negative for congestion, rhinorrhea and sore throat. Eyes: Negative for discharge and visual disturbance. Respiratory: Positive for shortness of breath. Negative for cough and wheezing. Cardiovascular: Negative for chest pain and palpitations. Gastrointestinal: Positive for constipation. Negative for abdominal pain, diarrhea and nausea. Genitourinary: Negative for dysuria and hematuria. Musculoskeletal: Positive for arthralgias, back pain and myalgias. Neurological: Positive for weakness. Negative for dizziness and headaches. Psychiatric/Behavioral: Positive for dysphoric mood and sleep disturbance. The patient is nervous/anxious. Vitals:    09/03/19 1032   BP: 102/60   Site: Left Upper Arm   Position: Sitting   Cuff Size: Medium Adult   Pulse: 73   Resp: 16   SpO2: 97%     There is no height or weight on file to calculate BMI. Wt Readings from Last 3 Encounters:   08/14/19 186 lb 6.4 oz (84.6 kg)   07/18/19 174 lb 14.4 oz (79.3 kg)   07/07/19 140 lb (63.5 kg)     BP Readings from Last 3 Encounters:   09/03/19 102/60   08/14/19 124/67   07/31/19 (!) 103/56       Physical Exam   Constitutional: He is oriented to person, place, and time. He appears well-developed and well-nourished. HENT:   Head: Normocephalic and atraumatic. Eyes: Conjunctivae and EOM are normal. Right eye exhibits no discharge. Left eye exhibits no discharge. No scleral icterus. Neck: Normal range of motion. Cardiovascular: Normal rate, regular rhythm and normal heart sounds. Pulmonary/Chest: Effort normal and breath sounds normal. He has no wheezes. Abdominal: Soft. Bowel sounds are normal. He exhibits no distension. There is no tenderness.        Musculoskeletal: He

## 2019-09-03 NOTE — TELEPHONE ENCOUNTER
Patient called. Wanting to know if Dr Deep Reveles had \"found me a home yet\". Patient was informed that he needed to contact his  Katerine Ramsey and she would help him set everything up. Office note, med list,scripts, and immunizations were all faxed to her,([phone]).

## 2019-09-04 DIAGNOSIS — F41.1 GENERALIZED ANXIETY DISORDER: ICD-10-CM

## 2019-09-04 RX ORDER — CLONAZEPAM 1 MG/1
1 TABLET ORAL 2 TIMES DAILY PRN
Qty: 20 TABLET | Refills: 0 | Status: SHIPPED | OUTPATIENT
Start: 2019-09-04 | End: 2019-10-04

## 2019-09-04 NOTE — TELEPHONE ENCOUNTER
Bhavesh Rowan called requesting a refill on the following medications:  Requested Prescriptions     Pending Prescriptions Disp Refills    clonazePAM (KLONOPIN) 1 MG tablet 20 tablet 0     Sig: Take 1 tablet by mouth 2 times daily as needed for Anxiety for up to 30 days. Pharmacy verified: [facility] in [place]  . pv      Date of last visit: 9/03/19  Date of next visit (if applicable): Visit date not found

## 2019-09-05 ENCOUNTER — TELEPHONE (OUTPATIENT)
Dept: FAMILY MEDICINE CLINIC | Age: 59
End: 2019-09-05

## 2019-09-05 ENCOUNTER — CARE COORDINATION (OUTPATIENT)
Dept: CARE COORDINATION | Age: 59
End: 2019-09-05

## 2019-09-05 ASSESSMENT — ENCOUNTER SYMPTOMS: DYSPNEA ASSOCIATED WITH: EXERTION

## 2019-09-05 NOTE — TELEPHONE ENCOUNTER
[facility] called form [facility] Speech Therapy and said that she was so do a follow up evaluation on the patient. She is asking if you would like to hold off on this due to recent nursing home placement?  Please advise    [facility] can be reached at [phone]

## 2019-09-05 NOTE — CARE COORDINATION
MOUTH ONCE DAILY 8/13/19   Darci Giordano MD   docusate sodium (COLACE) 100 MG capsule Take 1 capsule by mouth 3 times daily as needed for Constipation 8/13/19   Darci Giordano MD   lactulose [facility]) 10 GM/15ML solution Take 30 mLs by mouth 2 times daily as needed (constipation) 8/13/19   Darci Giordano MD   rivaroxaban Francyne Gains) 20 MG TABS tablet Take 1 tablet by mouth daily 8/14/19   Darci Giordano MD   naloxone [facility]) 4 MG/0.1ML LIQD nasal spray 1 spray by Nasal route as needed for Opioid Reversal 8/13/19   Darci Giordano MD   ergocalciferol (ERGOCALCIFEROL) 62751 units capsule 1 capsule by PEG Tube route every 3 days 8/15/19   Darci Giordano MD   lidocaine-prilocaine (EMLA) 2.5-2.5 % cream Apply topically as needed. 8/13/19   Darci Giordano MD   zinc oxide (PINXAV) 30 % OINT Apply 10 g topically 4 times daily as needed (Pressure wound on coccyx) 8/13/19   Darci Giordano MD       Future Appointments   Date Time Provider Richard Cortes   9/9/2019  2:30 PM YAQUELIN Garcia CNP SRPX Pain [facility]   9/27/2019 11:00 AM Pacheco Almanza MD Oncology [facility]   9/30/2019 10:45 AM YAQUELIN Munoz CNP [facility]     ,   Diabetes Assessment    Meal Planning:  Avoidance of concentrated sweets   How often do you test your blood sugar?:  No Testing   Do you have barriers with adherence to non-pharmacologic self-management interventions?  (Nutrition/Exercise/Self-Monitoring):  No   Have you ever had to go to the ED for symptoms of low blood sugar?:  No       No patient-reported symptoms   Do you have hyperglycemia symptoms?:  No   Do you have hypoglycemia symptoms?:  No   Blood Sugar Monitoring Regimen:  Not Testing   Blood Sugar Trends:  No Change      ,   COPD Assessment    Is the patient able to verbalize Rescue vs. Long Acting medications?:  No  Does the patient have a nebulizer?:  No  Does the patient use a space with inhaled medications?:  No     No patient-reported symptoms         Symptoms:      Symptom course:  stable  Breathlessness:  exertion  Increase use of rapid acting/rescue inhaled medications?:  No  Change in chronic cough?:  No/At Baseline  Change in sputum?:  No/At Baseline     ,   General Assessment    Do you have any symptoms that are causing concern?:  Yes  Progression since Onset:  Unchanged  Reported Symptoms:  Fatigue, Weakness      and Care Coordination Episodes    Type: Amb Care Coordination  Episode: Complex Care  Noted: 4/29/2019  Comments: CTC and DM edu

## 2019-09-12 ENCOUNTER — CARE COORDINATION (OUTPATIENT)
Dept: CARE COORDINATION | Age: 59
End: 2019-09-12

## 2019-09-16 ENCOUNTER — CARE COORDINATION (OUTPATIENT)
Dept: CASE MANAGEMENT | Age: 59
End: 2019-09-16

## 2019-09-16 LAB — AFB CULTURE & SMEAR: NORMAL

## 2019-09-18 ENCOUNTER — CARE COORDINATION (OUTPATIENT)
Dept: CARE COORDINATION | Age: 59
End: 2019-09-18

## 2019-09-20 ENCOUNTER — HOSPITAL ENCOUNTER (EMERGENCY)
Age: 59
Discharge: HOME | End: 2019-09-20
Payer: MEDICARE

## 2019-09-20 ENCOUNTER — APPOINTMENT (OUTPATIENT)
Dept: CT IMAGING | Age: 59
End: 2019-09-20
Payer: MEDICARE

## 2019-09-20 ENCOUNTER — APPOINTMENT (OUTPATIENT)
Dept: GENERAL RADIOLOGY | Age: 59
End: 2019-09-20
Payer: MEDICARE

## 2019-09-20 VITALS
TEMPERATURE: 98.4 F | DIASTOLIC BLOOD PRESSURE: 78 MMHG | SYSTOLIC BLOOD PRESSURE: 90 MMHG | HEART RATE: 60 BPM | RESPIRATION RATE: 16 BRPM | OXYGEN SATURATION: 99 %

## 2019-09-20 DIAGNOSIS — M17.11 OSTEOARTHRITIS OF RIGHT KNEE, UNSPECIFIED OSTEOARTHRITIS TYPE: ICD-10-CM
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
## 2019-09-20 LAB
ALBUMIN SERPL-MCNC: 3.4 G/DL (ref 3.5–5.1)
ALP BLD-CCNC: 99 U/L (ref 38–126)
ALT SERPL-CCNC: 15 U/L (ref 11–66)
ANION GAP SERPL CALCULATED.3IONS-SCNC: 8 MEQ/L (ref 8–16)
AST SERPL-CCNC: 16 U/L (ref 5–40)
BASOPHILS # BLD: 0.6 %
BASOPHILS ABSOLUTE: 0 THOU/MM3 (ref 0–0.1)
BILIRUB SERPL-MCNC: 0.2 MG/DL (ref 0.3–1.2)
BILIRUBIN DIRECT: < 0.2 MG/DL (ref 0–0.3)
BUN BLDV-MCNC: 19 MG/DL (ref 7–22)
CALCIUM SERPL-MCNC: 9 MG/DL (ref 8.5–10.5)
CHLORIDE BLD-SCNC: 101 MEQ/L (ref 98–111)
CO2: 33 MEQ/L (ref 23–33)
CREAT SERPL-MCNC: 1 MG/DL (ref 0.4–1.2)
EOSINOPHIL # BLD: 3.8 %
EOSINOPHILS ABSOLUTE: 0.2 THOU/MM3 (ref 0–0.4)
ERYTHROCYTE [DISTWIDTH] IN BLOOD BY AUTOMATED COUNT: 14.6 % (ref 11.5–14.5)
ERYTHROCYTE [DISTWIDTH] IN BLOOD BY AUTOMATED COUNT: 53 FL (ref 35–45)
GFR SERPL CREATININE-BSD FRML MDRD: 76 ML/MIN/1.73M2
GLUCOSE BLD-MCNC: 97 MG/DL (ref 70–108)
HCT VFR BLD CALC: 32 % (ref 42–52)
HEMOGLOBIN: 10.1 GM/DL (ref 14–18)
IMMATURE GRANS (ABS): 0.02 THOU/MM3 (ref 0–0.07)
IMMATURE GRANULOCYTES: 0 %
LIPASE: 19.7 U/L (ref 5.6–51.3)
LYMPHOCYTES # BLD: 14.7 %
LYMPHOCYTES ABSOLUTE: 1 THOU/MM3 (ref 1–4.8)
MAGNESIUM: 1.7 MG/DL (ref 1.6–2.4)
MCH RBC QN AUTO: 31.1 PG (ref 26–33)
MCHC RBC AUTO-ENTMCNC: 31.6 GM/DL (ref 32.2–35.5)
MCV RBC AUTO: 98.5 FL (ref 80–94)
MONOCYTES # BLD: 11.3 %
MONOCYTES ABSOLUTE: 0.7 THOU/MM3 (ref 0.4–1.3)
NUCLEATED RED BLOOD CELLS: 0 /100 WBC
OSMOLALITY CALCULATION: 285.3 MOSMOL/KG (ref 275–300)
PLATELET # BLD: 243 THOU/MM3 (ref 130–400)
PMV BLD AUTO: 10.4 FL (ref 9.4–12.4)
POTASSIUM SERPL-SCNC: 4.5 MEQ/L (ref 3.5–5.2)
RBC # BLD: 3.25 MILL/MM3 (ref 4.7–6.1)
SEG NEUTROPHILS: 69.3 %
SEGMENTED NEUTROPHILS ABSOLUTE COUNT: 4.5 THOU/MM3 (ref 1.8–7.7)
SODIUM BLD-SCNC: 142 MEQ/L (ref 135–145)
TOTAL PROTEIN: 6.3 G/DL (ref 6.1–8)
TROPONIN T: < 0.01 NG/ML
WBC # BLD: 6.5 THOU/MM3 (ref 4.8–10.8)

## 2019-09-20 PROCEDURE — 83690 ASSAY OF LIPASE: CPT

## 2019-09-20 PROCEDURE — 85025 COMPLETE CBC W/AUTO DIFF WBC: CPT

## 2019-09-20 PROCEDURE — 82248 BILIRUBIN DIRECT: CPT

## 2019-09-20 PROCEDURE — 70450 CT HEAD/BRAIN W/O DYE: CPT

## 2019-09-20 PROCEDURE — 80053 COMPREHEN METABOLIC PANEL: CPT

## 2019-09-20 PROCEDURE — 72125 CT NECK SPINE W/O DYE: CPT

## 2019-09-20 PROCEDURE — 84484 ASSAY OF TROPONIN QUANT: CPT

## 2019-09-20 PROCEDURE — 83735 ASSAY OF MAGNESIUM: CPT

## 2019-09-20 PROCEDURE — 36415 COLL VENOUS BLD VENIPUNCTURE: CPT

## 2019-09-20 PROCEDURE — 73562 X-RAY EXAM OF KNEE 3: CPT

## 2019-09-20 PROCEDURE — 99284 EMERGENCY DEPT VISIT MOD MDM: CPT

## 2019-09-20 ASSESSMENT — ENCOUNTER SYMPTOMS
SHORTNESS OF BREATH: 0
VOMITING: 0
EYE DISCHARGE: 0
SORE THROAT: 0
BACK PAIN: 0
NAUSEA: 0
DIARRHEA: 0
EYE REDNESS: 0
ABDOMINAL PAIN: 0
WHEEZING: 0
COUGH: 0
RHINORRHEA: 0

## 2019-09-23 ENCOUNTER — TELEPHONE (OUTPATIENT)
Dept: FAMILY MEDICINE CLINIC | Age: 59
End: 2019-09-23

## 2019-09-23 ENCOUNTER — CARE COORDINATION (OUTPATIENT)
Dept: CARE COORDINATION | Age: 59
End: 2019-09-23

## 2019-09-23 DIAGNOSIS — C09.9 MALIGNANT NEOPLASM OF TONSIL (HCC): Primary | ICD-10-CM

## 2019-09-23 DIAGNOSIS — C09.9 MALIGNANT NEOPLASM OF TONSIL (HCC): ICD-10-CM

## 2019-09-23 DIAGNOSIS — G89.3 CANCER ASSOCIATED PAIN: ICD-10-CM

## 2019-09-23 DIAGNOSIS — G89.4 CHRONIC PAIN SYNDROME: ICD-10-CM

## 2019-09-23 DIAGNOSIS — R22.1 MASS IN NECK: ICD-10-CM

## 2019-09-23 RX ORDER — MORPHINE SULFATE 10 MG/5ML
5-10 SOLUTION ORAL EVERY 6 HOURS PRN
Qty: 560 ML | Refills: 0 | Status: SHIPPED | OUTPATIENT
Start: 2019-09-23 | End: 2019-10-06 | Stop reason: SDUPTHER

## 2019-09-23 ASSESSMENT — ENCOUNTER SYMPTOMS: DYSPNEA ASSOCIATED WITH: EXERTION

## 2019-09-23 NOTE — CARE COORDINATION
to discharge from Care Coordination      Care Coordination Interventions    Program Enrollment:  Complex Care  Referral from Primary Care Provider:  No  Suggested Interventions and Community Resources  Behavorial Health:  Completed (Comment: clint Pagan psychologist )  Diabetes Education:  Completed  Fall Risk Prevention:  Completed  Home Health Services: In Process (Comment: [facility] )  Medication Assistance Program:  Declined (Comment: denied any need at this time )  Medi Set or Pill Pack:  Completed (Comment: AARON and bora assisting with med management )  Occupational Therapy: In Process (Comment: [facility] )  Physical Therapy: In Process (Comment: [facility])  Smoking Cessation:  Completed  Social Work:  Completed  Other Therapy Services: In Process (Comment: [facility])  Other Services:  Completed (Comment: Tele Care Program )  Transportation Support:  Completed  Zone Management Tools:  Completed  Other Services or Interventions:  Home O2 per [employer]         Goals Addressed                 This Visit's Progress       Care Coordination     Conditions and Symptoms   Improving     I will schedule office visits, as directed by my provider. I will keep my appointment or reschedule if I have to cancel. I will follow my Zone Management tool to seek urgent or emergent care. I will notify my provider of any symptoms that indicate a worsening of my condition. Barriers: lack of support, overwhelmed by complexity of regimen and lack of education  Plan for overcoming my barriers: Continued education and support   Confidence: 7/10  Anticipated Goal Completion Date: 8/1/19  Update: Patient with hospitalization and now is back home. Continue to work on education and support. New Goal Date: 11/10/19              Prior to Admission medications    Medication Sig Start Date End Date Taking?  Authorizing Provider   clonazePAM (KLONOPIN) 1 MG tablet Take 1 tablet by mouth 2 times daily as needed for Anxiety for up to 30 days. 9/4/19 10/4/19  Marquise Culver MD   nicotine (NICODERM CQ) 21 MG/24HR Place 1 patch onto the skin daily 9/3/19   Marquise Culver MD   midodrine (PROAMATINE) 5 MG tablet Take 1 tablet by mouth 3 times daily (with meals) Hold for blood pressure greater than 120. Do not give after 1800 or within 4 hrs of bedtime. 8/14/19   Joey Williamson MD   albuterol sulfate  (90 Base) MCG/ACT inhaler Inhale 2 puffs into the lungs every 6 hours as needed for Wheezing 8/13/19   Joey Williamson MD   aspirin 81 MG EC tablet Take 1 tablet by mouth daily 8/14/19   Joey Williamson MD   atorvastatin (LIPITOR) 80 MG tablet 1 tablet by PEG Tube route nightly 8/14/19   Joey Williamson MD   busPIRone (BUSPAR) 10 MG tablet Take 1 tablet by mouth nightly 8/14/19   Joey Williamson MD   sertraline (ZOLOFT) 100 MG tablet TAKE 2 TABLETS BY MOUTH ONCE DAILY 8/13/19   Joey Williamson MD   docusate sodium (COLACE) 100 MG capsule Take 1 capsule by mouth 3 times daily as needed for Constipation 8/13/19   Joey Williamson MD   lactulose [facility]) 10 GM/15ML solution Take 30 mLs by mouth 2 times daily as needed (constipation) 8/13/19   Joey Williamson MD   rivaroxaban Isabela Peers) 20 MG TABS tablet Take 1 tablet by mouth daily 8/14/19   Joey Williamson MD   naloxone [facility]) 4 MG/0.1ML LIQD nasal spray 1 spray by Nasal route as needed for Opioid Reversal 8/13/19   Joey Williamson MD   ergocalciferol (ERGOCALCIFEROL) 15461 units capsule 1 capsule by PEG Tube route every 3 days 8/15/19   Joey Williamson MD   lidocaine-prilocaine (EMLA) 2.5-2.5 % cream Apply topically as needed.  8/13/19   Joey Williamson MD   zinc oxide (PINXAV) 30 % OINT Apply 10 g topically 4 times daily as needed (Pressure wound on coccyx) 8/13/19   Joey Williamson MD       Future Appointments   Date Time Provider Richard Cortes   9/26/2019  2:45 PM [facility]   9/27/2019 11:00 AM Emilia Edmondson MD Oncology P -

## 2019-09-23 NOTE — TELEPHONE ENCOUNTER
Left message to inform patient and Michelle Lenz that the order for walker is being sent to [facility]

## 2019-09-23 NOTE — TELEPHONE ENCOUNTER
Audi Maya called requesting a refill on the following medications:  Requested Prescriptions     Pending Prescriptions Disp Refills    morphine 10 MG/5ML solution 560 mL 0     Si.5-5 mLs by PEG Tube route every 6 hours as needed for Pain for up to 30 days. Pharmacy verified: [facility]  . pv      Date of last visit: 6/3/19  Date of next visit (if applicable):

## 2019-09-23 NOTE — TELEPHONE ENCOUNTER
OARRS reviewed. UDS: + for  Morphine, oxycodone, 7-Aminoclonazepam, THC - cancer patient on clonazepam.     Narcan offered: offered  Last seen: 6/3/2019.  Follow-up: 10/08/2019

## 2019-09-24 ENCOUNTER — CARE COORDINATION (OUTPATIENT)
Dept: CASE MANAGEMENT | Age: 59
End: 2019-09-24

## 2019-09-24 NOTE — CARE COORDINATION
Received message on cell phone from patient asking when someone will be out to wash him up. Called patient back. Informed him that he was calling the [facility] Nurse. CTN informed him that she will contact [facility] to check on when the home health aide will be out to see him. [address]. Spoke with [employer]now [employer] Informed her that patient would like to know when aide will be making visit to see him. [employer] states she will relay the message and have someone contact him with date and time. Called patient back. Informed him that someone will be in touch with him regarding the date and time. He verbalized understanding. No other needs or concerns at this time.       Mary Villasenor, RN  Care Transitions Nurse

## 2019-09-25 ENCOUNTER — CARE COORDINATION (OUTPATIENT)
Dept: CARE COORDINATION | Age: 59
End: 2019-09-25

## 2019-09-25 ENCOUNTER — TELEPHONE (OUTPATIENT)
Dept: FAMILY MEDICINE CLINIC | Age: 59
End: 2019-09-25

## 2019-09-25 ENCOUNTER — HOSPITAL ENCOUNTER (EMERGENCY)
Age: 59
Discharge: HOME | End: 2019-09-25
Attending: EMERGENCY MEDICINE
Payer: MEDICARE

## 2019-09-25 VITALS
RESPIRATION RATE: 16 BRPM | WEIGHT: 181 LBS | HEIGHT: 72 IN | HEART RATE: 58 BPM | BODY MASS INDEX: 24.52 KG/M2 | DIASTOLIC BLOOD PRESSURE: 85 MMHG | TEMPERATURE: 97.8 F | OXYGEN SATURATION: 94 % | SYSTOLIC BLOOD PRESSURE: 137 MMHG

## 2019-09-25 DIAGNOSIS — T20.20XA PARTIAL THICKNESS BURN OF FACE AND HEAD, INITIAL ENCOUNTER: Primary | ICD-10-CM

## 2019-09-25 DIAGNOSIS — R20.8 NASAL BURNING: ICD-10-CM

## 2019-09-25 LAB
ALBUMIN SERPL-MCNC: 3.5 G/DL (ref 3.5–5.1)
ALLEN TEST: POSITIVE
ALP BLD-CCNC: 95 U/L (ref 38–126)
ALT SERPL-CCNC: 13 U/L (ref 11–66)
ANION GAP SERPL CALCULATED.3IONS-SCNC: 7 MEQ/L (ref 8–16)
AST SERPL-CCNC: 14 U/L (ref 5–40)
BASE EXCESS (CALCULATED): 9.7 MMOL/L (ref -2.5–2.5)
BASOPHILS # BLD: 0.3 %
BASOPHILS ABSOLUTE: 0 THOU/MM3 (ref 0–0.1)
BILIRUB SERPL-MCNC: 0.3 MG/DL (ref 0.3–1.2)
BUN BLDV-MCNC: 19 MG/DL (ref 7–22)
CALCIUM SERPL-MCNC: 9.4 MG/DL (ref 8.5–10.5)
CHLORIDE BLD-SCNC: 95 MEQ/L (ref 98–111)
CO2: 38 MEQ/L (ref 23–33)
COLLECTED BY:: ABNORMAL
CREAT SERPL-MCNC: 0.7 MG/DL (ref 0.4–1.2)
DEVICE: ABNORMAL
EOSINOPHIL # BLD: 2.6 %
EOSINOPHILS ABSOLUTE: 0.3 THOU/MM3 (ref 0–0.4)
ERYTHROCYTE [DISTWIDTH] IN BLOOD BY AUTOMATED COUNT: 14.4 % (ref 11.5–14.5)
ERYTHROCYTE [DISTWIDTH] IN BLOOD BY AUTOMATED COUNT: 53.1 FL (ref 35–45)
GFR SERPL CREATININE-BSD FRML MDRD: > 90 ML/MIN/1.73M2
GLUCOSE BLD-MCNC: 141 MG/DL (ref 70–108)
HCO3: 36 MMOL/L (ref 23–28)
HCT VFR BLD CALC: 35.4 % (ref 42–52)
HEMOGLOBIN: 11 GM/DL (ref 14–18)
IFIO2: 2
IMMATURE GRANS (ABS): 0.05 THOU/MM3 (ref 0–0.07)
IMMATURE GRANULOCYTES: 0.5 %
LIPASE: 12.6 U/L (ref 5.6–51.3)
LYMPHOCYTES # BLD: 7.4 %
LYMPHOCYTES ABSOLUTE: 0.8 THOU/MM3 (ref 1–4.8)
MAGNESIUM: 1.7 MG/DL (ref 1.6–2.4)
MCH RBC QN AUTO: 31.3 PG (ref 26–33)
MCHC RBC AUTO-ENTMCNC: 31.1 GM/DL (ref 32.2–35.5)
MCV RBC AUTO: 100.6 FL (ref 80–94)
MONOCYTES # BLD: 9 %
MONOCYTES ABSOLUTE: 1 THOU/MM3 (ref 0.4–1.3)
NUCLEATED RED BLOOD CELLS: 0 /100 WBC
O2 SATURATION: 93 %
OSMOLALITY CALCULATION: 284 MOSMOL/KG (ref 275–300)
PCO2: 57 MMHG (ref 35–45)
PH BLOOD GAS: 7.41 (ref 7.35–7.45)
PLATELET # BLD: 195 THOU/MM3 (ref 130–400)
PMV BLD AUTO: 10.8 FL (ref 9.4–12.4)
PO2: 68 MMHG (ref 71–104)
POTASSIUM SERPL-SCNC: 4.3 MEQ/L (ref 3.5–5.2)
RBC # BLD: 3.52 MILL/MM3 (ref 4.7–6.1)
SEG NEUTROPHILS: 80.2 %
SEGMENTED NEUTROPHILS ABSOLUTE COUNT: 8.7 THOU/MM3 (ref 1.8–7.7)
SODIUM BLD-SCNC: 140 MEQ/L (ref 135–145)
SOURCE, BLOOD GAS: ABNORMAL
TOTAL PROTEIN: 6.8 G/DL (ref 6.1–8)
TROPONIN T: < 0.01 NG/ML
WBC # BLD: 10.8 THOU/MM3 (ref 4.8–10.8)

## 2019-09-25 PROCEDURE — 85025 COMPLETE CBC W/AUTO DIFF WBC: CPT

## 2019-09-25 PROCEDURE — 83690 ASSAY OF LIPASE: CPT

## 2019-09-25 PROCEDURE — 80053 COMPREHEN METABOLIC PANEL: CPT

## 2019-09-25 PROCEDURE — 82803 BLOOD GASES ANY COMBINATION: CPT

## 2019-09-25 PROCEDURE — 99283 EMERGENCY DEPT VISIT LOW MDM: CPT

## 2019-09-25 PROCEDURE — 84484 ASSAY OF TROPONIN QUANT: CPT

## 2019-09-25 PROCEDURE — 36600 WITHDRAWAL OF ARTERIAL BLOOD: CPT

## 2019-09-25 PROCEDURE — 83735 ASSAY OF MAGNESIUM: CPT

## 2019-09-25 PROCEDURE — 2709999900 HC NON-CHARGEABLE SUPPLY

## 2019-09-25 PROCEDURE — 36415 COLL VENOUS BLD VENIPUNCTURE: CPT

## 2019-09-25 RX ORDER — MUPIROCIN CALCIUM 20 MG/G
CREAM TOPICAL
Qty: 1 TUBE | Refills: 0 | Status: SHIPPED | OUTPATIENT
Start: 2019-09-25 | End: 2019-09-25 | Stop reason: SDUPTHER

## 2019-09-25 RX ORDER — MUPIROCIN CALCIUM 20 MG/G
CREAM TOPICAL
Qty: 1 TUBE | Refills: 0 | Status: SHIPPED | OUTPATIENT
Start: 2019-09-25 | End: 2019-10-25

## 2019-09-25 RX ORDER — GINSENG 100 MG
CAPSULE ORAL
Status: DISCONTINUED
Start: 2019-09-25 | End: 2019-09-25 | Stop reason: HOSPADM

## 2019-09-25 ASSESSMENT — ENCOUNTER SYMPTOMS
EYE REDNESS: 0
WHEEZING: 0
BACK PAIN: 0
RHINORRHEA: 0
NAUSEA: 0
COUGH: 0
VOMITING: 0
DIARRHEA: 0
EYE DISCHARGE: 0
ABDOMINAL PAIN: 0
SHORTNESS OF BREATH: 0
SORE THROAT: 0

## 2019-09-25 NOTE — TELEPHONE ENCOUNTER
Received message from [place] with the med assistance program re: medication for patient's suggs. [place] stated if new script for ointment instead of cream can be sent to [facility] patient's cost will decrease from approx $20 to approx $3.  If appropriate - script would need to be for generic bactroban 2% ointment. Please advise.

## 2019-09-25 NOTE — CARE COORDINATION
Spoke with Amie Shanks for Medication Assistance for pt. He reported to me that he could not afford his $20 medication that was prescribed to him in ED last night. Rosalba Mckeon will she if she can help pt.

## 2019-09-25 NOTE — ED PROVIDER NOTES
posterior cerebral artery (HCC), Cigarette nicotine dependence without complication, COPD (chronic obstructive pulmonary disease) ([facility] [address] DVT (deep venous thrombosis) ([facility] [address] Enlarged lymph node, LAURA (generalized anxiety disorder), History of sleep apnea, Hypertension, Moderate episode of recurrent major depressive disorder ([facility] [address] NSTEMI (non-ST elevated myocardial infarction) [facility]), Pulmonary embolism and infarction ([facility] [address] S/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 SURGICAL HISTORY      has a past surgical history that includes knee surgery (Right, 1980's); Toe Surgery (Right, 2013); hip surgery (Right, 06/30/2014); Tunneled venous port placement; pr office/outpt visit,procedure only (Right, 10/16/2018); Cardiac catheterization (04/10/2019); transesophageal echocardiogram (N/A, 4/11/2019); Vena Cava Filter Placement (07/07/2009); and bronchoscopy (N/A, 7/31/2019). CURRENT MEDICATIONS       Previous Medications    ALBUTEROL SULFATE  (90 BASE) MCG/ACT INHALER    Inhale 2 puffs into the lungs every 6 hours as needed for Wheezing    ASPIRIN 81 MG EC TABLET    Take 1 tablet by mouth daily    ATORVASTATIN (LIPITOR) 80 MG TABLET    1 tablet by PEG Tube route nightly    BUSPIRONE (BUSPAR) 10 MG TABLET    Take 1 tablet by mouth nightly    CLONAZEPAM (KLONOPIN) 1 MG TABLET    Take 1 tablet by mouth 2 times daily as needed for Anxiety for up to 30 days.     DOCUSATE SODIUM (COLACE) 100 MG CAPSULE    Take 1 capsule by mouth 3 times daily as needed for Constipation    ERGOCALCIFEROL (ERGOCALCIFEROL) 85524 UNITS CAPSULE    1 capsule by PEG Tube route every 3 days    LACTULOSE (CHRONULAC) 10 GM/15ML SOLUTION    Take 30 mLs by mouth 2 times daily as needed (constipation)    LIDOCAINE-PRILOCAINE (EMLA) 2.5-2.5 % CREAM    Apply topically as normal.   Left Ear: Tympanic membrane and external ear normal.   Nose: Sinus tenderness present. No mucosal edema, rhinorrhea, septal deviation or nasal septal hematoma. No epistaxis. Mouth/Throat: Uvula is midline, oropharynx is clear and moist and mucous membranes are normal. Dental caries present. No oropharyngeal exudate, posterior oropharyngeal edema, posterior oropharyngeal erythema or tonsillar abscesses. Superficial epidermal burns to the upper lip, cheeks. There is soot inside of both nostrils as well as the outside of the nose. No burns or swelling to the posterior oropharynx, or in the nares. Eyes: Conjunctivae and EOM are normal.   Neck: Normal range of motion. Neck supple. No JVD present. Cardiovascular: Normal rate, regular rhythm, normal heart sounds, intact distal pulses and normal pulses. Exam reveals no gallop and no friction rub. No murmur heard. Pulmonary/Chest: Effort normal and breath sounds normal. No respiratory distress. He has no decreased breath sounds. He has no wheezes. He has no rhonchi. He has no rales. Abdominal: Soft. Bowel sounds are normal. He exhibits no distension. There is no tenderness. There is no rebound, no guarding and no CVA tenderness. Musculoskeletal: Normal range of motion. He exhibits no edema. Neurological: He is alert and oriented to person, place, and time. He exhibits normal muscle tone. Coordination normal.   Skin: Skin is warm and dry. No rash noted. He is not diaphoretic. Nursing note and vitals reviewed.         DIFFERENTIAL DIAGNOSIS:   Differential diagnoses were discussedextensively with the patient and family including but no limited to second degree burns, smoking complication     DIAGNOSTIC RESULTS     EKG: All EKG's are interpreted by the Emergency Department Physician who either signs or Co-signs this chart in the absence of a cardiologist.  EKG interpreted by No att. providers found:    none        RADIOLOGY: non-plain film

## 2019-09-26 ENCOUNTER — TELEPHONE (OUTPATIENT)
Dept: FAMILY MEDICINE CLINIC | Age: 59
End: 2019-09-26

## 2019-09-26 ENCOUNTER — CARE COORDINATION (OUTPATIENT)
Dept: CARE COORDINATION | Age: 59
End: 2019-09-26

## 2019-09-27 ENCOUNTER — PROCEDURE VISIT (OUTPATIENT)
Dept: CARDIOLOGY CLINIC | Age: 59
End: 2019-09-27
Payer: MEDICARE

## 2019-09-27 DIAGNOSIS — I63.9 CRYPTOGENIC STROKE (HCC): Primary | ICD-10-CM

## 2019-09-27 PROCEDURE — 93299 PR REM INTERROG ICPMS/SCRMS <30 D TECH REVIEW: CPT | Performed by: INTERNAL MEDICINE

## 2019-09-27 PROCEDURE — 93298 REM INTERROG DEV EVAL SCRMS: CPT | Performed by: INTERNAL MEDICINE

## 2019-09-30 ENCOUNTER — TELEPHONE (OUTPATIENT)
Dept: FAMILY MEDICINE CLINIC | Age: 59
End: 2019-09-30

## 2019-09-30 ENCOUNTER — CARE COORDINATION (OUTPATIENT)
Dept: CARE COORDINATION | Age: 59
End: 2019-09-30

## 2019-10-03 ENCOUNTER — APPOINTMENT (OUTPATIENT)
Dept: GENERAL RADIOLOGY | Age: 59
End: 2019-10-03
Payer: MEDICARE

## 2019-10-03 ENCOUNTER — HOSPITAL ENCOUNTER (EMERGENCY)
Age: 59
Discharge: HOME | End: 2019-10-03
Attending: EMERGENCY MEDICINE
Payer: MEDICARE

## 2019-10-03 ENCOUNTER — CARE COORDINATION (OUTPATIENT)
Dept: CARE COORDINATION | Age: 59
End: 2019-10-03

## 2019-10-03 VITALS
RESPIRATION RATE: 18 BRPM | BODY MASS INDEX: 24.52 KG/M2 | TEMPERATURE: 98 F | HEART RATE: 75 BPM | WEIGHT: 181 LBS | DIASTOLIC BLOOD PRESSURE: 57 MMHG | SYSTOLIC BLOOD PRESSURE: 108 MMHG | OXYGEN SATURATION: 94 % | HEIGHT: 72 IN

## 2019-10-03 DIAGNOSIS — T20.20XD PARTIAL THICKNESS BURN OF FACE, SUBSEQUENT ENCOUNTER: ICD-10-CM

## 2019-10-03 DIAGNOSIS — T17.908A ASPIRATION INTO AIRWAY, INITIAL ENCOUNTER: ICD-10-CM

## 2019-10-03 DIAGNOSIS — J18.9 PNEUMONIA DUE TO ORGANISM: Primary | ICD-10-CM

## 2019-10-03 PROCEDURE — 6370000000 HC RX 637 (ALT 250 FOR IP): Performed by: EMERGENCY MEDICINE

## 2019-10-03 PROCEDURE — 49465 FLUORO EXAM OF G/COLON TUBE: CPT

## 2019-10-03 PROCEDURE — 99282 EMERGENCY DEPT VISIT SF MDM: CPT

## 2019-10-03 PROCEDURE — 6360000004 HC RX CONTRAST MEDICATION: Performed by: EMERGENCY MEDICINE

## 2019-10-03 PROCEDURE — 2709999900 HC NON-CHARGEABLE SUPPLY

## 2019-10-03 PROCEDURE — 71046 X-RAY EXAM CHEST 2 VIEWS: CPT

## 2019-10-03 RX ORDER — AZITHROMYCIN 500 MG/1
500 TABLET, FILM COATED ORAL DAILY
Qty: 3 TABLET | Refills: 0 | Status: SHIPPED | OUTPATIENT
Start: 2019-10-03 | End: 2019-10-06

## 2019-10-03 RX ORDER — AZITHROMYCIN 250 MG/1
500 TABLET, FILM COATED ORAL ONCE
Status: COMPLETED | OUTPATIENT
Start: 2019-10-03 | End: 2019-10-03

## 2019-10-03 RX ADMIN — DIATRIZOATE MEGLUMINE AND DIATRIZOATE SODIUM 30 ML: 600; 100 SOLUTION ORAL; RECTAL at 14:50

## 2019-10-03 RX ADMIN — AZITHROMYCIN 500 MG: 250 TABLET, FILM COATED ORAL at 16:40

## 2019-10-03 ASSESSMENT — ENCOUNTER SYMPTOMS
EYE PAIN: 0
SINUS PAIN: 0
DIARRHEA: 0
BACK PAIN: 0
CHEST TIGHTNESS: 0
SINUS PRESSURE: 0
RECTAL PAIN: 0
CONSTIPATION: 0
ABDOMINAL PAIN: 0
COUGH: 0
BLOOD IN STOOL: 0
SHORTNESS OF BREATH: 0
NAUSEA: 0

## 2019-10-03 ASSESSMENT — PAIN DESCRIPTION - ORIENTATION: ORIENTATION: LEFT

## 2019-10-03 ASSESSMENT — PAIN DESCRIPTION - LOCATION: LOCATION: RIB CAGE

## 2019-10-03 ASSESSMENT — PAIN DESCRIPTION - PAIN TYPE: TYPE: ACUTE PAIN

## 2019-10-03 ASSESSMENT — PAIN SCALES - GENERAL: PAINLEVEL_OUTOF10: 10

## 2019-10-03 ASSESSMENT — PAIN DESCRIPTION - FREQUENCY: FREQUENCY: CONTINUOUS

## 2019-10-04 ENCOUNTER — CARE COORDINATION (OUTPATIENT)
Dept: CARE COORDINATION | Age: 59
End: 2019-10-04

## 2019-10-06 DIAGNOSIS — R22.1 MASS IN NECK: ICD-10-CM

## 2019-10-06 DIAGNOSIS — G89.4 CHRONIC PAIN SYNDROME: ICD-10-CM

## 2019-10-06 DIAGNOSIS — C09.9 MALIGNANT NEOPLASM OF TONSIL (HCC): ICD-10-CM

## 2019-10-06 DIAGNOSIS — G89.3 CANCER ASSOCIATED PAIN: ICD-10-CM

## 2019-10-07 ENCOUNTER — TELEPHONE (OUTPATIENT)
Dept: FAMILY MEDICINE CLINIC | Age: 59
End: 2019-10-07

## 2019-10-07 ENCOUNTER — CARE COORDINATION (OUTPATIENT)
Dept: CARE COORDINATION | Age: 59
End: 2019-10-07

## 2019-10-07 DIAGNOSIS — C09.9 MALIGNANT NEOPLASM OF TONSIL (HCC): ICD-10-CM

## 2019-10-07 DIAGNOSIS — E43 SEVERE MALNUTRITION (HCC): Primary | Chronic | ICD-10-CM

## 2019-10-07 RX ORDER — MORPHINE SULFATE 10 MG/5ML
SOLUTION ORAL
Qty: 560 ML | Refills: 0 | Status: SHIPPED | OUTPATIENT
Start: 2019-10-07 | End: 2019-10-21

## 2019-10-07 ASSESSMENT — ENCOUNTER SYMPTOMS: DYSPNEA ASSOCIATED WITH: EXERTION

## 2019-10-08 ENCOUNTER — CARE COORDINATION (OUTPATIENT)
Dept: CARE COORDINATION | Age: 59
End: 2019-10-08

## 2020-09-28 NOTE — CONSULTS
[facility]    Patient - Joe Ho   MRN -  [id number]   [facility]   - 1960      Date of Admission -  2019  3:25 PM  Date of evaluation -  2019  Room - -[facility]-RAKAN Hui MD Primary Care Physician - Bala Johnson MD   Chief Complaint   CC: Aspiration  Active Hospital Problem List      Active Hospital Problems    Diagnosis Date Noted    Cerebellar infarct [facility]) [I63.9] 2019     HPI   Joe Ho is a 62 y.o. male currently in rehab for L cerebellar CVA, smoker with COPD (listed diagnosis in Epic) the nursing staff noted increased oxygen requirements and CXR revealed decreased volume in the RLL, confirmed with CT chest revealing evidence of endobronchial obstruction on the right side. In conversations with nursing and Dr You Rae aparently family fed Rico Crumble orally over the weekend.     Past Medical History         Diagnosis Date    Anxiety     Bleeds easily ([address]     Bruises easily     Cerebrovascular accident (CVA) due to embolism of right posterior cerebral artery ([address] 2019    Cigarette nicotine dependence without complication     COPD (chronic obstructive pulmonary disease) ([address]     DVT (deep venous thrombosis) ([address]     Enlarged lymph node     LAURA (generalized anxiety disorder)     History of sleep apnea     Hypertension     HCC    Moderate episode of recurrent major depressive disorder ([address] 2018    NSTEMI (non-ST elevated myocardial infarction) ([address] 2019    Pulmonary embolism and infarction ([address] 10/22/2018    S/P insertion of inferior vena caval filter 2009    Severe malnutrition ([address] 10/23/2018    Squamous cell carcinoma of tonsils ([address]     left   - radiation    Type II or unspecified type diabetes mellitus without mention of complication, not stated as uncontrolled     lost weight, no meds at present 2018      Past Surgical or organizations: Not on file     Relationship status: Not on file    Intimate partner violence:     Fear of current or ex partner: Not on file     Emotionally abused: Not on file     Physically abused: Not on file     Forced sexual activity: Not on file   Other Topics Concern    Not on file   Social History Narrative    Not on file     Family History          Problem Relation Age of Onset    Cancer Mother         lung       ROS    General/Constitutional: No recent loss of weight or appetite changes. No fever or chills. HENT: Negative. Eyes: Negative. Upper respiratory tract: No nasal stuffiness or post nasal drip. Lower respiratory tract/ lungs: Weak cough, SOB, wheezing,   Cardiovascular: No palpitations, chest pain or edema. Gastrointestinal: No nausea or vomiting. Neurological: No focal neurological weakness. Extremities: No tenderness. Musculoskeletal: no complaints  Genitourinary: No complaints. Hematological: Negative. Denies easy buising  Skin: No itching.   Meds    Current Medications    piperacillin-tazobactam  3.375 g Intravenous Q8H    ipratropium-albuterol  1 ampule Inhalation 4x daily    midodrine  5 mg Oral TID WC    traZODone  100 mg Oral Nightly    sodium chloride flush  10 mL Intravenous 2 times per day    heparin flush (100 units/mL)  500 Units Intracatheter Daily    vitamin D  50,000 Units PEG Tube Q3 Days    [Held by provider] aspirin  81 mg Oral Daily    Or    [Held by provider] aspirin  300 mg Rectal Daily    atorvastatin  80 mg PEG Tube Nightly    lidocaine  3 patch Transdermal Daily    miconazole   Topical BID    nicotine  1 patch Transdermal Daily    [START ON 7/13/2020] potassium replacement protocol   Other RX Placeholder    [Held by provider] rivaroxaban  20 mg Oral Daily    sertraline  200 mg PEG Tube Daily    docusate  100 mg Per G Tube BID    famotidine  20 mg Per NG tube BID    senna  2 tablet PEG Tube Nightly    busPIRone  10 mg Oral Nightly minimal left pleural effusions. Multiple old healed bilateral rib fractures and a nonunion fracture of the lateral left ninth rib. Small sclerotic lesion in the posterior lateral right 12th rib, cannot exclude a metastasis. Mild bilateral gynecomastia.               **This report has been created using voice recognition software. It may contain minor errors which are inherent in voice recognition technology. **       Final report electronically signed by Dr. Kathy Chacko on 7/29/2019 3:56 AM           (See actual reports for details)    Assessment   Acute hypoxemic respiratory failure secondary to aspiration of particulate material VS mucus plug  Smoker  3 mm lung nodule  Cerebellar CVA  Recommendations   CPT L side down  Metaneb with nebs  Repeat CXR in am  Bronch scheduled tomorrow at 11:30 am  Repeat CT chest in 1 years for lung nodule  Strict NPO  Smoking cessation  Thank you for the consult and allowing us to participate in the care of your patient. Case discussed with nurse and patient/family. Questions and concerns addressed. Meds and Orders reviewed.     Electronically signed by     Ciara Burgos MD on 7/29/2019 at 4:34 PM maryana

## 2023-11-09 NOTE — PROGRESS NOTES
[facility] 4A  Bedside Swallowing Evaluation      SLP Individual Minutes  Time In: 4981  Time Out: 4688  Minutes: 12  Timed Code Treatment Minutes: 0 Minutes       Date: 2019  Patient Name: Ana Laura Corey      CSN: [id number]   : 1960  (62 y.o.)  Gender: male   Referring Physician:  Alexander Corbin DO  Diagnosis: Fall    History of Present Illness/Injury: Patient admitted to [address] [place] [address] with above diagnosis. See physician H&P for full report. Per chart review \"54 y.o. male who presented to [facility] [place] with a 2 day history of falls and dizziness. Patient has a significant medical hx of prior CVA, HTN, COPD, chronic DVT and PE, squamous cell carcinoma of tonsil, DM2, and anxiety. Patient is on Eliquis 5mg BID for DVT/PE. Patient states that he had fallen last night and this morning due to dizziness. He states that he just feels like he has no equilibrium. Patient states that he fell forward and landed on his left shoulder hitting his head but not losing consciousness. Patient states that he has a lot of chronic pain from a prior stroke back in April but that he is having increased pain currently on his left side ribs. He also states he has chronic headaches but that today it is worse. \" Patient with MBS completed  at [address] [place] [address] with recommendations for \"thin and moist purees with thin liquids with strict use of compensatory strategies and oral care protocol - recommend nutritional supplements to support caloric and hydration needs. \" ST consulted to evaluate patient swallow function and determine appropriate dysphagia POC.       Past Medical History:   Diagnosis Date    Anxiety     Bleeds easily ([address]     Bruises easily     Cerebrovascular accident (CVA) due to embolism of right posterior cerebral artery ([address] 2019    Cigarette nicotine dependence without complication 3/0/7397    COPD (chronic obstructive pulmonary disease) ([address]     DVT Ultrasound Not Used Text: Ultrasound was not performed today due to Additional Change To Daily Dosage Administered Mid Treatment?: No Fraction Number: 6 Energy (Kv): 70 Bill For Set Radiation Therapy Field (82002)?: Yes Treatment Documentation: Physician Orders: Radiotherapy Treatment Plan: Superficial Radiotherapy with Ultrasound\\n\\nPlan: This patient has been treated today with image-guided superficial radiation therapy for non-melanoma skin cancer.  Written informed consent has been previously obtained from this patient for this treatment. This consent is documented in the patient's chart. The patient gave verbal consent to continue treatment today.\\nThe patient was treated with a specific radiation dose and setup as prescribed by the provider listed on this visit note. A Radiation Therapist performed administration of radiation under the supervision of a provider.\\nThe treatment parameters and cumulative dose are indicated above. Prior to administering the radiation, the patient underwent a verification therapeutic radiology simulation-aided field setting defining relevant normal and abnormal target anatomy and acquiring images with a separate and distinct diagnostic high-frequency ultrasound to delineate tissues and determine whether to proceed with delivery of therapeutic, in addition to retrieve data necessary to develop an optimal radiation treatment process for the patient. The field placement simulation documents any change seen in overall tumor volume documented in the patient’s record, allows the clinician to indicate any needed changes in the treatment plan and/or prescription, provides diagnostic evaluation as the basis for performing the therapeutic procedure, and clearly identifies the information needed to decide to proceed with the therapeutic procedure. This process includes verification of the treatment port(s) and proper treatment positioning. All treatment ports were photographed within electronic medical records. The patient's lead blocking along with gross tumor volume and margin was confirmed. Considering superficial radiotherapy is clinical in setup, this requires the physician and radiation therapist to clarify the location interest being treated against initial images, ultrasound, pathology, and patient anatomy. Care was taken to ensure dawson treated were geometrically accurate and properly positioned using therapeutic radiology simulation-aided field setting verification per fraction. This process is also utilized to determine if any prescription or setup changes are necessary. These steps are therefore medically necessary to ensure safe and effective administration of radiation. Ongoing therapeutic radiology simulation-aided field setting verification is ordered throughout the course of therapy.  A high-frequency ultrasound image was acquired today for a two-dimensional evaluation of the tumor volume, depth, width, breadth, review of prior response to treatment, provide geometric accuracy of field placement, and determine whether to proceed with therapeutic delivery.\\nThe field placement and ultrasound imaging, per fraction, is separate and distinct from the initial simulation and is an important task in providing safe administration of superficial radiation therapy. Physician evaluation of the ultrasound information will be ongoing throughout the course of treatment and is deemed medically necessary to ensure the efficacy of treatment, whether to proceed with therapeutic delivery, and determine any necessary changes. Today's images were evaluated for determination of continuation of treatment with the current plan or with necessary changes as appropriate. According to the review of verification therapeutic radiology simulation-aided field setting and imaging, no change is required.\\nAdditionally, the use of ultrasound visualization and targeted assessment allows the patient to be able to see her cancer progress, encouraging the patient to complete and maintain compliance through the full course of prescribed radiotherapy. Per Dr. Garrett Roberts, continued ultrasound guidance and therapeutic radiology simulation-aided field setting verification per fraction is required for field placement, measurement of tumor depth, tissues evaluation, progress, acute effect monitoring, and determination for therapeutic treatment delivery is appropriate. Daily Dosage (Cgy): 270.06 Additional Comments (Add Customization Of Note Here): Biopsy site looks great.  No concerns or questions. Total Cumulative Dose (Cgy): 1620.36 Add X Modifier?: VIEYRA - Unusual Non-Overlapping Service Calculate Total Cumulative Dose Automatically Or Manually: Manually Prescription Used: 1 Ultrasound Used Text: High frequency ultrasound depth is 0.77 mm, which is +0.04 mm in difference from previous imaging.

## (undated) DEVICE — Device

## (undated) DEVICE — TUBING IV STOPCOCK 48 CM 3 W

## (undated) DEVICE — TRAP,MUCUS SPECIMEN, 80CC: Brand: MEDLINE

## (undated) DEVICE — KIT PEG DIA20FR STD PUSH DISP ENDOVIVE

## (undated) DEVICE — PACK PROCEDURE SURG POD SC SRHP LF

## (undated) DEVICE — GAUZE,SPONGE,8"X4",12PLY,XRAY,STRL,LF: Brand: MEDLINE

## (undated) DEVICE — SOLUTION IV IRRIG POUR BRL 0.9% SODIUM CHL 2F7124

## (undated) DEVICE — THIN OFFSET (9.0 X 0.38 X 25.0MM)

## (undated) DEVICE — GLOVE ORANGE PI 8 1/2   MSG9085

## (undated) DEVICE — GLOVE ORANGE PI 8   MSG9080

## (undated) DEVICE — GOWN,SIRUS,NONRNF,SETINSLV,XL,20/CS: Brand: MEDLINE

## (undated) DEVICE — CONMED SCOPE SAVER BITE BLOCK, 20X27 MM: Brand: SCOPE SAVER

## (undated) DEVICE — SET LNR RED GRN W/ BASE CLEANASCOPE

## (undated) DEVICE — SINGLE USE SUCTION VALVE MAJ-209: Brand: SINGLE USE SUCTION VALVE (STERILE)

## (undated) DEVICE — FORCEP RAD JAW W/NEEDLE 160CM

## (undated) DEVICE — TUBING, SUCTION, 1/4" X 6', STRAIGHT: Brand: MEDLINE

## (undated) DEVICE — ENDO KIT: Brand: MEDLINE INDUSTRIES, INC.

## (undated) DEVICE — CHLORAPREP 26ML CLEAR

## (undated) DEVICE — Z DISCONTINUED BY MEDLINE USE 2711682 TRAY SKIN PREP DRY W/ PREM GLV

## (undated) DEVICE — SOLUTION IV 1000ML 0.45% SOD CHL PH 5 INJ USP VIAFLX PLAS

## (undated) DEVICE — GLOVE ORANGE PI 7 1/2   MSG9075

## (undated) DEVICE — INTENDED FOR TISSUE SEPARATION, AND OTHER PROCEDURES THAT REQUIRE A SHARP SURGICAL BLADE TO PUNCTURE OR CUT.: Brand: BARD-PARKER ® CARBON RIB-BACK BLADES

## (undated) DEVICE — DRESSING ALG W3XL4IN TRNSPAR ANTIMIC WND CNTCT LAYR W/

## (undated) DEVICE — CONNECTOR TBNG AUX H2O JET DISP FOR OLY 160/180 SER

## (undated) DEVICE — SINGLE USE BIOPSY VALVE MAJ-210: Brand: SINGLE USE BIOPSY VALVE (STERILE)

## (undated) DEVICE — MASK O2 AD TB L7FT HI CONC PART N RBRTH W RESVR BG SFTY

## (undated) DEVICE — LINER SUCT CANSTR 1500CC SEMI RIG W/ POR HYDROPHOBIC SHUT

## (undated) DEVICE — CONTAINER,SPECIMEN,PNEU TUBE,4OZ,OR STRL: Brand: MEDLINE

## (undated) DEVICE — SWABSTICK MEDICATED 10% POVIDONE IOD PVP SGL ANTISEP SAT

## (undated) DEVICE — SET ADMIN 25ML L117IN PMP MOD CK VLV RLER CLMP 2 SMRTSITE

## (undated) DEVICE — HYPODERMIC SAFETY NEEDLE: Brand: MAGELLAN